# Patient Record
Sex: FEMALE | Race: WHITE | Employment: OTHER | URBAN - METROPOLITAN AREA
[De-identification: names, ages, dates, MRNs, and addresses within clinical notes are randomized per-mention and may not be internally consistent; named-entity substitution may affect disease eponyms.]

---

## 2020-07-24 ENCOUNTER — HOSPITAL ENCOUNTER (OUTPATIENT)
Dept: AUDIOLOGY | Age: 70
Discharge: HOME | End: 2020-07-24
Payer: MEDICARE

## 2020-07-24 PROCEDURE — 92545 OSCILLATING TRACKING TEST: CPT | Performed by: AUDIOLOGIST

## 2020-07-24 PROCEDURE — 92544 OPTOKINETIC NYSTAGMUS TEST: CPT | Performed by: AUDIOLOGIST

## 2020-07-24 PROCEDURE — 92537 CALORIC VSTBLR TEST W/REC: CPT | Performed by: AUDIOLOGIST

## 2020-07-24 PROCEDURE — 92541 SPONTANEOUS NYSTAGMUS TEST: CPT | Performed by: AUDIOLOGIST

## 2020-07-24 PROCEDURE — 92542 POSITIONAL NYSTAGMUS TEST: CPT | Performed by: AUDIOLOGIST

## 2020-07-24 NOTE — PROGRESS NOTES
VNG EVALUATION    REASON FOR REFERRAL:  This patient was referred for VNG testing by Dr. Donato Lowe due to dizziness. The patient reported dizziness that began 10-11 months ago. She reported dizziness when getting up quickly or bending over. She has fallen 5x in the last 3 months. She denied any nausea and vomiting. She reported that she used to take medications for high blood pressure, but since gastric bypass surgery she has been able to stop taking the medications. She reported that the dizziness occurs daily and the room is spinning when this happens. RESULTS:  Bithermal caloric irrigations revealed appropriate beating nystagmus with no unilateral weakness. Directional preponderance and fixation suppression were within normal limits. No irregular eye movements were recorded during saccades, pendular tracking, or optokinetic testing. No significant nystagmus was recorded during gaze or positional testing, with the exception of some rotary nystagmus during positional head right with vision. IMPRESSION:  Caloric testing was within normal limits. Oculomotor and positional test results were within normal limits with the exception of positional head right with vision which cannot rule out possible central involvement. If I can be of further assistance or provide additional information, please do not hesitate to contact this office.     Thank you for the referral.      __________________________________  Electronically signed by Michael Arellano on 7/24/2020 at 5:54 PM

## 2023-03-24 ENCOUNTER — TELEPHONE (OUTPATIENT)
Dept: PRIMARY CARE | Facility: CLINIC | Age: 73
End: 2023-03-24
Payer: MEDICARE

## 2023-03-24 DIAGNOSIS — I10 PRIMARY HYPERTENSION: ICD-10-CM

## 2023-03-24 DIAGNOSIS — Z79.4 TYPE 2 DIABETES MELLITUS WITH DIABETIC AUTONOMIC NEUROPATHY, WITH LONG-TERM CURRENT USE OF INSULIN (MULTI): ICD-10-CM

## 2023-03-24 DIAGNOSIS — E66.09 CLASS 1 OBESITY DUE TO EXCESS CALORIES WITH SERIOUS COMORBIDITY AND BODY MASS INDEX (BMI) OF 30.0 TO 30.9 IN ADULT: ICD-10-CM

## 2023-03-24 DIAGNOSIS — E55.9 VITAMIN D DEFICIENCY: ICD-10-CM

## 2023-03-24 DIAGNOSIS — E87.6 HYPOKALEMIA: Primary | ICD-10-CM

## 2023-03-24 DIAGNOSIS — I50.22 CHRONIC SYSTOLIC CONGESTIVE HEART FAILURE (MULTI): ICD-10-CM

## 2023-03-24 DIAGNOSIS — I48.11 LONGSTANDING PERSISTENT ATRIAL FIBRILLATION (MULTI): ICD-10-CM

## 2023-03-24 DIAGNOSIS — Z98.84 GASTRIC BYPASS STATUS FOR OBESITY: ICD-10-CM

## 2023-03-24 DIAGNOSIS — E11.43 TYPE 2 DIABETES MELLITUS WITH DIABETIC AUTONOMIC NEUROPATHY, WITH LONG-TERM CURRENT USE OF INSULIN (MULTI): ICD-10-CM

## 2023-03-29 RX ORDER — POTASSIUM CHLORIDE 750 MG/1
2 CAPSULE, EXTENDED RELEASE ORAL 2 TIMES DAILY
COMMUNITY
Start: 2021-07-02 | End: 2023-03-29 | Stop reason: ENTERED-IN-ERROR

## 2023-03-29 NOTE — TELEPHONE ENCOUNTER
Called patient and she stated she is taking the potassium 10 meq twice a day so I tried to add that one but she has other orders for the 20 mEq (which would make more sense to take). There was an outpatient order for the 20 mEq, please send whichever you prefer.     [facility] pharmacy.

## 2023-04-05 PROBLEM — Z79.4 TYPE 2 DIABETES MELLITUS WITH HYPERGLYCEMIA, WITH LONG-TERM CURRENT USE OF INSULIN (MULTI): Status: ACTIVE | Noted: 2023-04-05

## 2023-04-05 PROBLEM — E11.65 TYPE 2 DIABETES MELLITUS WITH HYPERGLYCEMIA, WITH LONG-TERM CURRENT USE OF INSULIN (MULTI): Status: ACTIVE | Noted: 2023-04-05

## 2023-04-05 RX ORDER — POTASSIUM CHLORIDE 750 MG/1
10 TABLET, FILM COATED, EXTENDED RELEASE ORAL 2 TIMES DAILY
Qty: 180 TABLET | Refills: 3 | Status: SHIPPED | OUTPATIENT
Start: 2023-04-05 | End: 2024-04-09

## 2023-04-05 NOTE — TELEPHONE ENCOUNTER
Pt taking 10meq twice daily.   States this is her 3 rd request.     Also needs order for lab work.

## 2023-04-10 PROBLEM — M17.0 DEGENERATIVE ARTHRITIS OF KNEE, BILATERAL: Status: ACTIVE | Noted: 2023-04-10

## 2023-04-10 PROBLEM — J34.0 NASAL SEPTUM ULCERATION: Status: ACTIVE | Noted: 2023-04-10

## 2023-04-10 PROBLEM — R42 DIZZINESS: Status: ACTIVE | Noted: 2023-04-10

## 2023-04-10 PROBLEM — I95.1 ORTHOSTATIC HYPOTENSION: Status: ACTIVE | Noted: 2023-04-10

## 2023-04-10 PROBLEM — U07.1 COVID-19 VIRUS INFECTION: Status: ACTIVE | Noted: 2023-04-10

## 2023-04-10 PROBLEM — R53.81 MALAISE AND FATIGUE: Status: ACTIVE | Noted: 2023-04-10

## 2023-04-10 PROBLEM — D34 HURTHLE CELL NEOPLASM OF THYROID: Status: ACTIVE | Noted: 2023-04-10

## 2023-04-10 PROBLEM — M70.52 BURSITIS OF LEFT KNEE: Status: ACTIVE | Noted: 2023-04-10

## 2023-04-10 PROBLEM — R11.2 POST-OPERATIVE NAUSEA AND VOMITING: Status: ACTIVE | Noted: 2023-04-10

## 2023-04-10 PROBLEM — K74.60 CIRRHOSIS (MULTI): Status: ACTIVE | Noted: 2023-04-10

## 2023-04-10 PROBLEM — I48.91 ATRIAL FIBRILLATION (MULTI): Status: ACTIVE | Noted: 2023-04-10

## 2023-04-10 PROBLEM — R63.5 ABNORMAL WEIGHT GAIN: Status: ACTIVE | Noted: 2023-04-10

## 2023-04-10 PROBLEM — J98.4 RESTRICTIVE LUNG DISEASE: Status: ACTIVE | Noted: 2023-04-10

## 2023-04-10 PROBLEM — K29.70 GASTRITIS: Status: ACTIVE | Noted: 2023-04-10

## 2023-04-10 PROBLEM — R53.83 MALAISE AND FATIGUE: Status: ACTIVE | Noted: 2023-04-10

## 2023-04-10 PROBLEM — R05.9 COUGH: Status: ACTIVE | Noted: 2023-04-10

## 2023-04-10 PROBLEM — E11.42 DIABETIC PERIPHERAL NEUROPATHY (MULTI): Status: ACTIVE | Noted: 2023-04-10

## 2023-04-10 PROBLEM — F32.5 DEPRESSION, MAJOR, IN REMISSION (CMS-HCC): Status: ACTIVE | Noted: 2023-04-10

## 2023-04-10 PROBLEM — R60.9 PERIPHERAL EDEMA: Status: ACTIVE | Noted: 2023-04-10

## 2023-04-10 PROBLEM — R05.8 POST-VIRAL COUGH SYNDROME: Status: ACTIVE | Noted: 2023-04-10

## 2023-04-10 PROBLEM — B37.2 CUTANEOUS CANDIDIASIS: Status: ACTIVE | Noted: 2023-04-10

## 2023-04-10 PROBLEM — R80.9 URINE TEST POSITIVE FOR MICROALBUMINURIA: Status: ACTIVE | Noted: 2023-04-10

## 2023-04-10 PROBLEM — K52.9 CHRONIC DIARRHEA: Status: ACTIVE | Noted: 2023-04-10

## 2023-04-10 PROBLEM — R19.5 POSITIVE COLORECTAL CANCER SCREENING USING COLOGUARD TEST: Status: ACTIVE | Noted: 2023-04-10

## 2023-04-10 PROBLEM — K91.2 INTESTINAL MALABSORPTION FOLLOWING GASTRECTOMY (HHS-HCC): Status: ACTIVE | Noted: 2023-04-10

## 2023-04-10 PROBLEM — E66.9 CLASS 1 OBESITY WITH BODY MASS INDEX (BMI) OF 30.0 TO 30.9 IN ADULT: Status: ACTIVE | Noted: 2023-04-10

## 2023-04-10 PROBLEM — T38.3X5A HYPOGLYCEMIA DUE TO INSULIN: Status: ACTIVE | Noted: 2023-04-10

## 2023-04-10 PROBLEM — L97.929 LEG ULCER, LEFT (MULTI): Status: ACTIVE | Noted: 2023-04-10

## 2023-04-10 PROBLEM — M25.561 BILATERAL CHRONIC KNEE PAIN: Status: ACTIVE | Noted: 2023-04-10

## 2023-04-10 PROBLEM — R31.9 HEMATURIA: Status: ACTIVE | Noted: 2023-04-10

## 2023-04-10 PROBLEM — I89.0 LYMPHEDEMA: Status: ACTIVE | Noted: 2023-04-10

## 2023-04-10 PROBLEM — R06.00 DYSPNEA: Status: ACTIVE | Noted: 2023-04-10

## 2023-04-10 PROBLEM — R06.09 DYSPNEA ON EXERTION: Status: ACTIVE | Noted: 2023-04-10

## 2023-04-10 PROBLEM — E78.1 HYPERTRIGLYCERIDEMIA: Status: ACTIVE | Noted: 2023-04-10

## 2023-04-10 PROBLEM — R06.02 SOB (SHORTNESS OF BREATH) ON EXERTION: Status: ACTIVE | Noted: 2023-04-10

## 2023-04-10 PROBLEM — B36.9 FUNGAL RASH OF TRUNK: Status: ACTIVE | Noted: 2023-04-10

## 2023-04-10 PROBLEM — Z98.84 H/O BARIATRIC SURGERY: Status: ACTIVE | Noted: 2023-04-10

## 2023-04-10 PROBLEM — K21.9 GERD (GASTROESOPHAGEAL REFLUX DISEASE): Status: ACTIVE | Noted: 2023-04-10

## 2023-04-10 PROBLEM — M24.80 CERVICAL SPINE CREPITUS: Status: ACTIVE | Noted: 2023-04-10

## 2023-04-10 PROBLEM — Z90.3 INTESTINAL MALABSORPTION FOLLOWING GASTRECTOMY (HHS-HCC): Status: ACTIVE | Noted: 2023-04-10

## 2023-04-10 PROBLEM — N39.0 UTI (URINARY TRACT INFECTION): Status: ACTIVE | Noted: 2023-04-10

## 2023-04-10 PROBLEM — H81.4 VERTIGO OF CENTRAL ORIGIN: Status: ACTIVE | Noted: 2023-04-10

## 2023-04-10 PROBLEM — Z89.422: Status: ACTIVE | Noted: 2023-04-10

## 2023-04-10 PROBLEM — E11.319: Status: ACTIVE | Noted: 2023-04-10

## 2023-04-10 PROBLEM — Z98.890 POST-OPERATIVE NAUSEA AND VOMITING: Status: ACTIVE | Noted: 2023-04-10

## 2023-04-10 PROBLEM — I10 HYPERTENSION: Status: ACTIVE | Noted: 2023-04-10

## 2023-04-10 PROBLEM — R30.0 DYSURIA: Status: ACTIVE | Noted: 2023-04-10

## 2023-04-10 PROBLEM — R09.02 HYPOXIA: Status: ACTIVE | Noted: 2023-04-10

## 2023-04-10 PROBLEM — N18.9 CHRONIC RENAL FAILURE: Status: ACTIVE | Noted: 2023-04-10

## 2023-04-10 PROBLEM — R40.0 DAYTIME SOMNOLENCE: Status: ACTIVE | Noted: 2023-04-10

## 2023-04-10 PROBLEM — M25.50 JOINT PAIN OF LEG: Status: ACTIVE | Noted: 2023-04-10

## 2023-04-10 PROBLEM — M25.562 BILATERAL CHRONIC KNEE PAIN: Status: ACTIVE | Noted: 2023-04-10

## 2023-04-10 PROBLEM — R32 INCONTINENCE: Status: ACTIVE | Noted: 2023-04-10

## 2023-04-10 PROBLEM — I51.7 CARDIOMEGALY: Status: ACTIVE | Noted: 2023-04-10

## 2023-04-10 PROBLEM — N20.0 KIDNEY STONE ON RIGHT SIDE: Status: ACTIVE | Noted: 2023-04-10

## 2023-04-10 PROBLEM — I87.2 CHRONIC STASIS DERMATITIS: Status: ACTIVE | Noted: 2023-04-10

## 2023-04-10 PROBLEM — G89.29 CHRONIC PAIN: Status: ACTIVE | Noted: 2023-04-10

## 2023-04-10 PROBLEM — T84.50XA PROSTHETIC JOINT INFECTION (CMS-HCC): Status: ACTIVE | Noted: 2023-04-10

## 2023-04-10 PROBLEM — M48.02 DEGENERATIVE CERVICAL SPINAL STENOSIS: Status: ACTIVE | Noted: 2023-04-10

## 2023-04-10 PROBLEM — G89.18 POST-OPERATIVE PAIN: Status: ACTIVE | Noted: 2023-04-10

## 2023-04-10 PROBLEM — L97.529 FOOT ULCER, LEFT (MULTI): Status: ACTIVE | Noted: 2023-04-10

## 2023-04-10 PROBLEM — E16.0 HYPOGLYCEMIA DUE TO INSULIN: Status: ACTIVE | Noted: 2023-04-10

## 2023-04-10 PROBLEM — L23.7 POISON IVY DERMATITIS: Status: ACTIVE | Noted: 2023-04-10

## 2023-04-10 PROBLEM — H61.21 IMPACTED CERUMEN OF RIGHT EAR: Status: ACTIVE | Noted: 2023-04-10

## 2023-04-10 PROBLEM — G47.33 OBSTRUCTIVE SLEEP APNEA: Status: ACTIVE | Noted: 2023-04-10

## 2023-04-10 PROBLEM — Z98.84 STATUS POST GASTRIC BYPASS FOR OBESITY: Status: ACTIVE | Noted: 2023-04-10

## 2023-04-10 PROBLEM — E11.9 TYPE 2 DIABETES MELLITUS (MULTI): Status: ACTIVE | Noted: 2023-04-10

## 2023-04-10 PROBLEM — F51.04 CHRONIC INSOMNIA: Status: ACTIVE | Noted: 2023-04-10

## 2023-04-10 PROBLEM — R91.1 LUNG NODULE: Status: ACTIVE | Noted: 2023-04-10

## 2023-04-10 PROBLEM — J45.909 REACTIVE AIRWAY DISEASE (HHS-HCC): Status: ACTIVE | Noted: 2023-04-10

## 2023-04-10 PROBLEM — E66.9 OBESITY (BMI 30-39.9): Status: ACTIVE | Noted: 2023-04-10

## 2023-04-10 PROBLEM — F41.9 ANXIETY: Status: ACTIVE | Noted: 2023-04-10

## 2023-04-10 PROBLEM — H61.23 BILATERAL IMPACTED CERUMEN: Status: ACTIVE | Noted: 2023-04-10

## 2023-04-10 PROBLEM — B37.31 VAGINAL CANDIDIASIS: Status: ACTIVE | Noted: 2023-04-10

## 2023-04-10 PROBLEM — F32.A CHRONIC DEPRESSION: Status: ACTIVE | Noted: 2023-04-10

## 2023-04-10 PROBLEM — I77.810 THORACIC AORTIC ECTASIA (CMS-HCC): Status: ACTIVE | Noted: 2023-04-10

## 2023-04-10 PROBLEM — R19.5 POSITIVE FIT (FECAL IMMUNOCHEMICAL TEST): Status: ACTIVE | Noted: 2023-04-10

## 2023-04-10 PROBLEM — G89.29 BILATERAL CHRONIC KNEE PAIN: Status: ACTIVE | Noted: 2023-04-10

## 2023-04-10 PROBLEM — F33.40 RECURRENT MAJOR DEPRESSIVE DISORDER, IN REMISSION (CMS-HCC): Status: ACTIVE | Noted: 2023-04-10

## 2023-04-10 PROBLEM — M19.019 ARTHROSIS OF SHOULDER: Status: ACTIVE | Noted: 2023-04-10

## 2023-04-10 PROBLEM — I71.20 THORACIC AORTIC ANEURYSM (CMS-HCC): Status: ACTIVE | Noted: 2023-04-10

## 2023-04-10 PROBLEM — M85.80 OSTEOPENIA: Status: ACTIVE | Noted: 2023-04-10

## 2023-04-10 PROBLEM — I50.9 CONGESTIVE HEART FAILURE (MULTI): Status: ACTIVE | Noted: 2023-04-10

## 2023-04-10 PROBLEM — J30.9 ALLERGIC RHINITIS: Status: ACTIVE | Noted: 2023-04-10

## 2023-04-10 PROBLEM — K76.9 LIVER PROBLEM: Status: ACTIVE | Noted: 2023-04-10

## 2023-04-10 PROBLEM — R10.9 ABDOMINAL CRAMPING: Status: ACTIVE | Noted: 2023-04-10

## 2023-04-10 PROBLEM — E89.0 HYPOTHYROIDISM, POSTSURGICAL: Status: ACTIVE | Noted: 2023-04-10

## 2023-04-10 RX ORDER — ASPIRIN 325 MG
50000 TABLET, DELAYED RELEASE (ENTERIC COATED) ORAL
Status: ON HOLD | COMMUNITY
Start: 2020-08-28 | End: 2024-03-27 | Stop reason: ENTERED-IN-ERROR

## 2023-04-10 RX ORDER — OMEPRAZOLE 40 MG/1
CAPSULE, DELAYED RELEASE ORAL
COMMUNITY
Start: 2019-12-04 | End: 2024-04-11

## 2023-04-10 RX ORDER — FLASH GLUCOSE SENSOR
KIT MISCELLANEOUS
COMMUNITY
Start: 2023-03-21 | End: 2023-05-12 | Stop reason: SDUPTHER

## 2023-04-10 RX ORDER — ASPIRIN 81 MG/1
1 TABLET ORAL
Status: ON HOLD | COMMUNITY
Start: 2020-04-21

## 2023-04-10 RX ORDER — GABAPENTIN 300 MG/1
1 CAPSULE ORAL 3 TIMES DAILY
COMMUNITY
Start: 2016-11-04 | End: 2023-09-07 | Stop reason: SDUPTHER

## 2023-04-10 RX ORDER — VENLAFAXINE HYDROCHLORIDE 150 MG/1
1 CAPSULE, EXTENDED RELEASE ORAL DAILY
COMMUNITY
Start: 2021-10-01 | End: 2023-05-12

## 2023-04-10 RX ORDER — AMIODARONE HYDROCHLORIDE 200 MG/1
1 TABLET ORAL DAILY
COMMUNITY
Start: 2023-02-11 | End: 2023-05-12 | Stop reason: SDUPTHER

## 2023-04-10 RX ORDER — INSULIN LISPRO 100 [IU]/ML
30 INJECTION, SUSPENSION SUBCUTANEOUS 2 TIMES DAILY
Status: ON HOLD | COMMUNITY
Start: 2023-02-17 | End: 2024-03-27 | Stop reason: ENTERED-IN-ERROR

## 2023-04-10 RX ORDER — PSEUDOEPHEDRINE HCL 30 MG
30 TABLET ORAL AS NEEDED
COMMUNITY
End: 2024-02-01 | Stop reason: HOSPADM

## 2023-04-10 RX ORDER — FAMOTIDINE 40 MG/1
40 TABLET, FILM COATED ORAL DAILY
COMMUNITY
Start: 2023-02-23 | End: 2023-05-12 | Stop reason: SDUPTHER

## 2023-04-10 RX ORDER — HUMAN INSULIN 100 [USP'U]/ML
INJECTION, SUSPENSION SUBCUTANEOUS
COMMUNITY
End: 2023-05-12 | Stop reason: CLARIF

## 2023-04-10 RX ORDER — METFORMIN HYDROCHLORIDE 1000 MG/1
1 TABLET ORAL 2 TIMES DAILY
COMMUNITY
Start: 2022-02-10 | End: 2023-06-15 | Stop reason: ALTCHOICE

## 2023-04-10 RX ORDER — TORSEMIDE 20 MG/1
2 TABLET ORAL DAILY
Status: ON HOLD | COMMUNITY
Start: 2018-08-30 | End: 2024-03-27 | Stop reason: ENTERED-IN-ERROR

## 2023-04-10 RX ORDER — PRENATAL VIT CALC,IRON,FOLIC
1 TABLET ORAL DAILY
Status: ON HOLD | COMMUNITY
Start: 2020-04-21

## 2023-04-10 RX ORDER — NYSTATIN 100000 [USP'U]/G
1 POWDER TOPICAL 2 TIMES DAILY PRN
Status: ON HOLD | COMMUNITY
Start: 2022-06-29 | End: 2024-03-27 | Stop reason: ENTERED-IN-ERROR

## 2023-04-10 RX ORDER — BLOOD SUGAR DIAGNOSTIC
STRIP MISCELLANEOUS
Status: ON HOLD | COMMUNITY
Start: 2020-03-18

## 2023-04-10 RX ORDER — SPIRONOLACTONE 25 MG/1
1 TABLET ORAL DAILY
COMMUNITY
Start: 2021-01-08 | End: 2024-04-11

## 2023-04-10 RX ORDER — TRAZODONE HYDROCHLORIDE 50 MG/1
1 TABLET ORAL NIGHTLY
COMMUNITY
End: 2023-05-15

## 2023-04-10 RX ORDER — MULTIVIT-MIN/IRON/FOLIC ACID/K 18-600-40
1 CAPSULE ORAL DAILY
Status: ON HOLD | COMMUNITY
Start: 2020-08-28

## 2023-04-10 RX ORDER — CLOTRIMAZOLE AND BETAMETHASONE DIPROPIONATE 10; .64 MG/G; MG/G
1 CREAM TOPICAL 2 TIMES DAILY PRN
Status: ON HOLD | COMMUNITY
Start: 2022-06-29 | End: 2024-03-27 | Stop reason: ENTERED-IN-ERROR

## 2023-04-10 RX ORDER — VENLAFAXINE HYDROCHLORIDE 75 MG/1
1 CAPSULE, EXTENDED RELEASE ORAL DAILY
COMMUNITY
Start: 2021-04-05 | End: 2023-05-12

## 2023-04-10 RX ORDER — OXYCODONE AND ACETAMINOPHEN 10; 325 MG/1; MG/1
1 TABLET ORAL EVERY 4 HOURS PRN
COMMUNITY
Start: 2022-09-28 | End: 2023-11-13 | Stop reason: SDUPTHER

## 2023-04-10 RX ORDER — METOPROLOL SUCCINATE 50 MG/1
1.5 TABLET, EXTENDED RELEASE ORAL DAILY
COMMUNITY
Start: 2022-05-24 | End: 2023-05-12 | Stop reason: SDUPTHER

## 2023-04-10 RX ORDER — LEVOTHYROXINE SODIUM 175 UG/1
1 TABLET ORAL DAILY
COMMUNITY
Start: 2021-06-22 | End: 2024-02-13 | Stop reason: HOSPADM

## 2023-04-10 RX ORDER — ACETAMINOPHEN, ASPIRIN (NSAID), AND CAFFEINE 250; 250; 65 MG/1; MG/1; MG/1
1 TABLET, FILM COATED ORAL EVERY 6 HOURS PRN
COMMUNITY
Start: 2020-05-18 | End: 2024-02-01 | Stop reason: HOSPADM

## 2023-05-12 ENCOUNTER — OFFICE VISIT (OUTPATIENT)
Dept: PRIMARY CARE | Facility: CLINIC | Age: 73
End: 2023-05-12
Payer: MEDICARE

## 2023-05-12 VITALS
DIASTOLIC BLOOD PRESSURE: 60 MMHG | TEMPERATURE: 98 F | BODY MASS INDEX: 33.18 KG/M2 | HEART RATE: 87 BPM | RESPIRATION RATE: 16 BRPM | OXYGEN SATURATION: 95 % | SYSTOLIC BLOOD PRESSURE: 130 MMHG | HEIGHT: 72 IN | WEIGHT: 245 LBS

## 2023-05-12 DIAGNOSIS — K21.9 GASTROESOPHAGEAL REFLUX DISEASE WITHOUT ESOPHAGITIS: ICD-10-CM

## 2023-05-12 DIAGNOSIS — Z23 NEED FOR PROPHYLACTIC VACCINATION AGAINST STREPTOCOCCUS PNEUMONIAE (PNEUMOCOCCUS): ICD-10-CM

## 2023-05-12 DIAGNOSIS — E66.09 CLASS 1 OBESITY DUE TO EXCESS CALORIES WITH SERIOUS COMORBIDITY AND BODY MASS INDEX (BMI) OF 30.0 TO 30.9 IN ADULT: ICD-10-CM

## 2023-05-12 DIAGNOSIS — F33.40 RECURRENT MAJOR DEPRESSIVE DISORDER, IN REMISSION (CMS-HCC): ICD-10-CM

## 2023-05-12 DIAGNOSIS — N39.46 MIXED STRESS AND URGE URINARY INCONTINENCE: ICD-10-CM

## 2023-05-12 DIAGNOSIS — E78.1 HYPERTRIGLYCERIDEMIA: ICD-10-CM

## 2023-05-12 DIAGNOSIS — R06.09 DYSPNEA ON EXERTION: ICD-10-CM

## 2023-05-12 DIAGNOSIS — L89.623 PRESSURE ULCER OF LEFT HEEL, STAGE 3 (MULTI): Primary | ICD-10-CM

## 2023-05-12 DIAGNOSIS — E11.65 TYPE 2 DIABETES MELLITUS WITH HYPERGLYCEMIA, WITH LONG-TERM CURRENT USE OF INSULIN (MULTI): ICD-10-CM

## 2023-05-12 DIAGNOSIS — J96.01 ACUTE RESPIRATORY FAILURE WITH HYPOXIA (MULTI): ICD-10-CM

## 2023-05-12 DIAGNOSIS — J44.9 CHRONIC OBSTRUCTIVE PULMONARY DISEASE, UNSPECIFIED COPD TYPE (MULTI): ICD-10-CM

## 2023-05-12 DIAGNOSIS — Z89.422: ICD-10-CM

## 2023-05-12 DIAGNOSIS — G89.29 BILATERAL CHRONIC KNEE PAIN: ICD-10-CM

## 2023-05-12 DIAGNOSIS — I50.42 CHRONIC COMBINED SYSTOLIC AND DIASTOLIC HEART FAILURE (MULTI): ICD-10-CM

## 2023-05-12 DIAGNOSIS — I87.2 CHRONIC STASIS DERMATITIS: ICD-10-CM

## 2023-05-12 DIAGNOSIS — E11.42 TYPE 2 DIABETES MELLITUS WITH DIABETIC POLYNEUROPATHY, WITH LONG-TERM CURRENT USE OF INSULIN (MULTI): ICD-10-CM

## 2023-05-12 DIAGNOSIS — Z79.4 TYPE 2 DIABETES MELLITUS WITH HYPERGLYCEMIA, WITH LONG-TERM CURRENT USE OF INSULIN (MULTI): ICD-10-CM

## 2023-05-12 DIAGNOSIS — K74.4 SECONDARY BILIARY CIRRHOSIS (MULTI): ICD-10-CM

## 2023-05-12 DIAGNOSIS — I71.21 ANEURYSM OF ASCENDING AORTA WITHOUT RUPTURE (CMS-HCC): ICD-10-CM

## 2023-05-12 DIAGNOSIS — E89.0 HYPOTHYROIDISM, POSTSURGICAL: ICD-10-CM

## 2023-05-12 DIAGNOSIS — Z79.899 MEDICATION MANAGEMENT: ICD-10-CM

## 2023-05-12 DIAGNOSIS — E66.01 MORBID OBESITY (MULTI): ICD-10-CM

## 2023-05-12 DIAGNOSIS — M25.561 BILATERAL CHRONIC KNEE PAIN: ICD-10-CM

## 2023-05-12 DIAGNOSIS — M17.0 PRIMARY OSTEOARTHRITIS OF BOTH KNEES: ICD-10-CM

## 2023-05-12 DIAGNOSIS — E66.9 OBESITY (BMI 30-39.9): ICD-10-CM

## 2023-05-12 DIAGNOSIS — E11.42 DIABETIC PERIPHERAL NEUROPATHY (MULTI): ICD-10-CM

## 2023-05-12 DIAGNOSIS — I48.0 PAROXYSMAL ATRIAL FIBRILLATION (MULTI): ICD-10-CM

## 2023-05-12 DIAGNOSIS — I10 PRIMARY HYPERTENSION: ICD-10-CM

## 2023-05-12 DIAGNOSIS — F32.A CHRONIC DEPRESSION: ICD-10-CM

## 2023-05-12 DIAGNOSIS — Z79.4 TYPE 2 DIABETES MELLITUS WITH DIABETIC POLYNEUROPATHY, WITH LONG-TERM CURRENT USE OF INSULIN (MULTI): ICD-10-CM

## 2023-05-12 DIAGNOSIS — G89.4 CHRONIC PAIN SYNDROME: ICD-10-CM

## 2023-05-12 DIAGNOSIS — F32.5 DEPRESSION, MAJOR, IN REMISSION (CMS-HCC): ICD-10-CM

## 2023-05-12 DIAGNOSIS — Z79.891 NARCOTIC USE AGREEMENT EXISTS: ICD-10-CM

## 2023-05-12 DIAGNOSIS — Z98.84 H/O BARIATRIC SURGERY: ICD-10-CM

## 2023-05-12 DIAGNOSIS — N18.31 CHRONIC RENAL FAILURE, STAGE 3A (MULTI): ICD-10-CM

## 2023-05-12 DIAGNOSIS — M25.562 BILATERAL CHRONIC KNEE PAIN: ICD-10-CM

## 2023-05-12 DIAGNOSIS — N18.31 STAGE 3A CHRONIC KIDNEY DISEASE (MULTI): ICD-10-CM

## 2023-05-12 DIAGNOSIS — I50.42 CHRONIC COMBINED SYSTOLIC AND DIASTOLIC CONGESTIVE HEART FAILURE (MULTI): ICD-10-CM

## 2023-05-12 PROBLEM — R09.02 HYPOXIA: Status: RESOLVED | Noted: 2023-04-10 | Resolved: 2023-05-12

## 2023-05-12 PROBLEM — D34 HURTHLE CELL NEOPLASM OF THYROID: Status: RESOLVED | Noted: 2023-04-10 | Resolved: 2023-05-12

## 2023-05-12 PROBLEM — T84.50XA PROSTHETIC JOINT INFECTION (CMS-HCC): Status: RESOLVED | Noted: 2023-04-10 | Resolved: 2023-05-12

## 2023-05-12 PROCEDURE — 3078F DIAST BP <80 MM HG: CPT | Performed by: FAMILY MEDICINE

## 2023-05-12 PROCEDURE — 1160F RVW MEDS BY RX/DR IN RCRD: CPT | Performed by: FAMILY MEDICINE

## 2023-05-12 PROCEDURE — G0009 ADMIN PNEUMOCOCCAL VACCINE: HCPCS | Performed by: FAMILY MEDICINE

## 2023-05-12 PROCEDURE — 3075F SYST BP GE 130 - 139MM HG: CPT | Performed by: FAMILY MEDICINE

## 2023-05-12 PROCEDURE — 80368 SEDATIVE HYPNOTICS: CPT

## 2023-05-12 PROCEDURE — 1036F TOBACCO NON-USER: CPT | Performed by: FAMILY MEDICINE

## 2023-05-12 PROCEDURE — 90677 PCV20 VACCINE IM: CPT | Performed by: FAMILY MEDICINE

## 2023-05-12 PROCEDURE — 1157F ADVNC CARE PLAN IN RCRD: CPT | Performed by: FAMILY MEDICINE

## 2023-05-12 PROCEDURE — 82043 UR ALBUMIN QUANTITATIVE: CPT

## 2023-05-12 PROCEDURE — 82570 ASSAY OF URINE CREATININE: CPT

## 2023-05-12 PROCEDURE — 80361 OPIATES 1 OR MORE: CPT

## 2023-05-12 PROCEDURE — 3008F BODY MASS INDEX DOCD: CPT | Performed by: FAMILY MEDICINE

## 2023-05-12 PROCEDURE — 80358 DRUG SCREENING METHADONE: CPT

## 2023-05-12 PROCEDURE — 80365 DRUG SCREENING OXYCODONE: CPT

## 2023-05-12 PROCEDURE — 99215 OFFICE O/P EST HI 40 MIN: CPT | Performed by: FAMILY MEDICINE

## 2023-05-12 PROCEDURE — 80373 DRUG SCREENING TRAMADOL: CPT

## 2023-05-12 PROCEDURE — 1159F MED LIST DOCD IN RCRD: CPT | Performed by: FAMILY MEDICINE

## 2023-05-12 PROCEDURE — 80307 DRUG TEST PRSMV CHEM ANLYZR: CPT

## 2023-05-12 PROCEDURE — 80354 DRUG SCREENING FENTANYL: CPT

## 2023-05-12 PROCEDURE — 80346 BENZODIAZEPINES1-12: CPT

## 2023-05-12 PROCEDURE — 3066F NEPHROPATHY DOC TX: CPT | Performed by: FAMILY MEDICINE

## 2023-05-12 RX ORDER — INSULIN LISPRO 100 [IU]/ML
INJECTION, SOLUTION INTRAVENOUS; SUBCUTANEOUS
COMMUNITY
Start: 2023-02-17 | End: 2023-11-13

## 2023-05-12 RX ORDER — OXYCODONE AND ACETAMINOPHEN 10; 325 MG/1; MG/1
1 TABLET ORAL EVERY 4 HOURS PRN
Qty: 180 TABLET | Refills: 0 | Status: SHIPPED | OUTPATIENT
Start: 2023-07-15 | End: 2023-08-14 | Stop reason: SDUPTHER

## 2023-05-12 RX ORDER — NALOXONE HYDROCHLORIDE 4 MG/.1ML
4 SPRAY NASAL AS NEEDED
Qty: 1 EACH | Refills: 0 | Status: SHIPPED | OUTPATIENT
Start: 2023-05-12 | End: 2023-05-13 | Stop reason: WASHOUT

## 2023-05-12 RX ORDER — OXYCODONE AND ACETAMINOPHEN 10; 325 MG/1; MG/1
1 TABLET ORAL EVERY 4 HOURS PRN
Qty: 180 TABLET | Refills: 0 | Status: SHIPPED | OUTPATIENT
Start: 2023-06-16 | End: 2023-07-16

## 2023-05-12 RX ORDER — FLASH GLUCOSE SENSOR
1 KIT MISCELLANEOUS
Qty: 6 EACH | Refills: 3 | Status: SHIPPED | OUTPATIENT
Start: 2023-05-12 | End: 2024-02-13 | Stop reason: SDUPTHER

## 2023-05-12 RX ORDER — FAMOTIDINE 40 MG/1
40 TABLET, FILM COATED ORAL DAILY
Qty: 90 TABLET | Refills: 3 | Status: SHIPPED | OUTPATIENT
Start: 2023-05-12 | End: 2023-09-07 | Stop reason: SDUPTHER

## 2023-05-12 RX ORDER — PEN NEEDLE, DIABETIC 29 G X1/2"
3 NEEDLE, DISPOSABLE MISCELLANEOUS 4 TIMES DAILY PRN
Qty: 450 EACH | Refills: 3 | Status: SHIPPED | OUTPATIENT
Start: 2023-05-12 | End: 2024-05-11

## 2023-05-12 RX ORDER — METOPROLOL SUCCINATE 50 MG/1
50 TABLET, EXTENDED RELEASE ORAL DAILY
Qty: 90 TABLET | Refills: 3 | Status: SHIPPED | OUTPATIENT
Start: 2023-05-12 | End: 2024-04-09

## 2023-05-12 RX ORDER — DESVENLAFAXINE 100 MG/1
100 TABLET, EXTENDED RELEASE ORAL DAILY
Qty: 90 TABLET | Refills: 3 | Status: SHIPPED | OUTPATIENT
Start: 2023-05-12 | End: 2024-04-09

## 2023-05-12 RX ORDER — AMIODARONE HYDROCHLORIDE 200 MG/1
200 TABLET ORAL DAILY
Qty: 90 TABLET | Refills: 3 | Status: SHIPPED | OUTPATIENT
Start: 2023-05-12 | End: 2024-05-13 | Stop reason: SDUPTHER

## 2023-05-12 RX ORDER — OXYCODONE AND ACETAMINOPHEN 10; 325 MG/1; MG/1
1 TABLET ORAL EVERY 4 HOURS PRN
Qty: 180 TABLET | Refills: 0 | Status: SHIPPED | OUTPATIENT
Start: 2023-05-17 | End: 2023-06-16

## 2023-05-12 ASSESSMENT — ENCOUNTER SYMPTOMS
BACK PAIN: 1
SHORTNESS OF BREATH: 1
LOSS OF SENSATION IN FEET: 0
OCCASIONAL FEELINGS OF UNSTEADINESS: 1
JOINT SWELLING: 1
DEPRESSION: 0
ARTHRALGIAS: 1
NECK PAIN: 1

## 2023-05-12 ASSESSMENT — PATIENT HEALTH QUESTIONNAIRE - PHQ9
1. LITTLE INTEREST OR PLEASURE IN DOING THINGS: NOT AT ALL
SUM OF ALL RESPONSES TO PHQ9 QUESTIONS 1 AND 2: 0
2. FEELING DOWN, DEPRESSED OR HOPELESS: NOT AT ALL

## 2023-05-12 NOTE — PROGRESS NOTES
OARRS:  No data recorded  I have personally reviewed the OARRS report for Teresa Matthews. I have considered the risks of abuse, dependence, addiction and diversion and I believe that it is clinically appropriate for Teresa Matthews to be prescribed this medication    Is the patient prescribed a combination of a benzodiazepine and opioid?  No    Last Urine Drug Screen / ordered today: Yes  Recent Results (from the past 16735 hour(s))   OPIATE/OPIOID/BENZO PRESCRIPTION COMPLIANCE    Collection Time: 06/29/22  3:40 PM   Result Value Ref Range    DRUG SCREEN COMMENT URINE SEE BELOW     Creatine, Urine 125.6 mg/dL    Amphetamine Screen, Urine PRESUMPTIVE NEGATIVE NEGATIVE    Barbiturate Screen, Urine PRESUMPTIVE NEGATIVE NEGATIVE    Cannabinoid Screen, Urine PRESUMPTIVE NEGATIVE NEGATIVE    Cocaine Screen, Urine PRESUMPTIVE NEGATIVE NEGATIVE    PCP Screen, Urine PRESUMPTIVE NEGATIVE NEGATIVE    7-Aminoclonazepam <25 Cutoff <25 ng/mL    Alpha-Hydroxyalprazolam <25 Cutoff <25 ng/mL    Alpha-Hydroxymidazolam <25 Cutoff <25 ng/mL    Alprazolam <25 Cutoff <25 ng/mL    Chlordiazepoxide <25 Cutoff <25 ng/mL    Clonazepam <25 Cutoff <25 ng/mL    Diazepam <25 Cutoff <25 ng/mL    Lorazepam <25 Cutoff <25 ng/mL    Midazolam <25 Cutoff <25 ng/mL    Nordiazepam <25 Cutoff <25 ng/mL    Oxazepam <25 Cutoff <25 ng/mL    Temazepam <25 Cutoff <25 ng/mL    Zolpidem <25 Cutoff <25 ng/mL    Zolpidem Metabolite (ZCA) <25 Cutoff <25 ng/mL    6-Acetylmorphine <25 Cutoff <25 ng/mL    Codeine <50 Cutoff <50 ng/mL    Hydrocodone <25 Cutoff <25 ng/mL    Hydromorphone <25 Cutoff <25 ng/mL    Morphine Urine <50 Cutoff <50 ng/mL    Norhydrocodone <25 Cutoff <25 ng/mL    Noroxycodone >1000 (A) Cutoff <25 ng/mL    Oxycodone 91 (A) Cutoff <25 ng/mL    Oxymorphone 156 (A) Cutoff <25 ng/mL    Tramadol <50 Cutoff <50 ng/mL    O-Desmethyltramadol <50 Cutoff <50 ng/mL    Fentanyl <2.5 Cutoff<2.5 ng/mL    Norfentanyl <2.5 Cutoff<2.5 ng/mL    METHADONE  CONFIRMATION,URINE <25 Cutoff <25 ng/mL    EDDP <25 Cutoff <25 ng/mL     Results are as expected.     Controlled Substance Agreement:  Date of the Last Agreement: 07/05/2022  Reviewed Controlled Substance Agreement including but not limited to the benefits, risks, and alternatives to treatment with a Controlled Substance medication(s).    Opioids:  What is the patient's goal of therapy? Improved pain  Is this being achieved with current treatment? yes    I have calculated the patient's Morphine Dose Equivalent (MED):   I have considered referral to Pain Management and/or a specialist, and do not feel it is necessary at this time.    I feel that it is clinically indicated to continue this current medication regimen after consideration of alternative therapies, and other non-opioid treatment.    Opioid Risk Screening:  No data recorded    Pain Assessment: 7/10  No data recordedSubjective   Patient ID: Teresa Matthews is a 72 y.o. female.    HPI    Review of Systems   Respiratory:  Positive for shortness of breath.    Musculoskeletal:  Positive for arthralgias, back pain, joint swelling and neck pain.   All other systems reviewed and are negative.      Objective   Physical Exam  Vitals reviewed.   Constitutional:       Appearance: Normal appearance.   HENT:      Head: Normocephalic and atraumatic.      Right Ear: Tympanic membrane normal.      Left Ear: Tympanic membrane normal.      Nose: Nose normal.      Mouth/Throat:      Mouth: Mucous membranes are moist.      Pharynx: Oropharynx is clear.   Eyes:      Extraocular Movements: Extraocular movements intact.      Conjunctiva/sclera: Conjunctivae normal.      Pupils: Pupils are equal, round, and reactive to light.   Cardiovascular:      Rate and Rhythm: Normal rate and regular rhythm.      Pulses: Normal pulses.      Heart sounds: Normal heart sounds.   Pulmonary:      Effort: Pulmonary effort is normal.      Breath sounds: Normal breath sounds.   Abdominal:       General: Abdomen is flat. Bowel sounds are normal.      Palpations: Abdomen is soft.   Musculoskeletal:         General: Swelling, tenderness and deformity present. Normal range of motion.      Cervical back: Normal range of motion and neck supple.      Comments: Left knee significantly larger than right, with tenderness, slight increased warmth and swelling.    Skin:     General: Skin is warm and dry.      Capillary Refill: Capillary refill takes less than 2 seconds.   Neurological:      General: No focal deficit present.      Mental Status: She is alert and oriented to person, place, and time.   Psychiatric:         Mood and Affect: Mood normal.         Behavior: Behavior normal.         Assessment/Plan   Diagnoses and all orders for this visit:  Pressure ulcer of left heel, stage 3 (CMS/HCC)  Chronic obstructive pulmonary disease, unspecified COPD type (CMS/HCC)  Acute respiratory failure with hypoxia (CMS/HCC)  Stage 3a chronic kidney disease  Left toe amputee (CMS/HCC)  Secondary biliary cirrhosis (CMS/HCC)  Chronic combined systolic and diastolic heart failure (CMS/HCC)  -     metoprolol succinate XL (Toprol-XL) 50 mg 24 hr tablet; Take 1 tablet (50 mg) by mouth once daily.  Recurrent major depressive disorder, in remission (CMS/HCC)  Type 2 diabetes mellitus with hyperglycemia, with long-term current use of insulin (CMS/[facility])  -     glucagon 1 mg injection; Inject 1 mg into the shoulder, thigh, or buttocks 1 time if needed for low blood sugar - see comments for up to 1 dose. USE AS DIRECTED.  -     FreeStyle Bulmaro 2 Sensor kit; Inject 1 each under the skin every 14 (fourteen) days. Test blood sugar 1-4 times per day as needed for diabetes, replace every 14 days  Aneurysm of ascending aorta without rupture (CMS/HCC)  Paroxysmal atrial fibrillation (CMS/HCC)  Morbid obesity (CMS/HCC)  Need for prophylactic vaccination against Streptococcus pneumoniae (pneumococcus)  -     Pneumococcal conjugate vaccine,  "20-valent, adult (PREVNAR 20)  Type 2 diabetes mellitus with diabetic polyneuropathy, with long-term current use of insulin (CMS/[facility])  -     pen needle 1/2\" (Easy Touch) 29G X 12mm needle; Inject 3 each under the skin 4 times a day as needed (as needed for sugars). Use as instructed  Chronic stasis dermatitis  Primary osteoarthritis of both knees  Primary hypertension  -     metoprolol succinate XL (Toprol-XL) 50 mg 24 hr tablet; Take 1 tablet (50 mg) by mouth once daily.  -     amiodarone (Pacerone) 200 mg tablet; Take 1 tablet (200 mg) by mouth once daily.  Chronic combined systolic and diastolic congestive heart failure (CMS/HCC)  Depression, major, in remission (CMS/HCC)  Hypertriglyceridemia  Class 1 obesity due to excess calories with serious comorbidity and body mass index (BMI) of 30.0 to 30.9 in adult  Gastroesophageal reflux disease without esophagitis  -     famotidine (Pepcid) 40 mg tablet; Take 1 tablet (40 mg) by mouth once daily.  Mixed stress and urge urinary incontinence  -     vibegron 75 mg tablet; Take 1 tablet by mouth once daily.      Patient was identified as a fall risk. Risk prevention instructions provided.Patient was identified as a fall risk. Risk prevention instructions provided.  "

## 2023-05-12 NOTE — PATIENT INSTRUCTIONS
"Physical Exam    Assessment/Plan   Diagnoses and all orders for this visit:  Pressure ulcer of left heel, stage 3 (CMS/[facility])  Chronic obstructive pulmonary disease, unspecified COPD type (CMS/[facility])  Acute respiratory failure with hypoxia (CMS/[facility])  Stage 3a chronic kidney disease  Left toe amputee (CMS/[facility])  Secondary biliary cirrhosis (CMS/[facility])  Chronic combined systolic and diastolic heart failure (CMS/[facility])  -     metoprolol succinate XL (Toprol-XL) 50 mg 24 hr tablet; Take 1 tablet (50 mg) by mouth once daily.  Recurrent major depressive disorder, in remission (CMS/[facility])  Type 2 diabetes mellitus with hyperglycemia, with long-term current use of insulin (CMS/[facility])  -     glucagon 1 mg injection; Inject 1 mg into the shoulder, thigh, or buttocks 1 time if needed for low blood sugar - see comments for up to 1 dose. USE AS DIRECTED.  -     FreeStyle Bulmaro 2 Sensor kit; Inject 1 each under the skin every 14 (fourteen) days. Test blood sugar 1-4 times per day as needed for diabetes, replace every 14 days  Aneurysm of ascending aorta without rupture (CMS/[facility])  Paroxysmal atrial fibrillation (CMS/[facility])  Morbid obesity (CMS/[facility])  Need for prophylactic vaccination against Streptococcus pneumoniae (pneumococcus)  -     Pneumococcal conjugate vaccine, 20-valent, adult (PREVNAR 20)  Type 2 diabetes mellitus with diabetic polyneuropathy, with long-term current use of insulin (CMS/[facility])  -     pen needle 1/2\" (Easy Touch) 29G X 12mm needle; Inject 3 each under the skin 4 times a day as needed (as needed for sugars). Use as instructed  Chronic stasis dermatitis  Primary osteoarthritis of both knees  Primary hypertension  -     metoprolol succinate XL (Toprol-XL) 50 mg 24 hr tablet; Take 1 tablet (50 mg) by mouth once daily.  -     amiodarone (Pacerone) 200 mg tablet; Take 1 tablet (200 mg) by mouth once daily.  Chronic combined systolic and diastolic congestive heart failure (CMS/[facility])  Depression, major, in remission " (CMS/[facility])  Hypertriglyceridemia  Class 1 obesity due to excess calories with serious comorbidity and body mass index (BMI) of 30.0 to 30.9 in adult  Gastroesophageal reflux disease without esophagitis  -     famotidine (Pepcid) 40 mg tablet; Take 1 tablet (40 mg) by mouth once daily.  Mixed stress and urge urinary incontinence  -     vibegron 75 mg tablet; Take 1 tablet by mouth once daily.      Patient was identified as a fall risk. Risk prevention instructions provided.Patient was identified as a fall risk. Risk prevention instructions provided.      Ways to Help Prevent Falls at Home    Quick Tips   ? Ask for help if you need it. Most people want to help!   ? Get up slowly after sitting or laying down   ? Wear a medical alert device or keep cell phone in your pocket   ? Use night lights, especially areas near a bathroom   ? Keep the items you use often within reach on a small stool or end table   ? Use an assistive device such as walker or cane, as directed by provider/physical therapy   ? Use a non-slip mat and grab bars in your bathroom. Look for home health sections for best options     Other Areas to Focus On   ? Exercise and nutrition: Regular exercise or taking a falls prevention class are great ways improve strength and balance. Don’t forget to stay hydrated and bring a snack!   ? Medicine side effects: Some medicines can make you sleepy or dizzy, which could cause a fall. Ask your healthcare provider about the side effects your medicines could cause. Be sure to let them know if you take any vitamins or supplements as well.   ? Tripping hazards: Remove items you could trip on, such as loose mats, rugs, cords, and clutter. Wear closed toe shoes with rubber soles.   ? Health and wellness: Get regular checkups with your healthcare provider, plus routine vision and hearing screenings. Talk with your healthcare provider about:   o Your medicines and the possible side effects - bring them in a bag if that is  easier!   o Problems with balance or feeling dizzy   o Ways to promote bone health, such as Vitamin D and calcium supplements   o Questions or concerns about falling     *Ask your healthcare team if you have questions     [facility], 2022         Ways to Help Prevent Falls at Home    Quick Tips   ? Ask for help if you need it. Most people want to help!   ? Get up slowly after sitting or laying down   ? Wear a medical alert device or keep cell phone in your pocket   ? Use night lights, especially areas near a bathroom   ? Keep the items you use often within reach on a small stool or end table   ? Use an assistive device such as walker or cane, as directed by provider/physical therapy   ? Use a non-slip mat and grab bars in your bathroom. Look for home health sections for best options     Other Areas to Focus On   ? Exercise and nutrition: Regular exercise or taking a falls prevention class are great ways improve strength and balance. Don’t forget to stay hydrated and bring a snack!   ? Medicine side effects: Some medicines can make you sleepy or dizzy, which could cause a fall. Ask your healthcare provider about the side effects your medicines could cause. Be sure to let them know if you take any vitamins or supplements as well.   ? Tripping hazards: Remove items you could trip on, such as loose mats, rugs, cords, and clutter. Wear closed toe shoes with rubber soles.   ? Health and wellness: Get regular checkups with your healthcare provider, plus routine vision and hearing screenings. Talk with your healthcare provider about:   o Your medicines and the possible side effects - bring them in a bag if that is easier!   o Problems with balance or feeling dizzy   o Ways to promote bone health, such as Vitamin D and calcium supplements   o Questions or concerns about falling     *Ask your healthcare team if you have questions     [facility], 2022

## 2023-05-13 LAB
ALBUMIN (MG/L) IN URINE: 131.3 MG/L
ALBUMIN/CREATININE (UG/MG) IN URINE: 141 UG/MG CRT (ref 0–30)
CREATININE (MG/DL) IN URINE: 93.1 MG/DL (ref 20–320)

## 2023-05-17 NOTE — RESULT ENCOUNTER NOTE
Urine albumin a bit higher but sugars aren't as well controlled. Will continue to recheck annually.

## 2023-05-19 LAB
6-ACETYLMORPHINE: <25 NG/ML
7-AMINOCLONAZEPAM: <25 NG/ML
ALPHA-HYDROXYALPRAZOLAM: <25 NG/ML
ALPHA-HYDROXYMIDAZOLAM: <25 NG/ML
ALPRAZOLAM: <25 NG/ML
AMPHETAMINE (PRESENCE) IN URINE BY SCREEN METHOD: NORMAL
BARBITURATES PRESENCE IN URINE BY SCREEN METHOD: NORMAL
CANNABINOIDS IN URINE BY SCREEN METHOD: NORMAL
CHLORDIAZEPOXIDE: <25 NG/ML
CLONAZEPAM: <25 NG/ML
COCAINE (PRESENCE) IN URINE BY SCREEN METHOD: NORMAL
CODEINE: <50 NG/ML
CREATINE, URINE FOR DRUG: 92.9 MG/DL
DIAZEPAM: <25 NG/ML
DRUG SCREEN COMMENT URINE: NORMAL
EDDP: <25 NG/ML
FENTANYL CONFIRMATION, URINE: <2.5 NG/ML
HYDROCODONE: <25 NG/ML
HYDROMORPHONE: <25 NG/ML
LORAZEPAM: <25 NG/ML
METHADONE CONFIRMATION,URINE: <25 NG/ML
MIDAZOLAM: <25 NG/ML
MORPHINE URINE: <50 NG/ML
NORDIAZEPAM: <25 NG/ML
NORFENTANYL: <2.5 NG/ML
NORHYDROCODONE: <25 NG/ML
NOROXYCODONE: <25 NG/ML
O-DESMETHYLTRAMADOL: <50 NG/ML
OXAZEPAM: <25 NG/ML
OXYCODONE: <25 NG/ML
OXYMORPHONE: <25 NG/ML
PHENCYCLIDINE (PRESENCE) IN URINE BY SCREEN METHOD: NORMAL
TEMAZEPAM: <25 NG/ML
TRAMADOL: <50 NG/ML
ZOLPIDEM METABOLITE (ZCA): <25 NG/ML
ZOLPIDEM: <25 NG/ML

## 2023-05-21 NOTE — RESULT ENCOUNTER NOTE
Urine albumin elevated, but we are treating with medication, and Urine drug screen performed for medication monitoring was consistent with medications prescribed and negative for any illicit drugs.

## 2023-06-09 ENCOUNTER — LAB (OUTPATIENT)
Dept: LAB | Facility: LAB | Age: 73
End: 2023-06-09
Payer: MEDICARE

## 2023-06-09 DIAGNOSIS — I48.11 LONGSTANDING PERSISTENT ATRIAL FIBRILLATION (MULTI): ICD-10-CM

## 2023-06-09 DIAGNOSIS — Z79.4 TYPE 2 DIABETES MELLITUS WITH HYPERGLYCEMIA, WITH LONG-TERM CURRENT USE OF INSULIN (MULTI): ICD-10-CM

## 2023-06-09 DIAGNOSIS — I71.21 ANEURYSM OF ASCENDING AORTA WITHOUT RUPTURE (CMS-HCC): ICD-10-CM

## 2023-06-09 DIAGNOSIS — E55.9 VITAMIN D DEFICIENCY: ICD-10-CM

## 2023-06-09 DIAGNOSIS — Z98.84 GASTRIC BYPASS STATUS FOR OBESITY: ICD-10-CM

## 2023-06-09 DIAGNOSIS — I50.22 CHRONIC SYSTOLIC CONGESTIVE HEART FAILURE (MULTI): ICD-10-CM

## 2023-06-09 DIAGNOSIS — E78.1 HYPERTRIGLYCERIDEMIA: ICD-10-CM

## 2023-06-09 DIAGNOSIS — I10 PRIMARY HYPERTENSION: ICD-10-CM

## 2023-06-09 DIAGNOSIS — E11.43 TYPE 2 DIABETES MELLITUS WITH DIABETIC AUTONOMIC NEUROPATHY, WITH LONG-TERM CURRENT USE OF INSULIN (MULTI): ICD-10-CM

## 2023-06-09 DIAGNOSIS — Z79.4 TYPE 2 DIABETES MELLITUS WITH DIABETIC AUTONOMIC NEUROPATHY, WITH LONG-TERM CURRENT USE OF INSULIN (MULTI): ICD-10-CM

## 2023-06-09 DIAGNOSIS — E89.0 HYPOTHYROIDISM, POSTSURGICAL: ICD-10-CM

## 2023-06-09 DIAGNOSIS — N18.31 STAGE 3A CHRONIC KIDNEY DISEASE (MULTI): ICD-10-CM

## 2023-06-09 DIAGNOSIS — E11.42 TYPE 2 DIABETES MELLITUS WITH DIABETIC POLYNEUROPATHY, WITH LONG-TERM CURRENT USE OF INSULIN (MULTI): ICD-10-CM

## 2023-06-09 DIAGNOSIS — Z79.4 TYPE 2 DIABETES MELLITUS WITH DIABETIC POLYNEUROPATHY, WITH LONG-TERM CURRENT USE OF INSULIN (MULTI): ICD-10-CM

## 2023-06-09 DIAGNOSIS — I48.0 PAROXYSMAL ATRIAL FIBRILLATION (MULTI): ICD-10-CM

## 2023-06-09 DIAGNOSIS — E11.65 TYPE 2 DIABETES MELLITUS WITH HYPERGLYCEMIA, WITH LONG-TERM CURRENT USE OF INSULIN (MULTI): ICD-10-CM

## 2023-06-09 DIAGNOSIS — L89.623 PRESSURE ULCER OF LEFT HEEL, STAGE 3 (MULTI): ICD-10-CM

## 2023-06-09 LAB
ALANINE AMINOTRANSFERASE (SGPT) (U/L) IN SER/PLAS: 19 U/L (ref 7–45)
ALBUMIN (G/DL) IN SER/PLAS: 3.6 G/DL (ref 3.4–5)
ALKALINE PHOSPHATASE (U/L) IN SER/PLAS: 153 U/L (ref 33–136)
ANION GAP IN SER/PLAS: 14 MMOL/L (ref 10–20)
ASPARTATE AMINOTRANSFERASE (SGOT) (U/L) IN SER/PLAS: 18 U/L (ref 9–39)
BASOPHILS (10*3/UL) IN BLOOD BY AUTOMATED COUNT: 0.06 X10E9/L (ref 0–0.1)
BASOPHILS/100 LEUKOCYTES IN BLOOD BY AUTOMATED COUNT: 1 % (ref 0–2)
BILIRUBIN TOTAL (MG/DL) IN SER/PLAS: 0.4 MG/DL (ref 0–1.2)
C REACTIVE PROTEIN (MG/L) IN SER/PLAS: 0.39 MG/DL
CALCIUM (MG/DL) IN SER/PLAS: 9.1 MG/DL (ref 8.6–10.3)
CARBON DIOXIDE, TOTAL (MMOL/L) IN SER/PLAS: 29 MMOL/L (ref 21–32)
CHLORIDE (MMOL/L) IN SER/PLAS: 98 MMOL/L (ref 98–107)
CHOLESTEROL (MG/DL) IN SER/PLAS: 171 MG/DL (ref 0–199)
CHOLESTEROL IN HDL (MG/DL) IN SER/PLAS: 41.3 MG/DL
CHOLESTEROL/HDL RATIO: 4.1
CREATINE KINASE (U/L) IN SER/PLAS: 70 U/L (ref 0–215)
CREATININE (MG/DL) IN SER/PLAS: 1.28 MG/DL (ref 0.5–1.05)
EOSINOPHILS (10*3/UL) IN BLOOD BY AUTOMATED COUNT: 0.14 X10E9/L (ref 0–0.4)
EOSINOPHILS/100 LEUKOCYTES IN BLOOD BY AUTOMATED COUNT: 2.4 % (ref 0–6)
ERYTHROCYTE DISTRIBUTION WIDTH (RATIO) BY AUTOMATED COUNT: 14.5 % (ref 11.5–14.5)
ERYTHROCYTE MEAN CORPUSCULAR HEMOGLOBIN CONCENTRATION (G/DL) BY AUTOMATED: 30.1 G/DL (ref 32–36)
ERYTHROCYTE MEAN CORPUSCULAR VOLUME (FL) BY AUTOMATED COUNT: 92 FL (ref 80–100)
ERYTHROCYTES (10*6/UL) IN BLOOD BY AUTOMATED COUNT: 4.14 X10E12/L (ref 4–5.2)
ESTIMATED AVERAGE GLUCOSE FOR HBA1C: 301 MG/DL
GFR FEMALE: 45 ML/MIN/1.73M2
GLUCOSE (MG/DL) IN SER/PLAS: 136 MG/DL (ref 74–99)
HEMATOCRIT (%) IN BLOOD BY AUTOMATED COUNT: 38.2 % (ref 36–46)
HEMOGLOBIN (G/DL) IN BLOOD: 11.5 G/DL (ref 12–16)
HEMOGLOBIN A1C/HEMOGLOBIN TOTAL IN BLOOD: 12.1 %
IMMATURE GRANULOCYTES/100 LEUKOCYTES IN BLOOD BY AUTOMATED COUNT: 0.7 % (ref 0–0.9)
LDL: 88 MG/DL (ref 0–99)
LEUKOCYTES (10*3/UL) IN BLOOD BY AUTOMATED COUNT: 5.9 X10E9/L (ref 4.4–11.3)
LYMPHOCYTES (10*3/UL) IN BLOOD BY AUTOMATED COUNT: 1.54 X10E9/L (ref 0.8–3)
LYMPHOCYTES/100 LEUKOCYTES IN BLOOD BY AUTOMATED COUNT: 26.2 % (ref 13–44)
MONOCYTES (10*3/UL) IN BLOOD BY AUTOMATED COUNT: 0.55 X10E9/L (ref 0.05–0.8)
MONOCYTES/100 LEUKOCYTES IN BLOOD BY AUTOMATED COUNT: 9.4 % (ref 2–10)
NEUTROPHILS (10*3/UL) IN BLOOD BY AUTOMATED COUNT: 3.54 X10E9/L (ref 1.6–5.5)
NEUTROPHILS/100 LEUKOCYTES IN BLOOD BY AUTOMATED COUNT: 60.3 % (ref 40–80)
NON HDL CHOLESTEROL: 130 MG/DL
PLATELETS (10*3/UL) IN BLOOD AUTOMATED COUNT: 161 X10E9/L (ref 150–450)
POTASSIUM (MMOL/L) IN SER/PLAS: 4.3 MMOL/L (ref 3.5–5.3)
PROTEIN TOTAL: 6.9 G/DL (ref 6.4–8.2)
SEDIMENTATION RATE, ERYTHROCYTE: 44 MM/H (ref 0–30)
SODIUM (MMOL/L) IN SER/PLAS: 137 MMOL/L (ref 136–145)
THYROTROPIN (MIU/L) IN SER/PLAS BY DETECTION LIMIT <= 0.05 MIU/L: 22.3 MIU/L (ref 0.44–3.98)
THYROXINE (T4) FREE (NG/DL) IN SER/PLAS: 1.09 NG/DL (ref 0.78–1.48)
TRIGLYCERIDE (MG/DL) IN SER/PLAS: 210 MG/DL (ref 0–149)
URATE (MG/DL) IN SER/PLAS: 5.3 MG/DL (ref 2.3–6.7)
UREA NITROGEN (MG/DL) IN SER/PLAS: 18 MG/DL (ref 6–23)
UREA NITROGEN (MG/DL) IN SER/PLAS: 19 MG/DL (ref 6–23)
VLDL: 42 MG/DL (ref 0–40)

## 2023-06-09 PROCEDURE — 84439 ASSAY OF FREE THYROXINE: CPT

## 2023-06-09 PROCEDURE — 36415 COLL VENOUS BLD VENIPUNCTURE: CPT

## 2023-06-09 PROCEDURE — 85025 COMPLETE CBC W/AUTO DIFF WBC: CPT

## 2023-06-09 PROCEDURE — 82306 VITAMIN D 25 HYDROXY: CPT

## 2023-06-09 PROCEDURE — 82607 VITAMIN B-12: CPT

## 2023-06-09 PROCEDURE — 86140 C-REACTIVE PROTEIN: CPT

## 2023-06-09 PROCEDURE — 85652 RBC SED RATE AUTOMATED: CPT

## 2023-06-09 PROCEDURE — 80053 COMPREHEN METABOLIC PANEL: CPT

## 2023-06-09 PROCEDURE — 84550 ASSAY OF BLOOD/URIC ACID: CPT

## 2023-06-09 PROCEDURE — 83036 HEMOGLOBIN GLYCOSYLATED A1C: CPT

## 2023-06-09 PROCEDURE — 80061 LIPID PANEL: CPT

## 2023-06-09 PROCEDURE — 84443 ASSAY THYROID STIM HORMONE: CPT

## 2023-06-10 LAB
CALCIDIOL (25 OH VITAMIN D3) (NG/ML) IN SER/PLAS: 31 NG/ML
COBALAMIN (VITAMIN B12) (PG/ML) IN SER/PLAS: 1874 PG/ML (ref 211–911)

## 2023-06-15 ENCOUNTER — TELEPHONE (OUTPATIENT)
Dept: PRIMARY CARE | Facility: CLINIC | Age: 73
End: 2023-06-15
Payer: MEDICARE

## 2023-06-15 DIAGNOSIS — Z79.4 TYPE 2 DIABETES MELLITUS WITH HYPERGLYCEMIA, WITH LONG-TERM CURRENT USE OF INSULIN (MULTI): Primary | ICD-10-CM

## 2023-06-15 DIAGNOSIS — E11.65 TYPE 2 DIABETES MELLITUS WITH HYPERGLYCEMIA, WITH LONG-TERM CURRENT USE OF INSULIN (MULTI): Primary | ICD-10-CM

## 2023-06-15 RX ORDER — METFORMIN HYDROCHLORIDE 500 MG/1
1000 TABLET, EXTENDED RELEASE ORAL 2 TIMES DAILY
Qty: 360 TABLET | Refills: 3 | Status: SHIPPED | OUTPATIENT
Start: 2023-06-15 | End: 2024-03-18 | Stop reason: SINTOL

## 2023-06-15 NOTE — TELEPHONE ENCOUNTER
Pt needs Metformin extended release sent to [facility]. States she was given regular and has developed diarrhea.

## 2023-06-20 NOTE — RESULT ENCOUNTER NOTE
Increasingly uncontrolled sugars and thyroid. I believe you are in hospital at the moment. I hope they get things improved, if not we can adjust when you are out. Also sed rate up, popssibly due to whatever is going on now! Looking forward to seeing you when you get out!

## 2023-06-21 ENCOUNTER — NURSING HOME VISIT (OUTPATIENT)
Dept: POST ACUTE CARE | Facility: EXTERNAL LOCATION | Age: 73
End: 2023-06-21
Payer: MEDICARE

## 2023-06-21 DIAGNOSIS — E11.65 TYPE 2 DIABETES MELLITUS WITH HYPERGLYCEMIA, WITH LONG-TERM CURRENT USE OF INSULIN (MULTI): ICD-10-CM

## 2023-06-21 DIAGNOSIS — Z79.4 TYPE 2 DIABETES MELLITUS WITH HYPERGLYCEMIA, WITH LONG-TERM CURRENT USE OF INSULIN (MULTI): ICD-10-CM

## 2023-06-21 DIAGNOSIS — I10 HYPERTENSION, ESSENTIAL: ICD-10-CM

## 2023-06-21 DIAGNOSIS — R53.1 WEAKNESS: Primary | ICD-10-CM

## 2023-06-21 DIAGNOSIS — I50.42 CHRONIC COMBINED SYSTOLIC AND DIASTOLIC CONGESTIVE HEART FAILURE (MULTI): ICD-10-CM

## 2023-06-21 DIAGNOSIS — S82.102D CLOSED FRACTURE OF PROXIMAL END OF LEFT TIBIA WITH ROUTINE HEALING, UNSPECIFIED FRACTURE MORPHOLOGY, SUBSEQUENT ENCOUNTER: ICD-10-CM

## 2023-06-21 DIAGNOSIS — S82.455D CLOSED NONDISPLACED COMMINUTED FRACTURE OF SHAFT OF LEFT FIBULA WITH ROUTINE HEALING, SUBSEQUENT ENCOUNTER: ICD-10-CM

## 2023-06-21 DIAGNOSIS — J44.9 CHRONIC OBSTRUCTIVE PULMONARY DISEASE, UNSPECIFIED COPD TYPE (MULTI): ICD-10-CM

## 2023-06-21 PROBLEM — Z98.84 STATUS POST GASTRIC BYPASS FOR OBESITY: Status: RESOLVED | Noted: 2023-04-10 | Resolved: 2023-06-21

## 2023-06-21 PROBLEM — U07.1 COVID-19 VIRUS INFECTION: Status: RESOLVED | Noted: 2023-04-10 | Resolved: 2023-06-21

## 2023-06-21 PROBLEM — R42 DIZZINESS: Status: RESOLVED | Noted: 2023-04-10 | Resolved: 2023-06-21

## 2023-06-21 PROBLEM — G89.18 POST-OPERATIVE PAIN: Status: RESOLVED | Noted: 2023-04-10 | Resolved: 2023-06-21

## 2023-06-21 PROBLEM — R11.2 POST-OPERATIVE NAUSEA AND VOMITING: Status: RESOLVED | Noted: 2023-04-10 | Resolved: 2023-06-21

## 2023-06-21 PROBLEM — R06.09 DYSPNEA ON EXERTION: Status: RESOLVED | Noted: 2023-04-10 | Resolved: 2023-06-21

## 2023-06-21 PROBLEM — M70.52 BURSITIS OF LEFT KNEE: Status: RESOLVED | Noted: 2023-04-10 | Resolved: 2023-06-21

## 2023-06-21 PROBLEM — E11.9 TYPE 2 DIABETES MELLITUS (MULTI): Status: RESOLVED | Noted: 2023-04-10 | Resolved: 2023-06-21

## 2023-06-21 PROBLEM — B37.31 VAGINAL CANDIDIASIS: Status: RESOLVED | Noted: 2023-04-10 | Resolved: 2023-06-21

## 2023-06-21 PROBLEM — R10.9 ABDOMINAL CRAMPING: Status: RESOLVED | Noted: 2023-04-10 | Resolved: 2023-06-21

## 2023-06-21 PROBLEM — E16.0 HYPOGLYCEMIA DUE TO INSULIN: Status: RESOLVED | Noted: 2023-04-10 | Resolved: 2023-06-21

## 2023-06-21 PROBLEM — J96.01 ACUTE RESPIRATORY FAILURE WITH HYPOXIA (MULTI): Status: RESOLVED | Noted: 2023-05-12 | Resolved: 2023-06-21

## 2023-06-21 PROBLEM — R30.0 DYSURIA: Status: RESOLVED | Noted: 2023-04-10 | Resolved: 2023-06-21

## 2023-06-21 PROBLEM — S82.409A FIBULA FRACTURE: Status: ACTIVE | Noted: 2023-06-21

## 2023-06-21 PROBLEM — B37.2 CUTANEOUS CANDIDIASIS: Status: RESOLVED | Noted: 2023-04-10 | Resolved: 2023-06-21

## 2023-06-21 PROBLEM — B36.9 FUNGAL RASH OF TRUNK: Status: RESOLVED | Noted: 2023-04-10 | Resolved: 2023-06-21

## 2023-06-21 PROBLEM — K29.70 GASTRITIS: Status: RESOLVED | Noted: 2023-04-10 | Resolved: 2023-06-21

## 2023-06-21 PROBLEM — F32.A CHRONIC DEPRESSION: Status: RESOLVED | Noted: 2023-04-10 | Resolved: 2023-06-21

## 2023-06-21 PROBLEM — H61.23 BILATERAL IMPACTED CERUMEN: Status: RESOLVED | Noted: 2023-04-10 | Resolved: 2023-06-21

## 2023-06-21 PROBLEM — T38.3X5A HYPOGLYCEMIA DUE TO INSULIN: Status: RESOLVED | Noted: 2023-04-10 | Resolved: 2023-06-21

## 2023-06-21 PROBLEM — N39.0 UTI (URINARY TRACT INFECTION): Status: RESOLVED | Noted: 2023-04-10 | Resolved: 2023-06-21

## 2023-06-21 PROBLEM — R06.00 DYSPNEA: Status: RESOLVED | Noted: 2023-04-10 | Resolved: 2023-06-21

## 2023-06-21 PROBLEM — N18.9 CHRONIC RENAL FAILURE: Status: RESOLVED | Noted: 2023-04-10 | Resolved: 2023-06-21

## 2023-06-21 PROBLEM — H61.21 IMPACTED CERUMEN OF RIGHT EAR: Status: RESOLVED | Noted: 2023-04-10 | Resolved: 2023-06-21

## 2023-06-21 PROBLEM — E66.9 CLASS 1 OBESITY WITH BODY MASS INDEX (BMI) OF 30.0 TO 30.9 IN ADULT: Status: RESOLVED | Noted: 2023-04-10 | Resolved: 2023-06-21

## 2023-06-21 PROBLEM — R05.9 COUGH: Status: RESOLVED | Noted: 2023-04-10 | Resolved: 2023-06-21

## 2023-06-21 PROBLEM — Z98.890 POST-OPERATIVE NAUSEA AND VOMITING: Status: RESOLVED | Noted: 2023-04-10 | Resolved: 2023-06-21

## 2023-06-21 PROCEDURE — 99309 SBSQ NF CARE MODERATE MDM 30: CPT | Performed by: NURSE PRACTITIONER

## 2023-06-21 NOTE — PROGRESS NOTES
PROGRESS NOTE    Subjective   Chief complaint: Teresa Matthews is a 72 y.o. female who is an acute skilled patient being seen and evaluated for weakness    HPI:  6/21/2023 patient mated to skilled nursing facility for therapy due to weakness after recent hospitalization status post fall.  Patient was found to have acute comminuted fractures of proximal tibia and fibular diaphysis.  She was admitted to the hospital and seen by Ortho who recommended conservative management.  Pain was controlled and patient none weightbearing.  She was discharged to skilled nursing facility for therapy and to continue medical management.      Objective   Vital signs: 131/70, 98.0, 77, 18, 93%,     Physical Exam  Constitutional:       General: She is not in acute distress.  Eyes:      Extraocular Movements: Extraocular movements intact.   Cardiovascular:      Rate and Rhythm: Normal rate and regular rhythm.   Pulmonary:      Effort: Pulmonary effort is normal.      Breath sounds: Normal breath sounds.   Abdominal:      General: Bowel sounds are normal.      Palpations: Abdomen is soft.   Musculoskeletal:      Cervical back: Neck supple.      Right lower leg: Edema present.      Left lower leg: Edema present.      Comments: Generalized weakness   Skin:     Comments: Dressing left foot   Neurological:      Mental Status: She is alert.   Psychiatric:         Mood and Affect: Mood normal.         Behavior: Behavior is cooperative.         Assessment/Plan   Problem List Items Addressed This Visit    None    Medications, treatments, and labs reviewed  Continue medications and treatments as listed in EMR    Yasemin Kelly, APRN-CNP

## 2023-06-21 NOTE — LETTER
Patient: Teresa Matthews  : 1950    Encounter Date: 2023    PROGRESS NOTE    Subjective  Chief complaint: Teresa Matthews is a 72 y.o. female who is an acute skilled patient being seen and evaluated for weakness    HPI:  2023 patient mated to skilled nursing facility for therapy due to weakness after recent hospitalization status post fall. Patient was found to have acute comminuted fractures of proximal tibia and fibular diaphysis. She was admitted to the hospital and seen by Ortho who recommended conservative management. Pain was controlled and patient none weightbearing. She was discharged to skilled nursing facility for therapy and to continue medical management.      Objective  Vital signs: 131/70, 98.0, 77, 18, 93%,      Physical Exam  Constitutional:       General: She is not in acute distress.  Eyes:      Extraocular Movements: Extraocular movements intact.   Cardiovascular:      Rate and Rhythm: Normal rate and regular rhythm.   Pulmonary:      Effort: Pulmonary effort is normal.      Breath sounds: Normal breath sounds.   Abdominal:      General: Bowel sounds are normal.      Palpations: Abdomen is soft.   Musculoskeletal:      Cervical back: Neck supple.      Right lower leg: Edema present.      Left lower leg: Edema present.      Comments: Generalized weakness   Skin:     Comments: Dressing left foot   Neurological:      Mental Status: She is alert.   Psychiatric:         Mood and Affect: Mood normal.         Behavior: Behavior is cooperative.         Assessment/Plan  Problem List Items Addressed This Visit       Chronic combined systolic and diastolic congestive heart failure (CMS/HCC)     Bill  Diuretic  Monitor weight         Chronic obstructive pulmonary disease, unspecified COPD type (CMS/HCC)     Stable  On room air         Closed fracture of proximal end of left tibia with routine healing     Pain meds  Therapy  Follow-up with Ortho         Fibula fracture     Pain  Spoke with EP NP - patient okay to be off tele for stress test - stress test should be nuclear exercise stress to eval for ischemia.    Amanda Mills NP  [phone] meds  Therapy  Follow-up with Ortho         Hypertension, essential     At goal  Monitor blood pressure  Continue antihypertensives         Type 2 diabetes mellitus with hyperglycemia, with long-term current use of insulin (CMS/[facility])     Blood sugars in the mid 200s  Continue metformin  Glucoscan with sliding scale insulin coverage         Weakness - Primary     Therapy to eval and treat          Medications, treatments, and labs reviewed  Continue medications and treatments as listed in EMR    MARISELA Nelson      Electronically Signed By: MARISELA Nelson   6/21/23  7:07 PM

## 2023-06-21 NOTE — PROGRESS NOTES
PROGRESS NOTE    Subjective   Chief complaint: Teresa Matthews is a 72 y.o. female who is an acute skilled patient being seen and evaluated for weakness    HPI:  6/21/2023 patient mated to skilled nursing facility for therapy due to weakness after recent hospitalization status post fall. Patient was found to have acute comminuted fractures of proximal tibia and fibular diaphysis. She was admitted to the hospital and seen by Ortho who recommended conservative management. Pain was controlled and patient none weightbearing. She was discharged to skilled nursing facility for therapy and to continue medical management.      Objective   Vital signs: 131/70, 98.0, 77, 18, 93%,      Physical Exam  Constitutional:       General: She is not in acute distress.  Eyes:      Extraocular Movements: Extraocular movements intact.   Cardiovascular:      Rate and Rhythm: Normal rate and regular rhythm.   Pulmonary:      Effort: Pulmonary effort is normal.      Breath sounds: Normal breath sounds.   Abdominal:      General: Bowel sounds are normal.      Palpations: Abdomen is soft.   Musculoskeletal:      Cervical back: Neck supple.      Right lower leg: Edema present.      Left lower leg: Edema present.      Comments: Generalized weakness   Skin:     Comments: Dressing left foot   Neurological:      Mental Status: She is alert.   Psychiatric:         Mood and Affect: Mood normal.         Behavior: Behavior is cooperative.         Assessment/Plan   Problem List Items Addressed This Visit       Chronic combined systolic and diastolic congestive heart failure (CMS/HCC)     Bill  Diuretic  Monitor weight         Chronic obstructive pulmonary disease, unspecified COPD type (CMS/HCC)     Stable  On room air         Closed fracture of proximal end of left tibia with routine healing     Pain meds  Therapy  Follow-up with Ortho         Fibula fracture     Pain meds  Therapy  Follow-up with Ortho         Hypertension, essential     At  goal  Monitor blood pressure  Continue antihypertensives         Type 2 diabetes mellitus with hyperglycemia, with long-term current use of insulin (CMS/[facility])     Blood sugars in the mid 200s  Continue metformin  Glucoscan with sliding scale insulin coverage         Weakness - Primary     Therapy to eval and treat          Medications, treatments, and labs reviewed  Continue medications and treatments as listed in EMR    Yasemin Kelly, APRN-CNP

## 2023-06-22 ENCOUNTER — NURSING HOME VISIT (OUTPATIENT)
Dept: POST ACUTE CARE | Facility: EXTERNAL LOCATION | Age: 73
End: 2023-06-22
Payer: MEDICARE

## 2023-06-22 DIAGNOSIS — I48.91 ATRIAL FIBRILLATION, UNSPECIFIED TYPE (MULTI): Primary | ICD-10-CM

## 2023-06-22 DIAGNOSIS — Z79.4 TYPE 2 DIABETES MELLITUS WITH HYPERGLYCEMIA, WITH LONG-TERM CURRENT USE OF INSULIN (MULTI): ICD-10-CM

## 2023-06-22 DIAGNOSIS — K74.60 CIRRHOSIS OF LIVER WITH ASCITES, UNSPECIFIED HEPATIC CIRRHOSIS TYPE (MULTI): ICD-10-CM

## 2023-06-22 DIAGNOSIS — R18.8 CIRRHOSIS OF LIVER WITH ASCITES, UNSPECIFIED HEPATIC CIRRHOSIS TYPE (MULTI): ICD-10-CM

## 2023-06-22 DIAGNOSIS — E11.65 TYPE 2 DIABETES MELLITUS WITH HYPERGLYCEMIA, WITH LONG-TERM CURRENT USE OF INSULIN (MULTI): ICD-10-CM

## 2023-06-22 DIAGNOSIS — I71.20 THORACIC AORTIC ANEURYSM (TAA), UNSPECIFIED PART, UNSPECIFIED WHETHER RUPTURED (CMS-HCC): ICD-10-CM

## 2023-06-22 DIAGNOSIS — R53.1 WEAKNESS: ICD-10-CM

## 2023-06-22 DIAGNOSIS — S82.102D CLOSED FRACTURE OF PROXIMAL END OF LEFT TIBIA WITH ROUTINE HEALING, UNSPECIFIED FRACTURE MORPHOLOGY, SUBSEQUENT ENCOUNTER: ICD-10-CM

## 2023-06-22 DIAGNOSIS — I50.42 CHRONIC COMBINED SYSTOLIC AND DIASTOLIC CONGESTIVE HEART FAILURE (MULTI): ICD-10-CM

## 2023-06-22 DIAGNOSIS — J44.9 CHRONIC OBSTRUCTIVE PULMONARY DISEASE, UNSPECIFIED COPD TYPE (MULTI): ICD-10-CM

## 2023-06-22 PROCEDURE — 99305 1ST NF CARE MODERATE MDM 35: CPT | Performed by: INTERNAL MEDICINE

## 2023-06-22 NOTE — LETTER
Patient: Teresa Matthews  : 1950    Encounter Date: 2023    HISTORY & PHYSICAL    Subjective  Chief complaint: Teresa Matthews is a 72 y.o. female who is a acute skilled care patient being seen and evaluated for multiple medical problems.  Patient presents for weakness.    HPI:  2023 patient mated to skilled nursing facility for therapy due to weakness after recent hospitalization status post fall. Patient was found to have acute comminuted fractures of proximal tibia and fibular diaphysis. She was admitted to the hospital and seen by Ortho who recommended conservative management. Pain was controlled and patient none weightbearing. She was discharged to skilled nursing facility for therapy and to continue medical management.        Past Medical History:   Diagnosis Date   • Body mass index (BMI) 31.0-31.9, adult 2020    Body mass index (BMI) of 31.0 to 31.9 in adult   • Body mass index (BMI) 32.0-32.9, adult 2021    Body mass index (BMI) of 32.0 to 32.9 in adult   • Body mass index (BMI) 33.0-33.9, adult 2021    Body mass index (BMI) of 33.0 to 33.9 in adult   • Body mass index (BMI) 35.0-35.9, adult 2020    Body mass index (BMI) of 35.0 to 35.9 in adult   • Body mass index (BMI)40.0-44.9, adult (CMS/[facility]) 2019    Body mass index (BMI) of 40.0 to 44.9 in adult   • Essential (primary) hypertension 2022    Hypertension   • 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   • Hypoxia 04/10/2023   • Nontoxic multinodular goiter 2021    Multiple thyroid nodules   • Personal history of other diseases of the circulatory system     History of congestive heart failure   • Personal history of other diseases of the musculoskeletal system and connective tissue     History of arthritis   • Personal history of other diseases of the nervous system and sense organs 10/11/2022    History of sleep apnea   • Personal history of other diseases of the respiratory system      History of lung disease   • Personal history of other diseases of urinary system 02/19/2020    History of hematuria   • Personal history of other endocrine, nutritional and metabolic disease 07/02/2021    History of morbid obesity   • Personal history of other endocrine, nutritional and metabolic disease 04/24/2017    History of diabetic neuropathy   • 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   • Status post gastric bypass for obesity 04/10/2023       Past Surgical History:   Procedure Laterality Date   • HYSTERECTOMY  01/20/2016    Hysterectomy   • OTHER SURGICAL HISTORY  01/07/2022    Toe amputation   • OTHER SURGICAL HISTORY  05/28/2021    Gastric bypass surgery   • TONSILLECTOMY  01/20/2016    Tonsillectomy   • TOTAL KNEE ARTHROPLASTY  05/28/2021    Knee Replacement   • TUBAL LIGATION  04/24/2017    Tubal Ligation       Family History   Problem Relation Name Age of Onset   • Coronary artery disease Mother     • Liver disease Mother     • Other (non-hodgkins lymphoma) Mother     • Stroke Father     • Deafness Father     • Hypertension Father         Social History     Socioeconomic History   • Marital status: Single     Spouse name: Not on file   • Number of children: Not on file   • Years of education: Not on file   • Highest education level: Not on file   Occupational History   • Not on file   Tobacco Use   • Smoking status: Never   • Smokeless tobacco: Never   Substance and Sexual Activity   • Alcohol use: Never   • Drug use: Never   • Sexual activity: Not on file   Other Topics Concern   • Not on file   Social History Narrative   • Not on file     Social Determinants of Health     Financial Resource Strain: Not on file   Food Insecurity: Not on file   Transportation Needs: Not on file   Physical Activity: Not on file   Stress: Not on file   Social Connections: Not on file   Intimate Partner Violence: Not on file   Housing Stability: Not on file       Vital signs: 136/81, 97.1, 81, 18, 193.0,  95%    Objective  Physical Exam  Vitals reviewed.   Constitutional:       Appearance: Normal appearance.   HENT:      Head: Normocephalic and atraumatic.   Cardiovascular:      Rate and Rhythm: Normal rate and regular rhythm.   Pulmonary:      Effort: Pulmonary effort is normal.      Breath sounds: Normal breath sounds.   Abdominal:      General: Bowel sounds are normal.      Palpations: Abdomen is soft.   Musculoskeletal:      Cervical back: Neck supple.      Comments: Limited range of motion of the tibia and the fibula bone at the knee level   Skin:     General: Skin is warm and dry.   Neurological:      General: No focal deficit present.      Mental Status: She is alert.   Psychiatric:         Mood and Affect: Mood normal.         Behavior: Behavior is cooperative.         Assessment/Plan  Problem List Items Addressed This Visit          Respiratory    Chronic obstructive pulmonary disease, unspecified COPD type (CMS/HCC)       Circulatory    Atrial fibrillation (CMS/HCC) - Primary    Chronic combined systolic and diastolic congestive heart failure (CMS/HCC)    Thoracic aortic aneurysm (CMS/HCC)       Digestive    Cirrhosis (CMS/HCC)       Musculoskeletal    Closed fracture of proximal end of left tibia with routine healing       Endocrine/Metabolic    Type 2 diabetes mellitus with hyperglycemia, with long-term current use of insulin (CMS/HCC)       Other    Weakness     Medications, treatments, and labs reviewed  Continue medications and treatments as listed in [facility]    Scribe Attestation  I, Donato Cervantesibe   attest that this documentation has been prepared under the direction and in the presence of Pita Virgen MD.    Provider Attestation - Scribe documentation  All medical record entries made by the Scribe were at my direction and personally dictated by me. I have reviewed the chart and agree that the record accurately reflects my personal performance of the history, physical exam, discussion and  plan.    Pita Virgen MD          Electronically Signed By: Pita Virgen MD   6/22/23  7:10 PM

## 2023-06-22 NOTE — PROGRESS NOTES
HISTORY & PHYSICAL    Subjective   Chief complaint: Teresa Matthews is a 72 y.o. female who is a acute skilled care patient being seen and evaluated for multiple medical problems.  Patient presents for weakness.    HPI:  6/21/2023 patient mated to skilled nursing facility for therapy due to weakness after recent hospitalization status post fall. Patient was found to have acute comminuted fractures of proximal tibia and fibular diaphysis. She was admitted to the hospital and seen by Ortho who recommended conservative management. Pain was controlled and patient none weightbearing. She was discharged to skilled nursing facility for therapy and to continue medical management.        Past Medical History:   Diagnosis Date    Body mass index (BMI) 31.0-31.9, adult 06/30/2020    Body mass index (BMI) of 31.0 to 31.9 in adult    Body mass index (BMI) 32.0-32.9, adult 01/04/2021    Body mass index (BMI) of 32.0 to 32.9 in adult    Body mass index (BMI) 33.0-33.9, adult 01/04/2021    Body mass index (BMI) of 33.0 to 33.9 in adult    Body mass index (BMI) 35.0-35.9, adult 04/09/2020    Body mass index (BMI) of 35.0 to 35.9 in adult    Body mass index (BMI)40.0-44.9, adult (CMS/[facility]) 11/14/2019    Body mass index (BMI) of 40.0 to 44.9 in adult    Essential (primary) hypertension 09/06/2022    Hypertension    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    Hypoxia 04/10/2023    Nontoxic multinodular goiter 07/02/2021    Multiple thyroid nodules    Personal history of other diseases of the circulatory system     History of congestive heart failure    Personal history of other diseases of the musculoskeletal system and connective tissue     History of arthritis    Personal history of other diseases of the nervous system and sense organs 10/11/2022    History of sleep apnea    Personal history of other diseases of the respiratory system     History of lung disease    Personal history of other diseases of urinary system 02/19/2020     History of hematuria    Personal history of other endocrine, nutritional and metabolic disease 07/02/2021    History of morbid obesity    Personal history of other endocrine, nutritional and metabolic disease 04/24/2017    History of diabetic neuropathy    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    Status post gastric bypass for obesity 04/10/2023       Past Surgical History:   Procedure Laterality Date    HYSTERECTOMY  01/20/2016    Hysterectomy    OTHER SURGICAL HISTORY  01/07/2022    Toe amputation    OTHER SURGICAL HISTORY  05/28/2021    Gastric bypass surgery    TONSILLECTOMY  01/20/2016    Tonsillectomy    TOTAL KNEE ARTHROPLASTY  05/28/2021    Knee Replacement    TUBAL LIGATION  04/24/2017    Tubal Ligation       Family History   Problem Relation Name Age of Onset    Coronary artery disease Mother      Liver disease Mother      Other (non-hodgkins lymphoma) Mother      Stroke Father      Deafness Father      Hypertension Father         Social History     Socioeconomic History    Marital status: Single     Spouse name: Not on file    Number of children: Not on file    Years of education: Not on file    Highest education level: Not on file   Occupational History    Not on file   Tobacco Use    Smoking status: Never    Smokeless tobacco: Never   Substance and Sexual Activity    Alcohol use: Never    Drug use: Never    Sexual activity: Not on file   Other Topics Concern    Not on file   Social History Narrative    Not on file     Social Determinants of Health     Financial Resource Strain: Not on file   Food Insecurity: Not on file   Transportation Needs: Not on file   Physical Activity: Not on file   Stress: Not on file   Social Connections: Not on file   Intimate Partner Violence: Not on file   Housing Stability: Not on file       Vital signs: 136/81, 97.1, 81, 18, 193.0, 95%    Objective   Physical Exam  Vitals reviewed.   Constitutional:       Appearance: Normal appearance.   HENT:      Head:  Normocephalic and atraumatic.   Cardiovascular:      Rate and Rhythm: Normal rate and regular rhythm.   Pulmonary:      Effort: Pulmonary effort is normal.      Breath sounds: Normal breath sounds.   Abdominal:      General: Bowel sounds are normal.      Palpations: Abdomen is soft.   Musculoskeletal:      Cervical back: Neck supple.      Comments: Limited range of motion of the tibia and the fibula bone at the knee level   Skin:     General: Skin is warm and dry.   Neurological:      General: No focal deficit present.      Mental Status: She is alert.   Psychiatric:         Mood and Affect: Mood normal.         Behavior: Behavior is cooperative.         Assessment/Plan   Problem List Items Addressed This Visit          Respiratory    Chronic obstructive pulmonary disease, unspecified COPD type (CMS/HCC)       Circulatory    Atrial fibrillation (CMS/HCC) - Primary    Chronic combined systolic and diastolic congestive heart failure (CMS/HCC)    Thoracic aortic aneurysm (CMS/HCC)       Digestive    Cirrhosis (CMS/HCC)       Musculoskeletal    Closed fracture of proximal end of left tibia with routine healing       Endocrine/Metabolic    Type 2 diabetes mellitus with hyperglycemia, with long-term current use of insulin (CMS/HCC)       Other    Weakness     Medications, treatments, and labs reviewed  Continue medications and treatments as listed in [facility]    Scribe Attestation  I, Rivka Kim Scribgénesis   attest that this documentation has been prepared under the direction and in the presence of Pita Virgen MD.    Provider Attestation - Scribe documentation  All medical record entries made by the Scribe were at my direction and personally dictated by me. I have reviewed the chart and agree that the record accurately reflects my personal performance of the history, physical exam, discussion and plan.    Pita Virgen MD

## 2023-06-23 ENCOUNTER — NURSING HOME VISIT (OUTPATIENT)
Dept: POST ACUTE CARE | Facility: EXTERNAL LOCATION | Age: 73
End: 2023-06-23
Payer: MEDICARE

## 2023-06-23 DIAGNOSIS — Z79.4 TYPE 2 DIABETES MELLITUS WITH HYPERGLYCEMIA, WITH LONG-TERM CURRENT USE OF INSULIN (MULTI): ICD-10-CM

## 2023-06-23 DIAGNOSIS — I50.42 CHRONIC COMBINED SYSTOLIC AND DIASTOLIC CONGESTIVE HEART FAILURE (MULTI): ICD-10-CM

## 2023-06-23 DIAGNOSIS — S82.102D CLOSED FRACTURE OF PROXIMAL END OF LEFT TIBIA WITH ROUTINE HEALING, UNSPECIFIED FRACTURE MORPHOLOGY, SUBSEQUENT ENCOUNTER: ICD-10-CM

## 2023-06-23 DIAGNOSIS — E11.65 TYPE 2 DIABETES MELLITUS WITH HYPERGLYCEMIA, WITH LONG-TERM CURRENT USE OF INSULIN (MULTI): ICD-10-CM

## 2023-06-23 DIAGNOSIS — J44.9 CHRONIC OBSTRUCTIVE PULMONARY DISEASE, UNSPECIFIED COPD TYPE (MULTI): ICD-10-CM

## 2023-06-23 DIAGNOSIS — I10 HYPERTENSION, ESSENTIAL: ICD-10-CM

## 2023-06-23 DIAGNOSIS — S82.455D CLOSED NONDISPLACED COMMINUTED FRACTURE OF SHAFT OF LEFT FIBULA WITH ROUTINE HEALING, SUBSEQUENT ENCOUNTER: ICD-10-CM

## 2023-06-23 DIAGNOSIS — R53.1 WEAKNESS: Primary | ICD-10-CM

## 2023-06-23 PROCEDURE — 99309 SBSQ NF CARE MODERATE MDM 30: CPT | Performed by: NURSE PRACTITIONER

## 2023-06-23 NOTE — PROGRESS NOTES
PROGRESS NOTE    Subjective   Chief complaint: Teresa Matthews is a 72 y.o. female who is an acute skilled patient being seen and evaluated for weakness    HPI:  6/21/2023 patient mated to skilled nursing facility for therapy due to weakness after recent hospitalization status post fall. Patient was found to have acute comminuted fractures of proximal tibia and fibular diaphysis. She was admitted to the hospital and seen by Ortho who recommended conservative management. Pain was controlled and patient none weightbearing. She was discharged to skilled nursing facility for therapy and to continue medical management.      Objective   Vital signs: 126/75, 97.5, 72, 18, 92%, blood sugar 270    Physical Exam  Constitutional:       General: She is not in acute distress.  Eyes:      Extraocular Movements: Extraocular movements intact.   Cardiovascular:      Rate and Rhythm: Normal rate and regular rhythm.   Pulmonary:      Effort: Pulmonary effort is normal.      Breath sounds: Normal breath sounds.   Abdominal:      General: Bowel sounds are normal.      Palpations: Abdomen is soft.   Musculoskeletal:      Cervical back: Neck supple.      Right lower leg: Edema present.      Left lower leg: Edema present.      Comments: Generalized weakness   Skin:     Comments: Dressing left foot   Neurological:      Mental Status: She is alert.   Psychiatric:         Mood and Affect: Mood normal.         Behavior: Behavior is cooperative.         Assessment/Plan   Problem List Items Addressed This Visit       Chronic combined systolic and diastolic congestive heart failure (CMS/HCC)     Stable  Diuretic  Monitor weight         Chronic obstructive pulmonary disease, unspecified COPD type (CMS/HCC)     Stable, no wheezing or shortness of breath  On room air         Closed fracture of proximal end of left tibia with routine healing     Pain meds  Therapy  Follow-up with Ortho         Fibula fracture     Pain meds  Therapy  Follow-up with  Ortho         Hypertension, essential     At goal  Monitor blood pressure  Continue antihypertensives         Type 2 diabetes mellitus with hyperglycemia, with long-term current use of insulin (CMS/[facility])     Continue metformin and insulin  Glucoscan with sliding scale insulin coverage  Low concentrated sweets diet         Weakness - Primary     Continue with therapy          Medications, treatments, and labs reviewed  Continue medications and treatments as listed in EMR    Yasemin Kelly, APRDIONISIO-CNP

## 2023-06-23 NOTE — LETTER
Patient: Teresa Matthews  : 1950    Encounter Date: 2023    PROGRESS NOTE    Subjective  Chief complaint: Teresa Matthews is a 72 y.o. female who is an acute skilled patient being seen and evaluated for weakness    HPI:  2023 patient mated to skilled nursing facility for therapy due to weakness after recent hospitalization status post fall. Patient was found to have acute comminuted fractures of proximal tibia and fibular diaphysis. She was admitted to the hospital and seen by Ortho who recommended conservative management. Pain was controlled and patient none weightbearing. She was discharged to skilled nursing facility for therapy and to continue medical management.      Objective  Vital signs: 126/75, 97.5, 72, 18, 92%, blood sugar 270    Physical Exam  Constitutional:       General: She is not in acute distress.  Eyes:      Extraocular Movements: Extraocular movements intact.   Cardiovascular:      Rate and Rhythm: Normal rate and regular rhythm.   Pulmonary:      Effort: Pulmonary effort is normal.      Breath sounds: Normal breath sounds.   Abdominal:      General: Bowel sounds are normal.      Palpations: Abdomen is soft.   Musculoskeletal:      Cervical back: Neck supple.      Right lower leg: Edema present.      Left lower leg: Edema present.      Comments: Generalized weakness   Skin:     Comments: Dressing left foot   Neurological:      Mental Status: She is alert.   Psychiatric:         Mood and Affect: Mood normal.         Behavior: Behavior is cooperative.         Assessment/Plan  Problem List Items Addressed This Visit       Chronic combined systolic and diastolic congestive heart failure (CMS/HCC)     Stable  Diuretic  Monitor weight         Chronic obstructive pulmonary disease, unspecified COPD type (CMS/HCC)     Stable, no wheezing or shortness of breath  On room air         Closed fracture of proximal end of left tibia with routine healing     Pain  meds  Therapy  Follow-up with Ortho         Fibula fracture     Pain meds  Therapy  Follow-up with Ortho         Hypertension, essential     At goal  Monitor blood pressure  Continue antihypertensives         Type 2 diabetes mellitus with hyperglycemia, with long-term current use of insulin (CMS/[facility])     Continue metformin and insulin  Glucoscan with sliding scale insulin coverage  Low concentrated sweets diet         Weakness - Primary     Continue with therapy          Medications, treatments, and labs reviewed  Continue medications and treatments as listed in EMR    MARISELA Nelson      Electronically Signed By: MARISELA Nelson   6/23/23  5:19 PM

## 2023-06-23 NOTE — ASSESSMENT & PLAN NOTE
Continue metformin and insulin  Glucoscan with sliding scale insulin coverage  Low concentrated sweets diet

## 2023-06-26 ENCOUNTER — NURSING HOME VISIT (OUTPATIENT)
Dept: POST ACUTE CARE | Facility: EXTERNAL LOCATION | Age: 73
End: 2023-06-26
Payer: MEDICARE

## 2023-06-26 DIAGNOSIS — R53.1 WEAKNESS: Primary | ICD-10-CM

## 2023-06-26 DIAGNOSIS — E11.65 TYPE 2 DIABETES MELLITUS WITH HYPERGLYCEMIA, WITH LONG-TERM CURRENT USE OF INSULIN (MULTI): ICD-10-CM

## 2023-06-26 DIAGNOSIS — Z79.4 TYPE 2 DIABETES MELLITUS WITH HYPERGLYCEMIA, WITH LONG-TERM CURRENT USE OF INSULIN (MULTI): ICD-10-CM

## 2023-06-26 DIAGNOSIS — I10 HYPERTENSION, ESSENTIAL: ICD-10-CM

## 2023-06-26 DIAGNOSIS — I48.91 ATRIAL FIBRILLATION, UNSPECIFIED TYPE (MULTI): ICD-10-CM

## 2023-06-26 PROCEDURE — 99308 SBSQ NF CARE LOW MDM 20: CPT | Performed by: INTERNAL MEDICINE

## 2023-06-26 NOTE — LETTER
Patient: Teresa Matthews  : 1950    Encounter Date: 2023    PROGRESS NOTE    Subjective  Chief complaint: Teresa Matthews is a 72 y.o. female who is an acute skilled patient being seen and evaluated for weakness    HPI:  2023 patient mated to skilled nursing facility for therapy due to weakness after recent hospitalization status post fall. Patient was found to have acute comminuted fractures of proximal tibia and fibular diaphysis. She was admitted to the hospital and seen by Ortho who recommended conservative management. Pain was controlled and patient none weightbearing. She was discharged to skilled nursing facility for therapy and to continue medical management.    2023 Patient continues to work with therapy to reach goals. ST addressing cognition and communication skill, pt with 100% acc min mod verbal cues. PT working on ROM, Posteral exercises to improve posture, transfer training, balance and coordination. Longest stance time 2:15 min with CGA/Min A. No LOB. Pt maintained NWB status on LLE. OT also working on body alignment\posture and increasing strength and activity tolerance. Patient actively participates in therapy. No new concerns today.  Denies n/v/f/c pain.        Objective  Vital signs: 117/72,72,95% RA,     Physical Exam  Constitutional:       General: She is not in acute distress.  Eyes:      Extraocular Movements: Extraocular movements intact.   Cardiovascular:      Rate and Rhythm: Normal rate and regular rhythm.   Pulmonary:      Effort: Pulmonary effort is normal.      Breath sounds: Normal breath sounds.   Abdominal:      General: Bowel sounds are normal.      Palpations: Abdomen is soft.   Musculoskeletal:      Cervical back: Neck supple.      Comments: Generalized weakness   Skin:     Comments: Dressing left foot   Neurological:      Mental Status: She is alert.   Psychiatric:         Mood and Affect: Mood normal.         Behavior: Behavior is  cooperative.         Assessment/Plan  Problem List Items Addressed This Visit       Type 2 diabetes mellitus with hyperglycemia, with long-term current use of insulin (CMS/[facility])     Continue metformin and insulin  Glucoscan with sliding scale insulin coverage  Low concentrated sweets diet         Atrial fibrillation (CMS/[facility])     Continue with Apixaban  HR controlled         Hypertension, essential     Monitor blood pressure  Continue antihypertensives         Weakness - Primary     Continue with therapy          Medications, treatments, and labs reviewed  Continue medications and treatments as listed in EMR    Scribe Attestation  I, Kirill Chapman   attest that this documentation has been prepared under the direction and in the presence of Pita Virgen MD.     Provider Attestation - Scribe documentation  All medical record entries made by the Scribe were at my direction and personally dictated by me. I have reviewed the chart and agree that the record accurately reflects my personal performance of the history, physical exam, discussion and plan.   Pita Virgen MD      Electronically Signed By: Pita Virgen MD   6/26/23  5:14 PM

## 2023-06-26 NOTE — PROGRESS NOTES
PROGRESS NOTE    Subjective   Chief complaint: Teresa Matthews is a 72 y.o. female who is an acute skilled patient being seen and evaluated for weakness    HPI:  6/21/2023 patient mated to skilled nursing facility for therapy due to weakness after recent hospitalization status post fall. Patient was found to have acute comminuted fractures of proximal tibia and fibular diaphysis. She was admitted to the hospital and seen by Ortho who recommended conservative management. Pain was controlled and patient none weightbearing. She was discharged to skilled nursing facility for therapy and to continue medical management.    6/26/2023 Patient continues to work with therapy to reach goals. ST addressing cognition and communication skill, pt with 100% acc min mod verbal cues. PT working on ROM, Posteral exercises to improve posture, transfer training, balance and coordination. Longest stance time 2:15 min with CGA/Min A. No LOB. Pt maintained NWB status on LLE. OT also working on body alignment\posture and increasing strength and activity tolerance. Patient actively participates in therapy. No new concerns today.  Denies n/v/f/c pain.        Objective   Vital signs: 117/72,72,95% RA,     Physical Exam  Constitutional:       General: She is not in acute distress.  Eyes:      Extraocular Movements: Extraocular movements intact.   Cardiovascular:      Rate and Rhythm: Normal rate and regular rhythm.   Pulmonary:      Effort: Pulmonary effort is normal.      Breath sounds: Normal breath sounds.   Abdominal:      General: Bowel sounds are normal.      Palpations: Abdomen is soft.   Musculoskeletal:      Cervical back: Neck supple.      Comments: Generalized weakness   Skin:     Comments: Dressing left foot   Neurological:      Mental Status: She is alert.   Psychiatric:         Mood and Affect: Mood normal.         Behavior: Behavior is cooperative.         Assessment/Plan   Problem List Items Addressed This Visit        Type 2 diabetes mellitus with hyperglycemia, with long-term current use of insulin (CMS/[facility])     Continue metformin and insulin  Glucoscan with sliding scale insulin coverage  Low concentrated sweets diet         Atrial fibrillation (CMS/[facility])     Continue with Apixaban  HR controlled         Hypertension, essential     Monitor blood pressure  Continue antihypertensives         Weakness - Primary     Continue with therapy          Medications, treatments, and labs reviewed  Continue medications and treatments as listed in EMR    Scribe Attestation  I, Kirill Chapman   attest that this documentation has been prepared under the direction and in the presence of Pita Virgen MD.     Provider Attestation - Scribe documentation  All medical record entries made by the Scribe were at my direction and personally dictated by me. I have reviewed the chart and agree that the record accurately reflects my personal performance of the history, physical exam, discussion and plan.   Pita Virgen MD

## 2023-06-27 ENCOUNTER — NURSING HOME VISIT (OUTPATIENT)
Dept: POST ACUTE CARE | Facility: EXTERNAL LOCATION | Age: 73
End: 2023-06-27
Payer: MEDICARE

## 2023-06-27 DIAGNOSIS — I48.91 ATRIAL FIBRILLATION, UNSPECIFIED TYPE (MULTI): ICD-10-CM

## 2023-06-27 DIAGNOSIS — J44.9 CHRONIC OBSTRUCTIVE PULMONARY DISEASE, UNSPECIFIED COPD TYPE (MULTI): ICD-10-CM

## 2023-06-27 DIAGNOSIS — I10 HYPERTENSION, ESSENTIAL: ICD-10-CM

## 2023-06-27 DIAGNOSIS — R53.1 WEAKNESS: Primary | ICD-10-CM

## 2023-06-27 DIAGNOSIS — Z79.4 TYPE 2 DIABETES MELLITUS WITH HYPERGLYCEMIA, WITH LONG-TERM CURRENT USE OF INSULIN (MULTI): ICD-10-CM

## 2023-06-27 DIAGNOSIS — E11.65 TYPE 2 DIABETES MELLITUS WITH HYPERGLYCEMIA, WITH LONG-TERM CURRENT USE OF INSULIN (MULTI): ICD-10-CM

## 2023-06-27 PROCEDURE — 99308 SBSQ NF CARE LOW MDM 20: CPT | Performed by: INTERNAL MEDICINE

## 2023-06-27 NOTE — LETTER
Patient: Teresa Matthews  : 1950    Encounter Date: 2023    PROGRESS NOTE    Subjective  Chief complaint: Teresa Matthews is a 72 y.o. female who is an acute skilled patient being seen and evaluated for weakness    HPI:  2023 patient mated to skilled nursing facility for therapy due to weakness after recent hospitalization status post fall. Patient was found to have acute comminuted fractures of proximal tibia and fibular diaphysis. She was admitted to the hospital and seen by Ortho who recommended conservative management. Pain was controlled and patient none weightbearing. She was discharged to skilled nursing facility for therapy and to continue medical management.    2023 Patient continues to work with therapy to reach goals. ST addressing cognition and communication skill, pt with 100% acc min mod verbal cues. PT working on ROM, Posteral exercises to improve posture, transfer training, balance and coordination. Longest stance time 2:15 min with CGA/Min A. No LOB. Pt maintained NWB status on LLE. OT also working on body alignment\posture and increasing strength and activity tolerance. Patient actively participates in therapy. No new concerns today.  Denies n/v/f/c pain.      2023  Patient working with therapy with Recumbent bike x 15 min in order to improve strength and endurance, maintaining NWB status on LLE. Working on transfer training, balance activities & weight shifting. ST working on addressing cognitive communication skills, targeting memory and reasoning skills. Pt completed 100% with min\mod cues. Complex problem solving\reasoning with 95% accuracy with occasional cues. No new nursing concerns today.       Objective  Vital signs: 108/68,78,95% RA,      Physical Exam  Constitutional:       General: She is not in acute distress.  Eyes:      Extraocular Movements: Extraocular movements intact.   Cardiovascular:      Rate and Rhythm: Normal rate and regular rhythm.    Pulmonary:      Effort: Pulmonary effort is normal.      Breath sounds: Normal breath sounds.   Abdominal:      General: Bowel sounds are normal.      Palpations: Abdomen is soft.   Musculoskeletal:      Cervical back: Neck supple.      Comments: Generalized weakness   Skin:     Comments: Dressing left foot   Neurological:      Mental Status: She is alert.   Psychiatric:         Mood and Affect: Mood normal.         Behavior: Behavior is cooperative.         Assessment/Plan  Problem List Items Addressed This Visit       Type 2 diabetes mellitus with hyperglycemia, with long-term current use of insulin (CMS/HCC)     Continue metformin and insulin  Glucoscan with sliding scale insulin coverage  Low concentrated sweets diet         Atrial fibrillation (CMS/HCC)     Continue with Apixaban  HR controlled         Hypertension, essential     Monitor blood pressure  Continue antihypertensives         Chronic obstructive pulmonary disease, unspecified COPD type (CMS/HCC)     Stable, no wheezing or shortness of breath  On room air         Weakness - Primary     Continue with therapy          Medications, treatments, and labs reviewed  Continue medications and treatments as listed in EMR    Scribe Attestation  Sharon CHICAS Scribe   attest that this documentation has been prepared under the direction and in the presence of Pita Virgen MD.     Provider Attestation - Scribe documentation  All medical record entries made by the Scribe were at my direction and personally dictated by me. I have reviewed the chart and agree that the record accurately reflects my personal performance of the history, physical exam, discussion and plan.   Pita Virgen MD      Electronically Signed By: Pita Virgen MD   6/27/23  8:06 PM

## 2023-06-27 NOTE — PROGRESS NOTES
PROGRESS NOTE    Subjective   Chief complaint: Teresa Matthews is a 72 y.o. female who is an acute skilled patient being seen and evaluated for weakness    HPI:  6/21/2023 patient mated to skilled nursing facility for therapy due to weakness after recent hospitalization status post fall. Patient was found to have acute comminuted fractures of proximal tibia and fibular diaphysis. She was admitted to the hospital and seen by Ortho who recommended conservative management. Pain was controlled and patient none weightbearing. She was discharged to skilled nursing facility for therapy and to continue medical management.    6/26/2023 Patient continues to work with therapy to reach goals. ST addressing cognition and communication skill, pt with 100% acc min mod verbal cues. PT working on ROM, Posteral exercises to improve posture, transfer training, balance and coordination. Longest stance time 2:15 min with CGA/Min A. No LOB. Pt maintained NWB status on LLE. OT also working on body alignment\posture and increasing strength and activity tolerance. Patient actively participates in therapy. No new concerns today.  Denies n/v/f/c pain.      6/27/2023  Patient working with therapy with Recumbent bike x 15 min in order to improve strength and endurance, maintaining NWB status on LLE. Working on transfer training, balance activities & weight shifting. ST working on addressing cognitive communication skills, targeting memory and reasoning skills. Pt completed 100% with min\mod cues. Complex problem solving\reasoning with 95% accuracy with occasional cues. No new nursing concerns today.       Objective   Vital signs: 108/68,78,95% RA,      Physical Exam  Constitutional:       General: She is not in acute distress.  Eyes:      Extraocular Movements: Extraocular movements intact.   Cardiovascular:      Rate and Rhythm: Normal rate and regular rhythm.   Pulmonary:      Effort: Pulmonary effort is normal.      Breath sounds:  Normal breath sounds.   Abdominal:      General: Bowel sounds are normal.      Palpations: Abdomen is soft.   Musculoskeletal:      Cervical back: Neck supple.      Comments: Generalized weakness   Skin:     Comments: Dressing left foot   Neurological:      Mental Status: She is alert.   Psychiatric:         Mood and Affect: Mood normal.         Behavior: Behavior is cooperative.         Assessment/Plan   Problem List Items Addressed This Visit       Type 2 diabetes mellitus with hyperglycemia, with long-term current use of insulin (CMS/HCC)     Continue metformin and insulin  Glucoscan with sliding scale insulin coverage  Low concentrated sweets diet         Atrial fibrillation (CMS/HCC)     Continue with Apixaban  HR controlled         Hypertension, essential     Monitor blood pressure  Continue antihypertensives         Chronic obstructive pulmonary disease, unspecified COPD type (CMS/HCC)     Stable, no wheezing or shortness of breath  On room air         Weakness - Primary     Continue with therapy          Medications, treatments, and labs reviewed  Continue medications and treatments as listed in EMR    Scribe Attestation  Sharon CHICAS Scribe   attest that this documentation has been prepared under the direction and in the presence of Pita Virgen MD.     Provider Attestation - Scribe documentation  All medical record entries made by the Scribe were at my direction and personally dictated by me. I have reviewed the chart and agree that the record accurately reflects my personal performance of the history, physical exam, discussion and plan.   Pita Virgen MD

## 2023-06-28 ENCOUNTER — NURSING HOME VISIT (OUTPATIENT)
Dept: POST ACUTE CARE | Facility: EXTERNAL LOCATION | Age: 73
End: 2023-06-28
Payer: MEDICARE

## 2023-06-28 DIAGNOSIS — J44.9 CHRONIC OBSTRUCTIVE PULMONARY DISEASE, UNSPECIFIED COPD TYPE (MULTI): ICD-10-CM

## 2023-06-28 DIAGNOSIS — E11.65 TYPE 2 DIABETES MELLITUS WITH HYPERGLYCEMIA, WITH LONG-TERM CURRENT USE OF INSULIN (MULTI): ICD-10-CM

## 2023-06-28 DIAGNOSIS — Z79.4 TYPE 2 DIABETES MELLITUS WITH HYPERGLYCEMIA, WITH LONG-TERM CURRENT USE OF INSULIN (MULTI): ICD-10-CM

## 2023-06-28 DIAGNOSIS — R53.1 WEAKNESS: Primary | ICD-10-CM

## 2023-06-28 DIAGNOSIS — I48.91 ATRIAL FIBRILLATION, UNSPECIFIED TYPE (MULTI): ICD-10-CM

## 2023-06-28 DIAGNOSIS — I50.42 CHRONIC COMBINED SYSTOLIC AND DIASTOLIC CONGESTIVE HEART FAILURE (MULTI): ICD-10-CM

## 2023-06-28 DIAGNOSIS — I10 HYPERTENSION, ESSENTIAL: ICD-10-CM

## 2023-06-28 PROCEDURE — 99309 SBSQ NF CARE MODERATE MDM 30: CPT | Performed by: NURSE PRACTITIONER

## 2023-06-28 NOTE — LETTER
Patient: Teresa Matthews  : 1950    Encounter Date: 2023    PROGRESS NOTE    Subjective  Chief complaint: Teresa Matthews is a 72 y.o. female who is an acute skilled patient being seen and evaluated for weakness    HPI:  2023 patient mated to skilled nursing facility for therapy due to weakness after recent hospitalization status post fall. Patient was found to have acute comminuted fractures of proximal tibia and fibular diaphysis. She was admitted to the hospital and seen by Ortho who recommended conservative management. Pain was controlled and patient none weightbearing. She was discharged to skilled nursing facility for therapy and to continue medical management.    2023 Patient continues to work with therapy to reach goals. ST addressing cognition and communication skill, pt with 100% acc min mod verbal cues. PT working on ROM, Posteral exercises to improve posture, transfer training, balance and coordination. Longest stance time 2:15 min with CGA/Min A. No LOB. Pt maintained NWB status on LLE. OT also working on body alignment\posture and increasing strength and activity tolerance. Patient actively participates in therapy. No new concerns today.  Denies n/v/f/c pain.      2023  Patient working with therapy with Recumbent bike x 15 min in order to improve strength and endurance, maintaining NWB status on LLE. Working on transfer training, balance activities & weight shifting. ST working on addressing cognitive communication skills, targeting memory and reasoning skills. Pt completed 100% with min\mod cues. Complex problem solving\reasoning with 95% accuracy with occasional cues. No new nursing concerns today.     2023 Patient presents for fu therapy and general medical care.  Patient is working on strengthening balance and ADLs and continues to work toward goal with assist of therapist.  No concerns verbalized from nursing.  Patient denies constitutional symptoms.          Objective  Vital signs: 136/80, 97.3, 78, 20, 93%, blood sugar 224    Physical Exam  Constitutional:       General: She is not in acute distress.  Eyes:      Extraocular Movements: Extraocular movements intact.   Cardiovascular:      Rate and Rhythm: Normal rate and regular rhythm.   Pulmonary:      Effort: Pulmonary effort is normal.      Breath sounds: Normal breath sounds.   Abdominal:      General: Bowel sounds are normal.      Palpations: Abdomen is soft.   Musculoskeletal:      Cervical back: Neck supple.      Comments: Generalized weakness  Immobilizer LLE   Skin:     Comments: Dressing left foot   Neurological:      Mental Status: She is alert.   Psychiatric:         Mood and Affect: Mood normal.         Behavior: Behavior is cooperative.         Assessment/Plan  Problem List Items Addressed This Visit       Atrial fibrillation (CMS/HCC)     Apixaban  Amiodarone  Metoprolol  Bleeding precautions           Chronic combined systolic and diastolic congestive heart failure (CMS/HCC)     Stable  Tosemide  Aldactone   BB  Monitor weight         Chronic obstructive pulmonary disease, unspecified COPD type (CMS/HCC)     Stable, no wheezing or shortness of breath  On room air         Hypertension, essential     BP at goal  Monitor blood pressure  Continue antihypertensives         Type 2 diabetes mellitus with hyperglycemia, with long-term current use of insulin (CMS/HCC)     Continue metformin and insulin  Glucoscan with sliding scale insulin coverage  Low concentrated sweets diet         Weakness - Primary     Continue with therapy          Medications, treatments, and labs reviewed  Continue medications and treatments as listed in EMR    MARISELA Nelson      Electronically Signed By: MARISELA Nelson   7/2/23  9:46 PM

## 2023-06-28 NOTE — PROGRESS NOTES
PROGRESS NOTE    Subjective   Chief complaint: Teresa Matthews is a 72 y.o. female who is an acute skilled patient being seen and evaluated for weakness    HPI:  6/21/2023 patient mated to skilled nursing facility for therapy due to weakness after recent hospitalization status post fall. Patient was found to have acute comminuted fractures of proximal tibia and fibular diaphysis. She was admitted to the hospital and seen by Ortho who recommended conservative management. Pain was controlled and patient none weightbearing. She was discharged to skilled nursing facility for therapy and to continue medical management.    6/26/2023 Patient continues to work with therapy to reach goals. ST addressing cognition and communication skill, pt with 100% acc min mod verbal cues. PT working on ROM, Posteral exercises to improve posture, transfer training, balance and coordination. Longest stance time 2:15 min with CGA/Min A. No LOB. Pt maintained NWB status on LLE. OT also working on body alignment\posture and increasing strength and activity tolerance. Patient actively participates in therapy. No new concerns today.  Denies n/v/f/c pain.      6/27/2023  Patient working with therapy with Recumbent bike x 15 min in order to improve strength and endurance, maintaining NWB status on LLE. Working on transfer training, balance activities & weight shifting. ST working on addressing cognitive communication skills, targeting memory and reasoning skills. Pt completed 100% with min\mod cues. Complex problem solving\reasoning with 95% accuracy with occasional cues. No new nursing concerns today.     6/28/2023 Patient presents for fu therapy and general medical care.  Patient is working on strengthening balance and ADLs and continues to work toward goal with assist of therapist.  No concerns verbalized from nursing.  Patient denies constitutional symptoms.         Objective   Vital signs: 136/80, 97.3, 78, 20, 93%, blood sugar  224    Physical Exam  Constitutional:       General: She is not in acute distress.  Eyes:      Extraocular Movements: Extraocular movements intact.   Cardiovascular:      Rate and Rhythm: Normal rate and regular rhythm.   Pulmonary:      Effort: Pulmonary effort is normal.      Breath sounds: Normal breath sounds.   Abdominal:      General: Bowel sounds are normal.      Palpations: Abdomen is soft.   Musculoskeletal:      Cervical back: Neck supple.      Comments: Generalized weakness  Immobilizer LLE   Skin:     Comments: Dressing left foot   Neurological:      Mental Status: She is alert.   Psychiatric:         Mood and Affect: Mood normal.         Behavior: Behavior is cooperative.         Assessment/Plan   Problem List Items Addressed This Visit       Atrial fibrillation (CMS/HCC)     Apixaban  Amiodarone  Metoprolol  Bleeding precautions           Chronic combined systolic and diastolic congestive heart failure (CMS/HCC)     Stable  Tosemide  Aldactone   BB  Monitor weight         Chronic obstructive pulmonary disease, unspecified COPD type (CMS/HCC)     Stable, no wheezing or shortness of breath  On room air         Hypertension, essential     BP at goal  Monitor blood pressure  Continue antihypertensives         Type 2 diabetes mellitus with hyperglycemia, with long-term current use of insulin (CMS/HCC)     Continue metformin and insulin  Glucoscan with sliding scale insulin coverage  Low concentrated sweets diet         Weakness - Primary     Continue with therapy          Medications, treatments, and labs reviewed  Continue medications and treatments as listed in EMR    Yasemin Kelly, APRN-CNP

## 2023-06-29 ENCOUNTER — NURSING HOME VISIT (OUTPATIENT)
Dept: POST ACUTE CARE | Facility: EXTERNAL LOCATION | Age: 73
End: 2023-06-29
Payer: MEDICARE

## 2023-06-29 DIAGNOSIS — R53.1 WEAKNESS: ICD-10-CM

## 2023-06-29 DIAGNOSIS — S82.102D CLOSED FRACTURE OF PROXIMAL END OF LEFT TIBIA WITH ROUTINE HEALING, UNSPECIFIED FRACTURE MORPHOLOGY, SUBSEQUENT ENCOUNTER: ICD-10-CM

## 2023-06-29 DIAGNOSIS — I50.42 CHRONIC COMBINED SYSTOLIC AND DIASTOLIC CONGESTIVE HEART FAILURE (MULTI): ICD-10-CM

## 2023-06-29 PROCEDURE — 99308 SBSQ NF CARE LOW MDM 20: CPT | Performed by: INTERNAL MEDICINE

## 2023-06-29 NOTE — LETTER
Patient: Teresa Matthews  : 1950    Encounter Date: 2023    PROGRESS NOTE    Subjective  Chief complaint: Teresa Matthews is a 72 y.o. female who is an acute skilled patient being seen and evaluated for weakness    HPI:  2023 patient mated to skilled nursing facility for therapy due to weakness after recent hospitalization status post fall. Patient was found to have acute comminuted fractures of proximal tibia and fibular diaphysis. She was admitted to the hospital and seen by Ortho who recommended conservative management. Pain was controlled and patient none weightbearing. She was discharged to skilled nursing facility for therapy and to continue medical management.    2023 Patient continues to work with therapy to reach goals. ST addressing cognition and communication skill, pt with 100% acc min mod verbal cues. PT working on ROM, Posteral exercises to improve posture, transfer training, balance and coordination. Longest stance time 2:15 min with CGA/Min A. No LOB. Pt maintained NWB status on LLE. OT also working on body alignment\posture and increasing strength and activity tolerance. Patient actively participates in therapy. No new concerns today.  Denies n/v/f/c pain.      2023  Patient working with therapy with Recumbent bike x 15 min in order to improve strength and endurance, maintaining NWB status on LLE. Working on transfer training, balance activities & weight shifting. ST working on addressing cognitive communication skills, targeting memory and reasoning skills. Pt completed 100% with min\mod cues. Complex problem solving\reasoning with 95% accuracy with occasional cues. No new nursing concerns today.     2023 Patient presents for fu therapy and general medical care.  Patient is working on strengthening balance and ADLs and continues to work toward goal with assist of therapist.  No concerns verbalized from nursing.  Patient denies constitutional symptoms.        6/29/23 Patient working on therapy. Is working on recumbent bike ans tolerates up to 15 minutes. Remains NWB to LLE. Denies pain at this time. No acute distress. Denies SOB and orthopnea. No acute distress      Objective  Vital signs: 137/74, 97%    Physical Exam  Constitutional:       General: She is not in acute distress.  Eyes:      Extraocular Movements: Extraocular movements intact.   Cardiovascular:      Rate and Rhythm: Normal rate and regular rhythm.   Pulmonary:      Effort: Pulmonary effort is normal.      Breath sounds: Normal breath sounds.   Abdominal:      General: Bowel sounds are normal.      Palpations: Abdomen is soft.   Musculoskeletal:      Cervical back: Neck supple.      Right lower leg: No edema.      Left lower leg: No edema.   Neurological:      Mental Status: She is alert.   Psychiatric:         Mood and Affect: Mood normal.         Behavior: Behavior is cooperative.         Assessment/Plan  Problem List Items Addressed This Visit          Cardiac and Vasculature    Chronic combined systolic and diastolic congestive heart failure (CMS/HCC)     Stable  Diuretic  Monitor weight            Musculoskeletal and Injuries    Closed fracture of proximal end of left tibia with routine healing     Pain meds  Therapy  Follow-up with Ortho            Symptoms and Signs    Weakness     Continue with therapy          Medications, treatments, and labs reviewed  Continue medications and treatments as listed in PCC    Pita Virgen MD    1. Chronic combined systolic and diastolic congestive heart failure (CMS/HCC)        2. Closed fracture of proximal end of left tibia with routine healing, unspecified fracture morphology, subsequent encounter        3. Weakness             Scribe Attestation  By signing my name below, I, Monica Ronak, Scribe   attest that this documentation has been prepared under the direction and in the presence of Pita Virgen MD.    Provider Attestation - Scribe  documentation  All medical record entries made by the Scribe were at my direction and personally dictated by me. I have reviewed the chart and agree that the record accurately reflects my personal performance of the history, physical exam, discussion and plan.      Electronically Signed By: Pita Virgen MD   6/29/23  2:54 PM

## 2023-06-29 NOTE — PROGRESS NOTES
PROGRESS NOTE    Subjective   Chief complaint: Teresa Matthews is a 72 y.o. female who is an acute skilled patient being seen and evaluated for weakness    HPI:  6/21/2023 patient mated to skilled nursing facility for therapy due to weakness after recent hospitalization status post fall. Patient was found to have acute comminuted fractures of proximal tibia and fibular diaphysis. She was admitted to the hospital and seen by Ortho who recommended conservative management. Pain was controlled and patient none weightbearing. She was discharged to skilled nursing facility for therapy and to continue medical management.    6/26/2023 Patient continues to work with therapy to reach goals. ST addressing cognition and communication skill, pt with 100% acc min mod verbal cues. PT working on ROM, Posteral exercises to improve posture, transfer training, balance and coordination. Longest stance time 2:15 min with CGA/Min A. No LOB. Pt maintained NWB status on LLE. OT also working on body alignment\posture and increasing strength and activity tolerance. Patient actively participates in therapy. No new concerns today.  Denies n/v/f/c pain.      6/27/2023  Patient working with therapy with Recumbent bike x 15 min in order to improve strength and endurance, maintaining NWB status on LLE. Working on transfer training, balance activities & weight shifting. ST working on addressing cognitive communication skills, targeting memory and reasoning skills. Pt completed 100% with min\mod cues. Complex problem solving\reasoning with 95% accuracy with occasional cues. No new nursing concerns today.     6/28/2023 Patient presents for fu therapy and general medical care.  Patient is working on strengthening balance and ADLs and continues to work toward goal with assist of therapist.  No concerns verbalized from nursing.  Patient denies constitutional symptoms.       6/29/23 Patient working on therapy. Is working on recumbent bike ans  tolerates up to 15 minutes. Remains NWB to LLE. Denies pain at this time. No acute distress. Denies SOB and orthopnea. No acute distress      Objective   Vital signs: 137/74, 97%    Physical Exam  Constitutional:       General: She is not in acute distress.  Eyes:      Extraocular Movements: Extraocular movements intact.   Cardiovascular:      Rate and Rhythm: Normal rate and regular rhythm.   Pulmonary:      Effort: Pulmonary effort is normal.      Breath sounds: Normal breath sounds.   Abdominal:      General: Bowel sounds are normal.      Palpations: Abdomen is soft.   Musculoskeletal:      Cervical back: Neck supple.      Right lower leg: No edema.      Left lower leg: No edema.   Neurological:      Mental Status: She is alert.   Psychiatric:         Mood and Affect: Mood normal.         Behavior: Behavior is cooperative.         Assessment/Plan   Problem List Items Addressed This Visit          Cardiac and Vasculature    Chronic combined systolic and diastolic congestive heart failure (CMS/HCC)     Stable  Diuretic  Monitor weight            Musculoskeletal and Injuries    Closed fracture of proximal end of left tibia with routine healing     Pain meds  Therapy  Follow-up with Ortho            Symptoms and Signs    Weakness     Continue with therapy          Medications, treatments, and labs reviewed  Continue medications and treatments as listed in PCC    Pita Virgen MD    1. Chronic combined systolic and diastolic congestive heart failure (CMS/HCC)        2. Closed fracture of proximal end of left tibia with routine healing, unspecified fracture morphology, subsequent encounter        3. Weakness             Scribe Attestation  By signing my name below, IMonica Scribe   attest that this documentation has been prepared under the direction and in the presence of Pita Virgen MD.    Provider Attestation - Scribe documentation  All medical record entries made by the Scribe were at my direction  and personally dictated by me. I have reviewed the chart and agree that the record accurately reflects my personal performance of the history, physical exam, discussion and plan.

## 2023-06-30 ENCOUNTER — NURSING HOME VISIT (OUTPATIENT)
Dept: POST ACUTE CARE | Facility: EXTERNAL LOCATION | Age: 73
End: 2023-06-30
Payer: MEDICARE

## 2023-06-30 DIAGNOSIS — E11.65 TYPE 2 DIABETES MELLITUS WITH HYPERGLYCEMIA, WITH LONG-TERM CURRENT USE OF INSULIN (MULTI): ICD-10-CM

## 2023-06-30 DIAGNOSIS — I50.42 CHRONIC COMBINED SYSTOLIC AND DIASTOLIC CONGESTIVE HEART FAILURE (MULTI): ICD-10-CM

## 2023-06-30 DIAGNOSIS — S82.102D CLOSED FRACTURE OF PROXIMAL END OF LEFT TIBIA WITH ROUTINE HEALING, UNSPECIFIED FRACTURE MORPHOLOGY, SUBSEQUENT ENCOUNTER: ICD-10-CM

## 2023-06-30 DIAGNOSIS — R53.1 WEAKNESS: Primary | ICD-10-CM

## 2023-06-30 DIAGNOSIS — Z79.4 TYPE 2 DIABETES MELLITUS WITH HYPERGLYCEMIA, WITH LONG-TERM CURRENT USE OF INSULIN (MULTI): ICD-10-CM

## 2023-06-30 DIAGNOSIS — I48.91 ATRIAL FIBRILLATION, UNSPECIFIED TYPE (MULTI): ICD-10-CM

## 2023-06-30 DIAGNOSIS — J44.9 CHRONIC OBSTRUCTIVE PULMONARY DISEASE, UNSPECIFIED COPD TYPE (MULTI): ICD-10-CM

## 2023-06-30 DIAGNOSIS — I10 HYPERTENSION, ESSENTIAL: ICD-10-CM

## 2023-06-30 PROCEDURE — 99309 SBSQ NF CARE MODERATE MDM 30: CPT | Performed by: NURSE PRACTITIONER

## 2023-06-30 NOTE — LETTER
Patient: Teresa Matthews  : 1950    Encounter Date: 2023    PROGRESS NOTE    Subjective  Chief complaint: Teresa Matthews is a 72 y.o. female who is an acute skilled patient being seen and evaluated for weakness    HPI:  2023 patient mated to skilled nursing facility for therapy due to weakness after recent hospitalization status post fall. Patient was found to have acute comminuted fractures of proximal tibia and fibular diaphysis. She was admitted to the hospital and seen by Ortho who recommended conservative management. Pain was controlled and patient none weightbearing. She was discharged to skilled nursing facility for therapy and to continue medical management.    2023 Patient continues to work with therapy to reach goals. ST addressing cognition and communication skill, pt with 100% acc min mod verbal cues. PT working on ROM, Posteral exercises to improve posture, transfer training, balance and coordination. Longest stance time 2:15 min with CGA/Min A. No LOB. Pt maintained NWB status on LLE. OT also working on body alignment\posture and increasing strength and activity tolerance. Patient actively participates in therapy. No new concerns today.  Denies n/v/f/c pain.      2023  Patient working with therapy with Recumbent bike x 15 min in order to improve strength and endurance, maintaining NWB status on LLE. Working on transfer training, balance activities & weight shifting. ST working on addressing cognitive communication skills, targeting memory and reasoning skills. Pt completed 100% with min\mod cues. Complex problem solving\reasoning with 95% accuracy with occasional cues. No new nursing concerns today.     2023 Patient presents for fu therapy and general medical care.  Patient is working on strengthening balance and ADLs and continues to work toward goal with assist of therapist.  No concerns verbalized from nursing.  Patient denies constitutional symptoms.        6/29/23 Patient working on therapy. Is working on recumbent bike ans tolerates up to 15 minutes. Remains NWB to LLE. Denies pain at this time. No acute distress. Denies SOB and orthopnea. No acute distress    6/30/2023 Patient at SNF and working toward goals in therapy.   Patient NWB LLE.  Working on transfer training and balance.  Patient verbalizes no new concerns today.  Had uneventful night.  Denies n/v/f/c.  No new concerns from nursing staff.          Objective  Vital signs: 121/73,     Physical Exam  Constitutional:       General: She is not in acute distress.  Eyes:      Extraocular Movements: Extraocular movements intact.   Cardiovascular:      Rate and Rhythm: Normal rate and regular rhythm.   Pulmonary:      Effort: Pulmonary effort is normal.      Breath sounds: Normal breath sounds.   Abdominal:      General: Bowel sounds are normal.      Palpations: Abdomen is soft.   Musculoskeletal:      Cervical back: Neck supple.      Right lower leg: Edema present.      Left lower leg: Edema present.      Comments: Generalized weakness   Skin:     Comments: Dressing left foot   Neurological:      Mental Status: She is alert.   Psychiatric:         Mood and Affect: Mood normal.         Behavior: Behavior is cooperative.         Assessment/Plan  Problem List Items Addressed This Visit       Atrial fibrillation (CMS/HCC)     Apixaban  Amiodarone  Metoprolol  Bleeding precautions           Chronic combined systolic and diastolic congestive heart failure (CMS/HCC)     Stable  Tosemide  Aldactone   BB  Monitor weight         Chronic obstructive pulmonary disease, unspecified COPD type (CMS/HCC)     Stable, no wheezing or shortness of breath  On room air         Closed fracture of proximal end of left tibia with routine healing     Pain meds  Therapy  Follow-up with Ortho         Hypertension, essential     BP at goal  Monitor blood pressure  Continue antihypertensives         Type 2 diabetes mellitus with  hyperglycemia, with long-term current use of insulin (CMS/[facility])     FBG at goal  Continue metformin and insulin  Glucoscan with sliding scale insulin coverage  Low concentrated sweets diet         Weakness - Primary     Continue with therapy          Medications, treatments, and labs reviewed  Continue medications and treatments as listed in EMR    MARISELA Nelson      Electronically Signed By: MARISELA Nelson   7/1/23  6:47 PM

## 2023-07-01 NOTE — PROGRESS NOTES
PROGRESS NOTE    Subjective   Chief complaint: Teresa Matthews is a 72 y.o. female who is an acute skilled patient being seen and evaluated for weakness    HPI:  6/21/2023 patient mated to skilled nursing facility for therapy due to weakness after recent hospitalization status post fall. Patient was found to have acute comminuted fractures of proximal tibia and fibular diaphysis. She was admitted to the hospital and seen by Ortho who recommended conservative management. Pain was controlled and patient none weightbearing. She was discharged to skilled nursing facility for therapy and to continue medical management.    6/26/2023 Patient continues to work with therapy to reach goals. ST addressing cognition and communication skill, pt with 100% acc min mod verbal cues. PT working on ROM, Posteral exercises to improve posture, transfer training, balance and coordination. Longest stance time 2:15 min with CGA/Min A. No LOB. Pt maintained NWB status on LLE. OT also working on body alignment\posture and increasing strength and activity tolerance. Patient actively participates in therapy. No new concerns today.  Denies n/v/f/c pain.      6/27/2023  Patient working with therapy with Recumbent bike x 15 min in order to improve strength and endurance, maintaining NWB status on LLE. Working on transfer training, balance activities & weight shifting. ST working on addressing cognitive communication skills, targeting memory and reasoning skills. Pt completed 100% with min\mod cues. Complex problem solving\reasoning with 95% accuracy with occasional cues. No new nursing concerns today.     6/28/2023 Patient presents for fu therapy and general medical care.  Patient is working on strengthening balance and ADLs and continues to work toward goal with assist of therapist.  No concerns verbalized from nursing.  Patient denies constitutional symptoms.       6/29/23 Patient working on therapy. Is working on recumbent bike ans  tolerates up to 15 minutes. Remains NWB to LLE. Denies pain at this time. No acute distress. Denies SOB and orthopnea. No acute distress    6/30/2023 Patient at SNF and working toward goals in therapy.   Patient NWB LLE.  Working on transfer training and balance.  Patient verbalizes no new concerns today.  Had uneventful night.  Denies n/v/f/c.  No new concerns from nursing staff.          Objective   Vital signs: 121/73,     Physical Exam  Constitutional:       General: She is not in acute distress.  Eyes:      Extraocular Movements: Extraocular movements intact.   Cardiovascular:      Rate and Rhythm: Normal rate and regular rhythm.   Pulmonary:      Effort: Pulmonary effort is normal.      Breath sounds: Normal breath sounds.   Abdominal:      General: Bowel sounds are normal.      Palpations: Abdomen is soft.   Musculoskeletal:      Cervical back: Neck supple.      Right lower leg: Edema present.      Left lower leg: Edema present.      Comments: Generalized weakness  Immobilizer LLE   Skin:     Comments: Dressing left foot   Neurological:      Mental Status: She is alert.   Psychiatric:         Mood and Affect: Mood normal.         Behavior: Behavior is cooperative.         Assessment/Plan   Problem List Items Addressed This Visit       Atrial fibrillation (CMS/HCC)     Apixaban  Amiodarone  Metoprolol  Bleeding precautions           Chronic combined systolic and diastolic congestive heart failure (CMS/HCC)     Stable  Tosemide  Aldactone   BB  Monitor weight         Chronic obstructive pulmonary disease, unspecified COPD type (CMS/HCC)     Stable, no wheezing or shortness of breath  On room air         Closed fracture of proximal end of left tibia with routine healing     Pain meds  Therapy  Follow-up with Ortho         Hypertension, essential     BP at goal  Monitor blood pressure  Continue antihypertensives         Type 2 diabetes mellitus with hyperglycemia, with long-term current use of insulin  (CMS/[facility])     FBG at goal  Continue metformin and insulin  Glucoscan with sliding scale insulin coverage  Low concentrated sweets diet         Weakness - Primary     Continue with therapy          Medications, treatments, and labs reviewed  Continue medications and treatments as listed in EMR    CORNELIUS Nelson-CNP

## 2023-07-01 NOTE — ASSESSMENT & PLAN NOTE
FBG at goal  Continue metformin and insulin  Glucoscan with sliding scale insulin coverage  Low concentrated sweets diet

## 2023-07-03 ENCOUNTER — NURSING HOME VISIT (OUTPATIENT)
Dept: POST ACUTE CARE | Facility: EXTERNAL LOCATION | Age: 73
End: 2023-07-03
Payer: MEDICARE

## 2023-07-03 DIAGNOSIS — I50.42 CHRONIC COMBINED SYSTOLIC AND DIASTOLIC CONGESTIVE HEART FAILURE (MULTI): ICD-10-CM

## 2023-07-03 DIAGNOSIS — S82.102D CLOSED FRACTURE OF PROXIMAL END OF LEFT TIBIA WITH ROUTINE HEALING, UNSPECIFIED FRACTURE MORPHOLOGY, SUBSEQUENT ENCOUNTER: ICD-10-CM

## 2023-07-03 DIAGNOSIS — R53.1 WEAKNESS: ICD-10-CM

## 2023-07-03 PROCEDURE — 99308 SBSQ NF CARE LOW MDM 20: CPT | Performed by: INTERNAL MEDICINE

## 2023-07-03 NOTE — PROGRESS NOTES
PROGRESS NOTE    Subjective   Chief complaint: Teresa Matthews is a 72 y.o. female who is an acute skilled patient being seen and evaluated for weakness    HPI:  6/21/2023 patient mated to skilled nursing facility for therapy due to weakness after recent hospitalization status post fall. Patient was found to have acute comminuted fractures of proximal tibia and fibular diaphysis. She was admitted to the hospital and seen by Ortho who recommended conservative management. Pain was controlled and patient none weightbearing. She was discharged to skilled nursing facility for therapy and to continue medical management.    6/26/2023 Patient continues to work with therapy to reach goals. ST addressing cognition and communication skill, pt with 100% acc min mod verbal cues. PT working on ROM, Posteral exercises to improve posture, transfer training, balance and coordination. Longest stance time 2:15 min with CGA/Min A. No LOB. Pt maintained NWB status on LLE. OT also working on body alignment\posture and increasing strength and activity tolerance. Patient actively participates in therapy. No new concerns today.  Denies n/v/f/c pain.      6/27/2023  Patient working with therapy with Recumbent bike x 15 min in order to improve strength and endurance, maintaining NWB status on LLE. Working on transfer training, balance activities & weight shifting. ST working on addressing cognitive communication skills, targeting memory and reasoning skills. Pt completed 100% with min\mod cues. Complex problem solving\reasoning with 95% accuracy with occasional cues. No new nursing concerns today.     6/28/2023 Patient presents for fu therapy and general medical care.  Patient is working on strengthening balance and ADLs and continues to work toward goal with assist of therapist.  No concerns verbalized from nursing.  Patient denies constitutional symptoms.       6/29/23 Patient working on therapy. Is working on recumbent bike ans  tolerates up to 15 minutes. Remains NWB to LLE. Denies pain at this time. No acute distress. Denies SOB and orthopnea. No acute distress    6/30/2023 Patient at SNF and working toward goals in therapy.   Patient NWB LLE.  Working on transfer training and balance.  Patient verbalizes no new concerns today.  Had uneventful night.  Denies n/v/f/c.  No new concerns from nursing staff.      7/3/23 Patient working in therapy due to weakness. Continues to be NWB LLE. Patient does not ambulate and is working on recumbent bike.  Getting stronger.   Pain from recent fracture is controlled with current medications. Denies SOB or weight gain.  No acute distress.      Objective   Vital signs: 122/74, 97%    Physical Exam  Constitutional:       General: She is not in acute distress.  Eyes:      Extraocular Movements: Extraocular movements intact.   Cardiovascular:      Rate and Rhythm: Normal rate and regular rhythm.   Pulmonary:      Effort: Pulmonary effort is normal.      Breath sounds: Normal breath sounds.   Abdominal:      General: Bowel sounds are normal.      Palpations: Abdomen is soft.   Musculoskeletal:      Cervical back: Neck supple.      Right lower leg: Edema present.      Left lower leg: Edema present.      Comments: Generalized weakness  Immobilizer LLE   Skin:     Comments: Dressing left foot   Neurological:      Mental Status: She is alert.   Psychiatric:         Mood and Affect: Mood normal.         Behavior: Behavior is cooperative.         Assessment/Plan   Problem List Items Addressed This Visit          Cardiac and Vasculature    Chronic combined systolic and diastolic congestive heart failure (CMS/HCC)     Stable  Tosemide  Aldactone   BB  Monitor weight            Musculoskeletal and Injuries    Closed fracture of proximal end of left tibia with routine healing     Pain meds  Therapy  Follow-up with Ortho            Symptoms and Signs    Weakness     Continue with therapy        Medications, treatments,  and labs reviewed  Continue medications and treatments as listed in EMR    Pita Virgen MD  Scribe Attestation  I, Kirill De Oliveira   attest that this documentation has been prepared under the direction and in the presence of Pita Virgen MD.     Provider Attestation - Scribe documentation  All medical record entries made by the Scribe were at my direction and personally dictated by me. I have reviewed the chart and agree that the record accurately reflects my personal performance of the history, physical exam, discussion and plan.   1. Chronic combined systolic and diastolic congestive heart failure (CMS/HCC)        2. Closed fracture of proximal end of left tibia with routine healing, unspecified fracture morphology, subsequent encounter        3. Weakness

## 2023-07-03 NOTE — LETTER
Patient: Teresa Matthews  : 1950    Encounter Date: 2023    PROGRESS NOTE    Subjective  Chief complaint: Teresa Matthews is a 72 y.o. female who is an acute skilled patient being seen and evaluated for weakness    HPI:  2023 patient mated to skilled nursing facility for therapy due to weakness after recent hospitalization status post fall. Patient was found to have acute comminuted fractures of proximal tibia and fibular diaphysis. She was admitted to the hospital and seen by Ortho who recommended conservative management. Pain was controlled and patient none weightbearing. She was discharged to skilled nursing facility for therapy and to continue medical management.    2023 Patient continues to work with therapy to reach goals. ST addressing cognition and communication skill, pt with 100% acc min mod verbal cues. PT working on ROM, Posteral exercises to improve posture, transfer training, balance and coordination. Longest stance time 2:15 min with CGA/Min A. No LOB. Pt maintained NWB status on LLE. OT also working on body alignment\posture and increasing strength and activity tolerance. Patient actively participates in therapy. No new concerns today.  Denies n/v/f/c pain.      2023  Patient working with therapy with Recumbent bike x 15 min in order to improve strength and endurance, maintaining NWB status on LLE. Working on transfer training, balance activities & weight shifting. ST working on addressing cognitive communication skills, targeting memory and reasoning skills. Pt completed 100% with min\mod cues. Complex problem solving\reasoning with 95% accuracy with occasional cues. No new nursing concerns today.     2023 Patient presents for fu therapy and general medical care.  Patient is working on strengthening balance and ADLs and continues to work toward goal with assist of therapist.  No concerns verbalized from nursing.  Patient denies constitutional symptoms.        6/29/23 Patient working on therapy. Is working on recumbent bike ans tolerates up to 15 minutes. Remains NWB to LLE. Denies pain at this time. No acute distress. Denies SOB and orthopnea. No acute distress    6/30/2023 Patient at SNF and working toward goals in therapy.   Patient NWB LLE.  Working on transfer training and balance.  Patient verbalizes no new concerns today.  Had uneventful night.  Denies n/v/f/c.  No new concerns from nursing staff.      7/3/23 Patient working in therapy due to weakness. Continues to be NWB LLE. Patient does not ambulate and is working on recumbent bike.  Getting stronger.   Pain from recent fracture is controlled with current medications. Denies SOB or weight gain.  No acute distress.      Objective  Vital signs: 122/74, 97%    Physical Exam  Constitutional:       General: She is not in acute distress.  Eyes:      Extraocular Movements: Extraocular movements intact.   Cardiovascular:      Rate and Rhythm: Normal rate and regular rhythm.   Pulmonary:      Effort: Pulmonary effort is normal.      Breath sounds: Normal breath sounds.   Abdominal:      General: Bowel sounds are normal.      Palpations: Abdomen is soft.   Musculoskeletal:      Cervical back: Neck supple.      Right lower leg: Edema present.      Left lower leg: Edema present.      Comments: Generalized weakness  Immobilizer LLE   Skin:     Comments: Dressing left foot   Neurological:      Mental Status: She is alert.   Psychiatric:         Mood and Affect: Mood normal.         Behavior: Behavior is cooperative.         Assessment/Plan  Problem List Items Addressed This Visit          Cardiac and Vasculature    Chronic combined systolic and diastolic congestive heart failure (CMS/HCC)     Stable  Tosemide  Aldactone   BB  Monitor weight            Musculoskeletal and Injuries    Closed fracture of proximal end of left tibia with routine healing     Pain meds  Therapy  Follow-up with Ortho            Symptoms and Signs     Weakness     Continue with therapy        Medications, treatments, and labs reviewed  Continue medications and treatments as listed in EMR    Pita Virgen MD  Scribe Attestation  I, Kirill De Oliveira   attest that this documentation has been prepared under the direction and in the presence of Pita Virgen MD.     Provider Attestation - Scribe documentation  All medical record entries made by the Scribe were at my direction and personally dictated by me. I have reviewed the chart and agree that the record accurately reflects my personal performance of the history, physical exam, discussion and plan.   1. Chronic combined systolic and diastolic congestive heart failure (CMS/HCC)        2. Closed fracture of proximal end of left tibia with routine healing, unspecified fracture morphology, subsequent encounter        3. Weakness              Electronically Signed By: Pita Virgen MD   7/3/23  3:14 PM

## 2023-07-04 ENCOUNTER — NURSING HOME VISIT (OUTPATIENT)
Dept: POST ACUTE CARE | Facility: EXTERNAL LOCATION | Age: 73
End: 2023-07-04
Payer: MEDICARE

## 2023-07-04 DIAGNOSIS — I48.91 ATRIAL FIBRILLATION, UNSPECIFIED TYPE (MULTI): ICD-10-CM

## 2023-07-04 DIAGNOSIS — R53.1 WEAKNESS: ICD-10-CM

## 2023-07-04 DIAGNOSIS — K21.9 GASTROESOPHAGEAL REFLUX DISEASE WITHOUT ESOPHAGITIS: ICD-10-CM

## 2023-07-04 DIAGNOSIS — I50.42 CHRONIC COMBINED SYSTOLIC AND DIASTOLIC CONGESTIVE HEART FAILURE (MULTI): ICD-10-CM

## 2023-07-04 DIAGNOSIS — S82.102D CLOSED FRACTURE OF PROXIMAL END OF LEFT TIBIA WITH ROUTINE HEALING, UNSPECIFIED FRACTURE MORPHOLOGY, SUBSEQUENT ENCOUNTER: ICD-10-CM

## 2023-07-04 PROCEDURE — 99309 SBSQ NF CARE MODERATE MDM 30: CPT | Performed by: INTERNAL MEDICINE

## 2023-07-04 NOTE — PROGRESS NOTES
PROGRESS NOTE    Subjective   Chief complaint: Teresa Matthews is a 72 y.o. female who is an acute skilled patient being seen and evaluated for weakness    HPI:  6/21/2023 patient mated to skilled nursing facility for therapy due to weakness after recent hospitalization status post fall. Patient was found to have acute comminuted fractures of proximal tibia and fibular diaphysis. She was admitted to the hospital and seen by Ortho who recommended conservative management. Pain was controlled and patient none weightbearing. She was discharged to skilled nursing facility for therapy and to continue medical management.    6/26/2023 Patient continues to work with therapy to reach goals. ST addressing cognition and communication skill, pt with 100% acc min mod verbal cues. PT working on ROM, Posteral exercises to improve posture, transfer training, balance and coordination. Longest stance time 2:15 min with CGA/Min A. No LOB. Pt maintained NWB status on LLE. OT also working on body alignment\posture and increasing strength and activity tolerance. Patient actively participates in therapy. No new concerns today.  Denies n/v/f/c pain.      6/27/2023  Patient working with therapy with Recumbent bike x 15 min in order to improve strength and endurance, maintaining NWB status on LLE. Working on transfer training, balance activities & weight shifting. ST working on addressing cognitive communication skills, targeting memory and reasoning skills. Pt completed 100% with min\mod cues. Complex problem solving\reasoning with 95% accuracy with occasional cues. No new nursing concerns today.     6/28/2023 Patient presents for fu therapy and general medical care.  Patient is working on strengthening balance and ADLs and continues to work toward goal with assist of therapist.  No concerns verbalized from nursing.  Patient denies constitutional symptoms.       6/29/23 Patient working on therapy. Is working on recumbent bike ans  tolerates up to 15 minutes. Remains NWB to LLE. Denies pain at this time. No acute distress. Denies SOB and orthopnea. No acute distress    6/30/2023 Patient at SNF and working toward goals in therapy.   Patient NWB LLE.  Working on transfer training and balance.  Patient verbalizes no new concerns today.  Had uneventful night.  Denies n/v/f/c.  No new concerns from nursing staff.      7/3/23 Patient working in therapy due to weakness. Continues to be NWB LLE. Patient does not ambulate and is working on recumbent bike.  Getting stronger.   Pain from recent fracture is controlled with current medications. Denies SOB or weight gain.  No acute distress.      Patient presents for general medical care and f/u.  Patient seen and examined at bedside.  No issues per nursing.  Patient has no acute complaints.    Pain from recent fracture is controlled with current medications.   AFIB stable, denies palpitations and chest pain.  GERD controlled.  Denies heartburn, regurgitation, epigastric discomfort, sour taste, and cough.  CHF stable, denies sob, orthopnea, weight gain.  Patient continues to work in therapy.  He is getting stronger and requires assistance for transfers ADLs and mobility.  No acute distress.      Objective   Vital signs: 122/74, 97%    Physical Exam  Constitutional:       General: She is not in acute distress.  Eyes:      Extraocular Movements: Extraocular movements intact.   Cardiovascular:      Rate and Rhythm: Normal rate and regular rhythm.   Pulmonary:      Effort: Pulmonary effort is normal.      Breath sounds: Normal breath sounds.   Abdominal:      General: Bowel sounds are normal.      Palpations: Abdomen is soft.   Musculoskeletal:      Cervical back: Neck supple.      Right lower leg: Edema present.      Left lower leg: Edema present.      Comments: Generalized weakness  Immobilizer LLE   Skin:     Comments: Dressing left foot   Neurological:      Mental Status: She is alert.   Psychiatric:          Mood and Affect: Mood normal.         Behavior: Behavior is cooperative.         Assessment/Plan   Problem List Items Addressed This Visit          Cardiac and Vasculature    Atrial fibrillation (CMS/HCC)     Apixaban  Amiodarone  Metoprolol  Bleeding precautions           Chronic combined systolic and diastolic congestive heart failure (CMS/HCC)     Stable  Tosemide  Aldactone   BB  Monitor weight            Gastrointestinal and Abdominal    GERD (gastroesophageal reflux disease)       Musculoskeletal and Injuries    Closed fracture of proximal end of left tibia with routine healing     Pain meds  Therapy  Follow-up with Ortho            Symptoms and Signs    Weakness     Continue with therapy        Medications, treatments, and labs reviewed  Continue medications and treatments as listed in EMR    Pita Virgen MD  Scribe Attestation  I, Kirill De Oliveira   attest that this documentation has been prepared under the direction and in the presence of Pita Virgen MD.     Provider Attestation - Scribe documentation  All medical record entries made by the Scribe were at my direction and personally dictated by me. I have reviewed the chart and agree that the record accurately reflects my personal performance of the history, physical exam, discussion and plan.   1. Atrial fibrillation, unspecified type (CMS/HCC)        2. Chronic combined systolic and diastolic congestive heart failure (CMS/HCC)        3. Closed fracture of proximal end of left tibia with routine healing, unspecified fracture morphology, subsequent encounter        4. Gastroesophageal reflux disease without esophagitis        5. Weakness

## 2023-07-04 NOTE — LETTER
Patient: Teresa Matthews  : 1950    Encounter Date: 2023    PROGRESS NOTE    Subjective  Chief complaint: Teresa Matthews is a 72 y.o. female who is an acute skilled patient being seen and evaluated for weakness    HPI:  2023 patient mated to skilled nursing facility for therapy due to weakness after recent hospitalization status post fall. Patient was found to have acute comminuted fractures of proximal tibia and fibular diaphysis. She was admitted to the hospital and seen by Ortho who recommended conservative management. Pain was controlled and patient none weightbearing. She was discharged to skilled nursing facility for therapy and to continue medical management.    2023 Patient continues to work with therapy to reach goals. ST addressing cognition and communication skill, pt with 100% acc min mod verbal cues. PT working on ROM, Posteral exercises to improve posture, transfer training, balance and coordination. Longest stance time 2:15 min with CGA/Min A. No LOB. Pt maintained NWB status on LLE. OT also working on body alignment\posture and increasing strength and activity tolerance. Patient actively participates in therapy. No new concerns today.  Denies n/v/f/c pain.      2023  Patient working with therapy with Recumbent bike x 15 min in order to improve strength and endurance, maintaining NWB status on LLE. Working on transfer training, balance activities & weight shifting. ST working on addressing cognitive communication skills, targeting memory and reasoning skills. Pt completed 100% with min\mod cues. Complex problem solving\reasoning with 95% accuracy with occasional cues. No new nursing concerns today.     2023 Patient presents for fu therapy and general medical care.  Patient is working on strengthening balance and ADLs and continues to work toward goal with assist of therapist.  No concerns verbalized from nursing.  Patient denies constitutional symptoms.        6/29/23 Patient working on therapy. Is working on recumbent bike ans tolerates up to 15 minutes. Remains NWB to LLE. Denies pain at this time. No acute distress. Denies SOB and orthopnea. No acute distress    6/30/2023 Patient at SNF and working toward goals in therapy.   Patient NWB LLE.  Working on transfer training and balance.  Patient verbalizes no new concerns today.  Had uneventful night.  Denies n/v/f/c.  No new concerns from nursing staff.      7/3/23 Patient working in therapy due to weakness. Continues to be NWB LLE. Patient does not ambulate and is working on recumbent bike.  Getting stronger.   Pain from recent fracture is controlled with current medications. Denies SOB or weight gain.  No acute distress.      Patient presents for general medical care and f/u.  Patient seen and examined at bedside.  No issues per nursing.  Patient has no acute complaints.    Pain from recent fracture is controlled with current medications.   AFIB stable, denies palpitations and chest pain.  GERD controlled.  Denies heartburn, regurgitation, epigastric discomfort, sour taste, and cough.  CHF stable, denies sob, orthopnea, weight gain.  Patient continues to work in therapy.  He is getting stronger and requires assistance for transfers ADLs and mobility.  No acute distress.      Objective  Vital signs: 122/74, 97%    Physical Exam  Constitutional:       General: She is not in acute distress.  Eyes:      Extraocular Movements: Extraocular movements intact.   Cardiovascular:      Rate and Rhythm: Normal rate and regular rhythm.   Pulmonary:      Effort: Pulmonary effort is normal.      Breath sounds: Normal breath sounds.   Abdominal:      General: Bowel sounds are normal.      Palpations: Abdomen is soft.   Musculoskeletal:      Cervical back: Neck supple.      Right lower leg: Edema present.      Left lower leg: Edema present.      Comments: Generalized weakness  Immobilizer LLE   Skin:     Comments: Dressing left foot    Neurological:      Mental Status: She is alert.   Psychiatric:         Mood and Affect: Mood normal.         Behavior: Behavior is cooperative.         Assessment/Plan  Problem List Items Addressed This Visit          Cardiac and Vasculature    Atrial fibrillation (CMS/HCC)     Apixaban  Amiodarone  Metoprolol  Bleeding precautions           Chronic combined systolic and diastolic congestive heart failure (CMS/HCC)     Stable  Tosemide  Aldactone   BB  Monitor weight            Gastrointestinal and Abdominal    GERD (gastroesophageal reflux disease)       Musculoskeletal and Injuries    Closed fracture of proximal end of left tibia with routine healing     Pain meds  Therapy  Follow-up with Ortho            Symptoms and Signs    Weakness     Continue with therapy        Medications, treatments, and labs reviewed  Continue medications and treatments as listed in EMR    Pita Virgen MD  Scribe Attestation  I, Kirill De Oliveira   attest that this documentation has been prepared under the direction and in the presence of Pita Virgen MD.     Provider Attestation - Scribe documentation  All medical record entries made by the Scribe were at my direction and personally dictated by me. I have reviewed the chart and agree that the record accurately reflects my personal performance of the history, physical exam, discussion and plan.   1. Atrial fibrillation, unspecified type (CMS/HCC)        2. Chronic combined systolic and diastolic congestive heart failure (CMS/HCC)        3. Closed fracture of proximal end of left tibia with routine healing, unspecified fracture morphology, subsequent encounter        4. Gastroesophageal reflux disease without esophagitis        5. Weakness              Electronically Signed By: Pita Virgen MD   7/5/23 12:55 PM

## 2023-07-05 ENCOUNTER — NURSING HOME VISIT (OUTPATIENT)
Dept: POST ACUTE CARE | Facility: EXTERNAL LOCATION | Age: 73
End: 2023-07-05
Payer: MEDICARE

## 2023-07-05 DIAGNOSIS — E11.65 TYPE 2 DIABETES MELLITUS WITH HYPERGLYCEMIA, WITH LONG-TERM CURRENT USE OF INSULIN (MULTI): ICD-10-CM

## 2023-07-05 DIAGNOSIS — J44.9 CHRONIC OBSTRUCTIVE PULMONARY DISEASE, UNSPECIFIED COPD TYPE (MULTI): ICD-10-CM

## 2023-07-05 DIAGNOSIS — S82.102D CLOSED FRACTURE OF PROXIMAL END OF LEFT TIBIA WITH ROUTINE HEALING, UNSPECIFIED FRACTURE MORPHOLOGY, SUBSEQUENT ENCOUNTER: ICD-10-CM

## 2023-07-05 DIAGNOSIS — I48.91 ATRIAL FIBRILLATION, UNSPECIFIED TYPE (MULTI): ICD-10-CM

## 2023-07-05 DIAGNOSIS — I10 HYPERTENSION, ESSENTIAL: ICD-10-CM

## 2023-07-05 DIAGNOSIS — R53.1 WEAKNESS: Primary | ICD-10-CM

## 2023-07-05 DIAGNOSIS — I50.42 CHRONIC COMBINED SYSTOLIC AND DIASTOLIC CONGESTIVE HEART FAILURE (MULTI): ICD-10-CM

## 2023-07-05 DIAGNOSIS — Z79.4 TYPE 2 DIABETES MELLITUS WITH HYPERGLYCEMIA, WITH LONG-TERM CURRENT USE OF INSULIN (MULTI): ICD-10-CM

## 2023-07-05 PROCEDURE — 99309 SBSQ NF CARE MODERATE MDM 30: CPT | Performed by: NURSE PRACTITIONER

## 2023-07-05 NOTE — ASSESSMENT & PLAN NOTE
Apixaban  Amiodarone  Metoprolol  Bleeding precautions    
BP at goal  Monitor blood pressure  Continue antihypertensives  
BS above goal  Start lantus 5u every day  Continue metformin and lispro insulin  Glucoscan with sliding scale insulin coverage  Low concentrated sweets diet  
Continue with therapy  
Pain meds  Therapy  Follow-up with Ortho  
Stable  Tosemide  Aldactone   BB  Monitor weight  
Stable, no wheezing or shortness of breath  On room air  
65
65

## 2023-07-05 NOTE — PROGRESS NOTES
PROGRESS NOTE    Subjective   Chief complaint: Teresa Matthews is a 72 y.o. female who is an acute skilled patient being seen and evaluated for weakness    HPI:  6/21/2023 patient mated to skilled nursing facility for therapy due to weakness after recent hospitalization status post fall. Patient was found to have acute comminuted fractures of proximal tibia and fibular diaphysis. She was admitted to the hospital and seen by Ortho who recommended conservative management. Pain was controlled and patient none weightbearing. She was discharged to skilled nursing facility for therapy and to continue medical management.    6/26/2023 Patient continues to work with therapy to reach goals. ST addressing cognition and communication skill, pt with 100% acc min mod verbal cues. PT working on ROM, Posteral exercises to improve posture, transfer training, balance and coordination. Longest stance time 2:15 min with CGA/Min A. No LOB. Pt maintained NWB status on LLE. OT also working on body alignment\posture and increasing strength and activity tolerance. Patient actively participates in therapy. No new concerns today.  Denies n/v/f/c pain.      6/27/2023  Patient working with therapy with Recumbent bike x 15 min in order to improve strength and endurance, maintaining NWB status on LLE. Working on transfer training, balance activities & weight shifting. ST working on addressing cognitive communication skills, targeting memory and reasoning skills. Pt completed 100% with min\mod cues. Complex problem solving\reasoning with 95% accuracy with occasional cues. No new nursing concerns today.     6/28/2023 Patient presents for fu therapy and general medical care.  Patient is working on strengthening balance and ADLs and continues to work toward goal with assist of therapist.  No concerns verbalized from nursing.  Patient denies constitutional symptoms.       6/29/23 Patient working on therapy. Is working on recumbent bike ans  tolerates up to 15 minutes. Remains NWB to LLE. Denies pain at this time. No acute distress. Denies SOB and orthopnea. No acute distress    6/30/2023 Patient at SNF and working toward goals in therapy.   Patient NWB LLE.  Working on transfer training and balance.  Patient verbalizes no new concerns today.  Had uneventful night.  Denies n/v/f/c.  No new concerns from nursing staff.      7/3/23 Patient working in therapy due to weakness. Continues to be NWB LLE. Patient does not ambulate and is working on recumbent bike.  Getting stronger.   Pain from recent fracture is controlled with current medications. Denies SOB or weight gain.  No acute distress.      Patient presents for general medical care and f/u.  Patient seen and examined at bedside.  No issues per nursing.  Patient has no acute complaints.    Pain from recent fracture is controlled with current medications.   AFIB stable, denies palpitations and chest pain.  GERD controlled.  Denies heartburn, regurgitation, epigastric discomfort, sour taste, and cough.  CHF stable, denies sob, orthopnea, weight gain.  Patient continues to work in therapy.  He is getting stronger and requires assistance for transfers ADLs and mobility.  No acute distress.    7/5/2023 Patient is at [facility] and working in therapy.   Feels therapy is going ok, no obstacles/barriers.  Patient is working on recumbent bike with the right lower extremity while maintaining nonweightbearing to the left.  Patient is feeling ok and has no new concerns today.  Denies constitutional symptoms.  No new concerns from nursing staff.          Objective   Vital signs: 126/74, 96.8, 66, 20, 93%, blood sugar 249    Physical Exam  Constitutional:       General: She is not in acute distress.  Eyes:      Extraocular Movements: Extraocular movements intact.   Cardiovascular:      Rate and Rhythm: Normal rate and regular rhythm.   Pulmonary:      Effort: Pulmonary effort is normal.      Breath sounds: Normal breath  sounds.   Abdominal:      General: Bowel sounds are normal.      Palpations: Abdomen is soft.   Musculoskeletal:      Cervical back: Neck supple.      Right lower leg: Edema present.      Left lower leg: Edema present.      Comments: Generalized weakness  Immobilizer LLE   Skin:     Comments: Dressing left foot   Neurological:      Mental Status: She is alert.   Psychiatric:         Mood and Affect: Mood normal.         Behavior: Behavior is cooperative.         Assessment/Plan   Problem List Items Addressed This Visit       Atrial fibrillation (CMS/HCC)     Apixaban  Amiodarone  Metoprolol  Bleeding precautions           Chronic combined systolic and diastolic congestive heart failure (CMS/HCC)     Stable  Tosemide  Aldactone   BB  Monitor weight         Chronic obstructive pulmonary disease, unspecified COPD type (CMS/HCC)     Stable, no wheezing or shortness of breath  On room air         Closed fracture of proximal end of left tibia with routine healing     Pain meds  Therapy  Follow-up with Ortho         Hypertension, essential     BP at goal  Monitor blood pressure  Continue antihypertensives         Type 2 diabetes mellitus with hyperglycemia, with long-term current use of insulin (CMS/HCC)     BS above goal  Start lantus 5u every day  Continue metformin and lispro insulin  Glucoscan with sliding scale insulin coverage  Low concentrated sweets diet         Weakness - Primary     Continue with therapy          Medications, treatments, and labs reviewed  Continue medications and treatments as listed in EMR    Yasemin Kelly, APRN-CNP

## 2023-07-05 NOTE — LETTER
Patient: Teresa Matthews  : 1950    Encounter Date: 2023    PROGRESS NOTE    Subjective  Chief complaint: Teresa Matthews is a 72 y.o. female who is an acute skilled patient being seen and evaluated for weakness    HPI:  2023 patient mated to skilled nursing facility for therapy due to weakness after recent hospitalization status post fall. Patient was found to have acute comminuted fractures of proximal tibia and fibular diaphysis. She was admitted to the hospital and seen by Ortho who recommended conservative management. Pain was controlled and patient none weightbearing. She was discharged to skilled nursing facility for therapy and to continue medical management.    2023 Patient continues to work with therapy to reach goals. ST addressing cognition and communication skill, pt with 100% acc min mod verbal cues. PT working on ROM, Posteral exercises to improve posture, transfer training, balance and coordination. Longest stance time 2:15 min with CGA/Min A. No LOB. Pt maintained NWB status on LLE. OT also working on body alignment\posture and increasing strength and activity tolerance. Patient actively participates in therapy. No new concerns today.  Denies n/v/f/c pain.      2023  Patient working with therapy with Recumbent bike x 15 min in order to improve strength and endurance, maintaining NWB status on LLE. Working on transfer training, balance activities & weight shifting. ST working on addressing cognitive communication skills, targeting memory and reasoning skills. Pt completed 100% with min\mod cues. Complex problem solving\reasoning with 95% accuracy with occasional cues. No new nursing concerns today.     2023 Patient presents for fu therapy and general medical care.  Patient is working on strengthening balance and ADLs and continues to work toward goal with assist of therapist.  No concerns verbalized from nursing.  Patient denies constitutional symptoms.        6/29/23 Patient working on therapy. Is working on recumbent bike ans tolerates up to 15 minutes. Remains NWB to LLE. Denies pain at this time. No acute distress. Denies SOB and orthopnea. No acute distress    6/30/2023 Patient at [facility] and working toward goals in therapy.   Patient NWB LLE.  Working on transfer training and balance.  Patient verbalizes no new concerns today.  Had uneventful night.  Denies n/v/f/c.  No new concerns from nursing staff.      7/3/23 Patient working in therapy due to weakness. Continues to be NWB LLE. Patient does not ambulate and is working on recumbent bike.  Getting stronger.   Pain from recent fracture is controlled with current medications. Denies SOB or weight gain.  No acute distress.      Patient presents for general medical care and f/u.  Patient seen and examined at bedside.  No issues per nursing.  Patient has no acute complaints.    Pain from recent fracture is controlled with current medications.   AFIB stable, denies palpitations and chest pain.  GERD controlled.  Denies heartburn, regurgitation, epigastric discomfort, sour taste, and cough.  CHF stable, denies sob, orthopnea, weight gain.  Patient continues to work in therapy.  He is getting stronger and requires assistance for transfers ADLs and mobility.  No acute distress.    7/5/2023 Patient is at [facility] and working in therapy.   Feels therapy is going ok, no obstacles/barriers.  Patient is working on recumbent bike with the right lower extremity while maintaining nonweightbearing to the left.  Patient is feeling ok and has no new concerns today.  Denies constitutional symptoms.  No new concerns from nursing staff.          Objective  Vital signs: 126/74, 96.8, 66, 20, 93%, blood sugar 249    Physical Exam  Constitutional:       General: She is not in acute distress.  Eyes:      Extraocular Movements: Extraocular movements intact.   Cardiovascular:      Rate and Rhythm: Normal rate and regular rhythm.   Pulmonary:       Effort: Pulmonary effort is normal.      Breath sounds: Normal breath sounds.   Abdominal:      General: Bowel sounds are normal.      Palpations: Abdomen is soft.   Musculoskeletal:      Cervical back: Neck supple.      Right lower leg: Edema present.      Left lower leg: Edema present.      Comments: Generalized weakness  Immobilizer LLE   Skin:     Comments: Dressing left foot   Neurological:      Mental Status: She is alert.   Psychiatric:         Mood and Affect: Mood normal.         Behavior: Behavior is cooperative.         Assessment/Plan  Problem List Items Addressed This Visit       Atrial fibrillation (CMS/HCC)     Apixaban  Amiodarone  Metoprolol  Bleeding precautions           Chronic combined systolic and diastolic congestive heart failure (CMS/HCC)     Stable  Tosemide  Aldactone   BB  Monitor weight         Chronic obstructive pulmonary disease, unspecified COPD type (CMS/HCC)     Stable, no wheezing or shortness of breath  On room air         Closed fracture of proximal end of left tibia with routine healing     Pain meds  Therapy  Follow-up with Ortho         Hypertension, essential     BP at goal  Monitor blood pressure  Continue antihypertensives         Type 2 diabetes mellitus with hyperglycemia, with long-term current use of insulin (CMS/HCC)     BS above goal  Start lantus 5u every day  Continue metformin and lispro insulin  Glucoscan with sliding scale insulin coverage  Low concentrated sweets diet         Weakness - Primary     Continue with therapy          Medications, treatments, and labs reviewed  Continue medications and treatments as listed in EMR    MARISELA Nelson      Electronically Signed By: MARISELA Nelson   7/5/23  3:16 PM

## 2023-07-06 ENCOUNTER — NURSING HOME VISIT (OUTPATIENT)
Dept: POST ACUTE CARE | Facility: EXTERNAL LOCATION | Age: 73
End: 2023-07-06
Payer: MEDICARE

## 2023-07-06 DIAGNOSIS — R53.1 WEAKNESS: ICD-10-CM

## 2023-07-06 DIAGNOSIS — I50.42 CHRONIC COMBINED SYSTOLIC AND DIASTOLIC CONGESTIVE HEART FAILURE (MULTI): ICD-10-CM

## 2023-07-06 DIAGNOSIS — S82.102D CLOSED FRACTURE OF PROXIMAL END OF LEFT TIBIA WITH ROUTINE HEALING, UNSPECIFIED FRACTURE MORPHOLOGY, SUBSEQUENT ENCOUNTER: ICD-10-CM

## 2023-07-06 PROCEDURE — 99308 SBSQ NF CARE LOW MDM 20: CPT | Performed by: INTERNAL MEDICINE

## 2023-07-06 NOTE — LETTER
Patient: Teresa Matthews  : 1950    Encounter Date: 2023    PROGRESS NOTE    Subjective  Chief complaint: Teresa Matthews is a 72 y.o. female who is an acute skilled patient being seen and evaluated for weakness    HPI:  2023 patient mated to skilled nursing facility for therapy due to weakness after recent hospitalization status post fall. Patient was found to have acute comminuted fractures of proximal tibia and fibular diaphysis. She was admitted to the hospital and seen by Ortho who recommended conservative management. Pain was controlled and patient none weightbearing. She was discharged to skilled nursing facility for therapy and to continue medical management.    2023 Patient continues to work with therapy to reach goals. ST addressing cognition and communication skill, pt with 100% acc min mod verbal cues. PT working on ROM, Posteral exercises to improve posture, transfer training, balance and coordination. Longest stance time 2:15 min with CGA/Min A. No LOB. Pt maintained NWB status on LLE. OT also working on body alignment\posture and increasing strength and activity tolerance. Patient actively participates in therapy. No new concerns today.  Denies n/v/f/c pain.      2023  Patient working with therapy with Recumbent bike x 15 min in order to improve strength and endurance, maintaining NWB status on LLE. Working on transfer training, balance activities & weight shifting. ST working on addressing cognitive communication skills, targeting memory and reasoning skills. Pt completed 100% with min\mod cues. Complex problem solving\reasoning with 95% accuracy with occasional cues. No new nursing concerns today.     2023 Patient presents for fu therapy and general medical care.  Patient is working on strengthening balance and ADLs and continues to work toward goal with assist of therapist.  No concerns verbalized from nursing.  Patient denies constitutional symptoms.        6/29/23 Patient working on therapy. Is working on recumbent bike ans tolerates up to 15 minutes. Remains NWB to LLE. Denies pain at this time. No acute distress. Denies SOB and orthopnea. No acute distress    6/30/2023 Patient at [facility] and working toward goals in therapy.   Patient NWB LLE.  Working on transfer training and balance.  Patient verbalizes no new concerns today.  Had uneventful night.  Denies n/v/f/c.  No new concerns from nursing staff.      7/3/23 Patient working in therapy due to weakness. Continues to be NWB LLE. Patient does not ambulate and is working on recumbent bike.  Getting stronger.   Pain from recent fracture is controlled with current medications. Denies SOB or weight gain.  No acute distress.      Patient presents for general medical care and f/u.  Patient seen and examined at bedside.  No issues per nursing.  Patient has no acute complaints.    Pain from recent fracture is controlled with current medications.   AFIB stable, denies palpitations and chest pain.  GERD controlled.  Denies heartburn, regurgitation, epigastric discomfort, sour taste, and cough.  CHF stable, denies sob, orthopnea, weight gain.  Patient continues to work in therapy.  He is getting stronger and requires assistance for transfers ADLs and mobility.  No acute distress.    7/5/2023 Patient is at [facility] and working in therapy.   Feels therapy is going ok, no obstacles/barriers.  Patient is working on recumbent bike with the right lower extremity while maintaining nonweightbearing to the left.  Patient is feeling ok and has no new concerns today.  Denies constitutional symptoms.  No new concerns from nursing staff.     7/6/23 Patient working in therapy due to weakness. Patient working on strengthening and balance. Remains NWB to LLE. Denies SOB or orthopnea. No acute distress. Pain from recent fracture controlled.          Objective  Vital signs: 124/76, 94%,     Physical Exam  Constitutional:       General: She is not  in acute distress.  Eyes:      Extraocular Movements: Extraocular movements intact.   Cardiovascular:      Rate and Rhythm: Normal rate and regular rhythm.   Pulmonary:      Effort: Pulmonary effort is normal.      Breath sounds: Normal breath sounds.   Abdominal:      General: Bowel sounds are normal.      Palpations: Abdomen is soft.   Musculoskeletal:      Cervical back: Neck supple.      Right lower leg: Edema present.      Left lower leg: Edema present.      Comments: Generalized weakness  Immobilizer LLE   Skin:     Comments: Dressing left foot   Neurological:      Mental Status: She is alert.   Psychiatric:         Mood and Affect: Mood normal.         Behavior: Behavior is cooperative.         Assessment/Plan  Problem List Items Addressed This Visit          Cardiac and Vasculature    Chronic combined systolic and diastolic congestive heart failure (CMS/HCC)     Stable  Tosemide  Aldactone   BB  Monitor weight            Musculoskeletal and Injuries    Closed fracture of proximal end of left tibia with routine healing     Pain meds  Therapy  Follow-up with Ortho            Symptoms and Signs    Weakness     Continue with therapy        Medications, treatments, and labs reviewed  Continue medications and treatments as listed in EMR    Pita Virgen MD  Scribe Attestation  By signing my name below, IMonica, Scribe   attest that this documentation has been prepared under the direction and in the presence of Pita Virgen MD.    Provider Attestation - Scribe documentation  All medical record entries made by the Scribe were at my direction and personally dictated by me. I have reviewed the chart and agree that the record accurately reflects my personal performance of the history, physical exam, discussion and plan.  1. Chronic combined systolic and diastolic congestive heart failure (CMS/HCC)        2. Weakness        3. Closed fracture of proximal end of left tibia with routine healing, unspecified  fracture morphology, subsequent encounter              Electronically Signed By: Pita Virgen MD   7/6/23 12:47 PM

## 2023-07-06 NOTE — PROGRESS NOTES
PROGRESS NOTE    Subjective   Chief complaint: Teresa Matthews is a 72 y.o. female who is an acute skilled patient being seen and evaluated for weakness    HPI:  6/21/2023 patient mated to skilled nursing facility for therapy due to weakness after recent hospitalization status post fall. Patient was found to have acute comminuted fractures of proximal tibia and fibular diaphysis. She was admitted to the hospital and seen by Ortho who recommended conservative management. Pain was controlled and patient none weightbearing. She was discharged to skilled nursing facility for therapy and to continue medical management.    6/26/2023 Patient continues to work with therapy to reach goals. ST addressing cognition and communication skill, pt with 100% acc min mod verbal cues. PT working on ROM, Posteral exercises to improve posture, transfer training, balance and coordination. Longest stance time 2:15 min with CGA/Min A. No LOB. Pt maintained NWB status on LLE. OT also working on body alignment\posture and increasing strength and activity tolerance. Patient actively participates in therapy. No new concerns today.  Denies n/v/f/c pain.      6/27/2023  Patient working with therapy with Recumbent bike x 15 min in order to improve strength and endurance, maintaining NWB status on LLE. Working on transfer training, balance activities & weight shifting. ST working on addressing cognitive communication skills, targeting memory and reasoning skills. Pt completed 100% with min\mod cues. Complex problem solving\reasoning with 95% accuracy with occasional cues. No new nursing concerns today.     6/28/2023 Patient presents for fu therapy and general medical care.  Patient is working on strengthening balance and ADLs and continues to work toward goal with assist of therapist.  No concerns verbalized from nursing.  Patient denies constitutional symptoms.       6/29/23 Patient working on therapy. Is working on recumbent bike ans  tolerates up to 15 minutes. Remains NWB to LLE. Denies pain at this time. No acute distress. Denies SOB and orthopnea. No acute distress    6/30/2023 Patient at SNF and working toward goals in therapy.   Patient NWB LLE.  Working on transfer training and balance.  Patient verbalizes no new concerns today.  Had uneventful night.  Denies n/v/f/c.  No new concerns from nursing staff.      7/3/23 Patient working in therapy due to weakness. Continues to be NWB LLE. Patient does not ambulate and is working on recumbent bike.  Getting stronger.   Pain from recent fracture is controlled with current medications. Denies SOB or weight gain.  No acute distress.      Patient presents for general medical care and f/u.  Patient seen and examined at bedside.  No issues per nursing.  Patient has no acute complaints.    Pain from recent fracture is controlled with current medications.   AFIB stable, denies palpitations and chest pain.  GERD controlled.  Denies heartburn, regurgitation, epigastric discomfort, sour taste, and cough.  CHF stable, denies sob, orthopnea, weight gain.  Patient continues to work in therapy.  He is getting stronger and requires assistance for transfers ADLs and mobility.  No acute distress.    7/5/2023 Patient is at [facility] and working in therapy.   Feels therapy is going ok, no obstacles/barriers.  Patient is working on recumbent bike with the right lower extremity while maintaining nonweightbearing to the left.  Patient is feeling ok and has no new concerns today.  Denies constitutional symptoms.  No new concerns from nursing staff.     7/6/23 Patient working in therapy due to weakness. Patient working on strengthening and balance. Remains NWB to LLE. Denies SOB or orthopnea. No acute distress. Pain from recent fracture controlled.          Objective   Vital signs: 124/76, 94%,     Physical Exam  Constitutional:       General: She is not in acute distress.  Eyes:      Extraocular Movements: Extraocular  movements intact.   Cardiovascular:      Rate and Rhythm: Normal rate and regular rhythm.   Pulmonary:      Effort: Pulmonary effort is normal.      Breath sounds: Normal breath sounds.   Abdominal:      General: Bowel sounds are normal.      Palpations: Abdomen is soft.   Musculoskeletal:      Cervical back: Neck supple.      Right lower leg: Edema present.      Left lower leg: Edema present.      Comments: Generalized weakness  Immobilizer LLE   Skin:     Comments: Dressing left foot   Neurological:      Mental Status: She is alert.   Psychiatric:         Mood and Affect: Mood normal.         Behavior: Behavior is cooperative.         Assessment/Plan   Problem List Items Addressed This Visit          Cardiac and Vasculature    Chronic combined systolic and diastolic congestive heart failure (CMS/HCC)     Stable  Tosemide  Aldactone   BB  Monitor weight            Musculoskeletal and Injuries    Closed fracture of proximal end of left tibia with routine healing     Pain meds  Therapy  Follow-up with Ortho            Symptoms and Signs    Weakness     Continue with therapy        Medications, treatments, and labs reviewed  Continue medications and treatments as listed in EMR    Pita Virgen MD  Scribe Attestation  By signing my name below, IMonica Scribe   attest that this documentation has been prepared under the direction and in the presence of Pita Virgen MD.    Provider Attestation - Scribe documentation  All medical record entries made by the Scribe were at my direction and personally dictated by me. I have reviewed the chart and agree that the record accurately reflects my personal performance of the history, physical exam, discussion and plan.  1. Chronic combined systolic and diastolic congestive heart failure (CMS/HCC)        2. Weakness        3. Closed fracture of proximal end of left tibia with routine healing, unspecified fracture morphology, subsequent encounter

## 2023-07-07 ENCOUNTER — NURSING HOME VISIT (OUTPATIENT)
Dept: POST ACUTE CARE | Facility: EXTERNAL LOCATION | Age: 73
End: 2023-07-07
Payer: MEDICARE

## 2023-07-07 DIAGNOSIS — I50.42 CHRONIC COMBINED SYSTOLIC AND DIASTOLIC CONGESTIVE HEART FAILURE (MULTI): ICD-10-CM

## 2023-07-07 DIAGNOSIS — I10 HYPERTENSION, ESSENTIAL: ICD-10-CM

## 2023-07-07 DIAGNOSIS — S82.102D CLOSED FRACTURE OF PROXIMAL END OF LEFT TIBIA WITH ROUTINE HEALING, UNSPECIFIED FRACTURE MORPHOLOGY, SUBSEQUENT ENCOUNTER: ICD-10-CM

## 2023-07-07 DIAGNOSIS — R53.1 WEAKNESS: Primary | ICD-10-CM

## 2023-07-07 DIAGNOSIS — I48.91 ATRIAL FIBRILLATION, UNSPECIFIED TYPE (MULTI): ICD-10-CM

## 2023-07-07 DIAGNOSIS — E11.65 TYPE 2 DIABETES MELLITUS WITH HYPERGLYCEMIA, WITH LONG-TERM CURRENT USE OF INSULIN (MULTI): ICD-10-CM

## 2023-07-07 DIAGNOSIS — Z79.4 TYPE 2 DIABETES MELLITUS WITH HYPERGLYCEMIA, WITH LONG-TERM CURRENT USE OF INSULIN (MULTI): ICD-10-CM

## 2023-07-07 DIAGNOSIS — J44.9 CHRONIC OBSTRUCTIVE PULMONARY DISEASE, UNSPECIFIED COPD TYPE (MULTI): ICD-10-CM

## 2023-07-07 PROCEDURE — 99309 SBSQ NF CARE MODERATE MDM 30: CPT | Performed by: NURSE PRACTITIONER

## 2023-07-07 NOTE — PROGRESS NOTES
PROGRESS NOTE    Subjective   Chief complaint: Teresa Matthews is a 72 y.o. female who is a acute skilled care patient being seen and evaluated for weakness.    HPI:  6/21/2023 patient mated to skilled nursing facility for therapy due to weakness after recent hospitalization status post fall. Patient was found to have acute comminuted fractures of proximal tibia and fibular diaphysis. She was admitted to the hospital and seen by Ortho who recommended conservative management. Pain was controlled and patient none weightbearing. She was discharged to skilled nursing facility for therapy and to continue medical management.    6/26/2023 Patient continues to work with therapy to reach goals. ST addressing cognition and communication skill, pt with 100% acc min mod verbal cues. PT working on ROM, Posteral exercises to improve posture, transfer training, balance and coordination. Longest stance time 2:15 min with CGA/Min A. No LOB. Pt maintained NWB status on LLE. OT also working on body alignment\posture and increasing strength and activity tolerance. Patient actively participates in therapy. No new concerns today.  Denies n/v/f/c pain.      6/27/2023  Patient working with therapy with Recumbent bike x 15 min in order to improve strength and endurance, maintaining NWB status on LLE. Working on transfer training, balance activities & weight shifting. ST working on addressing cognitive communication skills, targeting memory and reasoning skills. Pt completed 100% with min\mod cues. Complex problem solving\reasoning with 95% accuracy with occasional cues. No new nursing concerns today.     6/28/2023 Patient presents for fu therapy and general medical care.  Patient is working on strengthening balance and ADLs and continues to work toward goal with assist of therapist.  No concerns verbalized from nursing.  Patient denies constitutional symptoms.       6/29/23 Patient working on therapy. Is working on recumbent bike ans  tolerates up to 15 minutes. Remains NWB to LLE. Denies pain at this time. No acute distress. Denies SOB and orthopnea. No acute distress    6/30/2023 Patient at SNF and working toward goals in therapy.   Patient NWB LLE.  Working on transfer training and balance.  Patient verbalizes no new concerns today.  Had uneventful night.  Denies n/v/f/c.  No new concerns from nursing staff.      7/3/23 Patient working in therapy due to weakness. Continues to be NWB LLE. Patient does not ambulate and is working on recumbent bike.  Getting stronger.   Pain from recent fracture is controlled with current medications. Denies SOB or weight gain.  No acute distress.      Patient presents for general medical care and f/u.  Patient seen and examined at bedside.  No issues per nursing.  Patient has no acute complaints.    Pain from recent fracture is controlled with current medications.   AFIB stable, denies palpitations and chest pain.  GERD controlled.  Denies heartburn, regurgitation, epigastric discomfort, sour taste, and cough.  CHF stable, denies sob, orthopnea, weight gain.  Patient continues to work in therapy.  He is getting stronger and requires assistance for transfers ADLs and mobility.  No acute distress.    7/5/2023 Patient is at [facility] and working in therapy.   Feels therapy is going ok, no obstacles/barriers.  Patient is working on recumbent bike with the right lower extremity while maintaining nonweightbearing to the left.  Patient is feeling ok and has no new concerns today.  Denies constitutional symptoms.  No new concerns from nursing staff.     7/6/23 Patient working in therapy due to weakness. Patient working on strengthening and balance. Remains NWB to LLE. Denies SOB or orthopnea. No acute distress. Pain from recent fracture controlled.      7/7/23   Patient actively participates in therapy.  Working on therapy exercises to improve strength and endurance.  Patient is nonweightbearing to LLE.  Completed core  exercises to improve upright posture.    Completed sit to stand with contact-guard assist.  Able to stand at wheeled walker and maintain nonweightbearing for 1-1/2 to 2 minutes with fair minus balance.  Patient is stable without complaint.  No new concerns reported by nursing.      Objective   Vital signs:  144/78, 97.6, 88, 20, 94%,   Physical Exam  Constitutional:       General: She is not in acute distress.  Eyes:      Extraocular Movements: Extraocular movements intact.   Cardiovascular:      Rate and Rhythm: Normal rate and regular rhythm.   Pulmonary:      Effort: Pulmonary effort is normal.      Breath sounds: Normal breath sounds.   Abdominal:      General: Bowel sounds are normal.      Palpations: Abdomen is soft.   Musculoskeletal:      Cervical back: Neck supple.      Comments: Generalized weakness  Immobilizer LLE   Skin:     Comments: Dressing left foot   Neurological:      Mental Status: She is alert.   Psychiatric:         Mood and Affect: Mood normal.         Behavior: Behavior is cooperative.         Assessment/Plan   Problem List Items Addressed This Visit       Atrial fibrillation (CMS/HCC)     Apixaban  Amiodarone  Metoprolol  Bleeding precautions           Chronic combined systolic and diastolic congestive heart failure (CMS/HCC)     Stable  Tosemide  Aldactone   BB  Monitor weight         Chronic obstructive pulmonary disease, unspecified COPD type (CMS/HCC)     Stable, no wheezing or shortness of breath  On room air         Closed fracture of proximal end of left tibia with routine healing     Pain meds  Therapy  Follow-up with Ortho         Hypertension, essential     Monitor blood pressure  Continue antihypertensives         Type 2 diabetes mellitus with hyperglycemia, with long-term current use of insulin (CMS/HCC)     Lantus   Continue metformin and lispro insulin  Glucoscan with sliding scale insulin coverage  Low concentrated sweets diet         Weakness - Primary     Continue with  therapy          Medications, treatments, and labs reviewed  Continue medications and treatments as listed in EMR    Scribe Attestation  IMarzena Scribe   attest that this documentation has been prepared under the direction and in the presence of MARISELA Nelson    Provider Attestation - Scribe documentation  All medical record entries made by the Scribe were at my direction and personally dictated by me. I have reviewed the chart and agree that the record accurately reflects my personal performance of the history, physical exam, discussion and plan.   MARISELA Nelson

## 2023-07-07 NOTE — LETTER
Patient: Teresa Matthews  : 1950    Encounter Date: 2023    PROGRESS NOTE    Subjective  Chief complaint: Teresa Matthews is a 72 y.o. female who is a acute skilled care patient being seen and evaluated for weakness.    HPI:  2023 patient mated to skilled nursing facility for therapy due to weakness after recent hospitalization status post fall. Patient was found to have acute comminuted fractures of proximal tibia and fibular diaphysis. She was admitted to the hospital and seen by Ortho who recommended conservative management. Pain was controlled and patient none weightbearing. She was discharged to skilled nursing facility for therapy and to continue medical management.    2023 Patient continues to work with therapy to reach goals. ST addressing cognition and communication skill, pt with 100% acc min mod verbal cues. PT working on ROM, Posteral exercises to improve posture, transfer training, balance and coordination. Longest stance time 2:15 min with CGA/Min A. No LOB. Pt maintained NWB status on LLE. OT also working on body alignment\posture and increasing strength and activity tolerance. Patient actively participates in therapy. No new concerns today.  Denies n/v/f/c pain.      2023  Patient working with therapy with Recumbent bike x 15 min in order to improve strength and endurance, maintaining NWB status on LLE. Working on transfer training, balance activities & weight shifting. ST working on addressing cognitive communication skills, targeting memory and reasoning skills. Pt completed 100% with min\mod cues. Complex problem solving\reasoning with 95% accuracy with occasional cues. No new nursing concerns today.     2023 Patient presents for fu therapy and general medical care.  Patient is working on strengthening balance and ADLs and continues to work toward goal with assist of therapist.  No concerns verbalized from nursing.  Patient denies constitutional symptoms.        6/29/23 Patient working on therapy. Is working on recumbent bike ans tolerates up to 15 minutes. Remains NWB to LLE. Denies pain at this time. No acute distress. Denies SOB and orthopnea. No acute distress    6/30/2023 Patient at [facility] and working toward goals in therapy.   Patient NWB LLE.  Working on transfer training and balance.  Patient verbalizes no new concerns today.  Had uneventful night.  Denies n/v/f/c.  No new concerns from nursing staff.      7/3/23 Patient working in therapy due to weakness. Continues to be NWB LLE. Patient does not ambulate and is working on recumbent bike.  Getting stronger.   Pain from recent fracture is controlled with current medications. Denies SOB or weight gain.  No acute distress.      Patient presents for general medical care and f/u.  Patient seen and examined at bedside.  No issues per nursing.  Patient has no acute complaints.    Pain from recent fracture is controlled with current medications.   AFIB stable, denies palpitations and chest pain.  GERD controlled.  Denies heartburn, regurgitation, epigastric discomfort, sour taste, and cough.  CHF stable, denies sob, orthopnea, weight gain.  Patient continues to work in therapy.  He is getting stronger and requires assistance for transfers ADLs and mobility.  No acute distress.    7/5/2023 Patient is at [facility] and working in therapy.   Feels therapy is going ok, no obstacles/barriers.  Patient is working on recumbent bike with the right lower extremity while maintaining nonweightbearing to the left.  Patient is feeling ok and has no new concerns today.  Denies constitutional symptoms.  No new concerns from nursing staff.     7/6/23 Patient working in therapy due to weakness. Patient working on strengthening and balance. Remains NWB to LLE. Denies SOB or orthopnea. No acute distress. Pain from recent fracture controlled.      7/7/23   Patient actively participates in therapy.  Working on therapy exercises to improve strength  and endurance.  Patient is nonweightbearing to LLE.  Completed core exercises to improve upright posture.    Completed sit to stand with contact-guard assist.  Able to stand at wheeled walker and maintain nonweightbearing for 1-1/2 to 2 minutes with fair minus balance.  Patient is stable without complaint.  No new concerns reported by nursing.      Objective  Vital signs:  144/78, 97.6, 88, 20, 94%,   Physical Exam  Constitutional:       General: She is not in acute distress.  Eyes:      Extraocular Movements: Extraocular movements intact.   Cardiovascular:      Rate and Rhythm: Normal rate and regular rhythm.   Pulmonary:      Effort: Pulmonary effort is normal.      Breath sounds: Normal breath sounds.   Abdominal:      General: Bowel sounds are normal.      Palpations: Abdomen is soft.   Musculoskeletal:      Cervical back: Neck supple.      Comments: Generalized weakness  Immobilizer LLE   Skin:     Comments: Dressing left foot   Neurological:      Mental Status: She is alert.   Psychiatric:         Mood and Affect: Mood normal.         Behavior: Behavior is cooperative.         Assessment/Plan  Problem List Items Addressed This Visit       Atrial fibrillation (CMS/HCC)     Apixaban  Amiodarone  Metoprolol  Bleeding precautions           Chronic combined systolic and diastolic congestive heart failure (CMS/HCC)     Stable  Tosemide  Aldactone   BB  Monitor weight         Chronic obstructive pulmonary disease, unspecified COPD type (CMS/HCC)     Stable, no wheezing or shortness of breath  On room air         Closed fracture of proximal end of left tibia with routine healing     Pain meds  Therapy  Follow-up with Ortho         Hypertension, essential     Monitor blood pressure  Continue antihypertensives         Type 2 diabetes mellitus with hyperglycemia, with long-term current use of insulin (CMS/HCC)     Lantus   Continue metformin and lispro insulin  Glucoscan with sliding scale insulin coverage  Low  concentrated sweets diet         Weakness - Primary     Continue with therapy          Medications, treatments, and labs reviewed  Continue medications and treatments as listed in EMR    Scribe Attestation  IMarzena Scribe   attest that this documentation has been prepared under the direction and in the presence of MARISELA Nelson    Provider Attestation - Scribe documentation  All medical record entries made by the Scribe were at my direction and personally dictated by me. I have reviewed the chart and agree that the record accurately reflects my personal performance of the history, physical exam, discussion and plan.   MARISELA Nelson        Electronically Signed By: MARISELA Nelson   7/9/23  9:47 PM

## 2023-07-10 ENCOUNTER — NURSING HOME VISIT (OUTPATIENT)
Dept: POST ACUTE CARE | Facility: EXTERNAL LOCATION | Age: 73
End: 2023-07-10
Payer: MEDICARE

## 2023-07-10 DIAGNOSIS — I48.91 ATRIAL FIBRILLATION, UNSPECIFIED TYPE (MULTI): ICD-10-CM

## 2023-07-10 DIAGNOSIS — I50.42 CHRONIC COMBINED SYSTOLIC AND DIASTOLIC CONGESTIVE HEART FAILURE (MULTI): ICD-10-CM

## 2023-07-10 DIAGNOSIS — Z79.4 TYPE 2 DIABETES MELLITUS WITH HYPERGLYCEMIA, WITH LONG-TERM CURRENT USE OF INSULIN (MULTI): ICD-10-CM

## 2023-07-10 DIAGNOSIS — R53.1 WEAKNESS: Primary | ICD-10-CM

## 2023-07-10 DIAGNOSIS — J44.9 CHRONIC OBSTRUCTIVE PULMONARY DISEASE, UNSPECIFIED COPD TYPE (MULTI): ICD-10-CM

## 2023-07-10 DIAGNOSIS — E11.65 TYPE 2 DIABETES MELLITUS WITH HYPERGLYCEMIA, WITH LONG-TERM CURRENT USE OF INSULIN (MULTI): ICD-10-CM

## 2023-07-10 DIAGNOSIS — S82.102D CLOSED FRACTURE OF PROXIMAL END OF LEFT TIBIA WITH ROUTINE HEALING, UNSPECIFIED FRACTURE MORPHOLOGY, SUBSEQUENT ENCOUNTER: ICD-10-CM

## 2023-07-10 PROCEDURE — 99308 SBSQ NF CARE LOW MDM 20: CPT | Performed by: INTERNAL MEDICINE

## 2023-07-10 NOTE — PROGRESS NOTES
PROGRESS NOTE    Subjective   Chief complaint: Teresa Matthews is a 72 y.o. female who is an acute skilled patient being seen and evaluated for weakness    HPI:  6/21/2023 patient mated to skilled nursing facility for therapy due to weakness after recent hospitalization status post fall. Patient was found to have acute comminuted fractures of proximal tibia and fibular diaphysis. She was admitted to the hospital and seen by Ortho who recommended conservative management. Pain was controlled and patient none weightbearing. She was discharged to skilled nursing facility for therapy and to continue medical management.    6/26/2023 Patient continues to work with therapy to reach goals. ST addressing cognition and communication skill, pt with 100% acc min mod verbal cues. PT working on ROM, Posteral exercises to improve posture, transfer training, balance and coordination. Longest stance time 2:15 min with CGA/Min A. No LOB. Pt maintained NWB status on LLE. OT also working on body alignment\posture and increasing strength and activity tolerance. Patient actively participates in therapy. No new concerns today.  Denies n/v/f/c pain.      6/27/2023  Patient working with therapy with Recumbent bike x 15 min in order to improve strength and endurance, maintaining NWB status on LLE. Working on transfer training, balance activities & weight shifting. ST working on addressing cognitive communication skills, targeting memory and reasoning skills. Pt completed 100% with min\mod cues. Complex problem solving\reasoning with 95% accuracy with occasional cues. No new nursing concerns today.     6/28/2023 Patient presents for fu therapy and general medical care.  Patient is working on strengthening balance and ADLs and continues to work toward goal with assist of therapist.  No concerns verbalized from nursing.  Patient denies constitutional symptoms.       6/29/23 Patient working on therapy. Is working on recumbent bike ans  tolerates up to 15 minutes. Remains NWB to LLE. Denies pain at this time. No acute distress. Denies SOB and orthopnea. No acute distress    6/30/2023 Patient at SNF and working toward goals in therapy.   Patient NWB LLE.  Working on transfer training and balance.  Patient verbalizes no new concerns today.  Had uneventful night.  Denies n/v/f/c.  No new concerns from nursing staff.      7/3/23 Patient working in therapy due to weakness. Continues to be NWB LLE. Patient does not ambulate and is working on recumbent bike.  Getting stronger.   Pain from recent fracture is controlled with current medications. Denies SOB or weight gain.  No acute distress.      Patient presents for general medical care and f/u.  Patient seen and examined at bedside.  No issues per nursing.  Patient has no acute complaints.    Pain from recent fracture is controlled with current medications.   AFIB stable, denies palpitations and chest pain.  GERD controlled.  Denies heartburn, regurgitation, epigastric discomfort, sour taste, and cough.  CHF stable, denies sob, orthopnea, weight gain.  Patient continues to work in therapy.  He is getting stronger and requires assistance for transfers ADLs and mobility.  No acute distress.    7/5/2023 Patient is at [facility] and working in therapy.   Feels therapy is going ok, no obstacles/barriers.  Patient is working on recumbent bike with the right lower extremity while maintaining nonweightbearing to the left.  Patient is feeling ok and has no new concerns today.  Denies constitutional symptoms.  No new concerns from nursing staff.     7/6/23 Patient working in therapy due to weakness. Patient working on strengthening and balance. Remains NWB to LLE. Denies SOB or orthopnea. No acute distress. Pain from recent fracture controlled.      7/7/23   Patient actively participates in therapy.  Working on therapy exercises to improve strength and endurance.  Patient is nonweightbearing to LLE.  Completed core  exercises to improve upright posture.    Completed sit to stand with contact-guard assist.  Able to stand at wheeled walker and maintain nonweightbearing for 1-1/2 to 2 minutes with fair minus balance.  Patient is stable without complaint.  No new concerns reported by nursing.    7/10/2023 Patient continues working with therapy to reach goals. Pt is NWB to LLE with knee immobilizer in place. Patient utilizing sliding board for transfers with assistance for set up and sba during transfer. STS from w\c to grab bar with mod assist. Pt performed static standing balance with CGA, vc as needed to maintain NWB status. Pt attempted toilet transfer but unable to maintain NWB status. Pt continues to require assistance with dressing of lower extremities.       Objective   Vital signs: 117/75,74,99%    Physical Exam  Constitutional:       General: She is not in acute distress.  Eyes:      Extraocular Movements: Extraocular movements intact.   Cardiovascular:      Rate and Rhythm: Normal rate and regular rhythm.   Pulmonary:      Effort: Pulmonary effort is normal.      Breath sounds: Normal breath sounds.   Abdominal:      General: Bowel sounds are normal.      Palpations: Abdomen is soft.   Musculoskeletal:      Cervical back: Neck supple.      Comments: Generalized weakness  Immobilizer LLE   Skin:     Comments: Dressing left foot   Neurological:      Mental Status: She is alert.   Psychiatric:         Mood and Affect: Mood normal.         Behavior: Behavior is cooperative.         Assessment/Plan   Problem List Items Addressed This Visit       Type 2 diabetes mellitus with hyperglycemia, with long-term current use of insulin (CMS/HCC)     Continue metformin and insulin  Glucoscan with sliding scale insulin coverage  Low concentrated sweets diet         Atrial fibrillation (CMS/HCC)     Continue with Apixaban  HR controlled         Chronic combined systolic and diastolic congestive heart failure (CMS/HCC)      Stable  Tosemide  Aldactone   BB  Monitor weight         Chronic obstructive pulmonary disease, unspecified COPD type (CMS/HCC)     Stable, no wheezing or shortness of breath  On room air         Weakness - Primary     Continue with therapy         Closed fracture of proximal end of left tibia with routine healing     Pain meds  Therapy  Follow-up with Ortho          Medications, treatments, and labs reviewed  Continue medications and treatments as listed in EMR    Scribe Attestation  I, Sharon Christiansen Scribe   attest that this documentation has been prepared under the direction and in the presence of Pita Virgen MD.     Provider Attestation - Scribe documentation  All medical record entries made by the Scribe were at my direction and personally dictated by me. I have reviewed the chart and agree that the record accurately reflects my personal performance of the history, physical exam, discussion and plan.   Pita Virgen MD

## 2023-07-10 NOTE — LETTER
Patient: Teresa Matthews  : 1950    Encounter Date: 07/10/2023    PROGRESS NOTE    Subjective  Chief complaint: Teresa Matthews is a 72 y.o. female who is an acute skilled patient being seen and evaluated for weakness    HPI:  2023 patient mated to skilled nursing facility for therapy due to weakness after recent hospitalization status post fall. Patient was found to have acute comminuted fractures of proximal tibia and fibular diaphysis. She was admitted to the hospital and seen by Ortho who recommended conservative management. Pain was controlled and patient none weightbearing. She was discharged to skilled nursing facility for therapy and to continue medical management.    2023 Patient continues to work with therapy to reach goals. ST addressing cognition and communication skill, pt with 100% acc min mod verbal cues. PT working on ROM, Posteral exercises to improve posture, transfer training, balance and coordination. Longest stance time 2:15 min with CGA/Min A. No LOB. Pt maintained NWB status on LLE. OT also working on body alignment\posture and increasing strength and activity tolerance. Patient actively participates in therapy. No new concerns today.  Denies n/v/f/c pain.      2023  Patient working with therapy with Recumbent bike x 15 min in order to improve strength and endurance, maintaining NWB status on LLE. Working on transfer training, balance activities & weight shifting. ST working on addressing cognitive communication skills, targeting memory and reasoning skills. Pt completed 100% with min\mod cues. Complex problem solving\reasoning with 95% accuracy with occasional cues. No new nursing concerns today.     2023 Patient presents for fu therapy and general medical care.  Patient is working on strengthening balance and ADLs and continues to work toward goal with assist of therapist.  No concerns verbalized from nursing.  Patient denies constitutional symptoms.        6/29/23 Patient working on therapy. Is working on recumbent bike ans tolerates up to 15 minutes. Remains NWB to LLE. Denies pain at this time. No acute distress. Denies SOB and orthopnea. No acute distress    6/30/2023 Patient at [facility] and working toward goals in therapy.   Patient NWB LLE.  Working on transfer training and balance.  Patient verbalizes no new concerns today.  Had uneventful night.  Denies n/v/f/c.  No new concerns from nursing staff.      7/3/23 Patient working in therapy due to weakness. Continues to be NWB LLE. Patient does not ambulate and is working on recumbent bike.  Getting stronger.   Pain from recent fracture is controlled with current medications. Denies SOB or weight gain.  No acute distress.      Patient presents for general medical care and f/u.  Patient seen and examined at bedside.  No issues per nursing.  Patient has no acute complaints.    Pain from recent fracture is controlled with current medications.   AFIB stable, denies palpitations and chest pain.  GERD controlled.  Denies heartburn, regurgitation, epigastric discomfort, sour taste, and cough.  CHF stable, denies sob, orthopnea, weight gain.  Patient continues to work in therapy.  He is getting stronger and requires assistance for transfers ADLs and mobility.  No acute distress.    7/5/2023 Patient is at [facility] and working in therapy.   Feels therapy is going ok, no obstacles/barriers.  Patient is working on recumbent bike with the right lower extremity while maintaining nonweightbearing to the left.  Patient is feeling ok and has no new concerns today.  Denies constitutional symptoms.  No new concerns from nursing staff.     7/6/23 Patient working in therapy due to weakness. Patient working on strengthening and balance. Remains NWB to LLE. Denies SOB or orthopnea. No acute distress. Pain from recent fracture controlled.      7/7/23   Patient actively participates in therapy.  Working on therapy exercises to improve strength  and endurance.  Patient is nonweightbearing to LLE.  Completed core exercises to improve upright posture.    Completed sit to stand with contact-guard assist.  Able to stand at wheeled walker and maintain nonweightbearing for 1-1/2 to 2 minutes with fair minus balance.  Patient is stable without complaint.  No new concerns reported by nursing.    7/10/2023 Patient continues working with therapy to reach goals. Pt is NWB to LLE with knee immobilizer in place. Patient utilizing sliding board for transfers with assistance for set up and sba during transfer. STS from w\c to grab bar with mod assist. Pt performed static standing balance with CGA, vc as needed to maintain NWB status. Pt attempted toilet transfer but unable to maintain NWB status. Pt continues to require assistance with dressing of lower extremities.       Objective  Vital signs: 117/75,74,99%    Physical Exam  Constitutional:       General: She is not in acute distress.  Eyes:      Extraocular Movements: Extraocular movements intact.   Cardiovascular:      Rate and Rhythm: Normal rate and regular rhythm.   Pulmonary:      Effort: Pulmonary effort is normal.      Breath sounds: Normal breath sounds.   Abdominal:      General: Bowel sounds are normal.      Palpations: Abdomen is soft.   Musculoskeletal:      Cervical back: Neck supple.      Comments: Generalized weakness  Immobilizer LLE   Skin:     Comments: Dressing left foot   Neurological:      Mental Status: She is alert.   Psychiatric:         Mood and Affect: Mood normal.         Behavior: Behavior is cooperative.         Assessment/Plan  Problem List Items Addressed This Visit       Type 2 diabetes mellitus with hyperglycemia, with long-term current use of insulin (CMS/HCC)     Continue metformin and insulin  Glucoscan with sliding scale insulin coverage  Low concentrated sweets diet         Atrial fibrillation (CMS/HCC)     Continue with Apixaban  HR controlled         Chronic combined systolic and  diastolic congestive heart failure (CMS/HCC)     Stable  Tosemide  Aldactone   BB  Monitor weight         Chronic obstructive pulmonary disease, unspecified COPD type (CMS/HCC)     Stable, no wheezing or shortness of breath  On room air         Weakness - Primary     Continue with therapy         Closed fracture of proximal end of left tibia with routine healing     Pain meds  Therapy  Follow-up with Ortho          Medications, treatments, and labs reviewed  Continue medications and treatments as listed in EMR    Scribe Attestation  I, Kirill Chapman   attest that this documentation has been prepared under the direction and in the presence of Pita Virgen MD.     Provider Attestation - Scribe documentation  All medical record entries made by the Scribe were at my direction and personally dictated by me. I have reviewed the chart and agree that the record accurately reflects my personal performance of the history, physical exam, discussion and plan.   Pita Virgen MD      Electronically Signed By: Pita Virgen MD   7/10/23  5:47 PM

## 2023-07-10 NOTE — ASSESSMENT & PLAN NOTE
Lantus   Continue metformin and lispro insulin  Glucoscan with sliding scale insulin coverage  Low concentrated sweets diet

## 2023-07-11 ENCOUNTER — NURSING HOME VISIT (OUTPATIENT)
Dept: POST ACUTE CARE | Facility: EXTERNAL LOCATION | Age: 73
End: 2023-07-11
Payer: MEDICARE

## 2023-07-11 DIAGNOSIS — I50.42 CHRONIC COMBINED SYSTOLIC AND DIASTOLIC CONGESTIVE HEART FAILURE (MULTI): ICD-10-CM

## 2023-07-11 DIAGNOSIS — J44.9 CHRONIC OBSTRUCTIVE PULMONARY DISEASE, UNSPECIFIED COPD TYPE (MULTI): ICD-10-CM

## 2023-07-11 DIAGNOSIS — S82.102D CLOSED FRACTURE OF PROXIMAL END OF LEFT TIBIA WITH ROUTINE HEALING, UNSPECIFIED FRACTURE MORPHOLOGY, SUBSEQUENT ENCOUNTER: ICD-10-CM

## 2023-07-11 DIAGNOSIS — Z79.4 TYPE 2 DIABETES MELLITUS WITH HYPERGLYCEMIA, WITH LONG-TERM CURRENT USE OF INSULIN (MULTI): ICD-10-CM

## 2023-07-11 DIAGNOSIS — E11.65 TYPE 2 DIABETES MELLITUS WITH HYPERGLYCEMIA, WITH LONG-TERM CURRENT USE OF INSULIN (MULTI): ICD-10-CM

## 2023-07-11 DIAGNOSIS — R53.1 WEAKNESS: Primary | ICD-10-CM

## 2023-07-11 DIAGNOSIS — I48.91 ATRIAL FIBRILLATION, UNSPECIFIED TYPE (MULTI): ICD-10-CM

## 2023-07-11 PROCEDURE — 99308 SBSQ NF CARE LOW MDM 20: CPT | Performed by: INTERNAL MEDICINE

## 2023-07-11 NOTE — LETTER
Patient: Teresa Matthews  : 1950    Encounter Date: 2023    PROGRESS NOTE    Subjective  Chief complaint: Teresa Matthews is a 72 y.o. female who is an acute skilled patient being seen and evaluated for weakness    HPI:  2023 patient mated to skilled nursing facility for therapy due to weakness after recent hospitalization status post fall. Patient was found to have acute comminuted fractures of proximal tibia and fibular diaphysis. She was admitted to the hospital and seen by Ortho who recommended conservative management. Pain was controlled and patient none weightbearing. She was discharged to skilled nursing facility for therapy and to continue medical management.    2023 Patient continues to work with therapy to reach goals. ST addressing cognition and communication skill, pt with 100% acc min mod verbal cues. PT working on ROM, Posteral exercises to improve posture, transfer training, balance and coordination. Longest stance time 2:15 min with CGA/Min A. No LOB. Pt maintained NWB status on LLE. OT also working on body alignment\posture and increasing strength and activity tolerance. Patient actively participates in therapy. No new concerns today.  Denies n/v/f/c pain.      2023  Patient working with therapy with Recumbent bike x 15 min in order to improve strength and endurance, maintaining NWB status on LLE. Working on transfer training, balance activities & weight shifting. ST working on addressing cognitive communication skills, targeting memory and reasoning skills. Pt completed 100% with min\mod cues. Complex problem solving\reasoning with 95% accuracy with occasional cues. No new nursing concerns today.     2023 Patient presents for fu therapy and general medical care.  Patient is working on strengthening balance and ADLs and continues to work toward goal with assist of therapist.  No concerns verbalized from nursing.  Patient denies constitutional symptoms.        6/29/23 Patient working on therapy. Is working on recumbent bike ans tolerates up to 15 minutes. Remains NWB to LLE. Denies pain at this time. No acute distress. Denies SOB and orthopnea. No acute distress    6/30/2023 Patient at [facility] and working toward goals in therapy.   Patient NWB LLE.  Working on transfer training and balance.  Patient verbalizes no new concerns today.  Had uneventful night.  Denies n/v/f/c.  No new concerns from nursing staff.      7/3/23 Patient working in therapy due to weakness. Continues to be NWB LLE. Patient does not ambulate and is working on recumbent bike.  Getting stronger.   Pain from recent fracture is controlled with current medications. Denies SOB or weight gain.  No acute distress.      Patient presents for general medical care and f/u.  Patient seen and examined at bedside.  No issues per nursing.  Patient has no acute complaints.    Pain from recent fracture is controlled with current medications.   AFIB stable, denies palpitations and chest pain.  GERD controlled.  Denies heartburn, regurgitation, epigastric discomfort, sour taste, and cough.  CHF stable, denies sob, orthopnea, weight gain.  Patient continues to work in therapy.  He is getting stronger and requires assistance for transfers ADLs and mobility.  No acute distress.    7/5/2023 Patient is at [facility] and working in therapy.   Feels therapy is going ok, no obstacles/barriers.  Patient is working on recumbent bike with the right lower extremity while maintaining nonweightbearing to the left.  Patient is feeling ok and has no new concerns today.  Denies constitutional symptoms.  No new concerns from nursing staff.     7/6/23 Patient working in therapy due to weakness. Patient working on strengthening and balance. Remains NWB to LLE. Denies SOB or orthopnea. No acute distress. Pain from recent fracture controlled.      7/7/23   Patient actively participates in therapy.  Working on therapy exercises to improve strength  and endurance.  Patient is nonweightbearing to LLE.  Completed core exercises to improve upright posture.    Completed sit to stand with contact-guard assist.  Able to stand at wheeled walker and maintain nonweightbearing for 1-1/2 to 2 minutes with fair minus balance.  Patient is stable without complaint.  No new concerns reported by nursing.    7/10/2023 Patient continues working with therapy to reach goals. Pt is NWB to LLE with knee immobilizer in place. Patient utilizing sliding board for transfers with assistance for set up and sba during transfer. STS from w\c to grab bar with mod assist. Pt performed static standing balance with CGA, vc as needed to maintain NWB status. Pt attempted toilet transfer but unable to maintain NWB status. Pt continues to require assistance with dressing of lower extremities.     7/11/2023  Therapy continues to work with patient. Patient worked on parallel bars with mod\max assist. Pt completed reciprocal motion x 10 mins. Practiced sliding board transfers. Also practiced ADL's and independence with home activities by retrieving towels from dryer and folding. No new concerns.       Objective  Vital signs: 140/83,68,97%,    Physical Exam  Constitutional:       General: She is not in acute distress.  Eyes:      Extraocular Movements: Extraocular movements intact.   Cardiovascular:      Rate and Rhythm: Normal rate and regular rhythm.   Pulmonary:      Effort: Pulmonary effort is normal.      Breath sounds: Normal breath sounds.   Abdominal:      General: Bowel sounds are normal.      Palpations: Abdomen is soft.   Musculoskeletal:      Cervical back: Neck supple.      Comments: Generalized weakness  Immobilizer LLE   Skin:     Comments: Dressing left foot   Neurological:      Mental Status: She is alert.   Psychiatric:         Mood and Affect: Mood normal.         Behavior: Behavior is cooperative.         Assessment/Plan  Problem List Items Addressed This Visit       Type 2  diabetes mellitus with hyperglycemia, with long-term current use of insulin (CMS/HCC)     Continue metformin and insulin  Glucoscan with sliding scale insulin coverage  Low concentrated sweets diet         Atrial fibrillation (CMS/HCC)     Continue with Apixaban  HR controlled         Chronic combined systolic and diastolic congestive heart failure (CMS/HCC)     Stable  Tosemide  Aldactone   BB  Monitor weight         Chronic obstructive pulmonary disease, unspecified COPD type (CMS/[facility])     Stable, no wheezing or shortness of breath  On room air         Weakness - Primary     Continue with therapy         Closed fracture of proximal end of left tibia with routine healing     Pain meds  Therapy  Follow-up with Ortho          Medications, treatments, and labs reviewed  Continue medications and treatments as listed in EMR    Scribe Attestation  I, Sharon Christiansen Scribe   attest that this documentation has been prepared under the direction and in the presence of Pita Virgen MD.     Provider Attestation - Scribe documentation  All medical record entries made by the Scribe were at my direction and personally dictated by me. I have reviewed the chart and agree that the record accurately reflects my personal performance of the history, physical exam, discussion and plan.   Pita Virgen MD      Electronically Signed By: Pita Virgen MD   7/11/23  6:41 PM

## 2023-07-11 NOTE — PROGRESS NOTES
PROGRESS NOTE    Subjective   Chief complaint: Teresa Matthews is a 72 y.o. female who is an acute skilled patient being seen and evaluated for weakness    HPI:  6/21/2023 patient mated to skilled nursing facility for therapy due to weakness after recent hospitalization status post fall. Patient was found to have acute comminuted fractures of proximal tibia and fibular diaphysis. She was admitted to the hospital and seen by Ortho who recommended conservative management. Pain was controlled and patient none weightbearing. She was discharged to skilled nursing facility for therapy and to continue medical management.    6/26/2023 Patient continues to work with therapy to reach goals. ST addressing cognition and communication skill, pt with 100% acc min mod verbal cues. PT working on ROM, Posteral exercises to improve posture, transfer training, balance and coordination. Longest stance time 2:15 min with CGA/Min A. No LOB. Pt maintained NWB status on LLE. OT also working on body alignment\posture and increasing strength and activity tolerance. Patient actively participates in therapy. No new concerns today.  Denies n/v/f/c pain.      6/27/2023  Patient working with therapy with Recumbent bike x 15 min in order to improve strength and endurance, maintaining NWB status on LLE. Working on transfer training, balance activities & weight shifting. ST working on addressing cognitive communication skills, targeting memory and reasoning skills. Pt completed 100% with min\mod cues. Complex problem solving\reasoning with 95% accuracy with occasional cues. No new nursing concerns today.     6/28/2023 Patient presents for fu therapy and general medical care.  Patient is working on strengthening balance and ADLs and continues to work toward goal with assist of therapist.  No concerns verbalized from nursing.  Patient denies constitutional symptoms.       6/29/23 Patient working on therapy. Is working on recumbent bike ans  tolerates up to 15 minutes. Remains NWB to LLE. Denies pain at this time. No acute distress. Denies SOB and orthopnea. No acute distress    6/30/2023 Patient at SNF and working toward goals in therapy.   Patient NWB LLE.  Working on transfer training and balance.  Patient verbalizes no new concerns today.  Had uneventful night.  Denies n/v/f/c.  No new concerns from nursing staff.      7/3/23 Patient working in therapy due to weakness. Continues to be NWB LLE. Patient does not ambulate and is working on recumbent bike.  Getting stronger.   Pain from recent fracture is controlled with current medications. Denies SOB or weight gain.  No acute distress.      Patient presents for general medical care and f/u.  Patient seen and examined at bedside.  No issues per nursing.  Patient has no acute complaints.    Pain from recent fracture is controlled with current medications.   AFIB stable, denies palpitations and chest pain.  GERD controlled.  Denies heartburn, regurgitation, epigastric discomfort, sour taste, and cough.  CHF stable, denies sob, orthopnea, weight gain.  Patient continues to work in therapy.  He is getting stronger and requires assistance for transfers ADLs and mobility.  No acute distress.    7/5/2023 Patient is at [facility] and working in therapy.   Feels therapy is going ok, no obstacles/barriers.  Patient is working on recumbent bike with the right lower extremity while maintaining nonweightbearing to the left.  Patient is feeling ok and has no new concerns today.  Denies constitutional symptoms.  No new concerns from nursing staff.     7/6/23 Patient working in therapy due to weakness. Patient working on strengthening and balance. Remains NWB to LLE. Denies SOB or orthopnea. No acute distress. Pain from recent fracture controlled.      7/7/23   Patient actively participates in therapy.  Working on therapy exercises to improve strength and endurance.  Patient is nonweightbearing to LLE.  Completed core  exercises to improve upright posture.    Completed sit to stand with contact-guard assist.  Able to stand at wheeled walker and maintain nonweightbearing for 1-1/2 to 2 minutes with fair minus balance.  Patient is stable without complaint.  No new concerns reported by nursing.    7/10/2023 Patient continues working with therapy to reach goals. Pt is NWB to LLE with knee immobilizer in place. Patient utilizing sliding board for transfers with assistance for set up and sba during transfer. STS from w\c to grab bar with mod assist. Pt performed static standing balance with CGA, vc as needed to maintain NWB status. Pt attempted toilet transfer but unable to maintain NWB status. Pt continues to require assistance with dressing of lower extremities.     7/11/2023  Therapy continues to work with patient. Patient worked on parallel bars with mod\max assist. Pt completed reciprocal motion x 10 mins. Practiced sliding board transfers. Also practiced ADL's and independence with home activities by retrieving towels from dryer and folding. No new concerns.       Objective   Vital signs: 140/83,68,97%,    Physical Exam  Constitutional:       General: She is not in acute distress.  Eyes:      Extraocular Movements: Extraocular movements intact.   Cardiovascular:      Rate and Rhythm: Normal rate and regular rhythm.   Pulmonary:      Effort: Pulmonary effort is normal.      Breath sounds: Normal breath sounds.   Abdominal:      General: Bowel sounds are normal.      Palpations: Abdomen is soft.   Musculoskeletal:      Cervical back: Neck supple.      Comments: Generalized weakness  Immobilizer LLE   Skin:     Comments: Dressing left foot   Neurological:      Mental Status: She is alert.   Psychiatric:         Mood and Affect: Mood normal.         Behavior: Behavior is cooperative.         Assessment/Plan   Problem List Items Addressed This Visit       Type 2 diabetes mellitus with hyperglycemia, with long-term current use of  insulin (CMS/[facility])     Continue metformin and insulin  Glucoscan with sliding scale insulin coverage  Low concentrated sweets diet         Atrial fibrillation (CMS/HCC)     Continue with Apixaban  HR controlled         Chronic combined systolic and diastolic congestive heart failure (CMS/HCC)     Stable  Tosemide  Aldactone   BB  Monitor weight         Chronic obstructive pulmonary disease, unspecified COPD type (CMS/[facility])     Stable, no wheezing or shortness of breath  On room air         Weakness - Primary     Continue with therapy         Closed fracture of proximal end of left tibia with routine healing     Pain meds  Therapy  Follow-up with Ortho          Medications, treatments, and labs reviewed  Continue medications and treatments as listed in EMR    Scribe Attestation  Sharon CHICAS Scribe   attest that this documentation has been prepared under the direction and in the presence of Pita Virgen MD.     Provider Attestation - Scribe documentation  All medical record entries made by the Scribe were at my direction and personally dictated by me. I have reviewed the chart and agree that the record accurately reflects my personal performance of the history, physical exam, discussion and plan.   Pita Virgen MD

## 2023-07-12 ENCOUNTER — NURSING HOME VISIT (OUTPATIENT)
Dept: POST ACUTE CARE | Facility: EXTERNAL LOCATION | Age: 73
End: 2023-07-12
Payer: MEDICARE

## 2023-07-12 DIAGNOSIS — I10 HYPERTENSION, ESSENTIAL: ICD-10-CM

## 2023-07-12 DIAGNOSIS — J44.9 CHRONIC OBSTRUCTIVE PULMONARY DISEASE, UNSPECIFIED COPD TYPE (MULTI): ICD-10-CM

## 2023-07-12 DIAGNOSIS — R53.1 WEAKNESS: Primary | ICD-10-CM

## 2023-07-12 DIAGNOSIS — Z79.4 TYPE 2 DIABETES MELLITUS WITH HYPERGLYCEMIA, WITH LONG-TERM CURRENT USE OF INSULIN (MULTI): ICD-10-CM

## 2023-07-12 DIAGNOSIS — S82.102D CLOSED FRACTURE OF PROXIMAL END OF LEFT TIBIA WITH ROUTINE HEALING, UNSPECIFIED FRACTURE MORPHOLOGY, SUBSEQUENT ENCOUNTER: ICD-10-CM

## 2023-07-12 DIAGNOSIS — E11.65 TYPE 2 DIABETES MELLITUS WITH HYPERGLYCEMIA, WITH LONG-TERM CURRENT USE OF INSULIN (MULTI): ICD-10-CM

## 2023-07-12 DIAGNOSIS — I50.42 CHRONIC COMBINED SYSTOLIC AND DIASTOLIC CONGESTIVE HEART FAILURE (MULTI): ICD-10-CM

## 2023-07-12 PROCEDURE — 99309 SBSQ NF CARE MODERATE MDM 30: CPT | Performed by: NURSE PRACTITIONER

## 2023-07-12 NOTE — ASSESSMENT & PLAN NOTE
Blood sugars ranging mid 100s to upper 200s  Continue Lantus metformin and lispro   Glucoscan with sliding scale insulin coverage  Low concentrated sweets diet

## 2023-07-12 NOTE — LETTER
Patient: Teresa Matthews  : 1950    Encounter Date: 2023    PROGRESS NOTE    Subjectivef/u diabetes  Chief complaint: Teresa Matthews is a 72 y.o. female who is an acute skilled patient being seen and evaluated for weakness and fu DM    HPI:  2023 patient mated to skilled nursing facility for therapy due to weakness after recent hospitalization status post fall. Patient was found to have acute comminuted fractures of proximal tibia and fibular diaphysis. She was admitted to the hospital and seen by Ortho who recommended conservative management. Pain was controlled and patient none weightbearing. She was discharged to skilled nursing facility for therapy and to continue medical management.    2023 Patient continues to work with therapy to reach goals. ST addressing cognition and communication skill, pt with 100% acc min mod verbal cues. PT working on ROM, Posteral exercises to improve posture, transfer training, balance and coordination. Longest stance time 2:15 min with CGA/Min A. No LOB. Pt maintained NWB status on LLE. OT also working on body alignment\posture and increasing strength and activity tolerance. Patient actively participates in therapy. No new concerns today.  Denies n/v/f/c pain.      2023  Patient working with therapy with Recumbent bike x 15 min in order to improve strength and endurance, maintaining NWB status on LLE. Working on transfer training, balance activities & weight shifting. ST working on addressing cognitive communication skills, targeting memory and reasoning skills. Pt completed 100% with min\mod cues. Complex problem solving\reasoning with 95% accuracy with occasional cues. No new nursing concerns today.     2023 Patient presents for fu therapy and general medical care.  Patient is working on strengthening balance and ADLs and continues to work toward goal with assist of therapist.  No concerns verbalized from nursing.  Patient denies  constitutional symptoms.       6/29/23 Patient working on therapy. Is working on recumbent bike ans tolerates up to 15 minutes. Remains NWB to LLE. Denies pain at this time. No acute distress. Denies SOB and orthopnea. No acute distress    6/30/2023 Patient at [facility] and working toward goals in therapy.   Patient NWB LLE.  Working on transfer training and balance.  Patient verbalizes no new concerns today.  Had uneventful night.  Denies n/v/f/c.  No new concerns from nursing staff.      7/3/23 Patient working in therapy due to weakness. Continues to be NWB LLE. Patient does not ambulate and is working on recumbent bike.  Getting stronger.   Pain from recent fracture is controlled with current medications. Denies SOB or weight gain.  No acute distress.      Patient presents for general medical care and f/u.  Patient seen and examined at bedside.  No issues per nursing.  Patient has no acute complaints.    Pain from recent fracture is controlled with current medications.   AFIB stable, denies palpitations and chest pain.  GERD controlled.  Denies heartburn, regurgitation, epigastric discomfort, sour taste, and cough.  CHF stable, denies sob, orthopnea, weight gain.  Patient continues to work in therapy.  He is getting stronger and requires assistance for transfers ADLs and mobility.  No acute distress.    7/5/2023 Patient is at [facility] and working in therapy.   Feels therapy is going ok, no obstacles/barriers.  Patient is working on recumbent bike with the right lower extremity while maintaining nonweightbearing to the left.  Patient is feeling ok and has no new concerns today.  Denies constitutional symptoms.  No new concerns from nursing staff.     7/6/23 Patient working in therapy due to weakness. Patient working on strengthening and balance. Remains NWB to LLE. Denies SOB or orthopnea. No acute distress. Pain from recent fracture controlled.      7/7/23   Patient actively participates in therapy.  Working on therapy  exercises to improve strength and endurance.  Patient is nonweightbearing to LLE.  Completed core exercises to improve upright posture.    Completed sit to stand with contact-guard assist.  Able to stand at wheeled walker and maintain nonweightbearing for 1-1/2 to 2 minutes with fair minus balance.  Patient is stable without complaint.  No new concerns reported by nursing.    7/10/2023 Patient continues working with therapy to reach goals. Pt is NWB to LLE with knee immobilizer in place. Patient utilizing sliding board for transfers with assistance for set up and sba during transfer. STS from w\c to grab bar with mod assist. Pt performed static standing balance with CGA, vc as needed to maintain NWB status. Pt attempted toilet transfer but unable to maintain NWB status. Pt continues to require assistance with dressing of lower extremities.     7/11/2023  Therapy continues to work with patient. Patient worked on parallel bars with mod\max assist. Pt completed reciprocal motion x 10 mins. Practiced sliding board transfers. Also practiced ADL's and independence with home activities by retrieving towels from dryer and folding. No new concerns.     7/12/2023 Patient at SNF for therapy and presents for follow-up.  Patient continues working toward goals for strengthening, transfers, and ADLs.  Patient is nonweightbearing to the left lower extremity.  Patient has no new complaints at this time.  Denies n/v/f/c.  No new concerns per nursing.        Objective  Vital signs: 136/81, 97.1, 70s, 18, 98%, blood sugar 243    Physical Exam  Constitutional:       General: She is not in acute distress.  Eyes:      Extraocular Movements: Extraocular movements intact.   Cardiovascular:      Rate and Rhythm: Normal rate and regular rhythm.   Pulmonary:      Effort: Pulmonary effort is normal.      Breath sounds: Normal breath sounds.   Abdominal:      General: Bowel sounds are normal.      Palpations: Abdomen is soft.   Musculoskeletal:       Cervical back: Neck supple.      Comments: Generalized weakness  Immobilizer LLE   Skin:     Comments: Dressing left foot   Neurological:      Mental Status: She is alert.   Psychiatric:         Mood and Affect: Mood normal.         Behavior: Behavior is cooperative.         Assessment/Plan  Problem List Items Addressed This Visit       Chronic combined systolic and diastolic congestive heart failure (CMS/HCC)     Stable  Tosemide  Aldactone   BB  Monitor weight         Chronic obstructive pulmonary disease, unspecified COPD type (CMS/HCC)     Stable, no wheezing or shortness of breath  On room air         Closed fracture of proximal end of left tibia with routine healing     Pain meds  Therapy  Follow-up with Ortho         Hypertension, essential     Monitor blood pressure  Continue antihypertensives  Blood pressure at goal         Type 2 diabetes mellitus with hyperglycemia, with long-term current use of insulin (CMS/HCC)     Blood sugars ranging mid 100s to upper 200s  Continue Lantus metformin and lispro   Glucoscan with sliding scale insulin coverage  Low concentrated sweets diet         Weakness - Primary     Continue with therapy          Medications, treatments, and labs reviewed  Continue medications and treatments as listed in EMR    MARISELA Nelson      Electronically Signed By: MARISELA Nelson   7/12/23  3:25 PM

## 2023-07-12 NOTE — PROGRESS NOTES
PROGRESS NOTE    Subjective f/u diabetes  Chief complaint: Teresa Matthews is a 72 y.o. female who is an acute skilled patient being seen and evaluated for weakness and fu DM    HPI:  6/21/2023 patient mated to skilled nursing facility for therapy due to weakness after recent hospitalization status post fall. Patient was found to have acute comminuted fractures of proximal tibia and fibular diaphysis. She was admitted to the hospital and seen by Ortho who recommended conservative management. Pain was controlled and patient none weightbearing. She was discharged to skilled nursing facility for therapy and to continue medical management.    6/26/2023 Patient continues to work with therapy to reach goals. ST addressing cognition and communication skill, pt with 100% acc min mod verbal cues. PT working on ROM, Posteral exercises to improve posture, transfer training, balance and coordination. Longest stance time 2:15 min with CGA/Min A. No LOB. Pt maintained NWB status on LLE. OT also working on body alignment\posture and increasing strength and activity tolerance. Patient actively participates in therapy. No new concerns today.  Denies n/v/f/c pain.      6/27/2023  Patient working with therapy with Recumbent bike x 15 min in order to improve strength and endurance, maintaining NWB status on LLE. Working on transfer training, balance activities & weight shifting. ST working on addressing cognitive communication skills, targeting memory and reasoning skills. Pt completed 100% with min\mod cues. Complex problem solving\reasoning with 95% accuracy with occasional cues. No new nursing concerns today.     6/28/2023 Patient presents for fu therapy and general medical care.  Patient is working on strengthening balance and ADLs and continues to work toward goal with assist of therapist.  No concerns verbalized from nursing.  Patient denies constitutional symptoms.       6/29/23 Patient working on therapy. Is working on  recumbent bike ans tolerates up to 15 minutes. Remains NWB to LLE. Denies pain at this time. No acute distress. Denies SOB and orthopnea. No acute distress    6/30/2023 Patient at SNF and working toward goals in therapy.   Patient NWB LLE.  Working on transfer training and balance.  Patient verbalizes no new concerns today.  Had uneventful night.  Denies n/v/f/c.  No new concerns from nursing staff.      7/3/23 Patient working in therapy due to weakness. Continues to be NWB LLE. Patient does not ambulate and is working on recumbent bike.  Getting stronger.   Pain from recent fracture is controlled with current medications. Denies SOB or weight gain.  No acute distress.      Patient presents for general medical care and f/u.  Patient seen and examined at bedside.  No issues per nursing.  Patient has no acute complaints.    Pain from recent fracture is controlled with current medications.   AFIB stable, denies palpitations and chest pain.  GERD controlled.  Denies heartburn, regurgitation, epigastric discomfort, sour taste, and cough.  CHF stable, denies sob, orthopnea, weight gain.  Patient continues to work in therapy.  He is getting stronger and requires assistance for transfers ADLs and mobility.  No acute distress.    7/5/2023 Patient is at [facility] and working in therapy.   Feels therapy is going ok, no obstacles/barriers.  Patient is working on recumbent bike with the right lower extremity while maintaining nonweightbearing to the left.  Patient is feeling ok and has no new concerns today.  Denies constitutional symptoms.  No new concerns from nursing staff.     7/6/23 Patient working in therapy due to weakness. Patient working on strengthening and balance. Remains NWB to LLE. Denies SOB or orthopnea. No acute distress. Pain from recent fracture controlled.      7/7/23   Patient actively participates in therapy.  Working on therapy exercises to improve strength and endurance.  Patient is nonweightbearing to LLE.   Completed core exercises to improve upright posture.    Completed sit to stand with contact-guard assist.  Able to stand at wheeled walker and maintain nonweightbearing for 1-1/2 to 2 minutes with fair minus balance.  Patient is stable without complaint.  No new concerns reported by nursing.    7/10/2023 Patient continues working with therapy to reach goals. Pt is NWB to LLE with knee immobilizer in place. Patient utilizing sliding board for transfers with assistance for set up and sba during transfer. STS from w\c to grab bar with mod assist. Pt performed static standing balance with CGA, vc as needed to maintain NWB status. Pt attempted toilet transfer but unable to maintain NWB status. Pt continues to require assistance with dressing of lower extremities.     7/11/2023  Therapy continues to work with patient. Patient worked on parallel bars with mod\max assist. Pt completed reciprocal motion x 10 mins. Practiced sliding board transfers. Also practiced ADL's and independence with home activities by retrieving towels from dryer and folding. No new concerns.     7/12/2023 Patient at SNF for therapy and presents for follow-up.  Patient continues working toward goals for strengthening, transfers, and ADLs.  Patient is nonweightbearing to the left lower extremity.  Patient has no new complaints at this time.  Denies n/v/f/c.  No new concerns per nursing.        Objective   Vital signs: 136/81, 97.1, 70s, 18, 98%, blood sugar 243    Physical Exam  Constitutional:       General: She is not in acute distress.  Eyes:      Extraocular Movements: Extraocular movements intact.   Cardiovascular:      Rate and Rhythm: Normal rate and regular rhythm.   Pulmonary:      Effort: Pulmonary effort is normal.      Breath sounds: Normal breath sounds.   Abdominal:      General: Bowel sounds are normal.      Palpations: Abdomen is soft.   Musculoskeletal:      Cervical back: Neck supple.      Comments: Generalized weakness  Immobilizer  LLE   Skin:     Comments: Dressing left foot   Neurological:      Mental Status: She is alert.   Psychiatric:         Mood and Affect: Mood normal.         Behavior: Behavior is cooperative.         Assessment/Plan   Problem List Items Addressed This Visit       Chronic combined systolic and diastolic congestive heart failure (CMS/HCC)     Stable  Tosemide  Aldactone   BB  Monitor weight         Chronic obstructive pulmonary disease, unspecified COPD type (CMS/HCC)     Stable, no wheezing or shortness of breath  On room air         Closed fracture of proximal end of left tibia with routine healing     Pain meds  Therapy  Follow-up with Ortho         Hypertension, essential     Monitor blood pressure  Continue antihypertensives  Blood pressure at goal         Type 2 diabetes mellitus with hyperglycemia, with long-term current use of insulin (CMS/[facility])     Blood sugars ranging mid 100s to upper 200s  Continue Lantus metformin and lispro   Glucoscan with sliding scale insulin coverage  Low concentrated sweets diet         Weakness - Primary     Continue with therapy          Medications, treatments, and labs reviewed  Continue medications and treatments as listed in EMR    Yasemin Kelly, APRN-CNP

## 2023-07-13 ENCOUNTER — NURSING HOME VISIT (OUTPATIENT)
Dept: POST ACUTE CARE | Facility: EXTERNAL LOCATION | Age: 73
End: 2023-07-13
Payer: MEDICARE

## 2023-07-13 DIAGNOSIS — R53.1 WEAKNESS: ICD-10-CM

## 2023-07-13 DIAGNOSIS — I50.42 CHRONIC COMBINED SYSTOLIC AND DIASTOLIC CONGESTIVE HEART FAILURE (MULTI): ICD-10-CM

## 2023-07-13 DIAGNOSIS — S82.102D CLOSED FRACTURE OF PROXIMAL END OF LEFT TIBIA WITH ROUTINE HEALING, UNSPECIFIED FRACTURE MORPHOLOGY, SUBSEQUENT ENCOUNTER: ICD-10-CM

## 2023-07-13 PROCEDURE — 99308 SBSQ NF CARE LOW MDM 20: CPT | Performed by: INTERNAL MEDICINE

## 2023-07-13 NOTE — LETTER
Patient: Teresa Matthews  : 1950    Encounter Date: 2023    PROGRESS NOTE    Subjectivef/u diabetes  Chief complaint: Teresa Matthews is a 72 y.o. female who is an acute skilled patient being seen and evaluated for weakness and fu DM    HPI:  2023 patient mated to skilled nursing facility for therapy due to weakness after recent hospitalization status post fall. Patient was found to have acute comminuted fractures of proximal tibia and fibular diaphysis. She was admitted to the hospital and seen by Ortho who recommended conservative management. Pain was controlled and patient none weightbearing. She was discharged to skilled nursing facility for therapy and to continue medical management.    2023 Patient continues to work with therapy to reach goals. ST addressing cognition and communication skill, pt with 100% acc min mod verbal cues. PT working on ROM, Posteral exercises to improve posture, transfer training, balance and coordination. Longest stance time 2:15 min with CGA/Min A. No LOB. Pt maintained NWB status on LLE. OT also working on body alignment\posture and increasing strength and activity tolerance. Patient actively participates in therapy. No new concerns today.  Denies n/v/f/c pain.      2023  Patient working with therapy with Recumbent bike x 15 min in order to improve strength and endurance, maintaining NWB status on LLE. Working on transfer training, balance activities & weight shifting. ST working on addressing cognitive communication skills, targeting memory and reasoning skills. Pt completed 100% with min\mod cues. Complex problem solving\reasoning with 95% accuracy with occasional cues. No new nursing concerns today.     2023 Patient presents for fu therapy and general medical care.  Patient is working on strengthening balance and ADLs and continues to work toward goal with assist of therapist.  No concerns verbalized from nursing.  Patient denies  constitutional symptoms.       6/29/23 Patient working on therapy. Is working on recumbent bike ans tolerates up to 15 minutes. Remains NWB to LLE. Denies pain at this time. No acute distress. Denies SOB and orthopnea. No acute distress    6/30/2023 Patient at [facility] and working toward goals in therapy.   Patient NWB LLE.  Working on transfer training and balance.  Patient verbalizes no new concerns today.  Had uneventful night.  Denies n/v/f/c.  No new concerns from nursing staff.      7/3/23 Patient working in therapy due to weakness. Continues to be NWB LLE. Patient does not ambulate and is working on recumbent bike.  Getting stronger.   Pain from recent fracture is controlled with current medications. Denies SOB or weight gain.  No acute distress.      Patient presents for general medical care and f/u.  Patient seen and examined at bedside.  No issues per nursing.  Patient has no acute complaints.    Pain from recent fracture is controlled with current medications.   AFIB stable, denies palpitations and chest pain.  GERD controlled.  Denies heartburn, regurgitation, epigastric discomfort, sour taste, and cough.  CHF stable, denies sob, orthopnea, weight gain.  Patient continues to work in therapy.  He is getting stronger and requires assistance for transfers ADLs and mobility.  No acute distress.    7/5/2023 Patient is at [facility] and working in therapy.   Feels therapy is going ok, no obstacles/barriers.  Patient is working on recumbent bike with the right lower extremity while maintaining nonweightbearing to the left.  Patient is feeling ok and has no new concerns today.  Denies constitutional symptoms.  No new concerns from nursing staff.     7/6/23 Patient working in therapy due to weakness. Patient working on strengthening and balance. Remains NWB to LLE. Denies SOB or orthopnea. No acute distress. Pain from recent fracture controlled.      7/7/23   Patient actively participates in therapy.  Working on therapy  exercises to improve strength and endurance.  Patient is nonweightbearing to LLE.  Completed core exercises to improve upright posture.    Completed sit to stand with contact-guard assist.  Able to stand at wheeled walker and maintain nonweightbearing for 1-1/2 to 2 minutes with fair minus balance.  Patient is stable without complaint.  No new concerns reported by nursing.    7/10/2023 Patient continues working with therapy to reach goals. Pt is NWB to LLE with knee immobilizer in place. Patient utilizing sliding board for transfers with assistance for set up and sba during transfer. STS from w\c to grab bar with mod assist. Pt performed static standing balance with CGA, vc as needed to maintain NWB status. Pt attempted toilet transfer but unable to maintain NWB status. Pt continues to require assistance with dressing of lower extremities.     7/11/2023  Therapy continues to work with patient. Patient worked on parallel bars with mod\max assist. Pt completed reciprocal motion x 10 mins. Practiced sliding board transfers. Also practiced ADL's and independence with home activities by retrieving towels from dryer and folding. No new concerns.     7/12/2023 Patient at [facility] for therapy and presents for follow-up.  Patient continues working toward goals for strengthening, transfers, and ADLs.  Patient is nonweightbearing to the left lower extremity.  Patient has no new complaints at this time.  Denies n/v/f/c.  No new concerns per nursing.      7/13/23 Patient working in therapy due to weakness and debility. Patient is non weight bearing to LLE. Working on strengthening exercises and safe transfers and ADL retraining. No new issues or concerns at this time. Pain from recent fracture well controlled with current meds. Denies SOB or orthopnea.       Objective  Vital signs: 134/78,  98%,     Physical Exam  Constitutional:       General: She is not in acute distress.  Eyes:      Extraocular Movements: Extraocular movements  intact.   Cardiovascular:      Rate and Rhythm: Normal rate and regular rhythm.   Pulmonary:      Effort: Pulmonary effort is normal.      Breath sounds: Normal breath sounds.   Abdominal:      General: Bowel sounds are normal.      Palpations: Abdomen is soft.   Musculoskeletal:      Cervical back: Neck supple.      Comments: Generalized weakness  Immobilizer LLE   Skin:     Comments: Dressing left foot   Neurological:      Mental Status: She is alert.   Psychiatric:         Mood and Affect: Mood normal.         Behavior: Behavior is cooperative.         Assessment/Plan  Problem List Items Addressed This Visit          Cardiac and Vasculature    Chronic combined systolic and diastolic congestive heart failure (CMS/HCC)     Stable  Tosemide  Aldactone   BB  Monitor weight            Musculoskeletal and Injuries    Closed fracture of proximal end of left tibia with routine healing     Pain meds  Therapy  Follow-up with Ortho            Symptoms and Signs    Weakness     Continue with therapy        Medications, treatments, and labs reviewed  Continue medications and treatments as listed in EMR    Pita Virgen MD  Scribe Attestation  IMonica Scribgénesis   attest that this documentation has been prepared under the direction and in the presence of Pita Virgen MD.     Provider Attestation - Scribe documentation  All medical record entries made by the Scribe were at my direction and personally dictated by me. I have reviewed the chart and agree that the record accurately reflects my personal performance of the history, physical exam, discussion and plan.   Pita Virgen MD  1. Chronic combined systolic and diastolic congestive heart failure (CMS/HCC)        2. Closed fracture of proximal end of left tibia with routine healing, unspecified fracture morphology, subsequent encounter        3. Weakness              Electronically Signed By: Pita Virgen MD   7/13/23 11:38 AM

## 2023-07-13 NOTE — PROGRESS NOTES
PROGRESS NOTE    Subjective f/u diabetes  Chief complaint: Teresa Matthews is a 72 y.o. female who is an acute skilled patient being seen and evaluated for weakness and fu DM    HPI:  6/21/2023 patient mated to skilled nursing facility for therapy due to weakness after recent hospitalization status post fall. Patient was found to have acute comminuted fractures of proximal tibia and fibular diaphysis. She was admitted to the hospital and seen by Ortho who recommended conservative management. Pain was controlled and patient none weightbearing. She was discharged to skilled nursing facility for therapy and to continue medical management.    6/26/2023 Patient continues to work with therapy to reach goals. ST addressing cognition and communication skill, pt with 100% acc min mod verbal cues. PT working on ROM, Posteral exercises to improve posture, transfer training, balance and coordination. Longest stance time 2:15 min with CGA/Min A. No LOB. Pt maintained NWB status on LLE. OT also working on body alignment\posture and increasing strength and activity tolerance. Patient actively participates in therapy. No new concerns today.  Denies n/v/f/c pain.      6/27/2023  Patient working with therapy with Recumbent bike x 15 min in order to improve strength and endurance, maintaining NWB status on LLE. Working on transfer training, balance activities & weight shifting. ST working on addressing cognitive communication skills, targeting memory and reasoning skills. Pt completed 100% with min\mod cues. Complex problem solving\reasoning with 95% accuracy with occasional cues. No new nursing concerns today.     6/28/2023 Patient presents for fu therapy and general medical care.  Patient is working on strengthening balance and ADLs and continues to work toward goal with assist of therapist.  No concerns verbalized from nursing.  Patient denies constitutional symptoms.       6/29/23 Patient working on therapy. Is working on  recumbent bike ans tolerates up to 15 minutes. Remains NWB to LLE. Denies pain at this time. No acute distress. Denies SOB and orthopnea. No acute distress    6/30/2023 Patient at SNF and working toward goals in therapy.   Patient NWB LLE.  Working on transfer training and balance.  Patient verbalizes no new concerns today.  Had uneventful night.  Denies n/v/f/c.  No new concerns from nursing staff.      7/3/23 Patient working in therapy due to weakness. Continues to be NWB LLE. Patient does not ambulate and is working on recumbent bike.  Getting stronger.   Pain from recent fracture is controlled with current medications. Denies SOB or weight gain.  No acute distress.      Patient presents for general medical care and f/u.  Patient seen and examined at bedside.  No issues per nursing.  Patient has no acute complaints.    Pain from recent fracture is controlled with current medications.   AFIB stable, denies palpitations and chest pain.  GERD controlled.  Denies heartburn, regurgitation, epigastric discomfort, sour taste, and cough.  CHF stable, denies sob, orthopnea, weight gain.  Patient continues to work in therapy.  He is getting stronger and requires assistance for transfers ADLs and mobility.  No acute distress.    7/5/2023 Patient is at [facility] and working in therapy.   Feels therapy is going ok, no obstacles/barriers.  Patient is working on recumbent bike with the right lower extremity while maintaining nonweightbearing to the left.  Patient is feeling ok and has no new concerns today.  Denies constitutional symptoms.  No new concerns from nursing staff.     7/6/23 Patient working in therapy due to weakness. Patient working on strengthening and balance. Remains NWB to LLE. Denies SOB or orthopnea. No acute distress. Pain from recent fracture controlled.      7/7/23   Patient actively participates in therapy.  Working on therapy exercises to improve strength and endurance.  Patient is nonweightbearing to LLE.   Completed core exercises to improve upright posture.    Completed sit to stand with contact-guard assist.  Able to stand at wheeled walker and maintain nonweightbearing for 1-1/2 to 2 minutes with fair minus balance.  Patient is stable without complaint.  No new concerns reported by nursing.    7/10/2023 Patient continues working with therapy to reach goals. Pt is NWB to LLE with knee immobilizer in place. Patient utilizing sliding board for transfers with assistance for set up and sba during transfer. STS from w\c to grab bar with mod assist. Pt performed static standing balance with CGA, vc as needed to maintain NWB status. Pt attempted toilet transfer but unable to maintain NWB status. Pt continues to require assistance with dressing of lower extremities.     7/11/2023  Therapy continues to work with patient. Patient worked on parallel bars with mod\max assist. Pt completed reciprocal motion x 10 mins. Practiced sliding board transfers. Also practiced ADL's and independence with home activities by retrieving towels from dryer and folding. No new concerns.     7/12/2023 Patient at SNF for therapy and presents for follow-up.  Patient continues working toward goals for strengthening, transfers, and ADLs.  Patient is nonweightbearing to the left lower extremity.  Patient has no new complaints at this time.  Denies n/v/f/c.  No new concerns per nursing.      7/13/23 Patient working in therapy due to weakness and debility. Patient is non weight bearing to LLE. Working on strengthening exercises and safe transfers and ADL retraining. No new issues or concerns at this time. Pain from recent fracture well controlled with current meds. Denies SOB or orthopnea.       Objective   Vital signs: 134/78,  98%,     Physical Exam  Constitutional:       General: She is not in acute distress.  Eyes:      Extraocular Movements: Extraocular movements intact.   Cardiovascular:      Rate and Rhythm: Normal rate and regular rhythm.    Pulmonary:      Effort: Pulmonary effort is normal.      Breath sounds: Normal breath sounds.   Abdominal:      General: Bowel sounds are normal.      Palpations: Abdomen is soft.   Musculoskeletal:      Cervical back: Neck supple.      Comments: Generalized weakness  Immobilizer LLE   Skin:     Comments: Dressing left foot   Neurological:      Mental Status: She is alert.   Psychiatric:         Mood and Affect: Mood normal.         Behavior: Behavior is cooperative.         Assessment/Plan   Problem List Items Addressed This Visit          Cardiac and Vasculature    Chronic combined systolic and diastolic congestive heart failure (CMS/HCC)     Stable  Tosemide  Aldactone   BB  Monitor weight            Musculoskeletal and Injuries    Closed fracture of proximal end of left tibia with routine healing     Pain meds  Therapy  Follow-up with Ortho            Symptoms and Signs    Weakness     Continue with therapy        Medications, treatments, and labs reviewed  Continue medications and treatments as listed in EMR    Pita Virgen MD  Scribe Attestation  I, Kirill De Oliveira   attest that this documentation has been prepared under the direction and in the presence of Pita Virgen MD.     Provider Attestation - Scribe documentation  All medical record entries made by the Scribe were at my direction and personally dictated by me. I have reviewed the chart and agree that the record accurately reflects my personal performance of the history, physical exam, discussion and plan.   Pita Virgen MD  1. Chronic combined systolic and diastolic congestive heart failure (CMS/HCC)        2. Closed fracture of proximal end of left tibia with routine healing, unspecified fracture morphology, subsequent encounter        3. Weakness

## 2023-07-14 ENCOUNTER — NURSING HOME VISIT (OUTPATIENT)
Dept: POST ACUTE CARE | Facility: EXTERNAL LOCATION | Age: 73
End: 2023-07-14
Payer: MEDICARE

## 2023-07-14 DIAGNOSIS — R53.1 WEAKNESS: Primary | ICD-10-CM

## 2023-07-14 DIAGNOSIS — S82.102D CLOSED FRACTURE OF PROXIMAL END OF LEFT TIBIA WITH ROUTINE HEALING, UNSPECIFIED FRACTURE MORPHOLOGY, SUBSEQUENT ENCOUNTER: ICD-10-CM

## 2023-07-14 DIAGNOSIS — I48.91 ATRIAL FIBRILLATION, UNSPECIFIED TYPE (MULTI): ICD-10-CM

## 2023-07-14 DIAGNOSIS — Z79.4 TYPE 2 DIABETES MELLITUS WITH HYPERGLYCEMIA, WITH LONG-TERM CURRENT USE OF INSULIN (MULTI): ICD-10-CM

## 2023-07-14 DIAGNOSIS — I10 HYPERTENSION, ESSENTIAL: ICD-10-CM

## 2023-07-14 DIAGNOSIS — J44.9 CHRONIC OBSTRUCTIVE PULMONARY DISEASE, UNSPECIFIED COPD TYPE (MULTI): ICD-10-CM

## 2023-07-14 DIAGNOSIS — I50.42 CHRONIC COMBINED SYSTOLIC AND DIASTOLIC CONGESTIVE HEART FAILURE (MULTI): ICD-10-CM

## 2023-07-14 DIAGNOSIS — E11.65 TYPE 2 DIABETES MELLITUS WITH HYPERGLYCEMIA, WITH LONG-TERM CURRENT USE OF INSULIN (MULTI): ICD-10-CM

## 2023-07-14 PROCEDURE — 99309 SBSQ NF CARE MODERATE MDM 30: CPT | Performed by: NURSE PRACTITIONER

## 2023-07-14 NOTE — PROGRESS NOTES
PROGRESS NOTE    Subjective   Chief complaint: Teresa Matthews is a 72 y.o. female who is an acute skilled patient being seen and evaluated for weakness    HPI:  6/21/2023 patient mated to skilled nursing facility for therapy due to weakness after recent hospitalization status post fall. Patient was found to have acute comminuted fractures of proximal tibia and fibular diaphysis. She was admitted to the hospital and seen by Ortho who recommended conservative management. Pain was controlled and patient none weightbearing. She was discharged to skilled nursing facility for therapy and to continue medical management.    6/26/2023 Patient continues to work with therapy to reach goals. ST addressing cognition and communication skill, pt with 100% acc min mod verbal cues. PT working on ROM, Posteral exercises to improve posture, transfer training, balance and coordination. Longest stance time 2:15 min with CGA/Min A. No LOB. Pt maintained NWB status on LLE. OT also working on body alignment\posture and increasing strength and activity tolerance. Patient actively participates in therapy. No new concerns today.  Denies n/v/f/c pain.      6/27/2023  Patient working with therapy with Recumbent bike x 15 min in order to improve strength and endurance, maintaining NWB status on LLE. Working on transfer training, balance activities & weight shifting. ST working on addressing cognitive communication skills, targeting memory and reasoning skills. Pt completed 100% with min\mod cues. Complex problem solving\reasoning with 95% accuracy with occasional cues. No new nursing concerns today.     6/28/2023 Patient presents for fu therapy and general medical care.  Patient is working on strengthening balance and ADLs and continues to work toward goal with assist of therapist.  No concerns verbalized from nursing.  Patient denies constitutional symptoms.       6/29/23 Patient working on therapy. Is working on recumbent bike ans  tolerates up to 15 minutes. Remains NWB to LLE. Denies pain at this time. No acute distress. Denies SOB and orthopnea. No acute distress    6/30/2023 Patient at SNF and working toward goals in therapy.   Patient NWB LLE.  Working on transfer training and balance.  Patient verbalizes no new concerns today.  Had uneventful night.  Denies n/v/f/c.  No new concerns from nursing staff.      7/3/23 Patient working in therapy due to weakness. Continues to be NWB LLE. Patient does not ambulate and is working on recumbent bike.  Getting stronger.   Pain from recent fracture is controlled with current medications. Denies SOB or weight gain.  No acute distress.      Patient presents for general medical care and f/u.  Patient seen and examined at bedside.  No issues per nursing.  Patient has no acute complaints.    Pain from recent fracture is controlled with current medications.   AFIB stable, denies palpitations and chest pain.  GERD controlled.  Denies heartburn, regurgitation, epigastric discomfort, sour taste, and cough.  CHF stable, denies sob, orthopnea, weight gain.  Patient continues to work in therapy.  He is getting stronger and requires assistance for transfers ADLs and mobility.  No acute distress.    7/5/2023 Patient is at [facility] and working in therapy.   Feels therapy is going ok, no obstacles/barriers.  Patient is working on recumbent bike with the right lower extremity while maintaining nonweightbearing to the left.  Patient is feeling ok and has no new concerns today.  Denies constitutional symptoms.  No new concerns from nursing staff.     7/6/23 Patient working in therapy due to weakness. Patient working on strengthening and balance. Remains NWB to LLE. Denies SOB or orthopnea. No acute distress. Pain from recent fracture controlled.      7/7/23   Patient actively participates in therapy.  Working on therapy exercises to improve strength and endurance.  Patient is nonweightbearing to LLE.  Completed core  exercises to improve upright posture.    Completed sit to stand with contact-guard assist.  Able to stand at wheeled walker and maintain nonweightbearing for 1-1/2 to 2 minutes with fair minus balance.  Patient is stable without complaint.  No new concerns reported by nursing.    7/10/2023 Patient continues working with therapy to reach goals. Pt is NWB to LLE with knee immobilizer in place. Patient utilizing sliding board for transfers with assistance for set up and sba during transfer. STS from w\c to grab bar with mod assist. Pt performed static standing balance with CGA, vc as needed to maintain NWB status. Pt attempted toilet transfer but unable to maintain NWB status. Pt continues to require assistance with dressing of lower extremities.     7/11/2023  Therapy continues to work with patient. Patient worked on parallel bars with mod\max assist. Pt completed reciprocal motion x 10 mins. Practiced sliding board transfers. Also practiced ADL's and independence with home activities by retrieving towels from dryer and folding. No new concerns.     7/12/2023 Patient at SNF for therapy and presents for follow-up.  Patient continues working toward goals for strengthening, transfers, and ADLs.  Patient is nonweightbearing to the left lower extremity.  Patient has no new complaints at this time.  Denies n/v/f/c.  No new concerns per nursing.      7/13/23 Patient working in therapy due to weakness and debility. Patient is non weight bearing to LLE. Working on strengthening exercises and safe transfers and ADL retraining. No new issues or concerns at this time. Pain from recent fracture well controlled with current meds. Denies SOB or orthopnea.     7/14/2023 Patient continues working with therapy.  Patient is working on recumbent bike and balance activities.  Has no new concerns today.  Feeling OK.  Denies constitutional symptoms.  No new concerns per nursing.        Objective   Vital signs: 131/75,     Physical  Exam  Constitutional:       General: She is not in acute distress.  Eyes:      Extraocular Movements: Extraocular movements intact.   Cardiovascular:      Rate and Rhythm: Normal rate.   Pulmonary:      Effort: Pulmonary effort is normal.   Musculoskeletal:      Cervical back: Neck supple.      Comments: Generalized weakness  Immobilizer LLE   Skin:     Comments: Dressing left foot   Neurological:      Mental Status: She is alert.   Psychiatric:         Mood and Affect: Mood normal.         Behavior: Behavior is cooperative.         Assessment/Plan   Problem List Items Addressed This Visit       Atrial fibrillation (CMS/HCC)     Apixaban  Amiodarone  Metoprolol  Bleeding precautions           Chronic combined systolic and diastolic congestive heart failure (CMS/HCC)     Stable  Tosemide  Aldactone   BB  Monitor weight         Chronic obstructive pulmonary disease, unspecified COPD type (CMS/HCC)     Stable, no wheezing or shortness of breath  On room air         Closed fracture of proximal end of left tibia with routine healing     Pain meds  Therapy  Follow-up with Ortho         Hypertension, essential     Monitor blood pressure  Continue antihypertensives  Blood pressure at goal         Type 2 diabetes mellitus with hyperglycemia, with long-term current use of insulin (CMS/[facility])     FBG at goal  Continue Lantus metformin and lispro   Glucoscan with sliding scale insulin coverage  Low concentrated sweets diet         Weakness - Primary     Continue working with therapy, actively participating           Medications, treatments, and labs reviewed  Continue medications and treatments as listed in EMR    Yasemin Kelly, APRN-CNP

## 2023-07-14 NOTE — LETTER
Patient: Teresa Matthews  : 1950    Encounter Date: 2023    PROGRESS NOTE    Subjective  Chief complaint: Teresa Matthews is a 72 y.o. female who is an acute skilled patient being seen and evaluated for weakness    HPI:  2023 patient mated to skilled nursing facility for therapy due to weakness after recent hospitalization status post fall. Patient was found to have acute comminuted fractures of proximal tibia and fibular diaphysis. She was admitted to the hospital and seen by Ortho who recommended conservative management. Pain was controlled and patient none weightbearing. She was discharged to skilled nursing facility for therapy and to continue medical management.    2023 Patient continues to work with therapy to reach goals. ST addressing cognition and communication skill, pt with 100% acc min mod verbal cues. PT working on ROM, Posteral exercises to improve posture, transfer training, balance and coordination. Longest stance time 2:15 min with CGA/Min A. No LOB. Pt maintained NWB status on LLE. OT also working on body alignment\posture and increasing strength and activity tolerance. Patient actively participates in therapy. No new concerns today.  Denies n/v/f/c pain.      2023  Patient working with therapy with Recumbent bike x 15 min in order to improve strength and endurance, maintaining NWB status on LLE. Working on transfer training, balance activities & weight shifting. ST working on addressing cognitive communication skills, targeting memory and reasoning skills. Pt completed 100% with min\mod cues. Complex problem solving\reasoning with 95% accuracy with occasional cues. No new nursing concerns today.     2023 Patient presents for fu therapy and general medical care.  Patient is working on strengthening balance and ADLs and continues to work toward goal with assist of therapist.  No concerns verbalized from nursing.  Patient denies constitutional symptoms.        6/29/23 Patient working on therapy. Is working on recumbent bike ans tolerates up to 15 minutes. Remains NWB to LLE. Denies pain at this time. No acute distress. Denies SOB and orthopnea. No acute distress    6/30/2023 Patient at [facility] and working toward goals in therapy.   Patient NWB LLE.  Working on transfer training and balance.  Patient verbalizes no new concerns today.  Had uneventful night.  Denies n/v/f/c.  No new concerns from nursing staff.      7/3/23 Patient working in therapy due to weakness. Continues to be NWB LLE. Patient does not ambulate and is working on recumbent bike.  Getting stronger.   Pain from recent fracture is controlled with current medications. Denies SOB or weight gain.  No acute distress.      Patient presents for general medical care and f/u.  Patient seen and examined at bedside.  No issues per nursing.  Patient has no acute complaints.    Pain from recent fracture is controlled with current medications.   AFIB stable, denies palpitations and chest pain.  GERD controlled.  Denies heartburn, regurgitation, epigastric discomfort, sour taste, and cough.  CHF stable, denies sob, orthopnea, weight gain.  Patient continues to work in therapy.  He is getting stronger and requires assistance for transfers ADLs and mobility.  No acute distress.    7/5/2023 Patient is at [facility] and working in therapy.   Feels therapy is going ok, no obstacles/barriers.  Patient is working on recumbent bike with the right lower extremity while maintaining nonweightbearing to the left.  Patient is feeling ok and has no new concerns today.  Denies constitutional symptoms.  No new concerns from nursing staff.     7/6/23 Patient working in therapy due to weakness. Patient working on strengthening and balance. Remains NWB to LLE. Denies SOB or orthopnea. No acute distress. Pain from recent fracture controlled.      7/7/23   Patient actively participates in therapy.  Working on therapy exercises to improve strength  and endurance.  Patient is nonweightbearing to LLE.  Completed core exercises to improve upright posture.    Completed sit to stand with contact-guard assist.  Able to stand at wheeled walker and maintain nonweightbearing for 1-1/2 to 2 minutes with fair minus balance.  Patient is stable without complaint.  No new concerns reported by nursing.    7/10/2023 Patient continues working with therapy to reach goals. Pt is NWB to LLE with knee immobilizer in place. Patient utilizing sliding board for transfers with assistance for set up and sba during transfer. STS from w\c to grab bar with mod assist. Pt performed static standing balance with CGA, vc as needed to maintain NWB status. Pt attempted toilet transfer but unable to maintain NWB status. Pt continues to require assistance with dressing of lower extremities.     7/11/2023  Therapy continues to work with patient. Patient worked on parallel bars with mod\max assist. Pt completed reciprocal motion x 10 mins. Practiced sliding board transfers. Also practiced ADL's and independence with home activities by retrieving towels from dryer and folding. No new concerns.     7/12/2023 Patient at SNF for therapy and presents for follow-up.  Patient continues working toward goals for strengthening, transfers, and ADLs.  Patient is nonweightbearing to the left lower extremity.  Patient has no new complaints at this time.  Denies n/v/f/c.  No new concerns per nursing.      7/13/23 Patient working in therapy due to weakness and debility. Patient is non weight bearing to LLE. Working on strengthening exercises and safe transfers and ADL retraining. No new issues or concerns at this time. Pain from recent fracture well controlled with current meds. Denies SOB or orthopnea.     7/14/2023 Patient continues working with therapy.  Patient is working on recumbent bike and balance activities.  Has no new concerns today.  Feeling OK.  Denies constitutional symptoms.  No new concerns per  nursing.        Objective  Vital signs: 131/75,     Physical Exam  Constitutional:       General: She is not in acute distress.  Eyes:      Extraocular Movements: Extraocular movements intact.   Cardiovascular:      Rate and Rhythm: Normal rate.   Pulmonary:      Effort: Pulmonary effort is normal.   Musculoskeletal:      Cervical back: Neck supple.      Comments: Generalized weakness  Immobilizer LLE   Skin:     Comments: Dressing left foot   Neurological:      Mental Status: She is alert.   Psychiatric:         Mood and Affect: Mood normal.         Behavior: Behavior is cooperative.         Assessment/Plan  Problem List Items Addressed This Visit       Atrial fibrillation (CMS/HCC)     Apixaban  Amiodarone  Metoprolol  Bleeding precautions           Chronic combined systolic and diastolic congestive heart failure (CMS/HCC)     Stable  Tosemide  Aldactone   BB  Monitor weight         Chronic obstructive pulmonary disease, unspecified COPD type (CMS/HCC)     Stable, no wheezing or shortness of breath  On room air         Closed fracture of proximal end of left tibia with routine healing     Pain meds  Therapy  Follow-up with Ortho         Hypertension, essential     Monitor blood pressure  Continue antihypertensives  Blood pressure at goal         Type 2 diabetes mellitus with hyperglycemia, with long-term current use of insulin (CMS/HCC)     FBG at goal  Continue Lantus metformin and lispro   Glucoscan with sliding scale insulin coverage  Low concentrated sweets diet         Weakness - Primary     Continue working with therapy, actively participating           Medications, treatments, and labs reviewed  Continue medications and treatments as listed in EMR    MARISELA Nelson      Electronically Signed By: MARISELA Nelson   7/16/23  9:21 PM

## 2023-07-17 ENCOUNTER — NURSING HOME VISIT (OUTPATIENT)
Dept: POST ACUTE CARE | Facility: EXTERNAL LOCATION | Age: 73
End: 2023-07-17
Payer: MEDICARE

## 2023-07-17 DIAGNOSIS — I50.42 CHRONIC COMBINED SYSTOLIC AND DIASTOLIC CONGESTIVE HEART FAILURE (MULTI): ICD-10-CM

## 2023-07-17 DIAGNOSIS — R53.1 WEAKNESS: ICD-10-CM

## 2023-07-17 PROCEDURE — 99308 SBSQ NF CARE LOW MDM 20: CPT | Performed by: INTERNAL MEDICINE

## 2023-07-17 NOTE — ASSESSMENT & PLAN NOTE
Continue working with therapy, actively participating    [place] [place]: [id number] - PA Case ID: [id number] - Rx #: [id number]  Sent to plan Today    Lisdexamfetamine 40mg #30/30   Bipolar affective disorder, currently manic, moderate (CMS/HCC) +1 more     PA pending for approval

## 2023-07-17 NOTE — ASSESSMENT & PLAN NOTE
FBG at goal  Continue Lantus metformin and lispro   Glucoscan with sliding scale insulin coverage  Low concentrated sweets diet

## 2023-07-17 NOTE — LETTER
Patient: Teresa Matthews  : 1950    Encounter Date: 2023    PROGRESS NOTE    Subjective  Chief complaint: Teresa Matthews is a 72 y.o. female who is an acute skilled patient being seen and evaluated for weakness    HPI:  2023 patient mated to skilled nursing facility for therapy due to weakness after recent hospitalization status post fall. Patient was found to have acute comminuted fractures of proximal tibia and fibular diaphysis. She was admitted to the hospital and seen by Ortho who recommended conservative management. Pain was controlled and patient none weightbearing. She was discharged to skilled nursing facility for therapy and to continue medical management.    2023 Patient continues to work with therapy to reach goals. ST addressing cognition and communication skill, pt with 100% acc min mod verbal cues. PT working on ROM, Posteral exercises to improve posture, transfer training, balance and coordination. Longest stance time 2:15 min with CGA/Min A. No LOB. Pt maintained NWB status on LLE. OT also working on body alignment\posture and increasing strength and activity tolerance. Patient actively participates in therapy. No new concerns today.  Denies n/v/f/c pain.      2023  Patient working with therapy with Recumbent bike x 15 min in order to improve strength and endurance, maintaining NWB status on LLE. Working on transfer training, balance activities & weight shifting. ST working on addressing cognitive communication skills, targeting memory and reasoning skills. Pt completed 100% with min\mod cues. Complex problem solving\reasoning with 95% accuracy with occasional cues. No new nursing concerns today.     2023 Patient presents for fu therapy and general medical care.  Patient is working on strengthening balance and ADLs and continues to work toward goal with assist of therapist.  No concerns verbalized from nursing.  Patient denies constitutional symptoms.        6/29/23 Patient working on therapy. Is working on recumbent bike ans tolerates up to 15 minutes. Remains NWB to LLE. Denies pain at this time. No acute distress. Denies SOB and orthopnea. No acute distress    6/30/2023 Patient at [facility] and working toward goals in therapy.   Patient NWB LLE.  Working on transfer training and balance.  Patient verbalizes no new concerns today.  Had uneventful night.  Denies n/v/f/c.  No new concerns from nursing staff.      7/3/23 Patient working in therapy due to weakness. Continues to be NWB LLE. Patient does not ambulate and is working on recumbent bike.  Getting stronger.   Pain from recent fracture is controlled with current medications. Denies SOB or weight gain.  No acute distress.      Patient presents for general medical care and f/u.  Patient seen and examined at bedside.  No issues per nursing.  Patient has no acute complaints.    Pain from recent fracture is controlled with current medications.   AFIB stable, denies palpitations and chest pain.  GERD controlled.  Denies heartburn, regurgitation, epigastric discomfort, sour taste, and cough.  CHF stable, denies sob, orthopnea, weight gain.  Patient continues to work in therapy.  He is getting stronger and requires assistance for transfers ADLs and mobility.  No acute distress.    7/5/2023 Patient is at [facility] and working in therapy.   Feels therapy is going ok, no obstacles/barriers.  Patient is working on recumbent bike with the right lower extremity while maintaining nonweightbearing to the left.  Patient is feeling ok and has no new concerns today.  Denies constitutional symptoms.  No new concerns from nursing staff.     7/6/23 Patient working in therapy due to weakness. Patient working on strengthening and balance. Remains NWB to LLE. Denies SOB or orthopnea. No acute distress. Pain from recent fracture controlled.      7/7/23   Patient actively participates in therapy.  Working on therapy exercises to improve strength  and endurance.  Patient is nonweightbearing to LLE.  Completed core exercises to improve upright posture.    Completed sit to stand with contact-guard assist.  Able to stand at wheeled walker and maintain nonweightbearing for 1-1/2 to 2 minutes with fair minus balance.  Patient is stable without complaint.  No new concerns reported by nursing.    7/10/2023 Patient continues working with therapy to reach goals. Pt is NWB to LLE with knee immobilizer in place. Patient utilizing sliding board for transfers with assistance for set up and sba during transfer. STS from w\c to grab bar with mod assist. Pt performed static standing balance with CGA, vc as needed to maintain NWB status. Pt attempted toilet transfer but unable to maintain NWB status. Pt continues to require assistance with dressing of lower extremities.     7/11/2023  Therapy continues to work with patient. Patient worked on parallel bars with mod\max assist. Pt completed reciprocal motion x 10 mins. Practiced sliding board transfers. Also practiced ADL's and independence with home activities by retrieving towels from dryer and folding. No new concerns.     7/12/2023 Patient at SNF for therapy and presents for follow-up.  Patient continues working toward goals for strengthening, transfers, and ADLs.  Patient is nonweightbearing to the left lower extremity.  Patient has no new complaints at this time.  Denies n/v/f/c.  No new concerns per nursing.      7/13/23 Patient working in therapy due to weakness and debility. Patient is non weight bearing to LLE. Working on strengthening exercises and safe transfers and ADL retraining. No new issues or concerns at this time. Pain from recent fracture well controlled with current meds. Denies SOB or orthopnea.     7/14/2023 Patient continues working with therapy.  Patient is working on recumbent bike and balance activities.  Has no new concerns today.  Feeling OK.  Denies constitutional symptoms.  No new concerns per  nursing.      7/17/23 Patient working in therapy due to weakness. She is NWB to [facility] and requires assistance for transfers. Denies SOB or orthopnea. No acute distress.       Objective  Vital signs: 128/74, 98%    Physical Exam  Constitutional:       General: She is not in acute distress.  Eyes:      Extraocular Movements: Extraocular movements intact.   Cardiovascular:      Rate and Rhythm: Normal rate.   Pulmonary:      Effort: Pulmonary effort is normal.   Musculoskeletal:      Cervical back: Neck supple.      Comments: Generalized weakness  Immobilizer LLE   Skin:     Comments: Dressing left foot   Neurological:      Mental Status: She is alert.   Psychiatric:         Mood and Affect: Mood normal.         Behavior: Behavior is cooperative.         Assessment/Plan  Problem List Items Addressed This Visit          Cardiac and Vasculature    Chronic combined systolic and diastolic congestive heart failure (CMS/HCC)     Stable  Tosemide  Aldactone   BB  Monitor weight            Symptoms and Signs    Weakness     Continue working with therapy, actively participating         Medications, treatments, and labs reviewed  Continue medications and treatments as listed in EMR    Pita Virgen MD  Scribe Attestation  IMonica Scribe   attest that this documentation has been prepared under the direction and in the presence of Pita Virgen MD.     Provider Attestation - Scribe documentation  All medical record entries made by the Scribe were at my direction and personally dictated by me. I have reviewed the chart and agree that the record accurately reflects my personal performance of the history, physical exam, discussion and plan.       Electronically Signed By: Pita Virgen MD   7/18/23  3:46 PM

## 2023-07-18 ENCOUNTER — NURSING HOME VISIT (OUTPATIENT)
Dept: POST ACUTE CARE | Facility: EXTERNAL LOCATION | Age: 73
End: 2023-07-18
Payer: MEDICARE

## 2023-07-18 DIAGNOSIS — R53.1 WEAKNESS: ICD-10-CM

## 2023-07-18 DIAGNOSIS — S82.102D CLOSED FRACTURE OF PROXIMAL END OF LEFT TIBIA WITH ROUTINE HEALING, UNSPECIFIED FRACTURE MORPHOLOGY, SUBSEQUENT ENCOUNTER: ICD-10-CM

## 2023-07-18 DIAGNOSIS — I50.42 CHRONIC COMBINED SYSTOLIC AND DIASTOLIC CONGESTIVE HEART FAILURE (MULTI): ICD-10-CM

## 2023-07-18 PROCEDURE — 99308 SBSQ NF CARE LOW MDM 20: CPT | Performed by: INTERNAL MEDICINE

## 2023-07-18 NOTE — ASSESSMENT & PLAN NOTE
Continue working with therapy, actively participating   
Stable  Tosemide  Aldactone   BB  Monitor weight  
Yes

## 2023-07-18 NOTE — PROGRESS NOTES
PROGRESS NOTE    Subjective   Chief complaint: Teresa Matthews is a 72 y.o. female who is an acute skilled patient being seen and evaluated for weakness    HPI:  6/21/2023 patient mated to skilled nursing facility for therapy due to weakness after recent hospitalization status post fall. Patient was found to have acute comminuted fractures of proximal tibia and fibular diaphysis. She was admitted to the hospital and seen by Ortho who recommended conservative management. Pain was controlled and patient none weightbearing. She was discharged to skilled nursing facility for therapy and to continue medical management.    6/26/2023 Patient continues to work with therapy to reach goals. ST addressing cognition and communication skill, pt with 100% acc min mod verbal cues. PT working on ROM, Posteral exercises to improve posture, transfer training, balance and coordination. Longest stance time 2:15 min with CGA/Min A. No LOB. Pt maintained NWB status on LLE. OT also working on body alignment\posture and increasing strength and activity tolerance. Patient actively participates in therapy. No new concerns today.  Denies n/v/f/c pain.      6/27/2023  Patient working with therapy with Recumbent bike x 15 min in order to improve strength and endurance, maintaining NWB status on LLE. Working on transfer training, balance activities & weight shifting. ST working on addressing cognitive communication skills, targeting memory and reasoning skills. Pt completed 100% with min\mod cues. Complex problem solving\reasoning with 95% accuracy with occasional cues. No new nursing concerns today.     6/28/2023 Patient presents for fu therapy and general medical care.  Patient is working on strengthening balance and ADLs and continues to work toward goal with assist of therapist.  No concerns verbalized from nursing.  Patient denies constitutional symptoms.       6/29/23 Patient working on therapy. Is working on recumbent bike ans  tolerates up to 15 minutes. Remains NWB to LLE. Denies pain at this time. No acute distress. Denies SOB and orthopnea. No acute distress    6/30/2023 Patient at SNF and working toward goals in therapy.   Patient NWB LLE.  Working on transfer training and balance.  Patient verbalizes no new concerns today.  Had uneventful night.  Denies n/v/f/c.  No new concerns from nursing staff.      7/3/23 Patient working in therapy due to weakness. Continues to be NWB LLE. Patient does not ambulate and is working on recumbent bike.  Getting stronger.   Pain from recent fracture is controlled with current medications. Denies SOB or weight gain.  No acute distress.      Patient presents for general medical care and f/u.  Patient seen and examined at bedside.  No issues per nursing.  Patient has no acute complaints.    Pain from recent fracture is controlled with current medications.   AFIB stable, denies palpitations and chest pain.  GERD controlled.  Denies heartburn, regurgitation, epigastric discomfort, sour taste, and cough.  CHF stable, denies sob, orthopnea, weight gain.  Patient continues to work in therapy.  He is getting stronger and requires assistance for transfers ADLs and mobility.  No acute distress.    7/5/2023 Patient is at [facility] and working in therapy.   Feels therapy is going ok, no obstacles/barriers.  Patient is working on recumbent bike with the right lower extremity while maintaining nonweightbearing to the left.  Patient is feeling ok and has no new concerns today.  Denies constitutional symptoms.  No new concerns from nursing staff.     7/6/23 Patient working in therapy due to weakness. Patient working on strengthening and balance. Remains NWB to LLE. Denies SOB or orthopnea. No acute distress. Pain from recent fracture controlled.      7/7/23   Patient actively participates in therapy.  Working on therapy exercises to improve strength and endurance.  Patient is nonweightbearing to LLE.  Completed core  exercises to improve upright posture.    Completed sit to stand with contact-guard assist.  Able to stand at wheeled walker and maintain nonweightbearing for 1-1/2 to 2 minutes with fair minus balance.  Patient is stable without complaint.  No new concerns reported by nursing.    7/10/2023 Patient continues working with therapy to reach goals. Pt is NWB to LLE with knee immobilizer in place. Patient utilizing sliding board for transfers with assistance for set up and sba during transfer. STS from w\c to grab bar with mod assist. Pt performed static standing balance with CGA, vc as needed to maintain NWB status. Pt attempted toilet transfer but unable to maintain NWB status. Pt continues to require assistance with dressing of lower extremities.     7/11/2023  Therapy continues to work with patient. Patient worked on parallel bars with mod\max assist. Pt completed reciprocal motion x 10 mins. Practiced sliding board transfers. Also practiced ADL's and independence with home activities by retrieving towels from dryer and folding. No new concerns.     7/12/2023 Patient at SNF for therapy and presents for follow-up.  Patient continues working toward goals for strengthening, transfers, and ADLs.  Patient is nonweightbearing to the left lower extremity.  Patient has no new complaints at this time.  Denies n/v/f/c.  No new concerns per nursing.      7/13/23 Patient working in therapy due to weakness and debility. Patient is non weight bearing to LLE. Working on strengthening exercises and safe transfers and ADL retraining. No new issues or concerns at this time. Pain from recent fracture well controlled with current meds. Denies SOB or orthopnea.     7/14/2023 Patient continues working with therapy.  Patient is working on recumbent bike and balance activities.  Has no new concerns today.  Feeling OK.  Denies constitutional symptoms.  No new concerns per nursing.      7/17/23 Patient working in therapy due to weakness. She  is NWB to E and requires assistance for transfers. Denies SOB or orthopnea. No acute distress.       Objective   Vital signs: 128/74, 98%    Physical Exam  Constitutional:       General: She is not in acute distress.  Eyes:      Extraocular Movements: Extraocular movements intact.   Cardiovascular:      Rate and Rhythm: Normal rate.   Pulmonary:      Effort: Pulmonary effort is normal.   Musculoskeletal:      Cervical back: Neck supple.      Comments: Generalized weakness  Immobilizer LLE   Skin:     Comments: Dressing left foot   Neurological:      Mental Status: She is alert.   Psychiatric:         Mood and Affect: Mood normal.         Behavior: Behavior is cooperative.         Assessment/Plan   Problem List Items Addressed This Visit          Cardiac and Vasculature    Chronic combined systolic and diastolic congestive heart failure (CMS/HCC)     Stable  Tosemide  Aldactone   BB  Monitor weight            Symptoms and Signs    Weakness     Continue working with therapy, actively participating         Medications, treatments, and labs reviewed  Continue medications and treatments as listed in EMR    Pita Virgen MD  Scribe Attestation  I, Donato De Oliveiraibe   attest that this documentation has been prepared under the direction and in the presence of Pita Virgen MD.     Provider Attestation - Scribe documentation  All medical record entries made by the Scribe were at my direction and personally dictated by me. I have reviewed the chart and agree that the record accurately reflects my personal performance of the history, physical exam, discussion and plan.

## 2023-07-18 NOTE — LETTER
Patient: Teresa Matthews  : 1950    Encounter Date: 2023    PROGRESS NOTE    Subjective  Chief complaint: Teresa Matthews is a 72 y.o. female who is an acute skilled patient being seen and evaluated for weakness    HPI:  2023 patient mated to skilled nursing facility for therapy due to weakness after recent hospitalization status post fall. Patient was found to have acute comminuted fractures of proximal tibia and fibular diaphysis. She was admitted to the hospital and seen by Ortho who recommended conservative management. Pain was controlled and patient none weightbearing. She was discharged to skilled nursing facility for therapy and to continue medical management.    2023 Patient continues to work with therapy to reach goals. ST addressing cognition and communication skill, pt with 100% acc min mod verbal cues. PT working on ROM, Posteral exercises to improve posture, transfer training, balance and coordination. Longest stance time 2:15 min with CGA/Min A. No LOB. Pt maintained NWB status on LLE. OT also working on body alignment\posture and increasing strength and activity tolerance. Patient actively participates in therapy. No new concerns today.  Denies n/v/f/c pain.      2023  Patient working with therapy with Recumbent bike x 15 min in order to improve strength and endurance, maintaining NWB status on LLE. Working on transfer training, balance activities & weight shifting. ST working on addressing cognitive communication skills, targeting memory and reasoning skills. Pt completed 100% with min\mod cues. Complex problem solving\reasoning with 95% accuracy with occasional cues. No new nursing concerns today.     2023 Patient presents for fu therapy and general medical care.  Patient is working on strengthening balance and ADLs and continues to work toward goal with assist of therapist.  No concerns verbalized from nursing.  Patient denies constitutional symptoms.        6/29/23 Patient working on therapy. Is working on recumbent bike ans tolerates up to 15 minutes. Remains NWB to LLE. Denies pain at this time. No acute distress. Denies SOB and orthopnea. No acute distress    6/30/2023 Patient at [facility] and working toward goals in therapy.   Patient NWB LLE.  Working on transfer training and balance.  Patient verbalizes no new concerns today.  Had uneventful night.  Denies n/v/f/c.  No new concerns from nursing staff.      7/3/23 Patient working in therapy due to weakness. Continues to be NWB LLE. Patient does not ambulate and is working on recumbent bike.  Getting stronger.   Pain from recent fracture is controlled with current medications. Denies SOB or weight gain.  No acute distress.      Patient presents for general medical care and f/u.  Patient seen and examined at bedside.  No issues per nursing.  Patient has no acute complaints.    Pain from recent fracture is controlled with current medications.   AFIB stable, denies palpitations and chest pain.  GERD controlled.  Denies heartburn, regurgitation, epigastric discomfort, sour taste, and cough.  CHF stable, denies sob, orthopnea, weight gain.  Patient continues to work in therapy.  He is getting stronger and requires assistance for transfers ADLs and mobility.  No acute distress.    7/5/2023 Patient is at [facility] and working in therapy.   Feels therapy is going ok, no obstacles/barriers.  Patient is working on recumbent bike with the right lower extremity while maintaining nonweightbearing to the left.  Patient is feeling ok and has no new concerns today.  Denies constitutional symptoms.  No new concerns from nursing staff.     7/6/23 Patient working in therapy due to weakness. Patient working on strengthening and balance. Remains NWB to LLE. Denies SOB or orthopnea. No acute distress. Pain from recent fracture controlled.      7/7/23   Patient actively participates in therapy.  Working on therapy exercises to improve strength  and endurance.  Patient is nonweightbearing to LLE.  Completed core exercises to improve upright posture.    Completed sit to stand with contact-guard assist.  Able to stand at wheeled walker and maintain nonweightbearing for 1-1/2 to 2 minutes with fair minus balance.  Patient is stable without complaint.  No new concerns reported by nursing.    7/10/2023 Patient continues working with therapy to reach goals. Pt is NWB to LLE with knee immobilizer in place. Patient utilizing sliding board for transfers with assistance for set up and sba during transfer. STS from w\c to grab bar with mod assist. Pt performed static standing balance with CGA, vc as needed to maintain NWB status. Pt attempted toilet transfer but unable to maintain NWB status. Pt continues to require assistance with dressing of lower extremities.     7/11/2023  Therapy continues to work with patient. Patient worked on parallel bars with mod\max assist. Pt completed reciprocal motion x 10 mins. Practiced sliding board transfers. Also practiced ADL's and independence with home activities by retrieving towels from dryer and folding. No new concerns.     7/12/2023 Patient at SNF for therapy and presents for follow-up.  Patient continues working toward goals for strengthening, transfers, and ADLs.  Patient is nonweightbearing to the left lower extremity.  Patient has no new complaints at this time.  Denies n/v/f/c.  No new concerns per nursing.      7/13/23 Patient working in therapy due to weakness and debility. Patient is non weight bearing to LLE. Working on strengthening exercises and safe transfers and ADL retraining. No new issues or concerns at this time. Pain from recent fracture well controlled with current meds. Denies SOB or orthopnea.     7/14/2023 Patient continues working with therapy.  Patient is working on recumbent bike and balance activities.  Has no new concerns today.  Feeling OK.  Denies constitutional symptoms.  No new concerns per  nursing.      7/17/23 Patient working in therapy due to weakness. She is NWB to LLE and requires assistance for transfers. Denies SOB or orthopnea. No acute distress.     7/18/23 Patient working in therapy due to weakness and debility. Requires assistance for transfers and ADL's. Denies SOB or orthopnea. Pain controlled      Objective  Vital signs: 128/74, 98%    Physical Exam  Constitutional:       General: She is not in acute distress.  Eyes:      Extraocular Movements: Extraocular movements intact.   Cardiovascular:      Rate and Rhythm: Normal rate.   Pulmonary:      Effort: Pulmonary effort is normal.   Musculoskeletal:      Cervical back: Neck supple.      Comments: Generalized weakness  Immobilizer LLE   Skin:     Comments: Dressing left foot   Neurological:      Mental Status: She is alert.   Psychiatric:         Mood and Affect: Mood normal.         Behavior: Behavior is cooperative.         Assessment/Plan  Problem List Items Addressed This Visit          Cardiac and Vasculature    Chronic combined systolic and diastolic congestive heart failure (CMS/HCC)     Stable  Tosemide  Aldactone   BB  Monitor weight            Musculoskeletal and Injuries    Closed fracture of proximal end of left tibia with routine healing     Pain meds  Therapy  Follow-up with Ortho            Symptoms and Signs    Weakness     Continue working with therapy, actively participating         Medications, treatments, and labs reviewed  Continue medications and treatments as listed in EMR    Pita Virgen MD  Scribe Attestation  I, Kirill De Oliveira   attest that this documentation has been prepared under the direction and in the presence of Pita Virgen MD.     Provider Attestation - Scribe documentation  All medical record entries made by the Scribe were at my direction and personally dictated by me. I have reviewed the chart and agree that the record accurately reflects my personal performance of the history, physical  exam, discussion and plan.       Electronically Signed By: Pita Virgen MD   7/18/23  5:48 PM

## 2023-07-18 NOTE — PROGRESS NOTES
PROGRESS NOTE    Subjective   Chief complaint: Teresa Matthews is a 72 y.o. female who is an acute skilled patient being seen and evaluated for weakness    HPI:  6/21/2023 patient mated to skilled nursing facility for therapy due to weakness after recent hospitalization status post fall. Patient was found to have acute comminuted fractures of proximal tibia and fibular diaphysis. She was admitted to the hospital and seen by Ortho who recommended conservative management. Pain was controlled and patient none weightbearing. She was discharged to skilled nursing facility for therapy and to continue medical management.    6/26/2023 Patient continues to work with therapy to reach goals. ST addressing cognition and communication skill, pt with 100% acc min mod verbal cues. PT working on ROM, Posteral exercises to improve posture, transfer training, balance and coordination. Longest stance time 2:15 min with CGA/Min A. No LOB. Pt maintained NWB status on LLE. OT also working on body alignment\posture and increasing strength and activity tolerance. Patient actively participates in therapy. No new concerns today.  Denies n/v/f/c pain.      6/27/2023  Patient working with therapy with Recumbent bike x 15 min in order to improve strength and endurance, maintaining NWB status on LLE. Working on transfer training, balance activities & weight shifting. ST working on addressing cognitive communication skills, targeting memory and reasoning skills. Pt completed 100% with min\mod cues. Complex problem solving\reasoning with 95% accuracy with occasional cues. No new nursing concerns today.     6/28/2023 Patient presents for fu therapy and general medical care.  Patient is working on strengthening balance and ADLs and continues to work toward goal with assist of therapist.  No concerns verbalized from nursing.  Patient denies constitutional symptoms.       6/29/23 Patient working on therapy. Is working on recumbent bike ans  tolerates up to 15 minutes. Remains NWB to LLE. Denies pain at this time. No acute distress. Denies SOB and orthopnea. No acute distress    6/30/2023 Patient at SNF and working toward goals in therapy.   Patient NWB LLE.  Working on transfer training and balance.  Patient verbalizes no new concerns today.  Had uneventful night.  Denies n/v/f/c.  No new concerns from nursing staff.      7/3/23 Patient working in therapy due to weakness. Continues to be NWB LLE. Patient does not ambulate and is working on recumbent bike.  Getting stronger.   Pain from recent fracture is controlled with current medications. Denies SOB or weight gain.  No acute distress.      Patient presents for general medical care and f/u.  Patient seen and examined at bedside.  No issues per nursing.  Patient has no acute complaints.    Pain from recent fracture is controlled with current medications.   AFIB stable, denies palpitations and chest pain.  GERD controlled.  Denies heartburn, regurgitation, epigastric discomfort, sour taste, and cough.  CHF stable, denies sob, orthopnea, weight gain.  Patient continues to work in therapy.  He is getting stronger and requires assistance for transfers ADLs and mobility.  No acute distress.    7/5/2023 Patient is at [facility] and working in therapy.   Feels therapy is going ok, no obstacles/barriers.  Patient is working on recumbent bike with the right lower extremity while maintaining nonweightbearing to the left.  Patient is feeling ok and has no new concerns today.  Denies constitutional symptoms.  No new concerns from nursing staff.     7/6/23 Patient working in therapy due to weakness. Patient working on strengthening and balance. Remains NWB to LLE. Denies SOB or orthopnea. No acute distress. Pain from recent fracture controlled.      7/7/23   Patient actively participates in therapy.  Working on therapy exercises to improve strength and endurance.  Patient is nonweightbearing to LLE.  Completed core  exercises to improve upright posture.    Completed sit to stand with contact-guard assist.  Able to stand at wheeled walker and maintain nonweightbearing for 1-1/2 to 2 minutes with fair minus balance.  Patient is stable without complaint.  No new concerns reported by nursing.    7/10/2023 Patient continues working with therapy to reach goals. Pt is NWB to LLE with knee immobilizer in place. Patient utilizing sliding board for transfers with assistance for set up and sba during transfer. STS from w\c to grab bar with mod assist. Pt performed static standing balance with CGA, vc as needed to maintain NWB status. Pt attempted toilet transfer but unable to maintain NWB status. Pt continues to require assistance with dressing of lower extremities.     7/11/2023  Therapy continues to work with patient. Patient worked on parallel bars with mod\max assist. Pt completed reciprocal motion x 10 mins. Practiced sliding board transfers. Also practiced ADL's and independence with home activities by retrieving towels from dryer and folding. No new concerns.     7/12/2023 Patient at SNF for therapy and presents for follow-up.  Patient continues working toward goals for strengthening, transfers, and ADLs.  Patient is nonweightbearing to the left lower extremity.  Patient has no new complaints at this time.  Denies n/v/f/c.  No new concerns per nursing.      7/13/23 Patient working in therapy due to weakness and debility. Patient is non weight bearing to LLE. Working on strengthening exercises and safe transfers and ADL retraining. No new issues or concerns at this time. Pain from recent fracture well controlled with current meds. Denies SOB or orthopnea.     7/14/2023 Patient continues working with therapy.  Patient is working on recumbent bike and balance activities.  Has no new concerns today.  Feeling OK.  Denies constitutional symptoms.  No new concerns per nursing.      7/17/23 Patient working in therapy due to weakness. She  is NWB to LLE and requires assistance for transfers. Denies SOB or orthopnea. No acute distress.     7/18/23 Patient working in therapy due to weakness and debility. Requires assistance for transfers and ADL's. Denies SOB or orthopnea. Pain controlled      Objective   Vital signs: 128/74, 98%    Physical Exam  Constitutional:       General: She is not in acute distress.  Eyes:      Extraocular Movements: Extraocular movements intact.   Cardiovascular:      Rate and Rhythm: Normal rate.   Pulmonary:      Effort: Pulmonary effort is normal.   Musculoskeletal:      Cervical back: Neck supple.      Comments: Generalized weakness  Immobilizer LLE   Skin:     Comments: Dressing left foot   Neurological:      Mental Status: She is alert.   Psychiatric:         Mood and Affect: Mood normal.         Behavior: Behavior is cooperative.         Assessment/Plan   Problem List Items Addressed This Visit          Cardiac and Vasculature    Chronic combined systolic and diastolic congestive heart failure (CMS/HCC)     Stable  Tosemide  Aldactone   BB  Monitor weight            Musculoskeletal and Injuries    Closed fracture of proximal end of left tibia with routine healing     Pain meds  Therapy  Follow-up with Ortho            Symptoms and Signs    Weakness     Continue working with therapy, actively participating         Medications, treatments, and labs reviewed  Continue medications and treatments as listed in EMR    Pita Virgen MD  Scribe Attestation  I, Kirill De Oliveira   attest that this documentation has been prepared under the direction and in the presence of Pita Virgen MD.     Provider Attestation - Scribe documentation  All medical record entries made by the Scribe were at my direction and personally dictated by me. I have reviewed the chart and agree that the record accurately reflects my personal performance of the history, physical exam, discussion and plan.

## 2023-07-19 ENCOUNTER — NURSING HOME VISIT (OUTPATIENT)
Dept: POST ACUTE CARE | Facility: EXTERNAL LOCATION | Age: 73
End: 2023-07-19
Payer: MEDICARE

## 2023-07-19 DIAGNOSIS — I10 HYPERTENSION, ESSENTIAL: ICD-10-CM

## 2023-07-19 DIAGNOSIS — Z79.4 TYPE 2 DIABETES MELLITUS WITH HYPERGLYCEMIA, WITH LONG-TERM CURRENT USE OF INSULIN (MULTI): ICD-10-CM

## 2023-07-19 DIAGNOSIS — R53.1 WEAKNESS: Primary | ICD-10-CM

## 2023-07-19 DIAGNOSIS — S82.102D CLOSED FRACTURE OF PROXIMAL END OF LEFT TIBIA WITH ROUTINE HEALING, UNSPECIFIED FRACTURE MORPHOLOGY, SUBSEQUENT ENCOUNTER: ICD-10-CM

## 2023-07-19 DIAGNOSIS — J44.9 CHRONIC OBSTRUCTIVE PULMONARY DISEASE, UNSPECIFIED COPD TYPE (MULTI): ICD-10-CM

## 2023-07-19 DIAGNOSIS — I48.91 ATRIAL FIBRILLATION, UNSPECIFIED TYPE (MULTI): ICD-10-CM

## 2023-07-19 DIAGNOSIS — E11.65 TYPE 2 DIABETES MELLITUS WITH HYPERGLYCEMIA, WITH LONG-TERM CURRENT USE OF INSULIN (MULTI): ICD-10-CM

## 2023-07-19 DIAGNOSIS — I50.42 CHRONIC COMBINED SYSTOLIC AND DIASTOLIC CONGESTIVE HEART FAILURE (MULTI): ICD-10-CM

## 2023-07-19 PROCEDURE — 99309 SBSQ NF CARE MODERATE MDM 30: CPT | Performed by: NURSE PRACTITIONER

## 2023-07-19 NOTE — PROGRESS NOTES
PROGRESS NOTE    Subjective   Chief complaint: Teresa Matthews is a 72 y.o. female who is a acute skilled care patient being seen and evaluated for weakness.    HPI:  6/21/2023 patient mated to skilled nursing facility for therapy due to weakness after recent hospitalization status post fall. Patient was found to have acute comminuted fractures of proximal tibia and fibular diaphysis. She was admitted to the hospital and seen by Ortho who recommended conservative management. Pain was controlled and patient none weightbearing. She was discharged to skilled nursing facility for therapy and to continue medical management.    6/26/2023 Patient continues to work with therapy to reach goals. ST addressing cognition and communication skill, pt with 100% acc min mod verbal cues. PT working on ROM, Posteral exercises to improve posture, transfer training, balance and coordination. Longest stance time 2:15 min with CGA/Min A. No LOB. Pt maintained NWB status on LLE. OT also working on body alignment\posture and increasing strength and activity tolerance. Patient actively participates in therapy. No new concerns today.  Denies n/v/f/c pain.      6/27/2023  Patient working with therapy with Recumbent bike x 15 min in order to improve strength and endurance, maintaining NWB status on LLE. Working on transfer training, balance activities & weight shifting. ST working on addressing cognitive communication skills, targeting memory and reasoning skills. Pt completed 100% with min\mod cues. Complex problem solving\reasoning with 95% accuracy with occasional cues. No new nursing concerns today.     6/28/2023 Patient presents for fu therapy and general medical care.  Patient is working on strengthening balance and ADLs and continues to work toward goal with assist of therapist.  No concerns verbalized from nursing.  Patient denies constitutional symptoms.       6/29/23 Patient working on therapy. Is working on recumbent bike ans  tolerates up to 15 minutes. Remains NWB to LLE. Denies pain at this time. No acute distress. Denies SOB and orthopnea. No acute distress    6/30/2023 Patient at SNF and working toward goals in therapy.   Patient NWB LLE.  Working on transfer training and balance.  Patient verbalizes no new concerns today.  Had uneventful night.  Denies n/v/f/c.  No new concerns from nursing staff.      7/3/23 Patient working in therapy due to weakness. Continues to be NWB LLE. Patient does not ambulate and is working on recumbent bike.  Getting stronger.   Pain from recent fracture is controlled with current medications. Denies SOB or weight gain.  No acute distress.      Patient presents for general medical care and f/u.  Patient seen and examined at bedside.  No issues per nursing.  Patient has no acute complaints.    Pain from recent fracture is controlled with current medications.   AFIB stable, denies palpitations and chest pain.  GERD controlled.  Denies heartburn, regurgitation, epigastric discomfort, sour taste, and cough.  CHF stable, denies sob, orthopnea, weight gain.  Patient continues to work in therapy.  He is getting stronger and requires assistance for transfers ADLs and mobility.  No acute distress.    7/5/2023 Patient is at [facility] and working in therapy.   Feels therapy is going ok, no obstacles/barriers.  Patient is working on recumbent bike with the right lower extremity while maintaining nonweightbearing to the left.  Patient is feeling ok and has no new concerns today.  Denies constitutional symptoms.  No new concerns from nursing staff.     7/6/23 Patient working in therapy due to weakness. Patient working on strengthening and balance. Remains NWB to LLE. Denies SOB or orthopnea. No acute distress. Pain from recent fracture controlled.      7/7/23   Patient actively participates in therapy.  Working on therapy exercises to improve strength and endurance.  Patient is nonweightbearing to LLE.  Completed core  exercises to improve upright posture.    Completed sit to stand with contact-guard assist.  Able to stand at wheeled walker and maintain nonweightbearing for 1-1/2 to 2 minutes with fair minus balance.  Patient is stable without complaint.  No new concerns reported by nursing.    7/10/2023 Patient continues working with therapy to reach goals. Pt is NWB to LLE with knee immobilizer in place. Patient utilizing sliding board for transfers with assistance for set up and sba during transfer. STS from w\c to grab bar with mod assist. Pt performed static standing balance with CGA, vc as needed to maintain NWB status. Pt attempted toilet transfer but unable to maintain NWB status. Pt continues to require assistance with dressing of lower extremities.     7/11/2023  Therapy continues to work with patient. Patient worked on parallel bars with mod\max assist. Pt completed reciprocal motion x 10 mins. Practiced sliding board transfers. Also practiced ADL's and independence with home activities by retrieving towels from dryer and folding. No new concerns.     7/12/2023 Patient at SNF for therapy and presents for follow-up.  Patient continues working toward goals for strengthening, transfers, and ADLs.  Patient is nonweightbearing to the left lower extremity.  Patient has no new complaints at this time.  Denies n/v/f/c.  No new concerns per nursing.      7/13/23 Patient working in therapy due to weakness and debility. Patient is non weight bearing to LLE. Working on strengthening exercises and safe transfers and ADL retraining. No new issues or concerns at this time. Pain from recent fracture well controlled with current meds. Denies SOB or orthopnea.     7/14/2023 Patient continues working with therapy.  Patient is working on recumbent bike and balance activities.  Has no new concerns today.  Feeling OK.  Denies constitutional symptoms.  No new concerns per nursing.      7/17/23 Patient working in therapy due to weakness. She  is NWB to LLE and requires assistance for transfers. Denies SOB or orthopnea. No acute distress.     7/18/23 Patient working in therapy due to weakness and debility. Requires assistance for transfers and ADL's. Denies SOB or orthopnea. Pain controlled    7/19/23   Patient is stable without complaint.  Denies n/v/f/c.  Continues to work towards goals in therapy.  No new concerns reported by nursing.      Objective   Vital signs:   116/72, 97.1, 72, 18, 98%,   Physical Exam  Constitutional:       General: She is not in acute distress.  Eyes:      Extraocular Movements: Extraocular movements intact.   Cardiovascular:      Rate and Rhythm: Normal rate.   Pulmonary:      Effort: Pulmonary effort is normal.   Musculoskeletal:      Cervical back: Neck supple.      Right lower leg: Edema present.      Left lower leg: Edema present.      Comments: Generalized weakness     Skin:     Comments: Dressing left foot   Neurological:      Mental Status: She is alert.   Psychiatric:         Mood and Affect: Mood normal.         Behavior: Behavior is cooperative.         Assessment/Plan   Problem List Items Addressed This Visit       Atrial fibrillation (CMS/HCC)     Apixaban  Amiodarone  Metoprolol  Bleeding precautions           Chronic combined systolic and diastolic congestive heart failure (CMS/HCC)     Stable  Tosemide  Aldactone   BB  Monitor weight         Chronic obstructive pulmonary disease, unspecified COPD type (CMS/HCC)     Stable, no wheezing or shortness of breath  On room air         Closed fracture of proximal end of left tibia with routine healing     Pain meds  Immobilizer   Therapy  Follow-up with Ortho         Hypertension, essential     Controlled  Monitor blood pressure  Continue antihypertensives         Type 2 diabetes mellitus with hyperglycemia, with long-term current use of insulin (CMS/HCC)     Continue Lantus metformin and lispro   Glucoscan with sliding scale insulin coverage  Low concentrated  sweets diet         Weakness - Primary     Continue working with therapy, actively participating           Medications, treatments, and labs reviewed  Continue medications and treatments as listed in EMR    Scribe Attestation  IMarzena Scribe   attest that this documentation has been prepared under the direction and in the presence of MARISELA Nelson    Provider Attestation - Scribe documentation  All medical record entries made by the Scribe were at my direction and personally dictated by me. I have reviewed the chart and agree that the record accurately reflects my personal performance of the history, physical exam, discussion and plan.   MARISELA Nelson

## 2023-07-19 NOTE — LETTER
Patient: Teresa Matthews  : 1950    Encounter Date: 2023    PROGRESS NOTE    Subjective  Chief complaint: Teresa Matthews is a 72 y.o. female who is a acute skilled care patient being seen and evaluated for weakness.    HPI:  2023 patient mated to skilled nursing facility for therapy due to weakness after recent hospitalization status post fall. Patient was found to have acute comminuted fractures of proximal tibia and fibular diaphysis. She was admitted to the hospital and seen by Ortho who recommended conservative management. Pain was controlled and patient none weightbearing. She was discharged to skilled nursing facility for therapy and to continue medical management.    2023 Patient continues to work with therapy to reach goals. ST addressing cognition and communication skill, pt with 100% acc min mod verbal cues. PT working on ROM, Posteral exercises to improve posture, transfer training, balance and coordination. Longest stance time 2:15 min with CGA/Min A. No LOB. Pt maintained NWB status on LLE. OT also working on body alignment\posture and increasing strength and activity tolerance. Patient actively participates in therapy. No new concerns today.  Denies n/v/f/c pain.      2023  Patient working with therapy with Recumbent bike x 15 min in order to improve strength and endurance, maintaining NWB status on LLE. Working on transfer training, balance activities & weight shifting. ST working on addressing cognitive communication skills, targeting memory and reasoning skills. Pt completed 100% with min\mod cues. Complex problem solving\reasoning with 95% accuracy with occasional cues. No new nursing concerns today.     2023 Patient presents for fu therapy and general medical care.  Patient is working on strengthening balance and ADLs and continues to work toward goal with assist of therapist.  No concerns verbalized from nursing.  Patient denies constitutional symptoms.        6/29/23 Patient working on therapy. Is working on recumbent bike ans tolerates up to 15 minutes. Remains NWB to LLE. Denies pain at this time. No acute distress. Denies SOB and orthopnea. No acute distress    6/30/2023 Patient at [facility] and working toward goals in therapy.   Patient NWB LLE.  Working on transfer training and balance.  Patient verbalizes no new concerns today.  Had uneventful night.  Denies n/v/f/c.  No new concerns from nursing staff.      7/3/23 Patient working in therapy due to weakness. Continues to be NWB LLE. Patient does not ambulate and is working on recumbent bike.  Getting stronger.   Pain from recent fracture is controlled with current medications. Denies SOB or weight gain.  No acute distress.      Patient presents for general medical care and f/u.  Patient seen and examined at bedside.  No issues per nursing.  Patient has no acute complaints.    Pain from recent fracture is controlled with current medications.   AFIB stable, denies palpitations and chest pain.  GERD controlled.  Denies heartburn, regurgitation, epigastric discomfort, sour taste, and cough.  CHF stable, denies sob, orthopnea, weight gain.  Patient continues to work in therapy.  He is getting stronger and requires assistance for transfers ADLs and mobility.  No acute distress.    7/5/2023 Patient is at [facility] and working in therapy.   Feels therapy is going ok, no obstacles/barriers.  Patient is working on recumbent bike with the right lower extremity while maintaining nonweightbearing to the left.  Patient is feeling ok and has no new concerns today.  Denies constitutional symptoms.  No new concerns from nursing staff.     7/6/23 Patient working in therapy due to weakness. Patient working on strengthening and balance. Remains NWB to LLE. Denies SOB or orthopnea. No acute distress. Pain from recent fracture controlled.      7/7/23   Patient actively participates in therapy.  Working on therapy exercises to improve strength  and endurance.  Patient is nonweightbearing to LLE.  Completed core exercises to improve upright posture.    Completed sit to stand with contact-guard assist.  Able to stand at wheeled walker and maintain nonweightbearing for 1-1/2 to 2 minutes with fair minus balance.  Patient is stable without complaint.  No new concerns reported by nursing.    7/10/2023 Patient continues working with therapy to reach goals. Pt is NWB to LLE with knee immobilizer in place. Patient utilizing sliding board for transfers with assistance for set up and sba during transfer. STS from w\c to grab bar with mod assist. Pt performed static standing balance with CGA, vc as needed to maintain NWB status. Pt attempted toilet transfer but unable to maintain NWB status. Pt continues to require assistance with dressing of lower extremities.     7/11/2023  Therapy continues to work with patient. Patient worked on parallel bars with mod\max assist. Pt completed reciprocal motion x 10 mins. Practiced sliding board transfers. Also practiced ADL's and independence with home activities by retrieving towels from dryer and folding. No new concerns.     7/12/2023 Patient at SNF for therapy and presents for follow-up.  Patient continues working toward goals for strengthening, transfers, and ADLs.  Patient is nonweightbearing to the left lower extremity.  Patient has no new complaints at this time.  Denies n/v/f/c.  No new concerns per nursing.      7/13/23 Patient working in therapy due to weakness and debility. Patient is non weight bearing to LLE. Working on strengthening exercises and safe transfers and ADL retraining. No new issues or concerns at this time. Pain from recent fracture well controlled with current meds. Denies SOB or orthopnea.     7/14/2023 Patient continues working with therapy.  Patient is working on recumbent bike and balance activities.  Has no new concerns today.  Feeling OK.  Denies constitutional symptoms.  No new concerns per  nursing.      7/17/23 Patient working in therapy due to weakness. She is NWB to LLE and requires assistance for transfers. Denies SOB or orthopnea. No acute distress.     7/18/23 Patient working in therapy due to weakness and debility. Requires assistance for transfers and ADL's. Denies SOB or orthopnea. Pain controlled    7/19/23   Patient is stable without complaint.  Denies n/v/f/c.  Continues to work towards goals in therapy.  No new concerns reported by nursing.      Objective  Vital signs:   116/72, 97.1, 72, 18, 98%,   Physical Exam  Constitutional:       General: She is not in acute distress.  Eyes:      Extraocular Movements: Extraocular movements intact.   Cardiovascular:      Rate and Rhythm: Normal rate.   Pulmonary:      Effort: Pulmonary effort is normal.   Musculoskeletal:      Cervical back: Neck supple.      Right lower leg: Edema present.      Left lower leg: Edema present.      Comments: Generalized weakness     Skin:     Comments: Dressing left foot   Neurological:      Mental Status: She is alert.   Psychiatric:         Mood and Affect: Mood normal.         Behavior: Behavior is cooperative.         Assessment/Plan  Problem List Items Addressed This Visit       Atrial fibrillation (CMS/HCC)     Apixaban  Amiodarone  Metoprolol  Bleeding precautions           Chronic combined systolic and diastolic congestive heart failure (CMS/HCC)     Stable  Tosemide  Aldactone   BB  Monitor weight         Chronic obstructive pulmonary disease, unspecified COPD type (CMS/HCC)     Stable, no wheezing or shortness of breath  On room air         Closed fracture of proximal end of left tibia with routine healing     Pain meds  Immobilizer   Therapy  Follow-up with Ortho         Hypertension, essential     Controlled  Monitor blood pressure  Continue antihypertensives         Type 2 diabetes mellitus with hyperglycemia, with long-term current use of insulin (CMS/HCC)     Continue Lantus metformin and lispro    Glucoscan with sliding scale insulin coverage  Low concentrated sweets diet         Weakness - Primary     Continue working with therapy, actively participating           Medications, treatments, and labs reviewed  Continue medications and treatments as listed in EMR    Scribe Attestation  IMarzena Scribe   attest that this documentation has been prepared under the direction and in the presence of MARISELA Nelson    Provider Attestation - Scribe documentation  All medical record entries made by the Scribe were at my direction and personally dictated by me. I have reviewed the chart and agree that the record accurately reflects my personal performance of the history, physical exam, discussion and plan.   MARISELA Nelson        Electronically Signed By: MARISELA Nelson   7/23/23  6:42 PM

## 2023-07-20 ENCOUNTER — NURSING HOME VISIT (OUTPATIENT)
Dept: POST ACUTE CARE | Facility: EXTERNAL LOCATION | Age: 73
End: 2023-07-20
Payer: MEDICARE

## 2023-07-20 DIAGNOSIS — I50.42 CHRONIC COMBINED SYSTOLIC AND DIASTOLIC CONGESTIVE HEART FAILURE (MULTI): ICD-10-CM

## 2023-07-20 DIAGNOSIS — R53.1 WEAKNESS: ICD-10-CM

## 2023-07-20 PROCEDURE — 99308 SBSQ NF CARE LOW MDM 20: CPT | Performed by: INTERNAL MEDICINE

## 2023-07-20 NOTE — LETTER
Patient: Teresa Matthews  : 1950    Encounter Date: 2023    PROGRESS NOTE    Subjective  Chief complaint: Teresa Matthews is a 72 y.o. female who is a acute skilled care patient being seen and evaluated for weakness.    HPI:  2023 patient mated to skilled nursing facility for therapy due to weakness after recent hospitalization status post fall. Patient was found to have acute comminuted fractures of proximal tibia and fibular diaphysis. She was admitted to the hospital and seen by Ortho who recommended conservative management. Pain was controlled and patient none weightbearing. She was discharged to skilled nursing facility for therapy and to continue medical management.    2023 Patient continues to work with therapy to reach goals. ST addressing cognition and communication skill, pt with 100% acc min mod verbal cues. PT working on ROM, Posteral exercises to improve posture, transfer training, balance and coordination. Longest stance time 2:15 min with CGA/Min A. No LOB. Pt maintained NWB status on LLE. OT also working on body alignment\posture and increasing strength and activity tolerance. Patient actively participates in therapy. No new concerns today.  Denies n/v/f/c pain.      2023  Patient working with therapy with Recumbent bike x 15 min in order to improve strength and endurance, maintaining NWB status on LLE. Working on transfer training, balance activities & weight shifting. ST working on addressing cognitive communication skills, targeting memory and reasoning skills. Pt completed 100% with min\mod cues. Complex problem solving\reasoning with 95% accuracy with occasional cues. No new nursing concerns today.     2023 Patient presents for fu therapy and general medical care.  Patient is working on strengthening balance and ADLs and continues to work toward goal with assist of therapist.  No concerns verbalized from nursing.  Patient denies constitutional symptoms.        6/29/23 Patient working on therapy. Is working on recumbent bike ans tolerates up to 15 minutes. Remains NWB to LLE. Denies pain at this time. No acute distress. Denies SOB and orthopnea. No acute distress    6/30/2023 Patient at [facility] and working toward goals in therapy.   Patient NWB LLE.  Working on transfer training and balance.  Patient verbalizes no new concerns today.  Had uneventful night.  Denies n/v/f/c.  No new concerns from nursing staff.      7/3/23 Patient working in therapy due to weakness. Continues to be NWB LLE. Patient does not ambulate and is working on recumbent bike.  Getting stronger.   Pain from recent fracture is controlled with current medications. Denies SOB or weight gain.  No acute distress.      Patient presents for general medical care and f/u.  Patient seen and examined at bedside.  No issues per nursing.  Patient has no acute complaints.    Pain from recent fracture is controlled with current medications.   AFIB stable, denies palpitations and chest pain.  GERD controlled.  Denies heartburn, regurgitation, epigastric discomfort, sour taste, and cough.  CHF stable, denies sob, orthopnea, weight gain.  Patient continues to work in therapy.  He is getting stronger and requires assistance for transfers ADLs and mobility.  No acute distress.    7/5/2023 Patient is at [facility] and working in therapy.   Feels therapy is going ok, no obstacles/barriers.  Patient is working on recumbent bike with the right lower extremity while maintaining nonweightbearing to the left.  Patient is feeling ok and has no new concerns today.  Denies constitutional symptoms.  No new concerns from nursing staff.     7/6/23 Patient working in therapy due to weakness. Patient working on strengthening and balance. Remains NWB to LLE. Denies SOB or orthopnea. No acute distress. Pain from recent fracture controlled.      7/7/23   Patient actively participates in therapy.  Working on therapy exercises to improve strength  and endurance.  Patient is nonweightbearing to LLE.  Completed core exercises to improve upright posture.    Completed sit to stand with contact-guard assist.  Able to stand at wheeled walker and maintain nonweightbearing for 1-1/2 to 2 minutes with fair minus balance.  Patient is stable without complaint.  No new concerns reported by nursing.    7/10/2023 Patient continues working with therapy to reach goals. Pt is NWB to LLE with knee immobilizer in place. Patient utilizing sliding board for transfers with assistance for set up and sba during transfer. STS from w\c to grab bar with mod assist. Pt performed static standing balance with CGA, vc as needed to maintain NWB status. Pt attempted toilet transfer but unable to maintain NWB status. Pt continues to require assistance with dressing of lower extremities.     7/11/2023  Therapy continues to work with patient. Patient worked on parallel bars with mod\max assist. Pt completed reciprocal motion x 10 mins. Practiced sliding board transfers. Also practiced ADL's and independence with home activities by retrieving towels from dryer and folding. No new concerns.     7/12/2023 Patient at SNF for therapy and presents for follow-up.  Patient continues working toward goals for strengthening, transfers, and ADLs.  Patient is nonweightbearing to the left lower extremity.  Patient has no new complaints at this time.  Denies n/v/f/c.  No new concerns per nursing.      7/13/23 Patient working in therapy due to weakness and debility. Patient is non weight bearing to LLE. Working on strengthening exercises and safe transfers and ADL retraining. No new issues or concerns at this time. Pain from recent fracture well controlled with current meds. Denies SOB or orthopnea.     7/14/2023 Patient continues working with therapy.  Patient is working on recumbent bike and balance activities.  Has no new concerns today.  Feeling OK.  Denies constitutional symptoms.  No new concerns per  nursing.      7/17/23 Patient working in therapy due to weakness. She is NWB to LLE and requires assistance for transfers. Denies SOB or orthopnea. No acute distress.     7/18/23 Patient working in therapy due to weakness and debility. Requires assistance for transfers and ADL's. Denies SOB or orthopnea. Pain controlled    7/19/23   Patient is stable without complaint.  Denies n/v/f/c.  Continues to work towards goals in therapy.  No new concerns reported by nursing.    7/20/23   Patient continues to work in therapy.  Nonweightbearing to left lower extremity and requires assistance for all transfers.  Denies shortness of breath or orthopnea.  No acute distress.      Objective  Vital signs:   116/72, 97.1, 72, 18, 98%,   Physical Exam  Constitutional:       General: She is not in acute distress.  Eyes:      Extraocular Movements: Extraocular movements intact.   Cardiovascular:      Rate and Rhythm: Normal rate.   Pulmonary:      Effort: Pulmonary effort is normal.   Musculoskeletal:      Cervical back: Neck supple.      Right lower leg: Edema present.      Left lower leg: Edema present.      Comments: Generalized weakness     Skin:     Comments: Dressing left foot   Neurological:      Mental Status: She is alert.   Psychiatric:         Mood and Affect: Mood normal.         Behavior: Behavior is cooperative.         Assessment/Plan  Problem List Items Addressed This Visit          Cardiac and Vasculature    Chronic combined systolic and diastolic congestive heart failure (CMS/HCC)     Stable  Tosemide  Aldactone   BB  Monitor weight            Symptoms and Signs    Weakness     Continue working with therapy, actively participating           Medications, treatments, and labs reviewed  Continue medications and treatments as listed in EMR    Scribe Attestation  I, Monica Simons, Scribe   attest that this documentation has been prepared under the direction and in the presence of Pita Virgen MD    Provider  Attestation - Scribe documentation  All medical record entries made by the Scribe were at my direction and personally dictated by me. I have reviewed the chart and agree that the record accurately reflects my personal performance of the history, physical exam, discussion and plan.   Pita Virgen MD    1. Chronic combined systolic and diastolic congestive heart failure (CMS/HCC)        2. Weakness              Electronically Signed By: Pita Virgen MD   7/20/23  3:07 PM

## 2023-07-20 NOTE — PROGRESS NOTES
PROGRESS NOTE    Subjective   Chief complaint: Teresa Matthews is a 72 y.o. female who is a acute skilled care patient being seen and evaluated for weakness.    HPI:  6/21/2023 patient mated to skilled nursing facility for therapy due to weakness after recent hospitalization status post fall. Patient was found to have acute comminuted fractures of proximal tibia and fibular diaphysis. She was admitted to the hospital and seen by Ortho who recommended conservative management. Pain was controlled and patient none weightbearing. She was discharged to skilled nursing facility for therapy and to continue medical management.    6/26/2023 Patient continues to work with therapy to reach goals. ST addressing cognition and communication skill, pt with 100% acc min mod verbal cues. PT working on ROM, Posteral exercises to improve posture, transfer training, balance and coordination. Longest stance time 2:15 min with CGA/Min A. No LOB. Pt maintained NWB status on LLE. OT also working on body alignment\posture and increasing strength and activity tolerance. Patient actively participates in therapy. No new concerns today.  Denies n/v/f/c pain.      6/27/2023  Patient working with therapy with Recumbent bike x 15 min in order to improve strength and endurance, maintaining NWB status on LLE. Working on transfer training, balance activities & weight shifting. ST working on addressing cognitive communication skills, targeting memory and reasoning skills. Pt completed 100% with min\mod cues. Complex problem solving\reasoning with 95% accuracy with occasional cues. No new nursing concerns today.     6/28/2023 Patient presents for fu therapy and general medical care.  Patient is working on strengthening balance and ADLs and continues to work toward goal with assist of therapist.  No concerns verbalized from nursing.  Patient denies constitutional symptoms.       6/29/23 Patient working on therapy. Is working on recumbent bike ans  tolerates up to 15 minutes. Remains NWB to LLE. Denies pain at this time. No acute distress. Denies SOB and orthopnea. No acute distress    6/30/2023 Patient at SNF and working toward goals in therapy.   Patient NWB LLE.  Working on transfer training and balance.  Patient verbalizes no new concerns today.  Had uneventful night.  Denies n/v/f/c.  No new concerns from nursing staff.      7/3/23 Patient working in therapy due to weakness. Continues to be NWB LLE. Patient does not ambulate and is working on recumbent bike.  Getting stronger.   Pain from recent fracture is controlled with current medications. Denies SOB or weight gain.  No acute distress.      Patient presents for general medical care and f/u.  Patient seen and examined at bedside.  No issues per nursing.  Patient has no acute complaints.    Pain from recent fracture is controlled with current medications.   AFIB stable, denies palpitations and chest pain.  GERD controlled.  Denies heartburn, regurgitation, epigastric discomfort, sour taste, and cough.  CHF stable, denies sob, orthopnea, weight gain.  Patient continues to work in therapy.  He is getting stronger and requires assistance for transfers ADLs and mobility.  No acute distress.    7/5/2023 Patient is at [facility] and working in therapy.   Feels therapy is going ok, no obstacles/barriers.  Patient is working on recumbent bike with the right lower extremity while maintaining nonweightbearing to the left.  Patient is feeling ok and has no new concerns today.  Denies constitutional symptoms.  No new concerns from nursing staff.     7/6/23 Patient working in therapy due to weakness. Patient working on strengthening and balance. Remains NWB to LLE. Denies SOB or orthopnea. No acute distress. Pain from recent fracture controlled.      7/7/23   Patient actively participates in therapy.  Working on therapy exercises to improve strength and endurance.  Patient is nonweightbearing to LLE.  Completed core  exercises to improve upright posture.    Completed sit to stand with contact-guard assist.  Able to stand at wheeled walker and maintain nonweightbearing for 1-1/2 to 2 minutes with fair minus balance.  Patient is stable without complaint.  No new concerns reported by nursing.    7/10/2023 Patient continues working with therapy to reach goals. Pt is NWB to LLE with knee immobilizer in place. Patient utilizing sliding board for transfers with assistance for set up and sba during transfer. STS from w\c to grab bar with mod assist. Pt performed static standing balance with CGA, vc as needed to maintain NWB status. Pt attempted toilet transfer but unable to maintain NWB status. Pt continues to require assistance with dressing of lower extremities.     7/11/2023  Therapy continues to work with patient. Patient worked on parallel bars with mod\max assist. Pt completed reciprocal motion x 10 mins. Practiced sliding board transfers. Also practiced ADL's and independence with home activities by retrieving towels from dryer and folding. No new concerns.     7/12/2023 Patient at SNF for therapy and presents for follow-up.  Patient continues working toward goals for strengthening, transfers, and ADLs.  Patient is nonweightbearing to the left lower extremity.  Patient has no new complaints at this time.  Denies n/v/f/c.  No new concerns per nursing.      7/13/23 Patient working in therapy due to weakness and debility. Patient is non weight bearing to LLE. Working on strengthening exercises and safe transfers and ADL retraining. No new issues or concerns at this time. Pain from recent fracture well controlled with current meds. Denies SOB or orthopnea.     7/14/2023 Patient continues working with therapy.  Patient is working on recumbent bike and balance activities.  Has no new concerns today.  Feeling OK.  Denies constitutional symptoms.  No new concerns per nursing.      7/17/23 Patient working in therapy due to weakness. She  is NWB to LLE and requires assistance for transfers. Denies SOB or orthopnea. No acute distress.     7/18/23 Patient working in therapy due to weakness and debility. Requires assistance for transfers and ADL's. Denies SOB or orthopnea. Pain controlled    7/19/23   Patient is stable without complaint.  Denies n/v/f/c.  Continues to work towards goals in therapy.  No new concerns reported by nursing.    7/20/23   Patient continues to work in therapy.  Nonweightbearing to left lower extremity and requires assistance for all transfers.  Denies shortness of breath or orthopnea.  No acute distress.      Objective   Vital signs:   116/72, 97.1, 72, 18, 98%,   Physical Exam  Constitutional:       General: She is not in acute distress.  Eyes:      Extraocular Movements: Extraocular movements intact.   Cardiovascular:      Rate and Rhythm: Normal rate.   Pulmonary:      Effort: Pulmonary effort is normal.   Musculoskeletal:      Cervical back: Neck supple.      Right lower leg: Edema present.      Left lower leg: Edema present.      Comments: Generalized weakness     Skin:     Comments: Dressing left foot   Neurological:      Mental Status: She is alert.   Psychiatric:         Mood and Affect: Mood normal.         Behavior: Behavior is cooperative.         Assessment/Plan   Problem List Items Addressed This Visit          Cardiac and Vasculature    Chronic combined systolic and diastolic congestive heart failure (CMS/HCC)     Stable  Tosemide  Aldactone   BB  Monitor weight            Symptoms and Signs    Weakness     Continue working with therapy, actively participating           Medications, treatments, and labs reviewed  Continue medications and treatments as listed in EMR    Scribe Attestation  I, Monica Simons, Scribe   attest that this documentation has been prepared under the direction and in the presence of Pita Virgen MD    Provider Attestation - Scribe documentation  All medical record entries made by the  Kirill were at my direction and personally dictated by me. I have reviewed the chart and agree that the record accurately reflects my personal performance of the history, physical exam, discussion and plan.   Pita Virgen MD    1. Chronic combined systolic and diastolic congestive heart failure (CMS/HCC)        2. Weakness

## 2023-07-21 ENCOUNTER — NURSING HOME VISIT (OUTPATIENT)
Dept: POST ACUTE CARE | Facility: EXTERNAL LOCATION | Age: 73
End: 2023-07-21
Payer: MEDICARE

## 2023-07-21 DIAGNOSIS — J44.9 CHRONIC OBSTRUCTIVE PULMONARY DISEASE, UNSPECIFIED COPD TYPE (MULTI): ICD-10-CM

## 2023-07-21 DIAGNOSIS — I48.91 ATRIAL FIBRILLATION, UNSPECIFIED TYPE (MULTI): ICD-10-CM

## 2023-07-21 DIAGNOSIS — R53.1 WEAKNESS: Primary | ICD-10-CM

## 2023-07-21 DIAGNOSIS — S82.102D CLOSED FRACTURE OF PROXIMAL END OF LEFT TIBIA WITH ROUTINE HEALING, UNSPECIFIED FRACTURE MORPHOLOGY, SUBSEQUENT ENCOUNTER: ICD-10-CM

## 2023-07-21 DIAGNOSIS — I10 HYPERTENSION, ESSENTIAL: ICD-10-CM

## 2023-07-21 DIAGNOSIS — I50.42 CHRONIC COMBINED SYSTOLIC AND DIASTOLIC CONGESTIVE HEART FAILURE (MULTI): ICD-10-CM

## 2023-07-21 PROCEDURE — 99309 SBSQ NF CARE MODERATE MDM 30: CPT | Performed by: NURSE PRACTITIONER

## 2023-07-21 NOTE — Clinical Note
Patient: Teresa Matthews  : 1950    Encounter Date: 2023    PROGRESS NOTE    Subjective  Chief complaint: Teresa Matthews is a 72 y.o. female who is a acute skilled care patient being seen and evaluated for weakness.    HPI:  2023 patient mated to skilled nursing facility for therapy due to weakness after recent hospitalization status post fall. Patient was found to have acute comminuted fractures of proximal tibia and fibular diaphysis. She was admitted to the hospital and seen by Ortho who recommended conservative management. Pain was controlled and patient none weightbearing. She was discharged to skilled nursing facility for therapy and to continue medical management.    2023 Patient continues to work with therapy to reach goals. ST addressing cognition and communication skill, pt with 100% acc min mod verbal cues. PT working on ROM, Posteral exercises to improve posture, transfer training, balance and coordination. Longest stance time 2:15 min with CGA/Min A. No LOB. Pt maintained NWB status on LLE. OT also working on body alignment\posture and increasing strength and activity tolerance. Patient actively participates in therapy. No new concerns today.  Denies n/v/f/c pain.      2023  Patient working with therapy with Recumbent bike x 15 min in order to improve strength and endurance, maintaining NWB status on LLE. Working on transfer training, balance activities & weight shifting. ST working on addressing cognitive communication skills, targeting memory and reasoning skills. Pt completed 100% with min\mod cues. Complex problem solving\reasoning with 95% accuracy with occasional cues. No new nursing concerns today.     2023 Patient presents for fu therapy and general medical care.  Patient is working on strengthening balance and ADLs and continues to work toward goal with assist of therapist.  No concerns verbalized from nursing.  Patient denies constitutional symptoms.        6/29/23 Patient working on therapy. Is working on recumbent bike ans tolerates up to 15 minutes. Remains NWB to LLE. Denies pain at this time. No acute distress. Denies SOB and orthopnea. No acute distress    6/30/2023 Patient at [facility] and working toward goals in therapy.   Patient NWB LLE.  Working on transfer training and balance.  Patient verbalizes no new concerns today.  Had uneventful night.  Denies n/v/f/c.  No new concerns from nursing staff.      7/3/23 Patient working in therapy due to weakness. Continues to be NWB LLE. Patient does not ambulate and is working on recumbent bike.  Getting stronger.   Pain from recent fracture is controlled with current medications. Denies SOB or weight gain.  No acute distress.      Patient presents for general medical care and f/u.  Patient seen and examined at bedside.  No issues per nursing.  Patient has no acute complaints.    Pain from recent fracture is controlled with current medications.   AFIB stable, denies palpitations and chest pain.  GERD controlled.  Denies heartburn, regurgitation, epigastric discomfort, sour taste, and cough.  CHF stable, denies sob, orthopnea, weight gain.  Patient continues to work in therapy.  He is getting stronger and requires assistance for transfers ADLs and mobility.  No acute distress.    7/5/2023 Patient is at [facility] and working in therapy.   Feels therapy is going ok, no obstacles/barriers.  Patient is working on recumbent bike with the right lower extremity while maintaining nonweightbearing to the left.  Patient is feeling ok and has no new concerns today.  Denies constitutional symptoms.  No new concerns from nursing staff.     7/6/23 Patient working in therapy due to weakness. Patient working on strengthening and balance. Remains NWB to LLE. Denies SOB or orthopnea. No acute distress. Pain from recent fracture controlled.      7/7/23   Patient actively participates in therapy.  Working on therapy exercises to improve strength  and endurance.  Patient is nonweightbearing to LLE.  Completed core exercises to improve upright posture.    Completed sit to stand with contact-guard assist.  Able to stand at wheeled walker and maintain nonweightbearing for 1-1/2 to 2 minutes with fair minus balance.  Patient is stable without complaint.  No new concerns reported by nursing.    7/10/2023 Patient continues working with therapy to reach goals. Pt is NWB to LLE with knee immobilizer in place. Patient utilizing sliding board for transfers with assistance for set up and sba during transfer. STS from w\c to grab bar with mod assist. Pt performed static standing balance with CGA, vc as needed to maintain NWB status. Pt attempted toilet transfer but unable to maintain NWB status. Pt continues to require assistance with dressing of lower extremities.     7/11/2023  Therapy continues to work with patient. Patient worked on parallel bars with mod\max assist. Pt completed reciprocal motion x 10 mins. Practiced sliding board transfers. Also practiced ADL's and independence with home activities by retrieving towels from dryer and folding. No new concerns.     7/12/2023 Patient at SNF for therapy and presents for follow-up.  Patient continues working toward goals for strengthening, transfers, and ADLs.  Patient is nonweightbearing to the left lower extremity.  Patient has no new complaints at this time.  Denies n/v/f/c.  No new concerns per nursing.      7/13/23 Patient working in therapy due to weakness and debility. Patient is non weight bearing to LLE. Working on strengthening exercises and safe transfers and ADL retraining. No new issues or concerns at this time. Pain from recent fracture well controlled with current meds. Denies SOB or orthopnea.     7/14/2023 Patient continues working with therapy.  Patient is working on recumbent bike and balance activities.  Has no new concerns today.  Feeling OK.  Denies constitutional symptoms.  No new concerns per  nursing.      7/17/23 Patient working in therapy due to weakness. She is NWB to LLE and requires assistance for transfers. Denies SOB or orthopnea. No acute distress.     7/18/23 Patient working in therapy due to weakness and debility. Requires assistance for transfers and ADL's. Denies SOB or orthopnea. Pain controlled    7/19/23   Patient is stable without complaint.  Denies n/v/f/c.  Continues to work towards goals in therapy.  No new concerns reported by nursing.    7/20/23   Patient continues to work in therapy.  Nonweightbearing to left lower extremity and requires assistance for all transfers.  Denies shortness of breath or orthopnea.  No acute distress.    7/21/23   Patient being seen for follow-up of therapy.    Working on dynamic balance activities while sitting and throwing/catching/hitting and reaching to  balloon from ground.  Also completed various seated BLE therapy exercises to improve ROM and strength.  Patient is stable.  Denies sob.  No concerns reported by nursing.      Objective  Vital signs:   135/79, 98.1, 88, 18, 99%,   Physical Exam  Constitutional:       General: She is not in acute distress.  Eyes:      Extraocular Movements: Extraocular movements intact.   Cardiovascular:      Rate and Rhythm: Normal rate.   Pulmonary:      Effort: Pulmonary effort is normal.   Musculoskeletal:      Cervical back: Neck supple.      Comments: Generalized weakness     Neurological:      Mental Status: She is alert.   Psychiatric:         Mood and Affect: Mood normal.         Behavior: Behavior is cooperative.         Assessment/Plan  Problem List Items Addressed This Visit    None    Medications, treatments, and labs reviewed  Continue medications and treatments as listed in EMR    Scribe Attestation  I, Kirill Fountain   attest that this documentation has been prepared under the direction and in the presence of MARISELA Nelson    Provider Attestation - Scribe documentation  All  medical record entries made by the Scribe were at my direction and personally dictated by me. I have reviewed the chart and agree that the record accurately reflects my personal performance of the history, physical exam, discussion and plan.   MARISELA Nelson          Electronically Signed By: MARISELA Nelson   7/23/23  6:42 PM

## 2023-07-21 NOTE — PROGRESS NOTES
PROGRESS NOTE    Subjective   Chief complaint: Teresa Matthews is a 72 y.o. female who is a acute skilled care patient being seen and evaluated for weakness.    HPI:  6/21/2023 patient mated to skilled nursing facility for therapy due to weakness after recent hospitalization status post fall. Patient was found to have acute comminuted fractures of proximal tibia and fibular diaphysis. She was admitted to the hospital and seen by Ortho who recommended conservative management. Pain was controlled and patient none weightbearing. She was discharged to skilled nursing facility for therapy and to continue medical management.    6/26/2023 Patient continues to work with therapy to reach goals. ST addressing cognition and communication skill, pt with 100% acc min mod verbal cues. PT working on ROM, Posteral exercises to improve posture, transfer training, balance and coordination. Longest stance time 2:15 min with CGA/Min A. No LOB. Pt maintained NWB status on LLE. OT also working on body alignment\posture and increasing strength and activity tolerance. Patient actively participates in therapy. No new concerns today.  Denies n/v/f/c pain.      6/27/2023  Patient working with therapy with Recumbent bike x 15 min in order to improve strength and endurance, maintaining NWB status on LLE. Working on transfer training, balance activities & weight shifting. ST working on addressing cognitive communication skills, targeting memory and reasoning skills. Pt completed 100% with min\mod cues. Complex problem solving\reasoning with 95% accuracy with occasional cues. No new nursing concerns today.     6/28/2023 Patient presents for fu therapy and general medical care.  Patient is working on strengthening balance and ADLs and continues to work toward goal with assist of therapist.  No concerns verbalized from nursing.  Patient denies constitutional symptoms.       6/29/23 Patient working on therapy. Is working on recumbent bike ans  tolerates up to 15 minutes. Remains NWB to LLE. Denies pain at this time. No acute distress. Denies SOB and orthopnea. No acute distress    6/30/2023 Patient at SNF and working toward goals in therapy.   Patient NWB LLE.  Working on transfer training and balance.  Patient verbalizes no new concerns today.  Had uneventful night.  Denies n/v/f/c.  No new concerns from nursing staff.      7/3/23 Patient working in therapy due to weakness. Continues to be NWB LLE. Patient does not ambulate and is working on recumbent bike.  Getting stronger.   Pain from recent fracture is controlled with current medications. Denies SOB or weight gain.  No acute distress.      Patient presents for general medical care and f/u.  Patient seen and examined at bedside.  No issues per nursing.  Patient has no acute complaints.    Pain from recent fracture is controlled with current medications.   AFIB stable, denies palpitations and chest pain.  GERD controlled.  Denies heartburn, regurgitation, epigastric discomfort, sour taste, and cough.  CHF stable, denies sob, orthopnea, weight gain.  Patient continues to work in therapy.  He is getting stronger and requires assistance for transfers ADLs and mobility.  No acute distress.    7/5/2023 Patient is at [facility] and working in therapy.   Feels therapy is going ok, no obstacles/barriers.  Patient is working on recumbent bike with the right lower extremity while maintaining nonweightbearing to the left.  Patient is feeling ok and has no new concerns today.  Denies constitutional symptoms.  No new concerns from nursing staff.     7/6/23 Patient working in therapy due to weakness. Patient working on strengthening and balance. Remains NWB to LLE. Denies SOB or orthopnea. No acute distress. Pain from recent fracture controlled.      7/7/23   Patient actively participates in therapy.  Working on therapy exercises to improve strength and endurance.  Patient is nonweightbearing to LLE.  Completed core  exercises to improve upright posture.    Completed sit to stand with contact-guard assist.  Able to stand at wheeled walker and maintain nonweightbearing for 1-1/2 to 2 minutes with fair minus balance.  Patient is stable without complaint.  No new concerns reported by nursing.    7/10/2023 Patient continues working with therapy to reach goals. Pt is NWB to LLE with knee immobilizer in place. Patient utilizing sliding board for transfers with assistance for set up and sba during transfer. STS from w\c to grab bar with mod assist. Pt performed static standing balance with CGA, vc as needed to maintain NWB status. Pt attempted toilet transfer but unable to maintain NWB status. Pt continues to require assistance with dressing of lower extremities.     7/11/2023  Therapy continues to work with patient. Patient worked on parallel bars with mod\max assist. Pt completed reciprocal motion x 10 mins. Practiced sliding board transfers. Also practiced ADL's and independence with home activities by retrieving towels from dryer and folding. No new concerns.     7/12/2023 Patient at SNF for therapy and presents for follow-up.  Patient continues working toward goals for strengthening, transfers, and ADLs.  Patient is nonweightbearing to the left lower extremity.  Patient has no new complaints at this time.  Denies n/v/f/c.  No new concerns per nursing.      7/13/23 Patient working in therapy due to weakness and debility. Patient is non weight bearing to LLE. Working on strengthening exercises and safe transfers and ADL retraining. No new issues or concerns at this time. Pain from recent fracture well controlled with current meds. Denies SOB or orthopnea.     7/14/2023 Patient continues working with therapy.  Patient is working on recumbent bike and balance activities.  Has no new concerns today.  Feeling OK.  Denies constitutional symptoms.  No new concerns per nursing.      7/17/23 Patient working in therapy due to weakness. She  is NWB to LLE and requires assistance for transfers. Denies SOB or orthopnea. No acute distress.     7/18/23 Patient working in therapy due to weakness and debility. Requires assistance for transfers and ADL's. Denies SOB or orthopnea. Pain controlled    7/19/23   Patient is stable without complaint.  Denies n/v/f/c.  Continues to work towards goals in therapy.  No new concerns reported by nursing.    7/20/23   Patient continues to work in therapy.  Nonweightbearing to left lower extremity and requires assistance for all transfers.  Denies shortness of breath or orthopnea.  No acute distress.    7/21/23   Patient being seen for follow-up of therapy.    Working on dynamic balance activities while sitting and throwing/catching/hitting and reaching to  balloon from ground.  Also completed various seated BLE therapy exercises to improve ROM and strength.  Patient is stable.  Denies sob.  No concerns reported by nursing.      Objective   Vital signs:   135/79, 98.1, 88, 18, 99%,   Physical Exam  Constitutional:       General: She is not in acute distress.  Eyes:      Extraocular Movements: Extraocular movements intact.   Cardiovascular:      Rate and Rhythm: Normal rate.   Pulmonary:      Effort: Pulmonary effort is normal.   Musculoskeletal:      Cervical back: Neck supple.      Comments: Generalized weakness     Neurological:      Mental Status: She is alert.   Psychiatric:         Mood and Affect: Mood normal.         Behavior: Behavior is cooperative.         Assessment/Plan   Problem List Items Addressed This Visit       Atrial fibrillation (CMS/HCC)     Apixaban  Amiodarone  Metoprolol  Bleeding precautions           Chronic combined systolic and diastolic congestive heart failure (CMS/HCC)     Stable  [place]  Aldactone   BB  Monitor weight         Chronic obstructive pulmonary disease, unspecified COPD type (CMS/HCC)     Stable, no wheezing or shortness of breath  On room air         Closed fracture  of proximal end of left tibia with routine healing     Pain meds  Immobilizer   Therapy  Follow-up with Ortho         Hypertension, essential     Controlled  Monitor blood pressure  Continue antihypertensives         Weakness - Primary     Continue working with therapy, actively participating           Medications, treatments, and labs reviewed  Continue medications and treatments as listed in EMR    Scribe Attestation  Marzena CHICAS Scribe   attest that this documentation has been prepared under the direction and in the presence of MARISELA Nelson    Provider Attestation - Scribe documentation  All medical record entries made by the Scribe were at my direction and personally dictated by me. I have reviewed the chart and agree that the record accurately reflects my personal performance of the history, physical exam, discussion and plan.   MARISELA Nelson

## 2023-07-23 NOTE — ASSESSMENT & PLAN NOTE
Continue Lantus metformin and lispro   Glucoscan with sliding scale insulin coverage  Low concentrated sweets diet

## 2023-07-24 ENCOUNTER — TELEPHONE (OUTPATIENT)
Dept: PRIMARY CARE | Facility: CLINIC | Age: 73
End: 2023-07-24

## 2023-07-24 ENCOUNTER — NURSING HOME VISIT (OUTPATIENT)
Dept: POST ACUTE CARE | Facility: EXTERNAL LOCATION | Age: 73
End: 2023-07-24
Payer: MEDICARE

## 2023-07-24 DIAGNOSIS — M17.0 PRIMARY OSTEOARTHRITIS OF BOTH KNEES: ICD-10-CM

## 2023-07-24 DIAGNOSIS — I50.42 CHRONIC COMBINED SYSTOLIC AND DIASTOLIC CONGESTIVE HEART FAILURE (MULTI): Primary | ICD-10-CM

## 2023-07-24 DIAGNOSIS — M48.02 DEGENERATIVE CERVICAL SPINAL STENOSIS: ICD-10-CM

## 2023-07-24 DIAGNOSIS — L97.528: ICD-10-CM

## 2023-07-24 DIAGNOSIS — S82.102D CLOSED FRACTURE OF PROXIMAL END OF LEFT TIBIA WITH ROUTINE HEALING, UNSPECIFIED FRACTURE MORPHOLOGY, SUBSEQUENT ENCOUNTER: ICD-10-CM

## 2023-07-24 DIAGNOSIS — R53.1 WEAKNESS: ICD-10-CM

## 2023-07-24 PROCEDURE — 99308 SBSQ NF CARE LOW MDM 20: CPT | Performed by: INTERNAL MEDICINE

## 2023-07-24 NOTE — PROGRESS NOTES
PROGRESS NOTE    Subjective   Chief complaint: Teresa Matthews is a 72 y.o. female who is a acute skilled care patient being seen and evaluated for weakness.    HPI:  6/21/2023 patient mated to skilled nursing facility for therapy due to weakness after recent hospitalization status post fall. Patient was found to have acute comminuted fractures of proximal tibia and fibular diaphysis. She was admitted to the hospital and seen by Ortho who recommended conservative management. Pain was controlled and patient none weightbearing. She was discharged to skilled nursing facility for therapy and to continue medical management.    6/26/2023 Patient continues to work with therapy to reach goals. ST addressing cognition and communication skill, pt with 100% acc min mod verbal cues. PT working on ROM, Posteral exercises to improve posture, transfer training, balance and coordination. Longest stance time 2:15 min with CGA/Min A. No LOB. Pt maintained NWB status on LLE. OT also working on body alignment\posture and increasing strength and activity tolerance. Patient actively participates in therapy. No new concerns today.  Denies n/v/f/c pain.      6/27/2023  Patient working with therapy with Recumbent bike x 15 min in order to improve strength and endurance, maintaining NWB status on LLE. Working on transfer training, balance activities & weight shifting. ST working on addressing cognitive communication skills, targeting memory and reasoning skills. Pt completed 100% with min\mod cues. Complex problem solving\reasoning with 95% accuracy with occasional cues. No new nursing concerns today.     6/28/2023 Patient presents for fu therapy and general medical care.  Patient is working on strengthening balance and ADLs and continues to work toward goal with assist of therapist.  No concerns verbalized from nursing.  Patient denies constitutional symptoms.       6/29/23 Patient working on therapy. Is working on recumbent bike ans  tolerates up to 15 minutes. Remains NWB to LLE. Denies pain at this time. No acute distress. Denies SOB and orthopnea. No acute distress    6/30/2023 Patient at SNF and working toward goals in therapy.   Patient NWB LLE.  Working on transfer training and balance.  Patient verbalizes no new concerns today.  Had uneventful night.  Denies n/v/f/c.  No new concerns from nursing staff.      7/3/23 Patient working in therapy due to weakness. Continues to be NWB LLE. Patient does not ambulate and is working on recumbent bike.  Getting stronger.   Pain from recent fracture is controlled with current medications. Denies SOB or weight gain.  No acute distress.      Patient presents for general medical care and f/u.  Patient seen and examined at bedside.  No issues per nursing.  Patient has no acute complaints.    Pain from recent fracture is controlled with current medications.   AFIB stable, denies palpitations and chest pain.  GERD controlled.  Denies heartburn, regurgitation, epigastric discomfort, sour taste, and cough.  CHF stable, denies sob, orthopnea, weight gain.  Patient continues to work in therapy.  He is getting stronger and requires assistance for transfers ADLs and mobility.  No acute distress.    7/5/2023 Patient is at [facility] and working in therapy.   Feels therapy is going ok, no obstacles/barriers.  Patient is working on recumbent bike with the right lower extremity while maintaining nonweightbearing to the left.  Patient is feeling ok and has no new concerns today.  Denies constitutional symptoms.  No new concerns from nursing staff.     7/6/23 Patient working in therapy due to weakness. Patient working on strengthening and balance. Remains NWB to LLE. Denies SOB or orthopnea. No acute distress. Pain from recent fracture controlled.      7/7/23   Patient actively participates in therapy.  Working on therapy exercises to improve strength and endurance.  Patient is nonweightbearing to LLE.  Completed core  exercises to improve upright posture.    Completed sit to stand with contact-guard assist.  Able to stand at wheeled walker and maintain nonweightbearing for 1-1/2 to 2 minutes with fair minus balance.  Patient is stable without complaint.  No new concerns reported by nursing.    7/10/2023 Patient continues working with therapy to reach goals. Pt is NWB to LLE with knee immobilizer in place. Patient utilizing sliding board for transfers with assistance for set up and sba during transfer. STS from w\c to grab bar with mod assist. Pt performed static standing balance with CGA, vc as needed to maintain NWB status. Pt attempted toilet transfer but unable to maintain NWB status. Pt continues to require assistance with dressing of lower extremities.     7/11/2023  Therapy continues to work with patient. Patient worked on parallel bars with mod\max assist. Pt completed reciprocal motion x 10 mins. Practiced sliding board transfers. Also practiced ADL's and independence with home activities by retrieving towels from dryer and folding. No new concerns.     7/12/2023 Patient at SNF for therapy and presents for follow-up.  Patient continues working toward goals for strengthening, transfers, and ADLs.  Patient is nonweightbearing to the left lower extremity.  Patient has no new complaints at this time.  Denies n/v/f/c.  No new concerns per nursing.      7/13/23 Patient working in therapy due to weakness and debility. Patient is non weight bearing to LLE. Working on strengthening exercises and safe transfers and ADL retraining. No new issues or concerns at this time. Pain from recent fracture well controlled with current meds. Denies SOB or orthopnea.     7/14/2023 Patient continues working with therapy.  Patient is working on recumbent bike and balance activities.  Has no new concerns today.  Feeling OK.  Denies constitutional symptoms.  No new concerns per nursing.      7/17/23 Patient working in therapy due to weakness. She  is NWB to LLE and requires assistance for transfers. Denies SOB or orthopnea. No acute distress.     7/18/23 Patient working in therapy due to weakness and debility. Requires assistance for transfers and ADL's. Denies SOB or orthopnea. Pain controlled    7/19/23   Patient is stable without complaint.  Denies n/v/f/c.  Continues to work towards goals in therapy.  No new concerns reported by nursing.    7/20/23   Patient continues to work in therapy.  Nonweightbearing to left lower extremity and requires assistance for all transfers.  Denies shortness of breath or orthopnea.  No acute distress.    7/21/23   Patient being seen for follow-up of therapy.    Working on dynamic balance activities while sitting and throwing/catching/hitting and reaching to  balloon from ground.  Also completed various seated BLE therapy exercises to improve ROM and strength.  Patient is stable.  Denies sob.  No concerns reported by nursing.    7/24/23   Patient continues to work in therapy.  Patient is nonweightbearing to left lower extremity and does not ambulate.  Requires moderate assistance for transfers and ADLs.  No acute distress.   Pain from recent fracture is controlled with current medications.  Denies shortness of breath or orthopnea.      Objective   Vital signs:   137/75, 99%,   Physical Exam  Constitutional:       General: She is not in acute distress.  Eyes:      Extraocular Movements: Extraocular movements intact.   Cardiovascular:      Rate and Rhythm: Normal rate.   Pulmonary:      Effort: Pulmonary effort is normal.   Musculoskeletal:      Cervical back: Neck supple.      Comments: Generalized weakness     Neurological:      Mental Status: She is alert.   Psychiatric:         Mood and Affect: Mood normal.         Behavior: Behavior is cooperative.         Assessment/Plan   Problem List Items Addressed This Visit          Cardiac and Vasculature    Chronic combined systolic and diastolic congestive heart failure  (CMS/[facility]) - Primary     Stable  Tosemide  Aldactone   BB  Monitor weight            Musculoskeletal and Injuries    Closed fracture of proximal end of left tibia with routine healing     Pain meds  Immobilizer   Therapy  Follow-up with Ortho            Symptoms and Signs    Weakness     Continue working with therapy, actively participating           Medications, treatments, and labs reviewed  Continue medications and treatments as listed in EMR    Scribe Attestation  I, Kirill De Oliveira   attest that this documentation has been prepared under the direction and in the presence of Pita Virgen MD    Provider Attestation - Scribe documentation  All medical record entries made by the Scribe were at my direction and personally dictated by me. I have reviewed the chart and agree that the record accurately reflects my personal performance of the history, physical exam, discussion and plan.   Pita Virgen MD    1. Chronic combined systolic and diastolic congestive heart failure (CMS/[facility])        2. Closed fracture of proximal end of left tibia with routine healing, unspecified fracture morphology, subsequent encounter        3. Weakness

## 2023-07-24 NOTE — LETTER
Patient: Teresa Matthews  : 1950    Encounter Date: 2023    PROGRESS NOTE    Subjective  Chief complaint: Teresa Matthews is a 72 y.o. female who is a acute skilled care patient being seen and evaluated for weakness.    HPI:  2023 patient mated to skilled nursing facility for therapy due to weakness after recent hospitalization status post fall. Patient was found to have acute comminuted fractures of proximal tibia and fibular diaphysis. She was admitted to the hospital and seen by Ortho who recommended conservative management. Pain was controlled and patient none weightbearing. She was discharged to skilled nursing facility for therapy and to continue medical management.    2023 Patient continues to work with therapy to reach goals. ST addressing cognition and communication skill, pt with 100% acc min mod verbal cues. PT working on ROM, Posteral exercises to improve posture, transfer training, balance and coordination. Longest stance time 2:15 min with CGA/Min A. No LOB. Pt maintained NWB status on LLE. OT also working on body alignment\posture and increasing strength and activity tolerance. Patient actively participates in therapy. No new concerns today.  Denies n/v/f/c pain.      2023  Patient working with therapy with Recumbent bike x 15 min in order to improve strength and endurance, maintaining NWB status on LLE. Working on transfer training, balance activities & weight shifting. ST working on addressing cognitive communication skills, targeting memory and reasoning skills. Pt completed 100% with min\mod cues. Complex problem solving\reasoning with 95% accuracy with occasional cues. No new nursing concerns today.     2023 Patient presents for fu therapy and general medical care.  Patient is working on strengthening balance and ADLs and continues to work toward goal with assist of therapist.  No concerns verbalized from nursing.  Patient denies constitutional symptoms.        6/29/23 Patient working on therapy. Is working on recumbent bike ans tolerates up to 15 minutes. Remains NWB to LLE. Denies pain at this time. No acute distress. Denies SOB and orthopnea. No acute distress    6/30/2023 Patient at [facility] and working toward goals in therapy.   Patient NWB LLE.  Working on transfer training and balance.  Patient verbalizes no new concerns today.  Had uneventful night.  Denies n/v/f/c.  No new concerns from nursing staff.      7/3/23 Patient working in therapy due to weakness. Continues to be NWB LLE. Patient does not ambulate and is working on recumbent bike.  Getting stronger.   Pain from recent fracture is controlled with current medications. Denies SOB or weight gain.  No acute distress.      Patient presents for general medical care and f/u.  Patient seen and examined at bedside.  No issues per nursing.  Patient has no acute complaints.    Pain from recent fracture is controlled with current medications.   AFIB stable, denies palpitations and chest pain.  GERD controlled.  Denies heartburn, regurgitation, epigastric discomfort, sour taste, and cough.  CHF stable, denies sob, orthopnea, weight gain.  Patient continues to work in therapy.  He is getting stronger and requires assistance for transfers ADLs and mobility.  No acute distress.    7/5/2023 Patient is at [facility] and working in therapy.   Feels therapy is going ok, no obstacles/barriers.  Patient is working on recumbent bike with the right lower extremity while maintaining nonweightbearing to the left.  Patient is feeling ok and has no new concerns today.  Denies constitutional symptoms.  No new concerns from nursing staff.     7/6/23 Patient working in therapy due to weakness. Patient working on strengthening and balance. Remains NWB to LLE. Denies SOB or orthopnea. No acute distress. Pain from recent fracture controlled.      7/7/23   Patient actively participates in therapy.  Working on therapy exercises to improve strength  and endurance.  Patient is nonweightbearing to LLE.  Completed core exercises to improve upright posture.    Completed sit to stand with contact-guard assist.  Able to stand at wheeled walker and maintain nonweightbearing for 1-1/2 to 2 minutes with fair minus balance.  Patient is stable without complaint.  No new concerns reported by nursing.    7/10/2023 Patient continues working with therapy to reach goals. Pt is NWB to LLE with knee immobilizer in place. Patient utilizing sliding board for transfers with assistance for set up and sba during transfer. STS from w\c to grab bar with mod assist. Pt performed static standing balance with CGA, vc as needed to maintain NWB status. Pt attempted toilet transfer but unable to maintain NWB status. Pt continues to require assistance with dressing of lower extremities.     7/11/2023  Therapy continues to work with patient. Patient worked on parallel bars with mod\max assist. Pt completed reciprocal motion x 10 mins. Practiced sliding board transfers. Also practiced ADL's and independence with home activities by retrieving towels from dryer and folding. No new concerns.     7/12/2023 Patient at SNF for therapy and presents for follow-up.  Patient continues working toward goals for strengthening, transfers, and ADLs.  Patient is nonweightbearing to the left lower extremity.  Patient has no new complaints at this time.  Denies n/v/f/c.  No new concerns per nursing.      7/13/23 Patient working in therapy due to weakness and debility. Patient is non weight bearing to LLE. Working on strengthening exercises and safe transfers and ADL retraining. No new issues or concerns at this time. Pain from recent fracture well controlled with current meds. Denies SOB or orthopnea.     7/14/2023 Patient continues working with therapy.  Patient is working on recumbent bike and balance activities.  Has no new concerns today.  Feeling OK.  Denies constitutional symptoms.  No new concerns per  nursing.      7/17/23 Patient working in therapy due to weakness. She is NWB to LLE and requires assistance for transfers. Denies SOB or orthopnea. No acute distress.     7/18/23 Patient working in therapy due to weakness and debility. Requires assistance for transfers and ADL's. Denies SOB or orthopnea. Pain controlled    7/19/23   Patient is stable without complaint.  Denies n/v/f/c.  Continues to work towards goals in therapy.  No new concerns reported by nursing.    7/20/23   Patient continues to work in therapy.  Nonweightbearing to left lower extremity and requires assistance for all transfers.  Denies shortness of breath or orthopnea.  No acute distress.    7/21/23   Patient being seen for follow-up of therapy.    Working on dynamic balance activities while sitting and throwing/catching/hitting and reaching to  balloon from ground.  Also completed various seated BLE therapy exercises to improve ROM and strength.  Patient is stable.  Denies sob.  No concerns reported by nursing.    7/24/23   Patient continues to work in therapy.  Patient is nonweightbearing to left lower extremity and does not ambulate.  Requires moderate assistance for transfers and ADLs.  No acute distress.   Pain from recent fracture is controlled with current medications.  Denies shortness of breath or orthopnea.      Objective  Vital signs:   137/75, 99%,   Physical Exam  Constitutional:       General: She is not in acute distress.  Eyes:      Extraocular Movements: Extraocular movements intact.   Cardiovascular:      Rate and Rhythm: Normal rate.   Pulmonary:      Effort: Pulmonary effort is normal.   Musculoskeletal:      Cervical back: Neck supple.      Comments: Generalized weakness     Neurological:      Mental Status: She is alert.   Psychiatric:         Mood and Affect: Mood normal.         Behavior: Behavior is cooperative.         Assessment/Plan  Problem List Items Addressed This Visit          Cardiac and Vasculature     Chronic combined systolic and diastolic congestive heart failure (CMS/HCC) - Primary     Stable  Tosemide  Aldactone   BB  Monitor weight            Musculoskeletal and Injuries    Closed fracture of proximal end of left tibia with routine healing     Pain meds  Immobilizer   Therapy  Follow-up with Ortho            Symptoms and Signs    Weakness     Continue working with therapy, actively participating           Medications, treatments, and labs reviewed  Continue medications and treatments as listed in EMR    Scribe Attestation  I, Monica Simons Scribe   attest that this documentation has been prepared under the direction and in the presence of Pita Virgen MD    Provider Attestation - Scribe documentation  All medical record entries made by the Scribe were at my direction and personally dictated by me. I have reviewed the chart and agree that the record accurately reflects my personal performance of the history, physical exam, discussion and plan.   Pita Virgen MD    1. Chronic combined systolic and diastolic congestive heart failure (CMS/HCC)        2. Closed fracture of proximal end of left tibia with routine healing, unspecified fracture morphology, subsequent encounter        3. Weakness              Electronically Signed By: Pita Virgen MD   7/24/23  2:35 PM

## 2023-07-25 NOTE — TELEPHONE ENCOUNTER
Patient called again regarding this, needs orders for hospital bed sent to medical service company

## 2023-07-31 ENCOUNTER — NURSING HOME VISIT (OUTPATIENT)
Dept: POST ACUTE CARE | Facility: EXTERNAL LOCATION | Age: 73
End: 2023-07-31
Payer: MEDICARE

## 2023-07-31 DIAGNOSIS — F33.40 RECURRENT MAJOR DEPRESSIVE DISORDER, IN REMISSION (CMS-HCC): ICD-10-CM

## 2023-07-31 DIAGNOSIS — K70.30 ALCOHOLIC CIRRHOSIS OF LIVER WITHOUT ASCITES (MULTI): ICD-10-CM

## 2023-07-31 DIAGNOSIS — R53.1 WEAKNESS: Primary | ICD-10-CM

## 2023-07-31 DIAGNOSIS — I95.1 ORTHOSTATIC HYPOTENSION: ICD-10-CM

## 2023-07-31 DIAGNOSIS — E66.9 OBESITY (BMI 30-39.9): ICD-10-CM

## 2023-07-31 DIAGNOSIS — I71.10 RUPTURED ANEURYSM OF THORACIC AORTA, UNSPECIFIED PART (MULTI): ICD-10-CM

## 2023-07-31 DIAGNOSIS — R06.02 SOB (SHORTNESS OF BREATH) ON EXERTION: ICD-10-CM

## 2023-07-31 DIAGNOSIS — Z79.4 TYPE 2 DIABETES MELLITUS WITH HYPERGLYCEMIA, WITH LONG-TERM CURRENT USE OF INSULIN (MULTI): ICD-10-CM

## 2023-07-31 DIAGNOSIS — I89.0 LYMPHEDEMA: ICD-10-CM

## 2023-07-31 DIAGNOSIS — R60.9 PERIPHERAL EDEMA: ICD-10-CM

## 2023-07-31 DIAGNOSIS — E11.65 TYPE 2 DIABETES MELLITUS WITH HYPERGLYCEMIA, WITH LONG-TERM CURRENT USE OF INSULIN (MULTI): ICD-10-CM

## 2023-07-31 DIAGNOSIS — N18.31 STAGE 3A CHRONIC KIDNEY DISEASE (MULTI): ICD-10-CM

## 2023-07-31 PROCEDURE — 99305 1ST NF CARE MODERATE MDM 35: CPT | Performed by: INTERNAL MEDICINE

## 2023-07-31 NOTE — PROGRESS NOTES
HISTORY & PHYSICAL    Subjective   Chief complaint: Teresa Matthews is a 72 y.o. female who is a acute skilled care patient being seen and evaluated for multiple medical problems.  Patient presents for weakness.    HPI:  Patient was admitted to the skilled nursing facility from home. Patient has a past medical history of diabetes and respiratory failure.  patient mated to skilled nursing facility for therapy due to weakness after recent hospitalization status post fall. Patient was found to have acute comminuted fractures of proximal tibia and fibular diaphysis. She was admitted to the hospital and seen by Ortho who recommended conservative management. Pain was controlled and patient none weightbearing. She was discharged to skilled nursing facility for therapy and to continue medical management.admitted for weakness and need for PT OT      Past Medical History:   Diagnosis Date    Body mass index (BMI) 31.0-31.9, adult 06/30/2020    Body mass index (BMI) of 31.0 to 31.9 in adult    Body mass index (BMI) 32.0-32.9, adult 01/04/2021    Body mass index (BMI) of 32.0 to 32.9 in adult    Body mass index (BMI) 33.0-33.9, adult 01/04/2021    Body mass index (BMI) of 33.0 to 33.9 in adult    Body mass index (BMI) 35.0-35.9, adult 04/09/2020    Body mass index (BMI) of 35.0 to 35.9 in adult    Body mass index (BMI)40.0-44.9, adult (CMS/[facility]) 11/14/2019    Body mass index (BMI) of 40.0 to 44.9 in adult    Essential (primary) hypertension 09/06/2022    Hypertension    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    Hypoxia 04/10/2023    Nontoxic multinodular goiter 07/02/2021    Multiple thyroid nodules    Personal history of other diseases of the circulatory system     History of congestive heart failure    Personal history of other diseases of the musculoskeletal system and connective tissue     History of arthritis    Personal history of other diseases of the nervous system and sense organs 10/11/2022    History of  sleep apnea    Personal history of other diseases of the respiratory system     History of lung disease    Personal history of other diseases of urinary system 02/19/2020    History of hematuria    Personal history of other endocrine, nutritional and metabolic disease 07/02/2021    History of morbid obesity    Personal history of other endocrine, nutritional and metabolic disease 04/24/2017    History of diabetic neuropathy    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    Status post gastric bypass for obesity 04/10/2023       Past Surgical History:   Procedure Laterality Date    HYSTERECTOMY  01/20/2016    Hysterectomy    OTHER SURGICAL HISTORY  01/07/2022    Toe amputation    OTHER SURGICAL HISTORY  05/28/2021    Gastric bypass surgery    TONSILLECTOMY  01/20/2016    Tonsillectomy    TOTAL KNEE ARTHROPLASTY  05/28/2021    Knee Replacement    TUBAL LIGATION  04/24/2017    Tubal Ligation       Family History   Problem Relation Name Age of Onset    Coronary artery disease Mother      Liver disease Mother      Other (non-hodgkins lymphoma) Mother      Stroke Father      Deafness Father      Hypertension Father         Social History     Socioeconomic History    Marital status: Single     Spouse name: Not on file    Number of children: Not on file    Years of education: Not on file    Highest education level: Not on file   Occupational History    Not on file   Tobacco Use    Smoking status: Never    Smokeless tobacco: Never   Substance and Sexual Activity    Alcohol use: Never    Drug use: Never    Sexual activity: Not on file   Other Topics Concern    Not on file   Social History Narrative    Not on file     Social Determinants of Health     Financial Resource Strain: Not on file   Food Insecurity: Not on file   Transportation Needs: Not on file   Physical Activity: Not on file   Stress: Not on file   Social Connections: Not on file   Intimate Partner Violence: Not on file   Housing Stability: Not on file        Vital signs: 159/93, 97.6, 65, 18, 176.0, 92%    Objective   Physical Exam  Vitals reviewed.   Constitutional:       Appearance: Normal appearance.   HENT:      Head: Normocephalic and atraumatic.   Cardiovascular:      Rate and Rhythm: Normal rate and regular rhythm.   Pulmonary:      Effort: Pulmonary effort is normal.      Breath sounds: Normal breath sounds.   Abdominal:      General: Bowel sounds are normal.      Palpations: Abdomen is soft.   Musculoskeletal:      Cervical back: Neck supple.   Skin:     General: Skin is warm and dry.   Neurological:      General: No focal deficit present.      Mental Status: She is alert.   Psychiatric:         Mood and Affect: Mood normal.         Behavior: Behavior is cooperative.         Assessment/Plan   Problem List Items Addressed This Visit       Type 2 diabetes mellitus with hyperglycemia, with long-term current use of insulin (CMS/HCC)    Cirrhosis (CMS/HCC)    Lymphedema    Peripheral edema    Obesity (BMI 30-39.9)    Orthostatic hypotension    Thoracic aortic aneurysm (CMS/HCC)    Recurrent major depressive disorder, in remission (CMS/HCC)    SOB (shortness of breath) on exertion    Stage 3a chronic kidney disease    Weakness - Primary     Medications, treatments, and labs reviewed  Continue medications and treatments as listed in [facility]    Scribe Attestation  I, Kirill Cervantes   attest that this documentation has been prepared under the direction and in the presence of Pita Virgen MD.    Provider Attestation - Scribe documentation  All medical record entries made by the Scribe were at my direction and personally dictated by me. I have reviewed the chart and agree that the record accurately reflects my personal performance of the history, physical exam, discussion and plan.    Pita Virgen MD

## 2023-07-31 NOTE — LETTER
Patient: Teresa Matthews  : 1950    Encounter Date: 2023    HISTORY & PHYSICAL    Subjective  Chief complaint: Teresa Matthews is a 72 y.o. female who is a acute skilled care patient being seen and evaluated for multiple medical problems.  Patient presents for weakness.    HPI:  Patient was admitted to the skilled nursing facility from home. Patient has a past medical history of diabetes and respiratory failure.  patient mated to skilled nursing facility for therapy due to weakness after recent hospitalization status post fall. Patient was found to have acute comminuted fractures of proximal tibia and fibular diaphysis. She was admitted to the hospital and seen by Ortho who recommended conservative management. Pain was controlled and patient none weightbearing. She was discharged to skilled nursing facility for therapy and to continue medical management.admitted for weakness and need for PT OT      Past Medical History:   Diagnosis Date   • Body mass index (BMI) 31.0-31.9, adult 2020    Body mass index (BMI) of 31.0 to 31.9 in adult   • Body mass index (BMI) 32.0-32.9, adult 2021    Body mass index (BMI) of 32.0 to 32.9 in adult   • Body mass index (BMI) 33.0-33.9, adult 2021    Body mass index (BMI) of 33.0 to 33.9 in adult   • Body mass index (BMI) 35.0-35.9, adult 2020    Body mass index (BMI) of 35.0 to 35.9 in adult   • Body mass index (BMI)40.0-44.9, adult (CMS/[facility]) 2019    Body mass index (BMI) of 40.0 to 44.9 in adult   • Essential (primary) hypertension 2022    Hypertension   • 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   • Hypoxia 04/10/2023   • Nontoxic multinodular goiter 2021    Multiple thyroid nodules   • Personal history of other diseases of the circulatory system     History of congestive heart failure   • Personal history of other diseases of the musculoskeletal system and connective tissue     History of arthritis   • Personal  history of other diseases of the nervous system and sense organs 10/11/2022    History of sleep apnea   • Personal history of other diseases of the respiratory system     History of lung disease   • Personal history of other diseases of urinary system 02/19/2020    History of hematuria   • Personal history of other endocrine, nutritional and metabolic disease 07/02/2021    History of morbid obesity   • Personal history of other endocrine, nutritional and metabolic disease 04/24/2017    History of diabetic neuropathy   • 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   • Status post gastric bypass for obesity 04/10/2023       Past Surgical History:   Procedure Laterality Date   • HYSTERECTOMY  01/20/2016    Hysterectomy   • OTHER SURGICAL HISTORY  01/07/2022    Toe amputation   • OTHER SURGICAL HISTORY  05/28/2021    Gastric bypass surgery   • TONSILLECTOMY  01/20/2016    Tonsillectomy   • TOTAL KNEE ARTHROPLASTY  05/28/2021    Knee Replacement   • TUBAL LIGATION  04/24/2017    Tubal Ligation       Family History   Problem Relation Name Age of Onset   • Coronary artery disease Mother     • Liver disease Mother     • Other (non-hodgkins lymphoma) Mother     • Stroke Father     • Deafness Father     • Hypertension Father         Social History     Socioeconomic History   • Marital status: Single     Spouse name: Not on file   • Number of children: Not on file   • Years of education: Not on file   • Highest education level: Not on file   Occupational History   • Not on file   Tobacco Use   • Smoking status: Never   • Smokeless tobacco: Never   Substance and Sexual Activity   • Alcohol use: Never   • Drug use: Never   • Sexual activity: Not on file   Other Topics Concern   • Not on file   Social History Narrative   • Not on file     Social Determinants of Health     Financial Resource Strain: Not on file   Food Insecurity: Not on file   Transportation Needs: Not on file   Physical Activity: Not on file   Stress: Not on  file   Social Connections: Not on file   Intimate Partner Violence: Not on file   Housing Stability: Not on file       Vital signs: 159/93, 97.6, 65, 18, 176.0, 92%    Objective  Physical Exam  Vitals reviewed.   Constitutional:       Appearance: Normal appearance.   HENT:      Head: Normocephalic and atraumatic.   Cardiovascular:      Rate and Rhythm: Normal rate and regular rhythm.   Pulmonary:      Effort: Pulmonary effort is normal.      Breath sounds: Normal breath sounds.   Abdominal:      General: Bowel sounds are normal.      Palpations: Abdomen is soft.   Musculoskeletal:      Cervical back: Neck supple.   Skin:     General: Skin is warm and dry.   Neurological:      General: No focal deficit present.      Mental Status: She is alert.   Psychiatric:         Mood and Affect: Mood normal.         Behavior: Behavior is cooperative.         Assessment/Plan  Problem List Items Addressed This Visit       Type 2 diabetes mellitus with hyperglycemia, with long-term current use of insulin (CMS/HCC)    Cirrhosis (CMS/HCC)    Lymphedema    Peripheral edema    Obesity (BMI 30-39.9)    Orthostatic hypotension    Thoracic aortic aneurysm (CMS/HCC)    Recurrent major depressive disorder, in remission (CMS/HCC)    SOB (shortness of breath) on exertion    Stage 3a chronic kidney disease    Weakness - Primary     Medications, treatments, and labs reviewed  Continue medications and treatments as listed in [facility]    Scribe Attestation  I, Donato Cervantesibgénesis   attest that this documentation has been prepared under the direction and in the presence of Pita Virgen MD.    Provider Attestation - Scribe documentation  All medical record entries made by the Scribe were at my direction and personally dictated by me. I have reviewed the chart and agree that the record accurately reflects my personal performance of the history, physical exam, discussion and plan.    Pita Virgen MD          Electronically Signed By: Pita PAYNE  MD Param   7/31/23  8:14 PM

## 2023-08-01 ENCOUNTER — NURSING HOME VISIT (OUTPATIENT)
Dept: POST ACUTE CARE | Facility: EXTERNAL LOCATION | Age: 73
End: 2023-08-01
Payer: MEDICARE

## 2023-08-01 DIAGNOSIS — S82.102D CLOSED FRACTURE OF PROXIMAL END OF LEFT TIBIA WITH ROUTINE HEALING, UNSPECIFIED FRACTURE MORPHOLOGY, SUBSEQUENT ENCOUNTER: ICD-10-CM

## 2023-08-01 DIAGNOSIS — R53.1 WEAKNESS: ICD-10-CM

## 2023-08-01 DIAGNOSIS — I10 HYPERTENSION, ESSENTIAL: ICD-10-CM

## 2023-08-01 DIAGNOSIS — I50.42 CHRONIC COMBINED SYSTOLIC AND DIASTOLIC CONGESTIVE HEART FAILURE (MULTI): Primary | ICD-10-CM

## 2023-08-01 PROBLEM — R40.0 DAYTIME SOMNOLENCE: Status: RESOLVED | Noted: 2023-04-10 | Resolved: 2023-08-01

## 2023-08-01 PROBLEM — L23.7 POISON IVY DERMATITIS: Status: RESOLVED | Noted: 2023-04-10 | Resolved: 2023-08-01

## 2023-08-01 PROBLEM — M25.561 BILATERAL CHRONIC KNEE PAIN: Status: RESOLVED | Noted: 2023-04-10 | Resolved: 2023-08-01

## 2023-08-01 PROBLEM — G89.29 BILATERAL CHRONIC KNEE PAIN: Status: RESOLVED | Noted: 2023-04-10 | Resolved: 2023-08-01

## 2023-08-01 PROBLEM — F51.04 CHRONIC INSOMNIA: Status: RESOLVED | Noted: 2023-04-10 | Resolved: 2023-08-01

## 2023-08-01 PROBLEM — R31.9 HEMATURIA: Status: RESOLVED | Noted: 2023-04-10 | Resolved: 2023-08-01

## 2023-08-01 PROBLEM — Z98.84 H/O BARIATRIC SURGERY: Status: RESOLVED | Noted: 2023-04-10 | Resolved: 2023-08-01

## 2023-08-01 PROBLEM — N20.0 KIDNEY STONE ON RIGHT SIDE: Status: RESOLVED | Noted: 2023-04-10 | Resolved: 2023-08-01

## 2023-08-01 PROBLEM — J45.909 REACTIVE AIRWAY DISEASE (HHS-HCC): Status: RESOLVED | Noted: 2023-04-10 | Resolved: 2023-08-01

## 2023-08-01 PROBLEM — M25.562 BILATERAL CHRONIC KNEE PAIN: Status: RESOLVED | Noted: 2023-04-10 | Resolved: 2023-08-01

## 2023-08-01 PROBLEM — F33.40 RECURRENT MAJOR DEPRESSIVE DISORDER, IN REMISSION (CMS-HCC): Status: RESOLVED | Noted: 2023-04-10 | Resolved: 2023-08-01

## 2023-08-01 PROBLEM — R53.81 MALAISE AND FATIGUE: Status: RESOLVED | Noted: 2023-04-10 | Resolved: 2023-08-01

## 2023-08-01 PROBLEM — R05.8 POST-VIRAL COUGH SYNDROME: Status: RESOLVED | Noted: 2023-04-10 | Resolved: 2023-08-01

## 2023-08-01 PROBLEM — J30.9 ALLERGIC RHINITIS: Status: RESOLVED | Noted: 2023-04-10 | Resolved: 2023-08-01

## 2023-08-01 PROBLEM — R53.83 MALAISE AND FATIGUE: Status: RESOLVED | Noted: 2023-04-10 | Resolved: 2023-08-01

## 2023-08-01 PROBLEM — M25.50 JOINT PAIN OF LEG: Status: RESOLVED | Noted: 2023-04-10 | Resolved: 2023-08-01

## 2023-08-01 PROBLEM — R19.5 POSITIVE COLORECTAL CANCER SCREENING USING COLOGUARD TEST: Status: RESOLVED | Noted: 2023-04-10 | Resolved: 2023-08-01

## 2023-08-01 PROBLEM — R63.5 ABNORMAL WEIGHT GAIN: Status: RESOLVED | Noted: 2023-04-10 | Resolved: 2023-08-01

## 2023-08-01 PROBLEM — F32.5 DEPRESSION, MAJOR, IN REMISSION (CMS-HCC): Status: RESOLVED | Noted: 2023-04-10 | Resolved: 2023-08-01

## 2023-08-01 PROBLEM — K52.9 CHRONIC DIARRHEA: Status: RESOLVED | Noted: 2023-04-10 | Resolved: 2023-08-01

## 2023-08-01 PROBLEM — J34.0 NASAL SEPTUM ULCERATION: Status: RESOLVED | Noted: 2023-04-10 | Resolved: 2023-08-01

## 2023-08-01 PROBLEM — Z89.422: Status: RESOLVED | Noted: 2023-04-10 | Resolved: 2023-08-01

## 2023-08-01 PROBLEM — H81.4 VERTIGO OF CENTRAL ORIGIN: Status: RESOLVED | Noted: 2023-04-10 | Resolved: 2023-08-01

## 2023-08-01 PROCEDURE — 99308 SBSQ NF CARE LOW MDM 20: CPT | Performed by: INTERNAL MEDICINE

## 2023-08-01 NOTE — LETTER
Patient: Teresa Matthews  : 1950    Encounter Date: 2023    PROGRESS NOTE    Subjective  Chief complaint: Teresa Matthews is a 72 y.o. female who is an acute skilled patient being seen and evaluated for weakness    HPI:  23 Patient was admitted to the skilled nursing facility from home. Patient has a past medical history of diabetes and respiratory failure.  patient mated to skilled nursing facility for therapy due to weakness after recent hospitalization status post fall. Patient was found to have acute comminuted fractures of proximal tibia and fibular diaphysis. She was admitted to the hospital and seen by Ortho who recommended conservative management. Pain was controlled and patient none weightbearing. She was discharged to skilled nursing facility for therapy and to continue medical management.admitted for weakness and need for PT OT    23  patient continues to work in therapy.  Requires assistance with transfers ADLs and mobility.     Patient remains been on a weight bearing. Pain from recent fracture is controlled with current medications.  denies chest pain or headache, shortness of breath orthopnea.  No acute distress.      Objective  Vital signs: 132/76, 98%    Physical Exam  Constitutional:       General: She is not in acute distress.  Eyes:      Extraocular Movements: Extraocular movements intact.   Cardiovascular:      Rate and Rhythm: Normal rate and regular rhythm.   Pulmonary:      Effort: Pulmonary effort is normal.      Breath sounds: Normal breath sounds.   Abdominal:      General: Bowel sounds are normal.      Palpations: Abdomen is soft.   Musculoskeletal:      Cervical back: Neck supple.      Right lower leg: No edema.      Left lower leg: No edema.      Comments:  immobilize the left lower extremity   nonweightbearing LLE   Neurological:      Mental Status: She is alert.   Psychiatric:         Mood and Affect: Mood normal.         Behavior: Behavior is  cooperative.         Assessment/Plan  Problem List Items Addressed This Visit       Chronic combined systolic and diastolic congestive heart failure (CMS/HCC) - Primary     Stable  Tosemide  Aldactone   BB  Monitor weight         Hypertension, essential     Controlled  Monitor blood pressure  Continue antihypertensives         Weakness     Continue working with therapy,   actively participating          Closed fracture of proximal end of left tibia with routine healing     Pain meds  Immobilizer   Therapy  Follow-up with Ortho          Medications, treatments, and labs reviewed  Continue medications and treatments as listed in [facility]    Scribe Attestation  By signing my name below, I, Monica Simons, Scribe   attest that this documentation has been prepared under the direction and in the presence of Pita Virgen MD.    Provider Attestation - Scribe documentation  All medical record entries made by the Scribe were at my direction and personally dictated by me. I have reviewed the chart and agree that the record accurately reflects my personal performance of the history, physical exam, discussion and plan.          Electronically Signed By: Pita Virgen MD   8/1/23 12:09 PM

## 2023-08-01 NOTE — PROGRESS NOTES
PROGRESS NOTE    Subjective   Chief complaint: Teresa Matthews is a 72 y.o. female who is an acute skilled patient being seen and evaluated for weakness    HPI:  8/31/23 Patient was admitted to the skilled nursing facility from home. Patient has a past medical history of diabetes and respiratory failure.  patient mated to skilled nursing facility for therapy due to weakness after recent hospitalization status post fall. Patient was found to have acute comminuted fractures of proximal tibia and fibular diaphysis. She was admitted to the hospital and seen by Ortho who recommended conservative management. Pain was controlled and patient none weightbearing. She was discharged to skilled nursing facility for therapy and to continue medical management.admitted for weakness and need for PT OT    8/1/23  patient continues to work in therapy.  Requires assistance with transfers ADLs and mobility.     Patient remains been on a weight bearing. Pain from recent fracture is controlled with current medications.  denies chest pain or headache, shortness of breath orthopnea.  No acute distress.      Objective   Vital signs: 132/76, 98%    Physical Exam  Constitutional:       General: She is not in acute distress.  Eyes:      Extraocular Movements: Extraocular movements intact.   Cardiovascular:      Rate and Rhythm: Normal rate and regular rhythm.   Pulmonary:      Effort: Pulmonary effort is normal.      Breath sounds: Normal breath sounds.   Abdominal:      General: Bowel sounds are normal.      Palpations: Abdomen is soft.   Musculoskeletal:      Cervical back: Neck supple.      Right lower leg: No edema.      Left lower leg: No edema.      Comments:  immobilize the left lower extremity   nonweightbearing LLE   Neurological:      Mental Status: She is alert.   Psychiatric:         Mood and Affect: Mood normal.         Behavior: Behavior is cooperative.         Assessment/Plan   Problem List Items Addressed This Visit        Chronic combined systolic and diastolic congestive heart failure (CMS/[facility]) - Primary     Stable  Tosemide  Aldactone   BB  Monitor weight         Hypertension, essential     Controlled  Monitor blood pressure  Continue antihypertensives         Weakness     Continue working with therapy,   actively participating          Closed fracture of proximal end of left tibia with routine healing     Pain meds  Immobilizer   Therapy  Follow-up with Ortho          Medications, treatments, and labs reviewed  Continue medications and treatments as listed in [facility]    Scribe Attestation  By signing my name below, I, Kirill De Oliveira   attest that this documentation has been prepared under the direction and in the presence of Pita Virgen MD.    Provider Attestation - Scribe documentation  All medical record entries made by the Scribe were at my direction and personally dictated by me. I have reviewed the chart and agree that the record accurately reflects my personal performance of the history, physical exam, discussion and plan.

## 2023-08-02 ENCOUNTER — NURSING HOME VISIT (OUTPATIENT)
Dept: POST ACUTE CARE | Facility: EXTERNAL LOCATION | Age: 73
End: 2023-08-02
Payer: MEDICARE

## 2023-08-02 DIAGNOSIS — J44.9 CHRONIC OBSTRUCTIVE PULMONARY DISEASE, UNSPECIFIED COPD TYPE (MULTI): ICD-10-CM

## 2023-08-02 DIAGNOSIS — I10 HYPERTENSION, ESSENTIAL: ICD-10-CM

## 2023-08-02 DIAGNOSIS — S82.102D CLOSED FRACTURE OF PROXIMAL END OF LEFT TIBIA WITH ROUTINE HEALING, UNSPECIFIED FRACTURE MORPHOLOGY, SUBSEQUENT ENCOUNTER: ICD-10-CM

## 2023-08-02 DIAGNOSIS — I50.42 CHRONIC COMBINED SYSTOLIC AND DIASTOLIC CONGESTIVE HEART FAILURE (MULTI): ICD-10-CM

## 2023-08-02 DIAGNOSIS — I48.91 ATRIAL FIBRILLATION, UNSPECIFIED TYPE (MULTI): ICD-10-CM

## 2023-08-02 DIAGNOSIS — R53.1 WEAKNESS: Primary | ICD-10-CM

## 2023-08-02 DIAGNOSIS — E11.65 TYPE 2 DIABETES MELLITUS WITH HYPERGLYCEMIA, WITH LONG-TERM CURRENT USE OF INSULIN (MULTI): ICD-10-CM

## 2023-08-02 DIAGNOSIS — Z79.4 TYPE 2 DIABETES MELLITUS WITH HYPERGLYCEMIA, WITH LONG-TERM CURRENT USE OF INSULIN (MULTI): ICD-10-CM

## 2023-08-02 PROCEDURE — 99309 SBSQ NF CARE MODERATE MDM 30: CPT | Performed by: NURSE PRACTITIONER

## 2023-08-02 NOTE — LETTER
Patient: Teresa Matthews  : 1950    Encounter Date: 2023    PROGRESS NOTE    Subjective  Chief complaint: Teresa Matthews is a 72 y.o. female who is a acute skilled care patient being seen and evaluated for weakness.    HPI:  23 Patient was admitted to the skilled nursing facility from home. Patient has a past medical history of diabetes and respiratory failure.  patient mated to skilled nursing facility for therapy due to weakness after recent hospitalization status post fall. Patient was found to have acute comminuted fractures of proximal tibia and fibular diaphysis. She was admitted to the hospital and seen by Ortho who recommended conservative management. Pain was controlled and patient none weightbearing. She was discharged to skilled nursing facility for therapy and to continue medical management.admitted for weakness and need for PT OT    23  patient continues to work in therapy.  Requires assistance with transfers ADLs and mobility.     Patient remains been on a weight bearing. Pain from recent fracture is controlled with current medications.  denies chest pain or headache, shortness of breath orthopnea.  No acute distress.    23  Patient working on RLE exercises in therapy.  Completed 3 sets of 15 reps with 2.5 pound weights.  Also worked on dynamic balance activities while sitting and weight shifting to improve safety and unsupported sit to stand.  Patient is independent for rolling left and right and requires minimal/moderate assistance for sit to supine.  Requires max assist/TD for sit to stand and SBA/CGA for chair<>bed.  Patient has no complaints.  Denies n/v/f/c and pain.  No new concerns reported by staff.      Objective  Vital signs:  18, 130/84, 97.1, 84, 99%,   Physical Exam  Constitutional:       General: She is not in acute distress.  Eyes:      Extraocular Movements: Extraocular movements intact.   Cardiovascular:      Rate and Rhythm: Normal rate.    Pulmonary:      Effort: Pulmonary effort is normal.   Musculoskeletal:      Cervical back: Neck supple.      Comments: Generalized weakness  Immobilizer LLE   Neurological:      Mental Status: She is alert.   Psychiatric:         Mood and Affect: Mood normal.         Behavior: Behavior is cooperative.         Assessment/Plan  Problem List Items Addressed This Visit       Atrial fibrillation (CMS/HCC)     Apixaban  Amiodarone  Metoprolol  Bleeding precautions  HR controlled         Chronic combined systolic and diastolic congestive heart failure (CMS/HCC)     Stable  Tosemide  Aldactone   BB  Monitor weight         Chronic obstructive pulmonary disease, unspecified COPD type (CMS/HCC)     Stable, no wheezing or shortness of breath  On room air         Closed fracture of proximal end of left tibia with routine healing     Pain controlled  Therapy  Immobilizer  Fu with ortho         Hypertension, essential     Monitor blood pressure  Continue antihypertensives         Type 2 diabetes mellitus with hyperglycemia, with long-term current use of insulin (CMS/[facility])     Continue insulin  Glucoscan with sliding scale insulin coverage  Low concentrated sweets diet  FBG at goal         Weakness - Primary     Continue with therapy          Medications, treatments, and labs reviewed  Continue medications and treatments as listed in EMR    Scribe Attestation  IMarzena Scribe   attest that this documentation has been prepared under the direction and in the presence of MARISELA Nelson    Provider Attestation - Scribe documentation  All medical record entries made by the Scribe were at my direction and personally dictated by me. I have reviewed the chart and agree that the record accurately reflects my personal performance of the history, physical exam, discussion and plan.   MARISELA Nelson        Electronically Signed By: MARISELA Nelson   8/8/23  5:21 PM

## 2023-08-03 ENCOUNTER — NURSING HOME VISIT (OUTPATIENT)
Dept: POST ACUTE CARE | Facility: EXTERNAL LOCATION | Age: 73
End: 2023-08-03
Payer: MEDICARE

## 2023-08-03 DIAGNOSIS — I50.42 CHRONIC COMBINED SYSTOLIC AND DIASTOLIC CONGESTIVE HEART FAILURE (MULTI): Primary | ICD-10-CM

## 2023-08-03 DIAGNOSIS — R53.1 WEAKNESS: ICD-10-CM

## 2023-08-03 DIAGNOSIS — I10 HYPERTENSION, ESSENTIAL: ICD-10-CM

## 2023-08-03 DIAGNOSIS — S82.102D CLOSED FRACTURE OF PROXIMAL END OF LEFT TIBIA WITH ROUTINE HEALING, UNSPECIFIED FRACTURE MORPHOLOGY, SUBSEQUENT ENCOUNTER: ICD-10-CM

## 2023-08-03 DIAGNOSIS — F41.9 ANXIETY: ICD-10-CM

## 2023-08-03 PROCEDURE — 99309 SBSQ NF CARE MODERATE MDM 30: CPT | Performed by: INTERNAL MEDICINE

## 2023-08-03 NOTE — PROGRESS NOTES
PROGRESS NOTE    Subjective   Chief complaint: Teresa Matthews is a 72 y.o. female who is a acute skilled care patient being seen and evaluated for weakness.    HPI:  7/31/23 Patient was admitted to the skilled nursing facility from home. Patient has a past medical history of diabetes and respiratory failure.  patient mated to skilled nursing facility for therapy due to weakness after recent hospitalization status post fall. Patient was found to have acute comminuted fractures of proximal tibia and fibular diaphysis. She was admitted to the hospital and seen by Ortho who recommended conservative management. Pain was controlled and patient none weightbearing. She was discharged to skilled nursing facility for therapy and to continue medical management.admitted for weakness and need for PT OT    8/1/23  patient continues to work in therapy.  Requires assistance with transfers ADLs and mobility.     Patient remains been on a weight bearing. Pain from recent fracture is controlled with current medications.  denies chest pain or headache, shortness of breath orthopnea.  No acute distress.    8/2/23  Patient working on RLE exercises in therapy.  Completed 3 sets of 15 reps with 2.5 pound weights.  Also worked on dynamic balance activities while sitting and weight shifting to improve safety and unsupported sit to stand.  Patient is independent for rolling left and right and requires minimal/moderate assistance for sit to supine.  Requires max assist/TD for sit to stand and SBA/CGA for chair<>bed.  Patient has no complaints.  Denies n/v/f/c and pain.  No new concerns reported by staff.      Objective   Vital signs:  18, 130/84, 97.1, 84, 99%,   Physical Exam  Constitutional:       General: She is not in acute distress.  Eyes:      Extraocular Movements: Extraocular movements intact.   Cardiovascular:      Rate and Rhythm: Normal rate.   Pulmonary:      Effort: Pulmonary effort is normal.   Musculoskeletal:       Cervical back: Neck supple.      Comments: Generalized weakness  Immobilizer LLE   Neurological:      Mental Status: She is alert.   Psychiatric:         Mood and Affect: Mood normal.         Behavior: Behavior is cooperative.         Assessment/Plan   Problem List Items Addressed This Visit       Atrial fibrillation (CMS/HCC)     Apixaban  Amiodarone  Metoprolol  Bleeding precautions  HR controlled         Chronic combined systolic and diastolic congestive heart failure (CMS/HCC)     Stable  Tosemide  Aldactone   BB  Monitor weight         Chronic obstructive pulmonary disease, unspecified COPD type (CMS/[facility])     Stable, no wheezing or shortness of breath  On room air         Closed fracture of proximal end of left tibia with routine healing     Pain controlled  Therapy  Immobilizer  Fu with ortho         Hypertension, essential     Monitor blood pressure  Continue antihypertensives         Type 2 diabetes mellitus with hyperglycemia, with long-term current use of insulin (CMS/[facility])     Continue insulin  Glucoscan with sliding scale insulin coverage  Low concentrated sweets diet  FBG at goal         Weakness - Primary     Continue with therapy          Medications, treatments, and labs reviewed  Continue medications and treatments as listed in EMR    Scribe Attestation  IMarzena Scribe   attest that this documentation has been prepared under the direction and in the presence of MARISELA Nelson    Provider Attestation - Scribe documentation  All medical record entries made by the Scribe were at my direction and personally dictated by me. I have reviewed the chart and agree that the record accurately reflects my personal performance of the history, physical exam, discussion and plan.   MARISELA Nelson

## 2023-08-03 NOTE — LETTER
Patient: Teresa Matthews  : 1950    Encounter Date: 2023    PROGRESS NOTE    Subjective  Chief complaint: Teresa Matthews is a 72 y.o. female who is a long term care patient being seen and evaluated for monthly general medical care and follow-up.    HPI:  Patient seen and examined at bedside. No new issues according to nurse. Patient continues working in therapy due to weakness, requires assistance for transfers and ADLs. Denies feeling anxious, nervous or afraid. Denies SOB or orthopnea. Denies chest pain or headache. Pain from recent fracture controlled. No acute distress.       Objective  Vital signs: 133/76, 98%    Physical Exam  Constitutional:       General: She is not in acute distress.  Eyes:      Extraocular Movements: Extraocular movements intact.   Cardiovascular:      Rate and Rhythm: Normal rate and regular rhythm.   Pulmonary:      Effort: Pulmonary effort is normal.      Breath sounds: Normal breath sounds.   Abdominal:      General: Bowel sounds are normal.      Palpations: Abdomen is soft.   Musculoskeletal:      Cervical back: Neck supple.      Right lower leg: No edema.      Left lower leg: No edema.   Neurological:      Mental Status: She is alert.   Psychiatric:         Mood and Affect: Mood normal.         Behavior: Behavior is cooperative.         Assessment/Plan  Problem List Items Addressed This Visit       Anxiety     Controlled  Continue current meds         Chronic combined systolic and diastolic congestive heart failure (CMS/HCC) - Primary     Stable  Tosemide  Aldactone   BB  Monitor weight         Hypertension, essential     Controlled  Monitor blood pressure  Continue antihypertensives         Weakness     Continue working with therapy, actively participating          Closed fracture of proximal end of left tibia with routine healing     Pain meds  Immobilizer   Therapy  Follow-up with Ortho          Medications, treatments, and labs reviewed  Continue  medications and treatments as listed in [facility]    Scribe Attestation  By signing my name below, I, Kirill De Oliveira   attest that this documentation has been prepared under the direction and in the presence of Pita Virgen MD.    Provider Attestation - Scribe documentation  All medical record entries made by the Scribe were at my direction and personally dictated by me. I have reviewed the chart and agree that the record accurately reflects my personal performance of the history, physical exam, discussion and plan.    1. Chronic combined systolic and diastolic congestive heart failure (CMS/HCC)        2. Closed fracture of proximal end of left tibia with routine healing, unspecified fracture morphology, subsequent encounter        3. Hypertension, essential        4. Weakness        5. Anxiety               Electronically Signed By: Pita Virgen MD   8/4/23 10:11 AM

## 2023-08-04 ENCOUNTER — NURSING HOME VISIT (OUTPATIENT)
Dept: POST ACUTE CARE | Facility: EXTERNAL LOCATION | Age: 73
End: 2023-08-04
Payer: MEDICARE

## 2023-08-04 DIAGNOSIS — E11.65 TYPE 2 DIABETES MELLITUS WITH HYPERGLYCEMIA, WITH LONG-TERM CURRENT USE OF INSULIN (MULTI): ICD-10-CM

## 2023-08-04 DIAGNOSIS — I48.91 ATRIAL FIBRILLATION, UNSPECIFIED TYPE (MULTI): ICD-10-CM

## 2023-08-04 DIAGNOSIS — S82.102D CLOSED FRACTURE OF PROXIMAL END OF LEFT TIBIA WITH ROUTINE HEALING, UNSPECIFIED FRACTURE MORPHOLOGY, SUBSEQUENT ENCOUNTER: ICD-10-CM

## 2023-08-04 DIAGNOSIS — I10 HYPERTENSION, ESSENTIAL: ICD-10-CM

## 2023-08-04 DIAGNOSIS — R53.1 WEAKNESS: Primary | ICD-10-CM

## 2023-08-04 DIAGNOSIS — I50.42 CHRONIC COMBINED SYSTOLIC AND DIASTOLIC CONGESTIVE HEART FAILURE (MULTI): ICD-10-CM

## 2023-08-04 DIAGNOSIS — Z79.4 TYPE 2 DIABETES MELLITUS WITH HYPERGLYCEMIA, WITH LONG-TERM CURRENT USE OF INSULIN (MULTI): ICD-10-CM

## 2023-08-04 PROCEDURE — 99309 SBSQ NF CARE MODERATE MDM 30: CPT | Performed by: NURSE PRACTITIONER

## 2023-08-04 NOTE — LETTER
Patient: Teresa Matthews  : 1950    Encounter Date: 2023    PROGRESS NOTE    Subjective  Chief complaint: Teresa Matthews is a 72 y.o. female who is a acute skilled care patient being seen and evaluated for weakness.    HPI:  23 Patient was admitted to the skilled nursing facility from home. Patient has a past medical history of diabetes and respiratory failure.  patient mated to skilled nursing facility for therapy due to weakness after recent hospitalization status post fall. Patient was found to have acute comminuted fractures of proximal tibia and fibular diaphysis. She was admitted to the hospital and seen by Ortho who recommended conservative management. Pain was controlled and patient none weightbearing. She was discharged to skilled nursing facility for therapy and to continue medical management.admitted for weakness and need for PT OT    23  patient continues to work in therapy.  Requires assistance with transfers ADLs and mobility.     Patient remains been on a weight bearing. Pain from recent fracture is controlled with current medications.  denies chest pain or headache, shortness of breath orthopnea.  No acute distress.    23  Patient working on RLE exercises in therapy.  Completed 3 sets of 15 reps with 2.5 pound weights.  Also worked on dynamic balance activities while sitting and weight shifting to improve safety and unsupported sit to stand.  Patient is independent for rolling left and right and requires minimal/moderate assistance for sit to supine.  Requires max assist/TD for sit to stand and SBA/CGA for chair<>bed.  Patient has no complaints.  Denies n/v/f/c and pain.  No new concerns reported by staff.    8/3/23  Patient seen and examined at bedside. No new issues according to nurse. Patient continues working in therapy due to weakness, requires assistance for transfers and ADLs. Denies feeling anxious, nervous or afraid. Denies SOB or orthopnea. Denies chest  pain or headache. Pain from recent fracture controlled. No acute distress.     8/4/23   Patient is actively participating with skilled interventions.  Working on seated BLE therapy exercises completed 2 sets of 15 reps with 1.5 pound weights.  Perform slide board transfer from bed to wheelchair with standby assist and sit to stand at parallel bars x 2 with max assist.  Patient has follow-up with Ortho today.  Patient has no new complaints.   No new concerns reported by nursing.        Objective  Vital signs:   148/92, 97.1, 73, 18, 99%,   Physical Exam  Constitutional:       General: She is not in acute distress.  Eyes:      Extraocular Movements: Extraocular movements intact.   Cardiovascular:      Rate and Rhythm: Normal rate.   Pulmonary:      Effort: Pulmonary effort is normal.   Musculoskeletal:      Cervical back: Neck supple.      Comments: Generalized weakness  Immobilizer LLE   Neurological:      Mental Status: She is alert.   Psychiatric:         Mood and Affect: Mood normal.         Behavior: Behavior is cooperative.         Assessment/Plan   Problem List Items Addressed This Visit       Atrial fibrillation (CMS/HCC)     Apixaban  Amiodarone  Metoprolol  Bleeding precautions  HR controlled         Chronic combined systolic and diastolic congestive heart failure (CMS/HCC)     Stable  Tosemide  Aldactone   BB  Monitor weight         Chronic obstructive pulmonary disease, unspecified COPD type (CMS/HCC)     Stable, no wheezing or shortness of breath  On room air         Closed fracture of proximal end of left tibia with routine healing     Pain controlled  Therapy  Immobilizer  Patient has fu with ortho today         Hypertension, essential     Monitor blood pressure  Continue antihypertensives         Type 2 diabetes mellitus with hyperglycemia, with long-term current use of insulin (CMS/HCC)     Continue insulin  Glucoscan with sliding scale insulin coverage  Low concentrated sweets diet  FBG at  goal         Weakness - Primary     Continue working with therapy          Medications, treatments, and labs reviewed  Continue medications and treatments as listed in EMR    Scribe Attestation  IMarzena Scribe   attest that this documentation has been prepared under the direction and in the presence of MARISELA Nelson    Provider Attestation - Scribe documentation  All medical record entries made by the Scribe were at my direction and personally dictated by me. I have reviewed the chart and agree that the record accurately reflects my personal performance of the history, physical exam, discussion and plan.   MARISELA Nleson        Electronically Signed By: MARISELA Nelson   8/15/23  3:29 PM

## 2023-08-04 NOTE — PROGRESS NOTES
PROGRESS NOTE    Subjective   Chief complaint: Teresa Matthews is a 72 y.o. female who is a long term care patient being seen and evaluated for monthly general medical care and follow-up.    HPI:  Patient seen and examined at bedside. No new issues according to nurse. Patient continues working in therapy due to weakness, requires assistance for transfers and ADLs. Denies feeling anxious, nervous or afraid. Denies SOB or orthopnea. Denies chest pain or headache. Pain from recent fracture controlled. No acute distress.       Objective   Vital signs: 133/76, 98%    Physical Exam  Constitutional:       General: She is not in acute distress.  Eyes:      Extraocular Movements: Extraocular movements intact.   Cardiovascular:      Rate and Rhythm: Normal rate and regular rhythm.   Pulmonary:      Effort: Pulmonary effort is normal.      Breath sounds: Normal breath sounds.   Abdominal:      General: Bowel sounds are normal.      Palpations: Abdomen is soft.   Musculoskeletal:      Cervical back: Neck supple.      Right lower leg: No edema.      Left lower leg: No edema.   Neurological:      Mental Status: She is alert.   Psychiatric:         Mood and Affect: Mood normal.         Behavior: Behavior is cooperative.         Assessment/Plan   Problem List Items Addressed This Visit       Anxiety     Controlled  Continue current meds         Chronic combined systolic and diastolic congestive heart failure (CMS/HCC) - Primary     Stable  Tosemide  Aldactone   BB  Monitor weight         Hypertension, essential     Controlled  Monitor blood pressure  Continue antihypertensives         Weakness     Continue working with therapy, actively participating          Closed fracture of proximal end of left tibia with routine healing     Pain meds  Immobilizer   Therapy  Follow-up with Ortho          Medications, treatments, and labs reviewed  Continue medications and treatments as listed in [facility]    Scribe Attestation  By signing my  name below, I, Kirill De Oliveira   attest that this documentation has been prepared under the direction and in the presence of Pita Virgen MD.    Provider Attestation - Scribe documentation  All medical record entries made by the Scribe were at my direction and personally dictated by me. I have reviewed the chart and agree that the record accurately reflects my personal performance of the history, physical exam, discussion and plan.    1. Chronic combined systolic and diastolic congestive heart failure (CMS/HCC)        2. Closed fracture of proximal end of left tibia with routine healing, unspecified fracture morphology, subsequent encounter        3. Hypertension, essential        4. Weakness        5. Anxiety

## 2023-08-07 ENCOUNTER — NURSING HOME VISIT (OUTPATIENT)
Dept: POST ACUTE CARE | Facility: EXTERNAL LOCATION | Age: 73
End: 2023-08-07
Payer: MEDICARE

## 2023-08-07 DIAGNOSIS — R53.1 WEAKNESS: Primary | ICD-10-CM

## 2023-08-07 DIAGNOSIS — I50.42 CHRONIC COMBINED SYSTOLIC AND DIASTOLIC CONGESTIVE HEART FAILURE (MULTI): ICD-10-CM

## 2023-08-07 DIAGNOSIS — S82.102D CLOSED FRACTURE OF PROXIMAL END OF LEFT TIBIA WITH ROUTINE HEALING, UNSPECIFIED FRACTURE MORPHOLOGY, SUBSEQUENT ENCOUNTER: ICD-10-CM

## 2023-08-07 DIAGNOSIS — Z79.4 TYPE 2 DIABETES MELLITUS WITH HYPERGLYCEMIA, WITH LONG-TERM CURRENT USE OF INSULIN (MULTI): ICD-10-CM

## 2023-08-07 DIAGNOSIS — E11.65 TYPE 2 DIABETES MELLITUS WITH HYPERGLYCEMIA, WITH LONG-TERM CURRENT USE OF INSULIN (MULTI): ICD-10-CM

## 2023-08-07 DIAGNOSIS — I10 HYPERTENSION, ESSENTIAL: ICD-10-CM

## 2023-08-07 PROCEDURE — 99309 SBSQ NF CARE MODERATE MDM 30: CPT | Performed by: INTERNAL MEDICINE

## 2023-08-07 NOTE — PROGRESS NOTES
PROGRESS NOTE    Subjective   Chief complaint: Teresa Matthews is a 72 y.o. female who is an acute skilled patient being seen and evaluated for weakness    HPI:  7/31/23 Patient was admitted to the skilled nursing facility from home. Patient has a past medical history of diabetes and respiratory failure.  patient mated to skilled nursing facility for therapy due to weakness after recent hospitalization status post fall. Patient was found to have acute comminuted fractures of proximal tibia and fibular diaphysis. She was admitted to the hospital and seen by Ortho who recommended conservative management. Pain was controlled and patient none weightbearing. She was discharged to skilled nursing facility for therapy and to continue medical management.admitted for weakness and need for PT OT    8/1/23  patient continues to work in therapy.  Requires assistance with transfers ADLs and mobility.     Patient remains been on a weight bearing. Pain from recent fracture is controlled with current medications.  denies chest pain or headache, shortness of breath orthopnea.  No acute distress.    8/2/23  Patient working on RLE exercises in therapy.  Completed 3 sets of 15 reps with 2.5 pound weights.  Also worked on dynamic balance activities while sitting and weight shifting to improve safety and unsupported sit to stand.  Patient is independent for rolling left and right and requires minimal/moderate assistance for sit to supine.  Requires max assist/TD for sit to stand and SBA/CGA for chair<>bed.  Patient has no complaints.  Denies n/v/f/c and pain.  No new concerns reported by staff.    8/7/2023 Therapy has been working with the patient to improve strength and endurance with ADLs, transfers, and mobility.  Patient continues to work toward goals.  Patient utilizing recumbent bike x 15 mins. Pt requiring mod\max assistance with bed mobility and transfers. Patient is stable and has no new complaints.  Nursing staff voices no  new concerns today.      Objective   Vital signs: 134/65,64,18,97.5,96%, BS 85    Physical Exam  Constitutional:       General: She is not in acute distress.  Eyes:      Extraocular Movements: Extraocular movements intact.   Cardiovascular:      Rate and Rhythm: Normal rate and regular rhythm.   Pulmonary:      Effort: Pulmonary effort is normal.      Breath sounds: Normal breath sounds.   Abdominal:      General: Bowel sounds are normal.      Palpations: Abdomen is soft.   Musculoskeletal:      Cervical back: Neck supple.      Right lower leg: No edema.      Left lower leg: No edema.   Neurological:      Mental Status: She is alert.   Psychiatric:         Mood and Affect: Mood normal.         Behavior: Behavior is cooperative.         Assessment/Plan   Problem List Items Addressed This Visit       Type 2 diabetes mellitus with hyperglycemia, with long-term current use of insulin (CMS/[facility])     Continue metformin and insulin  Glucoscan with sliding scale insulin coverage  Low concentrated sweets diet         Chronic combined systolic and diastolic congestive heart failure (CMS/HCC)    Hypertension, essential     Monitor blood pressure  Continue antihypertensives         Weakness - Primary     Continue with therapy         Closed fracture of proximal end of left tibia with routine healing     Pain meds  Therapy  Continue NWB to LLE x 6 more weeks           Medications, treatments, and labs reviewed  Continue medications and treatments as listed in EMR    Scribe Attestation  I, Sharon Christiansen, Scribe   attest that this documentation has been prepared under the direction and in the presence of Pita Virgen MD.     Provider Attestation - Scribe documentation  All medical record entries made by the Scribe were at my direction and personally dictated by me. I have reviewed the chart and agree that the record accurately reflects my personal performance of the history, physical exam, discussion and plan.   Pita PAYNE  MD Param

## 2023-08-07 NOTE — LETTER
Patient: Teresa Matthews  : 1950    Encounter Date: 2023    PROGRESS NOTE    Subjective  Chief complaint: Teresa Matthews is a 72 y.o. female who is an acute skilled patient being seen and evaluated for weakness    HPI:  23 Patient was admitted to the skilled nursing facility from home. Patient has a past medical history of diabetes and respiratory failure.  patient mated to skilled nursing facility for therapy due to weakness after recent hospitalization status post fall. Patient was found to have acute comminuted fractures of proximal tibia and fibular diaphysis. She was admitted to the hospital and seen by Ortho who recommended conservative management. Pain was controlled and patient none weightbearing. She was discharged to skilled nursing facility for therapy and to continue medical management.admitted for weakness and need for PT OT    23  patient continues to work in therapy.  Requires assistance with transfers ADLs and mobility.     Patient remains been on a weight bearing. Pain from recent fracture is controlled with current medications.  denies chest pain or headache, shortness of breath orthopnea.  No acute distress.    23  Patient working on RLE exercises in therapy.  Completed 3 sets of 15 reps with 2.5 pound weights.  Also worked on dynamic balance activities while sitting and weight shifting to improve safety and unsupported sit to stand.  Patient is independent for rolling left and right and requires minimal/moderate assistance for sit to supine.  Requires max assist/TD for sit to stand and SBA/CGA for chair<>bed.  Patient has no complaints.  Denies n/v/f/c and pain.  No new concerns reported by staff.    2023 Therapy has been working with the patient to improve strength and endurance with ADLs, transfers, and mobility.  Patient continues to work toward goals.  Patient utilizing recumbent bike x 15 mins. Pt requiring mod\max assistance with bed mobility and  transfers. Patient is stable and has no new complaints.  Nursing staff voices no new concerns today.      Objective  Vital signs: 134/65,64,18,97.5,96%, BS 85    Physical Exam  Constitutional:       General: She is not in acute distress.  Eyes:      Extraocular Movements: Extraocular movements intact.   Cardiovascular:      Rate and Rhythm: Normal rate and regular rhythm.   Pulmonary:      Effort: Pulmonary effort is normal.      Breath sounds: Normal breath sounds.   Abdominal:      General: Bowel sounds are normal.      Palpations: Abdomen is soft.   Musculoskeletal:      Cervical back: Neck supple.      Right lower leg: No edema.      Left lower leg: No edema.   Neurological:      Mental Status: She is alert.   Psychiatric:         Mood and Affect: Mood normal.         Behavior: Behavior is cooperative.         Assessment/Plan  Problem List Items Addressed This Visit       Type 2 diabetes mellitus with hyperglycemia, with long-term current use of insulin (CMS/[facility])     Continue metformin and insulin  Glucoscan with sliding scale insulin coverage  Low concentrated sweets diet         Chronic combined systolic and diastolic congestive heart failure (CMS/HCC)    Hypertension, essential     Monitor blood pressure  Continue antihypertensives         Weakness - Primary     Continue with therapy         Closed fracture of proximal end of left tibia with routine healing     Pain meds  Therapy  Continue NWB to LLE x 6 more weeks           Medications, treatments, and labs reviewed  Continue medications and treatments as listed in EMR    Scribe Attestation  I, Kirill Chapman   attest that this documentation has been prepared under the direction and in the presence of Pita Virgen MD.     Provider Attestation - Scribe documentation  All medical record entries made by the Scribe were at my direction and personally dictated by me. I have reviewed the chart and agree that the record accurately reflects my personal  performance of the history, physical exam, discussion and plan.   Pita Virgen MD      Electronically Signed By: Pita Virgen MD   8/7/23  5:42 PM

## 2023-08-08 ENCOUNTER — NURSING HOME VISIT (OUTPATIENT)
Dept: POST ACUTE CARE | Facility: EXTERNAL LOCATION | Age: 73
End: 2023-08-08
Payer: MEDICARE

## 2023-08-08 DIAGNOSIS — R53.1 WEAKNESS: ICD-10-CM

## 2023-08-08 DIAGNOSIS — I10 HYPERTENSION, ESSENTIAL: Primary | ICD-10-CM

## 2023-08-08 PROCEDURE — 99308 SBSQ NF CARE LOW MDM 20: CPT | Performed by: INTERNAL MEDICINE

## 2023-08-08 NOTE — ASSESSMENT & PLAN NOTE
Continue insulin  Glucoscan with sliding scale insulin coverage  Low concentrated sweets diet  FBG at goal

## 2023-08-08 NOTE — LETTER
Patient: Teresa Matthews  : 1950    Encounter Date: 2023    PROGRESS NOTE    Subjective  Chief complaint: Teresa Matthews is a 72 y.o. female who is an acute skilled patient being seen and evaluated for weakness    HPI:  23 Patient was admitted to the skilled nursing facility from home. Patient has a past medical history of diabetes and respiratory failure.  patient mated to skilled nursing facility for therapy due to weakness after recent hospitalization status post fall. Patient was found to have acute comminuted fractures of proximal tibia and fibular diaphysis. She was admitted to the hospital and seen by Ortho who recommended conservative management. Pain was controlled and patient none weightbearing. She was discharged to skilled nursing facility for therapy and to continue medical management.admitted for weakness and need for PT OT    23  patient continues to work in therapy.  Requires assistance with transfers ADLs and mobility.     Patient remains been on a weight bearing. Pain from recent fracture is controlled with current medications.  denies chest pain or headache, shortness of breath orthopnea.  No acute distress.    23  Patient working on RLE exercises in therapy.  Completed 3 sets of 15 reps with 2.5 pound weights.  Also worked on dynamic balance activities while sitting and weight shifting to improve safety and unsupported sit to stand.  Patient is independent for rolling left and right and requires minimal/moderate assistance for sit to supine.  Requires max assist/TD for sit to stand and SBA/CGA for chair<>bed.  Patient has no complaints.  Denies n/v/f/c and pain.  No new concerns reported by staff.    2023 Therapy has been working with the patient to improve strength and endurance with ADLs, transfers, and mobility.  Patient continues to work toward goals.  Patient utilizing recumbent bike x 15 mins. Pt requiring mod\max assistance with bed mobility and  transfers. Patient is stable and has no new complaints.  Nursing staff voices no new concerns today.    8/8/23 Patient working in therapy due to weakness. Requires mod/max assistance with bed mobility and transfers. Denies chest pain or headache. No acute distress.       Objective  Vital signs: 132/67, 96%,     Physical Exam  Constitutional:       General: She is not in acute distress.  Eyes:      Extraocular Movements: Extraocular movements intact.   Cardiovascular:      Rate and Rhythm: Normal rate and regular rhythm.   Pulmonary:      Effort: Pulmonary effort is normal.      Breath sounds: Normal breath sounds.   Abdominal:      General: Bowel sounds are normal.      Palpations: Abdomen is soft.   Musculoskeletal:      Cervical back: Neck supple.      Right lower leg: No edema.      Left lower leg: No edema.   Neurological:      Mental Status: She is alert.   Psychiatric:         Mood and Affect: Mood normal.         Behavior: Behavior is cooperative.         Assessment/Plan  Problem List Items Addressed This Visit    None  Medications, treatments, and labs reviewed  Continue medications and treatments as listed in EMR    Scribe Attestation  IMonica Scribe   attest that this documentation has been prepared under the direction and in the presence of Pita Virgen MD.     Provider Attestation - Scribe documentation  All medical record entries made by the Scribe were at my direction and personally dictated by me. I have reviewed the chart and agree that the record accurately reflects my personal performance of the history, physical exam, discussion and plan.   Pita Virgen MD  1. Hypertension, essential        2. Weakness              Electronically Signed By: Pita Virgen MD   8/8/23  2:03 PM

## 2023-08-08 NOTE — PROGRESS NOTES
PROGRESS NOTE    Subjective   Chief complaint: Teresa Matthews is a 72 y.o. female who is an acute skilled patient being seen and evaluated for weakness    HPI:  7/31/23 Patient was admitted to the skilled nursing facility from home. Patient has a past medical history of diabetes and respiratory failure.  patient mated to skilled nursing facility for therapy due to weakness after recent hospitalization status post fall. Patient was found to have acute comminuted fractures of proximal tibia and fibular diaphysis. She was admitted to the hospital and seen by Ortho who recommended conservative management. Pain was controlled and patient none weightbearing. She was discharged to skilled nursing facility for therapy and to continue medical management.admitted for weakness and need for PT OT    8/1/23  patient continues to work in therapy.  Requires assistance with transfers ADLs and mobility.     Patient remains been on a weight bearing. Pain from recent fracture is controlled with current medications.  denies chest pain or headache, shortness of breath orthopnea.  No acute distress.    8/2/23  Patient working on RLE exercises in therapy.  Completed 3 sets of 15 reps with 2.5 pound weights.  Also worked on dynamic balance activities while sitting and weight shifting to improve safety and unsupported sit to stand.  Patient is independent for rolling left and right and requires minimal/moderate assistance for sit to supine.  Requires max assist/TD for sit to stand and SBA/CGA for chair<>bed.  Patient has no complaints.  Denies n/v/f/c and pain.  No new concerns reported by staff.    8/7/2023 Therapy has been working with the patient to improve strength and endurance with ADLs, transfers, and mobility.  Patient continues to work toward goals.  Patient utilizing recumbent bike x 15 mins. Pt requiring mod\max assistance with bed mobility and transfers. Patient is stable and has no new complaints.  Nursing staff voices no  new concerns today.    8/8/23 Patient working in therapy due to weakness. Requires mod/max assistance with bed mobility and transfers. Denies chest pain or headache. No acute distress.       Objective   Vital signs: 132/67, 96%,     Physical Exam  Constitutional:       General: She is not in acute distress.  Eyes:      Extraocular Movements: Extraocular movements intact.   Cardiovascular:      Rate and Rhythm: Normal rate and regular rhythm.   Pulmonary:      Effort: Pulmonary effort is normal.      Breath sounds: Normal breath sounds.   Abdominal:      General: Bowel sounds are normal.      Palpations: Abdomen is soft.   Musculoskeletal:      Cervical back: Neck supple.      Right lower leg: No edema.      Left lower leg: No edema.   Neurological:      Mental Status: She is alert.   Psychiatric:         Mood and Affect: Mood normal.         Behavior: Behavior is cooperative.         Assessment/Plan   Problem List Items Addressed This Visit    None  Medications, treatments, and labs reviewed  Continue medications and treatments as listed in EMR    Scribe Attestation  Monica CHICAS Scribe   attest that this documentation has been prepared under the direction and in the presence of Pita Virgen MD.     Provider Attestation - Scribe documentation  All medical record entries made by the Scribe were at my direction and personally dictated by me. I have reviewed the chart and agree that the record accurately reflects my personal performance of the history, physical exam, discussion and plan.   Pita Virgen MD  1. Hypertension, essential        2. Weakness

## 2023-08-09 ENCOUNTER — TELEPHONE (OUTPATIENT)
Dept: PRIMARY CARE | Facility: CLINIC | Age: 73
End: 2023-08-09
Payer: MEDICARE

## 2023-08-09 ENCOUNTER — NURSING HOME VISIT (OUTPATIENT)
Dept: POST ACUTE CARE | Facility: EXTERNAL LOCATION | Age: 73
End: 2023-08-09
Payer: MEDICARE

## 2023-08-09 DIAGNOSIS — I50.42 CHRONIC COMBINED SYSTOLIC AND DIASTOLIC CONGESTIVE HEART FAILURE (MULTI): ICD-10-CM

## 2023-08-09 DIAGNOSIS — M48.02 DEGENERATIVE CERVICAL SPINAL STENOSIS: ICD-10-CM

## 2023-08-09 DIAGNOSIS — L97.528: Primary | ICD-10-CM

## 2023-08-09 DIAGNOSIS — J44.9 CHRONIC OBSTRUCTIVE PULMONARY DISEASE, UNSPECIFIED COPD TYPE (MULTI): ICD-10-CM

## 2023-08-09 DIAGNOSIS — M17.0 PRIMARY OSTEOARTHRITIS OF BOTH KNEES: ICD-10-CM

## 2023-08-09 DIAGNOSIS — E11.65 TYPE 2 DIABETES MELLITUS WITH HYPERGLYCEMIA, WITH LONG-TERM CURRENT USE OF INSULIN (MULTI): ICD-10-CM

## 2023-08-09 DIAGNOSIS — E66.09 CLASS 1 OBESITY DUE TO EXCESS CALORIES WITH SERIOUS COMORBIDITY AND BODY MASS INDEX (BMI) OF 33.0 TO 33.9 IN ADULT: ICD-10-CM

## 2023-08-09 DIAGNOSIS — S82.102D CLOSED FRACTURE OF PROXIMAL END OF LEFT TIBIA WITH ROUTINE HEALING, UNSPECIFIED FRACTURE MORPHOLOGY, SUBSEQUENT ENCOUNTER: ICD-10-CM

## 2023-08-09 DIAGNOSIS — I48.91 ATRIAL FIBRILLATION, UNSPECIFIED TYPE (MULTI): ICD-10-CM

## 2023-08-09 DIAGNOSIS — Z79.4 TYPE 2 DIABETES MELLITUS WITH HYPERGLYCEMIA, WITH LONG-TERM CURRENT USE OF INSULIN (MULTI): ICD-10-CM

## 2023-08-09 DIAGNOSIS — J96.01 ACUTE RESPIRATORY FAILURE WITH HYPOXIA (MULTI): ICD-10-CM

## 2023-08-09 DIAGNOSIS — I10 HYPERTENSION, ESSENTIAL: ICD-10-CM

## 2023-08-09 DIAGNOSIS — R53.81 DEBILITY: ICD-10-CM

## 2023-08-09 DIAGNOSIS — R53.1 WEAKNESS: Primary | ICD-10-CM

## 2023-08-09 PROCEDURE — 99309 SBSQ NF CARE MODERATE MDM 30: CPT | Performed by: NURSE PRACTITIONER

## 2023-08-09 NOTE — LETTER
Patient: Teresa Matthews  : 1950    Encounter Date: 2023    PROGRESS NOTE    Subjective  Chief complaint: Teresa Matthews is a 72 y.o. female who is a acute skilled care patient being seen and evaluated for weakness.    HPI:  23 Patient was admitted to the skilled nursing facility from home. Patient has a past medical history of diabetes and respiratory failure.  patient mated to skilled nursing facility for therapy due to weakness after recent hospitalization status post fall. Patient was found to have acute comminuted fractures of proximal tibia and fibular diaphysis. She was admitted to the hospital and seen by Ortho who recommended conservative management. Pain was controlled and patient none weightbearing. She was discharged to skilled nursing facility for therapy and to continue medical management.admitted for weakness and need for PT OT    23  patient continues to work in therapy.  Requires assistance with transfers ADLs and mobility.     Patient remains been on a weight bearing. Pain from recent fracture is controlled with current medications.  denies chest pain or headache, shortness of breath orthopnea.  No acute distress.    23  Patient working on RLE exercises in therapy.  Completed 3 sets of 15 reps with 2.5 pound weights.  Also worked on dynamic balance activities while sitting and weight shifting to improve safety and unsupported sit to stand.  Patient is independent for rolling left and right and requires minimal/moderate assistance for sit to supine.  Requires max assist/TD for sit to stand and SBA/CGA for chair<>bed.  Patient has no complaints.  Denies n/v/f/c and pain.  No new concerns reported by staff.    2023 Therapy has been working with the patient to improve strength and endurance with ADLs, transfers, and mobility.  Patient continues to work toward goals.  Patient utilizing recumbent bike x 15 mins. Pt requiring mod\max assistance with bed mobility  and transfers. Patient is stable and has no new complaints.  Nursing staff voices no new concerns today.    8/8/23 Patient working in therapy due to weakness. Requires mod/max assistance with bed mobility and transfers. Denies chest pain or headache. No acute distress.     8/9/23   Patient has been working in therapy to improve strength, endurance, and ADLs.  Patient continues to work toward goals.  Received [facility] with LCD of 8/10/23.  No new concerns reported by nursing today.  Patient denies n/v/f/c pain.        Objective  Vital signs: 18, 142/89, 97.0, 64, 97%,   Physical Exam  Constitutional:       General: She is not in acute distress.  Eyes:      Extraocular Movements: Extraocular movements intact.   Cardiovascular:      Rate and Rhythm: Normal rate.   Pulmonary:      Effort: Pulmonary effort is normal.   Musculoskeletal:      Cervical back: Neck supple.      Comments: Generalized weakness     Neurological:      Mental Status: She is alert.   Psychiatric:         Mood and Affect: Mood normal.         Behavior: Behavior is cooperative.         Assessment/Plan  Problem List Items Addressed This Visit       Atrial fibrillation (CMS/HCC)     Apixaban  Amiodarone  Metoprolol  Bleeding precautions  HR controlled         Chronic combined systolic and diastolic congestive heart failure (CMS/HCC)     Stable  Tosemide  Aldactone   BB  Monitor weight         Chronic obstructive pulmonary disease, unspecified COPD type (CMS/HCC)     Stable, no wheezing or shortness of breath  On room air         Closed fracture of proximal end of left tibia with routine healing     Pain controlled  Therapy  Immobilizer  Fu with ortho         Hypertension, essential     Monitor blood pressure  Continue antihypertensives         Type 2 diabetes mellitus with hyperglycemia, with long-term current use of insulin (CMS/HCC)     Continue insulin  Glucoscan with sliding scale insulin coverage  Low concentrated sweets diet  FBG at goal          Weakness - Primary     Continue working with therapy          Medications, treatments, and labs reviewed  Continue medications and treatments as listed in EMR    Scribe Attestation  IMarzena Scribe   attest that this documentation has been prepared under the direction and in the presence of MARISELA Nelson    Provider Attestation - Scribe documentation  All medical record entries made by the Scribe were at my direction and personally dictated by me. I have reviewed the chart and agree that the record accurately reflects my personal performance of the history, physical exam, discussion and plan.   MARISELA Nelson        Electronically Signed By: MARISELA Nelson   8/19/23  3:12 PM

## 2023-08-09 NOTE — TELEPHONE ENCOUNTER
Patient needs orders for hospital bed to go to medical service company       Medical service company needs our last note added to-     Patient needs hospital bed due to?   And needs to say not feasible with standard bed

## 2023-08-10 ENCOUNTER — NURSING HOME VISIT (OUTPATIENT)
Dept: POST ACUTE CARE | Facility: EXTERNAL LOCATION | Age: 73
End: 2023-08-10
Payer: MEDICARE

## 2023-08-10 DIAGNOSIS — S82.455D CLOSED NONDISPLACED COMMINUTED FRACTURE OF SHAFT OF LEFT FIBULA WITH ROUTINE HEALING, SUBSEQUENT ENCOUNTER: ICD-10-CM

## 2023-08-10 DIAGNOSIS — R53.1 WEAKNESS: Primary | ICD-10-CM

## 2023-08-10 DIAGNOSIS — I10 HYPERTENSION, ESSENTIAL: ICD-10-CM

## 2023-08-10 DIAGNOSIS — S82.102D CLOSED FRACTURE OF PROXIMAL END OF LEFT TIBIA WITH ROUTINE HEALING, UNSPECIFIED FRACTURE MORPHOLOGY, SUBSEQUENT ENCOUNTER: ICD-10-CM

## 2023-08-10 PROCEDURE — 99308 SBSQ NF CARE LOW MDM 20: CPT | Performed by: FAMILY MEDICINE

## 2023-08-10 NOTE — LETTER
Patient: Teresa Matthews  : 1950    Encounter Date: 08/10/2023    PROGRESS NOTE    Subjective  Chief complaint: Teresa Matthews is a 72 y.o. female who is an acute skilled patient being seen and evaluated for weakness    HPI:  23 Patient was admitted to the skilled nursing facility from home. Patient has a past medical history of diabetes and respiratory failure.  patient mated to skilled nursing facility for therapy due to weakness after recent hospitalization status post fall. Patient was found to have acute comminuted fractures of proximal tibia and fibular diaphysis. She was admitted to the hospital and seen by Ortho who recommended conservative management. Pain was controlled and patient none weightbearing. She was discharged to skilled nursing facility for therapy and to continue medical management.admitted for weakness and need for PT OT    23  patient continues to work in therapy.  Requires assistance with transfers ADLs and mobility.     Patient remains been on a weight bearing. Pain from recent fracture is controlled with current medications.  denies chest pain or headache, shortness of breath orthopnea.  No acute distress.    23  Patient working on RLE exercises in therapy.  Completed 3 sets of 15 reps with 2.5 pound weights.  Also worked on dynamic balance activities while sitting and weight shifting to improve safety and unsupported sit to stand.  Patient is independent for rolling left and right and requires minimal/moderate assistance for sit to supine.  Requires max assist/TD for sit to stand and SBA/CGA for chair<>bed.  Patient has no complaints.  Denies n/v/f/c and pain.  No new concerns reported by staff.    2023 Therapy has been working with the patient to improve strength and endurance with ADLs, transfers, and mobility.  Patient continues to work toward goals.  Patient utilizing recumbent bike x 15 mins. Pt requiring mod\max assistance with bed mobility and  transfers. Patient is stable and has no new complaints.  Nursing staff voices no new concerns today.    8/8/23 Patient working in therapy due to weakness. Requires mod/max assistance with bed mobility and transfers. Denies chest pain or headache. No acute distress.     8/10/23   Patient continues to work in therapy.  Requires moderate to maximum assistance for all bed mobility and transfers.  Denies chest pain or headache.  No new issues today.  No acute distress.   Pain from recent fracture is controlled with current pain meds.      Objective  Vital signs: 132/67, 96%,     Physical Exam  Constitutional:       General: She is not in acute distress.  Eyes:      Extraocular Movements: Extraocular movements intact.   Cardiovascular:      Rate and Rhythm: Normal rate and regular rhythm.   Pulmonary:      Effort: Pulmonary effort is normal.      Breath sounds: Normal breath sounds.   Abdominal:      General: Bowel sounds are normal.      Palpations: Abdomen is soft.   Musculoskeletal:      Cervical back: Neck supple.      Right lower leg: No edema.      Left lower leg: No edema.   Neurological:      Mental Status: She is alert.   Psychiatric:         Mood and Affect: Mood normal.         Behavior: Behavior is cooperative.         Assessment/Plan  Problem List Items Addressed This Visit       Hypertension, essential     Monitor blood pressure  Continue antihypertensives         Weakness - Primary     Continue with therapy         Closed fracture of proximal end of left tibia with routine healing     Pain controlled  Therapy  Immobilizer  Fu with ortho         Fibula fracture     Pain meds  Therapy  Follow-up with Ortho        Medications, treatments, and labs reviewed  Continue medications and treatments as listed in EMR    Scribe Attestation  I, Kirill De Oliveira   attest that this documentation has been prepared under the direction and in the presence of Monica Macario MD.     Provider Attestation - Donatoibgénesis  documentation  All medical record entries made by the Scribe were at my direction and personally dictated by me. I have reviewed the chart and agree that the record accurately reflects my personal performance of the history, physical exam, discussion and plan.   Monica Macario MD  1. Weakness        2. Hypertension, essential        3. Closed nondisplaced comminuted fracture of shaft of left fibula with routine healing, subsequent encounter        4. Closed fracture of proximal end of left tibia with routine healing, unspecified fracture morphology, subsequent encounter              Electronically Signed By: Monica Macario MD   8/10/23  3:45 PM

## 2023-08-10 NOTE — PROGRESS NOTES
PROGRESS NOTE    Subjective   Chief complaint: Teresa Matthews is a 72 y.o. female who is an acute skilled patient being seen and evaluated for weakness    HPI:  7/31/23 Patient was admitted to the skilled nursing facility from home. Patient has a past medical history of diabetes and respiratory failure.  patient mated to skilled nursing facility for therapy due to weakness after recent hospitalization status post fall. Patient was found to have acute comminuted fractures of proximal tibia and fibular diaphysis. She was admitted to the hospital and seen by Ortho who recommended conservative management. Pain was controlled and patient none weightbearing. She was discharged to skilled nursing facility for therapy and to continue medical management.admitted for weakness and need for PT OT    8/1/23  patient continues to work in therapy.  Requires assistance with transfers ADLs and mobility.     Patient remains been on a weight bearing. Pain from recent fracture is controlled with current medications.  denies chest pain or headache, shortness of breath orthopnea.  No acute distress.    8/2/23  Patient working on RLE exercises in therapy.  Completed 3 sets of 15 reps with 2.5 pound weights.  Also worked on dynamic balance activities while sitting and weight shifting to improve safety and unsupported sit to stand.  Patient is independent for rolling left and right and requires minimal/moderate assistance for sit to supine.  Requires max assist/TD for sit to stand and SBA/CGA for chair<>bed.  Patient has no complaints.  Denies n/v/f/c and pain.  No new concerns reported by staff.    8/7/2023 Therapy has been working with the patient to improve strength and endurance with ADLs, transfers, and mobility.  Patient continues to work toward goals.  Patient utilizing recumbent bike x 15 mins. Pt requiring mod\max assistance with bed mobility and transfers. Patient is stable and has no new complaints.  Nursing staff voices no  new concerns today.    8/8/23 Patient working in therapy due to weakness. Requires mod/max assistance with bed mobility and transfers. Denies chest pain or headache. No acute distress.     8/10/23   Patient continues to work in therapy.  Requires moderate to maximum assistance for all bed mobility and transfers.  Denies chest pain or headache.  No new issues today.  No acute distress.   Pain from recent fracture is controlled with current pain meds.      Objective   Vital signs: 132/67, 96%,     Physical Exam  Constitutional:       General: She is not in acute distress.  Eyes:      Extraocular Movements: Extraocular movements intact.   Cardiovascular:      Rate and Rhythm: Normal rate and regular rhythm.   Pulmonary:      Effort: Pulmonary effort is normal.      Breath sounds: Normal breath sounds.   Abdominal:      General: Bowel sounds are normal.      Palpations: Abdomen is soft.   Musculoskeletal:      Cervical back: Neck supple.      Right lower leg: No edema.      Left lower leg: No edema.   Neurological:      Mental Status: She is alert.   Psychiatric:         Mood and Affect: Mood normal.         Behavior: Behavior is cooperative.         Assessment/Plan   Problem List Items Addressed This Visit       Hypertension, essential     Monitor blood pressure  Continue antihypertensives         Weakness - Primary     Continue with therapy         Closed fracture of proximal end of left tibia with routine healing     Pain controlled  Therapy  Immobilizer  Fu with ortho         Fibula fracture     Pain meds  Therapy  Follow-up with Ortho        Medications, treatments, and labs reviewed  Continue medications and treatments as listed in EMR    Scribe Attestation  I, Kirill De Oliveira   attest that this documentation has been prepared under the direction and in the presence of Monica Macario MD.     Provider Attestation - Scribe documentation  All medical record entries made by the Scribe were at my direction  and personally dictated by me. I have reviewed the chart and agree that the record accurately reflects my personal performance of the history, physical exam, discussion and plan.   Monica Macario MD  1. Weakness        2. Hypertension, essential        3. Closed nondisplaced comminuted fracture of shaft of left fibula with routine healing, subsequent encounter        4. Closed fracture of proximal end of left tibia with routine healing, unspecified fracture morphology, subsequent encounter

## 2023-08-11 ENCOUNTER — NURSING HOME VISIT (OUTPATIENT)
Dept: POST ACUTE CARE | Facility: EXTERNAL LOCATION | Age: 73
End: 2023-08-11
Payer: MEDICARE

## 2023-08-11 DIAGNOSIS — R53.1 WEAKNESS: Primary | ICD-10-CM

## 2023-08-11 DIAGNOSIS — I10 HYPERTENSION, ESSENTIAL: ICD-10-CM

## 2023-08-11 DIAGNOSIS — I50.42 CHRONIC COMBINED SYSTOLIC AND DIASTOLIC CONGESTIVE HEART FAILURE (MULTI): ICD-10-CM

## 2023-08-11 DIAGNOSIS — Z79.4 TYPE 2 DIABETES MELLITUS WITH HYPERGLYCEMIA, WITH LONG-TERM CURRENT USE OF INSULIN (MULTI): ICD-10-CM

## 2023-08-11 DIAGNOSIS — J44.9 CHRONIC OBSTRUCTIVE PULMONARY DISEASE, UNSPECIFIED COPD TYPE (MULTI): ICD-10-CM

## 2023-08-11 DIAGNOSIS — S82.102D CLOSED FRACTURE OF PROXIMAL END OF LEFT TIBIA WITH ROUTINE HEALING, UNSPECIFIED FRACTURE MORPHOLOGY, SUBSEQUENT ENCOUNTER: ICD-10-CM

## 2023-08-11 DIAGNOSIS — I48.91 ATRIAL FIBRILLATION, UNSPECIFIED TYPE (MULTI): ICD-10-CM

## 2023-08-11 DIAGNOSIS — E11.65 TYPE 2 DIABETES MELLITUS WITH HYPERGLYCEMIA, WITH LONG-TERM CURRENT USE OF INSULIN (MULTI): ICD-10-CM

## 2023-08-11 PROCEDURE — 99316 NF DSCHRG MGMT 30 MIN+: CPT | Performed by: NURSE PRACTITIONER

## 2023-08-11 NOTE — PROGRESS NOTES
PROGRESS NOTE    Subjective   Chief complaint: Teresa Matthews is a 72 y.o. female who is a acute skilled care patient being seen and evaluated for weakness.    HPI:  7/31/23 Patient was admitted to the skilled nursing facility from home. Patient has a past medical history of diabetes and respiratory failure.  patient mated to skilled nursing facility for therapy due to weakness after recent hospitalization status post fall. Patient was found to have acute comminuted fractures of proximal tibia and fibular diaphysis. She was admitted to the hospital and seen by Ortho who recommended conservative management. Pain was controlled and patient none weightbearing. She was discharged to skilled nursing facility for therapy and to continue medical management.admitted for weakness and need for PT OT    8/1/23  patient continues to work in therapy.  Requires assistance with transfers ADLs and mobility.     Patient remains been on a weight bearing. Pain from recent fracture is controlled with current medications.  denies chest pain or headache, shortness of breath orthopnea.  No acute distress.    8/2/23  Patient working on RLE exercises in therapy.  Completed 3 sets of 15 reps with 2.5 pound weights.  Also worked on dynamic balance activities while sitting and weight shifting to improve safety and unsupported sit to stand.  Patient is independent for rolling left and right and requires minimal/moderate assistance for sit to supine.  Requires max assist/TD for sit to stand and SBA/CGA for chair<>bed.  Patient has no complaints.  Denies n/v/f/c and pain.  No new concerns reported by staff.    8/3/23  Patient seen and examined at bedside. No new issues according to nurse. Patient continues working in therapy due to weakness, requires assistance for transfers and ADLs. Denies feeling anxious, nervous or afraid. Denies SOB or orthopnea. Denies chest pain or headache. Pain from recent fracture controlled. No acute distress.      8/4/23   Patient is actively participating with skilled interventions.  Working on seated BLE therapy exercises completed 2 sets of 15 reps with 1.5 pound weights.  Perform slide board transfer from bed to wheelchair with standby assist and sit to stand at parallel bars x 2 with max assist.  Patient has follow-up with Ortho today.  Patient has no new complaints.   No new concerns reported by nursing.        Objective   Vital signs:   148/92, 97.1, 73, 18, 99%,   Physical Exam  Constitutional:       General: She is not in acute distress.  Eyes:      Extraocular Movements: Extraocular movements intact.   Cardiovascular:      Rate and Rhythm: Normal rate.   Pulmonary:      Effort: Pulmonary effort is normal.   Musculoskeletal:      Cervical back: Neck supple.      Comments: Generalized weakness  Immobilizer LLE   Neurological:      Mental Status: She is alert.   Psychiatric:         Mood and Affect: Mood normal.         Behavior: Behavior is cooperative.         Assessment/Plan   Problem List Items Addressed This Visit       Atrial fibrillation (CMS/HCC)     Apixaban  Amiodarone  Metoprolol  Bleeding precautions  HR controlled         Chronic combined systolic and diastolic congestive heart failure (CMS/HCC)     Stable  Tosemide  Aldactone   BB  Monitor weight         Chronic obstructive pulmonary disease, unspecified COPD type (CMS/HCC)     Stable, no wheezing or shortness of breath  On room air         Closed fracture of proximal end of left tibia with routine healing     Pain controlled  Therapy  Immobilizer  Patient has fu with ortho today         Hypertension, essential     Monitor blood pressure  Continue antihypertensives         Type 2 diabetes mellitus with hyperglycemia, with long-term current use of insulin (CMS/HCC)     Continue insulin  Glucoscan with sliding scale insulin coverage  Low concentrated sweets diet  FBG at goal         Weakness - Primary     Continue working with therapy           Medications, treatments, and labs reviewed  Continue medications and treatments as listed in EMR    Scribe Attestation  I, Kirill Fountain   attest that this documentation has been prepared under the direction and in the presence of MARISELA Nelson    Provider Attestation - Scribe documentation  All medical record entries made by the Scribe were at my direction and personally dictated by me. I have reviewed the chart and agree that the record accurately reflects my personal performance of the history, physical exam, discussion and plan.   MARISELA Nelson

## 2023-08-11 NOTE — LETTER
Patient: Teresa Matthews  : 1950    Encounter Date: 2023    PROGRESS NOTE    Subjective  Chief complaint: Teresa Matthews is a 72 y.o. female who is a acute skilled care patient being seen and evaluated for weakness.    HPI:  23 Patient was admitted to the skilled nursing facility from home. Patient has a past medical history of diabetes and respiratory failure.  patient mated to skilled nursing facility for therapy due to weakness after recent hospitalization status post fall. Patient was found to have acute comminuted fractures of proximal tibia and fibular diaphysis. She was admitted to the hospital and seen by Ortho who recommended conservative management. Pain was controlled and patient none weightbearing. She was discharged to skilled nursing facility for therapy and to continue medical management.admitted for weakness and need for PT OT    23  patient continues to work in therapy.  Requires assistance with transfers ADLs and mobility.     Patient remains been on a weight bearing. Pain from recent fracture is controlled with current medications.  denies chest pain or headache, shortness of breath orthopnea.  No acute distress.    23  Patient working on RLE exercises in therapy.  Completed 3 sets of 15 reps with 2.5 pound weights.  Also worked on dynamic balance activities while sitting and weight shifting to improve safety and unsupported sit to stand.  Patient is independent for rolling left and right and requires minimal/moderate assistance for sit to supine.  Requires max assist/TD for sit to stand and SBA/CGA for chair<>bed.  Patient has no complaints.  Denies n/v/f/c and pain.  No new concerns reported by staff.    2023 Therapy has been working with the patient to improve strength and endurance with ADLs, transfers, and mobility.  Patient continues to work toward goals.  Patient utilizing recumbent bike x 15 mins. Pt requiring mod\max assistance with bed mobility  and transfers. Patient is stable and has no new complaints.  Nursing staff voices no new concerns today.    8/8/23 Patient working in therapy due to weakness. Requires mod/max assistance with bed mobility and transfers. Denies chest pain or headache. No acute distress.     8/10/23   Patient continues to work in therapy.  Requires moderate to maximum assistance for all bed mobility and transfers.  Denies chest pain or headache.  No new issues today.  No acute distress.   Pain from recent fracture is controlled with current pain meds.    8/11/23   Patient has been working in therapy.  Planning to discharge home.  No acute concerns or questions.        Objective  Vital signs:  132/79, 97.7, 70, 18, 97%,   Physical Exam  Constitutional:       General: She is not in acute distress.  Eyes:      Extraocular Movements: Extraocular movements intact.   Cardiovascular:      Rate and Rhythm: Normal rate.   Pulmonary:      Effort: Pulmonary effort is normal.   Musculoskeletal:      Cervical back: Neck supple.      Right lower leg: Edema present.      Left lower leg: Edema present.      Comments: Generalized weakness     Neurological:      Mental Status: She is alert.   Psychiatric:         Mood and Affect: Mood normal.         Behavior: Behavior is cooperative.       Admitting/DC Diagnoses:  Assessment/Plan  Problem List Items Addressed This Visit       Atrial fibrillation (CMS/HCC)     Apixaban  Amiodarone  Metoprolol  Bleeding precautions  HR controlled         Chronic combined systolic and diastolic congestive heart failure (CMS/HCC)     Stable  Tosemide  Aldactone   BB  Monitor weight         Chronic obstructive pulmonary disease, unspecified COPD type (CMS/HCC)     Stable, no wheezing or shortness of breath  On room air         Closed fracture of proximal end of left tibia with routine healing     Pain controlled  Therapy  Immobilizer  Fu with ortho         Hypertension, essential     Monitor blood pressure  Continue  antihypertensives  BP at goal         Type 2 diabetes mellitus with hyperglycemia, with long-term current use of insulin (CMS/[facility])     Continue insulin  Glucoscan with sliding scale insulin coverage  Low concentrated sweets diet  FBG at goal         Weakness - Primary     Cut from therapy and discharging home  Patient requires wc for mobility          Prognosis - Fair  Course - PT/OT  Plan - DC home with home health nursing and therapy    Medications, treatments, and labs reviewed  Continue medications and treatments as listed in EMR  Time spent >30 min    Scribe Attestation  IMarzena Scribe   attest that this documentation has been prepared under the direction and in the presence of MARISELA Nelson    Provider Attestation - Scribe documentation  All medical record entries made by the Scribe were at my direction and personally dictated by me. I have reviewed the chart and agree that the record accurately reflects my personal performance of the history, physical exam, discussion and plan.   MARISELA Nelson        Electronically Signed By: MARISELA Nelson   8/26/23  6:30 PM

## 2023-08-14 ENCOUNTER — PATIENT OUTREACH (OUTPATIENT)
Dept: CARE COORDINATION | Facility: CLINIC | Age: 73
End: 2023-08-14

## 2023-08-14 ENCOUNTER — TELEMEDICINE (OUTPATIENT)
Dept: PRIMARY CARE | Facility: CLINIC | Age: 73
End: 2023-08-14
Payer: MEDICARE

## 2023-08-14 ENCOUNTER — DOCUMENTATION (OUTPATIENT)
Dept: CARE COORDINATION | Facility: CLINIC | Age: 73
End: 2023-08-14

## 2023-08-14 DIAGNOSIS — S82.102D CLOSED FRACTURE OF PROXIMAL END OF LEFT TIBIA WITH ROUTINE HEALING, UNSPECIFIED FRACTURE MORPHOLOGY, SUBSEQUENT ENCOUNTER: Primary | ICD-10-CM

## 2023-08-14 DIAGNOSIS — G47.33 OBSTRUCTIVE SLEEP APNEA: ICD-10-CM

## 2023-08-14 DIAGNOSIS — E11.42 DIABETIC PERIPHERAL NEUROPATHY (MULTI): ICD-10-CM

## 2023-08-14 DIAGNOSIS — N18.31 STAGE 3A CHRONIC KIDNEY DISEASE (MULTI): ICD-10-CM

## 2023-08-14 DIAGNOSIS — F32.1 CURRENT MODERATE EPISODE OF MAJOR DEPRESSIVE DISORDER WITHOUT PRIOR EPISODE (MULTI): ICD-10-CM

## 2023-08-14 DIAGNOSIS — F11.90 OPIATE USE: ICD-10-CM

## 2023-08-14 DIAGNOSIS — G89.4 CHRONIC PAIN SYNDROME: ICD-10-CM

## 2023-08-14 DIAGNOSIS — I89.0 LYMPHEDEMA: ICD-10-CM

## 2023-08-14 DIAGNOSIS — K21.9 GASTROESOPHAGEAL REFLUX DISEASE WITHOUT ESOPHAGITIS: ICD-10-CM

## 2023-08-14 DIAGNOSIS — E89.0 HYPOTHYROIDISM, POSTSURGICAL: ICD-10-CM

## 2023-08-14 DIAGNOSIS — I48.91 ATRIAL FIBRILLATION, UNSPECIFIED TYPE (MULTI): ICD-10-CM

## 2023-08-14 DIAGNOSIS — J44.9 CHRONIC OBSTRUCTIVE PULMONARY DISEASE, UNSPECIFIED COPD TYPE (MULTI): ICD-10-CM

## 2023-08-14 DIAGNOSIS — M48.02 DEGENERATIVE CERVICAL SPINAL STENOSIS: ICD-10-CM

## 2023-08-14 DIAGNOSIS — I50.42 CHRONIC COMBINED SYSTOLIC AND DIASTOLIC CONGESTIVE HEART FAILURE (MULTI): ICD-10-CM

## 2023-08-14 DIAGNOSIS — E66.9 OBESITY (BMI 30-39.9): ICD-10-CM

## 2023-08-14 DIAGNOSIS — I10 HYPERTENSION, ESSENTIAL: ICD-10-CM

## 2023-08-14 DIAGNOSIS — E78.1 HYPERTRIGLYCERIDEMIA: ICD-10-CM

## 2023-08-14 DIAGNOSIS — L97.422: ICD-10-CM

## 2023-08-14 DIAGNOSIS — M17.0 PRIMARY OSTEOARTHRITIS OF BOTH KNEES: ICD-10-CM

## 2023-08-14 PROCEDURE — 99214 OFFICE O/P EST MOD 30 MIN: CPT | Performed by: FAMILY MEDICINE

## 2023-08-14 RX ORDER — OXYCODONE AND ACETAMINOPHEN 10; 325 MG/1; MG/1
1 TABLET ORAL EVERY 4 HOURS PRN
Qty: 180 TABLET | Refills: 0 | Status: SHIPPED | OUTPATIENT
Start: 2023-10-13 | End: 2023-11-13 | Stop reason: SDUPTHER

## 2023-08-14 RX ORDER — NALOXONE HYDROCHLORIDE 4 MG/.1ML
4 SPRAY NASAL AS NEEDED
Qty: 1 EACH | Refills: 0 | Status: ON HOLD | OUTPATIENT
Start: 2023-08-14 | End: 2023-08-15

## 2023-08-14 RX ORDER — BUPROPION HYDROCHLORIDE 150 MG/1
150 TABLET ORAL EVERY MORNING
Qty: 30 TABLET | Refills: 5 | Status: SHIPPED | OUTPATIENT
Start: 2023-08-14 | End: 2024-02-13

## 2023-08-14 RX ORDER — OXYCODONE AND ACETAMINOPHEN 10; 325 MG/1; MG/1
1 TABLET ORAL EVERY 4 HOURS PRN
Qty: 180 TABLET | Refills: 0 | Status: SHIPPED | OUTPATIENT
Start: 2023-09-13 | End: 2023-10-13

## 2023-08-14 RX ORDER — OXYCODONE AND ACETAMINOPHEN 10; 325 MG/1; MG/1
1 TABLET ORAL EVERY 4 HOURS PRN
Qty: 180 TABLET | Refills: 0 | Status: SHIPPED | OUTPATIENT
Start: 2023-08-14 | End: 2023-09-13

## 2023-08-14 ASSESSMENT — ENCOUNTER SYMPTOMS
JOINT SWELLING: 1
BACK PAIN: 1

## 2023-08-14 NOTE — PATIENT INSTRUCTIONS
Diagnoses and all orders for this visit:  Closed fracture of proximal end of left tibia with routine healing, unspecified fracture morphology, subsequent encounter  -     oxyCODONE-acetaminophen (Percocet)  mg tablet; Take 1 tablet by mouth every 4 hours if needed for severe pain (7 - 10).  -     oxyCODONE-acetaminophen (Percocet)  mg tablet; Take 1 tablet by mouth every 4 hours if needed for severe pain (7 - 10). Do not start before September 13, 2023.  -     oxyCODONE-acetaminophen (Percocet)  mg tablet; Take 1 tablet by mouth every 4 hours if needed for severe pain (7 - 10). Do not start before October 13, 2023.  Chronic ulcer of left heel with fat layer exposed (CMS/HCC)  Chronic pain syndrome  -     oxyCODONE-acetaminophen (Percocet)  mg tablet; Take 1 tablet by mouth every 4 hours if needed for severe pain (7 - 10).  -     oxyCODONE-acetaminophen (Percocet)  mg tablet; Take 1 tablet by mouth every 4 hours if needed for severe pain (7 - 10). Do not start before September 13, 2023.  -     oxyCODONE-acetaminophen (Percocet)  mg tablet; Take 1 tablet by mouth every 4 hours if needed for severe pain (7 - 10). Do not start before October 13, 2023.  Obstructive sleep apnea  Chronic obstructive pulmonary disease, unspecified COPD type (CMS/[facility])  Degenerative cervical spinal stenosis  Diabetic peripheral neuropathy (CMS/[facility])  Lymphedema  Primary osteoarthritis of both knees  -     oxyCODONE-acetaminophen (Percocet)  mg tablet; Take 1 tablet by mouth every 4 hours if needed for severe pain (7 - 10).  -     oxyCODONE-acetaminophen (Percocet)  mg tablet; Take 1 tablet by mouth every 4 hours if needed for severe pain (7 - 10). Do not start before September 13, 2023.  -     oxyCODONE-acetaminophen (Percocet)  mg tablet; Take 1 tablet by mouth every 4 hours if needed for severe pain (7 - 10). Do not start before October 13, 2023.  Stage 3a chronic kidney disease  (CMS/[facility])  Gastroesophageal reflux disease without esophagitis  Hypothyroidism, postsurgical  Obesity (BMI 30-39.9)  Hypertriglyceridemia  Hypertension, essential  Chronic combined systolic and diastolic congestive heart failure (CMS/[facility])  Atrial fibrillation, unspecified type (CMS/[facility])  Opiate use  -     naloxone (Narcan) 4 mg/0.1 mL nasal spray; Administer 1 spray (4 mg) into affected nostril(s) if needed for opioid reversal for up to 1 day. May repeat every 2-3 minutes if needed, alternating nostrils, until medical assistance becomes available.  Current moderate episode of major depressive disorder without prior episode (CMS/[facility])  -     buPROPion XL (Wellbutrin XL) 150 mg 24 hr tablet; Take 1 tablet (150 mg) by mouth once daily in the morning. Do not crush, chew, or split.

## 2023-08-14 NOTE — PROGRESS NOTES
Discharge Facility:[facility]  Discharge Diagnosis:unspecified fracture of shaft of left tibia  Admission Date:07/30/23  Discharge Date: 08/11/23    PCP Appointment Date:08/14/23  Specialist Appointment Date:   Hospital Encounter and Summary: not available at this time   See discharge assessment below for further details  Engagement  Call Start Time: 0916 (8/14/2023  9:15 AM)    Medications  Medications reviewed with patient/caregiver?: Yes (no new meds) (8/14/2023  9:15 AM)  Is the patient having any side effects they believe may be caused by any medication additions or changes?: No (8/14/2023  9:15 AM)  Does the patient have all medications ordered at discharge?: Not applicable (8/14/2023  9:15 AM)    Appointments  Does the patient have a primary care provider?: Yes (8/14/2023  9:15 AM)  Care Management Interventions: Verified appointment date/time/provider (8/14/2023  9:15 AM)    Self Management  Has home health visited the patient within 72 hours of discharge?: Yes (8/14/2023  9:15 AM)  Has all Durable Medical Equipment (DME) been delivered?: Yes (8/14/2023  9:15 AM)    Patient Teaching  Does the patient have access to their discharge instructions?: Yes (8/14/2023  9:15 AM)  Care Management Interventions: Reviewed instructions with patient (8/14/2023  9:15 AM)  What is the patient's perception of their health status since discharge?: Improving (still no weightbearing allowed until sept when she sees her ortho dr) (8/14/2023  9:15 AM)    Wrap Up  Call End Time: 0920 (8/14/2023  9:15 AM)

## 2023-08-14 NOTE — PROGRESS NOTES
Subjective   Patient ID: Teresa Matthews is a 72 y.o. female.    HPI  An interactive audio and video telecommunication system which permits real time communications between the patient (at the originating site) and provider (at a distant site) was utilized to provider this telehealth service.     72 year old female for follow up. No falls over weekend, has broken leg, and heel ulcer, she is now going to wound center, needs to go there in wheelchair, and cannot weight bear until 09/15. Sugars are not bad, and [place] [facility]. Admitted 6/17=-6/20, discharge to SNF,[facility], then home, then heel ulcer , admitted 07/25-07/28, discharge to SNF ([facility]  again) Home 8/11/2023. She is not supposed to be weight bearing and this is a challenge.   Review of Systems   Cardiovascular:  Positive for leg swelling.   Musculoskeletal:  Positive for back pain, gait problem and joint swelling.        Leg pain due to tib fib fractrue       Objective   Physical Exam  Neurological:      Mental Status: She is alert.     Virtual visit  Assessment/Plan   Diagnoses and all orders for this visit:  Closed fracture of proximal end of left tibia with routine healing, unspecified fracture morphology, subsequent encounter  -     oxyCODONE-acetaminophen (Percocet)  mg tablet; Take 1 tablet by mouth every 4 hours if needed for severe pain (7 - 10).  -     oxyCODONE-acetaminophen (Percocet)  mg tablet; Take 1 tablet by mouth every 4 hours if needed for severe pain (7 - 10). Do not start before September 13, 2023.  -     oxyCODONE-acetaminophen (Percocet)  mg tablet; Take 1 tablet by mouth every 4 hours if needed for severe pain (7 - 10). Do not start before October 13, 2023.  Chronic ulcer of left heel with fat layer exposed (CMS/HCC)  Chronic pain syndrome  -     oxyCODONE-acetaminophen (Percocet)  mg tablet; Take 1 tablet by mouth every 4 hours if needed for severe pain (7 - 10).  -      oxyCODONE-acetaminophen (Percocet)  mg tablet; Take 1 tablet by mouth every 4 hours if needed for severe pain (7 - 10). Do not start before September 13, 2023.  -     oxyCODONE-acetaminophen (Percocet)  mg tablet; Take 1 tablet by mouth every 4 hours if needed for severe pain (7 - 10). Do not start before October 13, 2023.  Obstructive sleep apnea  Chronic obstructive pulmonary disease, unspecified COPD type (CMS/[facility])  Degenerative cervical spinal stenosis  Diabetic peripheral neuropathy (CMS/[facility])  Lymphedema  Primary osteoarthritis of both knees  -     oxyCODONE-acetaminophen (Percocet)  mg tablet; Take 1 tablet by mouth every 4 hours if needed for severe pain (7 - 10).  -     oxyCODONE-acetaminophen (Percocet)  mg tablet; Take 1 tablet by mouth every 4 hours if needed for severe pain (7 - 10). Do not start before September 13, 2023.  -     oxyCODONE-acetaminophen (Percocet)  mg tablet; Take 1 tablet by mouth every 4 hours if needed for severe pain (7 - 10). Do not start before October 13, 2023.  Stage 3a chronic kidney disease (CMS/[facility])  Gastroesophageal reflux disease without esophagitis  Hypothyroidism, postsurgical  Obesity (BMI 30-39.9)  Hypertriglyceridemia  Hypertension, essential  Chronic combined systolic and diastolic congestive heart failure (CMS/[facility])  Atrial fibrillation, unspecified type (CMS/[facility])  Opiate use  -     naloxone (Narcan) 4 mg/0.1 mL nasal spray; Administer 1 spray (4 mg) into affected nostril(s) if needed for opioid reversal for up to 1 day. May repeat every 2-3 minutes if needed, alternating nostrils, until medical assistance becomes available.  Current moderate episode of major depressive disorder without prior episode (CMS/[facility])  -     buPROPion XL (Wellbutrin XL) 150 mg 24 hr tablet; Take 1 tablet (150 mg) by mouth once daily in the morning. Do not crush, chew, or split.  For depression, first I would add to the desvenlafaxine, and then change that if not  enough.   A virtual visit was performed today due to the current COVID-19 pandemic. All questions were answered, and medications were reviewed, and renewed where necessary.   A total of 25   minutes was spent on this encounter.   OARRS:  Monica Macario MD on 8/14/2023  2:56 PM  I have personally reviewed the OARRS report for Teresa Matthews. I have considered the risks of abuse, dependence, addiction and diversion and I believe that it is clinically appropriate for Teresa Matthews to be prescribed this medication    Is the patient prescribed a combination of a benzodiazepine and opioid?  No    Last Urine Drug Screen / ordered today: Yes  Recent Results (from the past 26474 hour(s))   OPIATE/OPIOID/BENZO PRESCRIPTION COMPLIANCE    Collection Time: 05/12/23  1:28 PM   Result Value Ref Range    DRUG SCREEN COMMENT URINE SEE BELOW     Creatine, Urine 92.9 mg/dL    Amphetamine Screen, Urine PRESUMPTIVE NEGATIVE NEGATIVE    Barbiturate Screen, Urine PRESUMPTIVE NEGATIVE NEGATIVE    Cannabinoid Screen, Urine PRESUMPTIVE NEGATIVE NEGATIVE    Cocaine Screen, Urine PRESUMPTIVE NEGATIVE NEGATIVE    PCP Screen, Urine PRESUMPTIVE NEGATIVE NEGATIVE    7-Aminoclonazepam <25 Cutoff <25 ng/mL    Alpha-Hydroxyalprazolam <25 Cutoff <25 ng/mL    Alpha-Hydroxymidazolam <25 Cutoff <25 ng/mL    Alprazolam <25 Cutoff <25 ng/mL    Chlordiazepoxide <25 Cutoff <25 ng/mL    Clonazepam <25 Cutoff <25 ng/mL    Diazepam <25 Cutoff <25 ng/mL    Lorazepam <25 Cutoff <25 ng/mL    Midazolam <25 Cutoff <25 ng/mL    Nordiazepam <25 Cutoff <25 ng/mL    Oxazepam <25 Cutoff <25 ng/mL    Temazepam <25 Cutoff <25 ng/mL    Zolpidem <25 Cutoff <25 ng/mL    Zolpidem Metabolite (ZCA) <25 Cutoff <25 ng/mL    6-Acetylmorphine <25 Cutoff <25 ng/mL    Codeine <50 Cutoff <50 ng/mL    Hydrocodone <25 Cutoff <25 ng/mL    Hydromorphone <25 Cutoff <25 ng/mL    Morphine Urine <50 Cutoff <50 ng/mL    Norhydrocodone <25 Cutoff <25 ng/mL    Noroxycodone <25  Cutoff <25 ng/mL    Oxycodone <25 Cutoff <25 ng/mL    Oxymorphone <25 Cutoff <25 ng/mL    Tramadol <50 Cutoff <50 ng/mL    O-Desmethyltramadol <50 Cutoff <50 ng/mL    Fentanyl <2.5 Cutoff<2.5 ng/mL    Norfentanyl <2.5 Cutoff<2.5 ng/mL    METHADONE CONFIRMATION,URINE <25 Cutoff <25 ng/mL    EDDP <25 Cutoff <25 ng/mL     Results are as expected.     Controlled Substance Agreement:  Date of the Last Agreement: 7/2022, needs updated, but patient is currently homebound and can only leave by ambulance.   Reviewed Controlled Substance Agreement including but not limited to the benefits, risks, and alternatives to treatment with a Controlled Substance medication(s).    Opioids:  What is the patient's goal of therapy? Improved pain in multiple areas  Is this being achieved with current treatment? yes    I have calculated the patient's Morphine Dose Equivalent (MED):   I have considered referral to Pain Management and/or a specialist, and do not feel it is necessary at this time.    I feel that it is clinically indicated to continue this current medication regimen after consideration of alternative therapies, and other non-opioid treatment.    Opioid Risk Screening:  No data recorded    Pain Assessment:  7/10  Incidentally at visit- patient is in need of lift chair for multiple reasons, She has had a failed knee replacement removed. She has a femur fracture who could not weight bear until 09/15, and since then also has wound ulcer on foot. For multiple reasons she cannot bear weight to rise from chair.

## 2023-08-16 ENCOUNTER — PATIENT OUTREACH (OUTPATIENT)
Dept: CARE COORDINATION | Facility: CLINIC | Age: 73
End: 2023-08-16
Payer: MEDICARE

## 2023-08-18 NOTE — PROGRESS NOTES
PROGRESS NOTE    Subjective   Chief complaint: Teresa Matthews is a 72 y.o. female who is a acute skilled care patient being seen and evaluated for weakness.    HPI:  7/31/23 Patient was admitted to the skilled nursing facility from home. Patient has a past medical history of diabetes and respiratory failure.  patient mated to skilled nursing facility for therapy due to weakness after recent hospitalization status post fall. Patient was found to have acute comminuted fractures of proximal tibia and fibular diaphysis. She was admitted to the hospital and seen by Ortho who recommended conservative management. Pain was controlled and patient none weightbearing. She was discharged to skilled nursing facility for therapy and to continue medical management.admitted for weakness and need for PT OT    8/1/23  patient continues to work in therapy.  Requires assistance with transfers ADLs and mobility.     Patient remains been on a weight bearing. Pain from recent fracture is controlled with current medications.  denies chest pain or headache, shortness of breath orthopnea.  No acute distress.    8/2/23  Patient working on RLE exercises in therapy.  Completed 3 sets of 15 reps with 2.5 pound weights.  Also worked on dynamic balance activities while sitting and weight shifting to improve safety and unsupported sit to stand.  Patient is independent for rolling left and right and requires minimal/moderate assistance for sit to supine.  Requires max assist/TD for sit to stand and SBA/CGA for chair<>bed.  Patient has no complaints.  Denies n/v/f/c and pain.  No new concerns reported by staff.    8/7/2023 Therapy has been working with the patient to improve strength and endurance with ADLs, transfers, and mobility.  Patient continues to work toward goals.  Patient utilizing recumbent bike x 15 mins. Pt requiring mod\max assistance with bed mobility and transfers. Patient is stable and has no new complaints.  Nursing staff  voices no new concerns today.    8/8/23 Patient working in therapy due to weakness. Requires mod/max assistance with bed mobility and transfers. Denies chest pain or headache. No acute distress.     8/9/23   Patient has been working in therapy to improve strength, endurance, and ADLs.  Patient continues to work toward goals.  Received [facility] with LCD of 8/10/23.  No new concerns reported by nursing today.  Patient denies n/v/f/c pain.        Objective   Vital signs: 18, 142/89, 97.0, 64, 97%,   Physical Exam  Constitutional:       General: She is not in acute distress.  Eyes:      Extraocular Movements: Extraocular movements intact.   Cardiovascular:      Rate and Rhythm: Normal rate.   Pulmonary:      Effort: Pulmonary effort is normal.   Musculoskeletal:      Cervical back: Neck supple.      Comments: Generalized weakness     Neurological:      Mental Status: She is alert.   Psychiatric:         Mood and Affect: Mood normal.         Behavior: Behavior is cooperative.         Assessment/Plan   Problem List Items Addressed This Visit       Atrial fibrillation (CMS/HCC)     Apixaban  Amiodarone  Metoprolol  Bleeding precautions  HR controlled         Chronic combined systolic and diastolic congestive heart failure (CMS/HCC)     Stable  Tosemide  Aldactone   BB  Monitor weight         Chronic obstructive pulmonary disease, unspecified COPD type (CMS/HCC)     Stable, no wheezing or shortness of breath  On room air         Closed fracture of proximal end of left tibia with routine healing     Pain controlled  Therapy  Immobilizer  Fu with ortho         Hypertension, essential     Monitor blood pressure  Continue antihypertensives         Type 2 diabetes mellitus with hyperglycemia, with long-term current use of insulin (CMS/HCC)     Continue insulin  Glucoscan with sliding scale insulin coverage  Low concentrated sweets diet  FBG at goal         Weakness - Primary     Continue working with therapy           Medications, treatments, and labs reviewed  Continue medications and treatments as listed in EMR    Scribe Attestation  I, Kirill Fountain   attest that this documentation has been prepared under the direction and in the presence of MARISELA Nelson    Provider Attestation - Scribe documentation  All medical record entries made by the Scribe were at my direction and personally dictated by me. I have reviewed the chart and agree that the record accurately reflects my personal performance of the history, physical exam, discussion and plan.   MARISELA Nelson

## 2023-08-19 PROBLEM — I95.1 ORTHOSTATIC HYPOTENSION: Status: RESOLVED | Noted: 2023-04-10 | Resolved: 2023-08-19

## 2023-08-19 PROBLEM — R60.9 PERIPHERAL EDEMA: Status: RESOLVED | Noted: 2023-04-10 | Resolved: 2023-08-19

## 2023-08-19 PROBLEM — R80.9 URINE TEST POSITIVE FOR MICROALBUMINURIA: Status: RESOLVED | Noted: 2023-04-10 | Resolved: 2023-08-19

## 2023-08-19 PROBLEM — R06.02 SOB (SHORTNESS OF BREATH) ON EXERTION: Status: RESOLVED | Noted: 2023-04-10 | Resolved: 2023-08-19

## 2023-08-19 PROBLEM — L97.529 FOOT ULCER, LEFT (MULTI): Status: RESOLVED | Noted: 2023-04-10 | Resolved: 2023-08-19

## 2023-08-19 PROBLEM — I89.0 LYMPHEDEMA: Status: RESOLVED | Noted: 2023-04-10 | Resolved: 2023-08-19

## 2023-08-21 NOTE — PROGRESS NOTES
PROGRESS NOTE    Subjective   Chief complaint: Teresa Matthews is a 72 y.o. female who is a acute skilled care patient being seen and evaluated for weakness.    HPI:  7/31/23 Patient was admitted to the skilled nursing facility from home. Patient has a past medical history of diabetes and respiratory failure.  patient mated to skilled nursing facility for therapy due to weakness after recent hospitalization status post fall. Patient was found to have acute comminuted fractures of proximal tibia and fibular diaphysis. She was admitted to the hospital and seen by Ortho who recommended conservative management. Pain was controlled and patient none weightbearing. She was discharged to skilled nursing facility for therapy and to continue medical management.admitted for weakness and need for PT OT    8/1/23  patient continues to work in therapy.  Requires assistance with transfers ADLs and mobility.     Patient remains been on a weight bearing. Pain from recent fracture is controlled with current medications.  denies chest pain or headache, shortness of breath orthopnea.  No acute distress.    8/2/23  Patient working on RLE exercises in therapy.  Completed 3 sets of 15 reps with 2.5 pound weights.  Also worked on dynamic balance activities while sitting and weight shifting to improve safety and unsupported sit to stand.  Patient is independent for rolling left and right and requires minimal/moderate assistance for sit to supine.  Requires max assist/TD for sit to stand and SBA/CGA for chair<>bed.  Patient has no complaints.  Denies n/v/f/c and pain.  No new concerns reported by staff.    8/7/2023 Therapy has been working with the patient to improve strength and endurance with ADLs, transfers, and mobility.  Patient continues to work toward goals.  Patient utilizing recumbent bike x 15 mins. Pt requiring mod\max assistance with bed mobility and transfers. Patient is stable and has no new complaints.  Nursing staff  voices no new concerns today.    8/8/23 Patient working in therapy due to weakness. Requires mod/max assistance with bed mobility and transfers. Denies chest pain or headache. No acute distress.     8/10/23   Patient continues to work in therapy.  Requires moderate to maximum assistance for all bed mobility and transfers.  Denies chest pain or headache.  No new issues today.  No acute distress.   Pain from recent fracture is controlled with current pain meds.    8/11/23   Patient has been working in therapy.  Planning to discharge home.  No acute concerns or questions.        Objective   Vital signs:  132/79, 97.7, 70, 18, 97%,   Physical Exam  Constitutional:       General: She is not in acute distress.  Eyes:      Extraocular Movements: Extraocular movements intact.   Cardiovascular:      Rate and Rhythm: Normal rate.   Pulmonary:      Effort: Pulmonary effort is normal.   Musculoskeletal:      Cervical back: Neck supple.      Right lower leg: Edema present.      Left lower leg: Edema present.      Comments: Generalized weakness     Neurological:      Mental Status: She is alert.   Psychiatric:         Mood and Affect: Mood normal.         Behavior: Behavior is cooperative.       Admitting/DC Diagnoses:  Assessment/Plan   Problem List Items Addressed This Visit       Atrial fibrillation (CMS/HCC)     Apixaban  Amiodarone  Metoprolol  Bleeding precautions  HR controlled         Chronic combined systolic and diastolic congestive heart failure (CMS/HCC)     Stable  Tosemide  Aldactone   BB  Monitor weight         Chronic obstructive pulmonary disease, unspecified COPD type (CMS/HCC)     Stable, no wheezing or shortness of breath  On room air         Closed fracture of proximal end of left tibia with routine healing     Pain controlled  Therapy  Immobilizer  Fu with ortho         Hypertension, essential     Monitor blood pressure  Continue antihypertensives  BP at goal         Type 2 diabetes mellitus with  hyperglycemia, with long-term current use of insulin (CMS/[facility])     Continue insulin  Glucoscan with sliding scale insulin coverage  Low concentrated sweets diet  FBG at goal         Weakness - Primary     Cut from therapy and discharging home  Patient requires wc for mobility          Prognosis - Fair  Course - PT/OT  Plan - DC home with home health nursing and therapy    Medications, treatments, and labs reviewed  Continue medications and treatments as listed in EMR  Time spent >30 min    Scribe Attestation  IMarzena Scribe   attest that this documentation has been prepared under the direction and in the presence of MARISELA Nelson    Provider Attestation - Scribe documentation  All medical record entries made by the Scribe were at my direction and personally dictated by me. I have reviewed the chart and agree that the record accurately reflects my personal performance of the history, physical exam, discussion and plan.   MARISELA Nelson

## 2023-08-23 ENCOUNTER — APPOINTMENT (OUTPATIENT)
Dept: PRIMARY CARE | Facility: CLINIC | Age: 73
End: 2023-08-23
Payer: MEDICARE

## 2023-08-23 ENCOUNTER — TELEPHONE (OUTPATIENT)
Dept: PRIMARY CARE | Facility: CLINIC | Age: 73
End: 2023-08-23

## 2023-08-23 ENCOUNTER — CLINICAL SUPPORT (OUTPATIENT)
Dept: PRIMARY CARE | Facility: CLINIC | Age: 73
End: 2023-08-23
Payer: MEDICARE

## 2023-08-23 DIAGNOSIS — N30.01 ACUTE CYSTITIS WITH HEMATURIA: Primary | ICD-10-CM

## 2023-08-23 DIAGNOSIS — R35.0 INCREASED URINARY FREQUENCY: ICD-10-CM

## 2023-08-23 LAB
POC APPEARANCE, URINE: CLEAR
POC BILIRUBIN, URINE: NEGATIVE
POC BLOOD, URINE: ABNORMAL
POC COLOR, URINE: ABNORMAL
POC GLUCOSE, URINE: ABNORMAL MG/DL
POC KETONES, URINE: NEGATIVE MG/DL
POC LEUKOCYTES, URINE: ABNORMAL
POC NITRITE,URINE: NEGATIVE
POC PH, URINE: 5.5 PH
POC PROTEIN, URINE: NEGATIVE MG/DL
POC SPECIFIC GRAVITY, URINE: 1.01
POC UROBILINOGEN, URINE: 0.2 EU/DL

## 2023-08-23 PROCEDURE — 87077 CULTURE AEROBIC IDENTIFY: CPT

## 2023-08-23 PROCEDURE — 87186 SC STD MICRODIL/AGAR DIL: CPT

## 2023-08-23 PROCEDURE — 81003 URINALYSIS AUTO W/O SCOPE: CPT | Performed by: FAMILY MEDICINE

## 2023-08-23 PROCEDURE — 87086 URINE CULTURE/COLONY COUNT: CPT

## 2023-08-23 RX ORDER — NITROFURANTOIN 25; 75 MG/1; MG/1
100 CAPSULE ORAL 2 TIMES DAILY
Qty: 10 CAPSULE | Refills: 0 | Status: SHIPPED | OUTPATIENT
Start: 2023-08-23 | End: 2023-08-28

## 2023-08-23 NOTE — TELEPHONE ENCOUNTER
Patient dropped off a urine, the dip showed positive for BLO, Lues, and Gl.   Can we send something in for her?

## 2023-08-26 LAB — URINE CULTURE: ABNORMAL

## 2023-08-29 NOTE — RESULT ENCOUNTER NOTE
Urine culture confirms macrobid that was used to treat should take care of the infection, if symptoms do not completely resolve or return shortly after completing antibiotic then please let us know

## 2023-08-31 ENCOUNTER — TELEPHONE (OUTPATIENT)
Dept: PRIMARY CARE | Facility: CLINIC | Age: 73
End: 2023-08-31
Payer: MEDICARE

## 2023-08-31 DIAGNOSIS — K21.9 GASTROESOPHAGEAL REFLUX DISEASE WITHOUT ESOPHAGITIS: ICD-10-CM

## 2023-08-31 DIAGNOSIS — E11.42 DIABETIC PERIPHERAL NEUROPATHY (MULTI): ICD-10-CM

## 2023-08-31 DIAGNOSIS — F51.04 CHRONIC INSOMNIA: ICD-10-CM

## 2023-08-31 DIAGNOSIS — B37.31 VAGINAL CANDIDIASIS: Primary | ICD-10-CM

## 2023-08-31 NOTE — TELEPHONE ENCOUNTER
After UTI  she now has a yeast infection and would like antibiotics sent to [facility] if possible

## 2023-09-01 RX ORDER — MICONAZOLE NITRATE 2 %
1 CREAM WITH APPLICATOR VAGINAL NIGHTLY
Qty: 45 G | Refills: 0 | Status: SHIPPED | OUTPATIENT
Start: 2023-09-01 | End: 2023-09-08

## 2023-09-06 NOTE — TELEPHONE ENCOUNTER
Trazodone   Gabapentin   Famotidine  Still has the yeast infection the cream is not working is there an oral pill she can try?  Refills sent to [facility]

## 2023-09-07 RX ORDER — FAMOTIDINE 40 MG/1
40 TABLET, FILM COATED ORAL DAILY
Qty: 30 TABLET | Refills: 0 | Status: SHIPPED | OUTPATIENT
Start: 2023-09-07 | End: 2024-05-13 | Stop reason: SDUPTHER

## 2023-09-07 RX ORDER — TRAZODONE HYDROCHLORIDE 50 MG/1
50 TABLET ORAL NIGHTLY
Qty: 30 TABLET | Refills: 0 | Status: SHIPPED | OUTPATIENT
Start: 2023-09-07 | End: 2023-11-13 | Stop reason: WASHOUT

## 2023-09-07 RX ORDER — TRAZODONE HYDROCHLORIDE 50 MG/1
1 TABLET ORAL NIGHTLY
COMMUNITY
End: 2023-11-13 | Stop reason: SDUPTHER

## 2023-09-07 RX ORDER — FLUCONAZOLE 150 MG/1
150 TABLET ORAL ONCE
Qty: 1 TABLET | Refills: 0 | Status: SHIPPED | OUTPATIENT
Start: 2023-09-07 | End: 2023-09-07

## 2023-09-07 RX ORDER — GABAPENTIN 300 MG/1
300 CAPSULE ORAL 3 TIMES DAILY
Qty: 90 CAPSULE | Refills: 0 | Status: SHIPPED | OUTPATIENT
Start: 2023-09-07 | End: 2023-10-22

## 2023-09-07 RX ORDER — FAMOTIDINE 40 MG/1
1 TABLET, FILM COATED ORAL DAILY
COMMUNITY
Start: 2022-10-18 | End: 2023-11-13

## 2023-09-19 ENCOUNTER — PATIENT OUTREACH (OUTPATIENT)
Dept: CARE COORDINATION | Facility: CLINIC | Age: 73
End: 2023-09-19
Payer: MEDICARE

## 2023-09-19 NOTE — PROGRESS NOTES
Call placed regarding one month post discharge follow up call.  At time of outreach call the patient feels as if their condition has (improved since initial visit with PCP or specialist.  Questions or concerns regarding recovery period addressed at this time. (Individualize as needed if questions arise)  Reviewed any PCP or specialists progress notes/labs/radiology reports if applicable and addressed any questions or concerns.

## 2023-09-21 ENCOUNTER — TELEPHONE (OUTPATIENT)
Dept: PRIMARY CARE | Facility: CLINIC | Age: 73
End: 2023-09-21
Payer: MEDICARE

## 2023-09-21 DIAGNOSIS — B37.9 YEAST INFECTION: Primary | ICD-10-CM

## 2023-09-21 NOTE — TELEPHONE ENCOUNTER
Still has the yeast infection would like something stronger than wht was sent last time, would prefer to not use a cream.

## 2023-09-26 RX ORDER — FLUCONAZOLE 100 MG/1
100 TABLET ORAL DAILY
Qty: 14 TABLET | Refills: 0 | Status: SHIPPED | OUTPATIENT
Start: 2023-09-26 | End: 2023-10-10

## 2023-10-09 ENCOUNTER — APPOINTMENT (OUTPATIENT)
Dept: UROLOGY | Facility: CLINIC | Age: 73
End: 2023-10-09
Payer: MEDICARE

## 2023-10-19 ENCOUNTER — TELEPHONE (OUTPATIENT)
Dept: PRIMARY CARE | Facility: CLINIC | Age: 73
End: 2023-10-19
Payer: MEDICARE

## 2023-11-09 ENCOUNTER — APPOINTMENT (OUTPATIENT)
Dept: UROLOGY | Facility: CLINIC | Age: 73
End: 2023-11-09
Payer: MEDICARE

## 2023-11-10 ENCOUNTER — PATIENT OUTREACH (OUTPATIENT)
Dept: CARE COORDINATION | Facility: CLINIC | Age: 73
End: 2023-11-10
Payer: MEDICARE

## 2023-11-13 ENCOUNTER — OFFICE VISIT (OUTPATIENT)
Dept: PRIMARY CARE | Facility: CLINIC | Age: 73
End: 2023-11-13
Payer: MEDICARE

## 2023-11-13 VITALS
HEART RATE: 84 BPM | DIASTOLIC BLOOD PRESSURE: 64 MMHG | HEIGHT: 72 IN | WEIGHT: 250 LBS | SYSTOLIC BLOOD PRESSURE: 138 MMHG | OXYGEN SATURATION: 96 % | BODY MASS INDEX: 33.86 KG/M2 | RESPIRATION RATE: 16 BRPM

## 2023-11-13 DIAGNOSIS — I48.91 ATRIAL FIBRILLATION, UNSPECIFIED TYPE (MULTI): Primary | ICD-10-CM

## 2023-11-13 DIAGNOSIS — L97.422: ICD-10-CM

## 2023-11-13 DIAGNOSIS — J44.9 CHRONIC OBSTRUCTIVE PULMONARY DISEASE, UNSPECIFIED COPD TYPE (MULTI): ICD-10-CM

## 2023-11-13 DIAGNOSIS — I50.42 CHRONIC COMBINED SYSTOLIC AND DIASTOLIC CONGESTIVE HEART FAILURE (MULTI): ICD-10-CM

## 2023-11-13 DIAGNOSIS — N18.31 STAGE 3A CHRONIC KIDNEY DISEASE (MULTI): ICD-10-CM

## 2023-11-13 DIAGNOSIS — E89.0 HYPOTHYROIDISM, POSTSURGICAL: ICD-10-CM

## 2023-11-13 DIAGNOSIS — I51.7 CARDIOMEGALY: ICD-10-CM

## 2023-11-13 DIAGNOSIS — Z23 NEED FOR PROPHYLACTIC VACCINATION AND INOCULATION AGAINST INFLUENZA: ICD-10-CM

## 2023-11-13 DIAGNOSIS — M48.02 DEGENERATIVE CERVICAL SPINAL STENOSIS: ICD-10-CM

## 2023-11-13 DIAGNOSIS — I87.2 CHRONIC STASIS DERMATITIS: ICD-10-CM

## 2023-11-13 DIAGNOSIS — S82.102D CLOSED FRACTURE OF PROXIMAL END OF LEFT TIBIA WITH ROUTINE HEALING, UNSPECIFIED FRACTURE MORPHOLOGY, SUBSEQUENT ENCOUNTER: ICD-10-CM

## 2023-11-13 DIAGNOSIS — M17.0 PRIMARY OSTEOARTHRITIS OF BOTH KNEES: ICD-10-CM

## 2023-11-13 DIAGNOSIS — E11.42 DIABETIC PERIPHERAL NEUROPATHY (MULTI): ICD-10-CM

## 2023-11-13 DIAGNOSIS — E11.65 TYPE 2 DIABETES MELLITUS WITH HYPERGLYCEMIA, WITH LONG-TERM CURRENT USE OF INSULIN (MULTI): ICD-10-CM

## 2023-11-13 DIAGNOSIS — F51.01 PRIMARY INSOMNIA: ICD-10-CM

## 2023-11-13 DIAGNOSIS — I10 HYPERTENSION, ESSENTIAL: ICD-10-CM

## 2023-11-13 DIAGNOSIS — R26.2 INABILITY TO AMBULATE DUE TO KNEE: ICD-10-CM

## 2023-11-13 DIAGNOSIS — Z79.899 MEDICATION MANAGEMENT: ICD-10-CM

## 2023-11-13 DIAGNOSIS — Z79.4 TYPE 2 DIABETES MELLITUS WITH HYPERGLYCEMIA, WITH LONG-TERM CURRENT USE OF INSULIN (MULTI): ICD-10-CM

## 2023-11-13 DIAGNOSIS — G89.4 CHRONIC PAIN SYNDROME: ICD-10-CM

## 2023-11-13 DIAGNOSIS — K21.9 GASTROESOPHAGEAL REFLUX DISEASE WITHOUT ESOPHAGITIS: ICD-10-CM

## 2023-11-13 LAB — POC HEMOGLOBIN A1C: 13.9 % (ref 4.2–6.5)

## 2023-11-13 PROCEDURE — 80358 DRUG SCREENING METHADONE: CPT

## 2023-11-13 PROCEDURE — 82570 ASSAY OF URINE CREATININE: CPT

## 2023-11-13 PROCEDURE — 3046F HEMOGLOBIN A1C LEVEL >9.0%: CPT | Performed by: FAMILY MEDICINE

## 2023-11-13 PROCEDURE — 1036F TOBACCO NON-USER: CPT | Performed by: FAMILY MEDICINE

## 2023-11-13 PROCEDURE — 3078F DIAST BP <80 MM HG: CPT | Performed by: FAMILY MEDICINE

## 2023-11-13 PROCEDURE — 3075F SYST BP GE 130 - 139MM HG: CPT | Performed by: FAMILY MEDICINE

## 2023-11-13 PROCEDURE — 3008F BODY MASS INDEX DOCD: CPT | Performed by: FAMILY MEDICINE

## 2023-11-13 PROCEDURE — 80365 DRUG SCREENING OXYCODONE: CPT

## 2023-11-13 PROCEDURE — 90662 IIV NO PRSV INCREASED AG IM: CPT | Performed by: FAMILY MEDICINE

## 2023-11-13 PROCEDURE — G0008 ADMIN INFLUENZA VIRUS VAC: HCPCS | Performed by: FAMILY MEDICINE

## 2023-11-13 PROCEDURE — 82043 UR ALBUMIN QUANTITATIVE: CPT

## 2023-11-13 PROCEDURE — 80361 OPIATES 1 OR MORE: CPT

## 2023-11-13 PROCEDURE — 3066F NEPHROPATHY DOC TX: CPT | Performed by: FAMILY MEDICINE

## 2023-11-13 PROCEDURE — 1160F RVW MEDS BY RX/DR IN RCRD: CPT | Performed by: FAMILY MEDICINE

## 2023-11-13 PROCEDURE — 99215 OFFICE O/P EST HI 40 MIN: CPT | Performed by: FAMILY MEDICINE

## 2023-11-13 PROCEDURE — 1125F AMNT PAIN NOTED PAIN PRSNT: CPT | Performed by: FAMILY MEDICINE

## 2023-11-13 PROCEDURE — 80354 DRUG SCREENING FENTANYL: CPT

## 2023-11-13 PROCEDURE — 80346 BENZODIAZEPINES1-12: CPT

## 2023-11-13 PROCEDURE — 83036 HEMOGLOBIN GLYCOSYLATED A1C: CPT | Performed by: FAMILY MEDICINE

## 2023-11-13 PROCEDURE — 1159F MED LIST DOCD IN RCRD: CPT | Performed by: FAMILY MEDICINE

## 2023-11-13 PROCEDURE — 80368 SEDATIVE HYPNOTICS: CPT

## 2023-11-13 PROCEDURE — 80307 DRUG TEST PRSMV CHEM ANLYZR: CPT

## 2023-11-13 PROCEDURE — 80373 DRUG SCREENING TRAMADOL: CPT

## 2023-11-13 RX ORDER — GABAPENTIN 600 MG/1
600 TABLET ORAL 3 TIMES DAILY
Qty: 90 TABLET | Refills: 5 | Status: SHIPPED | OUTPATIENT
Start: 2023-11-13 | End: 2024-03-30 | Stop reason: HOSPADM

## 2023-11-13 RX ORDER — INSULIN LISPRO 100 [IU]/ML
10 INJECTION, SOLUTION INTRAVENOUS; SUBCUTANEOUS
Qty: 10 ML | Refills: 11 | Status: SHIPPED | OUTPATIENT
Start: 2023-11-13 | End: 2024-03-30 | Stop reason: HOSPADM

## 2023-11-13 RX ORDER — OXYCODONE AND ACETAMINOPHEN 10; 325 MG/1; MG/1
1 TABLET ORAL EVERY 4 HOURS PRN
Qty: 180 TABLET | Refills: 0 | Status: SHIPPED | OUTPATIENT
Start: 2023-11-13 | End: 2023-12-13

## 2023-11-13 RX ORDER — TRAZODONE HYDROCHLORIDE 50 MG/1
50 TABLET ORAL NIGHTLY
Qty: 90 TABLET | Refills: 3 | Status: ON HOLD | OUTPATIENT
Start: 2023-11-13 | End: 2024-11-12

## 2023-11-13 RX ORDER — OXYCODONE AND ACETAMINOPHEN 10; 325 MG/1; MG/1
1 TABLET ORAL EVERY 4 HOURS PRN
Qty: 180 TABLET | Refills: 0 | Status: SHIPPED | OUTPATIENT
Start: 2024-01-12 | End: 2024-02-01 | Stop reason: ALTCHOICE

## 2023-11-13 RX ORDER — OXYCODONE AND ACETAMINOPHEN 10; 325 MG/1; MG/1
1 TABLET ORAL EVERY 4 HOURS PRN
Qty: 180 TABLET | Refills: 0 | Status: SHIPPED | OUTPATIENT
Start: 2023-12-13 | End: 2024-02-13 | Stop reason: SDUPTHER

## 2023-11-13 ASSESSMENT — PATIENT HEALTH QUESTIONNAIRE - PHQ9
2. FEELING DOWN, DEPRESSED OR HOPELESS: NEARLY EVERY DAY
4. FEELING TIRED OR HAVING LITTLE ENERGY: NEARLY EVERY DAY
9. THOUGHTS THAT YOU WOULD BE BETTER OFF DEAD, OR OF HURTING YOURSELF: NOT AT ALL
SUM OF ALL RESPONSES TO PHQ9 QUESTIONS 1 AND 2: 6
7. TROUBLE CONCENTRATING ON THINGS, SUCH AS READING THE NEWSPAPER OR WATCHING TELEVISION: NOT AT ALL
5. POOR APPETITE OR OVEREATING: NEARLY EVERY DAY
6. FEELING BAD ABOUT YOURSELF - OR THAT YOU ARE A FAILURE OR HAVE LET YOURSELF OR YOUR FAMILY DOWN: NOT AT ALL
1. LITTLE INTEREST OR PLEASURE IN DOING THINGS: NEARLY EVERY DAY
SUM OF ALL RESPONSES TO PHQ QUESTIONS 1-9: 13
10. IF YOU CHECKED OFF ANY PROBLEMS, HOW DIFFICULT HAVE THESE PROBLEMS MADE IT FOR YOU TO DO YOUR WORK, TAKE CARE OF THINGS AT HOME, OR GET ALONG WITH OTHER PEOPLE: NOT DIFFICULT AT ALL
8. MOVING OR SPEAKING SO SLOWLY THAT OTHER PEOPLE COULD HAVE NOTICED. OR THE OPPOSITE, BEING SO FIGETY OR RESTLESS THAT YOU HAVE BEEN MOVING AROUND A LOT MORE THAN USUAL: NOT AT ALL
3. TROUBLE FALLING OR STAYING ASLEEP OR SLEEPING TOO MUCH: SEVERAL DAYS

## 2023-11-13 ASSESSMENT — ENCOUNTER SYMPTOMS
DEPRESSION: 0
BACK PAIN: 1
ARTHRALGIAS: 1
OCCASIONAL FEELINGS OF UNSTEADINESS: 0
LOSS OF SENSATION IN FEET: 0

## 2023-11-13 NOTE — PATIENT INSTRUCTIONS
Assessment/Plan   Diagnoses and all orders for this visit:  Atrial fibrillation, unspecified type (CMS/HCC)  -     apixaban (Eliquis) 5 mg tablet; Take 1 tablet (5 mg) by mouth 2 times a day.    Cardiomegaly  Chronic combined systolic and diastolic congestive heart failure (CMS/HCC)- stable on amiodorone. Spironoloactone Torsemide.   Chronic stasis dermatitis  Hypertension, essential-   Hypothyroidism, postsurgical  Gastroesophageal reflux disease without esophagitis- omeprazole.   Chronic ulcer of left heel with fat layer exposed (CMS/HCC)  Closed fracture of proximal end of left tibia with routine healing, unspecified fracture morphology, subsequent encounter  -     oxyCODONE-acetaminophen (Percocet)  mg tablet; Take 1 tablet by mouth every 4 hours if needed (for pain).  -     oxyCODONE-acetaminophen (Percocet)  mg tablet; Take 1 tablet by mouth every 4 hours if needed for severe pain (7 - 10). Do not start before December 13, 2023.  -     oxyCODONE-acetaminophen (Percocet)  mg tablet; Take 1 tablet by mouth every 4 hours if needed for severe pain (7 - 10). Do not start before January 12, 2024.  Primary osteoarthritis of both knees  -     oxyCODONE-acetaminophen (Percocet)  mg tablet; Take 1 tablet by mouth every 4 hours if needed (for pain).  -     oxyCODONE-acetaminophen (Percocet)  mg tablet; Take 1 tablet by mouth every 4 hours if needed for severe pain (7 - 10). Do not start before December 13, 2023.  -     oxyCODONE-acetaminophen (Percocet)  mg tablet; Take 1 tablet by mouth every 4 hours if needed for severe pain (7 - 10). Do not start before January 12, 2024.  Degenerative cervical spinal stenosis  Chronic obstructive pulmonary disease, unspecified COPD type (CMS/HCC)  Chronic pain syndrome  -     oxyCODONE-acetaminophen (Percocet)  mg tablet; Take 1 tablet by mouth every 4 hours if needed (for pain).  -     oxyCODONE-acetaminophen (Percocet)  mg tablet;  Take 1 tablet by mouth every 4 hours if needed for severe pain (7 - 10). Do not start before December 13, 2023.  -     oxyCODONE-acetaminophen (Percocet)  mg tablet; Take 1 tablet by mouth every 4 hours if needed for severe pain (7 - 10). Do not start before January 12, 2024.  Need for prophylactic vaccination and inoculation against influenza  -     Flu vaccine, quadrivalent, high-dose, preservative free, age 65y+ (FLUZONE)  Type 2 diabetes mellitus with hyperglycemia, with long-term current use of insulin (CMS/[facility])  -     POCT glycosylated hemoglobin (Hb A1C) manually resulted  -     Albumin, urine, random; Future  Metformin and will   Diabetic peripheral neuropathy (CMS/[facility])  -     gabapentin (Neurontin) 600 mg tablet; Take 1 tablet (600 mg) by mouth 3 times a day.  Stage 3a chronic kidney disease (CMS/[facility])  Medication management  -     Opiate/Opioid/Benzo Extended Prescription Compliance; Future  -     traZODone (Desyrel) 50 mg tablet; Take 1 tablet (50 mg) by mouth once daily at bedtime.  Primary insomnia  Inability to ambulate due to knee  -     General supply request Raised toilet seat with arms    Follow up three months- call if you need anything. I am trying to get the lift chair mechanism and raised toilet for you. At some point you may want to call your insurance about a motorized scooter or wheelchair.       Ways to Help Prevent Falls at Home    Quick Tips   ? Ask for help if you need it. Most people want to help!   ? Get up slowly after sitting or laying down   ? Wear a medical alert device or keep cell phone in your pocket   ? Use night lights, especially areas near a bathroom   ? Keep the items you use often within reach on a small stool or end table   ? Use an assistive device such as walker or cane, as directed by provider/physical therapy   ? Use a non-slip mat and grab bars in your bathroom. Look for home health sections for best options     Other Areas to Focus On   ? Exercise and  nutrition: Regular exercise or taking a falls prevention class are great ways improve strength and balance. Don’t forget to stay hydrated and bring a snack!   ? Medicine side effects: Some medicines can make you sleepy or dizzy, which could cause a fall. Ask your healthcare provider about the side effects your medicines could cause. Be sure to let them know if you take any vitamins or supplements as well.   ? Tripping hazards: Remove items you could trip on, such as loose mats, rugs, cords, and clutter. Wear closed toe shoes with rubber soles.   ? Health and wellness: Get regular checkups with your healthcare provider, plus routine vision and hearing screenings. Talk with your healthcare provider about:   o Your medicines and the possible side effects - bring them in a bag if that is easier!   o Problems with balance or feeling dizzy   o Ways to promote bone health, such as Vitamin D and calcium supplements   o Questions or concerns about falling     *Ask your healthcare team if you have questions     ©[facility], 2022

## 2023-11-13 NOTE — PROGRESS NOTES
Subjective   Patient ID: Teresa Matthews is a 72 y.o. female.    HPI  72 year old female for follow up. She states she can no longer walk- fracture of femur and loss of knee replacement, left knee with spacer in place, after knee joint removed. Ankles are starting to get contractures, and making it difficult to walk. Cannot lift right leg.   Review of Systems   Musculoskeletal:  Positive for arthralgias, back pain and gait problem.        Cannot bear weight on leg.    All other systems reviewed and are negative.  Needs lift chair from [place].   OARRS:  Monica Macario MD on 11/13/2023  9:43 AM  I have personally reviewed the OARRS report for Teresa Matthews. I have considered the risks of abuse, dependence, addiction and diversion and I believe that it is clinically appropriate for Teresa Matthews to be prescribed this medication    Is the patient prescribed a combination of a benzodiazepine and opioid?  Yes, I feel it is clincially indicated to continue the medication and have discussed with the patient risks/benefits/alternatives.    Last Urine Drug Screen / ordered today: Yes  Recent Results (from the past 8760 hour(s))   OPIATE/OPIOID/BENZO PRESCRIPTION COMPLIANCE    Collection Time: 05/12/23  1:28 PM   Result Value Ref Range    DRUG SCREEN COMMENT URINE SEE BELOW     Creatine, Urine 92.9 mg/dL    Amphetamine Screen, Urine PRESUMPTIVE NEGATIVE NEGATIVE    Barbiturate Screen, Urine PRESUMPTIVE NEGATIVE NEGATIVE    Cannabinoid Screen, Urine PRESUMPTIVE NEGATIVE NEGATIVE    Cocaine Screen, Urine PRESUMPTIVE NEGATIVE NEGATIVE    PCP Screen, Urine PRESUMPTIVE NEGATIVE NEGATIVE    7-Aminoclonazepam <25 Cutoff <25 ng/mL    Alpha-Hydroxyalprazolam <25 Cutoff <25 ng/mL    Alpha-Hydroxymidazolam <25 Cutoff <25 ng/mL    Alprazolam <25 Cutoff <25 ng/mL    Chlordiazepoxide <25 Cutoff <25 ng/mL    Clonazepam <25 Cutoff <25 ng/mL    Diazepam <25 Cutoff <25 ng/mL    Lorazepam <25 Cutoff <25 ng/mL    Midazolam  <25 Cutoff <25 ng/mL    Nordiazepam <25 Cutoff <25 ng/mL    Oxazepam <25 Cutoff <25 ng/mL    Temazepam <25 Cutoff <25 ng/mL    Zolpidem <25 Cutoff <25 ng/mL    Zolpidem Metabolite (ZCA) <25 Cutoff <25 ng/mL    6-Acetylmorphine <25 Cutoff <25 ng/mL    Codeine <50 Cutoff <50 ng/mL    Hydrocodone <25 Cutoff <25 ng/mL    Hydromorphone <25 Cutoff <25 ng/mL    Morphine Urine <50 Cutoff <50 ng/mL    Norhydrocodone <25 Cutoff <25 ng/mL    Noroxycodone <25 Cutoff <25 ng/mL    Oxycodone <25 Cutoff <25 ng/mL    Oxymorphone <25 Cutoff <25 ng/mL    Tramadol <50 Cutoff <50 ng/mL    O-Desmethyltramadol <50 Cutoff <50 ng/mL    Fentanyl <2.5 Cutoff<2.5 ng/mL    Norfentanyl <2.5 Cutoff<2.5 ng/mL    METHADONE CONFIRMATION,URINE <25 Cutoff <25 ng/mL    EDDP <25 Cutoff <25 ng/mL     Results are as expected.         Controlled Substance Agreement:  Date of the Last Agreement: 07/2022  Reviewed Controlled Substance Agreement including but not limited to the benefits, risks, and alternatives to treatment with a Controlled Substance medication(s).    Opioids:  What is the patient's goal of therapy? Improved pain  Is this being achieved with current treatment? yes    I have calculated the patient's Morphine Dose Equivalent (MED):   I have considered referral to Pain Management and/or a specialist, and do not feel it is necessary at this time.    I feel that it is clinically indicated to continue this current medication regimen after consideration of alternative therapies, and other non-opioid treatment.    Opioid Risk Screening:  No data recorded    Pain Assessment:  5/10  Objective   Physical Exam  Vitals reviewed.   Constitutional:       Appearance: Normal appearance.   HENT:      Head: Normocephalic and atraumatic.      Right Ear: Tympanic membrane normal.      Left Ear: Tympanic membrane normal.      Nose: Nose normal.      Mouth/Throat:      Mouth: Mucous membranes are moist.      Pharynx: Oropharynx is clear.   Eyes:      Extraocular  Movements: Extraocular movements intact.      Conjunctiva/sclera: Conjunctivae normal.      Pupils: Pupils are equal, round, and reactive to light.   Cardiovascular:      Rate and Rhythm: Normal rate and regular rhythm.      Pulses: Normal pulses.      Heart sounds: Normal heart sounds.   Pulmonary:      Effort: Pulmonary effort is normal.      Breath sounds: Normal breath sounds.   Abdominal:      General: Abdomen is flat. Bowel sounds are normal.      Palpations: Abdomen is soft.   Musculoskeletal:         General: Normal range of motion.      Cervical back: Normal range of motion and neck supple.   Skin:     General: Skin is warm and dry.      Capillary Refill: Capillary refill takes less than 2 seconds.   Neurological:      General: No focal deficit present.      Mental Status: She is alert and oriented to person, place, and time.   Psychiatric:         Mood and Affect: Mood normal.         Behavior: Behavior normal.         Assessment/Plan   Diagnoses and all orders for this visit:  Atrial fibrillation, unspecified type (CMS/HCC)  -     apixaban (Eliquis) 5 mg tablet; Take 1 tablet (5 mg) by mouth 2 times a day.    Cardiomegaly  Chronic combined systolic and diastolic congestive heart failure (CMS/HCC)- stable on amiodorone. Spironoloactone Torsemide.   Chronic stasis dermatitis  Hypertension, essential-   Hypothyroidism, postsurgical  Gastroesophageal reflux disease without esophagitis- omeprazole.   Chronic ulcer of left heel with fat layer exposed (CMS/HCC)  Closed fracture of proximal end of left tibia with routine healing, unspecified fracture morphology, subsequent encounter  -     oxyCODONE-acetaminophen (Percocet)  mg tablet; Take 1 tablet by mouth every 4 hours if needed (for pain).  -     oxyCODONE-acetaminophen (Percocet)  mg tablet; Take 1 tablet by mouth every 4 hours if needed for severe pain (7 - 10). Do not start before December 13, 2023.  -     oxyCODONE-acetaminophen (Percocet)   mg tablet; Take 1 tablet by mouth every 4 hours if needed for severe pain (7 - 10). Do not start before January 12, 2024.  Primary osteoarthritis of both knees  -     oxyCODONE-acetaminophen (Percocet)  mg tablet; Take 1 tablet by mouth every 4 hours if needed (for pain).  -     oxyCODONE-acetaminophen (Percocet)  mg tablet; Take 1 tablet by mouth every 4 hours if needed for severe pain (7 - 10). Do not start before December 13, 2023.  -     oxyCODONE-acetaminophen (Percocet)  mg tablet; Take 1 tablet by mouth every 4 hours if needed for severe pain (7 - 10). Do not start before January 12, 2024.  Degenerative cervical spinal stenosis  Chronic obstructive pulmonary disease, unspecified COPD type (CMS/[facility])  Chronic pain syndrome  -     oxyCODONE-acetaminophen (Percocet)  mg tablet; Take 1 tablet by mouth every 4 hours if needed (for pain).  -     oxyCODONE-acetaminophen (Percocet)  mg tablet; Take 1 tablet by mouth every 4 hours if needed for severe pain (7 - 10). Do not start before December 13, 2023.  -     oxyCODONE-acetaminophen (Percocet)  mg tablet; Take 1 tablet by mouth every 4 hours if needed for severe pain (7 - 10). Do not start before January 12, 2024.  Need for prophylactic vaccination and inoculation against influenza  -     Flu vaccine, quadrivalent, high-dose, preservative free, age 65y+ (FLUZONE)  Type 2 diabetes mellitus with hyperglycemia, with long-term current use of insulin (CMS/[facility])  -     POCT glycosylated hemoglobin (Hb A1C) manually resulted  -     Albumin, urine, random; Future  Metformin and will   Diabetic peripheral neuropathy (CMS/[facility])  -     gabapentin (Neurontin) 600 mg tablet; Take 1 tablet (600 mg) by mouth 3 times a day.  Stage 3a chronic kidney disease (CMS/[facility])  Medication management  -     Opiate/Opioid/Benzo Extended Prescription Compliance; Future  -     traZODone (Desyrel) 50 mg tablet; Take 1 tablet (50 mg) by mouth once daily at  bedtime.  Primary insomnia  Inability to ambulate due to knee  -     General supply request Raised toilet seat with arms    Follow up three months- call if you need anything. I am trying to get the lift chair mechanism and raised toilet for you. At some point you may want to call your insurance about a motorized scooter or wheelchair.   Patient was identified as a fall risk. Risk prevention instructions provided.

## 2023-11-14 LAB
CREAT UR-MCNC: 46.7 MG/DL (ref 20–320)
MICROALBUMIN UR-MCNC: 101.2 MG/L
MICROALBUMIN/CREAT UR: 216.7 UG/MG CREAT

## 2023-11-15 ENCOUNTER — TELEPHONE (OUTPATIENT)
Dept: PRIMARY CARE | Facility: CLINIC | Age: 73
End: 2023-11-15
Payer: MEDICARE

## 2023-11-15 DIAGNOSIS — N39.41 URGE INCONTINENCE OF URINE: Primary | ICD-10-CM

## 2023-11-20 DIAGNOSIS — Z79.4 TYPE 2 DIABETES MELLITUS WITH HYPERGLYCEMIA, WITH LONG-TERM CURRENT USE OF INSULIN (MULTI): ICD-10-CM

## 2023-11-20 DIAGNOSIS — N18.31 STAGE 3A CHRONIC KIDNEY DISEASE (MULTI): Primary | ICD-10-CM

## 2023-11-20 DIAGNOSIS — R80.9 URINE TEST POSITIVE FOR MICROALBUMINURIA: ICD-10-CM

## 2023-11-20 DIAGNOSIS — E11.65 TYPE 2 DIABETES MELLITUS WITH HYPERGLYCEMIA, WITH LONG-TERM CURRENT USE OF INSULIN (MULTI): ICD-10-CM

## 2023-11-20 RX ORDER — LOSARTAN POTASSIUM 25 MG/1
25 TABLET ORAL DAILY
Qty: 90 TABLET | Refills: 3 | Status: ON HOLD | OUTPATIENT
Start: 2023-11-20 | End: 2024-11-19

## 2023-11-20 NOTE — RESULT ENCOUNTER NOTE
Urine albumin was very high- we usually prescribe and ARB medication for this, I sent 25 mg of losartan to pharmacy to add on to you medications.

## 2023-11-21 ENCOUNTER — TELEPHONE (OUTPATIENT)
Dept: PRIMARY CARE | Facility: CLINIC | Age: 73
End: 2023-11-21
Payer: MEDICARE

## 2023-11-22 ENCOUNTER — TELEPHONE (OUTPATIENT)
Dept: PRIMARY CARE | Facility: CLINIC | Age: 73
End: 2023-11-22
Payer: MEDICARE

## 2023-11-22 LAB
AMPHETAMINES UR QL SCN: NORMAL
BARBITURATES UR QL SCN: NORMAL
BZE UR QL SCN: NORMAL
CANNABINOIDS UR QL SCN: NORMAL
CREAT UR-MCNC: 48.5 MG/DL (ref 20–320)
PCP UR QL SCN: NORMAL

## 2023-11-22 NOTE — TELEPHONE ENCOUNTER
----- Message from Monica Macario MD sent at 11/20/2023  6:31 PM EST -----  Urine albumin was very high- we usually prescribe and ARB medication for this, I sent 25 mg of losartan to pharmacy to add on to you medications.

## 2023-11-28 ENCOUNTER — TELEPHONE (OUTPATIENT)
Dept: PRIMARY CARE | Facility: CLINIC | Age: 73
End: 2023-11-28
Payer: MEDICARE

## 2023-11-28 DIAGNOSIS — J01.00 ACUTE NON-RECURRENT MAXILLARY SINUSITIS: Primary | ICD-10-CM

## 2023-11-28 NOTE — TELEPHONE ENCOUNTER
Chief Complaint: Sinus infection    Symptoms: Stuffy head, congestion, Roof of mouth itcy, Green Drainage    Duration: 4 days    Treatments: Theraflu, cough drops    Patient Requesting:  Recommendations    Covid Test: Negative Today    Did patient have Covid Vaccine?    Recent covid booster:      Pharmacy: [facility]

## 2023-11-29 RX ORDER — DOXYCYCLINE 100 MG/1
100 CAPSULE ORAL 2 TIMES DAILY
Qty: 20 CAPSULE | Refills: 0 | Status: SHIPPED | OUTPATIENT
Start: 2023-11-29 | End: 2023-12-09

## 2023-12-06 NOTE — TELEPHONE ENCOUNTER
Patient is called back and following up on incontinence supply prescription. She is having trouble affording it until prescription is sent

## 2023-12-07 RX ORDER — DIAPER,BRIEF,ADULT, DISPOSABLE
1 EACH MISCELLANEOUS EVERY 4 HOURS PRN
Qty: 240 EACH | Refills: 11 | Status: SHIPPED | OUTPATIENT
Start: 2023-12-07 | End: 2024-02-13

## 2023-12-07 RX ORDER — MULTIVITAMIN WITH FOLIC ACID 400 MCG
1 TABLET ORAL 2 TIMES DAILY
Qty: 60 EACH | Refills: 11 | Status: ON HOLD | OUTPATIENT
Start: 2023-12-07 | End: 2024-12-06

## 2024-01-04 ENCOUNTER — TELEPHONE (OUTPATIENT)
Dept: PRIMARY CARE | Facility: CLINIC | Age: 74
End: 2024-01-04
Payer: MEDICARE

## 2024-01-04 DIAGNOSIS — R05.1 ACUTE COUGH: ICD-10-CM

## 2024-01-04 DIAGNOSIS — U07.1 COVID-19: Primary | ICD-10-CM

## 2024-01-04 NOTE — TELEPHONE ENCOUNTER
Patient tested positive for Covid 1/4 and wants to know if Paxlovid can be called into [facility]. Thanks!

## 2024-01-09 PROBLEM — J40 BRONCHITIS DUE TO COVID-19 VIRUS: Status: ACTIVE | Noted: 2023-04-10

## 2024-01-09 RX ORDER — PROMETHAZINE HYDROCHLORIDE AND DEXTROMETHORPHAN HYDROBROMIDE 6.25; 15 MG/5ML; MG/5ML
5 SYRUP ORAL EVERY 4 HOURS PRN
Qty: 120 ML | Refills: 1 | Status: SHIPPED | OUTPATIENT
Start: 2024-01-09 | End: 2024-02-01 | Stop reason: HOSPADM

## 2024-01-09 RX ORDER — DOXYCYCLINE 100 MG/1
100 CAPSULE ORAL 2 TIMES DAILY
Qty: 14 CAPSULE | Refills: 0 | Status: SHIPPED | OUTPATIENT
Start: 2024-01-09 | End: 2024-01-09 | Stop reason: SDUPTHER

## 2024-01-09 RX ORDER — PROMETHAZINE HYDROCHLORIDE AND DEXTROMETHORPHAN HYDROBROMIDE 6.25; 15 MG/5ML; MG/5ML
5 SYRUP ORAL EVERY 4 HOURS PRN
Qty: 120 ML | Refills: 1 | Status: SHIPPED | OUTPATIENT
Start: 2024-01-09 | End: 2024-01-09 | Stop reason: SDUPTHER

## 2024-01-09 RX ORDER — DOXYCYCLINE 100 MG/1
100 CAPSULE ORAL 2 TIMES DAILY
Qty: 14 CAPSULE | Refills: 0 | Status: SHIPPED | OUTPATIENT
Start: 2024-01-09 | End: 2024-01-16

## 2024-01-09 NOTE — TELEPHONE ENCOUNTER
She would like something called in to pharmacy. Cough medicine/ antibiotics  [facility] [place] - Open later than [facility].

## 2024-01-26 ENCOUNTER — APPOINTMENT (OUTPATIENT)
Dept: CARDIOLOGY | Facility: HOSPITAL | Age: 74
DRG: 264 | End: 2024-01-26
Payer: MEDICARE

## 2024-01-26 ENCOUNTER — HOSPITAL ENCOUNTER (INPATIENT)
Facility: HOSPITAL | Age: 74
LOS: 6 days | Discharge: NURSING, REHAB OR LONG-TERM CARE | DRG: 264 | End: 2024-02-01
Attending: EMERGENCY MEDICINE | Admitting: INTERNAL MEDICINE
Payer: MEDICARE

## 2024-01-26 ENCOUNTER — APPOINTMENT (OUTPATIENT)
Dept: RADIOLOGY | Facility: HOSPITAL | Age: 74
DRG: 264 | End: 2024-01-26
Payer: MEDICARE

## 2024-01-26 DIAGNOSIS — E11.42 DIABETIC PERIPHERAL NEUROPATHY (MULTI): ICD-10-CM

## 2024-01-26 DIAGNOSIS — E11.65 TYPE 2 DIABETES MELLITUS WITH HYPERGLYCEMIA, UNSPECIFIED WHETHER LONG TERM INSULIN USE (MULTI): ICD-10-CM

## 2024-01-26 DIAGNOSIS — E89.0 HYPOTHYROIDISM, POSTSURGICAL: ICD-10-CM

## 2024-01-26 DIAGNOSIS — L03.119 CELLULITIS OF LOWER EXTREMITY, UNSPECIFIED LATERALITY: Primary | ICD-10-CM

## 2024-01-26 DIAGNOSIS — L03.116 CELLULITIS OF LEFT LOWER EXTREMITY: ICD-10-CM

## 2024-01-26 LAB
ALBUMIN SERPL BCP-MCNC: 3.4 G/DL (ref 3.4–5)
ALP SERPL-CCNC: 117 U/L (ref 33–136)
ALT SERPL W P-5'-P-CCNC: 12 U/L (ref 7–45)
ANION GAP SERPL CALC-SCNC: 13 MMOL/L (ref 10–20)
AST SERPL W P-5'-P-CCNC: 11 U/L (ref 9–39)
BASOPHILS # BLD AUTO: 0.03 X10*3/UL (ref 0–0.1)
BASOPHILS NFR BLD AUTO: 0.5 %
BILIRUB SERPL-MCNC: 0.4 MG/DL (ref 0–1.2)
BNP SERPL-MCNC: 294 PG/ML (ref 0–99)
BUN SERPL-MCNC: 14 MG/DL (ref 6–23)
CALCIUM SERPL-MCNC: 8.8 MG/DL (ref 8.6–10.3)
CARDIAC TROPONIN I PNL SERPL HS: 6 NG/L (ref 0–13)
CHLORIDE SERPL-SCNC: 94 MMOL/L (ref 98–107)
CO2 SERPL-SCNC: 28 MMOL/L (ref 21–32)
CREAT SERPL-MCNC: 1.19 MG/DL (ref 0.5–1.05)
CRP SERPL-MCNC: 1.91 MG/DL
EGFRCR SERPLBLD CKD-EPI 2021: 49 ML/MIN/1.73M*2
EOSINOPHIL # BLD AUTO: 0.05 X10*3/UL (ref 0–0.4)
EOSINOPHIL NFR BLD AUTO: 0.8 %
ERYTHROCYTE [DISTWIDTH] IN BLOOD BY AUTOMATED COUNT: 15.7 % (ref 11.5–14.5)
ERYTHROCYTE [SEDIMENTATION RATE] IN BLOOD BY WESTERGREN METHOD: 45 MM/H (ref 0–30)
GLUCOSE BLD MANUAL STRIP-MCNC: 330 MG/DL (ref 74–99)
GLUCOSE BLD MANUAL STRIP-MCNC: 387 MG/DL (ref 74–99)
GLUCOSE BLD MANUAL STRIP-MCNC: 451 MG/DL (ref 74–99)
GLUCOSE SERPL-MCNC: 576 MG/DL (ref 74–99)
HCT VFR BLD AUTO: 35.3 % (ref 36–46)
HGB BLD-MCNC: 11.5 G/DL (ref 12–16)
IMM GRANULOCYTES # BLD AUTO: 0.03 X10*3/UL (ref 0–0.5)
IMM GRANULOCYTES NFR BLD AUTO: 0.5 % (ref 0–0.9)
LACTATE SERPL-SCNC: 2 MMOL/L (ref 0.4–2)
LACTATE SERPL-SCNC: 2.6 MMOL/L (ref 0.4–2)
LYMPHOCYTES # BLD AUTO: 0.99 X10*3/UL (ref 0.8–3)
LYMPHOCYTES NFR BLD AUTO: 15.7 %
MAGNESIUM SERPL-MCNC: 1.79 MG/DL (ref 1.6–2.4)
MCH RBC QN AUTO: 28.3 PG (ref 26–34)
MCHC RBC AUTO-ENTMCNC: 32.6 G/DL (ref 32–36)
MCV RBC AUTO: 87 FL (ref 80–100)
MONOCYTES # BLD AUTO: 0.5 X10*3/UL (ref 0.05–0.8)
MONOCYTES NFR BLD AUTO: 7.9 %
NEUTROPHILS # BLD AUTO: 4.7 X10*3/UL (ref 1.6–5.5)
NEUTROPHILS NFR BLD AUTO: 74.6 %
NRBC BLD-RTO: 0 /100 WBCS (ref 0–0)
PLATELET # BLD AUTO: 156 X10*3/UL (ref 150–450)
POTASSIUM SERPL-SCNC: 4.1 MMOL/L (ref 3.5–5.3)
PROT SERPL-MCNC: 6.8 G/DL (ref 6.4–8.2)
RBC # BLD AUTO: 4.07 X10*6/UL (ref 4–5.2)
SODIUM SERPL-SCNC: 131 MMOL/L (ref 136–145)
T4 FREE SERPL-MCNC: 0.68 NG/DL (ref 0.61–1.12)
TSH SERPL-ACNC: 40.63 MIU/L (ref 0.44–3.98)
WBC # BLD AUTO: 6.3 X10*3/UL (ref 4.4–11.3)

## 2024-01-26 PROCEDURE — 93970 EXTREMITY STUDY: CPT

## 2024-01-26 PROCEDURE — 83735 ASSAY OF MAGNESIUM: CPT | Performed by: HEALTH CARE PROVIDER

## 2024-01-26 PROCEDURE — 93005 ELECTROCARDIOGRAM TRACING: CPT

## 2024-01-26 PROCEDURE — 1200000002 HC GENERAL ROOM WITH TELEMETRY DAILY

## 2024-01-26 PROCEDURE — 82947 ASSAY GLUCOSE BLOOD QUANT: CPT

## 2024-01-26 PROCEDURE — 82947 ASSAY GLUCOSE BLOOD QUANT: CPT | Mod: 91

## 2024-01-26 PROCEDURE — 87040 BLOOD CULTURE FOR BACTERIA: CPT | Mod: GEALAB,91 | Performed by: INTERNAL MEDICINE

## 2024-01-26 PROCEDURE — 2550000001 HC RX 255 CONTRASTS: Performed by: HEALTH CARE PROVIDER

## 2024-01-26 PROCEDURE — 87040 BLOOD CULTURE FOR BACTERIA: CPT | Mod: GEALAB,91 | Performed by: HEALTH CARE PROVIDER

## 2024-01-26 PROCEDURE — 96367 TX/PROPH/DG ADDL SEQ IV INF: CPT

## 2024-01-26 PROCEDURE — 83880 ASSAY OF NATRIURETIC PEPTIDE: CPT | Performed by: HEALTH CARE PROVIDER

## 2024-01-26 PROCEDURE — 96365 THER/PROPH/DIAG IV INF INIT: CPT

## 2024-01-26 PROCEDURE — 2500000004 HC RX 250 GENERAL PHARMACY W/ HCPCS (ALT 636 FOR OP/ED): Performed by: INTERNAL MEDICINE

## 2024-01-26 PROCEDURE — 2500000004 HC RX 250 GENERAL PHARMACY W/ HCPCS (ALT 636 FOR OP/ED): Performed by: HEALTH CARE PROVIDER

## 2024-01-26 PROCEDURE — 2500000002 HC RX 250 W HCPCS SELF ADMINISTERED DRUGS (ALT 637 FOR MEDICARE OP, ALT 636 FOR OP/ED): Performed by: INTERNAL MEDICINE

## 2024-01-26 PROCEDURE — 99285 EMERGENCY DEPT VISIT HI MDM: CPT | Mod: 27 | Performed by: EMERGENCY MEDICINE

## 2024-01-26 PROCEDURE — 73590 X-RAY EXAM OF LOWER LEG: CPT | Mod: LT

## 2024-01-26 PROCEDURE — 86140 C-REACTIVE PROTEIN: CPT | Performed by: HEALTH CARE PROVIDER

## 2024-01-26 PROCEDURE — 73590 X-RAY EXAM OF LOWER LEG: CPT | Mod: LEFT SIDE | Performed by: RADIOLOGY

## 2024-01-26 PROCEDURE — 85025 COMPLETE CBC W/AUTO DIFF WBC: CPT | Performed by: HEALTH CARE PROVIDER

## 2024-01-26 PROCEDURE — 71275 CT ANGIOGRAPHY CHEST: CPT

## 2024-01-26 PROCEDURE — 2500000002 HC RX 250 W HCPCS SELF ADMINISTERED DRUGS (ALT 637 FOR MEDICARE OP, ALT 636 FOR OP/ED): Performed by: HEALTH CARE PROVIDER

## 2024-01-26 PROCEDURE — 36415 COLL VENOUS BLD VENIPUNCTURE: CPT | Performed by: HEALTH CARE PROVIDER

## 2024-01-26 PROCEDURE — 80053 COMPREHEN METABOLIC PANEL: CPT | Performed by: HEALTH CARE PROVIDER

## 2024-01-26 PROCEDURE — 71275 CT ANGIOGRAPHY CHEST: CPT | Performed by: RADIOLOGY

## 2024-01-26 PROCEDURE — 96376 TX/PRO/DX INJ SAME DRUG ADON: CPT

## 2024-01-26 PROCEDURE — 84484 ASSAY OF TROPONIN QUANT: CPT | Performed by: HEALTH CARE PROVIDER

## 2024-01-26 PROCEDURE — 84439 ASSAY OF FREE THYROXINE: CPT | Performed by: INTERNAL MEDICINE

## 2024-01-26 PROCEDURE — 96375 TX/PRO/DX INJ NEW DRUG ADDON: CPT

## 2024-01-26 PROCEDURE — 83605 ASSAY OF LACTIC ACID: CPT | Performed by: HEALTH CARE PROVIDER

## 2024-01-26 PROCEDURE — 99223 1ST HOSP IP/OBS HIGH 75: CPT | Performed by: INTERNAL MEDICINE

## 2024-01-26 PROCEDURE — A4217 STERILE WATER/SALINE, 500 ML: HCPCS | Performed by: HEALTH CARE PROVIDER

## 2024-01-26 PROCEDURE — 85652 RBC SED RATE AUTOMATED: CPT | Performed by: HEALTH CARE PROVIDER

## 2024-01-26 PROCEDURE — 84443 ASSAY THYROID STIM HORMONE: CPT | Performed by: INTERNAL MEDICINE

## 2024-01-26 PROCEDURE — 2500000001 HC RX 250 WO HCPCS SELF ADMINISTERED DRUGS (ALT 637 FOR MEDICARE OP): Performed by: INTERNAL MEDICINE

## 2024-01-26 RX ORDER — TORSEMIDE 20 MG/1
40 TABLET ORAL DAILY
Status: DISCONTINUED | OUTPATIENT
Start: 2024-01-26 | End: 2024-02-01 | Stop reason: HOSPADM

## 2024-01-26 RX ORDER — GABAPENTIN 300 MG/1
600 CAPSULE ORAL 3 TIMES DAILY
Status: DISCONTINUED | OUTPATIENT
Start: 2024-01-26 | End: 2024-02-01 | Stop reason: HOSPADM

## 2024-01-26 RX ORDER — LOSARTAN POTASSIUM 50 MG/1
25 TABLET ORAL DAILY
Status: DISCONTINUED | OUTPATIENT
Start: 2024-01-26 | End: 2024-02-01 | Stop reason: HOSPADM

## 2024-01-26 RX ORDER — DESVENLAFAXINE 100 MG/1
100 TABLET, EXTENDED RELEASE ORAL DAILY
Status: DISCONTINUED | OUTPATIENT
Start: 2024-01-26 | End: 2024-02-01 | Stop reason: HOSPADM

## 2024-01-26 RX ORDER — ASPIRIN 81 MG/1
81 TABLET ORAL
Status: DISCONTINUED | OUTPATIENT
Start: 2024-01-26 | End: 2024-02-01 | Stop reason: HOSPADM

## 2024-01-26 RX ORDER — DEXTROSE 50 % IN WATER (D50W) INTRAVENOUS SYRINGE
25
Status: DISCONTINUED | OUTPATIENT
Start: 2024-01-26 | End: 2024-01-27

## 2024-01-26 RX ORDER — ONDANSETRON 4 MG/1
4 TABLET, FILM COATED ORAL EVERY 8 HOURS PRN
Status: DISCONTINUED | OUTPATIENT
Start: 2024-01-26 | End: 2024-02-01 | Stop reason: HOSPADM

## 2024-01-26 RX ORDER — LEVOTHYROXINE SODIUM 175 UG/1
175 TABLET ORAL DAILY
Status: DISCONTINUED | OUTPATIENT
Start: 2024-01-26 | End: 2024-01-27

## 2024-01-26 RX ORDER — POLYETHYLENE GLYCOL 3350 17 G/17G
17 POWDER, FOR SOLUTION ORAL DAILY
Status: DISCONTINUED | OUTPATIENT
Start: 2024-01-26 | End: 2024-02-01 | Stop reason: HOSPADM

## 2024-01-26 RX ORDER — POTASSIUM CHLORIDE 750 MG/1
10 TABLET, FILM COATED, EXTENDED RELEASE ORAL 2 TIMES DAILY
Status: DISCONTINUED | OUTPATIENT
Start: 2024-01-26 | End: 2024-02-01 | Stop reason: HOSPADM

## 2024-01-26 RX ORDER — DEXTROSE MONOHYDRATE 100 MG/ML
0.3 INJECTION, SOLUTION INTRAVENOUS ONCE AS NEEDED
Status: DISCONTINUED | OUTPATIENT
Start: 2024-01-26 | End: 2024-02-01 | Stop reason: HOSPADM

## 2024-01-26 RX ORDER — INSULIN LISPRO 100 [IU]/ML
8 INJECTION, SOLUTION INTRAVENOUS; SUBCUTANEOUS
Status: DISCONTINUED | OUTPATIENT
Start: 2024-01-27 | End: 2024-01-31

## 2024-01-26 RX ORDER — OXYCODONE AND ACETAMINOPHEN 5; 325 MG/1; MG/1
1 TABLET ORAL EVERY 4 HOURS PRN
Status: DISCONTINUED | OUTPATIENT
Start: 2024-01-26 | End: 2024-02-01 | Stop reason: HOSPADM

## 2024-01-26 RX ORDER — METOPROLOL SUCCINATE 50 MG/1
50 TABLET, EXTENDED RELEASE ORAL DAILY
Status: DISCONTINUED | OUTPATIENT
Start: 2024-01-26 | End: 2024-02-01 | Stop reason: HOSPADM

## 2024-01-26 RX ORDER — SPIRONOLACTONE 25 MG/1
25 TABLET ORAL DAILY
Status: DISCONTINUED | OUTPATIENT
Start: 2024-01-26 | End: 2024-02-01 | Stop reason: HOSPADM

## 2024-01-26 RX ORDER — FAMOTIDINE 20 MG/1
40 TABLET, FILM COATED ORAL DAILY
Status: DISCONTINUED | OUTPATIENT
Start: 2024-01-26 | End: 2024-02-01 | Stop reason: HOSPADM

## 2024-01-26 RX ORDER — INSULIN GLARGINE 100 [IU]/ML
20 INJECTION, SOLUTION SUBCUTANEOUS 2 TIMES DAILY
Status: DISCONTINUED | OUTPATIENT
Start: 2024-01-26 | End: 2024-01-27

## 2024-01-26 RX ORDER — AMIODARONE HYDROCHLORIDE 200 MG/1
200 TABLET ORAL DAILY
Status: DISCONTINUED | OUTPATIENT
Start: 2024-01-26 | End: 2024-02-01 | Stop reason: HOSPADM

## 2024-01-26 RX ORDER — ONDANSETRON HYDROCHLORIDE 2 MG/ML
4 INJECTION, SOLUTION INTRAVENOUS EVERY 8 HOURS PRN
Status: DISCONTINUED | OUTPATIENT
Start: 2024-01-26 | End: 2024-02-01 | Stop reason: HOSPADM

## 2024-01-26 RX ORDER — NYSTATIN 100000 [USP'U]/G
1 POWDER TOPICAL 2 TIMES DAILY PRN
Status: DISCONTINUED | OUTPATIENT
Start: 2024-01-26 | End: 2024-02-01 | Stop reason: HOSPADM

## 2024-01-26 RX ORDER — TRAZODONE HYDROCHLORIDE 50 MG/1
50 TABLET ORAL NIGHTLY
Status: DISCONTINUED | OUTPATIENT
Start: 2024-01-26 | End: 2024-02-01 | Stop reason: HOSPADM

## 2024-01-26 RX ORDER — BUPROPION HYDROCHLORIDE 150 MG/1
150 TABLET ORAL EVERY MORNING
Status: DISCONTINUED | OUTPATIENT
Start: 2024-01-27 | End: 2024-02-01 | Stop reason: HOSPADM

## 2024-01-26 RX ADMIN — ASPIRIN 81 MG: 81 TABLET, COATED ORAL at 22:01

## 2024-01-26 RX ADMIN — INSULIN GLARGINE 20 UNITS: 100 INJECTION, SOLUTION SUBCUTANEOUS at 22:42

## 2024-01-26 RX ADMIN — INSULIN HUMAN 10 UNITS: 100 INJECTION, SOLUTION PARENTERAL at 16:49

## 2024-01-26 RX ADMIN — POTASSIUM CHLORIDE 10 MEQ: 750 TABLET, EXTENDED RELEASE ORAL at 22:01

## 2024-01-26 RX ADMIN — INSULIN HUMAN 10 UNITS: 100 INJECTION, SOLUTION PARENTERAL at 18:41

## 2024-01-26 RX ADMIN — APIXABAN 5 MG: 5 TABLET, FILM COATED ORAL at 22:01

## 2024-01-26 RX ADMIN — TRAZODONE HYDROCHLORIDE 50 MG: 50 TABLET ORAL at 22:01

## 2024-01-26 RX ADMIN — PIPERACILLIN SODIUM AND TAZOBACTAM SODIUM 3.38 G: 3; .375 INJECTION, SOLUTION INTRAVENOUS at 16:48

## 2024-01-26 RX ADMIN — VANCOMYCIN HYDROCHLORIDE 1500 MG: 10 INJECTION, POWDER, LYOPHILIZED, FOR SOLUTION INTRAVENOUS at 16:49

## 2024-01-26 RX ADMIN — GABAPENTIN 600 MG: 300 CAPSULE ORAL at 22:01

## 2024-01-26 RX ADMIN — IOHEXOL 80 ML: 350 INJECTION, SOLUTION INTRAVENOUS at 17:30

## 2024-01-26 SDOH — SOCIAL STABILITY: SOCIAL INSECURITY: ARE YOU OR HAVE YOU BEEN THREATENED OR ABUSED PHYSICALLY, EMOTIONALLY, OR SEXUALLY BY ANYONE?: NO

## 2024-01-26 SDOH — SOCIAL STABILITY: SOCIAL INSECURITY: DOES ANYONE TRY TO KEEP YOU FROM HAVING/CONTACTING OTHER FRIENDS OR DOING THINGS OUTSIDE YOUR HOME?: NO

## 2024-01-26 SDOH — SOCIAL STABILITY: SOCIAL INSECURITY: HAVE YOU HAD THOUGHTS OF HARMING ANYONE ELSE?: NO

## 2024-01-26 SDOH — SOCIAL STABILITY: SOCIAL INSECURITY: HAS ANYONE EVER THREATENED TO HURT YOUR FAMILY OR YOUR PETS?: NO

## 2024-01-26 SDOH — SOCIAL STABILITY: SOCIAL INSECURITY: DO YOU FEEL UNSAFE GOING BACK TO THE PLACE WHERE YOU ARE LIVING?: NO

## 2024-01-26 SDOH — SOCIAL STABILITY: SOCIAL INSECURITY: DO YOU FEEL ANYONE HAS EXPLOITED OR TAKEN ADVANTAGE OF YOU FINANCIALLY OR OF YOUR PERSONAL PROPERTY?: NO

## 2024-01-26 SDOH — SOCIAL STABILITY: SOCIAL INSECURITY: WERE YOU ABLE TO COMPLETE ALL THE BEHAVIORAL HEALTH SCREENINGS?: YES

## 2024-01-26 SDOH — SOCIAL STABILITY: SOCIAL INSECURITY: ABUSE: ADULT

## 2024-01-26 SDOH — SOCIAL STABILITY: SOCIAL INSECURITY: ARE THERE ANY APPARENT SIGNS OF INJURIES/BEHAVIORS THAT COULD BE RELATED TO ABUSE/NEGLECT?: NO

## 2024-01-26 ASSESSMENT — PAIN - FUNCTIONAL ASSESSMENT
PAIN_FUNCTIONAL_ASSESSMENT: 0-10
PAIN_FUNCTIONAL_ASSESSMENT: 0-10

## 2024-01-26 ASSESSMENT — LIFESTYLE VARIABLES
REASON UNABLE TO ASSESS: NO
HOW MANY STANDARD DRINKS CONTAINING ALCOHOL DO YOU HAVE ON A TYPICAL DAY: PATIENT DOES NOT DRINK
HAVE PEOPLE ANNOYED YOU BY CRITICIZING YOUR DRINKING: NO
AUDIT-C TOTAL SCORE: 0
AUDIT-C TOTAL SCORE: 0
HOW OFTEN DO YOU HAVE A DRINK CONTAINING ALCOHOL: NEVER
SKIP TO QUESTIONS 9-10: 1
EVER FELT BAD OR GUILTY ABOUT YOUR DRINKING: NO
HOW OFTEN DO YOU HAVE 6 OR MORE DRINKS ON ONE OCCASION: NEVER
HAVE YOU EVER FELT YOU SHOULD CUT DOWN ON YOUR DRINKING: NO
EVER HAD A DRINK FIRST THING IN THE MORNING TO STEADY YOUR NERVES TO GET RID OF A HANGOVER: NO

## 2024-01-26 ASSESSMENT — ENCOUNTER SYMPTOMS
SLEEP DISTURBANCE: 1
ENDOCRINE NEGATIVE: 1
SHORTNESS OF BREATH: 1
CONSTITUTIONAL NEGATIVE: 1
WEAKNESS: 1
WOUND: 1
ALLERGIC/IMMUNOLOGIC NEGATIVE: 1
MUSCULOSKELETAL NEGATIVE: 1
DECREASED CONCENTRATION: 1
COLOR CHANGE: 1
GASTROINTESTINAL NEGATIVE: 1
EYES NEGATIVE: 1

## 2024-01-26 ASSESSMENT — COGNITIVE AND FUNCTIONAL STATUS - GENERAL
MOBILITY SCORE: 12
MOVING TO AND FROM BED TO CHAIR: A LOT
TURNING FROM BACK TO SIDE WHILE IN FLAT BAD: A LITTLE
STANDING UP FROM CHAIR USING ARMS: A LOT
PATIENT BASELINE BEDBOUND: NO
DAILY ACTIVITIY SCORE: 23
MOVING FROM LYING ON BACK TO SITTING ON SIDE OF FLAT BED WITH BEDRAILS: A LITTLE
WALKING IN HOSPITAL ROOM: TOTAL
CLIMB 3 TO 5 STEPS WITH RAILING: TOTAL
TOILETING: A LITTLE

## 2024-01-26 ASSESSMENT — ACTIVITIES OF DAILY LIVING (ADL)
ASSISTIVE_DEVICE: WHEELCHAIR;EYEGLASSES
HEARING - RIGHT EAR: FUNCTIONAL
PATIENT'S MEMORY ADEQUATE TO SAFELY COMPLETE DAILY ACTIVITIES?: YES
FEEDING YOURSELF: NEEDS ASSISTANCE
BATHING: NEEDS ASSISTANCE
LACK_OF_TRANSPORTATION: PATIENT DECLINED
GROOMING: NEEDS ASSISTANCE
DRESSING YOURSELF: NEEDS ASSISTANCE
WALKS IN HOME: NEEDS ASSISTANCE
TOILETING: NEEDS ASSISTANCE
ADEQUATE_TO_COMPLETE_ADL: YES
JUDGMENT_ADEQUATE_SAFELY_COMPLETE_DAILY_ACTIVITIES: YES
HEARING - LEFT EAR: FUNCTIONAL

## 2024-01-26 ASSESSMENT — PAIN SCALES - GENERAL
PAINLEVEL_OUTOF10: 0 - NO PAIN
PAINLEVEL_OUTOF10: 0 - NO PAIN
PAINLEVEL_OUTOF10: 3

## 2024-01-26 ASSESSMENT — PAIN DESCRIPTION - PROGRESSION: CLINICAL_PROGRESSION: NOT CHANGED

## 2024-01-26 ASSESSMENT — COLUMBIA-SUICIDE SEVERITY RATING SCALE - C-SSRS
1. IN THE PAST MONTH, HAVE YOU WISHED YOU WERE DEAD OR WISHED YOU COULD GO TO SLEEP AND NOT WAKE UP?: NO
1. IN THE PAST MONTH, HAVE YOU WISHED YOU WERE DEAD OR WISHED YOU COULD GO TO SLEEP AND NOT WAKE UP?: NO
6. HAVE YOU EVER DONE ANYTHING, STARTED TO DO ANYTHING, OR PREPARED TO DO ANYTHING TO END YOUR LIFE?: NO
2. HAVE YOU ACTUALLY HAD ANY THOUGHTS OF KILLING YOURSELF?: NO
2. HAVE YOU ACTUALLY HAD ANY THOUGHTS OF KILLING YOURSELF?: NO
6. HAVE YOU EVER DONE ANYTHING, STARTED TO DO ANYTHING, OR PREPARED TO DO ANYTHING TO END YOUR LIFE?: NO

## 2024-01-26 ASSESSMENT — PATIENT HEALTH QUESTIONNAIRE - PHQ9
SUM OF ALL RESPONSES TO PHQ9 QUESTIONS 1 & 2: 6
2. FEELING DOWN, DEPRESSED OR HOPELESS: NEARLY EVERY DAY
1. LITTLE INTEREST OR PLEASURE IN DOING THINGS: NEARLY EVERY DAY

## 2024-01-26 ASSESSMENT — PAIN DESCRIPTION - ORIENTATION: ORIENTATION: RIGHT

## 2024-01-26 NOTE — ED PROVIDER NOTES
HPI   Chief Complaint   Patient presents with    Abscess     Patient to ED complaining of diabetic ulcer left leg       CC: Cellulitis lower extremities, left lower extremity wound  HPI:   This is a pleasant 73-year-old female history of insulin-dependent type 2 diabetes noncompliant with her medications for the past month presents ED via EMS with lower extremity redness, warmth, wound to the left lower extremity nonhealing, draining, patient has chronic lower extremity neuropathy does not remember injuring her left lower extremity she reports having previous left femur fracture and left tib-fib fracture year ago, she has chronic venous stasis, chronic Charcot left foot, status post left first metatarsophalangeal amputation she has not seen her podiatrist in several months.  Patient states she is nonambulatory at home she is able to transition to and from wheelchair and pivot.  She denies having any chest pain fever, chills denies any headache, dizziness denies any abdominal pain.     Additional Limitations to History:   External Records Reviewed: I reviewed recent and relevant outside records including   History Obtained From:     Past Medical History: Per HPI  Medications: Reviewed in EMR and with patient  Allergies:  Reviewed in EMR  Past Surgical History:   Social History:     ------------------------------------------------------------------------------------------------------  Physical Exam:  --Vital signs reviewed in nursing triage note, EMR flow sheets, and at patient's bedside  GEN:  A&Ox3, no acute distress, appears comfortable.  Conversational and appropriate.  No confusion or gross mental status changes.  EYES: EOMI, non-injected sclera.  ENT: Moist mucous membranes, no apparent injuries or lesions.   CARDIO: Normal rate and regular rhythm. No murmurs, rubs, or gallops.  2+ equal pulses of the distal extremities.   PULM: Clear to auscultation bilaterally. No rales, rhonchi, or wheezes. Good symmetric  chest expansion.  GI: Soft, non-tender, non-distended. No rebound tenderness or guarding.  SKIN: Chronic venous stasis dermatitis lower extremities bilaterally, soft tissue is moderately erythematous, 1-2+ pitting edema, there is no open draining nonpurulent wound to the left lower extremity  MSK: Chronic Charcot right foot left foot, no obvious soft tissue ulcers or infection to the feet bilaterally.  Tinea pedis to the left foot  EXT: No peripheral edema, contusions, or wounds.   NEURO: Cranial nerves II-XII grossly intact. Sensation to light touch intact and equal bilaterally in upper and lower extremities.  Symmetric 5/5 strength in upper and lower extremities.  PSYCH: Appropriate mood and behavior, converses and responds appropriately during exam.  -------------------------------------------------------------------------------------------------------    Medical Decision Making:  Patient seen and evaluated by ED attending. On arrival the patient     Differential Diagnoses Considered:   Chronic Medical Conditions Significantly Affecting Care:   Diagnostic testing considered: [PERC, D-Dimer, PECARN, etc.]    - EKG interpreted by myself (ED attending physician):   - I independently interpreted: [CXR, CT, POCUS, etc. including your interpretation]  - Labs notable for     Escalation of Care: Appropriate for   Social Determinants of Health Significantly Affecting Care: [Homelessness, lacking transportation, uninsured, unable to afford medications]  Prescription Drug Consideration: [Antibiotics, antivirals, pain medications, etc.]  Discussion of Management with Other Providers:  I discussed the patient/results with: [admitting team, consultant, radiologist, social work, EPAT, case management, PT/OT, RT, PCP, etc.]      Jesus Cox PA-C                          No data recorded                Patient History   Past Medical History:   Diagnosis Date    Abnormal weight gain 04/10/2023    Allergic rhinitis 04/10/2023     Body mass index (BMI) 31.0-31.9, adult 06/30/2020    Body mass index (BMI) of 31.0 to 31.9 in adult    Body mass index (BMI) 32.0-32.9, adult 01/04/2021    Body mass index (BMI) of 32.0 to 32.9 in adult    Body mass index (BMI) 33.0-33.9, adult 01/04/2021    Body mass index (BMI) of 33.0 to 33.9 in adult    Body mass index (BMI) 35.0-35.9, adult 04/09/2020    Body mass index (BMI) of 35.0 to 35.9 in adult    Body mass index (BMI)40.0-44.9, adult 11/14/2019    Body mass index (BMI) of 40.0 to 44.9 in adult    Chronic insomnia 04/10/2023    Depression, major, in remission (CMS/HCC) 04/10/2023    Essential (primary) hypertension 09/06/2022    Hypertension    H/O bariatric surgery 04/10/2023    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    Hypoxia 04/10/2023    Kidney stone on right side 04/10/2023    Left toe amputee (CMS/HCC) 04/10/2023    Malaise and fatigue 04/10/2023    Nasal septum ulceration 04/10/2023    Nontoxic multinodular goiter 07/02/2021    Multiple thyroid nodules    Personal history of other diseases of the circulatory system     History of congestive heart failure    Personal history of other diseases of the musculoskeletal system and connective tissue     History of arthritis    Personal history of other diseases of the nervous system and sense organs 10/11/2022    History of sleep apnea    Personal history of other diseases of the respiratory system     History of lung disease    Personal history of other diseases of urinary system 02/19/2020    History of hematuria    Personal history of other endocrine, nutritional and metabolic disease 07/02/2021    History of morbid obesity    Personal history of other endocrine, nutritional and metabolic disease 04/24/2017    History of diabetic neuropathy    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    Recurrent major depressive disorder, in remission (CMS/HCC) 04/10/2023    Status post gastric bypass for obesity 04/10/2023    Vertigo of central origin  04/10/2023     Past Surgical History:   Procedure Laterality Date    HYSTERECTOMY  01/20/2016    Hysterectomy    OTHER SURGICAL HISTORY  01/07/2022    Toe amputation    OTHER SURGICAL HISTORY  05/28/2021    Gastric bypass surgery    TONSILLECTOMY  01/20/2016    Tonsillectomy    TOTAL KNEE ARTHROPLASTY  05/28/2021    Knee Replacement    TUBAL LIGATION  04/24/2017    Tubal Ligation     Family History   Problem Relation Name Age of Onset    Coronary artery disease Mother      Liver disease Mother      Other (non-hodgkins lymphoma) Mother      Stroke Father      Deafness Father      Hypertension Father       Social History     Tobacco Use    Smoking status: Never    Smokeless tobacco: Never   Vaping Use    Vaping Use: Never used   Substance Use Topics    Alcohol use: Never    Drug use: Never       Physical Exam   ED Triage Vitals [01/26/24 1531]   Temperature Heart Rate Respirations BP   36.3 °C (97.3 °F) 64 18 (!) 159/92      Pulse Ox Temp src Heart Rate Source Patient Position   97 % -- -- --      BP Location FiO2 (%)     Right arm --       Physical Exam    ED Course & MDM   Diagnoses as of 01/26/24 1821   Cellulitis of lower extremity, unspecified laterality   Type 2 diabetes mellitus with hyperglycemia, unspecified whether long term insulin use (CMS/[facility])       Medical Decision Making  This is a 73-year-old female with history of insulin-dependent type 2 diabetes who is noncompliant with her medications or over a month now with chronic venous stasis, peripheral vascular disease, acute hyperglycemia, complicated by cellulitis in the lower extremities bilaterally and nonhealing wound to the left lower extremity no evidence suggesting necrotizing fasciitis or osteomyelitis at this time, she received Zosyn, vancomycin, she is nontoxic-appearing well-hydrated, patient was also given 10 units insulin IV, patient also became hypoxic while in the emergency room she was placed on 2 L via nasal cannula which improved her  oxygen saturation over 95%, CT chest is negative for pulmonary embolism, there was markedly dilated main pulmonary artery, patient will be admitted admitted for IV antibiotics, ID consult        Procedure  Procedures     Jesus Cox PA-C  01/26/24 1607       Jesus Cox PA-C  01/26/24 1825

## 2024-01-26 NOTE — H&P
History Of Present Illness  Teresa Matthews is a 73 y.o. female  with history of poorly controlled diabetes mellitus type 2, atrial fibrillation on anticoagulation, congestive heart failure, left total knee arthroplasty complicated by E. coli infection and MRSA infection of the foot s/p left  total knee explant and synovectomy, acute comminuted fractures of proximal tibial  and fibular, bedbound, anxiety and depression, GERD, hypothyroidism, morbid obesity, COPD, CKD stage III comes into the hospital due to worsening wound on her left lower extremity.  The patient states she has not been compliant with her medications and has not been taking her insulins for quite a few days and has not been taking her Eliquis for past couple of days.  The patient states her family seems to have given up on her and she states that she has been depressed and stopped taking her medications.  The patient denies any fever chills nausea vomiting abdominal pain but she was found to be hypoxic in ER and required supplemental oxygen.  She denies any productive cough at this time.  The patient was started on vancomycin and Zosyn in ED and was found to have blood glucose over 500 and given 10 units of IV insulin.  CT of the chest was negative for any PE and the patient is admitted for further care and management.    Past Medical History  Past Medical History:   Diagnosis Date    Abnormal weight gain 04/10/2023    Allergic rhinitis 04/10/2023    Body mass index (BMI) 31.0-31.9, adult 06/30/2020    Body mass index (BMI) of 31.0 to 31.9 in adult    Body mass index (BMI) 32.0-32.9, adult 01/04/2021    Body mass index (BMI) of 32.0 to 32.9 in adult    Body mass index (BMI) 33.0-33.9, adult 01/04/2021    Body mass index (BMI) of 33.0 to 33.9 in adult    Body mass index (BMI) 35.0-35.9, adult 04/09/2020    Body mass index (BMI) of 35.0 to 35.9 in adult    Body mass index (BMI)40.0-44.9, adult 11/14/2019    Body mass index (BMI) of 40.0 to 44.9  in adult    Chronic insomnia 04/10/2023    Depression, major, in remission (CMS/HCC) 04/10/2023    Essential (primary) hypertension 09/06/2022    Hypertension    H/O bariatric surgery 04/10/2023    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    Hypoxia 04/10/2023    Kidney stone on right side 04/10/2023    Left toe amputee (CMS/HCC) 04/10/2023    Malaise and fatigue 04/10/2023    Nasal septum ulceration 04/10/2023    Nontoxic multinodular goiter 07/02/2021    Multiple thyroid nodules    Personal history of other diseases of the circulatory system     History of congestive heart failure    Personal history of other diseases of the musculoskeletal system and connective tissue     History of arthritis    Personal history of other diseases of the nervous system and sense organs 10/11/2022    History of sleep apnea    Personal history of other diseases of the respiratory system     History of lung disease    Personal history of other diseases of urinary system 02/19/2020    History of hematuria    Personal history of other endocrine, nutritional and metabolic disease 07/02/2021    History of morbid obesity    Personal history of other endocrine, nutritional and metabolic disease 04/24/2017    History of diabetic neuropathy    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    Recurrent major depressive disorder, in remission (CMS/HCC) 04/10/2023    Status post gastric bypass for obesity 04/10/2023    Vertigo of central origin 04/10/2023       Surgical History  Past Surgical History:   Procedure Laterality Date    HYSTERECTOMY  01/20/2016    Hysterectomy    OTHER SURGICAL HISTORY  01/07/2022    Toe amputation    OTHER SURGICAL HISTORY  05/28/2021    Gastric bypass surgery    TONSILLECTOMY  01/20/2016    Tonsillectomy    TOTAL KNEE ARTHROPLASTY  05/28/2021    Knee Replacement    TUBAL LIGATION  04/24/2017    Tubal Ligation        Social History  She reports that she has never smoked. She has never used smokeless tobacco.  She reports that she does not drink alcohol and does not use drugs.    Family History  Family History   Problem Relation Name Age of Onset    Coronary artery disease Mother      Liver disease Mother      Other (non-hodgkins lymphoma) Mother      Stroke Father      Deafness Father      Hypertension Father          Allergies  Cephalexin, Other, Statins-hmg-coa reductase inhibitors, and Adhesive tape-silicones    Review of Systems   Constitutional: Negative.    HENT: Negative.     Eyes: Negative.    Respiratory:  Positive for shortness of breath.    Cardiovascular:  Positive for leg swelling.   Gastrointestinal: Negative.    Endocrine: Negative.    Musculoskeletal: Negative.    Skin:  Positive for color change and wound.   Allergic/Immunologic: Negative.    Neurological:  Positive for weakness.   Psychiatric/Behavioral:  Positive for decreased concentration and sleep disturbance.    All other systems reviewed and are negative.       Physical Exam  Constitutional:       Appearance: She is obese. She is ill-appearing.   HENT:      Head: Normocephalic and atraumatic.      Nose: Nose normal.      Mouth/Throat:      Mouth: Mucous membranes are dry.   Eyes:      Extraocular Movements: Extraocular movements intact.      Conjunctiva/sclera: Conjunctivae normal.   Cardiovascular:      Rate and Rhythm: Regular rhythm. Tachycardia present.      Pulses: Normal pulses.      Heart sounds: Normal heart sounds.   Pulmonary:      Effort: Pulmonary effort is normal.      Breath sounds: Normal breath sounds.   Abdominal:      General: Bowel sounds are normal.      Palpations: Abdomen is soft.   Musculoskeletal:         General: Normal range of motion.      Cervical back: Normal range of motion and neck supple.      Right lower leg: Edema present.      Left lower leg: Edema present.   Skin:     General: Skin is warm and dry.      Comments: Bilateral lower extremity venous stasis.  Left lower extremity erythema and ulcer at the posterior  aspect.  There is erythema and edema   Neurological:      General: No focal deficit present.      Mental Status: She is alert and oriented to person, place, and time. Mental status is at baseline.   Psychiatric:      Comments: Depressed          Last Recorded Vitals  Blood pressure 156/84, pulse 62, temperature 36.3 °C (97.3 °F), resp. rate 16, height 1.829 m (6'), weight 113 kg (250 lb), SpO2 99 %.    Relevant Results        73 y.o. female  with history of poorly controlled diabetes mellitus type 2, atrial fibrillation on anticoagulation, congestive heart failure, left total knee arthroplasty complicated by E. coli infection and MRSA infection of the foot s/p left  total knee explant and synovectomy, acute comminuted fractures of proximal tibial  and fibular, bedbound, anxiety and depression, GERD, hypothyroidism, morbid obesity, COPD, CKD stage III comes into the hospital due to worsening wound on her left lower extremity.       Assessment/Plan   Principal Problem:    Cellulitis of lower extremity, unspecified laterality      Cellulitis of left lower extremity.  -Patient has been started with vancomycin and Zosyn we will continue.  -Follow-up blood cultures and check wound cultures  -Podiatry and infectious disease has been consulted  -Doppler with no DVT.  -Lactic acidosis improved.    Poorly controlled diabetes mellitus type 2 with hyperglycemia  -Not compliant with home medications.  Given 10 units of insulin in ER with minimal improvement.  -Give another 10 units and 20 units of Lantus.  -Continue with 20 units of Lantus in a.m. and p.m.  -Premeal insulin and sliding scale insulin  -Endocrinologist been consulted    Chronic diastolic congestive heart failure  Acute respiratory failure with hypoxia  -Continue with    Atrial fibrillation  -Continue with home amiodarone and Eliquis.    COPD  -Continue with breathing treatments    CKD stage III  -Monitor renal functions avoid nephrotoxins    Hypothyroidism/morbid  obesity/GERD  -Continue appropriate home medications    VTE prophylaxis: On Eliquis    PT OT and SW       I spent 55 minutes in the professional and overall care of this patient.      Behzad Baeza MD

## 2024-01-26 NOTE — PROGRESS NOTES
Pharmacy Medication History Review    Teresa Matthews is a 73 y.o. female admitted for No Principal Problem: There is no principal problem currently on the Problem List. Please update the Problem List and refresh.. Pharmacy reviewed the patient's [id number] medications and allergies for accuracy.    The list below reflectives the updated PTA list. Please review each medication in order reconciliation for additional clarification and justification.  Prior to Admission medications    Medication Sig Start Date End Date Taking? Authorizing Provider   amiodarone (Pacerone) 200 mg tablet Take 1 tablet (200 mg) by mouth once daily. 5/12/23 5/11/24  Monica Macario MD   apixaban (Eliquis) 5 mg tablet Take 1 tablet (5 mg) by mouth 2 times a day. 11/13/23 11/12/24  Monica Macario MD   ascorbic acid, vitamin C, 500 mg capsule Take 1 capsule by mouth once daily. 8/28/20   Historical Provider, MD   aspirin 81 mg EC tablet Take 1 tablet (81 mg) by mouth once every day. 4/21/20   Historical Provider, MD   aspirin-acetaminophen-caffeine (Excedrin Extra Strength) 250-250-65 mg tablet Take 1 tablet by mouth every 6 hours if needed for headaches. 5/18/20   Historical Provider, MD   BIOTIN ORAL Take 1 capsule by mouth once daily.    Historical Provider, MD   buPROPion XL (Wellbutrin XL) 150 mg 24 hr tablet Take 1 tablet (150 mg) by mouth once daily in the morning. Do not crush, chew, or split. 8/14/23 2/10/24  Monica Macario MD   calcium citrate-vitamin D3 200 mg-6.25 mcg (250 unit) tablet Take 1 tablet by mouth once daily. 4/21/20   Historical Provider, MD   cholecalciferol (Vitamin D-3) 1,250 mcg (50,000 unit) capsule Take 1 capsule (50,000 Units) by mouth 1 (one) time per week. 8/28/20   Historical Provider, MD   clotrimazole-betamethasone (Lotrisone) cream Apply 1 Application topically 2 times a day as needed. APPLY  AND RUB  IN A THIN FILM TO AFFECTED AREAS TWICE DAILY.(AM AND PM). 6/29/22   Historical  Provider, MD   desvenlafaxine (Pristiq) 100 mg 24 hr tablet Take 1 tablet (100 mg) by mouth once daily. Do not crush, chew, or split. Instead of venlafaxine. 5/12/23 5/11/24  Monica Macario MD   famotidine (Pepcid) 40 mg tablet Take 1 tablet (40 mg) by mouth once daily. 9/7/23 1/26/24  Katrin Lomeli, APRN-CNP   FreeStyle Bulmaro 2 Sensor kit Inject 1 each under the skin every 14 (fourteen) days. Test blood sugar 1-4 times per day as needed for diabetes, replace every 14 days 5/12/23   Monica Macario MD   gabapentin (Neurontin) 600 mg tablet Take 1 tablet (600 mg) by mouth 3 times a day. 11/13/23 5/11/24  Monica Macario MD   glucagon 1 mg injection Inject 1 mg into the shoulder, thigh, or buttocks 1 time if needed for low blood sugar - see comments for up to 1 dose. USE AS DIRECTED. 5/12/23   Monica Macario MD   HumaLOG Mix 75-25 KwikPen 100 unit/mL (75-25) injection Inject 30 Units under the skin 2 times a day. 2/17/23   Historical Provider, MD   insulin lispro (HumaLOG U-100 Insulin) 100 unit/mL injection Inject 0.1 mL (10 Units) under the skin 3 times a day with meals. Take as directed per insulin instructions. Adjust per Sliding scale- 200-250 6u, 250-300 8u 350-400 10u 400+ 12u  Patient not taking: Reported on 1/26/2024 11/13/23 11/12/24  Monica Macario MD   levothyroxine (Synthroid, Levoxyl) 175 mcg tablet Take 1 tablet (175 mcg) by mouth once daily. 6/22/21   Historical Provider, MD   losartan (Cozaar) 25 mg tablet Take 1 tablet (25 mg) by mouth once daily. 11/20/23 11/19/24  Monica Macario MD   metFORMIN XR (Glucophage-XR) 500 mg 24 hr tablet Take 2 tablets (1,000 mg) by mouth 2 times a day. Do not crush, chew, or split. 6/15/23 6/14/24  CORNELIUS Cannon-CNP   metoprolol succinate XL (Toprol-XL) 50 mg 24 hr tablet Take 1 tablet (50 mg) by mouth once daily. 5/12/23 5/11/24  Monica Macario MD   mv-min/iron/folic/calcium/vitK (WOMEN'S MULTIVITAMIN ORAL) Take  "1 tablet by mouth once daily. 4/21/20   Historical Provider, MD   naloxone (Narcan) 4 mg/0.1 mL nasal spray Administer 1 spray (4 mg) into affected nostril(s) if needed for opioid reversal for up to 1 day. May repeat every 2-3 minutes if needed, alternating nostrils, until medical assistance becomes available. 8/14/23 8/15/23  Monica Macario MD   nystatin (Mycostatin) 100,000 unit/gram powder Apply 1 Application topically 2 times a day as needed. APPLY SPARINGLY TO AFFECTED AREA(S) TWICE DAILY 6/29/22   Historical Provider, MD   omeprazole (PriLOSEC) 40 mg DR capsule TAKE 1 CAPSULE BY MOUTH  DAILY OPEN CAPSULE AND  SPRINKLE CONTENTS ON SUGAR  FREE APPLESAUCE OR PUDDING. 12/4/19   Historical Provider, MD   OneTouch Ultra Test strip TO TEST BLOOD SUGAR 4 TIMES A DAY FOR DIABETES 3/18/20   Historical Provider, MD   oxyCODONE-acetaminophen (Percocet)  mg tablet Take 1 tablet by mouth every 4 hours if needed for severe pain (7 - 10). Do not start before January 12, 2024. 1/12/24 2/11/24  Monica Macario MD   pen needle 1/2\" (Easy Touch) 29G X 12mm needle Inject 3 each under the skin 4 times a day as needed (as needed for sugars). Use as instructed 5/12/23 5/11/24  Monica Macario MD   potassium chloride CR 10 mEq ER tablet Take 1 tablet (10 mEq) by mouth in the morning and 1 tablet (10 mEq) before bedtime. Do not crush, chew, or split.. 4/5/23 4/4/24  Monica Macario MD   promethazine-DM (Phenergan-DM) 6.25-15 mg/5 mL syrup Take 5 mL by mouth every 4 hours if needed for cough.  Patient not taking: Reported on 1/26/2024 1/9/24 2/8/24  Monica Macario MD   pseudoephedrine (Sudafed) 30 mg tablet Take 1 tablet (30 mg) by mouth if needed.    Historical Provider, MD   spironolactone (Aldactone) 25 mg tablet Take 1 tablet (25 mg) by mouth once daily. 1/8/21   Historical Provider, MD   torsemide (Demadex) 20 mg tablet Take 2 tablets (40 mg) by mouth once daily. 8/30/18   Historical Provider, MD "   traZODone (Desyrel) 50 mg tablet Take 1 tablet (50 mg) by mouth once daily at bedtime. 11/13/23 11/12/24  Monica Macario MD   underpads (Bed Underpads) pad 1 each 2 times a day. 12/7/23 12/6/24  Monica Macario MD   vibegron 75 mg tablet Take 1 tablet by mouth once daily. 5/12/23 5/11/24  Monica Macario MD   vitamin B complex (SUPER B-50 COMPLEX ORAL) Take 1 capsule by mouth once daily.    Historical Provider, MD   L. acidophilus/Bifid. animalis 32 billion cell capsule USE AS DIRECTED. 8/28/20 1/26/24  Historical Provider, MD        The list below reflectives the updated allergy list. Please review each documented allergy for additional clarification and justification.  Allergies  Reviewed by Coy Lockwood RN on 1/26/2024        Severity Reactions Comments    Cephalexin Not Specified Unknown Tolerated Zosyn: 11/17/2021    Other Not Specified Unknown BANDAGES PLASTIC STRIPS    Statins-hmg-coa Reductase Inhibitors Not Specified Other     Adhesive Tape-silicones Low Rash             Below are additional concerns with the patient's PTA list.      Masha Minaya CPhT

## 2024-01-27 ENCOUNTER — APPOINTMENT (OUTPATIENT)
Dept: RADIOLOGY | Facility: HOSPITAL | Age: 74
DRG: 264 | End: 2024-01-27
Payer: MEDICARE

## 2024-01-27 LAB
ANION GAP SERPL CALC-SCNC: 9 MMOL/L (ref 10–20)
BUN SERPL-MCNC: 13 MG/DL (ref 6–23)
CALCIUM SERPL-MCNC: 8.6 MG/DL (ref 8.6–10.3)
CHLORIDE SERPL-SCNC: 94 MMOL/L (ref 98–107)
CO2 SERPL-SCNC: 33 MMOL/L (ref 21–32)
CREAT SERPL-MCNC: 1.16 MG/DL (ref 0.5–1.05)
EGFRCR SERPLBLD CKD-EPI 2021: 50 ML/MIN/1.73M*2
EST. AVERAGE GLUCOSE BLD GHB EST-MCNC: 395 MG/DL
GLUCOSE BLD MANUAL STRIP-MCNC: 145 MG/DL (ref 74–99)
GLUCOSE BLD MANUAL STRIP-MCNC: 190 MG/DL (ref 74–99)
GLUCOSE BLD MANUAL STRIP-MCNC: 319 MG/DL (ref 74–99)
GLUCOSE BLD MANUAL STRIP-MCNC: 350 MG/DL (ref 74–99)
GLUCOSE SERPL-MCNC: 335 MG/DL (ref 74–99)
HBA1C MFR BLD: 15.4 %
POTASSIUM SERPL-SCNC: 3.9 MMOL/L (ref 3.5–5.3)
SODIUM SERPL-SCNC: 132 MMOL/L (ref 136–145)
VANCOMYCIN SERPL-MCNC: 8.3 UG/ML (ref 5–20)

## 2024-01-27 PROCEDURE — 80048 BASIC METABOLIC PNL TOTAL CA: CPT | Performed by: INTERNAL MEDICINE

## 2024-01-27 PROCEDURE — 2500000002 HC RX 250 W HCPCS SELF ADMINISTERED DRUGS (ALT 637 FOR MEDICARE OP, ALT 636 FOR OP/ED): Mod: MUE | Performed by: INTERNAL MEDICINE

## 2024-01-27 PROCEDURE — 97535 SELF CARE MNGMENT TRAINING: CPT | Mod: GO | Performed by: OCCUPATIONAL THERAPIST

## 2024-01-27 PROCEDURE — 0JBP0ZZ EXCISION OF LEFT LOWER LEG SUBCUTANEOUS TISSUE AND FASCIA, OPEN APPROACH: ICD-10-PCS | Performed by: PODIATRIST

## 2024-01-27 PROCEDURE — 2500000004 HC RX 250 GENERAL PHARMACY W/ HCPCS (ALT 636 FOR OP/ED): Performed by: INTERNAL MEDICINE

## 2024-01-27 PROCEDURE — 1200000002 HC GENERAL ROOM WITH TELEMETRY DAILY

## 2024-01-27 PROCEDURE — 2500000001 HC RX 250 WO HCPCS SELF ADMINISTERED DRUGS (ALT 637 FOR MEDICARE OP): Performed by: INTERNAL MEDICINE

## 2024-01-27 PROCEDURE — 36415 COLL VENOUS BLD VENIPUNCTURE: CPT | Performed by: INTERNAL MEDICINE

## 2024-01-27 PROCEDURE — 97161 PT EVAL LOW COMPLEX 20 MIN: CPT | Mod: GP

## 2024-01-27 PROCEDURE — 97165 OT EVAL LOW COMPLEX 30 MIN: CPT | Mod: GO | Performed by: OCCUPATIONAL THERAPIST

## 2024-01-27 PROCEDURE — 76882 US LMTD JT/FCL EVL NVASC XTR: CPT | Performed by: RADIOLOGY

## 2024-01-27 PROCEDURE — 80202 ASSAY OF VANCOMYCIN: CPT | Performed by: INTERNAL MEDICINE

## 2024-01-27 PROCEDURE — 83036 HEMOGLOBIN GLYCOSYLATED A1C: CPT | Mod: GEALAB | Performed by: INTERNAL MEDICINE

## 2024-01-27 PROCEDURE — 97530 THERAPEUTIC ACTIVITIES: CPT | Mod: GP

## 2024-01-27 PROCEDURE — 99222 1ST HOSP IP/OBS MODERATE 55: CPT | Performed by: INTERNAL MEDICINE

## 2024-01-27 PROCEDURE — 99233 SBSQ HOSP IP/OBS HIGH 50: CPT | Performed by: INTERNAL MEDICINE

## 2024-01-27 PROCEDURE — 76882 US LMTD JT/FCL EVL NVASC XTR: CPT

## 2024-01-27 PROCEDURE — 82947 ASSAY GLUCOSE BLOOD QUANT: CPT

## 2024-01-27 RX ORDER — INSULIN LISPRO 100 [IU]/ML
0-10 INJECTION, SOLUTION INTRAVENOUS; SUBCUTANEOUS
Status: DISCONTINUED | OUTPATIENT
Start: 2024-01-27 | End: 2024-02-01 | Stop reason: HOSPADM

## 2024-01-27 RX ORDER — DEXTROSE 50 % IN WATER (D50W) INTRAVENOUS SYRINGE
25
Status: DISCONTINUED | OUTPATIENT
Start: 2024-01-27 | End: 2024-02-01 | Stop reason: HOSPADM

## 2024-01-27 RX ORDER — CEFAZOLIN SODIUM 2 G/50ML
2 SOLUTION INTRAVENOUS EVERY 8 HOURS
Status: DISCONTINUED | OUTPATIENT
Start: 2024-01-27 | End: 2024-01-27

## 2024-01-27 RX ORDER — CEFAZOLIN SODIUM 2 G/100ML
2 INJECTION, SOLUTION INTRAVENOUS EVERY 8 HOURS
Status: DISCONTINUED | OUTPATIENT
Start: 2024-01-27 | End: 2024-02-01 | Stop reason: HOSPADM

## 2024-01-27 RX ADMIN — ASPIRIN 81 MG: 81 TABLET, COATED ORAL at 20:14

## 2024-01-27 RX ADMIN — GABAPENTIN 600 MG: 300 CAPSULE ORAL at 20:14

## 2024-01-27 RX ADMIN — PIPERACILLIN SODIUM AND TAZOBACTAM SODIUM 4.5 G: 4; .5 INJECTION, SOLUTION INTRAVENOUS at 06:39

## 2024-01-27 RX ADMIN — INSULIN LISPRO 8 UNITS: 100 INJECTION, SOLUTION INTRAVENOUS; SUBCUTANEOUS at 17:09

## 2024-01-27 RX ADMIN — METOPROLOL SUCCINATE 50 MG: 50 TABLET, EXTENDED RELEASE ORAL at 09:05

## 2024-01-27 RX ADMIN — INSULIN LISPRO 8 UNITS: 100 INJECTION, SOLUTION INTRAVENOUS; SUBCUTANEOUS at 09:13

## 2024-01-27 RX ADMIN — LEVOTHYROXINE SODIUM 225 MCG: 0.17 TABLET ORAL at 09:05

## 2024-01-27 RX ADMIN — CEFAZOLIN SODIUM 2 G: 2 INJECTION, SOLUTION INTRAVENOUS at 11:51

## 2024-01-27 RX ADMIN — APIXABAN 5 MG: 5 TABLET, FILM COATED ORAL at 09:08

## 2024-01-27 RX ADMIN — INSULIN LISPRO 8 UNITS: 100 INJECTION, SOLUTION INTRAVENOUS; SUBCUTANEOUS at 09:17

## 2024-01-27 RX ADMIN — AMIODARONE HYDROCHLORIDE 200 MG: 200 TABLET ORAL at 09:04

## 2024-01-27 RX ADMIN — LOSARTAN POTASSIUM 25 MG: 50 TABLET, FILM COATED ORAL at 09:07

## 2024-01-27 RX ADMIN — DESVENLAFAXINE SUCCINATE 100 MG: 100 TABLET, EXTENDED RELEASE ORAL at 09:09

## 2024-01-27 RX ADMIN — TRAZODONE HYDROCHLORIDE 50 MG: 50 TABLET ORAL at 20:15

## 2024-01-27 RX ADMIN — TORSEMIDE 40 MG: 20 TABLET ORAL at 09:03

## 2024-01-27 RX ADMIN — SPIRONOLACTONE 25 MG: 25 TABLET ORAL at 09:03

## 2024-01-27 RX ADMIN — GABAPENTIN 600 MG: 300 CAPSULE ORAL at 15:04

## 2024-01-27 RX ADMIN — CEFAZOLIN SODIUM 2 G: 2 INJECTION, SOLUTION INTRAVENOUS at 20:14

## 2024-01-27 RX ADMIN — BUPROPION HYDROCHLORIDE 150 MG: 150 TABLET, FILM COATED, EXTENDED RELEASE ORAL at 09:05

## 2024-01-27 RX ADMIN — INSULIN LISPRO 2 UNITS: 100 INJECTION, SOLUTION INTRAVENOUS; SUBCUTANEOUS at 17:04

## 2024-01-27 RX ADMIN — INSULIN LISPRO 8 UNITS: 100 INJECTION, SOLUTION INTRAVENOUS; SUBCUTANEOUS at 11:53

## 2024-01-27 RX ADMIN — INSULIN HUMAN 30 UNITS: 100 INJECTION, SUSPENSION SUBCUTANEOUS at 20:15

## 2024-01-27 RX ADMIN — POLYETHYLENE GLYCOL 3350 17 G: 17 POWDER, FOR SOLUTION ORAL at 09:02

## 2024-01-27 RX ADMIN — GABAPENTIN 600 MG: 300 CAPSULE ORAL at 09:03

## 2024-01-27 RX ADMIN — INSULIN LISPRO 8 UNITS: 100 INJECTION, SOLUTION INTRAVENOUS; SUBCUTANEOUS at 11:51

## 2024-01-27 RX ADMIN — FAMOTIDINE 40 MG: 20 TABLET ORAL at 09:04

## 2024-01-27 RX ADMIN — POTASSIUM CHLORIDE 10 MEQ: 750 TABLET, EXTENDED RELEASE ORAL at 09:00

## 2024-01-27 RX ADMIN — INSULIN HUMAN 30 UNITS: 100 INJECTION, SUSPENSION SUBCUTANEOUS at 09:18

## 2024-01-27 RX ADMIN — PIPERACILLIN SODIUM AND TAZOBACTAM SODIUM 4.5 G: 4; .5 INJECTION, SOLUTION INTRAVENOUS at 00:17

## 2024-01-27 RX ADMIN — OXYCODONE HYDROCHLORIDE AND ACETAMINOPHEN 1 TABLET: 5; 325 TABLET ORAL at 20:14

## 2024-01-27 RX ADMIN — POTASSIUM CHLORIDE 10 MEQ: 750 TABLET, EXTENDED RELEASE ORAL at 20:15

## 2024-01-27 RX ADMIN — APIXABAN 5 MG: 5 TABLET, FILM COATED ORAL at 20:14

## 2024-01-27 ASSESSMENT — COGNITIVE AND FUNCTIONAL STATUS - GENERAL
TOILETING: TOTAL
CLIMB 3 TO 5 STEPS WITH RAILING: TOTAL
CLIMB 3 TO 5 STEPS WITH RAILING: TOTAL
DRESSING REGULAR LOWER BODY CLOTHING: TOTAL
DRESSING REGULAR LOWER BODY CLOTHING: TOTAL
CLIMB 3 TO 5 STEPS WITH RAILING: TOTAL
MOBILITY SCORE: 10
DAILY ACTIVITIY SCORE: 15
MOVING FROM LYING ON BACK TO SITTING ON SIDE OF FLAT BED WITH BEDRAILS: A LITTLE
DRESSING REGULAR UPPER BODY CLOTHING: A LITTLE
WALKING IN HOSPITAL ROOM: TOTAL
DRESSING REGULAR UPPER BODY CLOTHING: A LITTLE
MOVING FROM LYING ON BACK TO SITTING ON SIDE OF FLAT BED WITH BEDRAILS: A LOT
WALKING IN HOSPITAL ROOM: TOTAL
DAILY ACTIVITIY SCORE: 15
HELP NEEDED FOR BATHING: A LOT
MOVING TO AND FROM BED TO CHAIR: A LOT
TOILETING: TOTAL
STANDING UP FROM CHAIR USING ARMS: A LOT
TURNING FROM BACK TO SIDE WHILE IN FLAT BAD: A LITTLE
TOILETING: A LITTLE
MOVING TO AND FROM BED TO CHAIR: A LOT
STANDING UP FROM CHAIR USING ARMS: A LOT
WALKING IN HOSPITAL ROOM: TOTAL
MOVING FROM LYING ON BACK TO SITTING ON SIDE OF FLAT BED WITH BEDRAILS: A LOT
MOBILITY SCORE: 12
HELP NEEDED FOR BATHING: A LOT
STANDING UP FROM CHAIR USING ARMS: A LOT
MOBILITY SCORE: 10
MOVING TO AND FROM BED TO CHAIR: A LOT
TURNING FROM BACK TO SIDE WHILE IN FLAT BAD: A LOT
DAILY ACTIVITIY SCORE: 23
TURNING FROM BACK TO SIDE WHILE IN FLAT BAD: A LOT

## 2024-01-27 ASSESSMENT — ENCOUNTER SYMPTOMS
FATIGUE: 1
NAUSEA: 0
FEVER: 0
ENDOCRINE COMMENTS: AS ABOVE
VOMITING: 0
SHORTNESS OF BREATH: 0
UNEXPECTED WEIGHT CHANGE: 0

## 2024-01-27 ASSESSMENT — ACTIVITIES OF DAILY LIVING (ADL)
ADL_ASSISTANCE: INDEPENDENT
BATHING_ASSISTANCE: MAXIMAL
HOME_MANAGEMENT_TIME_ENTRY: 22
LACK_OF_TRANSPORTATION: NO

## 2024-01-27 ASSESSMENT — PAIN - FUNCTIONAL ASSESSMENT
PAIN_FUNCTIONAL_ASSESSMENT: 0-10
PAIN_FUNCTIONAL_ASSESSMENT: 0-10

## 2024-01-27 ASSESSMENT — PAIN SCALES - GENERAL
PAINLEVEL_OUTOF10: 8
PAINLEVEL_OUTOF10: 2
PAINLEVEL_OUTOF10: 3

## 2024-01-27 ASSESSMENT — PAIN DESCRIPTION - LOCATION: LOCATION: KNEE

## 2024-01-27 NOTE — PROGRESS NOTES
Occupational Therapy    Evaluation    Patient Name: Teresa Matthews  MRN: [id number]  Today's Date: 1/27/2024  Time Calculation  Start Time: 0938  Stop Time: 1020  Time Calculation (min): 42 min    Assessment  IP OT Assessment  OT Assessment: Pt below functional baseline and would benefit from skilled OT intervention to increase  Evaluation/Treatment Tolerance: Patient limited by fatigue  Medical Staff Made Aware: Yes  End of Session Communication: Bedside nurse, PCT/NA/CTA  End of Session Patient Position: Up in chair, Alarm on  Plan:  Treatment Interventions: ADL retraining, Functional transfer training, UE strengthening/ROM, Endurance training, Equipment evaluation/education, Compensatory technique education  OT Frequency: 3 times per week  OT Discharge Recommendations: Moderate intensity level of continued care  OT Recommended Transfer Status: Moderate assist, Assist of 2  OT - OK to Discharge: Yes    Subjective   Current Problem:  1. Cellulitis of lower extremity, unspecified laterality        2. Cellulitis of left lower extremity  Vascular US Lower Extremity Venous Duplex Bilateral    Vascular US Lower Extremity Venous Duplex Bilateral      3. Type 2 diabetes mellitus with hyperglycemia, unspecified whether long term insulin use (CMS/[facility])          General:  General  Reason for Referral: OT for ADL and safety assessment; pt presents with LLE cellulitus  Referred By: Behzad Baeza MD  Past Medical History Relevant to Rehab: PMH: poorly controlled diabetes mellitus type 2, atrial fibrillation on anticoagulation, congestive heart failure, left total knee arthroplasty complicated by E. coli infection and MRSA infection of the foot s/p left  total knee explant and synovectomy, acute comminuted fractures of proximal tibial  and fibular, bedbound, anxiety and depression, GERD, hypothyroidism, morbid obesity, COPD, CKD stage III  Family/Caregiver Present: No  Co-Treatment: PT  Co-Treatment Reason: To maximize  pt mobility safely  Prior to Session Communication: Bedside nurse  Patient Position Received: Bed, 3 rail up, Alarm on  Preferred Learning Style: auditory, visual  General Comment: Pt pleasant and agreeable to therapy. Distal LLE diabetic ulcer, purewick catheter, telemetry  Precautions:  Medical Precautions: Fall precautions  Vital Signs:     Pain:       Objective   Cognition:  Overall Cognitive Status: Within Functional Limits  Attention: Within Functional Limits  Memory: Within Funtional Limits  Problem Solving: Within Functional Limits  Abstract Reasoning: Within Functional Limits  Safety/Judgement: Exceptions to WFL  Unable to Self-Monitor and Self-Correct Consistently: Maximal  Other (Comment): pt incontinent of urine at baseline; pt bed soiled more than 80% and pt did not request assistance  Insight: Within function limits  Processing Speed: Within funtional limits     Confusion Assessment Method (CAM)  Acute Onset and Fluctuating Course (1A): No  [place] Agitation Sedation Scale  [place] Agitation Sedation Scale (RASS): Alert and calm  Home Living:  Type of Home: Apartment  Lives With: Alone  Home Adaptive Equipment: Walker rolling or standard, Wheelchair-manual  Home Layout: One level  Home Access: Ramped entrance  Bathroom Shower/Tub: Tub/shower unit (Pt unable to access bathroom due to small size)  Home Living Comments: Pt uses bedside commode in living area   Prior Function:  Level of [place]: Needs assistance with homemaking  Receives Help From: Neighbor  ADL Assistance: Independent (per pt)  Homemaking Assistance: Needs assistance  Homemaking Assistance Comments: Pt reports  multiple neighbors assist with homemaking  Ambulatory Assistance: Needs assistance  Prior Function Comments: Completed rehab in early and summer of 2023 but since then has become weaker and non-ambulatory; pt pivoting from surface to wheelchair  IADL History:  Homemaking Responsibilities: No  Current License: No  Mode of  Transportation:  (Medicare provides transportation when needed)  ADL:  Eating Assistance: Independent  Grooming Assistance: Stand by  Grooming Deficit: Setup  Bathing Assistance: Maximal  Bathing Deficit: Steadying, Increased time to complete , Right lower leg including foot, Left lower leg including foot, Right upper leg, Left upper leg, Buttocks, Perineal area  UE Dressing Assistance: Minimal  LE Dressing Assistance: Total  LE Dressing Deficit: Requires assistive device for steadying, Supervision/safety, Don/doff R sock, Don/doff L sock, Thread RLE into pants, Thread LLE into pants, Thread RLE into underwear, Thread LLE into underwear, Don/doff R shoe, Don/doff L shoe  Toileting Assistance with Device:  (dependent; pt is incontinent at baseline; use of purewick this acute LOS; pt bed saturated when initating bed mobility)       Bed Mobility/Transfers: Bed Mobility  Bed Mobility: Yes  Bed Mobility 1  Bed Mobility 1: Supine to sitting  Level of Assistance 1: Moderate assistance    Transfers  Transfer: Yes  Transfer 1  Transfer From 1: Bed to, Sit to  Transfer to 1: Stand  Technique 1: Sit to stand  Transfer Device 1: Walker  Transfer Level of Assistance 1: Moderate assistance  Transfers 2  Transfer From 2: Stand to  Transfer to 2: Sit, Chair with drop arm  Technique 2: Stand pivot  Transfer Device 2: Walker  Transfer Level of Assistance 2: Moderate assistance, +2, Minimal verbal cues         Sitting Balance:  Static Sitting Balance  Static Sitting-Balance Support: Bilateral upper extremity supported  Static Sitting-Level of Assistance: Close supervision  Dynamic Sitting Balance  Dynamic Sitting-Balance Support: Bilateral upper extremity supported  Dynamic Sitting-Balance: Lateral lean, Forward lean, Trunk control activities  Dynamic Sitting-Comments: CGA  Standing Balance:  Static Standing Balance  Static Standing-Balance Support: Bilateral upper extremity supported  Static Standing-Level of Assistance: Minimum  assistance  Dynamic Standing Balance  Dynamic Standing-Balance Support: Bilateral upper extremity supported  Dynamic Standing-Balance: Turning  Dynamic Standing-Comments: moderate assistance       IADL's:   Homemaking Responsibilities: No  Current License: No  Mode of Transportation:  (Medicare provides transportation when needed)  Vision: Vision - Basic Assessment  Current Vision: No visual deficits  Sensation:  Sensation Comment: pt reports neuropathy and indicates generalized  Strength:  Strength Comments: WFL BUE  Perception:  Inattention/Neglect: Appears intact  Initiation: Appears intact  Motor Planning: Appears intact  Perseveration: Not present  Coordination:  Movements are Fluid and Coordinated: Yes  Finger to Nose: Intact   Hand Function:  Hand Function  Gross Grasp: Functional  Coordination: Functional  Extremities: RUE   RUE : Within Functional Limits and LUE   LUE: Within Functional Limits      Outcome Measures: [facility] Daily Activity  Putting on and taking off regular lower body clothing: Total  Bathing (including washing, rinsing, drying): A lot  Putting on and taking off regular upper body clothing: A little  Toileting, which includes using toilet, bedpan or urinal: Total  Taking care of personal grooming such as brushing teeth: None  Eating Meals: None  Daily Activity - Total Score: 15      Education Documentation  No documentation found.  Education Comments  No comments found.      Goals:   Encounter Problems       Encounter Problems (Active)       ADLs       Patient with complete upper body dressing with modified independent level of assistance donning and doffing all UE clothes with no adaptive equipment while supported sitting       Start:  01/27/24    Expected End:  02/09/24            Patient will complete daily grooming tasks brushing teeth, washing face/hair, and unsupported sitting with modified independent level of assistance and PRN adaptive equipment while supported sitting and standing.        Start:  01/27/24    Expected End:  02/09/24                 EXERCISE/STRENGTHENING       Patient will be educated on BUE HEP for increased ADL performance.       Start:  01/27/24    Expected End:  02/09/24                 TRANSFERS       Patient will complete sit to stand transfer with modified independent level of assistance and least restrictive device in order to improve safety and prepare for out of bed mobility.       Start:  01/27/24    Expected End:  02/09/24

## 2024-01-27 NOTE — PROGRESS NOTES
01/27/24 1118   Discharge Planning   Living Arrangements Alone   Support Systems Family members   Assistance Needed Patient is A&Ox3, on room air at baseline, is independent with ADL's and uses a wheelchair at baseline (self propels), doesn't drive (patient receives transport through [employer])   Type of Residence Private residence   Who is requesting discharge planning? Provider   Home or Post Acute Services Post acute facilities (Rehab/SNF/etc)  (TBD pend PT/OT eval)   Type of Post Acute Facility Services Skilled nursing   Patient expects to be discharged to: HHC vs SNF- pend PT/OT eval   Does the patient need discharge transport arranged? Yes   RoundTrip coordination needed? Yes   Has discharge transport been arranged? No   Financial Resource Strain   How hard is it for you to pay for the very basics like food, housing, medical care, and heating? Not hard   Housing Stability   In the last 12 months, was there a time when you were not able to pay the mortgage or rent on time? N   In the last 12 months, how many places have you lived? 1   In the last 12 months, was there a time when you did not have a steady place to sleep or slept in a shelter (including now)? N   Transportation Needs   In the past 12 months, has lack of transportation kept you from medical appointments or from getting medications? no   In the past 12 months, has lack of transportation kept you from meetings, work, or from getting things needed for daily living? No   Patient Choice   Provider Choice list and CMS website (https://medicare.gov/care-compare#search) for post-acute Quality and Resource Measure Data were provided and reviewed with: Patient     1/27/24 at 1518: Spoke with patient regarding PT/OT recommendation of SNF at time of discharge. Patient agreeable to SNF, PAQN list printed and provided to patient. Patient to review with family and TCC will follow up.

## 2024-01-27 NOTE — CONSULTS
Consults  Referred by ELMER Baeza MD: Monica Macario MD    Reason For Consult  cellulitis    History Of Present Illness  Teresa Matthews is a 73 y.o. female, hx of obesity, hx of DM, hx of AF, hx of CHF, hx of CKD, hx of GERD, hx of hypothyroidism, hx of legs stasis with recurrent cellulitis, hx of Lt knee prosthesis infection sp explantation and antibiotics ( E. Coli ), hx of MRSA colonization, she was admitted for elevated glucose and Lt calf wound, ist noticed 3 days ago, was bleeding, minimal pain, no trauma, no fever, no sob, no emesis, WBC are N, the us is negative for DVT, the xray with no bony changes     Past Medical History  She has a past medical history of Abnormal weight gain (04/10/2023), Allergic rhinitis (04/10/2023), Body mass index (BMI) 31.0-31.9, adult (06/30/2020), Body mass index (BMI) 32.0-32.9, adult (01/04/2021), Body mass index (BMI) 33.0-33.9, adult (01/04/2021), Body mass index (BMI) 35.0-35.9, adult (04/09/2020), Body mass index (BMI)40.0-44.9, adult (11/14/2019), Chronic insomnia (04/10/2023), Depression, major, in remission (CMS/[facility]) (04/10/2023), Essential (primary) hypertension (09/06/2022), H/O bariatric surgery (04/10/2023), 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), Hypoxia (04/10/2023), Kidney stone on right side (04/10/2023), Left toe amputee (CMS/HCC) (04/10/2023), Malaise and fatigue (04/10/2023), Nasal septum ulceration (04/10/2023), Nontoxic multinodular goiter (07/02/2021), Personal history of other diseases of the circulatory system, Personal history of other diseases of the musculoskeletal system and connective tissue, Personal history of other diseases of the nervous system and sense organs (10/11/2022), Personal history of other diseases of the respiratory system, Personal history of other diseases of urinary system (02/19/2020), Personal history of other endocrine, nutritional and metabolic disease (07/02/2021), Personal history of other  endocrine, nutritional and metabolic disease (04/24/2017), Prosthetic joint infection (CMS/HCC) (04/10/2023), Recurrent major depressive disorder, in remission (CMS/HCC) (04/10/2023), Status post gastric bypass for obesity (04/10/2023), and 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    Surgical History  She has a past surgical history that includes Hysterectomy (01/20/2016); Tonsillectomy (01/20/2016); Total knee arthroplasty (05/28/2021); Other surgical history (01/07/2022); Other surgical history (05/28/2021); and Tubal ligation (04/24/2017).     Social History     Occupational History    Not on file   Tobacco Use    Smoking status: Never    Smokeless tobacco: Never   Vaping Use    Vaping Use: Never used   Substance and Sexual Activity    Alcohol use: Never    Drug use: Never    Sexual activity: Defer     Travel History   Travel since 12/27/23    No documented travel since 12/27/23          Family History  Family History   Problem Relation Name Age of Onset    Coronary artery disease Mother      Liver disease Mother      Other (non-hodgkins lymphoma) Mother      Stroke Father      Deafness Father      Hypertension Father       Allergies  Cephalexin, Other, Statins-hmg-coa reductase inhibitors, and Adhesive tape-silicones     Immunization History   Administered Date(s) Administered    Flu vaccine (IIV4), preservative free *Check age/dose* 10/29/2019    Flu vaccine, quadrivalent, high-dose, preservative free, age 65y+ (FLUZONE) 11/13/2023    Influenza, seasonal, injectable 10/01/2021    Accruent SARS-CoV-2 Vaccination 03/06/2021, 10/25/2021    Pneumococcal conjugate vaccine, 13-valent (PREVNAR 13) 09/17/2015, 05/31/2018    Pneumococcal conjugate vaccine, 20-valent (PREVNAR 20) 05/12/2023    Pneumococcal polysaccharide vaccine, 23-valent, age 2 years and older (PNEUMOVAX 23) 11/04/2016    Tdap vaccine, age 7 year and older (BOOSTRIX, ADACEL) 07/17/2018    Zoster vaccine, recombinant, adult (SHINGRIX) 04/25/2019,  08/02/2019   Pneumonia and influenza vaccines are up to date  Henson fall risk 70, preventive protocol was implemented  Depression screen is negative    Medications  Home medications:  Medications Prior to Admission   Medication Sig Dispense Refill Last Dose    amiodarone (Pacerone) 200 mg tablet Take 1 tablet (200 mg) by mouth once daily. 90 tablet 3 1/23/2024    apixaban (Eliquis) 5 mg tablet Take 1 tablet (5 mg) by mouth 2 times a day. 180 tablet 3 1/23/2024    ascorbic acid, vitamin C, 500 mg capsule Take 1 capsule by mouth once daily.   1/23/2024    aspirin 81 mg EC tablet Take 1 tablet (81 mg) by mouth once every day.   1/12/2024    aspirin-acetaminophen-caffeine (Excedrin Extra Strength) 250-250-65 mg tablet Take 1 tablet by mouth every 6 hours if needed for headaches.   Unknown    BIOTIN ORAL Take 1 capsule by mouth once daily.   1/23/2024    buPROPion XL (Wellbutrin XL) 150 mg 24 hr tablet Take 1 tablet (150 mg) by mouth once daily in the morning. Do not crush, chew, or split. 30 tablet 5 1/25/2024 at am    calcium citrate-vitamin D3 200 mg-6.25 mcg (250 unit) tablet Take 1 tablet by mouth once daily.   1/23/2024    cholecalciferol (Vitamin D-3) 1,250 mcg (50,000 unit) capsule Take 1 capsule (50,000 Units) by mouth 1 (one) time per week.       clotrimazole-betamethasone (Lotrisone) cream Apply 1 Application topically 2 times a day as needed. APPLY  AND RUB  IN A THIN FILM TO AFFECTED AREAS TWICE DAILY.(AM AND PM).       desvenlafaxine (Pristiq) 100 mg 24 hr tablet Take 1 tablet (100 mg) by mouth once daily. Do not crush, chew, or split. Instead of venlafaxine. 90 tablet 3 1/25/2024 at am    famotidine (Pepcid) 40 mg tablet Take 1 tablet (40 mg) by mouth once daily. 30 tablet 0 1/25/2024    FreeStyle Bulmaro 2 Sensor kit Inject 1 each under the skin every 14 (fourteen) days. Test blood sugar 1-4 times per day as needed for diabetes, replace every 14 days 6 each 3     gabapentin (Neurontin) 600 mg tablet Take 1  tablet (600 mg) by mouth 3 times a day. 90 tablet 5 1/26/2024 at am    glucagon 1 mg injection Inject 1 mg into the shoulder, thigh, or buttocks 1 time if needed for low blood sugar - see comments for up to 1 dose. USE AS DIRECTED. 1 each 5 Unknown    HumaLOG Mix 75-25 KwikPen 100 unit/mL (75-25) injection Inject 30 Units under the skin 2 times a day.   More than a month    insulin lispro (HumaLOG U-100 Insulin) 100 unit/mL injection Inject 0.1 mL (10 Units) under the skin 3 times a day with meals. Take as directed per insulin instructions. Adjust per Sliding scale- 200-250 6u, 250-300 8u 350-400 10u 400+ 12u (Patient not taking: Reported on 1/26/2024) 10 mL 11 More than a month    levothyroxine (Synthroid, Levoxyl) 175 mcg tablet Take 1 tablet (175 mcg) by mouth once daily.   1/25/2024 at am    losartan (Cozaar) 25 mg tablet Take 1 tablet (25 mg) by mouth once daily. 90 tablet 3 1/23/2024    metFORMIN XR (Glucophage-XR) 500 mg 24 hr tablet Take 2 tablets (1,000 mg) by mouth 2 times a day. Do not crush, chew, or split. 360 tablet 3 Unknown    metoprolol succinate XL (Toprol-XL) 50 mg 24 hr tablet Take 1 tablet (50 mg) by mouth once daily. 90 tablet 3 1/23/2024    mv-min/iron/folic/calcium/vitK (WOMEN'S MULTIVITAMIN ORAL) Take 1 tablet by mouth once daily.       naloxone (Narcan) 4 mg/0.1 mL nasal spray Administer 1 spray (4 mg) into affected nostril(s) if needed for opioid reversal for up to 1 day. May repeat every 2-3 minutes if needed, alternating nostrils, until medical assistance becomes available. 1 each 0     nystatin (Mycostatin) 100,000 unit/gram powder Apply 1 Application topically 2 times a day as needed. APPLY SPARINGLY TO AFFECTED AREA(S) TWICE DAILY       omeprazole (PriLOSEC) 40 mg DR capsule TAKE 1 CAPSULE BY MOUTH  DAILY OPEN CAPSULE AND  SPRINKLE CONTENTS ON SUGAR  FREE APPLESAUCE OR PUDDING.   1/25/2024 at am    OneTouch Ultra Test strip TO TEST BLOOD SUGAR 4 TIMES A DAY FOR DIABETES        "oxyCODONE-acetaminophen (Percocet)  mg tablet Take 1 tablet by mouth every 4 hours if needed for severe pain (7 - 10). Do not start before January 12, 2024. 180 tablet 0 1/24/2024    pen needle 1/2\" (Easy Touch) 29G X 12mm needle Inject 3 each under the skin 4 times a day as needed (as needed for sugars). Use as instructed 450 each 3     potassium chloride CR 10 mEq ER tablet Take 1 tablet (10 mEq) by mouth in the morning and 1 tablet (10 mEq) before bedtime. Do not crush, chew, or split.. 180 tablet 3 1/23/2024    promethazine-DM (Phenergan-DM) 6.25-15 mg/5 mL syrup Take 5 mL by mouth every 4 hours if needed for cough. (Patient not taking: Reported on 1/26/2024) 120 mL 1 Not Taking    pseudoephedrine (Sudafed) 30 mg tablet Take 1 tablet (30 mg) by mouth if needed.       spironolactone (Aldactone) 25 mg tablet Take 1 tablet (25 mg) by mouth once daily.   1/12/2024    torsemide (Demadex) 20 mg tablet Take 2 tablets (40 mg) by mouth once daily.   More than a month    traZODone (Desyrel) 50 mg tablet Take 1 tablet (50 mg) by mouth once daily at bedtime. 90 tablet 3 Unknown    underpads (Bed Underpads) pad 1 each 2 times a day. 60 each 11     vibegron 75 mg tablet Take 1 tablet by mouth once daily. 90 tablet 3 1/23/2024    vitamin B complex (SUPER B-50 COMPLEX ORAL) Take 1 capsule by mouth once daily.   1/23/2024     Current medications:  Scheduled medications  amiodarone, 200 mg, oral, Daily  apixaban, 5 mg, oral, BID  aspirin, 81 mg, oral, Every Day  buPROPion XL, 150 mg, oral, q AM  desvenlafaxine, 100 mg, oral, Daily  famotidine, 40 mg, oral, Daily  gabapentin, 600 mg, oral, TID  insulin lispro, 0-10 Units, subcutaneous, TID with meals  insulin lispro, 8 Units, subcutaneous, TID with meals  insulin NPH (Isophane), 30 Units, subcutaneous, q12h  levothyroxine, 225 mcg, oral, Daily  losartan, 25 mg, oral, Daily  metoprolol succinate XL, 50 mg, oral, Daily  piperacillin-tazobactam, 4.5 g, intravenous, " q8h  polyethylene glycol, 17 g, oral, Daily  potassium chloride CR, 10 mEq, oral, BID  spironolactone, 25 mg, oral, Daily  torsemide, 40 mg, oral, Daily  traZODone, 50 mg, oral, Nightly  vancomycin, 1,500 mg, intravenous, q24h      Continuous medications     PRN medications  PRN medications: dextrose 10 % in water (D10W), dextrose, glucagon, nystatin, ondansetron **OR** ondansetron, oxyCODONE-acetaminophen, oxygen    Review of Systems   All other systems reviewed and are negative.       Objective  Range of Vitals (last 24 hours)  Heart Rate:  [60-64]   Temp:  [36 °C (96.8 °F)-36.3 °C (97.3 °F)]   Resp:  [16-20]   BP: (118-179)/(63-98)   Height:  [182.9 cm (6')]   Weight:  [109 kg (240 lb 9.6 oz)-113 kg (250 lb)]   SpO2:  [83 %-100 %]   Daily Weight  01/26/24 : 109 kg (240 lb 9.6 oz)    Body mass index is 32.63 kg/m².   Nutritional consult    Physical Exam  Constitutional:       Appearance: Normal appearance.   HENT:      Head: Normocephalic and atraumatic.      Mouth/Throat:      Mouth: Mucous membranes are moist.      Pharynx: Oropharynx is clear.   Eyes:      Pupils: Pupils are equal, round, and reactive to light.   Cardiovascular:      Rate and Rhythm: Normal rate and regular rhythm.      Heart sounds: Normal heart sounds.   Pulmonary:      Effort: Pulmonary effort is normal.      Breath sounds: Normal breath sounds.   Abdominal:      General: Abdomen is flat. Bowel sounds are normal.      Palpations: Abdomen is soft.   Musculoskeletal:      Cervical back: Normal range of motion.      Comments: Stasis of both legs with crust and desquamation,   Lt upper posterior jesus wound with some slough, dried eschar, no purulence   Neurological:      Mental Status: She is alert.          Relevant Results  Outside Hospital Results  reviewed  Labs  Results from last 72 hours   Lab Units 01/26/24  1558   WBC AUTO x10*3/uL 6.3   HEMOGLOBIN g/dL 11.5*   HEMATOCRIT % 35.3*   PLATELETS AUTO x10*3/uL 156   NEUTROS PCT AUTO % 74.6  "  LYMPHS PCT AUTO % 15.7   MONOS PCT AUTO % 7.9   EOS PCT AUTO % 0.8     Results from last 72 hours   Lab Units 01/26/24  1558   SODIUM mmol/L 131*   POTASSIUM mmol/L 4.1   CHLORIDE mmol/L 94*   CO2 mmol/L 28   BUN mg/dL 14   CREATININE mg/dL 1.19*   GLUCOSE mg/dL 576*   CALCIUM mg/dL 8.8   ANION GAP mmol/L 13   EGFR mL/min/1.73m*2 49*     Results from last 72 hours   Lab Units 01/26/24  1558   ALK PHOS U/L 117   BILIRUBIN TOTAL mg/dL 0.4   PROTEIN TOTAL g/dL 6.8   ALT U/L 12   AST U/L 11   ALBUMIN g/dL 3.4     Estimated Creatinine Clearance: 59 mL/min (A) (by C-G formula based on SCr of 1.19 mg/dL (H)).  C-Reactive Protein   Date Value Ref Range Status   01/26/2024 1.91 (H) <1.00 mg/dL Final     CRP   Date Value Ref Range Status   07/26/2023 1.28 (A) mg/dL Final     Comment:     REF VALUE  < 1.00     06/09/2023 0.39 mg/dL Final     Comment:     REF VALUE  < 1.00     Sedimentation Rate   Date Value Ref Range Status   01/26/2024 45 (H) 0 - 30 mm/h Final   07/26/2023 20 0 - 30 mm/h Final   06/09/2023 44 (H) 0 - 30 mm/h Final     No results found for: \"HIV1X2\", \"HIVCONF\", \"BOACVR0NR\"  Hepatitis C Ab   Date Value Ref Range Status   05/16/2019 NON-REACTIVE NONREACTIVE Final     Comment:      Patients receiving more than 5 mg/day of biotin may have interference   in test results.  A sample should be taken no sooner than eight hours   after  previous dose. Contact the testing laboratory for additional    information.        Microbiology  Reviewed  Imaging  Reviewed       Assessment/Plan   Left calf wound / stasis / possible cellulitis  Legs stasis    Recommendations :  Cefazolin 2 gm iv every 8 hours  Keep the legs elevated  Wound care  US of the calf wound to rule out a collection    I spent minutes in the professional and overall care of this patient.      Lady Butterfield MD  "

## 2024-01-27 NOTE — PROGRESS NOTES
Physical Therapy    Physical Therapy Evaluation & Treatment    Patient Name: Teresa Matthews  MRN: [id number]  Today's Date: 1/27/2024   Time Calculation  Start Time: 0937  Stop Time: 1014  Time Calculation (min): 37 min    Assessment/Plan Pt is a 72 yo female who presents to the hospital with LLE cellulitis. Pt states ~6 months ago was able to walk within apartment unity, but now is only able to pivot to WC and unable to enter restroom due to weakness. Pt presents with generalized weakness, impaired balance, and impaired mobility limiting her overall functional independence. Pt may benefit from PT services at this time for strengthening, balance training, and mobility training to improve overall independence.   PT Assessment  PT Assessment Results: Decreased strength, Decreased range of motion, Impaired balance, Decreased mobility, Obesity, Decreased skin integrity, Pain, Orthopedic restrictions  Rehab Prognosis: Fair  Evaluation/Treatment Tolerance: Patient limited by fatigue  Medical Staff Made Aware: Yes  Strengths: Ability to acquire knowledge, Support of extended family/friends, Rehab experience  Barriers to Participation: Comorbidities  End of Session Communication: Bedside nurse  End of Session Patient Position: Up in chair, Alarm on   IP OR SWING BED PT PLAN  Inpatient or Swing Bed: Inpatient  PT Plan  Treatment/Interventions: Bed mobility, Transfer training, Gait training, Balance training, Neuromuscular re-education, Strengthening, Endurance training, Therapeutic exercise, Therapeutic activity, Home exercise program  PT Plan: Skilled PT  PT Frequency: 3 times per week  PT Discharge Recommendations: Moderate intensity level of continued care  PT Recommended Transfer Status: Assist x2  PT - OK to Discharge: Yes      Subjective     General Visit Information:  General  Reason for Referral: Pt admitted with left leg cellulities  Referred By: Behzad Baeza MD  Past Medical History Relevant to Rehab:  PMH: poorly controlled diabetes mellitus type 2, atrial fibrillation on anticoagulation, congestive heart failure, left total knee arthroplasty complicated by E. coli infection and MRSA infection of the foot s/p left  total knee explant and synovectomy, acute comminuted fractures of proximal tibial  and fibular, bedbound, anxiety and depression, GERD, hypothyroidism, morbid obesity, COPD, CKD stage III  Co-Treatment: OT  Co-Treatment Reason: To maximize pt mobility safely  Prior to Session Communication: Bedside nurse  Patient Position Received: Bed, 3 rail up, Alarm on  General Comment: Pt pleasant and agreeable to therapy. Distal LLE diabetic ulcer, purewick catheter, telemetry  Home Living:  Home Living  Type of Home:  (First floor of an apartment unit within very hold home)  Lives With: Alone  Home Adaptive Equipment: Walker rolling or standard, Wheelchair-manual (Lift chair (where she sleeps))  Home Layout: One level  Home Access: Ramped entrance (Public service provided by Medicaid assist pt with entering/exiting residence)  Bathroom Shower/Tub:  (Pt has not been able to enter bathroom due to weakness, has been sponge bathing)  Prior Level of Function:  Prior Function Per Pt/Caregiver Report  Level of [place]: Needs assistance with homemaking  Receives Help From: Neighbor  Homemaking Assistance: Needs assistance  Ambulatory Assistance: Needs assistance  Transfers:  (Pivot to )  Gait:  (Has not been able to ambulate since summer due to weakness)  Prior Function Comments: Completed rehab in early and summer of 2023 but since then has become weaker  Precautions:  Precautions  Medical Precautions: Fall precautions       Objective   Pain: Pt did not report     Cognition:  Cognition  Overall Cognitive Status: Within Functional Limits    General Assessments:    Static Sitting Balance  Static Sitting-Balance Support: Bilateral upper extremity supported, Feet supported  Static Sitting-Level of Assistance: Close  supervision  Static Sitting-Comment/Number of Minutes: 5 mins, observed fatigue       Functional Assessments:  Bed Mobility  Bed Mobility: Yes  Bed Mobility 1  Bed Mobility 1: Supine to sitting  Level of Assistance 1: Moderate assistance (x1)  Bed Mobility Comments 1: HOB elevated, increased time required    Transfers  Transfer: Yes  Transfer 1  Transfer From 1: Bed to, Sit to  Transfer to 1: Stand  Technique 1: Sit to stand  Transfer Device 1: Walker  Transfer Level of Assistance 1: Moderate assistance (x2)  Transfers 2  Transfer From 2: Stand to  Transfer to 2: Sit, Chair with drop arm  Technique 2: Stand pivot  Transfer Device 2: Walker  Transfer Level of Assistance 2: Moderate assistance (x2)    Ambulation/Gait Training  Ambulation/Gait Training Performed: Yes  Ambulation/Gait Training 1  Surface 1: Level tile  Device 1: Rolling walker  Assistance 1: Moderate assistance (x1)  Quality of Gait 1:  ([place] flexed posure, decreased step height/length, decreased use of BUE)  Comments/Distance (ft) 1: 1' left lateral side steps  Extremity/Trunk Assessments:    CANDICE HARVEY:  (Decreased mobility of L knee secondary to spacer)    Treatments:     Bed Mobility  Bed Mobility: Yes  Bed Mobility 1  Bed Mobility 1: Supine to sitting  Level of Assistance 1: Moderate assistance (x1)  Bed Mobility Comments 1: HOB elevated, increased time required    Ambulation/Gait Training  Ambulation/Gait Training Performed: Yes  Ambulation/Gait Training 1  Surface 1: Level tile  Device 1: Rolling walker  Assistance 1: Moderate assistance (x1)  Quality of Gait 1:  ([place] flexed posure, decreased step height/length, decreased use of BUE)  Comments/Distance (ft) 1: 1' left lateral side steps  Transfers  Transfer: Yes  Transfer 1  Transfer From 1: Bed to, Sit to  Transfer to 1: Stand  Technique 1: Sit to stand  Transfer Device 1: Walker  Transfer Level of Assistance 1: Moderate assistance (x2)  Transfers 2  Transfer From 2: Stand to  Transfer to  2: Sit, Chair with drop arm  Technique 2: Stand pivot  Transfer Device 2: Walker  Transfer Level of Assistance 2: Moderate assistance (x2)  Outcome Measures:  [facility] Basic Mobility  Turning from your back to your side while in a flat bed without using bedrails: A lot  Moving from lying on your back to sitting on the side of a flat bed without using bedrails: A lot  Moving to and from bed to chair (including a wheelchair): A lot  Standing up from a chair using your arms (e.g. wheelchair or bedside chair): A lot  To walk in hospital room: Total  Climbing 3-5 steps with railing: Total  Basic Mobility - Total Score: 10    Encounter Problems       Encounter Problems (Active)       Balance       STG - Maintains static sitting balance with upper extremity support       Start:  01/27/24       INTERVENTIONS:  1. Practice sitting on the edge of a bed/mat with minimal support.  2. Educate patient about maintining total hip precautions while maintaining balance.  3. Educate patient about pressure relief.  4. Educate patient about use of assistive device.            Mobility       STG - Patient will ambulate       Start:  01/27/24               Transfers       STG - Patient to transfer to and from sit to supine       Start:  01/27/24            STG - Patient will transfer sit to and from stand       Start:  01/27/24                   Education Documentation  Body Mechanics, taught by Adriana Schulz PT at 1/27/2024 11:37 AM.  Learner: Patient  Readiness: Acceptance  Method: Explanation  Response: Verbalizes Understanding, Demonstrated Understanding    Mobility Training, taught by Adriana Schulz PT at 1/27/2024 11:37 AM.  Learner: Patient  Readiness: Acceptance  Method: Explanation  Response: Verbalizes Understanding, Demonstrated Understanding    Body Mechanics, taught by Adriana Schulz PT at 1/27/2024 11:36 AM.  Learner: Patient  Readiness: Acceptance  Method: Explanation  Response: Verbalizes Understanding,  Demonstrated Understanding    Mobility Training, taught by Adriana Schulz PT at 1/27/2024 11:36 AM.  Learner: Patient  Readiness: Acceptance  Method: Explanation  Response: Verbalizes Understanding, Demonstrated Understanding    Education Comments  No comments found.

## 2024-01-27 NOTE — CONSULTS
Vancomycin Dosing by Pharmacy- Cessation of Therapy    Consult to pharmacy for vancomycin dosing has been discontinued by the prescriber, pharmacy will sign off at this time.    Please call pharmacy if there are further questions or re-enter a consult if vancomycin is resumed.     Mary Painting, PharmD

## 2024-01-27 NOTE — CONSULTS
Consults    Reason For Consult  Left lower leg ulceration/cellulitis    History Of Present Illness  Teresa Matthews is a 73 y.o. female presenting with a new wound to her left leg.  She relates that she noticed it a few days ago when she saw some drainage on her sock.  Does not remember hitting the leg on anything or that her legs have been swelling more.  Patient well known to me from previous hospitalizations, private office, and [facility] for wound care.  Has history of multiple episodes of cellulitis/infection to left leg.  Stopped taking her insulin a few months ago because she was just sick of it.     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), Hypoxia (04/10/2023), Kidney stone on right side (04/10/2023), Left toe amputee (CMS/HCC) (04/10/2023), Malaise and fatigue (04/10/2023), Nasal septum ulceration (04/10/2023), Nontoxic multinodular goiter (07/02/2021), Personal history of other diseases of the circulatory system, Personal history of other diseases of the musculoskeletal system and connective tissue, Personal history of other diseases of the nervous system and sense organs (10/11/2022), Personal history of other diseases of the respiratory system, Personal history of other diseases of urinary system (02/19/2020), Personal history of other endocrine, nutritional and metabolic disease (07/02/2021), Personal history of other endocrine,  nutritional and metabolic disease (04/24/2017), Prosthetic joint infection (CMS/HCC) (04/10/2023), Recurrent major depressive disorder, in remission (CMS/[facility]) (04/10/2023), Status post gastric bypass for obesity (04/10/2023), and 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    Surgical History  She has a past surgical history that includes Hysterectomy (01/20/2016); Tonsillectomy (01/20/2016); Total knee arthroplasty (05/28/2021); Other surgical history (01/07/2022); Other surgical history (05/28/2021); and Tubal ligation (04/24/2017).     Social History  She reports that she has never smoked. She has never used smokeless tobacco. She reports that she does not drink alcohol and does not use drugs.    Family History  Family History   Problem Relation Name Age of Onset    Coronary artery disease Mother      Liver disease Mother      Other (non-hodgkins lymphoma) Mother      Stroke Father      Deafness Father      Hypertension Father          Allergies  Cephalexin, Other, Statins-hmg-coa reductase inhibitors, and Adhesive tape-silicones    Meds:  Scheduled medications  amiodarone, 200 mg, oral, Daily  apixaban, 5 mg, oral, BID  aspirin, 81 mg, oral, Every Day  buPROPion XL, 150 mg, oral, q AM  ceFAZolin, 2 g, intravenous, q8h  desvenlafaxine, 100 mg, oral, Daily  famotidine, 40 mg, oral, Daily  gabapentin, 600 mg, oral, TID  insulin lispro, 0-10 Units, subcutaneous, TID with meals  insulin lispro, 8 Units, subcutaneous, TID with meals  insulin NPH (Isophane), 30 Units, subcutaneous, q12h  levothyroxine, 225 mcg, oral, Daily  losartan, 25 mg, oral, Daily  metoprolol succinate XL, 50 mg, oral, Daily  polyethylene glycol, 17 g, oral, Daily  potassium chloride CR, 10 mEq, oral, BID  spironolactone, 25 mg, oral, Daily  torsemide, 40 mg, oral, Daily  traZODone, 50 mg, oral, Nightly      Continuous medications     PRN medications  PRN medications: dextrose 10 % in water (D10W), dextrose, glucagon, nystatin, ondansetron **OR**  ondansetron, oxyCODONE-acetaminophen, oxygen    Review of Systems   Negative except for above     Physical Exam  Constitutional:       Appearance: Normal appearance.   HENT:      Head: Normocephalic and atraumatic.      Mouth/Throat:      Mouth: Mucous membranes are moist.      Pharynx: Oropharynx is clear.   Eyes:      Pupils: Pupils are equal, round, and reactive to light.   Cardiovascular:      Rate and Rhythm: Normal rate and regular rhythm.      Heart sounds: Normal heart sounds.   Pulmonary:      Effort: Pulmonary effort is normal.      Breath sounds: Normal breath sounds.   Abdominal:      General: Abdomen is flat. Bowel sounds are normal.      Palpations: Abdomen is soft.   Musculoskeletal:      Cervical back: Normal range of motion.      Comments: palpable pedal pulses B/L;  surgically absent left hallux;  charcot deformity to left;  stasis changes to B/L lower legs;  ulceration with eshcar to left lateral calf measuring 3.5 x 3.0 x 0.3 cm - no purulence;  no obvious tendon exposure.  Erythema to left lower leg.  Neurological:      Mental Status: She is alert.         Last Recorded Vitals  Blood pressure 118/63, pulse 60, temperature 36.1 °C (97 °F), temperature source Temporal, resp. rate 19, height 1.829 m (6'), weight 109 kg (240 lb 9.6 oz), SpO2 95 %.    Relevant Results  CT angio chest for pulmonary embolism    Result Date: 1/26/2024  Interpreted By:  Jody Aldana, STUDY: CT ANGIO CHEST FOR PULMONARY EMBOLISM;  1/26/2024 5:20 pm   INDICATION: Signs/Symptoms:Hypoxic, noncompliant insulin-dependent diabetic.   COMPARISON: 09/12/2022   ACCESSION NUMBER(S): [id number]   ORDERING CLINICIAN: PHIL GARCIA   TECHNIQUE: Helical data acquisition of the chest was obtained with IV contrast material. MIP reconstructions. 80 mL of Omnipaque 350. Images were reformatted in axial, coronal, and sagittal planes.   FINDINGS: Lungs and Pleura: Streaky bibasilar, lingular, and right middle lobe atelectasis. No  pulmonary consolidation. No pleural effusion. No pneumothorax.   Mediastinum and axilla: No adenopathy by CT size criteria. Cardiomegaly. Fluid in the pericardial recesses without romain pericardial effusion. Cystic structure in the subcarinal region measuring 3.4 x 2.6 cm suggestive of a pericardial cyst. Borderline heart size. Heavy mitral unless area aortic annular calcifications. Ascending thoracic aortic aneurysm measuring 4.6 cm in diameter. Markedly dilated main pulmonary artery measuring 4.4 cm in diameter. Small hiatal hernia.   Visualized Upper Abdomen: Sequelae of gastric bypass surgery. Calcified granulomas in the spleen. Bilateral adrenal gland thickening. Nodular liver contour suggestive of cirrhosis.   MSK/Chest Wall: No aggressive bony lesion identified. Bridging syndesmophytes throughout the spine.       No evidence of pulmonary embolism.   Ascending thoracic aortic aneurysm again noted.   Dilated main pulmonary artery suggesting pulmonary artery hypertension, similar.   Scattered atelectasis.   Signed by: Jody Aldana 1/26/2024 5:56 PM Dictation workstation:   [id number]    Vascular US Lower Extremity Venous Duplex Bilateral    Result Date: 1/26/2024  Interpreted By:  Ramon Pearce, STUDY: [facility] US LOWER EXTREMITY VENOUS DUPLEX BILATERAL;  1/26/2024 4:39 pm   ACCESSION NUMBER(S): [id number]   ORDERING CLINICIAN: DONYA DOAN   TECHNIQUE: Static grayscale, color Doppler, and spectral Doppler sonographic images of the bilateral lower extremities were obtained for duplex evaluation.   FINDINGS: LEFT LOWER EXTREMITY: There is no evidence of deep venous thrombus in the visualized portions of the common femoral vein, femoral vein, or popliteal vein. Respiratory variation and augmentation to calf pressure is noted. The visualized portion of the posterior tibial and peroneal veins are unremarkable.   RIGHT LOWER EXTREMITY: There is no evidence of deep venous thrombus in the visualized portions of the  common femoral vein, femoral vein, or popliteal vein. Respiratory variation and augmentation to calf pressure is noted. The visualized portion of the posterior tibial, and peroneal veins are unremarkable.       1. No evidence of deep venous thrombus in the evaluated veins of the bilateral lower extremities from the inguinal ligament to the popliteal fossa.   Signed by: Ramon Pearce 1/26/2024 4:43 PM Dictation workstation:   [id number]    XR tibia fibula left 2 views    Result Date: 1/26/2024  Interpreted By:  Nancy Marin, STUDY: XR TIBIA FIBULA LEFT 2 VIEWS; ;  1/26/2024 4:03 pm   INDICATION: Signs/Symptoms:wound ulcer LLE diabetic.   COMPARISON: 09/15/2023   ACCESSION NUMBER(S): [id number]   ORDERING CLINICIAN: PHIL GARCIA   FINDINGS: Four views left tibia and fibula: There is antibiotic spacer radiopaque material in the femorotibial joint space at the site of distal femoral and proximal tibial prostheses which have been removed. There are remote healed fractures of the proximal tibial and fibular diaphyses, also seen on 09/15/2023. The bones are markedly osteopenic. There is no bony destruction or osteomyelitis. There is no gas-forming infection. There is no acute fracture or dislocation. There are punctate soft tissue calcifications.       No acute bony abnormality left tibia and fibula.     MACRO: None   Signed by: Nancy Marin 1/26/2024 4:09 PM Dictation workstation:   [id number]   Results for orders placed or performed during the hospital encounter of 01/26/24 (from the past 24 hour(s))   CBC and Auto Differential   Result Value Ref Range    WBC 6.3 4.4 - 11.3 x10*3/uL    nRBC 0.0 0.0 - 0.0 /100 WBCs    RBC 4.07 4.00 - 5.20 x10*6/uL    Hemoglobin 11.5 (L) 12.0 - 16.0 g/dL    Hematocrit 35.3 (L) 36.0 - 46.0 %    MCV 87 80 - 100 fL    MCH 28.3 26.0 - 34.0 pg    MCHC 32.6 32.0 - 36.0 g/dL    RDW 15.7 (H) 11.5 - 14.5 %    Platelets 156 150 - 450 x10*3/uL    Neutrophils % 74.6 40.0 - 80.0 %    Immature  Granulocytes %, Automated 0.5 0.0 - 0.9 %    Lymphocytes % 15.7 13.0 - 44.0 %    Monocytes % 7.9 2.0 - 10.0 %    Eosinophils % 0.8 0.0 - 6.0 %    Basophils % 0.5 0.0 - 2.0 %    Neutrophils Absolute 4.70 1.60 - 5.50 x10*3/uL    Immature Granulocytes Absolute, Automated 0.03 0.00 - 0.50 x10*3/uL    Lymphocytes Absolute 0.99 0.80 - 3.00 x10*3/uL    Monocytes Absolute 0.50 0.05 - 0.80 x10*3/uL    Eosinophils Absolute 0.05 0.00 - 0.40 x10*3/uL    Basophils Absolute 0.03 0.00 - 0.10 x10*3/uL   Magnesium   Result Value Ref Range    Magnesium 1.79 1.60 - 2.40 mg/dL   Comprehensive metabolic panel   Result Value Ref Range    Glucose 576 (HH) 74 - 99 mg/dL    Sodium 131 (L) 136 - 145 mmol/L    Potassium 4.1 3.5 - 5.3 mmol/L    Chloride 94 (L) 98 - 107 mmol/L    Bicarbonate 28 21 - 32 mmol/L    Anion Gap 13 10 - 20 mmol/L    Urea Nitrogen 14 6 - 23 mg/dL    Creatinine 1.19 (H) 0.50 - 1.05 mg/dL    eGFR 49 (L) >60 mL/min/1.73m*2    Calcium 8.8 8.6 - 10.3 mg/dL    Albumin 3.4 3.4 - 5.0 g/dL    Alkaline Phosphatase 117 33 - 136 U/L    Total Protein 6.8 6.4 - 8.2 g/dL    AST 11 9 - 39 U/L    Bilirubin, Total 0.4 0.0 - 1.2 mg/dL    ALT 12 7 - 45 U/L   Lactate   Result Value Ref Range    Lactate 2.6 (H) 0.4 - 2.0 mmol/L   Blood Culture    Specimen: Peripheral Venipuncture; Blood culture   Result Value Ref Range    Blood Culture Loaded on Instrument - Culture in progress    Blood Culture    Specimen: Peripheral Venipuncture; Blood culture   Result Value Ref Range    Blood Culture Loaded on Instrument - Culture in progress    Sedimentation rate, automated   Result Value Ref Range    Sedimentation Rate 45 (H) 0 - 30 mm/h   C-reactive protein   Result Value Ref Range    C-Reactive Protein 1.91 (H) <1.00 mg/dL   Troponin I, High Sensitivity   Result Value Ref Range    Troponin I, High Sensitivity 6 0 - 13 ng/L   B-Type Natriuretic Peptide   Result Value Ref Range     (H) 0 - 99 pg/mL   TSH with reflex to Free T4 if abnormal    Result Value Ref Range    Thyroid Stimulating Hormone 40.63 (H) 0.44 - 3.98 mIU/L   Thyroxine, Free   Result Value Ref Range    Thyroxine, Free 0.68 0.61 - 1.12 ng/dL   Lactate   Result Value Ref Range    Lactate 2.0 0.4 - 2.0 mmol/L   POCT GLUCOSE   Result Value Ref Range    POCT Glucose 451 (H) 74 - 99 mg/dL   POCT GLUCOSE   Result Value Ref Range    POCT Glucose 387 (H) 74 - 99 mg/dL   POCT GLUCOSE   Result Value Ref Range    POCT Glucose 330 (H) 74 - 99 mg/dL   Vancomycin   Result Value Ref Range    Vancomycin 8.3 5.0 - 20.0 ug/mL   POCT GLUCOSE   Result Value Ref Range    POCT Glucose 319 (H) 74 - 99 mg/dL   Basic Metabolic Panel   Result Value Ref Range    Glucose 335 (H) 74 - 99 mg/dL    Sodium 132 (L) 136 - 145 mmol/L    Potassium 3.9 3.5 - 5.3 mmol/L    Chloride 94 (L) 98 - 107 mmol/L    Bicarbonate 33 (H) 21 - 32 mmol/L    Anion Gap 9 (L) 10 - 20 mmol/L    Urea Nitrogen 13 6 - 23 mg/dL    Creatinine 1.16 (H) 0.50 - 1.05 mg/dL    eGFR 50 (L) >60 mL/min/1.73m*2    Calcium 8.6 8.6 - 10.3 mg/dL   POCT GLUCOSE   Result Value Ref Range    POCT Glucose 350 (H) 74 - 99 mg/dL        Assessment/Plan      Left lateral calf ulceration/hematoma  Sharp, full thickness excisional debridement of left lateral calf ulceration down to an including the subcutaneous tissue utilizing forceps and scissors.  No purulence or tunneling.    Medihoney,  aquacel, ABD, and tubifast. Keep leg elevated  IV antibiotics per ID.  Additional imaging pending.  Will follow.    I spent 40 minutes in the professional and overall care of this patient.

## 2024-01-27 NOTE — PROGRESS NOTES
"Vancomycin Dosing by Pharmacy- INITIAL    Teresa Matthews is a 73 y.o. year old female who Pharmacy has been consulted for vancomycin dosing for cellulitis, skin and soft tissue. Based on the patient's indication and renal status this patient will be dosed based on a goal AUC of 400-600.     Renal function is currently stable.    Visit Vitals  /71 (BP Location: Right arm, Patient Position: Sitting)   Pulse 64   Temp 36 °C (96.8 °F) (Temporal)   Resp 20        Lab Results   Component Value Date    CREATININE 1.19 (H) 01/26/2024    CREATININE 1.45 (H) 07/29/2023    CREATININE 1.73 (H) 07/28/2023    CREATININE 1.67 (H) 07/27/2023    CREATININE 1.28 (H) 07/26/2023        Patient weight is No results found for: \"PTWEIGHT\"    No results found for: \"CULTURE\"     No intake/output data recorded.  [unfilled]    Lab Results   Component Value Date    PATIENTTEMP 37.0 08/12/2022          Assessment/Plan     Patient will not be given a loading dose.  Will initiate vancomycin maintenance,  1500 mg every 24 hours.    This dosing regimen is predicted by [employer] to result in the following pharmacokinetic parameters:    Regimen: 1500 mg IV every 24 hours.  Start time: 04:49 on 01/27/2024  Exposure target: AUC24 (range)400-600 mg/L.hr   AUC24,ss: 480 mg/L.hr  Probability of AUC24 > 400: 70 %  Ctrough,ss: 14.6 mg/L  Probability of Ctrough,ss > 20: 23 %  Probability of nephrotoxicity (Lodise SONNY 2009): 10 %    Follow-up level will be ordered on 1/27 at 5a unless clinically indicated sooner.  Will continue to monitor renal function daily while on vancomycin and order serum creatinine at least every 48 hours if not already ordered.  Follow for continued vancomycin needs, clinical response, and signs/symptoms of toxicity.       Jim Calvillo, PharmD       "

## 2024-01-27 NOTE — CONSULTS
Inpatient consult to Endocrinology  Consult performed by: Kirill Scott MD  Consult ordered by: Behzad Baeza MD      Reason For Consult  Diabetes    History Of Present Illness  Teresa Matthews is a 73 y.o. female admitted yesterday with left leg cellulitis.    Duration of type 2 diabetes mellitus:  28 years  Complications:  retinopathy, peripheral neuropathy, cardiovascular disease, and left Charcot foot, left hallux amputation  CKD  Bariatric surgery status: RNYGB 2019    Lantus 20 units last night  Regular insulin 10 units subcutaneous and 10 units IV yesterday.    Home regimen:  Humalog Mix 75/25  Humalog  Metformin ER 1000 mg BID    She stopped taking insulin 2 months ago for no particular reason, just was tired of it.     Last available A1c 9.9% (7/26/23)  Patient following with Dr. Macario.  Former patient in my practic.    Hypothyroidism, postoperative  Thyroidectomy 2021  She does not know her levothyroxine dose but states she does take it every day.  Records indicate 175 mcg/day    Medications    Current Facility-Administered Medications:     amiodarone (Pacerone) tablet 200 mg, 200 mg, oral, Daily, Behzad Baeza MD    apixaban (Eliquis) tablet 5 mg, 5 mg, oral, BID, Behzad Baeza MD, 5 mg at 01/26/24 2201    aspirin EC tablet 81 mg, 81 mg, oral, Every Day, Behzad Baeza MD, 81 mg at 01/26/24 2201    buPROPion XL (Wellbutrin XL) 24 hr tablet 150 mg, 150 mg, oral, q AM, Behzad Baeza MD    desvenlafaxine (Pristiq) 24 hr tablet 100 mg, 100 mg, oral, Daily, Behzad Baeza MD    dextrose 10 % in water (D10W) infusion, 0.3 g/kg/hr, intravenous, Once PRN, Behzad Baeza MD    dextrose 50 % injection 25 g, 25 g, intravenous, q15 min PRN, Behzad Baeza MD    famotidine (Pepcid) tablet 40 mg, 40 mg, oral, Daily, Behzad Baeza MD    gabapentin (Neurontin) capsule 600 mg, 600 mg, oral, TID, Behzad Baeza MD, 600 mg at 01/26/24 2201    glucagon (Glucagen)  injection 1 mg, 1 mg, intramuscular, q15 min PRN, Behzad Baeza MD    insulin glargine (Lantus) injection 20 Units, 20 Units, subcutaneous, BID, Behzad Baeza MD, 20 Units at 01/26/24 2242    insulin lispro (HumaLOG) injection 8 Units, 8 Units, subcutaneous, TID with meals, Behzad Baeza MD    insulin regular (HumuLIN R) injection 0-10 Units, 0-10 Units, subcutaneous, TID with meals, Behzad Baeza MD    levothyroxine (Synthroid, Levoxyl) tablet 175 mcg, 175 mcg, oral, Daily, Behzad Baeza MD    losartan (Cozaar) tablet 25 mg, 25 mg, oral, Daily, Behzad Baeza MD    metoprolol succinate XL (Toprol-XL) 24 hr tablet 50 mg, 50 mg, oral, Daily, Behzad Baeza MD    nystatin (Mycostatin) 100,000 unit/gram powder 1 Application, 1 Application, Topical, BID PRN, Behzad Baeza MD    ondansetron (Zofran) tablet 4 mg, 4 mg, oral, q8h PRN **OR** ondansetron (Zofran) injection 4 mg, 4 mg, intravenous, q8h PRN, Behzad Baeza MD    oxyCODONE-acetaminophen (Percocet) 5-325 mg per tablet 1 tablet, 1 tablet, oral, q4h PRN, Behzad Baeza MD    piperacillin-tazobactam-dextrose (Zosyn) IV 4.5 g, 4.5 g, intravenous, q8h, Behzad Baeza MD, Stopped at 01/27/24 0047    polyethylene glycol (Glycolax, Miralax) packet 17 g, 17 g, oral, Daily, Behzad Baeza MD    potassium chloride CR (Klor-Con) ER tablet 10 mEq, 10 mEq, oral, BID, Behzad Baeza MD, 10 mEq at 01/26/24 2201    spironolactone (Aldactone) tablet 25 mg, 25 mg, oral, Daily, Behzad Baeza MD    torsemide (Demadex) tablet 40 mg, 40 mg, oral, Daily, Behzad Baeza MD    traZODone (Desyrel) tablet 50 mg, 50 mg, oral, Nightly, Behzad Baeza MD, 50 mg at 01/26/24 2201    vancomycin (Vancocin) 1,500 mg in dextrose 5 % in water (D5W) 500 mL IV, 1,500 mg, intravenous, q24h, Behzad Baeza MD     Past Medical History  She has a past medical history of Abnormal weight gain (04/10/2023), Allergic rhinitis  (04/10/2023), Body mass index (BMI) 31.0-31.9, adult (06/30/2020), Body mass index (BMI) 32.0-32.9, adult (01/04/2021), Body mass index (BMI) 33.0-33.9, adult (01/04/2021), Body mass index (BMI) 35.0-35.9, adult (04/09/2020), Body mass index (BMI)40.0-44.9, adult (11/14/2019), Chronic insomnia (04/10/2023), Depression, major, in remission (CMS/HCC) (04/10/2023), Essential (primary) hypertension (09/06/2022), H/O bariatric surgery (04/10/2023), 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), Hypoxia (04/10/2023), Kidney stone on right side (04/10/2023), Left toe amputee (CMS/HCC) (04/10/2023), Malaise and fatigue (04/10/2023), Nasal septum ulceration (04/10/2023), Nontoxic multinodular goiter (07/02/2021), Personal history of other diseases of the circulatory system, Personal history of other diseases of the musculoskeletal system and connective tissue, Personal history of other diseases of the nervous system and sense organs (10/11/2022), Personal history of other diseases of the respiratory system, Personal history of other diseases of urinary system (02/19/2020), Personal history of other endocrine, nutritional and metabolic disease (07/02/2021), Personal history of other endocrine, nutritional and metabolic disease (04/24/2017), Prosthetic joint infection (CMS/HCC) (04/10/2023), Recurrent major depressive disorder, in remission (CMS/HCC) (04/10/2023), Status post gastric bypass for obesity (04/10/2023), and 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    Surgical History  She has a past surgical history that includes Hysterectomy (01/20/2016); Tonsillectomy (01/20/2016); Total knee arthroplasty (05/28/2021); Other surgical history (01/07/2022); Other surgical history (05/28/2021); and Tubal ligation (04/24/2017).     Social History  She reports that she has never smoked. She has never used smokeless tobacco. She reports that she does not drink alcohol and does not use drugs.    Family History  Family History   Problem  Relation Name Age of Onset    Coronary artery disease Mother      Liver disease Mother      Other (non-hodgkins lymphoma) Mother      Stroke Father      Deafness Father      Hypertension Father         Allergies  Cephalexin, Other, Statins-hmg-coa reductase inhibitors, and Adhesive tape-silicones    Review of Systems   Constitutional:  Positive for fatigue. Negative for fever and unexpected weight change.   Respiratory:  Negative for shortness of breath.    Cardiovascular:  Negative for chest pain.   Gastrointestinal:  Negative for nausea and vomiting.   Endocrine:        As above         Last Recorded Vitals  Blood pressure 118/63, pulse 60, temperature 36.1 °C (97 °F), temperature source Temporal, resp. rate 19, height 1.829 m (6'), weight 109 kg (240 lb 9.6 oz), SpO2 96 %.    Physical Exam  Constitutional:       General: She is not in acute distress.  HENT:      Head: Normocephalic.   Neck:      Comments: No palpable thyroid  Cardiovascular:      Pulses:           Radial pulses are 2+ on the right side and 2+ on the left side.        Dorsalis pedis pulses are 2+ on the right side and 2+ on the left side.   Abdominal:      Tenderness: There is no abdominal tenderness.   Musculoskeletal:      Left foot: Deformity (1st toe amputation) present.   Skin:     Comments: Stasis erythema left lower leg, open sore posteriorly.   No active foot sores  Dry flaky skin   Neurological:      Mental Status: She is alert.      Motor: No tremor.   Psychiatric:         Mood and Affect: Affect normal.          Relevant Results  Lab Results   Component Value Date    POCGLU 330 (H) 01/26/2024    POCGLU 387 (H) 01/26/2024    POCGLU 451 (H) 01/26/2024    POCGLU 109 (H) 07/29/2023    POCGLU 181 (H) 07/29/2023    GLUCOSE 576 (HH) 01/26/2024    GLUCOSE 97 07/29/2023    GLUCOSE 103 (H) 07/28/2023    GLUCOSE 242 (H) 07/27/2023    GLUCOSE 244 (H) 07/26/2023      Latest Reference Range & Units 01/26/24 15:58   GLUCOSE 74 - 99 mg/dL 576 (HH)    SODIUM 136 - 145 mmol/L 131 (L)   POTASSIUM 3.5 - 5.3 mmol/L 4.1   CHLORIDE 98 - 107 mmol/L 94 (L)   Bicarbonate 21 - 32 mmol/L 28   Anion Gap 10 - 20 mmol/L 13   Blood Urea Nitrogen 6 - 23 mg/dL 14   Creatinine 0.50 - 1.05 mg/dL 1.19 (H)   EGFR >60 mL/min/1.73m*2 49 (L)   Calcium 8.6 - 10.3 mg/dL 8.8   Albumin 3.4 - 5.0 g/dL 3.4   Alkaline Phosphatase 33 - 136 U/L 117   ALT 7 - 45 U/L 12   AST 9 - 39 U/L 11   Bilirubin Total 0.0 - 1.2 mg/dL 0.4      Latest Reference Range & Units 01/26/24 15:58   Thyroxine, Free 0.61 - 1.12 ng/dL 0.68   Thyroid Stimulating Hormone 0.44 - 3.98 mIU/L 40.63 (H)         IMPRESSION  TYPE 2 DIABETES MELLITUS WITH HYPERGLYCEMIA  LONG TERM CURRENT USE OF INSULIN  Patient stopped taking insulin  Admitted with hyperglycemia  Further exacerbation of hyperglycemia by infection    HYPOTHYROIDISM, POSTOPERATIVE  TSH quite elevated  Patient states she actually takes her levothyroxine.      RECOMMENDATIONS  NPH 30 unit subcutaneous BID  Lispro 8 units QAC plus corrective scale  Plan to resume Humalog Mix 75/25 at discharge  Increase Levothyroxine to 225 mcg/day        Kirill Scott MD

## 2024-01-27 NOTE — PROGRESS NOTES
Teresa Matthews is a 73 y.o. female on day 1 of admission presenting with Cellulitis of lower extremity, unspecified laterality.      Subjective   Patient seen and evaluated today.  She is eating her breakfast denies any specific complaints.  Today she is on room air yesterday she did require oxygen briefly.  Denies any shortness of breath chest pain fever or chills.       Objective     Last Recorded Vitals  /63 (BP Location: Right arm, Patient Position: Lying)   Pulse 60   Temp 36.1 °C (97 °F) (Temporal)   Resp 19   Wt 109 kg (240 lb 9.6 oz)   SpO2 95%   Intake/Output last 3 Shifts:  No intake or output data in the 24 hours ending 01/27/24 1157    Admission Weight  Weight: 113 kg (250 lb) (01/26/24 1531)    Daily Weight  01/26/24 : 109 kg (240 lb 9.6 oz)    Image Results  CT angio chest for pulmonary embolism  Narrative: Interpreted By:  Jody Aldana,   STUDY:  CT ANGIO CHEST FOR PULMONARY EMBOLISM;  1/26/2024 5:20 pm      INDICATION:  Signs/Symptoms:Hypoxic, noncompliant insulin-dependent diabetic.      COMPARISON:  09/12/2022      ACCESSION NUMBER(S):  [id number]      ORDERING CLINICIAN:  PHIL GARCIA      TECHNIQUE:  Helical data acquisition of the chest was obtained with IV contrast  material. MIP reconstructions. 80 mL of Omnipaque 350. Images were  reformatted in axial, coronal, and sagittal planes.      FINDINGS:  Lungs and Pleura: Streaky bibasilar, lingular, and right middle lobe  atelectasis. No pulmonary consolidation. No pleural effusion. No  pneumothorax.      Mediastinum and axilla: No adenopathy by CT size criteria.  Cardiomegaly. Fluid in the pericardial recesses without romain  pericardial effusion. Cystic structure in the subcarinal region  measuring 3.4 x 2.6 cm suggestive of a pericardial cyst. Borderline  heart size. Heavy mitral unless area aortic annular calcifications.  Ascending thoracic aortic aneurysm measuring 4.6 cm in diameter.  Markedly dilated main pulmonary  artery measuring 4.4 cm in diameter.  Small hiatal hernia.      Visualized Upper Abdomen: Sequelae of gastric bypass surgery.  Calcified granulomas in the spleen. Bilateral adrenal gland  thickening. Nodular liver contour suggestive of cirrhosis.      MSK/Chest Wall: No aggressive bony lesion identified. Bridging  syndesmophytes throughout the spine.      Impression: No evidence of pulmonary embolism.      Ascending thoracic aortic aneurysm again noted.      Dilated main pulmonary artery suggesting pulmonary artery  hypertension, similar.      Scattered atelectasis.      Signed by: Jody Aldana 1/26/2024 5:56 PM  Dictation workstation:   [id number]  Vascular US Lower Extremity Venous Duplex Bilateral  Narrative: Interpreted By:  Ramon Pearce,   STUDY:  [facility] US LOWER EXTREMITY VENOUS DUPLEX BILATERAL;  1/26/2024 4:39 pm      ACCESSION NUMBER(S):  [id number]      ORDERING CLINICIAN:  DONYA DOAN      TECHNIQUE:  Static grayscale, color Doppler, and spectral Doppler sonographic  images of the bilateral lower extremities were obtained for duplex  evaluation.      FINDINGS:  LEFT LOWER EXTREMITY:  There is no evidence of deep venous thrombus in the visualized  portions of the common femoral vein, femoral vein, or popliteal vein.  Respiratory variation and augmentation to calf pressure is noted. The  visualized portion of the posterior tibial and peroneal veins are  unremarkable.      RIGHT LOWER EXTREMITY:  There is no evidence of deep venous thrombus in the visualized  portions of the common femoral vein, femoral vein, or popliteal vein.  Respiratory variation and augmentation to calf pressure is noted. The  visualized portion of the posterior tibial, and peroneal veins are  unremarkable.      Impression: 1. No evidence of deep venous thrombus in the evaluated veins of the  bilateral lower extremities from the inguinal ligament to the  popliteal fossa.      Signed by: Ramon Pearce 1/26/2024 4:43 PM  Dictation  workstation:   [id number]  XR tibia fibula left 2 views  Narrative: Interpreted By:  Nancy Marin,   STUDY:  XR TIBIA FIBULA LEFT 2 VIEWS; ;  1/26/2024 4:03 pm      INDICATION:  Signs/Symptoms:wound ulcer LLE diabetic.      COMPARISON:  09/15/2023      ACCESSION NUMBER(S):  [id number]      ORDERING CLINICIAN:  PHIL GARCIA      FINDINGS:  Four views left tibia and fibula:  There is antibiotic spacer radiopaque material in the femorotibial  joint space at the site of distal femoral and proximal tibial  prostheses which have been removed. There are remote healed fractures  of the proximal tibial and fibular diaphyses, also seen on  09/15/2023. The bones are markedly osteopenic. There is no bony  destruction or osteomyelitis. There is no gas-forming infection.  There is no acute fracture or dislocation.  There are punctate soft tissue calcifications.      Impression: No acute bony abnormality left tibia and fibula.          MACRO:  None      Signed by: Nancy Marin 1/26/2024 4:09 PM  Dictation workstation:   [id number]      Physical Exam  Constitutional:       Appearance: She is obese. She is ill-appearing.   HENT:      Head: Normocephalic and atraumatic.      Nose: Nose normal.      Mouth/Throat:      Mouth: Mucous membranes are dry.   Eyes:      Extraocular Movements: Extraocular movements intact.      Conjunctiva/sclera: Conjunctivae normal.   Cardiovascular:      Rate and Rhythm: Normal rate and regular rhythm.      Pulses: Normal pulses.      Heart sounds: Normal heart sounds.   Pulmonary:      Effort: Pulmonary effort is normal.      Breath sounds: Normal breath sounds.   Abdominal:      General: Bowel sounds are normal.      Palpations: Abdomen is soft.   Musculoskeletal:         General: Normal range of motion.      Cervical back: Normal range of motion and neck supple.      Right lower leg: Edema present.      Left lower leg: Edema present.   Skin:     General: Skin is warm and dry.      Findings:  Erythema present.      Comments: Venous stasis well bilateral lower extremities.  Large ulcer posterior aspect of left lower extremity seems to be chronic and nonhealing   Neurological:      General: No focal deficit present.      Mental Status: She is alert and oriented to person, place, and time. Mental status is at baseline.   Psychiatric:         Mood and Affect: Mood normal.         Behavior: Behavior normal.         Relevant Results               Assessment/Plan        73 y.o. female  with history of poorly controlled diabetes mellitus type 2, atrial fibrillation on anticoagulation, congestive heart failure, left total knee arthroplasty complicated by E. coli infection and MRSA infection of the foot s/p left  total knee explant and synovectomy, comminuted fractures of proximal tibial  and fibular, bedbound, anxiety and depression, GERD, hypothyroidism, morbid obesity, COPD, CKD stage III comes into the hospital due to worsening wound on her left lower extremity and poor living conditions and not able to care for self.         Principal Problem:    Cellulitis of lower extremity, unspecified laterality    Left calf wound with possible cellulitis   -Patient was started with vancomycin and Zosyn now switched to cefazolin 2 g IV every 8 hours.  -Follow-up blood cultures and check wound cultures  -Appreciate recommendations from ID ultrasound of the calf wound to rule out collection.  -Doppler with no DVT.     Poorly controlled diabetes mellitus type 2 with hyperglycemia  -Not compliant with home medications.  Given 10 units of insulin in ER with minimal improvement.  -Appreciate endocrinology recommendations.  NPH 30 units SQ twice daily  -Lispro 8 units before every meal plus corrective scale  -Plan to resume Humalog Mix 75/25 at discharge  -A1c over 15.     Chronic diastolic congestive heart failure  -Continue with Aldactone and torsemide at home.  Continue with potassium as well.  -Echo from 2020 showed LVEF of  55 to 60%.  Showed diastolic dysfunction.  Mild aortic valve regurgitation and mild mitral regurg.     Atrial fibrillation  -Continue with home amiodarone and Eliquis.  Continue with metoprolol.     COPD  -Continue with breathing treatments     CKD stage III  -Monitor renal functions avoid nephrotoxins     Hypothyroidism  -Levothyroxine has been increased to 225.  Unclear if she has been compliant with her home medications but per record she has.  TSH 40.63     VTE prophylaxis: On Eliquis     PT OT and SW              Behzad Baeza MD

## 2024-01-27 NOTE — PROGRESS NOTES
"Vancomycin Dosing by Pharmacy- FOLLOW UP    Teresa Matthews is a 73 y.o. year old female who Pharmacy has been consulted for vancomycin dosing for cellulitis, skin and soft tissue. Based on the patient's indication and renal status this patient is being dosed based on a goal AUC of 400-600.     Renal function is currently improving.    Current vancomycin dose: 1500 mg given every 24 hours    Estimated vancomycin AUC on current dose: 473 mg/L.hr     Visit Vitals  /63 (BP Location: Right arm, Patient Position: Lying)   Pulse 60   Temp 36.1 °C (97 °F) (Temporal)   Resp 19        Lab Results   Component Value Date    CREATININE 1.19 (H) 01/26/2024    CREATININE 1.45 (H) 07/29/2023    CREATININE 1.73 (H) 07/28/2023    CREATININE 1.67 (H) 07/27/2023    CREATININE 1.28 (H) 07/26/2023        Patient weight is No results found for: \"PTWEIGHT\"    No results found for: \"CULTURE\"     No intake/output data recorded.  [unfilled]    Lab Results   Component Value Date    PATIENTTEMP 37.0 08/12/2022        Assessment/Plan    Within goal AUC range. Continue current vancomycin regimen.    This dosing regimen is predicted by [employer] to result in the following pharmacokinetic parameters:    Regimen: 1500 mg IV every 24 hours.  Start time: 06:52 on 01/27/2024  Exposure target: AUC24 (range)400-600 mg/L.hr   AUC24,ss: 473 mg/L.hr  Probability of AUC24 > 400: 78 %  Ctrough,ss: 14.5 mg/L  Probability of Ctrough,ss > 20: 14 %  Probability of nephrotoxicity (Lodise SONNY 2009): 10 %    The next level will be obtained on 1/28 at 5a. May be obtained sooner if clinically indicated.   Will continue to monitor renal function daily while on vancomycin and order serum creatinine at least every 48 hours if not already ordered.  Follow for continued vancomycin needs, clinical response, and signs/symptoms of toxicity.       Jim Calvillo, PharmD           "

## 2024-01-27 NOTE — SIGNIFICANT EVENT
Found patient on 2L NC . She stated she's been on it since she's been at the hospital. Moved pt. To RA and will see how she does

## 2024-01-28 LAB
ANION GAP SERPL CALC-SCNC: 10 MMOL/L (ref 10–20)
BASOPHILS # BLD AUTO: 0.05 X10*3/UL (ref 0–0.1)
BASOPHILS NFR BLD AUTO: 0.9 %
BUN SERPL-MCNC: 18 MG/DL (ref 6–23)
CALCIUM SERPL-MCNC: 8.4 MG/DL (ref 8.6–10.3)
CHLORIDE SERPL-SCNC: 98 MMOL/L (ref 98–107)
CO2 SERPL-SCNC: 31 MMOL/L (ref 21–32)
CREAT SERPL-MCNC: 1.55 MG/DL (ref 0.5–1.05)
EGFRCR SERPLBLD CKD-EPI 2021: 35 ML/MIN/1.73M*2
EOSINOPHIL # BLD AUTO: 0.2 X10*3/UL (ref 0–0.4)
EOSINOPHIL NFR BLD AUTO: 3.5 %
ERYTHROCYTE [DISTWIDTH] IN BLOOD BY AUTOMATED COUNT: 16.2 % (ref 11.5–14.5)
GLUCOSE BLD MANUAL STRIP-MCNC: 105 MG/DL (ref 74–99)
GLUCOSE BLD MANUAL STRIP-MCNC: 105 MG/DL (ref 74–99)
GLUCOSE BLD MANUAL STRIP-MCNC: 246 MG/DL (ref 74–99)
GLUCOSE BLD MANUAL STRIP-MCNC: 90 MG/DL (ref 74–99)
GLUCOSE BLD MANUAL STRIP-MCNC: 90 MG/DL (ref 74–99)
GLUCOSE SERPL-MCNC: 107 MG/DL (ref 74–99)
HCT VFR BLD AUTO: 37.9 % (ref 36–46)
HGB BLD-MCNC: 11.8 G/DL (ref 12–16)
IMM GRANULOCYTES # BLD AUTO: 0.04 X10*3/UL (ref 0–0.5)
IMM GRANULOCYTES NFR BLD AUTO: 0.7 % (ref 0–0.9)
LYMPHOCYTES # BLD AUTO: 1.71 X10*3/UL (ref 0.8–3)
LYMPHOCYTES NFR BLD AUTO: 29.6 %
MCH RBC QN AUTO: 28 PG (ref 26–34)
MCHC RBC AUTO-ENTMCNC: 31.1 G/DL (ref 32–36)
MCV RBC AUTO: 90 FL (ref 80–100)
MONOCYTES # BLD AUTO: 0.58 X10*3/UL (ref 0.05–0.8)
MONOCYTES NFR BLD AUTO: 10.1 %
NEUTROPHILS # BLD AUTO: 3.19 X10*3/UL (ref 1.6–5.5)
NEUTROPHILS NFR BLD AUTO: 55.2 %
NRBC BLD-RTO: 0 /100 WBCS (ref 0–0)
PLATELET # BLD AUTO: 162 X10*3/UL (ref 150–450)
POTASSIUM SERPL-SCNC: 3.7 MMOL/L (ref 3.5–5.3)
RBC # BLD AUTO: 4.21 X10*6/UL (ref 4–5.2)
SODIUM SERPL-SCNC: 135 MMOL/L (ref 136–145)
WBC # BLD AUTO: 5.8 X10*3/UL (ref 4.4–11.3)

## 2024-01-28 PROCEDURE — 2500000001 HC RX 250 WO HCPCS SELF ADMINISTERED DRUGS (ALT 637 FOR MEDICARE OP): Performed by: INTERNAL MEDICINE

## 2024-01-28 PROCEDURE — 99233 SBSQ HOSP IP/OBS HIGH 50: CPT | Performed by: INTERNAL MEDICINE

## 2024-01-28 PROCEDURE — 2500000004 HC RX 250 GENERAL PHARMACY W/ HCPCS (ALT 636 FOR OP/ED): Mod: MUE | Performed by: INTERNAL MEDICINE

## 2024-01-28 PROCEDURE — 85025 COMPLETE CBC W/AUTO DIFF WBC: CPT | Performed by: INTERNAL MEDICINE

## 2024-01-28 PROCEDURE — 2500000002 HC RX 250 W HCPCS SELF ADMINISTERED DRUGS (ALT 637 FOR MEDICARE OP, ALT 636 FOR OP/ED): Mod: MUE | Performed by: INTERNAL MEDICINE

## 2024-01-28 PROCEDURE — 2500000002 HC RX 250 W HCPCS SELF ADMINISTERED DRUGS (ALT 637 FOR MEDICARE OP, ALT 636 FOR OP/ED): Performed by: INTERNAL MEDICINE

## 2024-01-28 PROCEDURE — 80048 BASIC METABOLIC PNL TOTAL CA: CPT | Performed by: INTERNAL MEDICINE

## 2024-01-28 PROCEDURE — 36415 COLL VENOUS BLD VENIPUNCTURE: CPT | Performed by: INTERNAL MEDICINE

## 2024-01-28 PROCEDURE — 1200000002 HC GENERAL ROOM WITH TELEMETRY DAILY

## 2024-01-28 PROCEDURE — 2500000004 HC RX 250 GENERAL PHARMACY W/ HCPCS (ALT 636 FOR OP/ED): Performed by: INTERNAL MEDICINE

## 2024-01-28 PROCEDURE — 82947 ASSAY GLUCOSE BLOOD QUANT: CPT

## 2024-01-28 PROCEDURE — 99231 SBSQ HOSP IP/OBS SF/LOW 25: CPT | Performed by: INTERNAL MEDICINE

## 2024-01-28 RX ADMIN — LOSARTAN POTASSIUM 25 MG: 50 TABLET, FILM COATED ORAL at 08:02

## 2024-01-28 RX ADMIN — INSULIN LISPRO 8 UNITS: 100 INJECTION, SOLUTION INTRAVENOUS; SUBCUTANEOUS at 16:19

## 2024-01-28 RX ADMIN — FAMOTIDINE 40 MG: 20 TABLET ORAL at 08:01

## 2024-01-28 RX ADMIN — GABAPENTIN 600 MG: 300 CAPSULE ORAL at 08:01

## 2024-01-28 RX ADMIN — TRAZODONE HYDROCHLORIDE 50 MG: 50 TABLET ORAL at 20:18

## 2024-01-28 RX ADMIN — DESVENLAFAXINE SUCCINATE 100 MG: 100 TABLET, EXTENDED RELEASE ORAL at 08:01

## 2024-01-28 RX ADMIN — SPIRONOLACTONE 25 MG: 25 TABLET ORAL at 08:02

## 2024-01-28 RX ADMIN — CEFAZOLIN SODIUM 2 G: 2 INJECTION, SOLUTION INTRAVENOUS at 16:58

## 2024-01-28 RX ADMIN — INSULIN HUMAN 30 UNITS: 100 INJECTION, SUSPENSION SUBCUTANEOUS at 08:07

## 2024-01-28 RX ADMIN — INSULIN LISPRO 4 UNITS: 100 INJECTION, SOLUTION INTRAVENOUS; SUBCUTANEOUS at 11:31

## 2024-01-28 RX ADMIN — AMIODARONE HYDROCHLORIDE 200 MG: 200 TABLET ORAL at 08:02

## 2024-01-28 RX ADMIN — APIXABAN 5 MG: 5 TABLET, FILM COATED ORAL at 20:18

## 2024-01-28 RX ADMIN — GABAPENTIN 600 MG: 300 CAPSULE ORAL at 20:18

## 2024-01-28 RX ADMIN — ASPIRIN 81 MG: 81 TABLET, COATED ORAL at 20:18

## 2024-01-28 RX ADMIN — POTASSIUM CHLORIDE 10 MEQ: 750 TABLET, EXTENDED RELEASE ORAL at 08:01

## 2024-01-28 RX ADMIN — METOPROLOL SUCCINATE 50 MG: 50 TABLET, EXTENDED RELEASE ORAL at 08:01

## 2024-01-28 RX ADMIN — BUPROPION HYDROCHLORIDE 150 MG: 150 TABLET, FILM COATED, EXTENDED RELEASE ORAL at 08:02

## 2024-01-28 RX ADMIN — CEFAZOLIN SODIUM 2 G: 2 INJECTION, SOLUTION INTRAVENOUS at 09:13

## 2024-01-28 RX ADMIN — CEFAZOLIN SODIUM 2 G: 2 INJECTION, SOLUTION INTRAVENOUS at 01:05

## 2024-01-28 RX ADMIN — INSULIN LISPRO 8 UNITS: 100 INJECTION, SOLUTION INTRAVENOUS; SUBCUTANEOUS at 11:33

## 2024-01-28 RX ADMIN — POTASSIUM CHLORIDE 10 MEQ: 750 TABLET, EXTENDED RELEASE ORAL at 20:18

## 2024-01-28 RX ADMIN — OXYCODONE HYDROCHLORIDE AND ACETAMINOPHEN 1 TABLET: 5; 325 TABLET ORAL at 20:18

## 2024-01-28 RX ADMIN — APIXABAN 5 MG: 5 TABLET, FILM COATED ORAL at 08:02

## 2024-01-28 RX ADMIN — LEVOTHYROXINE SODIUM 225 MCG: 0.17 TABLET ORAL at 05:27

## 2024-01-28 RX ADMIN — TORSEMIDE 40 MG: 20 TABLET ORAL at 08:02

## 2024-01-28 RX ADMIN — INSULIN LISPRO 8 UNITS: 100 INJECTION, SOLUTION INTRAVENOUS; SUBCUTANEOUS at 08:06

## 2024-01-28 RX ADMIN — GABAPENTIN 600 MG: 300 CAPSULE ORAL at 14:22

## 2024-01-28 ASSESSMENT — COGNITIVE AND FUNCTIONAL STATUS - GENERAL
CLIMB 3 TO 5 STEPS WITH RAILING: TOTAL
STANDING UP FROM CHAIR USING ARMS: A LOT
TURNING FROM BACK TO SIDE WHILE IN FLAT BAD: A LOT
TOILETING: TOTAL
DRESSING REGULAR UPPER BODY CLOTHING: A LITTLE
DRESSING REGULAR UPPER BODY CLOTHING: A LITTLE
HELP NEEDED FOR BATHING: A LOT
TURNING FROM BACK TO SIDE WHILE IN FLAT BAD: A LOT
WALKING IN HOSPITAL ROOM: TOTAL
DRESSING REGULAR LOWER BODY CLOTHING: TOTAL
MOVING TO AND FROM BED TO CHAIR: A LOT
MOVING TO AND FROM BED TO CHAIR: A LOT
STANDING UP FROM CHAIR USING ARMS: A LOT
MOBILITY SCORE: 10
CLIMB 3 TO 5 STEPS WITH RAILING: TOTAL
HELP NEEDED FOR BATHING: A LOT
TOILETING: TOTAL
MOVING FROM LYING ON BACK TO SITTING ON SIDE OF FLAT BED WITH BEDRAILS: A LOT
DRESSING REGULAR LOWER BODY CLOTHING: TOTAL
MOVING FROM LYING ON BACK TO SITTING ON SIDE OF FLAT BED WITH BEDRAILS: A LOT
WALKING IN HOSPITAL ROOM: TOTAL
DAILY ACTIVITIY SCORE: 15

## 2024-01-28 ASSESSMENT — PAIN DESCRIPTION - LOCATION: LOCATION: KNEE

## 2024-01-28 ASSESSMENT — PAIN SCALES - GENERAL
PAINLEVEL_OUTOF10: 2
PAINLEVEL_OUTOF10: 7

## 2024-01-28 ASSESSMENT — PAIN DESCRIPTION - ORIENTATION: ORIENTATION: RIGHT;LEFT

## 2024-01-28 ASSESSMENT — PAIN - FUNCTIONAL ASSESSMENT: PAIN_FUNCTIONAL_ASSESSMENT: 0-10

## 2024-01-28 NOTE — PROGRESS NOTES
Teresa Matthews is a 73 y.o. female on day 2 of admission presenting with Cellulitis of lower extremity, unspecified laterality.      Subjective   Patient seen and evaluated today.  She is eating her breakfast denies any specific complaints.  Today she is on room air yesterday she did require oxygen briefly.  Denies any shortness of breath chest pain fever or chills.       Objective     Last Recorded Vitals  /86 (BP Location: Right arm, Patient Position: Lying)   Pulse 78   Temp 35.5 °C (95.9 °F) (Temporal)   Resp 19   Wt 109 kg (240 lb 9.6 oz)   SpO2 91%   Intake/Output last 3 Shifts:    Intake/Output Summary (Last 24 hours) at 1/28/2024 1059  Last data filed at 1/28/2024 0929  Gross per 24 hour   Intake 400 ml   Output 1900 ml   Net -1500 ml       Admission Weight  Weight: 113 kg (250 lb) (01/26/24 1531)    Daily Weight  01/26/24 : 109 kg (240 lb 9.6 oz)    Image Results  US extremity nonvascular real time w image documentation limited anatomic specific  Narrative: Interpreted By:  Nancy Marin,   STUDY:  US EXTREMITY NONVASCULAR REAL TIME WITH IMAGE DOCUMENTATION LIMITED  ANATOMIC SPECIFIC;  1/27/2024 4:35 pm      INDICATION:  Signs/Symptoms:Lt calf wound / rule out a collection.      COMPARISON:  None.      ACCESSION NUMBER(S):  [id number]      ORDERING CLINICIAN:  SELVIN AMTTSON      TECHNIQUE:  Soft tissue ultrasound of the left calf was performed      FINDINGS:  There is edema in the subcutaneous fat with no abscess.      Impression: Soft tissue ultrasound of the left calf was performed. There is edema  of the subcutaneous fat with no abscess or focal fluid collection.      MACRO:  None      Signed by: Nancy Marin 1/28/2024 9:42 AM  Dictation workstation:   [id number]      Physical Exam  Constitutional:       Appearance: She is obese.   HENT:      Head: Normocephalic and atraumatic.      Nose: Nose normal.      Mouth/Throat:      Mouth: Mucous membranes are dry.   Eyes:       Extraocular Movements: Extraocular movements intact.      Conjunctiva/sclera: Conjunctivae normal.   Cardiovascular:      Rate and Rhythm: Normal rate and regular rhythm.      Pulses: Normal pulses.      Heart sounds: Normal heart sounds.   Pulmonary:      Effort: Pulmonary effort is normal.      Breath sounds: Normal breath sounds.   Abdominal:      General: Bowel sounds are normal.      Palpations: Abdomen is soft.   Musculoskeletal:         General: Normal range of motion.      Cervical back: Normal range of motion and neck supple.      Right lower leg: Edema present.      Left lower leg: Edema present.   Skin:     General: Skin is warm and dry.      Findings: Erythema present.      Comments: Venous stasis well bilateral lower extremities.  Large ulcer posterior aspect of left lower extremity seems to be chronic and nonhealing   Neurological:      General: No focal deficit present.      Mental Status: She is alert and oriented to person, place, and time. Mental status is at baseline.   Psychiatric:         Mood and Affect: Mood normal.         Behavior: Behavior normal.         Relevant Results               Assessment/Plan        73 y.o. female  with history of poorly controlled diabetes mellitus type 2, atrial fibrillation on anticoagulation, congestive heart failure, left total knee arthroplasty complicated by E. coli infection and MRSA infection of the foot s/p left  total knee explant and synovectomy, comminuted fractures of proximal tibial  and fibular, bedbound, anxiety and depression, GERD, hypothyroidism, morbid obesity, COPD, CKD stage III comes into the hospital due to worsening wound on her left lower extremity and poor living conditions and not able to care for self.         Principal Problem:    Cellulitis of lower extremity, unspecified laterality    Left calf wound with possible cellulitis   -Patient was started with vancomycin and Zosyn now switched to cefazolin 2 g IV every 8 hours.  -Blood  culture NGTD  -Appreciate recommendations from ID ultrasound of the calf wound  with no evidence of abscess at this time..  -Doppler with no DVT.  -Appreciate podiatry.  Continue outpatient follow-up as well     Poorly controlled diabetes mellitus type 2 with hyperglycemia  -Not compliant with home medications.  Given 10 units of insulin in ER with minimal improvement.  -Appreciate endocrinology recommendations.  NPH 30 units SQ twice daily  -Lispro 8 units before every meal plus corrective scale  -Plan to resume Humalog Mix 75/25 at discharge  -A1c over 15.     Chronic diastolic congestive heart failure  -Aldactone and torsemide at home on hold due to mild SANDI.  Continue with potassium as well.  -Echo from 2020 showed LVEF of 55 to 60%.  Showed diastolic dysfunction.  Mild aortic valve regurgitation and mild mitral regurg.     Atrial fibrillation  -Continue with home amiodarone and Eliquis.  Continue with metoprolol.     COPD  -Continue with breathing treatments     CKD stage III  -Monitor renal functions avoid nephrotoxins  -Any noted to be 1.55 today holding diuretics and ARB at this time.  Monitor renal functions.     Hypothyroidism  -Levothyroxine has been increased to 225.  Unclear if she has been compliant with her home medications but per record she has.  TSH 40.63     VTE prophylaxis: On Eliquis     PT OT and SW.  The patient may need placement.              Behzad Baeza MD

## 2024-01-28 NOTE — PROGRESS NOTES
Teresa Matthews is a 73 y.o. female on day 2 of admission presenting with Cellulitis of lower extremity, unspecified laterality.    Subjective   No new complaint     Scheduled medications  amiodarone, 200 mg, oral, Daily  apixaban, 5 mg, oral, BID  aspirin, 81 mg, oral, Every Day  buPROPion XL, 150 mg, oral, q AM  ceFAZolin, 2 g, intravenous, q8h  desvenlafaxine, 100 mg, oral, Daily  famotidine, 40 mg, oral, Daily  gabapentin, 600 mg, oral, TID  insulin lispro, 0-10 Units, subcutaneous, TID with meals  insulin lispro, 8 Units, subcutaneous, TID with meals  insulin NPH (Isophane), 30 Units, subcutaneous, q12h  levothyroxine, 225 mcg, oral, Daily  [Held by provider] losartan, 25 mg, oral, Daily  metoprolol succinate XL, 50 mg, oral, Daily  polyethylene glycol, 17 g, oral, Daily  potassium chloride CR, 10 mEq, oral, BID  [Held by provider] spironolactone, 25 mg, oral, Daily  [Held by provider] torsemide, 40 mg, oral, Daily  traZODone, 50 mg, oral, Nightly        Objective   Physical Exam  Constitutional:       General: She is not in acute distress.  Neurological:      Mental Status: She is alert.         Last Recorded Vitals  Blood pressure 150/86, pulse 78, temperature 35.5 °C (95.9 °F), temperature source Temporal, resp. rate 19, height 1.829 m (6'), weight 109 kg (240 lb 9.6 oz), SpO2 91 %.  Intake/Output last 3 Shifts:  I/O last 3 completed shifts:  In: 300 (2.7 mL/kg) [IV Piggyback:300]  Out: 1900 (17.4 mL/kg) [Urine:1900 (0.5 mL/kg/hr)]  Weight: 109.1 kg     Relevant Results  Results from last 7 days   Lab Units 01/28/24  1118 01/28/24  0716 01/28/24  0526 01/27/24  1951/24  1551 01/27/24  1121 01/27/24  0857 01/26/24  1804 01/26/24  1558   POCT GLUCOSE mg/dL 246* 105*  --  145* 190* 350*  --    < >  --    GLUCOSE mg/dL  --   --  107*  --   --   --  335*  --  576*    < > = values in this interval not displayed.      Latest Reference Range & Units 01/28/24 05:26   GLUCOSE 74 - 99 mg/dL 107 (H)   SODIUM  136 - 145 mmol/L 135 (L)   POTASSIUM 3.5 - 5.3 mmol/L 3.7   CHLORIDE 98 - 107 mmol/L 98   Bicarbonate 21 - 32 mmol/L 31   Anion Gap 10 - 20 mmol/L 10   Blood Urea Nitrogen 6 - 23 mg/dL 18   Creatinine 0.50 - 1.05 mg/dL 1.55 (H)   EGFR >60 mL/min/1.73m*2 35 (L)   Calcium 8.6 - 10.3 mg/dL 8.4 (L)       Assessment/Plan   Principal Problem:    Cellulitis of lower extremity, unspecified laterality    IMPRESSION  TYPE 2 DIABETES MELLITUS WITH HYPERGLYCEMIA  LONG TERM CURRENT USE OF INSULIN  Patient stopped taking insulin  Admitted with hyperglycemia  Glucose much improved     HYPOTHYROIDISM, POSTOPERATIVE  TSH quite elevated        RECOMMENDATIONS  NPH 30 unit subcutaneous BID  Lispro 8 units QAC plus corrective scale  Plan to resume Humalog Mix 75/25 at discharge  Levothyroxine 225 mcg/day      Kirill Scott MD

## 2024-01-28 NOTE — PROGRESS NOTES
Teresa Matthews is a 73 y.o. female on day 2 of admission presenting with Cellulitis of lower extremity, unspecified laterality.    Subjective   Interval History: no fever, no new complaints        Review of Systems    Objective   Range of Vitals (last 24 hours)  Heart Rate:  [67-78]   Temp:  [35.5 °C (95.9 °F)-36.4 °C (97.5 °F)]   Resp:  [17-19]   BP: (106-150)/(57-86)   SpO2:  [91 %-99 %]   Daily Weight  01/26/24 : 109 kg (240 lb 9.6 oz)    Body mass index is 32.63 kg/m².    Physical Exam  Constitutional:       Appearance: Normal appearance.   HENT:      Head: Normocephalic and atraumatic.      Mouth/Throat:      Mouth: Mucous membranes are moist.      Pharynx: Oropharynx is clear.   Eyes:      Pupils: Pupils are equal, round, and reactive to light.   Cardiovascular:      Rate and Rhythm: Normal rate and regular rhythm.      Heart sounds: Normal heart sounds.   Pulmonary:      Effort: Pulmonary effort is normal.      Breath sounds: Normal breath sounds.   Abdominal:      General: Abdomen is flat. Bowel sounds are normal.      Palpations: Abdomen is soft.   Musculoskeletal:         General: Swelling present.      Cervical back: Normal range of motion.      Comments: Lt calf wound, less edema and redness   Neurological:      Mental Status: She is alert.         Antibiotics  vancomycin (Vancocin) in dextrose 5 % water (D5W) 500 mL IV 1,500 mg  piperacillin-tazobactam-dextrose (Zosyn) IV 3.375 g  insulin regular (HumuLIN R) injection 10 Units  iohexol (OMNIPaque) 350 mg iodine/mL solution 80 mL    insulin regular (HumuLIN R) injection 10 Units  amiodarone (Pacerone) tablet 200 mg  apixaban (Eliquis) tablet 5 mg  aspirin EC tablet 81 mg  buPROPion XL (Wellbutrin XL) 24 hr tablet 150 mg  desvenlafaxine (Pristiq) 24 hr tablet 100 mg  famotidine (Pepcid) tablet 40 mg  gabapentin (Neurontin) capsule 600 mg  levothyroxine (Synthroid, Levoxyl) tablet 175 mcg  losartan (Cozaar) tablet 25 mg  metoprolol succinate XL  (Toprol-XL) 24 hr tablet 50 mg  nystatin (Mycostatin) 100,000 unit/gram powder 1 Application  oxyCODONE-acetaminophen (Percocet) 5-325 mg per tablet 1 tablet  potassium chloride CR (Klor-Con) ER tablet 10 mEq  spironolactone (Aldactone) tablet 25 mg  torsemide (Demadex) tablet 40 mg  traZODone (Desyrel) tablet 50 mg  ondansetron (Zofran) tablet 4 mg  ondansetron (Zofran) injection 4 mg  polyethylene glycol (Glycolax, Miralax) packet 17 g  dextrose 50 % injection 25 g  glucagon (Glucagen) injection 1 mg  dextrose 10 % in water (D10W) infusion  insulin glargine (Lantus) injection 20 Units  insulin lispro (HumaLOG) injection 8 Units  insulin regular (HumuLIN R) injection 0-10 Units  piperacillin-tazobactam-dextrose (Zosyn) IV 4.5 g  vancomycin (Vancocin) 1,500 mg in dextrose 5 % in water (D5W) 500 mL IV  dextrose 50 % injection 25 g  insulin NPH (Isophane) (HumuLIN N,NovoLIN N) injection 30 Units  insulin lispro (HumaLOG) injection 0-10 Units  levothyroxine (Synthroid, Levoxyl) tablet 225 mcg  oxygen (O2) therapy  ceFAZolin (Ancef) 2 g IV in dextrose 5% 50 mL  ceFAZolin in dextrose (iso-os) (Ancef) IVPB 2 g      Relevant Results  Labs  Results from last 72 hours   Lab Units 01/28/24  0526 01/26/24  1558   WBC AUTO x10*3/uL 5.8 6.3   HEMOGLOBIN g/dL 11.8* 11.5*   HEMATOCRIT % 37.9 35.3*   PLATELETS AUTO x10*3/uL 162 156   NEUTROS PCT AUTO % 55.2 74.6   LYMPHS PCT AUTO % 29.6 15.7   MONOS PCT AUTO % 10.1 7.9   EOS PCT AUTO % 3.5 0.8     Results from last 72 hours   Lab Units 01/28/24  0526 01/27/24  0857 01/26/24  1558   SODIUM mmol/L 135* 132* 131*   POTASSIUM mmol/L 3.7 3.9 4.1   CHLORIDE mmol/L 98 94* 94*   CO2 mmol/L 31 33* 28   BUN mg/dL 18 13 14   CREATININE mg/dL 1.55* 1.16* 1.19*   GLUCOSE mg/dL 107* 335* 576*   CALCIUM mg/dL 8.4* 8.6 8.8   ANION GAP mmol/L 10 9* 13   EGFR mL/min/1.73m*2 35* 50* 49*     Results from last 72 hours   Lab Units 01/26/24  1558   ALK PHOS U/L 117   BILIRUBIN TOTAL mg/dL 0.4   PROTEIN  TOTAL g/dL 6.8   ALT U/L 12   AST U/L 11   ALBUMIN g/dL 3.4     Estimated Creatinine Clearance: 45.3 mL/min (A) (by C-G formula based on SCr of 1.55 mg/dL (H)).  C-Reactive Protein   Date Value Ref Range Status   01/26/2024 1.91 (H) <1.00 mg/dL Final     CRP   Date Value Ref Range Status   07/26/2023 1.28 (A) mg/dL Final     Comment:     REF VALUE  < 1.00     06/09/2023 0.39 mg/dL Final     Comment:     REF VALUE  < 1.00     Microbiology  Reviewed  Imaging  reviewed        Assessment/Plan   Left calf wound / stasis / possible cellulitis  Legs stasis     Recommendations :  Continue Cefazolin   Discussed with the wound care team     I spent minutes in the professional and overall care of this patient.      Lady Butterfield MD

## 2024-01-28 NOTE — PROGRESS NOTES
Teresa Matthews is a 73 y.o. female on day 2 of admission presenting with Cellulitis of lower extremity, unspecified laterality.    Subjective   No new symptoms to left leg.  Had ultrasound done of left calf yesterday.    Meds:  Scheduled medications  amiodarone, 200 mg, oral, Daily  apixaban, 5 mg, oral, BID  aspirin, 81 mg, oral, Every Day  buPROPion XL, 150 mg, oral, q AM  ceFAZolin, 2 g, intravenous, q8h  desvenlafaxine, 100 mg, oral, Daily  famotidine, 40 mg, oral, Daily  gabapentin, 600 mg, oral, TID  insulin lispro, 0-10 Units, subcutaneous, TID with meals  insulin lispro, 8 Units, subcutaneous, TID with meals  insulin NPH (Isophane), 30 Units, subcutaneous, q12h  levothyroxine, 225 mcg, oral, Daily  [Held by provider] losartan, 25 mg, oral, Daily  metoprolol succinate XL, 50 mg, oral, Daily  polyethylene glycol, 17 g, oral, Daily  potassium chloride CR, 10 mEq, oral, BID  [Held by provider] spironolactone, 25 mg, oral, Daily  [Held by provider] torsemide, 40 mg, oral, Daily  traZODone, 50 mg, oral, Nightly      Continuous medications     PRN medications  PRN medications: dextrose 10 % in water (D10W), dextrose, glucagon, nystatin, ondansetron **OR** ondansetron, oxyCODONE-acetaminophen, oxygen      Physical Exam    Constitutional:       Appearance: Normal appearance.   HENT:      Head: Normocephalic and atraumatic.      Mouth/Throat:      Mouth: Mucous membranes are moist.      Pharynx: Oropharynx is clear.   Eyes:      Pupils: Pupils are equal, round, and reactive to light.   Cardiovascular:      Rate and Rhythm: Normal rate and regular rhythm.      Heart sounds: Normal heart sounds.   Pulmonary:      Effort: Pulmonary effort is normal.      Breath sounds: Normal breath sounds.   Abdominal:      General: Abdomen is flat. Bowel sounds are normal.      Palpations: Abdomen is soft.   Musculoskeletal:      Cervical back: Normal range of motion.      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  Decreased erythema to left lower leg.  Neurological:      Mental Status: She is alert.         Last Recorded Vitals  Blood pressure 150/86, pulse 78, temperature 35.5 °C (95.9 °F), temperature source Temporal, resp. rate 19, height 1.829 m (6'), weight 109 kg (240 lb 9.6 oz), SpO2 91 %.    Intake/Output last 3 Shifts:  I/O last 3 completed shifts:  In: 300 (2.7 mL/kg) [IV Piggyback:300]  Out: 1900 (17.4 mL/kg) [Urine:1900 (0.5 mL/kg/hr)]  Weight: 109.1 kg     Relevant Results   US extremity nonvascular real time w image documentation limited anatomic specific    Result Date: 1/28/2024  Interpreted By:  Nancy Marin, STUDY: US EXTREMITY NONVASCULAR REAL TIME WITH IMAGE DOCUMENTATION LIMITED ANATOMIC SPECIFIC;  1/27/2024 4:35 pm   INDICATION: Signs/Symptoms:Lt calf wound / rule out a collection.   COMPARISON: None.   ACCESSION NUMBER(S): [id number]   ORDERING CLINICIAN: SELVIN MATTSON   TECHNIQUE: Soft tissue ultrasound of the left calf was performed   FINDINGS: There is edema in the subcutaneous fat with no abscess.       Soft tissue ultrasound of the left calf was performed. There is edema of the subcutaneous fat with no abscess or focal fluid collection.   MACRO: None   Signed by: Nancy Marin 1/28/2024 9:42 AM Dictation workstation:   [id number]    CT angio chest for pulmonary embolism    Result Date: 1/26/2024  Interpreted By:  Jody Aldana, STUDY: CT ANGIO CHEST FOR PULMONARY EMBOLISM;  1/26/2024 5:20 pm   INDICATION: Signs/Symptoms:Hypoxic, noncompliant insulin-dependent diabetic.   COMPARISON: 09/12/2022   ACCESSION NUMBER(S): [id number]   ORDERING CLINICIAN: PHIL GARCIA   TECHNIQUE: Helical data acquisition of the chest was obtained with IV contrast material. MIP reconstructions. 80 mL  of Omnipaque 350. Images were reformatted in axial, coronal, and sagittal planes.   FINDINGS: Lungs and Pleura: Streaky bibasilar, lingular, and right middle lobe atelectasis. No pulmonary consolidation. No pleural effusion. No pneumothorax.   Mediastinum and axilla: No adenopathy by CT size criteria. Cardiomegaly. Fluid in the pericardial recesses without romain pericardial effusion. Cystic structure in the subcarinal region measuring 3.4 x 2.6 cm suggestive of a pericardial cyst. Borderline heart size. Heavy mitral unless area aortic annular calcifications. Ascending thoracic aortic aneurysm measuring 4.6 cm in diameter. Markedly dilated main pulmonary artery measuring 4.4 cm in diameter. Small hiatal hernia.   Visualized Upper Abdomen: Sequelae of gastric bypass surgery. Calcified granulomas in the spleen. Bilateral adrenal gland thickening. Nodular liver contour suggestive of cirrhosis.   MSK/Chest Wall: No aggressive bony lesion identified. Bridging syndesmophytes throughout the spine.       No evidence of pulmonary embolism.   Ascending thoracic aortic aneurysm again noted.   Dilated main pulmonary artery suggesting pulmonary artery hypertension, similar.   Scattered atelectasis.   Signed by: Jody Aldana 1/26/2024 5:56 PM Dictation workstation:   [id number]    Vascular US Lower Extremity Venous Duplex Bilateral    Result Date: 1/26/2024  Interpreted By:  Ramon Pearce, STUDY: [facility] US LOWER EXTREMITY VENOUS DUPLEX BILATERAL;  1/26/2024 4:39 pm   ACCESSION NUMBER(S): [id number]   ORDERING CLINICIAN: DONYA DOAN   TECHNIQUE: Static grayscale, color Doppler, and spectral Doppler sonographic images of the bilateral lower extremities were obtained for duplex evaluation.   FINDINGS: LEFT LOWER EXTREMITY: There is no evidence of deep venous thrombus in the visualized portions of the common femoral vein, femoral vein, or popliteal vein. Respiratory variation and augmentation to calf pressure is noted. The  visualized portion of the posterior tibial and peroneal veins are unremarkable.   RIGHT LOWER EXTREMITY: There is no evidence of deep venous thrombus in the visualized portions of the common femoral vein, femoral vein, or popliteal vein. Respiratory variation and augmentation to calf pressure is noted. The visualized portion of the posterior tibial, and peroneal veins are unremarkable.       1. No evidence of deep venous thrombus in the evaluated veins of the bilateral lower extremities from the inguinal ligament to the popliteal fossa.   Signed by: Ramon Pearce 1/26/2024 4:43 PM Dictation workstation:   [id number]    XR tibia fibula left 2 views    Result Date: 1/26/2024  Interpreted By:  Nancy Marin, STUDY: XR TIBIA FIBULA LEFT 2 VIEWS; ;  1/26/2024 4:03 pm   INDICATION: Signs/Symptoms:wound ulcer LLE diabetic.   COMPARISON: 09/15/2023   ACCESSION NUMBER(S): [id number]   ORDERING CLINICIAN: PHIL GARCIA   FINDINGS: Four views left tibia and fibula: There is antibiotic spacer radiopaque material in the femorotibial joint space at the site of distal femoral and proximal tibial prostheses which have been removed. There are remote healed fractures of the proximal tibial and fibular diaphyses, also seen on 09/15/2023. The bones are markedly osteopenic. There is no bony destruction or osteomyelitis. There is no gas-forming infection. There is no acute fracture or dislocation. There are punctate soft tissue calcifications.       No acute bony abnormality left tibia and fibula.     MACRO: None   Signed by: Nancy Marin 1/26/2024 4:09 PM Dictation workstation:   [id number]   Results for orders placed or performed during the hospital encounter of 01/26/24 (from the past 24 hour(s))   POCT GLUCOSE   Result Value Ref Range    POCT Glucose 350 (H) 74 - 99 mg/dL   POCT GLUCOSE   Result Value Ref Range    POCT Glucose 190 (H) 74 - 99 mg/dL   POCT GLUCOSE   Result Value Ref Range    POCT Glucose 145 (H) 74 - 99 mg/dL    CBC and Auto Differential   Result Value Ref Range    WBC 5.8 4.4 - 11.3 x10*3/uL    nRBC 0.0 0.0 - 0.0 /100 WBCs    RBC 4.21 4.00 - 5.20 x10*6/uL    Hemoglobin 11.8 (L) 12.0 - 16.0 g/dL    Hematocrit 37.9 36.0 - 46.0 %    MCV 90 80 - 100 fL    MCH 28.0 26.0 - 34.0 pg    MCHC 31.1 (L) 32.0 - 36.0 g/dL    RDW 16.2 (H) 11.5 - 14.5 %    Platelets 162 150 - 450 x10*3/uL    Neutrophils % 55.2 40.0 - 80.0 %    Immature Granulocytes %, Automated 0.7 0.0 - 0.9 %    Lymphocytes % 29.6 13.0 - 44.0 %    Monocytes % 10.1 2.0 - 10.0 %    Eosinophils % 3.5 0.0 - 6.0 %    Basophils % 0.9 0.0 - 2.0 %    Neutrophils Absolute 3.19 1.60 - 5.50 x10*3/uL    Immature Granulocytes Absolute, Automated 0.04 0.00 - 0.50 x10*3/uL    Lymphocytes Absolute 1.71 0.80 - 3.00 x10*3/uL    Monocytes Absolute 0.58 0.05 - 0.80 x10*3/uL    Eosinophils Absolute 0.20 0.00 - 0.40 x10*3/uL    Basophils Absolute 0.05 0.00 - 0.10 x10*3/uL   Basic Metabolic Panel   Result Value Ref Range    Glucose 107 (H) 74 - 99 mg/dL    Sodium 135 (L) 136 - 145 mmol/L    Potassium 3.7 3.5 - 5.3 mmol/L    Chloride 98 98 - 107 mmol/L    Bicarbonate 31 21 - 32 mmol/L    Anion Gap 10 10 - 20 mmol/L    Urea Nitrogen 18 6 - 23 mg/dL    Creatinine 1.55 (H) 0.50 - 1.05 mg/dL    eGFR 35 (L) >60 mL/min/1.73m*2    Calcium 8.4 (L) 8.6 - 10.3 mg/dL   POCT GLUCOSE   Result Value Ref Range    POCT Glucose 105 (H) 74 - 99 mg/dL       Assessment/Plan   Principal Problem:    Cellulitis of lower extremity, unspecified laterality    Left lateral calf ulceration/hematoma  Medihoney,  aquacel, ABD, and ace bandage. Keep leg elevated.  Change every other day.  IV antibiotics per ID.  Ultrasound negative for abscess/hematoma  Nails x 9 debrided today.  Will follow.       Christine Balderrama DPM

## 2024-01-28 NOTE — PROGRESS NOTES
01/27/24 1118   Discharge Planning   Living Arrangements Alone   Support Systems Family members   Assistance Needed Patient is A&Ox3, on room air at baseline, is independent with ADL's and uses a wheelchair at baseline (self propels), doesn't drive (patient receives transport through [facility])   Type of Residence Private residence   Who is requesting discharge planning? Provider   Home or Post Acute Services Post acute facilities (Rehab/SNF/etc)  (TBD pend PT/OT eval)   Type of Post Acute Facility Services Skilled nursing   Patient expects to be discharged to: HHC vs SNF- pend PT/OT eval   Does the patient need discharge transport arranged? Yes   RoundTrip coordination needed? Yes   Has discharge transport been arranged? No   Financial Resource Strain   How hard is it for you to pay for the very basics like food, housing, medical care, and heating? Not hard   Housing Stability   In the last 12 months, was there a time when you were not able to pay the mortgage or rent on time? N   In the last 12 months, how many places have you lived? 1   In the last 12 months, was there a time when you did not have a steady place to sleep or slept in a shelter (including now)? N   Transportation Needs   In the past 12 months, has lack of transportation kept you from medical appointments or from getting medications? no   In the past 12 months, has lack of transportation kept you from meetings, work, or from getting things needed for daily living? No   Patient Choice   Provider Choice list and CMS website (https://medicare.gov/care-compare#search) for post-acute Quality and Resource Measure Data were provided and reviewed with: Patient      1/27/24 at 1518: Spoke with patient regarding PT/OT recommendation of SNF at time of discharge. Patient agreeable to SNF, PAQN list printed and provided to patient. Patient to review with family and TCC will follow up.     1/28/24 at 1530: TCC followed up with patient to see if  they could provide choices. Patient states they haven't been able to look it over with family yet. TCC to follow.

## 2024-01-29 LAB
ANION GAP SERPL CALC-SCNC: 15 MMOL/L (ref 10–20)
BASOPHILS # BLD AUTO: 0.05 X10*3/UL (ref 0–0.1)
BASOPHILS NFR BLD AUTO: 0.9 %
BUN SERPL-MCNC: 21 MG/DL (ref 6–23)
CALCIUM SERPL-MCNC: 8.1 MG/DL (ref 8.6–10.3)
CHLORIDE SERPL-SCNC: 96 MMOL/L (ref 98–107)
CO2 SERPL-SCNC: 29 MMOL/L (ref 21–32)
CREAT SERPL-MCNC: 1.56 MG/DL (ref 0.5–1.05)
EGFRCR SERPLBLD CKD-EPI 2021: 35 ML/MIN/1.73M*2
EOSINOPHIL # BLD AUTO: 0.16 X10*3/UL (ref 0–0.4)
EOSINOPHIL NFR BLD AUTO: 2.8 %
ERYTHROCYTE [DISTWIDTH] IN BLOOD BY AUTOMATED COUNT: 16 % (ref 11.5–14.5)
GLUCOSE BLD MANUAL STRIP-MCNC: 100 MG/DL (ref 74–99)
GLUCOSE BLD MANUAL STRIP-MCNC: 145 MG/DL (ref 74–99)
GLUCOSE BLD MANUAL STRIP-MCNC: 262 MG/DL (ref 74–99)
GLUCOSE BLD MANUAL STRIP-MCNC: 81 MG/DL (ref 74–99)
GLUCOSE SERPL-MCNC: 137 MG/DL (ref 74–99)
HCT VFR BLD AUTO: 37 % (ref 36–46)
HGB BLD-MCNC: 11.8 G/DL (ref 12–16)
IMM GRANULOCYTES # BLD AUTO: 0.04 X10*3/UL (ref 0–0.5)
IMM GRANULOCYTES NFR BLD AUTO: 0.7 % (ref 0–0.9)
LYMPHOCYTES # BLD AUTO: 1.65 X10*3/UL (ref 0.8–3)
LYMPHOCYTES NFR BLD AUTO: 28.4 %
MCH RBC QN AUTO: 28.1 PG (ref 26–34)
MCHC RBC AUTO-ENTMCNC: 31.9 G/DL (ref 32–36)
MCV RBC AUTO: 88 FL (ref 80–100)
MONOCYTES # BLD AUTO: 0.73 X10*3/UL (ref 0.05–0.8)
MONOCYTES NFR BLD AUTO: 12.6 %
NEUTROPHILS # BLD AUTO: 3.18 X10*3/UL (ref 1.6–5.5)
NEUTROPHILS NFR BLD AUTO: 54.6 %
NRBC BLD-RTO: 0 /100 WBCS (ref 0–0)
PLATELET # BLD AUTO: 154 X10*3/UL (ref 150–450)
POTASSIUM SERPL-SCNC: 3.6 MMOL/L (ref 3.5–5.3)
RBC # BLD AUTO: 4.2 X10*6/UL (ref 4–5.2)
SODIUM SERPL-SCNC: 136 MMOL/L (ref 136–145)
WBC # BLD AUTO: 5.8 X10*3/UL (ref 4.4–11.3)

## 2024-01-29 PROCEDURE — 82947 ASSAY GLUCOSE BLOOD QUANT: CPT

## 2024-01-29 PROCEDURE — 36415 COLL VENOUS BLD VENIPUNCTURE: CPT | Performed by: INTERNAL MEDICINE

## 2024-01-29 PROCEDURE — 97530 THERAPEUTIC ACTIVITIES: CPT | Mod: GP,CQ

## 2024-01-29 PROCEDURE — 2500000002 HC RX 250 W HCPCS SELF ADMINISTERED DRUGS (ALT 637 FOR MEDICARE OP, ALT 636 FOR OP/ED): Performed by: INTERNAL MEDICINE

## 2024-01-29 PROCEDURE — 99232 SBSQ HOSP IP/OBS MODERATE 35: CPT | Performed by: INTERNAL MEDICINE

## 2024-01-29 PROCEDURE — 2500000001 HC RX 250 WO HCPCS SELF ADMINISTERED DRUGS (ALT 637 FOR MEDICARE OP): Performed by: INTERNAL MEDICINE

## 2024-01-29 PROCEDURE — 2500000004 HC RX 250 GENERAL PHARMACY W/ HCPCS (ALT 636 FOR OP/ED): Performed by: INTERNAL MEDICINE

## 2024-01-29 PROCEDURE — 97535 SELF CARE MNGMENT TRAINING: CPT | Mod: GO,CO

## 2024-01-29 PROCEDURE — 1200000002 HC GENERAL ROOM WITH TELEMETRY DAILY

## 2024-01-29 PROCEDURE — 80048 BASIC METABOLIC PNL TOTAL CA: CPT | Performed by: INTERNAL MEDICINE

## 2024-01-29 PROCEDURE — 85025 COMPLETE CBC W/AUTO DIFF WBC: CPT | Performed by: INTERNAL MEDICINE

## 2024-01-29 PROCEDURE — 97110 THERAPEUTIC EXERCISES: CPT | Mod: GP,CQ

## 2024-01-29 RX ORDER — CEPHALEXIN 500 MG/1
500 CAPSULE ORAL 2 TIMES DAILY
Qty: 10 CAPSULE | Refills: 0 | Status: SHIPPED | OUTPATIENT
Start: 2024-01-29 | End: 2024-02-03

## 2024-01-29 RX ORDER — LEVOTHYROXINE SODIUM 75 UG/1
225 TABLET ORAL DAILY
Qty: 90 TABLET | Refills: 0 | Status: SHIPPED | OUTPATIENT
Start: 2024-01-30 | End: 2024-03-30 | Stop reason: HOSPADM

## 2024-01-29 RX ADMIN — DESVENLAFAXINE SUCCINATE 100 MG: 100 TABLET, EXTENDED RELEASE ORAL at 09:16

## 2024-01-29 RX ADMIN — INSULIN LISPRO 8 UNITS: 100 INJECTION, SOLUTION INTRAVENOUS; SUBCUTANEOUS at 17:08

## 2024-01-29 RX ADMIN — GABAPENTIN 600 MG: 300 CAPSULE ORAL at 20:28

## 2024-01-29 RX ADMIN — BUPROPION HYDROCHLORIDE 150 MG: 150 TABLET, FILM COATED, EXTENDED RELEASE ORAL at 09:16

## 2024-01-29 RX ADMIN — INSULIN HUMAN 25 UNITS: 100 INJECTION, SUSPENSION SUBCUTANEOUS at 09:22

## 2024-01-29 RX ADMIN — CEFAZOLIN SODIUM 2 G: 2 INJECTION, SOLUTION INTRAVENOUS at 01:10

## 2024-01-29 RX ADMIN — LEVOTHYROXINE SODIUM 225 MCG: 0.17 TABLET ORAL at 05:00

## 2024-01-29 RX ADMIN — NYSTATIN 1 APPLICATION: 100000 POWDER TOPICAL at 09:12

## 2024-01-29 RX ADMIN — INSULIN LISPRO 8 UNITS: 100 INJECTION, SOLUTION INTRAVENOUS; SUBCUTANEOUS at 09:25

## 2024-01-29 RX ADMIN — INSULIN LISPRO 6 UNITS: 100 INJECTION, SOLUTION INTRAVENOUS; SUBCUTANEOUS at 12:11

## 2024-01-29 RX ADMIN — AMIODARONE HYDROCHLORIDE 200 MG: 200 TABLET ORAL at 09:16

## 2024-01-29 RX ADMIN — APIXABAN 5 MG: 5 TABLET, FILM COATED ORAL at 09:16

## 2024-01-29 RX ADMIN — OXYCODONE HYDROCHLORIDE AND ACETAMINOPHEN 1 TABLET: 5; 325 TABLET ORAL at 20:27

## 2024-01-29 RX ADMIN — POTASSIUM CHLORIDE 10 MEQ: 750 TABLET, EXTENDED RELEASE ORAL at 20:28

## 2024-01-29 RX ADMIN — APIXABAN 5 MG: 5 TABLET, FILM COATED ORAL at 20:28

## 2024-01-29 RX ADMIN — POTASSIUM CHLORIDE 10 MEQ: 750 TABLET, EXTENDED RELEASE ORAL at 09:16

## 2024-01-29 RX ADMIN — INSULIN LISPRO 8 UNITS: 100 INJECTION, SOLUTION INTRAVENOUS; SUBCUTANEOUS at 12:08

## 2024-01-29 RX ADMIN — ASPIRIN 81 MG: 81 TABLET, COATED ORAL at 20:28

## 2024-01-29 RX ADMIN — GABAPENTIN 600 MG: 300 CAPSULE ORAL at 14:30

## 2024-01-29 RX ADMIN — FAMOTIDINE 40 MG: 20 TABLET ORAL at 09:16

## 2024-01-29 RX ADMIN — METOPROLOL SUCCINATE 50 MG: 50 TABLET, EXTENDED RELEASE ORAL at 09:16

## 2024-01-29 RX ADMIN — TRAZODONE HYDROCHLORIDE 50 MG: 50 TABLET ORAL at 20:28

## 2024-01-29 RX ADMIN — GABAPENTIN 600 MG: 300 CAPSULE ORAL at 09:16

## 2024-01-29 RX ADMIN — CEFAZOLIN SODIUM 2 G: 2 INJECTION, SOLUTION INTRAVENOUS at 17:11

## 2024-01-29 RX ADMIN — OXYCODONE HYDROCHLORIDE AND ACETAMINOPHEN 1 TABLET: 5; 325 TABLET ORAL at 10:49

## 2024-01-29 RX ADMIN — POLYETHYLENE GLYCOL 3350 17 G: 17 POWDER, FOR SOLUTION ORAL at 09:16

## 2024-01-29 RX ADMIN — CEFAZOLIN SODIUM 2 G: 2 INJECTION, SOLUTION INTRAVENOUS at 09:13

## 2024-01-29 ASSESSMENT — COGNITIVE AND FUNCTIONAL STATUS - GENERAL
DRESSING REGULAR LOWER BODY CLOTHING: TOTAL
CLIMB 3 TO 5 STEPS WITH RAILING: TOTAL
WALKING IN HOSPITAL ROOM: TOTAL
TOILETING: TOTAL
STANDING UP FROM CHAIR USING ARMS: A LOT
MOVING FROM LYING ON BACK TO SITTING ON SIDE OF FLAT BED WITH BEDRAILS: A LOT
MOVING TO AND FROM BED TO CHAIR: A LOT
HELP NEEDED FOR BATHING: A LOT
MOVING TO AND FROM BED TO CHAIR: A LOT
TOILETING: TOTAL
WALKING IN HOSPITAL ROOM: TOTAL
STANDING UP FROM CHAIR USING ARMS: A LOT
MOBILITY SCORE: 10
DRESSING REGULAR UPPER BODY CLOTHING: A LITTLE
CLIMB 3 TO 5 STEPS WITH RAILING: TOTAL
TURNING FROM BACK TO SIDE WHILE IN FLAT BAD: A LOT
DRESSING REGULAR UPPER BODY CLOTHING: A LITTLE
WALKING IN HOSPITAL ROOM: TOTAL
STANDING UP FROM CHAIR USING ARMS: A LOT
DRESSING REGULAR UPPER BODY CLOTHING: A LITTLE
MOVING FROM LYING ON BACK TO SITTING ON SIDE OF FLAT BED WITH BEDRAILS: A LOT
HELP NEEDED FOR BATHING: A LOT
DAILY ACTIVITIY SCORE: 15
DRESSING REGULAR LOWER BODY CLOTHING: TOTAL
HELP NEEDED FOR BATHING: A LOT
DAILY ACTIVITIY SCORE: 15
DAILY ACTIVITIY SCORE: 14
CLIMB 3 TO 5 STEPS WITH RAILING: TOTAL
MOBILITY SCORE: 10
DRESSING REGULAR LOWER BODY CLOTHING: TOTAL
PERSONAL GROOMING: A LITTLE
TOILETING: TOTAL
TURNING FROM BACK TO SIDE WHILE IN FLAT BAD: A LOT
MOVING FROM LYING ON BACK TO SITTING ON SIDE OF FLAT BED WITH BEDRAILS: A LOT
TURNING FROM BACK TO SIDE WHILE IN FLAT BAD: A LOT
MOVING TO AND FROM BED TO CHAIR: A LOT
MOBILITY SCORE: 10

## 2024-01-29 ASSESSMENT — PAIN - FUNCTIONAL ASSESSMENT
PAIN_FUNCTIONAL_ASSESSMENT: 0-10

## 2024-01-29 ASSESSMENT — ACTIVITIES OF DAILY LIVING (ADL)
HOME_MANAGEMENT_TIME_ENTRY: 10
LACK_OF_TRANSPORTATION: NO

## 2024-01-29 ASSESSMENT — PAIN SCALES - GENERAL
PAINLEVEL_OUTOF10: 0 - NO PAIN
PAINLEVEL_OUTOF10: 7

## 2024-01-29 ASSESSMENT — PAIN DESCRIPTION - LOCATION: LOCATION: ANKLE

## 2024-01-29 NOTE — DISCHARGE SUMMARY
Discharge Diagnosis  Cellulitis of lower extremity, unspecified laterality    Issues Requiring Follow-Up  Continue follow-up with podiatry and wound care in outpatient settings.  Continue to work with PT OT and SNF.  Follow-up with endocrinology in outpatient settings.  Continue outpatient follow-up with cardiology.    Discharge Meds     Your medication list        START taking these medications        Instructions Last Dose Given Next Dose Due   cephalexin 500 mg capsule  Commonly known as: Keflex      Take 1 capsule (500 mg) by mouth 2 times a day for 5 days.              CHANGE how you take these medications        Instructions Last Dose Given Next Dose Due   levothyroxine 75 mcg tablet  Commonly known as: Synthroid, Levoxyl  Start taking on: January 30, 2024  What changed:   medication strength  how much to take      Take 3 tablets (225 mcg) by mouth once daily. Do not start before January 30, 2024.              CONTINUE taking these medications        Instructions Last Dose Given Next Dose Due   amiodarone 200 mg tablet  Commonly known as: Pacerone      Take 1 tablet (200 mg) by mouth once daily.       apixaban 5 mg tablet  Commonly known as: Eliquis      Take 1 tablet (5 mg) by mouth 2 times a day.       ascorbic acid (vitamin C) 500 mg capsule           aspirin 81 mg EC tablet           BIOTIN ORAL           buPROPion  mg 24 hr tablet  Commonly known as: Wellbutrin XL      Take 1 tablet (150 mg) by mouth once daily in the morning. Do not crush, chew, or split.       calcium citrate-vitamin D3 200 mg-6.25 mcg (250 unit) tablet           cholecalciferol 50,000 unit capsule  Commonly known as: Vitamin D-3           clotrimazole-betamethasone cream  Commonly known as: Lotrisone           desvenlafaxine 100 mg 24 hr tablet  Commonly known as: Pristiq      Take 1 tablet (100 mg) by mouth once daily. Do not crush, chew, or split. Instead of venlafaxine.       famotidine 40 mg tablet  Commonly known as:  "Pepcid      Take 1 tablet (40 mg) by mouth once daily.       FreeStyle Bulmaro 2 Sensor kit  Generic drug: FreeStyle Bulmaro sensor system      Inject 1 each under the skin every 14 (fourteen) days. Test blood sugar 1-4 times per day as needed for diabetes, replace every 14 days       gabapentin 600 mg tablet  Commonly known as: Neurontin      Take 1 tablet (600 mg) by mouth 3 times a day.       glucagon 1 mg injection  Commonly known as: Glucagen      Inject 1 mg into the shoulder, thigh, or buttocks 1 time if needed for low blood sugar - see comments for up to 1 dose. USE AS DIRECTED.       HumaLOG Mix 75-25 KwikPen 100 unit/mL (75-25) injection  Generic drug: insulin lispro protamin-lispro           losartan 25 mg tablet  Commonly known as: Cozaar      Take 1 tablet (25 mg) by mouth once daily.       metFORMIN  mg 24 hr tablet  Commonly known as: Glucophage-XR      Take 2 tablets (1,000 mg) by mouth 2 times a day. Do not crush, chew, or split.       metoprolol succinate XL 50 mg 24 hr tablet  Commonly known as: Toprol-XL      Take 1 tablet (50 mg) by mouth once daily.       naloxone 4 mg/0.1 mL nasal spray  Commonly known as: Narcan      Administer 1 spray (4 mg) into affected nostril(s) if needed for opioid reversal for up to 1 day. May repeat every 2-3 minutes if needed, alternating nostrils, until medical assistance becomes available.       nystatin 100,000 unit/gram powder  Commonly known as: Mycostatin           omeprazole 40 mg DR capsule  Commonly known as: PriLOSEC           OneTouch Ultra Test strip  Generic drug: blood sugar diagnostic           oxyCODONE-acetaminophen  mg tablet  Commonly known as: Percocet      Take 1 tablet by mouth every 4 hours if needed for severe pain (7 - 10). Do not start before January 12, 2024.       pen needle 1/2\" 29G X 12mm needle  Commonly known as: Easy Touch      Inject 3 each under the skin 4 times a day as needed (as needed for sugars). Use as instructed     "   potassium chloride CR 10 mEq ER tablet  Commonly known as: Klor-Con      Take 1 tablet (10 mEq) by mouth in the morning and 1 tablet (10 mEq) before bedtime. Do not crush, chew, or split..       spironolactone 25 mg tablet  Commonly known as: Aldactone           SUPER B-50 COMPLEX ORAL           torsemide 20 mg tablet  Commonly known as: Demadex           traZODone 50 mg tablet  Commonly known as: Desyrel      Take 1 tablet (50 mg) by mouth once daily at bedtime.       underpads pad  Commonly known as: Bed Underpads      1 each 2 times a day.       vibegron 75 mg tablet      Take 1 tablet by mouth once daily.       WOMEN'S MULTIVITAMIN ORAL                  STOP taking these medications      Excedrin Extra Strength 250-250-65 mg tablet  Generic drug: aspirin-acetaminophen-caffeine        promethazine-DM 6.25-15 mg/5 mL syrup  Commonly known as: Phenergan-DM        Sudafed 30 mg tablet  Generic drug: pseudoephedrine               ASK your doctor about these medications        Instructions Last Dose Given Next Dose Due   insulin lispro 100 unit/mL injection  Commonly known as: HumaLOG U-100 Insulin      Inject 0.1 mL (10 Units) under the skin 3 times a day with meals. Take as directed per insulin instructions. Adjust per Sliding scale- 200-250 6u, 250-300 8u 350-400 10u 400+ 12u                 Where to Get Your Medications        These medications were sent to [facility] [address] [address]      Phone: [phone]   cephalexin 500 mg capsule  levothyroxine 75 mcg tablet         Test Results Pending At Discharge  Pending Labs       Order Current Status    Blood Culture Preliminary result    Blood Culture Preliminary result    Blood Culture Preliminary result    Blood Culture Preliminary result            Hospital Course   73 y.o. female  with history of poorly controlled diabetes mellitus type 2, atrial fibrillation on anticoagulation, congestive  heart failure, left total knee arthroplasty complicated by E. coli infection and MRSA infection of the foot s/p left  total knee explant and synovectomy, comminuted fractures of proximal tibial  and fibular, bedbound, anxiety and depression, GERD, hypothyroidism, morbid obesity, COPD, CKD stage III comes into the hospital due to worsening wound on her left lower extremity and poor living conditions and not able to care for self.  Patient was started on vancomycin and Zosyn initially.  Infectious disease were consulted and antibiotic were switched to cefazolin.  Podiatry were consulted and the patient had some excisional debridement.  Ultrasound of the calf wound with without any evidence of abscess at this time.  ID recommends outpatient Keflex course.  Doppler was done without any evidence of DVT.  Regarding poorly controlled diabetes mellitus and the patient has not been taking her medications at home.  Endocrinology were consulted and recommended to continue NPH and sliding scale at home.  The patient blood glucose improved.  Also the patient's Synthroid has been increased to 225 and recommend outpatient monitoring of her TSH.  Her TSH was 40.63 here and hemoglobin A1c was more than 15.  Patient also worked with PT OT qualifies for rehab and she is stable for discharge to rehab with outpatient follow-up.    Pertinent Physical Exam At Time of Discharge  Physical Exam  Constitutional:       Appearance: She is obese.   HENT:      Head: Normocephalic and atraumatic.      Nose: Nose normal.      Mouth/Throat:      Mouth: Mucous membranes are dry.   Eyes:      Extraocular Movements: Extraocular movements intact.      Conjunctiva/sclera: Conjunctivae normal.   Cardiovascular:      Rate and Rhythm: Normal rate and regular rhythm.      Pulses: Normal pulses.      Heart sounds: Normal heart sounds.   Pulmonary:      Effort: Pulmonary effort is normal.      Breath sounds: Normal breath sounds.   Abdominal:      General:  Bowel sounds are normal.      Palpations: Abdomen is soft.   Musculoskeletal:         General: Normal range of motion.      Cervical back: Normal range of motion and neck supple.      Right lower leg: Edema present.      Left lower leg: Edema present.   Skin:     General: Skin is warm and dry.      Comments: Left lower extremity wound currently in dressing.  Dressing is clean dry intact.   Neurological:      General: No focal deficit present.      Mental Status: She is alert and oriented to person, place, and time. Mental status is at baseline.   Psychiatric:         Mood and Affect: Mood normal.         Behavior: Behavior normal.         Thought Content: Thought content normal.         Judgment: Judgment normal.         Outpatient Follow-Up  Future Appointments   Date Time Provider Department Center   2/13/2024  1:40 PM Monica Macario MD [id number] [facility]         Behzad Baeza MD

## 2024-01-29 NOTE — CARE PLAN
The patient's goals for the shift include      The clinical goals for the shift include Patient will remain free from injury throughout shift    Over the shift, the patient did not make progress toward the following goals. Barriers to progression include . Recommendations to address these barriers include   Problem: Skin  Goal: Decreased wound size/increased tissue granulation at next dressing change  Outcome: Progressing  Flowsheets (Taken 1/28/2024 2200)  Decreased wound size/increased tissue granulation at next dressing change: Promote sleep for wound healing  Goal: Participates in plan/prevention/treatment measures  Outcome: Progressing  Goal: Prevent/manage excess moisture  Outcome: Progressing  Goal: Prevent/minimize sheer/friction injuries  Outcome: Progressing  Goal: Promote/optimize nutrition  Outcome: Progressing  Goal: Promote skin healing  Outcome: Progressing   .

## 2024-01-29 NOTE — PROGRESS NOTES
01/29/24 0948   Discharge Planning   Living Arrangements Alone   Support Systems Family members   Assistance Needed Patient is A&Ox3, on room air at baseline, is independent with ADL's and uses a wheelchair at baseline (self propels), doesn't drive (patient receives transport through [facility] PF program)   Type of Residence Private residence   Who is requesting discharge planning? Provider   Home or Post Acute Services Post acute facilities (Rehab/SNF/etc)  (TBD pend PT/OT eval)   Type of Post Acute Facility Services Skilled nursing   Financial Resource Strain   How hard is it for you to pay for the very basics like food, housing, medical care, and heating? Not hard   Housing Stability   In the last 12 months, was there a time when you were not able to pay the mortgage or rent on time? N   In the last 12 months, how many places have you lived? 1   In the last 12 months, was there a time when you did not have a steady place to sleep or slept in a shelter (including now)? N   Transportation Needs   In the past 12 months, has lack of transportation kept you from medical appointments or from getting medications? no   In the past 12 months, has lack of transportation kept you from meetings, work, or from getting things needed for daily living? No        01/31/24 0810   Discharge Planning   Living Arrangements Alone   Support Systems Family members   Assistance Needed Patient is A&Ox3, on room air at baseline, is independent with ADL's and uses a wheelchair at baseline (self propels), doesn't drive (patient receives transport through [facility] program)   Type of Residence Private residence   Who is requesting discharge planning? Provider   Home or Post Acute Services Post acute facilities (Rehab/SNF/etc)  (TBD pend PT/OT eval)   Type of Post Acute Facility Services Skilled nursing   Financial Resource Strain   How hard is it for you to pay for the very basics like food, housing, medical care, and heating? Not hard    Housing Stability   In the last 12 months, was there a time when you were not able to pay the mortgage or rent on time? N   In the last 12 months, how many places have you lived? 1   In the last 12 months, was there a time when you did not have a steady place to sleep or slept in a shelter (including now)? N   Transportation Needs   In the past 12 months, has lack of transportation kept you from medical appointments or from getting medications? no   In the past 12 months, has lack of transportation kept you from meetings, work, or from getting things needed for daily living? No     01/29/2024 0948am  Made aware patient recommended for snf by therapy. Patient primarily in wheelchair since summer due to weakness. Met with patient and reviewed [facility] SNF list. Her preference for snf are 1)[facility] 2)[facility] 3)[facility]. Patient with [facility] insurance so precert needed for snf transition. Will send referrals to facilities for review.     01/29/2024 1230pm  [facility] is able to accept patient upon precert. PT completed and [facility] is starting precert now with [facility]. In anticipation of obtaining precert today, transport confirmed for 4pm stretcher via [facility] but patient CANNOT discharge until precert received from insurance. 'After visit summary' and goldenrod sent to [facility] and they were made aware of 4pm transport if auth received. Maci Rockwell at [facility] has completed 7000 in [facility]. Patient made aware of discharge plan and anticipated dc for today at 4pm-IMM signed by patient. RN and RN Navigator aware of anticipated dc plan for today. Ambulance form given to unit secretary and she was made aware patient cannot dc until auth received.     01/29/2024 1525pm  Precert still pending for [facility]. No discharge today to [facility]. 4pm stretcher cancelled and facility, physician, RN, RN Navigator and patient made aware transport cancelled.     01/31/2024 0811am  Met with patient bedside.  Made patient aware that precert is still pending at this time with [employer]. Patient aware of likely auth and discharge today to [facility]. Will continue to follow.     02/01/2024 0729am  Insurance authorization received for transition to [facility]. Transport confirmed for 12pm stretcher via [facility]. 'After visit summary' and goldenrod sent to facility and they were made aware of transport time. 7000 has been completed in [facility] by [facility]. Spoke with patient and made her aware of authorization for snf received and that she will be leaving around 12pm today. RN and RN Navigator made aware of discharge. Ambulance form completed and given to unit secretary.

## 2024-01-29 NOTE — PROGRESS NOTES
Physical Therapy    Physical Therapy Treatment    Patient Name: Teresa Matthews  MRN: [id number]  Today's Date: 1/29/2024  Time Calculation  Start Time: 1115  Stop Time: 1157  Time Calculation (min): 42 min       Assessment/Plan   PT Assessment  End of Session Communication: Bedside nurse  End of Session Patient Position: Up in chair, Alarm off, not on at start of session     PT Plan  Treatment/Interventions: Bed mobility, Transfer training, Gait training, Balance training, Neuromuscular re-education, Strengthening, Endurance training, Therapeutic exercise, Therapeutic activity, Home exercise program  PT Plan: Skilled PT  PT Frequency: 3 times per week  PT Discharge Recommendations: Moderate intensity level of continued care  PT Recommended Transfer Status: Assist x2  PT - OK to Discharge: Yes      General Visit Information:   PT  Visit  PT Received On: 01/29/24  Response to Previous Treatment: Patient with no complaints from previous session.  General  Reason for Referral: Referred to PT for decreased mobility and safety. Patient presents with LLE cellulitus  Referred By: Behzad Baeza MD  Past Medical History Relevant to Rehab: PMH: poorly controlled diabetes mellitus type 2, atrial fibrillation on anticoagulation, congestive heart failure, left total knee arthroplasty complicated by E. coli infection and MRSA infection of the foot s/p left  total knee explant and synovectomy, acute comminuted fractures of proximal tibial  and fibular, bedbound, anxiety and depression, GERD, hypothyroidism, morbid obesity, COPD, CKD stage III  Family/Caregiver Present: No  Prior to Session Communication: Bedside nurse  Patient Position Received: Bed, 3 rail up, Alarm off, not on at start of session  Preferred Learning Style: visual, verbal  General Comment: Patient presents with L LE cellulitis, Gauze and ACE wrap L Calf. Per patient debridement treatment received on Saturday 1/27/24.  IV removed by nurse at beginning of  session. (IV removed by nurse at beginning of session. Patient on RA during day. Patient stated she was instructed to wear 2L O2 only at night. Confirmed with nurse.)    Subjective   Precautions:  Precautions  Medical Precautions: Fall precautions  Prosthesis/Orthosis Used:  (L surgical boot and R shoe donned for transfer for improved stability and prevent L foot from sliding.)  Vital Signs:       Objective   Pain:  Pain Assessment  Pain Assessment: 0-10  Pain Score: 0 - No pain  Pain Interventions: Medication (See MAR)  Cognition:  Cognition  Overall Cognitive Status: Within Functional Limits  Postural Control:  Static Sitting Balance  Static Sitting-Level of Assistance: Close supervision  Static Sitting-Comment/Number of Minutes: Static sitting x 5 min b/t each STS trial. (Seated rest break to recover from each trial)  Extremity/Trunk Assessments:    Activity Tolerance:  Activity Tolerance  Endurance: Tolerates 30 min exercise with multiple rests  Activity Tolerance Comments: Patient with good tolerance although patient is self limiting secondary to B LE weakness.  Treatments:  Therapeutic Exercise  Therapeutic Exercise Performed: Yes  Therapeutic Exercise Activity 1: Seated ther ex 2 x 15 each  B LE (HR/TR, ABD/ADD (lightly resisted), HS (lightly resisted), LAQ, Hip flex)  Therapeutic Exercise Activity 2: Supine ther ex x 10 each (reclined in chair) (AP, QS, GS, HS)         Bed Mobility  Bed Mobility: Yes  Bed Mobility 1  Bed Mobility 1: Supine to sitting  Level of Assistance 1: Moderate assistance  Bed Mobility Comments 1: HOB elevated to assist patient. (HHA and TOI to assist patient to EOB and feet >floor)  Bed Mobility 2  Bed Mobility  2: Scooting (Scooting to HOB seated at EOB)  Level of Assistance 2: Contact guard  Bed Mobility Comments 2: Extended time to complete       Transfers  Transfer: Yes  Transfer 1  Transfer From 1: Bed to  Transfer to 1: Stand  Technique 1: Sit to stand  Transfer Device 1:  Walker  Transfer Level of Assistance 1: Moderate assistance, +2  Trials/Comments 1: Multiple trials initially with MaxA x 1. (Patient wasn't able to complete with one person assist. ModA x 2 required to complete.)  Transfers 2  Transfer From 2: Stand to  Transfer to 2: Chair with arms  Technique 2: Stand to sit  Transfer Device 2: Walker  Transfer Level of Assistance 2: Minimal verbal cues         Outcome Measures:  [facility] Basic Mobility  Turning from your back to your side while in a flat bed without using bedrails: A lot  Moving from lying on your back to sitting on the side of a flat bed without using bedrails: A lot  Moving to and from bed to chair (including a wheelchair): A lot  Standing up from a chair using your arms (e.g. wheelchair or bedside chair): A lot  To walk in hospital room: Total  Climbing 3-5 steps with railing: Total  Basic Mobility - Total Score: 10    Education Documentation  Precautions, taught by Lexii Quintero PTA at 1/29/2024 12:22 PM.  Learner: Patient  Readiness: Acceptance  Method: Explanation, Demonstration  Response: Verbalizes Understanding, Demonstrated Understanding, Needs Reinforcement    Home Exercise Program, taught by Lexii Quintero PTA at 1/29/2024 12:22 PM.  Learner: Patient  Readiness: Acceptance  Method: Explanation, Demonstration  Response: Verbalizes Understanding, Demonstrated Understanding, Needs Reinforcement    Body Mechanics, taught by Lexii Quintero PTA at 1/29/2024 12:22 PM.  Learner: Patient  Readiness: Acceptance  Method: Explanation, Demonstration  Response: Verbalizes Understanding, Demonstrated Understanding, Needs Reinforcement    Mobility Training, taught by Lexii Quintero PTA at 1/29/2024 12:22 PM.  Learner: Patient  Readiness: Acceptance  Method: Explanation, Demonstration  Response: Verbalizes Understanding, Demonstrated Understanding, Needs Reinforcement    Education Comments  No comments found.        OP EDUCATION:       Encounter Problems        Encounter Problems (Active)       Balance       STG - Maintains static sitting balance with upper extremity support       Start:  01/27/24       INTERVENTIONS:  1. Practice sitting on the edge of a bed/mat with minimal support.  2. Educate patient about maintining total hip precautions while maintaining balance.  3. Educate patient about pressure relief.  4. Educate patient about use of assistive device.            Mobility       STG - Patient will ambulate       Start:  01/27/24               Transfers       STG - Patient to transfer to and from sit to supine       Start:  01/27/24            STG - Patient will transfer sit to and from stand       Start:  01/27/24

## 2024-01-29 NOTE — PROGRESS NOTES
Teresa Matthews is a 73 y.o. female on day 3 of admission presenting with Cellulitis of lower extremity, unspecified laterality.      Subjective   Patient seen and evaluated today.  She is eating her breakfast denies any specific complaints.  She remains on room air.  Denies any shortness of breath chest pain fever or chills.       Objective     Last Recorded Vitals  /52   Pulse 65   Temp 36.3 °C (97.3 °F)   Resp 18   Wt 109 kg (240 lb 9.6 oz)   SpO2 96%   Intake/Output last 3 Shifts:    Intake/Output Summary (Last 24 hours) at 1/29/2024 1014  Last data filed at 1/29/2024 0838  Gross per 24 hour   Intake 1500 ml   Output 3000 ml   Net -1500 ml         Admission Weight  Weight: 113 kg (250 lb) (01/26/24 1531)    Daily Weight  01/26/24 : 109 kg (240 lb 9.6 oz)    Image Results  US extremity nonvascular real time w image documentation limited anatomic specific  Narrative: Interpreted By:  Nanyc Marni,   STUDY:  US EXTREMITY NONVASCULAR REAL TIME WITH IMAGE DOCUMENTATION LIMITED  ANATOMIC SPECIFIC;  1/27/2024 4:35 pm      INDICATION:  Signs/Symptoms:Lt calf wound / rule out a collection.      COMPARISON:  None.      ACCESSION NUMBER(S):  [id number]      ORDERING CLINICIAN:  SELVIN MATTSON      TECHNIQUE:  Soft tissue ultrasound of the left calf was performed      FINDINGS:  There is edema in the subcutaneous fat with no abscess.      Impression: Soft tissue ultrasound of the left calf was performed. There is edema  of the subcutaneous fat with no abscess or focal fluid collection.      MACRO:  None      Signed by: Nancy Marin 1/28/2024 9:42 AM  Dictation workstation:   [id number]      Physical Exam  Constitutional:       Appearance: She is obese.   HENT:      Head: Normocephalic and atraumatic.      Nose: Nose normal.      Mouth/Throat:      Mouth: Mucous membranes are dry.   Eyes:      Extraocular Movements: Extraocular movements intact.      Conjunctiva/sclera: Conjunctivae normal.    Cardiovascular:      Rate and Rhythm: Normal rate and regular rhythm.      Pulses: Normal pulses.      Heart sounds: Normal heart sounds.   Pulmonary:      Effort: Pulmonary effort is normal.      Breath sounds: Normal breath sounds.   Abdominal:      General: Bowel sounds are normal.      Palpations: Abdomen is soft.   Musculoskeletal:         General: Normal range of motion.      Cervical back: Normal range of motion and neck supple.      Right lower leg: Edema present.      Left lower leg: Edema present.   Skin:     General: Skin is warm and dry.      Findings: Erythema present.      Comments: Venous stasis well bilateral lower extremities.  Large ulcer posterior aspect of left lower extremity seems to be chronic and nonhealing   Neurological:      General: No focal deficit present.      Mental Status: She is alert and oriented to person, place, and time. Mental status is at baseline.   Psychiatric:         Mood and Affect: Mood normal.         Behavior: Behavior normal.         Relevant Results               Assessment/Plan        73 y.o. female  with history of poorly controlled diabetes mellitus type 2, atrial fibrillation on anticoagulation, congestive heart failure, left total knee arthroplasty complicated by E. coli infection and MRSA infection of the foot s/p left  total knee explant and synovectomy, comminuted fractures of proximal tibial  and fibular, bedbound, anxiety and depression, GERD, hypothyroidism, morbid obesity, COPD, CKD stage III comes into the hospital due to worsening wound on her left lower extremity and poor living conditions and not able to care for self.         Principal Problem:    Cellulitis of lower extremity, unspecified laterality    Left calf wound with possible cellulitis   -Patient was started with vancomycin and Zosyn now switched to cefazolin 2 g IV every 8 hours.  -Blood culture NGTD  -Appreciate recommendations from ID ultrasound of the calf wound  with no evidence of  abscess at this time..  ID recommends: Continue Cefazolin, plan on a short course of oral Keflex with discharge   -Doppler with no DVT.  -Appreciate podiatry.  Continue outpatient follow-up as well     Poorly controlled diabetes mellitus type 2 with hyperglycemia  -Not compliant with home medications.  Given 10 units of insulin in ER with minimal improvement.  -Appreciate endocrinology recommendations.  NPH 30 units SQ twice daily  -Lispro 8 units before every meal plus corrective scale  -Plan to resume Humalog Mix 75/25 at discharge  -A1c over 15.  -Blood glucose improved on current regimen.     Chronic diastolic congestive heart failure  -Aldactone and torsemide at home on hold due to mild SANDI.  Continue with potassium as well.  -Echo from 2020 showed LVEF of 55 to 60%.  Showed diastolic dysfunction.  Mild aortic valve regurgitation and mild mitral regurg.     Atrial fibrillation  -Continue with home amiodarone and Eliquis.  Continue with metoprolol.     COPD  -Continue with breathing treatments     CKD stage III  -Monitor renal functions avoid nephrotoxins  -Any noted to be 1.55>1.56 today holding diuretics and ARB at this time.  Monitor renal functions.  Will likely resume on discharge.     Hypothyroidism  -Levothyroxine has been increased to 225.  Unclear if she has been compliant with her home medications but per record she has.  TSH 40.63     VTE prophylaxis: On Eliquis     PT OT and SW. pending placement to SNF at this time.              Behzad Baeza MD

## 2024-01-29 NOTE — DOCUMENTATION CLARIFICATION NOTE
"    PATIENT:               REYES RAMESH  ACCT #:                  [id number]  MRN:                       [id number]  :                       1950  ADMIT DATE:       2024 3:28 PM  DISCH DATE:  RESPONDING PROVIDER #:        [id number]          PROVIDER RESPONSE TEXT:    The ulceration and current cellulitis is due to PVD caused by uncontrolled DMII    CDI QUERY TEXT:    [facility]_Etiology Linkage      Instruction:    Based on your assessment of the patient and the clinical information, please provide the requested documentation by clicking on the appropriate radio button and enter any additional information if prompted.    Question: Please clarify if a relationship exists between    When answering this query, please exercise your independent professional judgment. The fact that a question is being asked, does not imply that any particular answer is desired or expected.    The patient's clinical indicators include:  Clinical Information: 74 y/o female with a BMI of 32.63 with a history of DMI and chronic lower extremity neuropath and  with noncompliance of medications, bedbound, anxiety and depression, GERD, hypothyroidism, morbid obesity, COPD, CKD stage III, AFib on anticoagulants, and diastolic CHF presented complaining of a diabetic ulcer at right leg    Clinical Indicators:    Per ED note 23 [facility] \"peripheral vascular disease\"    Noted uncontrolled DM II with noncompliance of medications.    Per PN [place] 24 \"Left calf wound / stasis / possible cellulitis, US is negative for collection.\" and  \"Legs stasis\"    Per Consult note [place] [facility] \" Left lateral calf ulceration/hematoma\"    Per Labs 24 through 24  Glucose 576, 335, 107, and 137    Treatment: Debridement, Ancef 2 g IV every 8 hours, Insulin for glycemic control, Eliquis 5 mg 2 times a day, Metoprolol 50 mg tab every 24 hours, Losartan 25 mg daily, amiodarone 200 mg daily, and supportive care    Risk Factors: 74 y/o with noted " DM II with noncompliance, Cellulitis, PVD, and stasis now with ulcerations  Options provided:  -- The ulceration and current cellulitis is due to PVD caused by uncontrolled DMII  -- The ulceration is due to PVD caused by uncontrolled DMII  -- The  current cellulitis is due uncontrolled DMII but the ulceration is not related to DMII  -- The ulceration and current cellulitis  are not related to the DMII  -- Other - I will add my own diagnosis  -- Refer to Clinical Documentation Reviewer    Query created by: Gio Ramirez on 1/29/2024 10:56 AM      Electronically signed by:  ROBLES HARTMAN MD 1/29/2024 11:28 AM

## 2024-01-29 NOTE — PROGRESS NOTES
Teresa Matthews is a 73 y.o. female on day 3 of admission presenting with Cellulitis of lower extremity, unspecified laterality.    Subjective   Interval History: no fever, no new complaints        Review of Systems    Objective   Range of Vitals (last 24 hours)  Heart Rate:  [61-77]   Temp:  [36.2 °C (97.2 °F)-36.3 °C (97.3 °F)]   Resp:  [18-20]   BP: (103-114)/(52-70)   SpO2:  [92 %-96 %]   Daily Weight  01/26/24 : 109 kg (240 lb 9.6 oz)    Body mass index is 32.63 kg/m².    Physical Exam  Constitutional:       Appearance: Normal appearance.   HENT:      Head: Normocephalic and atraumatic.      Mouth/Throat:      Mouth: Mucous membranes are moist.      Pharynx: Oropharynx is clear.   Eyes:      Pupils: Pupils are equal, round, and reactive to light.   Cardiovascular:      Rate and Rhythm: Normal rate and regular rhythm.      Heart sounds: Normal heart sounds.   Pulmonary:      Effort: Pulmonary effort is normal.      Breath sounds: Normal breath sounds.   Abdominal:      General: Abdomen is flat. Bowel sounds are normal.      Palpations: Abdomen is soft.   Musculoskeletal:         General: Swelling present.      Cervical back: Normal range of motion.      Comments: Lt calf wound, less edema and redness   Neurological:      Mental Status: She is alert.         Antibiotics  vancomycin (Vancocin) in dextrose 5 % water (D5W) 500 mL IV 1,500 mg  piperacillin-tazobactam-dextrose (Zosyn) IV 3.375 g  insulin regular (HumuLIN R) injection 10 Units  iohexol (OMNIPaque) 350 mg iodine/mL solution 80 mL    insulin regular (HumuLIN R) injection 10 Units  amiodarone (Pacerone) tablet 200 mg  apixaban (Eliquis) tablet 5 mg  aspirin EC tablet 81 mg  buPROPion XL (Wellbutrin XL) 24 hr tablet 150 mg  desvenlafaxine (Pristiq) 24 hr tablet 100 mg  famotidine (Pepcid) tablet 40 mg  gabapentin (Neurontin) capsule 600 mg  levothyroxine (Synthroid, Levoxyl) tablet 175 mcg  losartan (Cozaar) tablet 25 mg  metoprolol succinate XL  (Toprol-XL) 24 hr tablet 50 mg  nystatin (Mycostatin) 100,000 unit/gram powder 1 Application  oxyCODONE-acetaminophen (Percocet) 5-325 mg per tablet 1 tablet  potassium chloride CR (Klor-Con) ER tablet 10 mEq  spironolactone (Aldactone) tablet 25 mg  torsemide (Demadex) tablet 40 mg  traZODone (Desyrel) tablet 50 mg  ondansetron (Zofran) tablet 4 mg  ondansetron (Zofran) injection 4 mg  polyethylene glycol (Glycolax, Miralax) packet 17 g  dextrose 50 % injection 25 g  glucagon (Glucagen) injection 1 mg  dextrose 10 % in water (D10W) infusion  insulin glargine (Lantus) injection 20 Units  insulin lispro (HumaLOG) injection 8 Units  insulin regular (HumuLIN R) injection 0-10 Units  piperacillin-tazobactam-dextrose (Zosyn) IV 4.5 g  vancomycin (Vancocin) 1,500 mg in dextrose 5 % in water (D5W) 500 mL IV  dextrose 50 % injection 25 g  insulin NPH (Isophane) (HumuLIN N,NovoLIN N) injection 30 Units  insulin lispro (HumaLOG) injection 0-10 Units  levothyroxine (Synthroid, Levoxyl) tablet 225 mcg  oxygen (O2) therapy  ceFAZolin (Ancef) 2 g IV in dextrose 5% 50 mL  ceFAZolin in dextrose (iso-os) (Ancef) IVPB 2 g      Relevant Results  Labs  Results from last 72 hours   Lab Units 01/29/24  0509 01/28/24  0526 01/26/24  1558   WBC AUTO x10*3/uL 5.8 5.8 6.3   HEMOGLOBIN g/dL 11.8* 11.8* 11.5*   HEMATOCRIT % 37.0 37.9 35.3*   PLATELETS AUTO x10*3/uL 154 162 156   NEUTROS PCT AUTO % 54.6 55.2 74.6   LYMPHS PCT AUTO % 28.4 29.6 15.7   MONOS PCT AUTO % 12.6 10.1 7.9   EOS PCT AUTO % 2.8 3.5 0.8       Results from last 72 hours   Lab Units 01/29/24  0509 01/28/24  0526 01/27/24  0857   SODIUM mmol/L 136 135* 132*   POTASSIUM mmol/L 3.6 3.7 3.9   CHLORIDE mmol/L 96* 98 94*   CO2 mmol/L 29 31 33*   BUN mg/dL 21 18 13   CREATININE mg/dL 1.56* 1.55* 1.16*   GLUCOSE mg/dL 137* 107* 335*   CALCIUM mg/dL 8.1* 8.4* 8.6   ANION GAP mmol/L 15 10 9*   EGFR mL/min/1.73m*2 35* 35* 50*       Results from last 72 hours   Lab Units 01/26/24  1556    ALK PHOS U/L 117   BILIRUBIN TOTAL mg/dL 0.4   PROTEIN TOTAL g/dL 6.8   ALT U/L 12   AST U/L 11   ALBUMIN g/dL 3.4       Estimated Creatinine Clearance: 45 mL/min (A) (by C-G formula based on SCr of 1.56 mg/dL (H)).  C-Reactive Protein   Date Value Ref Range Status   01/26/2024 1.91 (H) <1.00 mg/dL Final     CRP   Date Value Ref Range Status   07/26/2023 1.28 (A) mg/dL Final     Comment:     REF VALUE  < 1.00     06/09/2023 0.39 mg/dL Final     Comment:     REF VALUE  < 1.00     Microbiology  Reviewed  Imaging  reviewed        Assessment/Plan   Left calf wound / stasis / possible cellulitis, US is negative for collection  Legs stasis     Recommendations :  Continue Cefazolin, plan on a short course of oral Keflex with discharge  Discussed with the wound care team     I spent minutes in the professional and overall care of this patient.      Lady Butterfield MD

## 2024-01-29 NOTE — CARE PLAN
The patient's goals for the shift include      The clinical goals for the shift include Patient will remain free from injury throughout shift    Over the shift, the patient did not make progress toward the following goals. Barriers to progression include pending . Recommendations to address these barriers include pending .

## 2024-01-29 NOTE — PROGRESS NOTES
Occupational Therapy    Occupational Therapy Treatment    Name: Teresa Matthews  MRN: [id number]  : 1950  Date: 24  Time Calculation  Start Time: 1352  Stop Time: 1409  Time Calculation (min): 17 min    Assessment:  OT Assessment: Pt still below functional baseline for PLOF and will benefit from skilled OT intervention to increase mobility needed for BADL and safe mobility/transfers.  Prognosis: Good  Barriers to Discharge: None  Evaluation/Treatment Tolerance: Patient limited by fatigue  Medical Staff Made Aware: Yes  End of Session Communication: Bedside nurse, Care Coordinator  End of Session Patient Position: Up in chair, Alarm on  Plan:  Treatment Interventions: ADL retraining, Functional transfer training, Endurance training, Equipment evaluation/education  OT Frequency: 3 times per week  OT Discharge Recommendations: Moderate intensity level of continued care  Equipment Recommended upon Discharge:  (TBD in discharge location)  OT Recommended Transfer Status: Assist of 2  OT - OK to Discharge: Yes    Subjective   Previous Visit Info:  OT Last Visit  OT Received On: 24  General:  General  Reason for Referral: OT for ADL and safety assessment; pt presents with LLE cellulitus  Referred By: Behzad Baeza MD  Past Medical History Relevant to Rehab: PMH: poorly controlled diabetes mellitus type 2, atrial fibrillation on anticoagulation, congestive heart failure, left total knee arthroplasty complicated by E. coli infection and MRSA infection of the foot s/p left  total knee explant and synovectomy, acute comminuted fractures of proximal tibial  and fibular, bedbound, anxiety and depression, GERD, hypothyroidism, morbid obesity, COPD, CKD stage III  Family/Caregiver Present: No  Prior to Session Communication: Bedside nurse  Patient Position Received: Alarm on  Preferred Learning Style: verbal  General Comment: Pt pleasant and agreeable to therapy. Distal LLE diabetic ulcer, purewick  catheter, telemetry. Post-op shoe and vecro closure shoes donned prior to transfer.  Precautions:  Medical Precautions: Fall precautions  Vitals:     Pain Assessment:  Pain Assessment  Pain Assessment: 0-10  Pain Score: 0 - No pain     Objective   Activities of Daily Living: LE Dressing  LE Dressing: Yes  LE Dressing Adaptive Equipment: Reacher, Dressing stick  Shoe Level of Assistance: Maximum assistance  LE Dressing Where Assessed: Chair  LE Dressing Comments: Assist needed to manage velcro closures and preposition shoes prior to donning.     Bed Mobility/Transfers: Bed Mobility  Bed Mobility: Yes  Bed Mobility 1  Bed Mobility 1: Sitting to supine  Level of Assistance 1: Moderate assistance  Bed Mobility Comments 1:  (Assisgt needed to lift right leg to bed surface.)    Transfers  Transfer: Yes  Transfer 1  Transfer From 1: Chair with arms to  Transfer to 1: Stand  Technique 1: Sit to stand  Transfer Device 1: Walker  Transfer Level of Assistance 1: Maximum assistance  Trials/Comments 1: Significantly extended time on task.  Transfers 2  Transfer From 2: Stand to  Transfer to 2: Bed  Technique 2: Stand pivot  Transfer Device 2: Walker  Transfer Level of Assistance 2: Maximum assistance  Trials/Comments 2: Pysical assist required for pivot and advancement of feet in turning with device.    Outcome Measures:  [facility] Daily Activity  Putting on and taking off regular lower body clothing: Total  Bathing (including washing, rinsing, drying): A lot  Putting on and taking off regular upper body clothing: A little  Toileting, which includes using toilet, bedpan or urinal: Total  Taking care of personal grooming such as brushing teeth: A little  Eating Meals: None  Daily Activity - Total Score: 14      Education Documentation  Body Mechanics, taught by TIMMY Mckeon at 1/29/2024  2:28 PM.  Learner: Patient  Readiness: Acceptance  Method: Explanation, Demonstration  Response: Verbalizes Understanding, Demonstrated  Understanding, Needs Reinforcement  Comment: Safety in mobility and transfers stressed.    Precautions, taught by TIMMY Mckeon at 1/29/2024  2:28 PM.  Learner: Patient  Readiness: Acceptance  Method: Explanation, Demonstration  Response: Verbalizes Understanding, Demonstrated Understanding, Needs Reinforcement  Comment: Safety in mobility and transfers stressed.    ADL Training, taught by TIMMY Mckeon at 1/29/2024  2:28 PM.  Learner: Patient  Readiness: Acceptance  Method: Explanation, Demonstration  Response: Verbalizes Understanding, Demonstrated Understanding, Needs Reinforcement  Comment: Safety in mobility and transfers stressed.    Education Comments  Pt receptive to and compliant with instruction..    Goals:  Encounter Problems       Encounter Problems (Active)       ADLs       Patient with complete upper body dressing with modified independent level of assistance donning and doffing all UE clothes with no adaptive equipment while supported sitting (Progressing)       Start:  01/27/24    Expected End:  02/09/24            Patient will complete daily grooming tasks brushing teeth, washing face/hair, and unsupported sitting with modified independent level of assistance and PRN adaptive equipment while supported sitting and standing. (Progressing)       Start:  01/27/24    Expected End:  02/09/24               EXERCISE/STRENGTHENING       Patient will be educated on BUE HEP for increased ADL performance. (Progressing)       Start:  01/27/24    Expected End:  02/09/24               TRANSFERS       Patient will complete sit to stand transfer with modified independent level of assistance and least restrictive device in order to improve safety and prepare for out of bed mobility. (Progressing)       Start:  01/27/24    Expected End:  02/09/24                  absent

## 2024-01-30 LAB
ANION GAP SERPL CALC-SCNC: 11 MMOL/L (ref 10–20)
ATRIAL RATE: 65 BPM
BASOPHILS # BLD AUTO: 0.05 X10*3/UL (ref 0–0.1)
BASOPHILS NFR BLD AUTO: 1 %
BUN SERPL-MCNC: 25 MG/DL (ref 6–23)
CALCIUM SERPL-MCNC: 8.1 MG/DL (ref 8.6–10.3)
CHLORIDE SERPL-SCNC: 95 MMOL/L (ref 98–107)
CO2 SERPL-SCNC: 31 MMOL/L (ref 21–32)
CREAT SERPL-MCNC: 1.55 MG/DL (ref 0.5–1.05)
EGFRCR SERPLBLD CKD-EPI 2021: 35 ML/MIN/1.73M*2
EOSINOPHIL # BLD AUTO: 0.16 X10*3/UL (ref 0–0.4)
EOSINOPHIL NFR BLD AUTO: 3.1 %
ERYTHROCYTE [DISTWIDTH] IN BLOOD BY AUTOMATED COUNT: 15.9 % (ref 11.5–14.5)
GLUCOSE BLD MANUAL STRIP-MCNC: 169 MG/DL (ref 74–99)
GLUCOSE BLD MANUAL STRIP-MCNC: 186 MG/DL (ref 74–99)
GLUCOSE BLD MANUAL STRIP-MCNC: 195 MG/DL (ref 74–99)
GLUCOSE BLD MANUAL STRIP-MCNC: 294 MG/DL (ref 74–99)
GLUCOSE SERPL-MCNC: 202 MG/DL (ref 74–99)
HCT VFR BLD AUTO: 34.8 % (ref 36–46)
HGB BLD-MCNC: 11 G/DL (ref 12–16)
IMM GRANULOCYTES # BLD AUTO: 0.03 X10*3/UL (ref 0–0.5)
IMM GRANULOCYTES NFR BLD AUTO: 0.6 % (ref 0–0.9)
LYMPHOCYTES # BLD AUTO: 1.57 X10*3/UL (ref 0.8–3)
LYMPHOCYTES NFR BLD AUTO: 30.4 %
MCH RBC QN AUTO: 28.1 PG (ref 26–34)
MCHC RBC AUTO-ENTMCNC: 31.6 G/DL (ref 32–36)
MCV RBC AUTO: 89 FL (ref 80–100)
MONOCYTES # BLD AUTO: 0.65 X10*3/UL (ref 0.05–0.8)
MONOCYTES NFR BLD AUTO: 12.6 %
NEUTROPHILS # BLD AUTO: 2.71 X10*3/UL (ref 1.6–5.5)
NEUTROPHILS NFR BLD AUTO: 52.3 %
NRBC BLD-RTO: 0 /100 WBCS (ref 0–0)
P AXIS: 61 DEGREES
P OFFSET: 162 MS
P ONSET: 111 MS
PLATELET # BLD AUTO: 155 X10*3/UL (ref 150–450)
POTASSIUM SERPL-SCNC: 3.8 MMOL/L (ref 3.5–5.3)
PR INTERVAL: 208 MS
Q ONSET: 215 MS
QRS COUNT: 11 BEATS
QRS DURATION: 104 MS
QT INTERVAL: 474 MS
QTC CALCULATION(BAZETT): 492 MS
QTC FREDERICIA: 486 MS
R AXIS: 82 DEGREES
RBC # BLD AUTO: 3.91 X10*6/UL (ref 4–5.2)
SODIUM SERPL-SCNC: 133 MMOL/L (ref 136–145)
T AXIS: 56 DEGREES
T OFFSET: 452 MS
VENTRICULAR RATE: 65 BPM
WBC # BLD AUTO: 5.2 X10*3/UL (ref 4.4–11.3)

## 2024-01-30 PROCEDURE — 82947 ASSAY GLUCOSE BLOOD QUANT: CPT

## 2024-01-30 PROCEDURE — 2500000002 HC RX 250 W HCPCS SELF ADMINISTERED DRUGS (ALT 637 FOR MEDICARE OP, ALT 636 FOR OP/ED): Performed by: INTERNAL MEDICINE

## 2024-01-30 PROCEDURE — 99231 SBSQ HOSP IP/OBS SF/LOW 25: CPT | Performed by: INTERNAL MEDICINE

## 2024-01-30 PROCEDURE — 36415 COLL VENOUS BLD VENIPUNCTURE: CPT | Performed by: INTERNAL MEDICINE

## 2024-01-30 PROCEDURE — 1200000002 HC GENERAL ROOM WITH TELEMETRY DAILY

## 2024-01-30 PROCEDURE — 80048 BASIC METABOLIC PNL TOTAL CA: CPT | Performed by: INTERNAL MEDICINE

## 2024-01-30 PROCEDURE — 2500000004 HC RX 250 GENERAL PHARMACY W/ HCPCS (ALT 636 FOR OP/ED): Performed by: INTERNAL MEDICINE

## 2024-01-30 PROCEDURE — 2500000001 HC RX 250 WO HCPCS SELF ADMINISTERED DRUGS (ALT 637 FOR MEDICARE OP): Performed by: INTERNAL MEDICINE

## 2024-01-30 PROCEDURE — 85025 COMPLETE CBC W/AUTO DIFF WBC: CPT | Performed by: INTERNAL MEDICINE

## 2024-01-30 PROCEDURE — 99232 SBSQ HOSP IP/OBS MODERATE 35: CPT | Performed by: FAMILY MEDICINE

## 2024-01-30 PROCEDURE — 97110 THERAPEUTIC EXERCISES: CPT | Mod: GO,CO

## 2024-01-30 RX ADMIN — LEVOTHYROXINE SODIUM 225 MCG: 0.17 TABLET ORAL at 06:28

## 2024-01-30 RX ADMIN — TRAZODONE HYDROCHLORIDE 50 MG: 50 TABLET ORAL at 21:23

## 2024-01-30 RX ADMIN — INSULIN LISPRO 2 UNITS: 100 INJECTION, SOLUTION INTRAVENOUS; SUBCUTANEOUS at 08:58

## 2024-01-30 RX ADMIN — INSULIN LISPRO 8 UNITS: 100 INJECTION, SOLUTION INTRAVENOUS; SUBCUTANEOUS at 16:23

## 2024-01-30 RX ADMIN — METOPROLOL SUCCINATE 50 MG: 50 TABLET, EXTENDED RELEASE ORAL at 08:53

## 2024-01-30 RX ADMIN — INSULIN HUMAN 25 UNITS: 100 INJECTION, SUSPENSION SUBCUTANEOUS at 08:54

## 2024-01-30 RX ADMIN — GABAPENTIN 600 MG: 300 CAPSULE ORAL at 21:23

## 2024-01-30 RX ADMIN — GABAPENTIN 600 MG: 300 CAPSULE ORAL at 16:22

## 2024-01-30 RX ADMIN — INSULIN LISPRO 8 UNITS: 100 INJECTION, SOLUTION INTRAVENOUS; SUBCUTANEOUS at 13:07

## 2024-01-30 RX ADMIN — INSULIN LISPRO 2 UNITS: 100 INJECTION, SOLUTION INTRAVENOUS; SUBCUTANEOUS at 16:24

## 2024-01-30 RX ADMIN — APIXABAN 5 MG: 5 TABLET, FILM COATED ORAL at 21:23

## 2024-01-30 RX ADMIN — OXYCODONE HYDROCHLORIDE AND ACETAMINOPHEN 1 TABLET: 5; 325 TABLET ORAL at 21:25

## 2024-01-30 RX ADMIN — AMIODARONE HYDROCHLORIDE 200 MG: 200 TABLET ORAL at 08:51

## 2024-01-30 RX ADMIN — BUPROPION HYDROCHLORIDE 150 MG: 150 TABLET, FILM COATED, EXTENDED RELEASE ORAL at 08:50

## 2024-01-30 RX ADMIN — INSULIN LISPRO 6 UNITS: 100 INJECTION, SOLUTION INTRAVENOUS; SUBCUTANEOUS at 13:07

## 2024-01-30 RX ADMIN — CEFAZOLIN SODIUM 2 G: 2 INJECTION, SOLUTION INTRAVENOUS at 08:51

## 2024-01-30 RX ADMIN — DESVENLAFAXINE SUCCINATE 100 MG: 100 TABLET, EXTENDED RELEASE ORAL at 08:52

## 2024-01-30 RX ADMIN — GABAPENTIN 600 MG: 300 CAPSULE ORAL at 08:50

## 2024-01-30 RX ADMIN — CEFAZOLIN SODIUM 2 G: 2 INJECTION, SOLUTION INTRAVENOUS at 00:47

## 2024-01-30 RX ADMIN — POTASSIUM CHLORIDE 10 MEQ: 750 TABLET, EXTENDED RELEASE ORAL at 21:23

## 2024-01-30 RX ADMIN — POTASSIUM CHLORIDE 10 MEQ: 750 TABLET, EXTENDED RELEASE ORAL at 08:51

## 2024-01-30 RX ADMIN — INSULIN LISPRO 8 UNITS: 100 INJECTION, SOLUTION INTRAVENOUS; SUBCUTANEOUS at 08:56

## 2024-01-30 RX ADMIN — ASPIRIN 81 MG: 81 TABLET, COATED ORAL at 21:23

## 2024-01-30 RX ADMIN — INSULIN HUMAN 20 UNITS: 100 INJECTION, SUSPENSION SUBCUTANEOUS at 21:25

## 2024-01-30 RX ADMIN — FAMOTIDINE 40 MG: 20 TABLET ORAL at 08:50

## 2024-01-30 RX ADMIN — APIXABAN 5 MG: 5 TABLET, FILM COATED ORAL at 08:51

## 2024-01-30 RX ADMIN — CEFAZOLIN SODIUM 2 G: 2 INJECTION, SOLUTION INTRAVENOUS at 16:23

## 2024-01-30 ASSESSMENT — COGNITIVE AND FUNCTIONAL STATUS - GENERAL
DRESSING REGULAR UPPER BODY CLOTHING: A LITTLE
CLIMB 3 TO 5 STEPS WITH RAILING: A LOT
DRESSING REGULAR LOWER BODY CLOTHING: TOTAL
TURNING FROM BACK TO SIDE WHILE IN FLAT BAD: A LOT
MOVING TO AND FROM BED TO CHAIR: A LOT
DRESSING REGULAR LOWER BODY CLOTHING: TOTAL
MOVING FROM LYING ON BACK TO SITTING ON SIDE OF FLAT BED WITH BEDRAILS: A LOT
DAILY ACTIVITIY SCORE: 15
MOBILITY SCORE: 12
CLIMB 3 TO 5 STEPS WITH RAILING: A LOT
HELP NEEDED FOR BATHING: A LOT
DAILY ACTIVITIY SCORE: 15
HELP NEEDED FOR BATHING: A LOT
MOBILITY SCORE: 12
TURNING FROM BACK TO SIDE WHILE IN FLAT BAD: A LOT
DRESSING REGULAR UPPER BODY CLOTHING: A LITTLE
STANDING UP FROM CHAIR USING ARMS: A LOT
MOVING FROM LYING ON BACK TO SITTING ON SIDE OF FLAT BED WITH BEDRAILS: A LOT
DRESSING REGULAR LOWER BODY CLOTHING: TOTAL
MOVING TO AND FROM BED TO CHAIR: A LOT
TOILETING: TOTAL
TOILETING: TOTAL
STANDING UP FROM CHAIR USING ARMS: A LOT
WALKING IN HOSPITAL ROOM: A LOT
WALKING IN HOSPITAL ROOM: A LOT

## 2024-01-30 ASSESSMENT — PAIN SCALES - GENERAL
PAINLEVEL_OUTOF10: 0 - NO PAIN

## 2024-01-30 ASSESSMENT — PAIN - FUNCTIONAL ASSESSMENT: PAIN_FUNCTIONAL_ASSESSMENT: 0-10

## 2024-01-30 NOTE — PROGRESS NOTES
Teresa Matthews is a 73 y.o. female on day 4 of admission presenting with Cellulitis of lower extremity, unspecified laterality.    Subjective   No new complaints      Objective   Review of Systems  Physical Exam  Constitutional:       General: She is not in acute distress.        Last Recorded Vitals  Blood pressure 116/60, pulse 68, temperature 36.1 °C (97 °F), resp. rate 16, height 1.829 m (6'), weight 109 kg (240 lb 9.6 oz), SpO2 92 %.  Intake/Output last 3 Shifts:  I/O last 3 completed shifts:  In: 920 (8.4 mL/kg) [P.O.:720; IV Piggyback:200]  Out: 2500 (22.9 mL/kg) [Urine:2500 (0.6 mL/kg/hr)]  Weight: 109.1 kg     Relevant Results  Results from last 7 days   Lab Units 01/30/24  0737 01/30/24  0600 01/29/24  2035 01/29/24  1535 01/29/24  1122 01/29/24  0724 01/29/24  0509 01/28/24  0716 01/28/24  0526 01/27/24  1121 01/27/24  0857 01/26/24  1804 01/26/24  1558   POCT GLUCOSE mg/dL 195*  --  81 100* 262* 145*  --    < >  --    < >  --    < >  --    GLUCOSE mg/dL  --  202*  --   --   --   --  137*  --  107*  --  335*  --  576*    < > = values in this interval not displayed.     Pump Settings  Assessment/Plan   Principal Problem:    Cellulitis of lower extremity, unspecified laterality    IMPRESSION  TYPE 2 DIABETES MELLITUS WITH HYPERGLYCEMIA  LONG TERM CURRENT USE OF INSULIN  Patient stopped taking insulin  Admitted with hyperglycemia  Glucose much improved  Glucose tight yesterday     HYPOTHYROIDISM, POSTOPERATIVE        RECOMMENDATIONS  NPH 25 unit subcutaneous BID  Lispro 8 units QAC plus corrective scale  Plan to resume Humalog Mix 75/25 at discharge  Levothyroxine 225 mcg/day    Kirill Scott MD

## 2024-01-30 NOTE — PROGRESS NOTES
Teresa Matthews is a 73 y.o. female on day 4 of admission presenting with Cellulitis of lower extremity, unspecified laterality.    Subjective   Interval History: no fever, no new complaints        Review of Systems    Objective   Range of Vitals (last 24 hours)  Heart Rate:  [64-68]   Temp:  [36.1 °C (97 °F)-36.8 °C (98.2 °F)]   Resp:  [16-18]   BP: (111-129)/(60-65)   SpO2:  [92 %]   Daily Weight  01/26/24 : 109 kg (240 lb 9.6 oz)    Body mass index is 32.63 kg/m².    Physical Exam  Constitutional:       Appearance: Normal appearance.   HENT:      Head: Normocephalic and atraumatic.      Mouth/Throat:      Mouth: Mucous membranes are moist.      Pharynx: Oropharynx is clear.   Eyes:      Pupils: Pupils are equal, round, and reactive to light.   Cardiovascular:      Rate and Rhythm: Normal rate and regular rhythm.      Heart sounds: Normal heart sounds.   Pulmonary:      Effort: Pulmonary effort is normal.      Breath sounds: Normal breath sounds.   Abdominal:      General: Abdomen is flat. Bowel sounds are normal.      Palpations: Abdomen is soft.   Musculoskeletal:         General: Swelling present.      Cervical back: Normal range of motion.      Comments: Lt calf wound, less edema and redness   Neurological:      Mental Status: She is alert.         Antibiotics  vancomycin (Vancocin) in dextrose 5 % water (D5W) 500 mL IV 1,500 mg  piperacillin-tazobactam-dextrose (Zosyn) IV 3.375 g  insulin regular (HumuLIN R) injection 10 Units  iohexol (OMNIPaque) 350 mg iodine/mL solution 80 mL    insulin regular (HumuLIN R) injection 10 Units  amiodarone (Pacerone) tablet 200 mg  apixaban (Eliquis) tablet 5 mg  aspirin EC tablet 81 mg  buPROPion XL (Wellbutrin XL) 24 hr tablet 150 mg  desvenlafaxine (Pristiq) 24 hr tablet 100 mg  famotidine (Pepcid) tablet 40 mg  gabapentin (Neurontin) capsule 600 mg  levothyroxine (Synthroid, Levoxyl) tablet 175 mcg  losartan (Cozaar) tablet 25 mg  metoprolol succinate XL (Toprol-XL)  24 hr tablet 50 mg  nystatin (Mycostatin) 100,000 unit/gram powder 1 Application  oxyCODONE-acetaminophen (Percocet) 5-325 mg per tablet 1 tablet  potassium chloride CR (Klor-Con) ER tablet 10 mEq  spironolactone (Aldactone) tablet 25 mg  torsemide (Demadex) tablet 40 mg  traZODone (Desyrel) tablet 50 mg  ondansetron (Zofran) tablet 4 mg  ondansetron (Zofran) injection 4 mg  polyethylene glycol (Glycolax, Miralax) packet 17 g  dextrose 50 % injection 25 g  glucagon (Glucagen) injection 1 mg  dextrose 10 % in water (D10W) infusion  insulin glargine (Lantus) injection 20 Units  insulin lispro (HumaLOG) injection 8 Units  insulin regular (HumuLIN R) injection 0-10 Units  piperacillin-tazobactam-dextrose (Zosyn) IV 4.5 g  vancomycin (Vancocin) 1,500 mg in dextrose 5 % in water (D5W) 500 mL IV  dextrose 50 % injection 25 g  insulin NPH (Isophane) (HumuLIN N,NovoLIN N) injection 30 Units  insulin lispro (HumaLOG) injection 0-10 Units  levothyroxine (Synthroid, Levoxyl) tablet 225 mcg  oxygen (O2) therapy  ceFAZolin (Ancef) 2 g IV in dextrose 5% 50 mL  ceFAZolin in dextrose (iso-os) (Ancef) IVPB 2 g      Relevant Results  Labs  Results from last 72 hours   Lab Units 01/30/24  0600 01/29/24  0509 01/28/24  0526   WBC AUTO x10*3/uL 5.2 5.8 5.8   HEMOGLOBIN g/dL 11.0* 11.8* 11.8*   HEMATOCRIT % 34.8* 37.0 37.9   PLATELETS AUTO x10*3/uL 155 154 162   NEUTROS PCT AUTO % 52.3 54.6 55.2   LYMPHS PCT AUTO % 30.4 28.4 29.6   MONOS PCT AUTO % 12.6 12.6 10.1   EOS PCT AUTO % 3.1 2.8 3.5       Results from last 72 hours   Lab Units 01/30/24  0600 01/29/24  0509 01/28/24  0526   SODIUM mmol/L 133* 136 135*   POTASSIUM mmol/L 3.8 3.6 3.7   CHLORIDE mmol/L 95* 96* 98   CO2 mmol/L 31 29 31   BUN mg/dL 25* 21 18   CREATININE mg/dL 1.55* 1.56* 1.55*   GLUCOSE mg/dL 202* 137* 107*   CALCIUM mg/dL 8.1* 8.1* 8.4*   ANION GAP mmol/L 11 15 10   EGFR mL/min/1.73m*2 35* 35* 35*             Estimated Creatinine Clearance: 45.3 mL/min (A) (by C-G  formula based on SCr of 1.55 mg/dL (H)).  C-Reactive Protein   Date Value Ref Range Status   01/26/2024 1.91 (H) <1.00 mg/dL Final     CRP   Date Value Ref Range Status   07/26/2023 1.28 (A) mg/dL Final     Comment:     REF VALUE  < 1.00     06/09/2023 0.39 mg/dL Final     Comment:     REF VALUE  < 1.00     Microbiology  Reviewed  Imaging  reviewed        Assessment/Plan   Left calf wound / stasis / possible cellulitis, US is negative for collection  Legs stasis     Recommendations :  Continue Cefazolin, plan on a short course of oral Keflex with discharge  Discussed with the wound care team     I spent minutes in the professional and overall care of this patient.      Lady Butterfield MD

## 2024-01-30 NOTE — PROGRESS NOTES
Teresa Matthews is a 73 y.o. female on day 4 of admission presenting with Cellulitis of lower extremity, unspecified laterality.      Subjective   No acute distress, patient is medically stable awaiting disposition       Objective     Last Recorded Vitals  /60 (BP Location: Right arm, Patient Position: Lying)   Pulse 68   Temp 36.1 °C (97 °F)   Resp 16   Wt 109 kg (240 lb 9.6 oz)   SpO2 92%   Intake/Output last 3 Shifts:    Intake/Output Summary (Last 24 hours) at 1/30/2024 1430  Last data filed at 1/30/2024 0921  Gross per 24 hour   Intake 100 ml   Output 900 ml   Net -800 ml         Admission Weight  Weight: 113 kg (250 lb) (01/26/24 1531)    Daily Weight  01/26/24 : 109 kg (240 lb 9.6 oz)    Image Results  ECG 12 lead  Sinus rhythm with Premature supraventricular complexes  Low voltage QRS  Cannot rule out Anterior infarct , age undetermined  Abnormal ECG  When compared with ECG of 10-SEP-2022 13:55,  Previous ECG has undetermined rhythm, needs review      Physical Exam  Constitutional:       Appearance: She is obese.   HENT:      Head: Normocephalic and atraumatic.      Nose: Nose normal.      Mouth/Throat:      Mouth: Mucous membranes are dry.   Eyes:      Extraocular Movements: Extraocular movements intact.      Conjunctiva/sclera: Conjunctivae normal.   Cardiovascular:      Rate and Rhythm: Normal rate and regular rhythm.      Pulses: Normal pulses.      Heart sounds: Normal heart sounds.   Pulmonary:      Effort: Pulmonary effort is normal.      Breath sounds: Normal breath sounds.   Abdominal:      General: Bowel sounds are normal.      Palpations: Abdomen is soft.   Musculoskeletal:         General: Normal range of motion.      Cervical back: Normal range of motion and neck supple.      Right lower leg: Edema present.      Left lower leg: Edema present.   Skin:     General: Skin is warm and dry.      Findings: Erythema present.      Comments: Venous stasis well bilateral lower extremities.   Large ulcer posterior aspect of left lower extremity seems to be chronic and nonhealing   Neurological:      General: No focal deficit present.      Mental Status: She is alert and oriented to person, place, and time. Mental status is at baseline.   Psychiatric:         Mood and Affect: Mood normal.         Behavior: Behavior normal.              Assessment/Plan        73 y.o. female  with history of poorly controlled diabetes mellitus type 2, atrial fibrillation on anticoagulation, congestive heart failure, left total knee arthroplasty complicated by E. coli infection and MRSA infection of the foot s/p left  total knee explant and synovectomy, comminuted fractures of proximal tibial  and fibular, bedbound, anxiety and depression, GERD, hypothyroidism, morbid obesity, COPD, CKD stage III comes into the hospital due to worsening wound on her left lower extremity and poor living conditions and not able to care for self.         Principal Problem:    Cellulitis of lower extremity, unspecified laterality    Left calf wound with possible cellulitis   -Patient was started with vancomycin and Zosyn now switched to cefazolin 2 g IV every 8 hours.  -Blood culture NGTD  -Appreciate recommendations from ID ultrasound of the calf wound  with no evidence of abscess at this time..  ID recommends: Continue Cefazolin, plan on a short course of oral Keflex with discharge   -Doppler with no DVT.     Poorly controlled diabetes mellitus type 2 with hyperglycemia  -Not compliant with home medications.  Given 10 units of insulin in ER with minimal improvement.  -Appreciate endocrinology recommendations.  NPH 30 units SQ twice daily  -Lispro 8 units before every meal plus corrective scale  -Plan to resume Humalog Mix 75/25 at discharge  -A1c over 15.  -Blood glucose improved on current regimen.     Chronic diastolic congestive heart failure  -Aldactone and torsemide at home on hold due to mild SANDI.  Continue with potassium as well.  -Echo  from 2020 showed LVEF of 55 to 60%.  Showed diastolic dysfunction.  Mild aortic valve regurgitation and mild mitral regurg.     Atrial fibrillation  -Continue with home amiodarone and Eliquis.  Continue with metoprolol.     COPD  -Continue with breathing treatments     CKD stage III  -Monitor renal functions avoid nephrotoxins  -Any noted to be 1.55>1.56 today holding diuretics and ARB at this time.  Monitor renal functions.  Will likely resume on discharge.     Hypothyroidism  -Levothyroxine has been increased to 225.  Unclear if she has been compliant with her home medications but per record she has.  TSH 40.63     VTE prophylaxis: On Eliquis     Medically stable for discharge, awaiting placement              Khadar Rodrigues, DO

## 2024-01-30 NOTE — PROGRESS NOTES
Occupational Therapy    Occupational Therapy Treatment    Name: Teresa Matthews  MRN: [id number]  : 1950  Date: 24  Time Calculation  Start Time: 1146  Stop Time: 1158  Time Calculation (min): 12 min    Assessment:  OT Assessment: Pt still below functional baseline for PLOF and will benefit from skilled OT intervention to increase mobility needed for BADL and safe mobility/transfers.  Prognosis: Good  Barriers to Discharge: None  Evaluation/Treatment Tolerance: Patient limited by fatigue  Medical Staff Made Aware: Yes  End of Session Communication: Bedside nurse, Care Coordinator  End of Session Patient Position: Bed, 3 rail up, Alarm on  Plan:  Treatment Interventions: UE strengthening/ROM, Endurance training  OT Frequency: 3 times per week  OT Discharge Recommendations: Moderate intensity level of continued care  Equipment Recommended upon Discharge:  (TBD in discharge location)  OT Recommended Transfer Status: Assist of 2  OT - OK to Discharge: Yes    Subjective   Previous Visit Info:  OT Last Visit  OT Received On: 24  General:  General  Reason for Referral: OT for ADL and safety assessment; pt presents with LLE cellulitus  Referred By: Behzad Baeza MD  Past Medical History Relevant to Rehab: PMH: poorly controlled diabetes mellitus type 2, atrial fibrillation on anticoagulation, congestive heart failure, left total knee arthroplasty complicated by E. coli infection and MRSA infection of the foot s/p left  total knee explant and synovectomy, acute comminuted fractures of proximal tibial  and fibular, bedbound, anxiety and depression, GERD, hypothyroidism, morbid obesity, COPD, CKD stage III  Family/Caregiver Present: No  Prior to Session Communication: Bedside nurse  Patient Position Received: Alarm on  Preferred Learning Style: verbal  General Comment: Pt pleasant and agreeable to address UB stregthening goals. Possible discharge pending for later today.  Precautions:  Medical  Precautions: Fall precautions  Vitals:     Pain Assessment:  Pain Assessment  Pain Assessment: 0-10  Pain Score: 0 - No pain     Objective      Therapy/Activity: Therapeutic Exercise  Therapeutic Exercise Performed: Yes  Therapeutic Exercise Activity 1: Supine UE shoulder and elbow theraband sets 2 x 15 each plane. Red theraband provided with frequent breaks for recovery.  Therapeutic Exercise Activity 2: Supine ther ex x 10 each (reclined in chair)      Outcome Measures:  [facility] Daily Activity  Putting on and taking off regular lower body clothing: Total  Bathing (including washing, rinsing, drying): A lot  Putting on and taking off regular upper body clothing: A little  Toileting, which includes using toilet, bedpan or urinal: Total  Taking care of personal grooming such as brushing teeth: None  Eating Meals: None  Daily Activity - Total Score: 15    Education Documentation  Body Mechanics, taught by TIMMY Mckeon at 1/30/2024 12:10 PM.  Learner: Patient  Readiness: Acceptance  Method: Explanation, Demonstration  Response: Verbalizes Understanding, Demonstrated Understanding, Needs Reinforcement  Comment: Pt educated on benefits of exercise and UE strengthening to increase independence in ADL and mobility/transfers. Pt demonstrated good grasp of instruction and techniques.    Precautions, taught by TIMMY Mckeon at 1/30/2024 12:10 PM.  Learner: Patient  Readiness: Acceptance  Method: Explanation, Demonstration  Response: Verbalizes Understanding, Demonstrated Understanding, Needs Reinforcement  Comment: Pt educated on benefits of exercise and UE strengthening to increase independence in ADL and mobility/transfers. Pt demonstrated good grasp of instruction and techniques.    ADL Training, taught by TIMMY Mckeon at 1/30/2024 12:10 PM.  Learner: Patient  Readiness: Acceptance  Method: Explanation, Demonstration  Response: Verbalizes Understanding, Demonstrated Understanding, Needs Reinforcement  Comment: Pt  educated on benefits of exercise and UE strengthening to increase independence in ADL and mobility/transfers. Pt demonstrated good grasp of instruction and techniques.    Education Comments  Patient was educated on HEP and on the importance of consistency with HEP to achieve best results and to perform exercises within pain free range. Patient verbalized agreement with instruction and willingness to comply.      Goals:  Encounter Problems       Encounter Problems (Active)       ADLs       Patient with complete upper body dressing with modified independent level of assistance donning and doffing all UE clothes with no adaptive equipment while supported sitting (Progressing)       Start:  01/27/24    Expected End:  02/09/24            Patient will complete daily grooming tasks brushing teeth, washing face/hair, and unsupported sitting with modified independent level of assistance and PRN adaptive equipment while supported sitting and standing. (Progressing)       Start:  01/27/24    Expected End:  02/09/24               EXERCISE/STRENGTHENING       Patient will be educated on BUE HEP for increased ADL performance. (Progressing)       Start:  01/27/24    Expected End:  02/09/24               TRANSFERS       Patient will complete sit to stand transfer with modified independent level of assistance and least restrictive device in order to improve safety and prepare for out of bed mobility. (Progressing)       Start:  01/27/24    Expected End:  02/09/24

## 2024-01-31 LAB
BACTERIA BLD CULT: NORMAL
GLUCOSE BLD MANUAL STRIP-MCNC: 170 MG/DL (ref 74–99)
GLUCOSE BLD MANUAL STRIP-MCNC: 171 MG/DL (ref 74–99)
GLUCOSE BLD MANUAL STRIP-MCNC: 183 MG/DL (ref 74–99)
GLUCOSE BLD MANUAL STRIP-MCNC: 250 MG/DL (ref 74–99)

## 2024-01-31 PROCEDURE — 97110 THERAPEUTIC EXERCISES: CPT | Mod: GP

## 2024-01-31 PROCEDURE — 2500000004 HC RX 250 GENERAL PHARMACY W/ HCPCS (ALT 636 FOR OP/ED): Performed by: INTERNAL MEDICINE

## 2024-01-31 PROCEDURE — 2500000002 HC RX 250 W HCPCS SELF ADMINISTERED DRUGS (ALT 637 FOR MEDICARE OP, ALT 636 FOR OP/ED): Performed by: INTERNAL MEDICINE

## 2024-01-31 PROCEDURE — 97530 THERAPEUTIC ACTIVITIES: CPT | Mod: GP

## 2024-01-31 PROCEDURE — 2500000001 HC RX 250 WO HCPCS SELF ADMINISTERED DRUGS (ALT 637 FOR MEDICARE OP): Performed by: INTERNAL MEDICINE

## 2024-01-31 PROCEDURE — 82947 ASSAY GLUCOSE BLOOD QUANT: CPT

## 2024-01-31 PROCEDURE — 1200000002 HC GENERAL ROOM WITH TELEMETRY DAILY

## 2024-01-31 PROCEDURE — 99231 SBSQ HOSP IP/OBS SF/LOW 25: CPT | Performed by: FAMILY MEDICINE

## 2024-01-31 PROCEDURE — 99231 SBSQ HOSP IP/OBS SF/LOW 25: CPT | Performed by: INTERNAL MEDICINE

## 2024-01-31 RX ORDER — INSULIN LISPRO 100 [IU]/ML
10 INJECTION, SOLUTION INTRAVENOUS; SUBCUTANEOUS
Qty: 10 ML | Refills: 0 | Status: SHIPPED | OUTPATIENT
Start: 2024-01-31 | End: 2024-03-30 | Stop reason: HOSPADM

## 2024-01-31 RX ORDER — INSULIN LISPRO 100 [IU]/ML
10 INJECTION, SOLUTION INTRAVENOUS; SUBCUTANEOUS
Status: DISCONTINUED | OUTPATIENT
Start: 2024-01-31 | End: 2024-02-01 | Stop reason: HOSPADM

## 2024-01-31 RX ADMIN — CEFAZOLIN SODIUM 2 G: 2 INJECTION, SOLUTION INTRAVENOUS at 16:34

## 2024-01-31 RX ADMIN — CEFAZOLIN SODIUM 2 G: 2 INJECTION, SOLUTION INTRAVENOUS at 08:49

## 2024-01-31 RX ADMIN — TRAZODONE HYDROCHLORIDE 50 MG: 50 TABLET ORAL at 20:21

## 2024-01-31 RX ADMIN — BUPROPION HYDROCHLORIDE 150 MG: 150 TABLET, FILM COATED, EXTENDED RELEASE ORAL at 08:39

## 2024-01-31 RX ADMIN — DESVENLAFAXINE SUCCINATE 100 MG: 100 TABLET, EXTENDED RELEASE ORAL at 08:38

## 2024-01-31 RX ADMIN — INSULIN HUMAN 20 UNITS: 100 INJECTION, SUSPENSION SUBCUTANEOUS at 20:21

## 2024-01-31 RX ADMIN — APIXABAN 5 MG: 5 TABLET, FILM COATED ORAL at 20:20

## 2024-01-31 RX ADMIN — GABAPENTIN 600 MG: 300 CAPSULE ORAL at 14:26

## 2024-01-31 RX ADMIN — INSULIN LISPRO 2 UNITS: 100 INJECTION, SOLUTION INTRAVENOUS; SUBCUTANEOUS at 16:25

## 2024-01-31 RX ADMIN — INSULIN HUMAN 20 UNITS: 100 INJECTION, SUSPENSION SUBCUTANEOUS at 08:39

## 2024-01-31 RX ADMIN — POLYETHYLENE GLYCOL 3350 17 G: 17 POWDER, FOR SOLUTION ORAL at 08:38

## 2024-01-31 RX ADMIN — AMIODARONE HYDROCHLORIDE 200 MG: 200 TABLET ORAL at 08:39

## 2024-01-31 RX ADMIN — ASPIRIN 81 MG: 81 TABLET, COATED ORAL at 20:20

## 2024-01-31 RX ADMIN — INSULIN LISPRO 8 UNITS: 100 INJECTION, SOLUTION INTRAVENOUS; SUBCUTANEOUS at 12:10

## 2024-01-31 RX ADMIN — FAMOTIDINE 40 MG: 20 TABLET ORAL at 08:38

## 2024-01-31 RX ADMIN — INSULIN LISPRO 10 UNITS: 100 INJECTION, SOLUTION INTRAVENOUS; SUBCUTANEOUS at 16:27

## 2024-01-31 RX ADMIN — GABAPENTIN 600 MG: 300 CAPSULE ORAL at 20:21

## 2024-01-31 RX ADMIN — INSULIN LISPRO 2 UNITS: 100 INJECTION, SOLUTION INTRAVENOUS; SUBCUTANEOUS at 08:41

## 2024-01-31 RX ADMIN — INSULIN LISPRO 8 UNITS: 100 INJECTION, SOLUTION INTRAVENOUS; SUBCUTANEOUS at 08:41

## 2024-01-31 RX ADMIN — CEFAZOLIN SODIUM 2 G: 2 INJECTION, SOLUTION INTRAVENOUS at 00:46

## 2024-01-31 RX ADMIN — POTASSIUM CHLORIDE 10 MEQ: 750 TABLET, EXTENDED RELEASE ORAL at 20:21

## 2024-01-31 RX ADMIN — OXYCODONE HYDROCHLORIDE AND ACETAMINOPHEN 1 TABLET: 5; 325 TABLET ORAL at 14:26

## 2024-01-31 RX ADMIN — METOPROLOL SUCCINATE 50 MG: 50 TABLET, EXTENDED RELEASE ORAL at 08:39

## 2024-01-31 RX ADMIN — LEVOTHYROXINE SODIUM 225 MCG: 0.17 TABLET ORAL at 05:48

## 2024-01-31 RX ADMIN — POTASSIUM CHLORIDE 10 MEQ: 750 TABLET, EXTENDED RELEASE ORAL at 08:39

## 2024-01-31 RX ADMIN — INSULIN LISPRO 4 UNITS: 100 INJECTION, SOLUTION INTRAVENOUS; SUBCUTANEOUS at 12:08

## 2024-01-31 RX ADMIN — APIXABAN 5 MG: 5 TABLET, FILM COATED ORAL at 08:39

## 2024-01-31 RX ADMIN — GABAPENTIN 600 MG: 300 CAPSULE ORAL at 08:39

## 2024-01-31 ASSESSMENT — COGNITIVE AND FUNCTIONAL STATUS - GENERAL
MOVING TO AND FROM BED TO CHAIR: TOTAL
TURNING FROM BACK TO SIDE WHILE IN FLAT BAD: A LITTLE
STANDING UP FROM CHAIR USING ARMS: A LOT
CLIMB 3 TO 5 STEPS WITH RAILING: TOTAL
DRESSING REGULAR UPPER BODY CLOTHING: A LOT
DRESSING REGULAR LOWER BODY CLOTHING: A LOT
HELP NEEDED FOR BATHING: A LOT
TOILETING: A LOT
WALKING IN HOSPITAL ROOM: TOTAL
TURNING FROM BACK TO SIDE WHILE IN FLAT BAD: A LITTLE
PERSONAL GROOMING: A LITTLE
MOVING FROM LYING ON BACK TO SITTING ON SIDE OF FLAT BED WITH BEDRAILS: A LITTLE
WALKING IN HOSPITAL ROOM: TOTAL
DAILY ACTIVITIY SCORE: 15
MOBILITY SCORE: 11
MOVING TO AND FROM BED TO CHAIR: TOTAL
MOBILITY SCORE: 11
CLIMB 3 TO 5 STEPS WITH RAILING: TOTAL
STANDING UP FROM CHAIR USING ARMS: A LOT
MOVING FROM LYING ON BACK TO SITTING ON SIDE OF FLAT BED WITH BEDRAILS: A LITTLE

## 2024-01-31 ASSESSMENT — ACTIVITIES OF DAILY LIVING (ADL): LACK_OF_TRANSPORTATION: NO

## 2024-01-31 ASSESSMENT — PAIN SCALES - GENERAL
PAINLEVEL_OUTOF10: 0 - NO PAIN
PAINLEVEL_OUTOF10: 8
PAINLEVEL_OUTOF10: 0 - NO PAIN
PAINLEVEL_OUTOF10: 7
PAINLEVEL_OUTOF10: 0 - NO PAIN

## 2024-01-31 ASSESSMENT — PAIN - FUNCTIONAL ASSESSMENT: PAIN_FUNCTIONAL_ASSESSMENT: 0-10

## 2024-01-31 NOTE — PROGRESS NOTES
Teresa Matthews is a 73 y.o. female on day 5 of admission presenting with Cellulitis of lower extremity, unspecified laterality.    Subjective   Feeling better.  No pain to left lower leg.    Meds:  Scheduled medications  amiodarone, 200 mg, oral, Daily  apixaban, 5 mg, oral, BID  aspirin, 81 mg, oral, Every Day  buPROPion XL, 150 mg, oral, q AM  ceFAZolin, 2 g, intravenous, q8h  desvenlafaxine, 100 mg, oral, Daily  famotidine, 40 mg, oral, Daily  gabapentin, 600 mg, oral, TID  insulin lispro, 0-10 Units, subcutaneous, TID with meals  insulin lispro, 8 Units, subcutaneous, TID with meals  insulin NPH (Isophane), 20 Units, subcutaneous, q12h  levothyroxine, 225 mcg, oral, Daily  [Held by provider] losartan, 25 mg, oral, Daily  metoprolol succinate XL, 50 mg, oral, Daily  polyethylene glycol, 17 g, oral, Daily  potassium chloride CR, 10 mEq, oral, BID  [Held by provider] spironolactone, 25 mg, oral, Daily  [Held by provider] torsemide, 40 mg, oral, Daily  traZODone, 50 mg, oral, Nightly      Continuous medications     PRN medications  PRN medications: dextrose 10 % in water (D10W), dextrose, glucagon, nystatin, ondansetron **OR** ondansetron, oxyCODONE-acetaminophen, oxygen       Physical Exam     Constitutional:       Appearance: Normal appearance.   HENT:      Head: Normocephalic and atraumatic.      Mouth/Throat:      Mouth: Mucous membranes are moist.      Pharynx: Oropharynx is clear.   Eyes:      Pupils: Pupils are equal, round, and reactive to light.   Cardiovascular:      Rate and Rhythm: Normal rate and regular rhythm.      Heart sounds: Normal heart sounds.   Pulmonary:      Effort: Pulmonary effort is normal.      Breath sounds: Normal breath sounds.   Abdominal:      General: Abdomen is flat. Bowel sounds are normal.      Palpations: Abdomen is soft.   Musculoskeletal:      Cervical back: Normal range of motion.      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  Decreased erythema to left lower leg.  Neurological:      Mental Status: She is alert.     Last Recorded Vitals  Blood pressure 124/66, pulse 92, temperature 36 °C (96.8 °F), temperature source Temporal, resp. rate 16, height 1.829 m (6'), weight 109 kg (240 lb 9.6 oz), SpO2 92 %.    Intake/Output last 3 Shifts:  I/O last 3 completed shifts:  In: 200 (1.8 mL/kg) [IV Piggyback:200]  Out: 1900 (17.4 mL/kg) [Urine:1900 (0.5 mL/kg/hr)]  Weight: 109.1 kg     Relevant Results   ECG 12 lead    Result Date: 1/29/2024  Sinus rhythm with Premature supraventricular complexes Low voltage QRS Cannot rule out Anterior infarct , age undetermined Abnormal ECG When compared with ECG of 10-SEP-2022 13:55, Previous ECG has undetermined rhythm, needs review    US extremity nonvascular real time w image documentation limited anatomic specific    Result Date: 1/28/2024  Interpreted By:  Nancy Marin, STUDY: US EXTREMITY NONVASCULAR REAL TIME WITH IMAGE DOCUMENTATION LIMITED ANATOMIC SPECIFIC;  1/27/2024 4:35 pm   INDICATION: Signs/Symptoms:Lt calf wound / rule out a collection.   COMPARISON: None.   ACCESSION NUMBER(S): [id number]   ORDERING CLINICIAN: SELVIN MATTSON   TECHNIQUE: Soft tissue ultrasound of the left calf was performed   FINDINGS: There is edema in the subcutaneous fat with no abscess.       Soft tissue ultrasound of the left calf was performed. There is edema of the subcutaneous fat with no abscess or focal fluid collection.   MACRO: None   Signed by: Nancy Marin 1/28/2024 9:42 AM Dictation workstation:   [id number]    CT angio chest for pulmonary embolism    Result Date: 1/26/2024  Interpreted By:  Jody Aldana, STUDY: CT ANGIO CHEST FOR PULMONARY EMBOLISM;  1/26/2024 5:20 pm   INDICATION: Signs/Symptoms:Hypoxic,  noncompliant insulin-dependent diabetic.   COMPARISON: 09/12/2022   ACCESSION NUMBER(S): [id number]   ORDERING CLINICIAN: PHIL GARCIA   TECHNIQUE: Helical data acquisition of the chest was obtained with IV contrast material. MIP reconstructions. 80 mL of Omnipaque 350. Images were reformatted in axial, coronal, and sagittal planes.   FINDINGS: Lungs and Pleura: Streaky bibasilar, lingular, and right middle lobe atelectasis. No pulmonary consolidation. No pleural effusion. No pneumothorax.   Mediastinum and axilla: No adenopathy by CT size criteria. Cardiomegaly. Fluid in the pericardial recesses without romain pericardial effusion. Cystic structure in the subcarinal region measuring 3.4 x 2.6 cm suggestive of a pericardial cyst. Borderline heart size. Heavy mitral unless area aortic annular calcifications. Ascending thoracic aortic aneurysm measuring 4.6 cm in diameter. Markedly dilated main pulmonary artery measuring 4.4 cm in diameter. Small hiatal hernia.   Visualized Upper Abdomen: Sequelae of gastric bypass surgery. Calcified granulomas in the spleen. Bilateral adrenal gland thickening. Nodular liver contour suggestive of cirrhosis.   MSK/Chest Wall: No aggressive bony lesion identified. Bridging syndesmophytes throughout the spine.       No evidence of pulmonary embolism.   Ascending thoracic aortic aneurysm again noted.   Dilated main pulmonary artery suggesting pulmonary artery hypertension, similar.   Scattered atelectasis.   Signed by: Jody Aldana 1/26/2024 5:56 PM Dictation workstation:   [id number]    Vascular US Lower Extremity Venous Duplex Bilateral    Result Date: 1/26/2024  Interpreted By:  Ramon Pearce, STUDY: [facility] US LOWER EXTREMITY VENOUS DUPLEX BILATERAL;  1/26/2024 4:39 pm   ACCESSION NUMBER(S): [id number]   ORDERING CLINICIAN: DONYA DOAN   TECHNIQUE: Static grayscale, color Doppler, and spectral Doppler sonographic images of the bilateral lower extremities were obtained for  duplex evaluation.   FINDINGS: LEFT LOWER EXTREMITY: There is no evidence of deep venous thrombus in the visualized portions of the common femoral vein, femoral vein, or popliteal vein. Respiratory variation and augmentation to calf pressure is noted. The visualized portion of the posterior tibial and peroneal veins are unremarkable.   RIGHT LOWER EXTREMITY: There is no evidence of deep venous thrombus in the visualized portions of the common femoral vein, femoral vein, or popliteal vein. Respiratory variation and augmentation to calf pressure is noted. The visualized portion of the posterior tibial, and peroneal veins are unremarkable.       1. No evidence of deep venous thrombus in the evaluated veins of the bilateral lower extremities from the inguinal ligament to the popliteal fossa.   Signed by: Ramon Pearce 1/26/2024 4:43 PM Dictation workstation:   [id number]    XR tibia fibula left 2 views    Result Date: 1/26/2024  Interpreted By:  Nancy Marin, STUDY: XR TIBIA FIBULA LEFT 2 VIEWS; ;  1/26/2024 4:03 pm   INDICATION: Signs/Symptoms:wound ulcer LLE diabetic.   COMPARISON: 09/15/2023   ACCESSION NUMBER(S): [id number]   ORDERING CLINICIAN: PHIL GARCIA   FINDINGS: Four views left tibia and fibula: There is antibiotic spacer radiopaque material in the femorotibial joint space at the site of distal femoral and proximal tibial prostheses which have been removed. There are remote healed fractures of the proximal tibial and fibular diaphyses, also seen on 09/15/2023. The bones are markedly osteopenic. There is no bony destruction or osteomyelitis. There is no gas-forming infection. There is no acute fracture or dislocation. There are punctate soft tissue calcifications.       No acute bony abnormality left tibia and fibula.     MACRO: None   Signed by: Nancy Marin 1/26/2024 4:09 PM Dictation workstation:   [id number]   Results for orders placed or performed during the hospital encounter of 01/26/24 (from  the past 24 hour(s))   POCT GLUCOSE   Result Value Ref Range    POCT Glucose 294 (H) 74 - 99 mg/dL   POCT GLUCOSE   Result Value Ref Range    POCT Glucose 186 (H) 74 - 99 mg/dL   POCT GLUCOSE   Result Value Ref Range    POCT Glucose 169 (H) 74 - 99 mg/dL   POCT GLUCOSE   Result Value Ref Range    POCT Glucose 183 (H) 74 - 99 mg/dL       Assessment/Plan   Principal Problem:    Cellulitis of lower extremity, unspecified laterality    Left lateral calf ulceration/hematoma  Medihoney,  aquacel, ABD, and ace bandage. Keep leg elevated.  Change every other day.  IV antibiotics per ID.     Follow-up in wound care center after discharge.          Christine Balderrama DPM

## 2024-01-31 NOTE — PROGRESS NOTES
Physical Therapy    Physical Therapy Treatment    Patient Name: Teresa Matthews  MRN: [id number]  Today's Date: 1/31/2024  Time Calculation  Start Time: 1415  Stop Time: 1442  Time Calculation (min): 27 min       Assessment/Plan   PT Assessment  PT Assessment Results: Decreased strength, Decreased range of motion, Impaired balance, Decreased mobility, Pain  Rehab Prognosis: Good  Evaluation/Treatment Tolerance: Patient tolerated treatment well  Medical Staff Made Aware: Yes  Strengths: Ability to acquire knowledge, Support of Caregivers, Rehab experience  Barriers to Participation: Comorbidities  End of Session Communication: Bedside nurse, Care Coordinator  Assessment Comment: Patient with improved mobility on this date but not at functional baseline. Continue to rec mod intensity PT intervention.  End of Session Patient Position: Bed, 3 rail up, Alarm on (all needs within reach)  PT Plan  Inpatient/Swing Bed or Outpatient: Inpatient  PT Plan  Treatment/Interventions: Bed mobility, Transfer training, Gait training, Balance training, Strengthening, Endurance training  PT Plan: Skilled PT  PT Frequency: 3 times per week  PT Discharge Recommendations: Moderate intensity level of continued care  Equipment Recommended upon Discharge: Wheeled walker  PT Recommended Transfer Status: Assist x2  PT - OK to Discharge: Yes (per PT POC)      General Visit Information:   PT  Visit  PT Received On: 01/31/24  Response to Previous Treatment: Patient with no complaints from previous session.  General  Reason for Referral: Impaired functional mobility; L LE cellulitis  Referred By: Behzad Baeza MD  Past Medical History Relevant to Rehab: PMH: poorly controlled diabetes mellitus type 2, atrial fibrillation on anticoagulation, congestive heart failure, left total knee arthroplasty complicated by E. coli infection and MRSA infection of the foot s/p left  total knee explant and synovectomy, acute comminuted fractures of  "proximal tibial  and fibular, bedbound, anxiety and depression, GERD, hypothyroidism, morbid obesity, COPD, CKD stage III  Family/Caregiver Present: No  Prior to Session Communication: Bedside nurse  Patient Position Received: Bed, 3 rail up, Alarm on  General Comment: Patient pleasant, cooperative, eager to improve.    Subjective   Precautions:  Precautions  Medical Precautions: Fall precautions ([place])    Objective   Pain:  Pain Assessment  Pain Assessment: 0-10  Pain Score: 8  Pain Type: Acute pain  Pain Location: Leg  Pain Orientation: Left  Pain Interventions: Medication (See MAR), Repositioned (RN provided with meds per patient request)  Cognition:  Cognition  Overall Cognitive Status: Within Functional Limits  Postural Control:  Postural Control  Postural Control: Within Functional Limits  Static Sitting Balance  Static Sitting-Balance Support: Feet supported, Bilateral upper extremity supported  Static Sitting-Level of Assistance: Close supervision  Static Standing Balance  Static Standing-Balance Support: Bilateral upper extremity supported  Static Standing-Level of Assistance: Moderate assistance  Static Standing-Comment/Number of Minutes: Maintains < 30\" each trial    Activity Tolerance:  Activity Tolerance  Endurance: Tolerates 10 - 20 min exercise with multiple rests  Treatments:  Therapeutic Exercise  Therapeutic Exercise Performed: Yes  Therapeutic Exercise Activity 1: Sitting EOB: ankle pumps B 2 x 20; LAQ B 2 x 20; Marching B 2 x 20                             Bed Mobility  Bed Mobility: Yes  Bed Mobility 1  Bed Mobility 1: Supine to sitting  Level of Assistance 1: Minimum assistance (trunk with use of rail)  Bed Mobility 2  Bed Mobility  2: Sitting to supine  Level of Assistance 2: Moderate assistance (L LE and for repositioning)  Bed Mobility 3  Bed Mobility 3: Scooting  Level of Assistance 3: Close supervision       Transfers  Transfer: Yes  Transfer 1  Transfer From 1: Sit to, Stand " "to  Transfer to 1: Sit, Stand  Technique 1: Sit to stand, Stand to sit  Transfer Device 1: Walker  Transfer Level of Assistance 1: Moderate assistance  Trials/Comments 1: Maintains standing < 30\"; 2 trials completed. Shoes donned for transfer, removed at end of session         Outcome Measures:  [facility] Basic Mobility  Turning from your back to your side while in a flat bed without using bedrails: A little  Moving from lying on your back to sitting on the side of a flat bed without using bedrails: A little  Moving to and from bed to chair (including a wheelchair): Total  Standing up from a chair using your arms (e.g. wheelchair or bedside chair): A lot  To walk in hospital room: Total  Climbing 3-5 steps with railing: Total  Basic Mobility - Total Score: 11    Education Documentation  Precautions, taught by Sarita Tucker PT at 1/31/2024  2:54 PM.  Learner: Patient  Readiness: Acceptance  Method: Explanation  Response: Verbalizes Understanding    Body Mechanics, taught by Sarita Tucker PT at 1/31/2024  2:54 PM.  Learner: Patient  Readiness: Acceptance  Method: Explanation  Response: Verbalizes Understanding    Mobility Training, taught by Sarita Tucker PT at 1/31/2024  2:54 PM.  Learner: Patient  Readiness: Acceptance  Method: Explanation  Response: Verbalizes Understanding    Education Comments  No comments found.        OP EDUCATION:       Encounter Problems       Encounter Problems (Active)       Mobility       STG - Patient will ambulate with LRD 5' min A (Progressing)       Start:  01/31/24    Expected End:  02/14/24                   Transfers       STG - Patient to transfer to and from sit to supine supervision (Progressing)       Start:  01/31/24    Expected End:  02/14/24                STG - Patient will transfer sit to and from stand CGA (Progressing)       Start:  01/31/24    Expected End:  02/14/24                      Encounter Problems (Resolved)       Balance       STG - Maintains static sitting " balance with upper extremity support (Met)       Start:  01/27/24    Resolved:  01/31/24    INTERVENTIONS:  1. Practice sitting on the edge of a bed/mat with minimal support.  2. Educate patient about maintining total hip precautions while maintaining balance.  3. Educate patient about pressure relief.  4. Educate patient about use of assistive device.

## 2024-01-31 NOTE — PROGRESS NOTES
Teresa Matthews is a 73 y.o. female on day 5 of admission presenting with Cellulitis of lower extremity, unspecified laterality.    Subjective   No new complaints  Awaiting placement at [facility]     Scheduled medications  amiodarone, 200 mg, oral, Daily  apixaban, 5 mg, oral, BID  aspirin, 81 mg, oral, Every Day  buPROPion XL, 150 mg, oral, q AM  ceFAZolin, 2 g, intravenous, q8h  desvenlafaxine, 100 mg, oral, Daily  famotidine, 40 mg, oral, Daily  gabapentin, 600 mg, oral, TID  insulin lispro, 0-10 Units, subcutaneous, TID with meals  insulin lispro, 8 Units, subcutaneous, TID with meals  insulin NPH (Isophane), 20 Units, subcutaneous, q12h  levothyroxine, 225 mcg, oral, Daily  [Held by provider] losartan, 25 mg, oral, Daily  metoprolol succinate XL, 50 mg, oral, Daily  polyethylene glycol, 17 g, oral, Daily  potassium chloride CR, 10 mEq, oral, BID  [Held by provider] spironolactone, 25 mg, oral, Daily  [Held by provider] torsemide, 40 mg, oral, Daily  traZODone, 50 mg, oral, Nightly      Objective   Physical Exam  Constitutional:       General: She is not in acute distress.  Neurological:      Mental Status: She is alert.         Last Recorded Vitals  Blood pressure 124/66, pulse 92, temperature 36 °C (96.8 °F), temperature source Temporal, resp. rate 16, height 1.829 m (6'), weight 109 kg (240 lb 9.6 oz), SpO2 92 %.  Intake/Output last 3 Shifts:  I/O last 3 completed shifts:  In: 200 (1.8 mL/kg) [IV Piggyback:200]  Out: 1900 (17.4 mL/kg) [Urine:1900 (0.5 mL/kg/hr)]  Weight: 109.1 kg     Relevant Results  Results from last 7 days   Lab Units 01/31/24  1125 01/31/24  0727 01/30/24 2057 01/30/24  1622 01/30/24  1128 01/30/24  0737 01/30/24  0600 01/29/24  0724 01/29/24  0509 01/28/24  0716 01/28/24  0526 01/27/24  1121 01/27/24  0857 01/26/24  1804 01/26/24  1558   POCT GLUCOSE mg/dL 250* 183* 169* 186* 294*   < >  --    < >  --    < >  --    < >  --    < >  --    GLUCOSE mg/dL  --   --   --   --   --    --  202*  --  137*  --  107*  --  335*  --  576*    < > = values in this interval not displayed.          Assessment/Plan   Principal Problem:    Cellulitis of lower extremity, unspecified laterality    IMPRESSION  TYPE 2 DIABETES MELLITUS WITH HYPERGLYCEMIA  LONG TERM CURRENT USE OF INSULIN  Patient stopped taking insulin  Admitted with hyperglycemia  Postprandial hyperglycemia     HYPOTHYROIDISM, POSTOPERATIVE        RECOMMENDATIONS  NPH 25 unit subcutaneous BID  Lispro 10 units QAC plus corrective scale  Continue above program in a health care facility  Plan to resume Humalog Mix 75/25 when she goes home  Levothyroxine 225 mcg/day      Kirill Scott MD

## 2024-01-31 NOTE — PROGRESS NOTES
Teresa Matthews is a 73 y.o. female on day 5 of admission presenting with Cellulitis of lower extremity, unspecified laterality.    Subjective   Interval History: no fever, no new complaints        Review of Systems    Objective   Range of Vitals (last 24 hours)  Heart Rate:  [65-92]   Temp:  [36 °C (96.8 °F)-36.6 °C (97.9 °F)]   Resp:  [16]   BP: (115-124)/(59-66)   SpO2:  [92 %-95 %]   Daily Weight  01/26/24 : 109 kg (240 lb 9.6 oz)    Body mass index is 32.63 kg/m².    Physical Exam  Constitutional:       Appearance: Normal appearance.   HENT:      Head: Normocephalic and atraumatic.      Mouth/Throat:      Mouth: Mucous membranes are moist.      Pharynx: Oropharynx is clear.   Eyes:      Pupils: Pupils are equal, round, and reactive to light.   Cardiovascular:      Rate and Rhythm: Normal rate and regular rhythm.      Heart sounds: Normal heart sounds.   Pulmonary:      Effort: Pulmonary effort is normal.      Breath sounds: Normal breath sounds.   Abdominal:      General: Abdomen is flat. Bowel sounds are normal.      Palpations: Abdomen is soft.   Musculoskeletal:         General: Swelling present.      Cervical back: Normal range of motion.      Comments: Lt calf wound, less edema and redness   Neurological:      Mental Status: She is alert.         Antibiotics  vancomycin (Vancocin) in dextrose 5 % water (D5W) 500 mL IV 1,500 mg  piperacillin-tazobactam-dextrose (Zosyn) IV 3.375 g  insulin regular (HumuLIN R) injection 10 Units  iohexol (OMNIPaque) 350 mg iodine/mL solution 80 mL    insulin regular (HumuLIN R) injection 10 Units  amiodarone (Pacerone) tablet 200 mg  apixaban (Eliquis) tablet 5 mg  aspirin EC tablet 81 mg  buPROPion XL (Wellbutrin XL) 24 hr tablet 150 mg  desvenlafaxine (Pristiq) 24 hr tablet 100 mg  famotidine (Pepcid) tablet 40 mg  gabapentin (Neurontin) capsule 600 mg  levothyroxine (Synthroid, Levoxyl) tablet 175 mcg  losartan (Cozaar) tablet 25 mg  metoprolol succinate XL  (Toprol-XL) 24 hr tablet 50 mg  nystatin (Mycostatin) 100,000 unit/gram powder 1 Application  oxyCODONE-acetaminophen (Percocet) 5-325 mg per tablet 1 tablet  potassium chloride CR (Klor-Con) ER tablet 10 mEq  spironolactone (Aldactone) tablet 25 mg  torsemide (Demadex) tablet 40 mg  traZODone (Desyrel) tablet 50 mg  ondansetron (Zofran) tablet 4 mg  ondansetron (Zofran) injection 4 mg  polyethylene glycol (Glycolax, Miralax) packet 17 g  dextrose 50 % injection 25 g  glucagon (Glucagen) injection 1 mg  dextrose 10 % in water (D10W) infusion  insulin glargine (Lantus) injection 20 Units  insulin lispro (HumaLOG) injection 8 Units  insulin regular (HumuLIN R) injection 0-10 Units  piperacillin-tazobactam-dextrose (Zosyn) IV 4.5 g  vancomycin (Vancocin) 1,500 mg in dextrose 5 % in water (D5W) 500 mL IV  dextrose 50 % injection 25 g  insulin NPH (Isophane) (HumuLIN N,NovoLIN N) injection 30 Units  insulin lispro (HumaLOG) injection 0-10 Units  levothyroxine (Synthroid, Levoxyl) tablet 225 mcg  oxygen (O2) therapy  ceFAZolin (Ancef) 2 g IV in dextrose 5% 50 mL  ceFAZolin in dextrose (iso-os) (Ancef) IVPB 2 g      Relevant Results  Labs  Results from last 72 hours   Lab Units 01/30/24  0600 01/29/24  0509   WBC AUTO x10*3/uL 5.2 5.8   HEMOGLOBIN g/dL 11.0* 11.8*   HEMATOCRIT % 34.8* 37.0   PLATELETS AUTO x10*3/uL 155 154   NEUTROS PCT AUTO % 52.3 54.6   LYMPHS PCT AUTO % 30.4 28.4   MONOS PCT AUTO % 12.6 12.6   EOS PCT AUTO % 3.1 2.8       Results from last 72 hours   Lab Units 01/30/24  0600 01/29/24  0509   SODIUM mmol/L 133* 136   POTASSIUM mmol/L 3.8 3.6   CHLORIDE mmol/L 95* 96*   CO2 mmol/L 31 29   BUN mg/dL 25* 21   CREATININE mg/dL 1.55* 1.56*   GLUCOSE mg/dL 202* 137*   CALCIUM mg/dL 8.1* 8.1*   ANION GAP mmol/L 11 15   EGFR mL/min/1.73m*2 35* 35*             Estimated Creatinine Clearance: 45.3 mL/min (A) (by C-G formula based on SCr of 1.55 mg/dL (H)).  C-Reactive Protein   Date Value Ref Range Status    01/26/2024 1.91 (H) <1.00 mg/dL Final     CRP   Date Value Ref Range Status   07/26/2023 1.28 (A) mg/dL Final     Comment:     REF VALUE  < 1.00     06/09/2023 0.39 mg/dL Final     Comment:     REF VALUE  < 1.00     Microbiology  Reviewed  Imaging  reviewed        Assessment/Plan   Left calf wound / stasis / possible cellulitis, US is negative for collection  Legs stasis     Recommendations :  Continue Cefazolin, plan on a short course of oral Keflex with discharge  Discussed with the wound care team     I spent minutes in the professional and overall care of this patient.      Lady Butterfield MD

## 2024-01-31 NOTE — PROGRESS NOTES
Teresa Matthews is a 73 y.o. female on day 5 of admission presenting with Cellulitis of lower extremity, unspecified laterality.      Subjective   No acute distress, patient is medically stable awaiting disposition       Objective     Last Recorded Vitals  /65 (BP Location: Right arm, Patient Position: Lying)   Pulse 65   Temp 36.8 °C (98.2 °F)   Resp 16   Wt 109 kg (240 lb 9.6 oz)   SpO2 93%   Intake/Output last 3 Shifts:    Intake/Output Summary (Last 24 hours) at 1/31/2024 1628  Last data filed at 1/30/2024 2307  Gross per 24 hour   Intake 100 ml   Output 1000 ml   Net -900 ml         Admission Weight  Weight: 113 kg (250 lb) (01/26/24 1531)    Daily Weight  01/26/24 : 109 kg (240 lb 9.6 oz)    Image Results  ECG 12 lead  Sinus rhythm with Premature supraventricular complexes  Low voltage QRS  Cannot rule out Anterior infarct , age undetermined  Abnormal ECG  When compared with ECG of 10-SEP-2022 13:55,  Previous ECG has undetermined rhythm, needs review      Physical Exam  Constitutional:       Appearance: She is obese.   HENT:      Head: Normocephalic and atraumatic.      Nose: Nose normal.      Mouth/Throat:      Mouth: Mucous membranes are dry.   Eyes:      Extraocular Movements: Extraocular movements intact.      Conjunctiva/sclera: Conjunctivae normal.   Cardiovascular:      Rate and Rhythm: Normal rate and regular rhythm.      Pulses: Normal pulses.      Heart sounds: Normal heart sounds.   Pulmonary:      Effort: Pulmonary effort is normal.      Breath sounds: Normal breath sounds.   Abdominal:      General: Bowel sounds are normal.      Palpations: Abdomen is soft.   Musculoskeletal:         General: Normal range of motion.      Cervical back: Normal range of motion and neck supple.      Right lower leg: Edema present.      Left lower leg: Edema present.   Skin:     General: Skin is warm and dry.      Findings: Erythema present.      Comments: Venous stasis well bilateral lower  extremities.  Large ulcer posterior aspect of left lower extremity seems to be chronic and nonhealing   Neurological:      General: No focal deficit present.      Mental Status: She is alert and oriented to person, place, and time. Mental status is at baseline.   Psychiatric:         Mood and Affect: Mood normal.         Behavior: Behavior normal.              Assessment/Plan        73 y.o. female  with history of poorly controlled diabetes mellitus type 2, atrial fibrillation on anticoagulation, congestive heart failure, left total knee arthroplasty complicated by E. coli infection and MRSA infection of the foot s/p left  total knee explant and synovectomy, comminuted fractures of proximal tibial  and fibular, bedbound, anxiety and depression, GERD, hypothyroidism, morbid obesity, COPD, CKD stage III comes into the hospital due to worsening wound on her left lower extremity and poor living conditions and not able to care for self.         Principal Problem:    Cellulitis of lower extremity, unspecified laterality    Left calf wound with possible cellulitis   -Patient was started with vancomycin and Zosyn now switched to cefazolin 2 g IV every 8 hours.  -Blood culture NGTD  -Appreciate recommendations from ID ultrasound of the calf wound  with no evidence of abscess at this time..  ID recommends: Continue Cefazolin, plan on a short course of oral Keflex with discharge   -Doppler with no DVT.     Poorly controlled diabetes mellitus type 2 with hyperglycemia  -Not compliant with home medications.  Given 10 units of insulin in ER with minimal improvement.  -Appreciate endocrinology recommendations.  NPH 30 units SQ twice daily  -Lispro 8 units before every meal plus corrective scale  -Plan to resume Humalog Mix 75/25 at discharge  -A1c over 15.  -Blood glucose improved on current regimen.     Chronic diastolic congestive heart failure  -Aldactone and torsemide at home on hold due to mild SANDI.  Continue with potassium  as well.  -Echo from 2020 showed LVEF of 55 to 60%.  Showed diastolic dysfunction.  Mild aortic valve regurgitation and mild mitral regurg.     Atrial fibrillation  -Continue with home amiodarone and Eliquis.  Continue with metoprolol.     COPD  -Continue with breathing treatments     CKD stage III  -Monitor renal functions avoid nephrotoxins  -Any noted to be 1.55>1.56 today holding diuretics and ARB at this time.  Monitor renal functions.  Will likely resume on discharge.     Hypothyroidism  -Levothyroxine has been increased to 225.  Unclear if she has been compliant with her home medications but per record she has.  TSH 40.63     VTE prophylaxis: On Eliquis     Medically stable for discharge, awaiting placement              Khadar Rodrigues DO

## 2024-02-01 VITALS
RESPIRATION RATE: 16 BRPM | TEMPERATURE: 97.9 F | HEART RATE: 63 BPM | BODY MASS INDEX: 32.59 KG/M2 | SYSTOLIC BLOOD PRESSURE: 134 MMHG | WEIGHT: 240.6 LBS | OXYGEN SATURATION: 92 % | HEIGHT: 72 IN | DIASTOLIC BLOOD PRESSURE: 61 MMHG

## 2024-02-01 DIAGNOSIS — S82.102D CLOSED FRACTURE OF PROXIMAL END OF LEFT TIBIA WITH ROUTINE HEALING, UNSPECIFIED FRACTURE MORPHOLOGY, SUBSEQUENT ENCOUNTER: ICD-10-CM

## 2024-02-01 DIAGNOSIS — G89.29 OTHER CHRONIC PAIN: Primary | ICD-10-CM

## 2024-02-01 DIAGNOSIS — G89.4 CHRONIC PAIN SYNDROME: ICD-10-CM

## 2024-02-01 DIAGNOSIS — M17.0 PRIMARY OSTEOARTHRITIS OF BOTH KNEES: ICD-10-CM

## 2024-02-01 PROBLEM — L03.119 CELLULITIS OF LOWER EXTREMITY, UNSPECIFIED LATERALITY: Status: RESOLVED | Noted: 2024-01-26 | Resolved: 2024-02-01

## 2024-02-01 LAB
GLUCOSE BLD MANUAL STRIP-MCNC: 128 MG/DL (ref 74–99)
GLUCOSE BLD MANUAL STRIP-MCNC: 234 MG/DL (ref 74–99)

## 2024-02-01 PROCEDURE — 82947 ASSAY GLUCOSE BLOOD QUANT: CPT

## 2024-02-01 PROCEDURE — 99238 HOSP IP/OBS DSCHRG MGMT 30/<: CPT | Performed by: FAMILY MEDICINE

## 2024-02-01 PROCEDURE — 2500000002 HC RX 250 W HCPCS SELF ADMINISTERED DRUGS (ALT 637 FOR MEDICARE OP, ALT 636 FOR OP/ED): Performed by: INTERNAL MEDICINE

## 2024-02-01 PROCEDURE — 2500000001 HC RX 250 WO HCPCS SELF ADMINISTERED DRUGS (ALT 637 FOR MEDICARE OP): Performed by: INTERNAL MEDICINE

## 2024-02-01 PROCEDURE — 2500000004 HC RX 250 GENERAL PHARMACY W/ HCPCS (ALT 636 FOR OP/ED): Performed by: INTERNAL MEDICINE

## 2024-02-01 RX ORDER — OXYCODONE AND ACETAMINOPHEN 5; 325 MG/1; MG/1
1 TABLET ORAL EVERY 4 HOURS PRN
Qty: 120 TABLET | Refills: 0 | Status: SHIPPED | OUTPATIENT
Start: 2024-02-01 | End: 2024-02-09 | Stop reason: SDUPTHER

## 2024-02-01 RX ADMIN — POTASSIUM CHLORIDE 10 MEQ: 750 TABLET, EXTENDED RELEASE ORAL at 09:22

## 2024-02-01 RX ADMIN — BUPROPION HYDROCHLORIDE 150 MG: 150 TABLET, FILM COATED, EXTENDED RELEASE ORAL at 09:21

## 2024-02-01 RX ADMIN — METOPROLOL SUCCINATE 50 MG: 50 TABLET, EXTENDED RELEASE ORAL at 09:22

## 2024-02-01 RX ADMIN — DESVENLAFAXINE SUCCINATE 100 MG: 100 TABLET, EXTENDED RELEASE ORAL at 09:21

## 2024-02-01 RX ADMIN — INSULIN HUMAN 20 UNITS: 100 INJECTION, SUSPENSION SUBCUTANEOUS at 09:22

## 2024-02-01 RX ADMIN — AMIODARONE HYDROCHLORIDE 200 MG: 200 TABLET ORAL at 09:21

## 2024-02-01 RX ADMIN — CEFAZOLIN SODIUM 2 G: 2 INJECTION, SOLUTION INTRAVENOUS at 00:30

## 2024-02-01 RX ADMIN — INSULIN LISPRO 10 UNITS: 100 INJECTION, SOLUTION INTRAVENOUS; SUBCUTANEOUS at 09:23

## 2024-02-01 RX ADMIN — FAMOTIDINE 40 MG: 20 TABLET ORAL at 09:21

## 2024-02-01 RX ADMIN — CEFAZOLIN SODIUM 2 G: 2 INJECTION, SOLUTION INTRAVENOUS at 09:22

## 2024-02-01 RX ADMIN — APIXABAN 5 MG: 5 TABLET, FILM COATED ORAL at 09:22

## 2024-02-01 RX ADMIN — GABAPENTIN 600 MG: 300 CAPSULE ORAL at 09:20

## 2024-02-01 RX ADMIN — LEVOTHYROXINE SODIUM 225 MCG: 0.17 TABLET ORAL at 05:31

## 2024-02-01 RX ADMIN — POLYETHYLENE GLYCOL 3350 17 G: 17 POWDER, FOR SOLUTION ORAL at 09:21

## 2024-02-01 ASSESSMENT — PAIN SCALES - GENERAL: PAINLEVEL_OUTOF10: 0 - NO PAIN

## 2024-02-01 ASSESSMENT — PAIN - FUNCTIONAL ASSESSMENT: PAIN_FUNCTIONAL_ASSESSMENT: 0-10

## 2024-02-01 NOTE — DISCHARGE SUMMARY
Discharge Diagnosis  Cellulitis of lower extremity, unspecified laterality, poorly controlled type 2 diabetes mellitus, chronic diastolic heart failure, atrial fibrillation, COPD, stage III chronic kidney disease, hypothyroidism     Issues Requiring Follow-Up  Continue follow-up with podiatry and wound care in outpatient settings.  Continue to work with PT OT and SNF.  Follow-up with endocrinology in outpatient settings.  Continue outpatient follow-up with cardiology.     Discharge Meds      Your medication list          START taking these medications         Instructions Last Dose Given Next Dose Due   cephalexin 500 mg capsule  Commonly known as: Keflex       Take 1 capsule (500 mg) by mouth 2 times a day for 5 days.                    CHANGE how you take these medications         Instructions Last Dose Given Next Dose Due   levothyroxine 75 mcg tablet  Commonly known as: Synthroid, Levoxyl  Start taking on: January 30, 2024  What changed:   medication strength  how much to take       Take 3 tablets (225 mcg) by mouth once daily. Do not start before January 30, 2024.                    CONTINUE taking these medications         Instructions Last Dose Given Next Dose Due   amiodarone 200 mg tablet  Commonly known as: Pacerone       Take 1 tablet (200 mg) by mouth once daily.          apixaban 5 mg tablet  Commonly known as: Eliquis       Take 1 tablet (5 mg) by mouth 2 times a day.          ascorbic acid (vitamin C) 500 mg capsule               aspirin 81 mg EC tablet               BIOTIN ORAL               buPROPion  mg 24 hr tablet  Commonly known as: Wellbutrin XL       Take 1 tablet (150 mg) by mouth once daily in the morning. Do not crush, chew, or split.          calcium citrate-vitamin D3 200 mg-6.25 mcg (250 unit) tablet               cholecalciferol 50,000 unit capsule  Commonly known as: Vitamin D-3               clotrimazole-betamethasone cream  Commonly known as: Lotrisone                desvenlafaxine 100 mg 24 hr tablet  Commonly known as: Pristiq       Take 1 tablet (100 mg) by mouth once daily. Do not crush, chew, or split. Instead of venlafaxine.          famotidine 40 mg tablet  Commonly known as: Pepcid       Take 1 tablet (40 mg) by mouth once daily.          FreeStyle Bulmaro 2 Sensor kit  Generic drug: FreeStyle Bulmaro sensor system       Inject 1 each under the skin every 14 (fourteen) days. Test blood sugar 1-4 times per day as needed for diabetes, replace every 14 days          gabapentin 600 mg tablet  Commonly known as: Neurontin       Take 1 tablet (600 mg) by mouth 3 times a day.          glucagon 1 mg injection  Commonly known as: Glucagen       Inject 1 mg into the shoulder, thigh, or buttocks 1 time if needed for low blood sugar - see comments for up to 1 dose. USE AS DIRECTED.          HumaLOG Mix 75-25 KwikPen 100 unit/mL (75-25) injection  Generic drug: insulin lispro protamin-lispro               losartan 25 mg tablet  Commonly known as: Cozaar       Take 1 tablet (25 mg) by mouth once daily.          metFORMIN  mg 24 hr tablet  Commonly known as: Glucophage-XR       Take 2 tablets (1,000 mg) by mouth 2 times a day. Do not crush, chew, or split.          metoprolol succinate XL 50 mg 24 hr tablet  Commonly known as: Toprol-XL       Take 1 tablet (50 mg) by mouth once daily.          naloxone 4 mg/0.1 mL nasal spray  Commonly known as: Narcan       Administer 1 spray (4 mg) into affected nostril(s) if needed for opioid reversal for up to 1 day. May repeat every 2-3 minutes if needed, alternating nostrils, until medical assistance becomes available.          nystatin 100,000 unit/gram powder  Commonly known as: Mycostatin               omeprazole 40 mg DR capsule  Commonly known as: PriLOSEC               OneTouch Ultra Test strip  Generic drug: blood sugar diagnostic               oxyCODONE-acetaminophen  mg tablet  Commonly known as: Percocet       Take 1 tablet by  "mouth every 4 hours if needed for severe pain (7 - 10). Do not start before January 12, 2024.          pen needle 1/2\" 29G X 12mm needle  Commonly known as: Easy Touch       Inject 3 each under the skin 4 times a day as needed (as needed for sugars). Use as instructed          potassium chloride CR 10 mEq ER tablet  Commonly known as: Klor-Con       Take 1 tablet (10 mEq) by mouth in the morning and 1 tablet (10 mEq) before bedtime. Do not crush, chew, or split..          spironolactone 25 mg tablet  Commonly known as: Aldactone               SUPER B-50 COMPLEX ORAL               torsemide 20 mg tablet  Commonly known as: Demadex               traZODone 50 mg tablet  Commonly known as: Desyrel       Take 1 tablet (50 mg) by mouth once daily at bedtime.          underpads pad  Commonly known as: Bed Underpads       1 each 2 times a day.          vibegron 75 mg tablet       Take 1 tablet by mouth once daily.          WOMEN'S MULTIVITAMIN ORAL                         STOP taking these medications       Excedrin Extra Strength 250-250-65 mg tablet  Generic drug: aspirin-acetaminophen-caffeine         promethazine-DM 6.25-15 mg/5 mL syrup  Commonly known as: Phenergan-DM         Sudafed 30 mg tablet  Generic drug: pseudoephedrine                   ASK your doctor about these medications         Instructions Last Dose Given Next Dose Due   insulin lispro 100 unit/mL injection  Commonly known as: HumaLOG U-100 Insulin       Inject 0.1 mL (10 Units) under the skin 3 times a day with meals. Take as directed per insulin instructions. Adjust per Sliding scale- 200-250 6u, 250-300 8u 350-400 10u 400+ 12u                        Where to Get Your Medications          These medications were sent to [facility] [address] [address]        Phone: [phone]   cephalexin 500 mg capsule  levothyroxine 75 mcg tablet            Test Results Pending At Discharge  Pending " Labs         Order Current Status     Blood Culture Preliminary result     Blood Culture Preliminary result     Blood Culture Preliminary result     Blood Culture Preliminary result                Hospital Course   73 y.o. female  with history of poorly controlled diabetes mellitus type 2, atrial fibrillation on anticoagulation, congestive heart failure, left total knee arthroplasty complicated by E. coli infection and MRSA infection of the foot s/p left  total knee explant and synovectomy, comminuted fractures of proximal tibial  and fibular, bedbound, anxiety and depression, GERD, hypothyroidism, morbid obesity, COPD, CKD stage III comes into the hospital due to worsening wound on her left lower extremity and poor living conditions and not able to care for self.  Patient was started on vancomycin and Zosyn initially.  Infectious disease were consulted and antibiotic were switched to cefazolin.  Podiatry were consulted and the patient had some excisional debridement.  Ultrasound of the calf wound with without any evidence of abscess at this time.  ID recommends outpatient Keflex course.  Doppler was done without any evidence of DVT.  Regarding poorly controlled diabetes mellitus and the patient has not been taking her medications at home.  Endocrinology were consulted and recommended to continue NPH and sliding scale at home.  The patient blood glucose improved.  At discharge as patient was going to a nursing facility endocrinology further recommended to continue NPH 25 units subcutaneous twice daily with lispro 10 units before every meal plus corrective scale.  If patient were to return to home Humalog mix 75/25 was recommended. The patient's Synthroid has been increased to 225 and recommend outpatient monitoring of her TSH.  Her TSH was 40.63 here and hemoglobin A1c was more than 15.  Patient also worked with PT OT qualifies for rehab and she is stable for discharge to rehab with outpatient follow-up.      Pertinent Physical Exam At Time of Discharge  Physical Exam  Constitutional:       Appearance: She is obese.   HENT:      Head: Normocephalic and atraumatic.      Nose: Nose normal.      Mouth/Throat:   Eyes:      Extraocular Movements: Extraocular movements intact.      Conjunctiva/sclera: Conjunctivae normal.   Cardiovascular:      Rate and Rhythm: Normal rate and regular rhythm.      Pulses: Normal pulses.      Heart sounds: Normal heart sounds.   Pulmonary:      Effort: Pulmonary effort is normal.      Breath sounds: Normal breath sounds.   Abdominal:      General: Bowel sounds are normal.      Palpations: Abdomen is soft.   Musculoskeletal:         General: Normal range of motion.      Cervical back: Normal range of motion and neck supple.       Skin:     General: Skin is warm and dry.      Comments: Left lower extremity wound currently in dressing.  Dressing is clean dry intact.   Neurological:      General: No focal deficit present.      Mental Status: She is alert and oriented to person, place, and time. Mental status is at baseline.   Psychiatric:         Mood and Affect: Mood normal.         Behavior: Behavior normal.         Thought Content: Thought content normal.         Judgment: Judgment normal.            Outpatient Follow-Up         Future Appointments   Date Time Provider Department Center   2/13/2024  1:40 PM Monica Macario MD [id number] [facility]

## 2024-02-01 NOTE — PROGRESS NOTES
Occupational Therapy                 Therapy Communication Note    Patient Name: Teresa Matthews  MRN: [id number]  Today's Date: 2/1/2024     Discipline: Occupational Therapy    Missed Visit Reason: Patient refused (Pt declined treatment pending discharge to rehab at noon.)    Missed Time: Attempt

## 2024-02-01 NOTE — PROGRESS NOTES
Teresa Matthews is a 73 y.o. female on day 6 of admission presenting with Cellulitis of lower extremity, unspecified laterality.    Subjective   Interval History: no fever, no new complaints        Review of Systems    Objective   Range of Vitals (last 24 hours)  Heart Rate:  [63-70]   Temp:  [36.6 °C (97.9 °F)-36.8 °C (98.2 °F)]   Resp:  [16]   BP: (122-134)/(61-68)   SpO2:  [92 %-93 %]   Daily Weight  01/26/24 : 109 kg (240 lb 9.6 oz)    Body mass index is 32.63 kg/m².    Physical Exam  Constitutional:       Appearance: Normal appearance.   HENT:      Head: Normocephalic and atraumatic.      Mouth/Throat:      Mouth: Mucous membranes are moist.      Pharynx: Oropharynx is clear.   Eyes:      Pupils: Pupils are equal, round, and reactive to light.   Cardiovascular:      Rate and Rhythm: Normal rate and regular rhythm.      Heart sounds: Normal heart sounds.   Pulmonary:      Effort: Pulmonary effort is normal.      Breath sounds: Normal breath sounds.   Abdominal:      General: Abdomen is flat. Bowel sounds are normal.      Palpations: Abdomen is soft.   Musculoskeletal:         General: Swelling present.      Cervical back: Normal range of motion.      Comments: Lt calf wound, less edema and redness   Neurological:      Mental Status: She is alert.         Antibiotics  vancomycin (Vancocin) in dextrose 5 % water (D5W) 500 mL IV 1,500 mg  piperacillin-tazobactam-dextrose (Zosyn) IV 3.375 g  insulin regular (HumuLIN R) injection 10 Units  iohexol (OMNIPaque) 350 mg iodine/mL solution 80 mL    insulin regular (HumuLIN R) injection 10 Units  amiodarone (Pacerone) tablet 200 mg  apixaban (Eliquis) tablet 5 mg  aspirin EC tablet 81 mg  buPROPion XL (Wellbutrin XL) 24 hr tablet 150 mg  desvenlafaxine (Pristiq) 24 hr tablet 100 mg  famotidine (Pepcid) tablet 40 mg  gabapentin (Neurontin) capsule 600 mg  levothyroxine (Synthroid, Levoxyl) tablet 175 mcg  losartan (Cozaar) tablet 25 mg  metoprolol succinate XL  (Toprol-XL) 24 hr tablet 50 mg  nystatin (Mycostatin) 100,000 unit/gram powder 1 Application  oxyCODONE-acetaminophen (Percocet) 5-325 mg per tablet 1 tablet  potassium chloride CR (Klor-Con) ER tablet 10 mEq  spironolactone (Aldactone) tablet 25 mg  torsemide (Demadex) tablet 40 mg  traZODone (Desyrel) tablet 50 mg  ondansetron (Zofran) tablet 4 mg  ondansetron (Zofran) injection 4 mg  polyethylene glycol (Glycolax, Miralax) packet 17 g  dextrose 50 % injection 25 g  glucagon (Glucagen) injection 1 mg  dextrose 10 % in water (D10W) infusion  insulin glargine (Lantus) injection 20 Units  insulin lispro (HumaLOG) injection 8 Units  insulin regular (HumuLIN R) injection 0-10 Units  piperacillin-tazobactam-dextrose (Zosyn) IV 4.5 g  vancomycin (Vancocin) 1,500 mg in dextrose 5 % in water (D5W) 500 mL IV  dextrose 50 % injection 25 g  insulin NPH (Isophane) (HumuLIN N,NovoLIN N) injection 30 Units  insulin lispro (HumaLOG) injection 0-10 Units  levothyroxine (Synthroid, Levoxyl) tablet 225 mcg  oxygen (O2) therapy  ceFAZolin (Ancef) 2 g IV in dextrose 5% 50 mL  ceFAZolin in dextrose (iso-os) (Ancef) IVPB 2 g      Relevant Results  Labs  Results from last 72 hours   Lab Units 01/30/24  0600   WBC AUTO x10*3/uL 5.2   HEMOGLOBIN g/dL 11.0*   HEMATOCRIT % 34.8*   PLATELETS AUTO x10*3/uL 155   NEUTROS PCT AUTO % 52.3   LYMPHS PCT AUTO % 30.4   MONOS PCT AUTO % 12.6   EOS PCT AUTO % 3.1       Results from last 72 hours   Lab Units 01/30/24  0600   SODIUM mmol/L 133*   POTASSIUM mmol/L 3.8   CHLORIDE mmol/L 95*   CO2 mmol/L 31   BUN mg/dL 25*   CREATININE mg/dL 1.55*   GLUCOSE mg/dL 202*   CALCIUM mg/dL 8.1*   ANION GAP mmol/L 11   EGFR mL/min/1.73m*2 35*             Estimated Creatinine Clearance: 45.3 mL/min (A) (by C-G formula based on SCr of 1.55 mg/dL (H)).  C-Reactive Protein   Date Value Ref Range Status   01/26/2024 1.91 (H) <1.00 mg/dL Final     CRP   Date Value Ref Range Status   07/26/2023 1.28 (A) mg/dL Final      Comment:     REF VALUE  < 1.00     06/09/2023 0.39 mg/dL Final     Comment:     REF VALUE  < 1.00     Microbiology  Reviewed  Imaging  reviewed        Assessment/Plan   Left calf wound / stasis / possible cellulitis, US is negative for collection  Legs stasis     Recommendations :  Plan on a short course of oral Keflex with discharge  Discussed with the wound care team     I spent minutes in the professional and overall care of this patient.      Lady Butterfield MD

## 2024-02-02 ENCOUNTER — NURSING HOME VISIT (OUTPATIENT)
Dept: POST ACUTE CARE | Facility: EXTERNAL LOCATION | Age: 74
End: 2024-02-02
Payer: MEDICARE

## 2024-02-02 ENCOUNTER — TELEPHONE (OUTPATIENT)
Dept: PRIMARY CARE | Facility: CLINIC | Age: 74
End: 2024-02-02
Payer: MEDICARE

## 2024-02-02 DIAGNOSIS — J44.9 CHRONIC OBSTRUCTIVE PULMONARY DISEASE, UNSPECIFIED COPD TYPE (MULTI): ICD-10-CM

## 2024-02-02 DIAGNOSIS — R53.1 WEAKNESS: Primary | ICD-10-CM

## 2024-02-02 DIAGNOSIS — L03.116 CELLULITIS OF LEFT LEG: ICD-10-CM

## 2024-02-02 DIAGNOSIS — Z79.4 TYPE 2 DIABETES MELLITUS WITH HYPERGLYCEMIA, WITH LONG-TERM CURRENT USE OF INSULIN (MULTI): ICD-10-CM

## 2024-02-02 DIAGNOSIS — I48.91 ATRIAL FIBRILLATION, UNSPECIFIED TYPE (MULTI): ICD-10-CM

## 2024-02-02 DIAGNOSIS — E11.65 TYPE 2 DIABETES MELLITUS WITH HYPERGLYCEMIA, WITH LONG-TERM CURRENT USE OF INSULIN (MULTI): ICD-10-CM

## 2024-02-02 DIAGNOSIS — I11.0 HYPERTENSIVE HEART DISEASE WITH CONGESTIVE HEART FAILURE, UNSPECIFIED HEART FAILURE TYPE (MULTI): ICD-10-CM

## 2024-02-02 PROCEDURE — 99309 SBSQ NF CARE MODERATE MDM 30: CPT | Performed by: NURSE PRACTITIONER

## 2024-02-02 NOTE — LETTER
Patient: Teresa Matthews  : 1950    Encounter Date: 2024    PROGRESS NOTE    Subjective  Chief complaint: Teresa Matthews is a 73 y.o. female who is an acute skilled patient being seen and evaluated for weakness    HPI:  HPI  This patient was admitted to SNF for therapy due to generalized weakness and for medical management after recent hospitalization cellulitis of the left leg.  Patient to use the antibiotic, tolerating without adverse effects.  Therapy to evaluate and establish plan of care.  Patient seen and examined at bedside in no apparent distress.  Nursing staff voices no new concerns today.  Patient denies nausea vomiting fever chills.     Objective  Vital signs: 100/53, 98.0, 76, 18, blood sugar 373    Physical Exam  Constitutional:       General: She is not in acute distress.  Eyes:      Extraocular Movements: Extraocular movements intact.   Cardiovascular:      Rate and Rhythm: Normal rate.   Pulmonary:      Effort: Pulmonary effort is normal.      Breath sounds: Decreased breath sounds present.   Musculoskeletal:      Cervical back: Neck supple.      Right lower leg: Edema present.      Left lower leg: Edema present.      Comments: Generalized weakness  ACE to LLE   Neurological:      Mental Status: She is alert.   Psychiatric:         Mood and Affect: Mood normal.         Behavior: Behavior is cooperative.         Assessment/Plan  Problem List Items Addressed This Visit       Atrial fibrillation (CMS/HCC)     Bleeding precautions  Amiodarone  Apixaban  Monitor heart rate         Cellulitis of left leg     Continue cephalexin until complete, 2024  ID followed         Chronic obstructive pulmonary disease, unspecified COPD type (CMS/HCC)     On room air  Monitor for shortness of breath         Hypertensive heart disease with CHF (CMS/HCC)     BP at goal  CHF stable, no sob  Monitor blood pressure  Losartan potassium  Toprol-XL  Spironolactone  Torsemide  Monitor for shortness  of breath         Type 2 diabetes mellitus with hyperglycemia, with long-term current use of insulin (CMS/[facility])     Continue insulin  Glucoscan with sliding scale insulin coverage  Low concentrated sweets diet         Weakness - Primary     Therapy to evaluate and establish plan of care          Medications, treatments, and labs reviewed  Continue medications and treatments as listed in EMR      Scribe Attestation  Lorna CHICAS Scribe   attest that this documentation has been prepared under the direction and in the presence of MARISELA Nelson    Provider Attestation - Scribe documentation  All medical record entries made by the Scribe were at my direction and personally dictated by me. I have reviewed the chart and agree that the record accurately reflects my personal performance of the history, physical exam, discussion and plan.   MARISELA Nelson        Electronically Signed By: MARISELA Nelson   2/9/24  3:12 PM

## 2024-02-05 ENCOUNTER — NURSING HOME VISIT (OUTPATIENT)
Dept: POST ACUTE CARE | Facility: EXTERNAL LOCATION | Age: 74
End: 2024-02-05
Payer: MEDICARE

## 2024-02-05 DIAGNOSIS — I48.91 ATRIAL FIBRILLATION, UNSPECIFIED TYPE (MULTI): ICD-10-CM

## 2024-02-05 DIAGNOSIS — K21.9 GASTROESOPHAGEAL REFLUX DISEASE WITHOUT ESOPHAGITIS: ICD-10-CM

## 2024-02-05 DIAGNOSIS — F32.A DEPRESSION, UNSPECIFIED DEPRESSION TYPE: ICD-10-CM

## 2024-02-05 DIAGNOSIS — Z79.4 TYPE 2 DIABETES MELLITUS WITH HYPERGLYCEMIA, WITH LONG-TERM CURRENT USE OF INSULIN (MULTI): ICD-10-CM

## 2024-02-05 DIAGNOSIS — E11.65 TYPE 2 DIABETES MELLITUS WITH HYPERGLYCEMIA, WITH LONG-TERM CURRENT USE OF INSULIN (MULTI): ICD-10-CM

## 2024-02-05 DIAGNOSIS — J44.9 CHRONIC OBSTRUCTIVE PULMONARY DISEASE, UNSPECIFIED COPD TYPE (MULTI): ICD-10-CM

## 2024-02-05 DIAGNOSIS — I11.0 HYPERTENSIVE HEART DISEASE WITH CONGESTIVE HEART FAILURE, UNSPECIFIED HEART FAILURE TYPE (MULTI): ICD-10-CM

## 2024-02-05 DIAGNOSIS — L03.116 CELLULITIS OF LEFT LEG: Primary | ICD-10-CM

## 2024-02-05 PROBLEM — I50.42 CHRONIC COMBINED SYSTOLIC AND DIASTOLIC CONGESTIVE HEART FAILURE (MULTI): Status: RESOLVED | Noted: 2023-04-10 | Resolved: 2024-02-05

## 2024-02-05 PROBLEM — S82.102D CLOSED FRACTURE OF PROXIMAL END OF LEFT TIBIA WITH ROUTINE HEALING: Status: RESOLVED | Noted: 2023-06-21 | Resolved: 2024-02-05

## 2024-02-05 PROCEDURE — 99305 1ST NF CARE MODERATE MDM 35: CPT | Performed by: INTERNAL MEDICINE

## 2024-02-05 NOTE — ASSESSMENT & PLAN NOTE
Monitor blood pressure  Losartan potassium  Toprol-XL  Spironolactone  Torsemide  Monitor for shortness of breath

## 2024-02-05 NOTE — ASSESSMENT & PLAN NOTE
Monitor glucoscans  Insulin  Metformin  Sliding scale insulin  Monitor labs  Low concentrated sweets diet

## 2024-02-05 NOTE — PROGRESS NOTES
HISTORY & PHYSICAL    Subjective   Chief complaint: Teresa Matthews is a 73 y.o. female who is being seen and evaluated for multiple medical problems.  Patient admitted to SNF for therapy due to weakness after recent hospitalization.    HPI:  HPI  Patient is a past medical history of left total knee arthroplasty complicated by E. coli and MRSA infection of the foot status post total knee explant and synovectomy.  Patient presented to ED due to worsening wound of left lower extremity and unable to care for self.  Patient was admitted for further evaluation and management.  Patient diagnosed with cellulitis of left lower extremity.  Patient was started on antibiotics.  ID consulted on patient as well as podiatry.  ID recommended continuing antibiotics.  Doppler completed revealed no evidence of DVT.  Patient followed by endocrinology for diabetes, poorly controlled.  Patient's insulin was adjusted.  PT OT evaluated patient recommending SNF for continued medical management and therapy.  Patient was hemodynamically stable for discharge.  Patient was seen and examined at bedside, appears to be in no apparent distress.  Denies chest pain or shortness of breath.  Denies nausea or vomiting.    Past Medical History:   Diagnosis Date    Abnormal weight gain 04/10/2023    Allergic rhinitis 04/10/2023    Body mass index (BMI) 31.0-31.9, adult 06/30/2020    Body mass index (BMI) of 31.0 to 31.9 in adult    Body mass index (BMI) 32.0-32.9, adult 01/04/2021    Body mass index (BMI) of 32.0 to 32.9 in adult    Body mass index (BMI) 33.0-33.9, adult 01/04/2021    Body mass index (BMI) of 33.0 to 33.9 in adult    Body mass index (BMI) 35.0-35.9, adult 04/09/2020    Body mass index (BMI) of 35.0 to 35.9 in adult    Body mass index (BMI)40.0-44.9, adult 11/14/2019    Body mass index (BMI) of 40.0 to 44.9 in adult    Chronic insomnia 04/10/2023    Depression, major, in remission (CMS/HCC) 04/10/2023    Essential (primary)  hypertension 09/06/2022    Hypertension    H/O bariatric surgery 04/10/2023    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    Hypoxia 04/10/2023    Kidney stone on right side 04/10/2023    Left toe amputee (CMS/[facility]) 04/10/2023    Malaise and fatigue 04/10/2023    Nasal septum ulceration 04/10/2023    Nontoxic multinodular goiter 07/02/2021    Multiple thyroid nodules    Personal history of other diseases of the circulatory system     History of congestive heart failure    Personal history of other diseases of the musculoskeletal system and connective tissue     History of arthritis    Personal history of other diseases of the nervous system and sense organs 10/11/2022    History of sleep apnea    Personal history of other diseases of the respiratory system     History of lung disease    Personal history of other diseases of urinary system 02/19/2020    History of hematuria    Personal history of other endocrine, nutritional and metabolic disease 07/02/2021    History of morbid obesity    Personal history of other endocrine, nutritional and metabolic disease 04/24/2017    History of diabetic neuropathy    Prosthetic joint infection (CMS/[facility]) 04/10/2023    Recurrent major depressive disorder, in remission (CMS/[facility]) 04/10/2023    Status post gastric bypass for obesity 04/10/2023    Vertigo of central origin 04/10/2023       Past Surgical History:   Procedure Laterality Date    HYSTERECTOMY  01/20/2016    Hysterectomy    OTHER SURGICAL HISTORY  01/07/2022    Toe amputation    OTHER SURGICAL HISTORY  05/28/2021    Gastric bypass surgery    TONSILLECTOMY  01/20/2016    Tonsillectomy    TOTAL KNEE ARTHROPLASTY  05/28/2021    Knee Replacement    TUBAL LIGATION  04/24/2017    Tubal Ligation       Family History   Problem Relation Name Age of Onset    Coronary artery disease Mother      Liver disease Mother      Other (non-hodgkins lymphoma) Mother      Stroke Father      Deafness Father      Hypertension Father          Social History     Socioeconomic History    Marital status: Single     Spouse name: Not on file    Number of children: Not on file    Years of education: Not on file    Highest education level: Not on file   Occupational History    Not on file   Tobacco Use    Smoking status: Never    Smokeless tobacco: Never   Vaping Use    Vaping Use: Never used   Substance and Sexual Activity    Alcohol use: Never    Drug use: Never    Sexual activity: Defer   Other Topics Concern    Not on file   Social History Narrative    Not on file     Social Determinants of Health     Financial Resource Strain: Low Risk  (1/31/2024)    Overall Financial Resource Strain (CARDIA)     Difficulty of Paying Living Expenses: Not hard at all   Food Insecurity: Not on file   Transportation Needs: No Transportation Needs (1/31/2024)    PRAPARE - Transportation     Lack of Transportation (Medical): No     Lack of Transportation (Non-Medical): No   Physical Activity: Not on file   Stress: Not on file   Social Connections: Not on file   Intimate Partner Violence: Not on file   Housing Stability: Low Risk  (1/31/2024)    Housing Stability Vital Sign     Unable to Pay for Housing in the Last Year: No     Number of Places Lived in the Last Year: 1     Unstable Housing in the Last Year: No       Vital signs: 125/65, 97.5, 68, 18, blood sugar 160, 96%    Objective   Physical Exam  Constitutional:       General: She is not in acute distress.  Eyes:      Extraocular Movements: Extraocular movements intact.   Cardiovascular:      Rate and Rhythm: Normal rate and regular rhythm.   Pulmonary:      Effort: Pulmonary effort is normal.      Breath sounds: Normal breath sounds.   Abdominal:      General: Bowel sounds are normal.      Palpations: Abdomen is soft.   Musculoskeletal:      Cervical back: Neck supple.      Right lower leg: No edema.      Left lower leg: Edema present.   Neurological:      Mental Status: She is alert.   Psychiatric:         Mood and  Affect: Mood normal.         Behavior: Behavior is cooperative.         Assessment/Plan   Problem List Items Addressed This Visit       Type 2 diabetes mellitus with hyperglycemia, with long-term current use of insulin (CMS/[facility])     Monitor glucoscans  Insulin  Metformin  Sliding scale insulin  Monitor labs  Low concentrated sweets diet         Atrial fibrillation (CMS/[facility])     Bleeding precautions  Amiodarone  Apixaban  Monitor heart rate         GERD (gastroesophageal reflux disease)     PPI  Monitor GI symptoms         Hypertensive heart disease with CHF (CMS/[facility])     Monitor blood pressure  Losartan potassium  Toprol-XL  Spironolactone  Torsemide  Monitor for shortness of breath         Chronic obstructive pulmonary disease, unspecified COPD type (CMS/[facility])     On room air  Monitor for shortness of breath         Cellulitis of left leg - Primary     Continue cephalexin until complete, 2/6/2024  ID followed         Depression     Monitor mood and behaviors.  Bupropion  Desvenlafaxine  Trazodone          Hospital records reviewed  Medications, treatments, and labs reviewed  Continue medications and treatments as listed in EMR  Discussed with nursing and therapy      Scribe Attestation  I, Donato Pageibe   attest that this documentation has been prepared under the direction and in the presence of Pita Virgen MD    Provider Attestation - Scribe documentation  All medical record entries made by the Scribe were at my direction and personally dictated by me. I have reviewed the chart and agree that the record accurately reflects my personal performance of the history, physical exam, discussion and plan.   Pita Virgen MD

## 2024-02-05 NOTE — LETTER
Patient: Teresa Matthews  : 1950    Encounter Date: 2024    HISTORY & PHYSICAL    Subjective  Chief complaint: Teresa Matthews is a 73 y.o. female who is being seen and evaluated for multiple medical problems.  Patient admitted to SNF for therapy due to weakness after recent hospitalization.    HPI:  HPI  Patient is a past medical history of left total knee arthroplasty complicated by E. coli and MRSA infection of the foot status post total knee explant and synovectomy.  Patient presented to ED due to worsening wound of left lower extremity and unable to care for self.  Patient was admitted for further evaluation and management.  Patient diagnosed with cellulitis of left lower extremity.  Patient was started on antibiotics.  ID consulted on patient as well as podiatry.  ID recommended continuing antibiotics.  Doppler completed revealed no evidence of DVT.  Patient followed by endocrinology for diabetes, poorly controlled.  Patient's insulin was adjusted.  PT OT evaluated patient recommending SNF for continued medical management and therapy.  Patient was hemodynamically stable for discharge.  Patient was seen and examined at bedside, appears to be in no apparent distress.  Denies chest pain or shortness of breath.  Denies nausea or vomiting.    Past Medical History:   Diagnosis Date   • Abnormal weight gain 04/10/2023   • Allergic rhinitis 04/10/2023   • Body mass index (BMI) 31.0-31.9, adult 2020    Body mass index (BMI) of 31.0 to 31.9 in adult   • Body mass index (BMI) 32.0-32.9, adult 2021    Body mass index (BMI) of 32.0 to 32.9 in adult   • Body mass index (BMI) 33.0-33.9, adult 2021    Body mass index (BMI) of 33.0 to 33.9 in adult   • Body mass index (BMI) 35.0-35.9, adult 2020    Body mass index (BMI) of 35.0 to 35.9 in adult   • Body mass index (BMI)40.0-44.9, adult 2019    Body mass index (BMI) of 40.0 to 44.9 in adult   • Chronic insomnia 04/10/2023   •  Depression, major, in remission (CMS/HCC) 04/10/2023   • Essential (primary) hypertension 09/06/2022    Hypertension   • H/O bariatric surgery 04/10/2023   • 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   • Hypoxia 04/10/2023   • Kidney stone on right side 04/10/2023   • Left toe amputee (CMS/HCC) 04/10/2023   • Malaise and fatigue 04/10/2023   • Nasal septum ulceration 04/10/2023   • Nontoxic multinodular goiter 07/02/2021    Multiple thyroid nodules   • Personal history of other diseases of the circulatory system     History of congestive heart failure   • Personal history of other diseases of the musculoskeletal system and connective tissue     History of arthritis   • Personal history of other diseases of the nervous system and sense organs 10/11/2022    History of sleep apnea   • Personal history of other diseases of the respiratory system     History of lung disease   • Personal history of other diseases of urinary system 02/19/2020    History of hematuria   • Personal history of other endocrine, nutritional and metabolic disease 07/02/2021    History of morbid obesity   • Personal history of other endocrine, nutritional and metabolic disease 04/24/2017    History of diabetic neuropathy   • 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   • Recurrent major depressive disorder, in remission (CMS/HCC) 04/10/2023   • Status post gastric bypass for obesity 04/10/2023   • Vertigo of central origin 04/10/2023       Past Surgical History:   Procedure Laterality Date   • HYSTERECTOMY  01/20/2016    Hysterectomy   • OTHER SURGICAL HISTORY  01/07/2022    Toe amputation   • OTHER SURGICAL HISTORY  05/28/2021    Gastric bypass surgery   • TONSILLECTOMY  01/20/2016    Tonsillectomy   • TOTAL KNEE ARTHROPLASTY  05/28/2021    Knee Replacement   • TUBAL LIGATION  04/24/2017    Tubal Ligation       Family History   Problem Relation Name Age of Onset   • Coronary artery disease Mother     • Liver disease Mother     • Other  (non-hodgkins lymphoma) Mother     • Stroke Father     • Deafness Father     • Hypertension Father         Social History     Socioeconomic History   • Marital status: Single     Spouse name: Not on file   • Number of children: Not on file   • Years of education: Not on file   • Highest education level: Not on file   Occupational History   • Not on file   Tobacco Use   • Smoking status: Never   • Smokeless tobacco: Never   Vaping Use   • Vaping Use: Never used   Substance and Sexual Activity   • Alcohol use: Never   • Drug use: Never   • Sexual activity: Defer   Other Topics Concern   • Not on file   Social History Narrative   • Not on file     Social Determinants of Health     Financial Resource Strain: Low Risk  (1/31/2024)    Overall Financial Resource Strain (CARDIA)    • Difficulty of Paying Living Expenses: Not hard at all   Food Insecurity: Not on file   Transportation Needs: No Transportation Needs (1/31/2024)    PRAPARE - Transportation    • Lack of Transportation (Medical): No    • Lack of Transportation (Non-Medical): No   Physical Activity: Not on file   Stress: Not on file   Social Connections: Not on file   Intimate Partner Violence: Not on file   Housing Stability: Low Risk  (1/31/2024)    Housing Stability Vital Sign    • Unable to Pay for Housing in the Last Year: No    • Number of Places Lived in the Last Year: 1    • Unstable Housing in the Last Year: No       Vital signs: 125/65, 97.5, 68, 18, blood sugar 160, 96%    Objective  Physical Exam  Constitutional:       General: She is not in acute distress.  Eyes:      Extraocular Movements: Extraocular movements intact.   Cardiovascular:      Rate and Rhythm: Normal rate and regular rhythm.   Pulmonary:      Effort: Pulmonary effort is normal.      Breath sounds: Normal breath sounds.   Abdominal:      General: Bowel sounds are normal.      Palpations: Abdomen is soft.   Musculoskeletal:      Cervical back: Neck supple.      Right lower leg: No  edema.      Left lower leg: Edema present.   Neurological:      Mental Status: She is alert.   Psychiatric:         Mood and Affect: Mood normal.         Behavior: Behavior is cooperative.         Assessment/Plan  Problem List Items Addressed This Visit       Type 2 diabetes mellitus with hyperglycemia, with long-term current use of insulin (CMS/[facility])     Monitor glucoscans  Insulin  Metformin  Sliding scale insulin  Monitor labs  Low concentrated sweets diet         Atrial fibrillation (CMS/[facility])     Bleeding precautions  Amiodarone  Apixaban  Monitor heart rate         GERD (gastroesophageal reflux disease)     PPI  Monitor GI symptoms         Hypertensive heart disease with CHF (CMS/[facility])     Monitor blood pressure  Losartan potassium  Toprol-XL  Spironolactone  Torsemide  Monitor for shortness of breath         Chronic obstructive pulmonary disease, unspecified COPD type (CMS/[facility])     On room air  Monitor for shortness of breath         Cellulitis of left leg - Primary     Continue cephalexin until complete, 2/6/2024  ID followed         Depression     Monitor mood and behaviors.  Bupropion  Desvenlafaxine  Trazodone          Hospital records reviewed  Medications, treatments, and labs reviewed  Continue medications and treatments as listed in EMR  Discussed with nursing and therapy      Scribe Attestation  I, Donato Pageibgénesis   attest that this documentation has been prepared under the direction and in the presence of Pita Virgen MD    Provider Attestation - Scribe documentation  All medical record entries made by the Scribe were at my direction and personally dictated by me. I have reviewed the chart and agree that the record accurately reflects my personal performance of the history, physical exam, discussion and plan.   Pita Virgen MD      Electronically Signed By: Pita Virgen MD   2/5/24  5:43 PM

## 2024-02-06 ENCOUNTER — NURSING HOME VISIT (OUTPATIENT)
Dept: POST ACUTE CARE | Facility: EXTERNAL LOCATION | Age: 74
End: 2024-02-06
Payer: MEDICARE

## 2024-02-06 DIAGNOSIS — Z79.4 TYPE 2 DIABETES MELLITUS WITH HYPERGLYCEMIA, WITH LONG-TERM CURRENT USE OF INSULIN (MULTI): ICD-10-CM

## 2024-02-06 DIAGNOSIS — J44.9 CHRONIC OBSTRUCTIVE PULMONARY DISEASE, UNSPECIFIED COPD TYPE (MULTI): ICD-10-CM

## 2024-02-06 DIAGNOSIS — E11.65 TYPE 2 DIABETES MELLITUS WITH HYPERGLYCEMIA, WITH LONG-TERM CURRENT USE OF INSULIN (MULTI): ICD-10-CM

## 2024-02-06 DIAGNOSIS — K21.9 GASTROESOPHAGEAL REFLUX DISEASE WITHOUT ESOPHAGITIS: ICD-10-CM

## 2024-02-06 DIAGNOSIS — L03.116 CELLULITIS OF LEFT LEG: ICD-10-CM

## 2024-02-06 DIAGNOSIS — I11.0 HYPERTENSIVE HEART DISEASE WITH CONGESTIVE HEART FAILURE, UNSPECIFIED HEART FAILURE TYPE (MULTI): ICD-10-CM

## 2024-02-06 DIAGNOSIS — F32.A DEPRESSION, UNSPECIFIED DEPRESSION TYPE: ICD-10-CM

## 2024-02-06 DIAGNOSIS — I48.91 ATRIAL FIBRILLATION, UNSPECIFIED TYPE (MULTI): ICD-10-CM

## 2024-02-06 PROCEDURE — 99309 SBSQ NF CARE MODERATE MDM 30: CPT | Performed by: INTERNAL MEDICINE

## 2024-02-06 NOTE — ASSESSMENT & PLAN NOTE
BP at goal  CHF stable, no sob  Monitor blood pressure  Losartan potassium  Toprol-XL  Spironolactone  Torsemide  Monitor for shortness of breath

## 2024-02-06 NOTE — LETTER
Patient: Teresa Matthews  : 1950    Encounter Date: 2024    PROGRESS NOTE    Subjective  Chief complaint: Teresa Matthews is a 73 y.o. female who is an acute skilled patient being seen and evaluated for weakness    HPI:  Patient presents for general medical care and f/u.  Patient seen and examined at bedside. Patient with diagnosis of depression, denies feeling down and thoughts of harming self or others. T2DM, Patient denies vision changes, excessive thirst, sweating, urinary frequency. CHF, no changes in weight or SOB. Afib, denies palpitation or chest pain. Patient has diagnosis of GERD, denies heartburn, regurgitation, epigastric discomfort, sour taste, and cough.  She continues on ATB therapy for cellulitis, with no adverse reactions noted. She continues to work with therapy to improve strength and mobility.  No issues per nursing.  Patient has no acute complaints      Objective  Vital signs: 128/70,78,97%, BS 92    Physical Exam  Constitutional:       General: She is not in acute distress.  Eyes:      Extraocular Movements: Extraocular movements intact.   Cardiovascular:      Rate and Rhythm: Normal rate and regular rhythm.   Pulmonary:      Effort: Pulmonary effort is normal.      Breath sounds: Normal breath sounds.   Abdominal:      General: Bowel sounds are normal.      Palpations: Abdomen is soft.   Musculoskeletal:      Cervical back: Neck supple.      Right lower leg: No edema.      Left lower leg: Edema present.   Skin:     Comments: Left calf wound - dsd in place    Neurological:      Mental Status: She is alert.   Psychiatric:         Mood and Affect: Mood normal.         Behavior: Behavior is cooperative.         Assessment/Plan  Problem List Items Addressed This Visit       Type 2 diabetes mellitus with hyperglycemia, with long-term current use of insulin (CMS/[facility])     Monitor glucoscans  Insulin  Metformin  Sliding scale insulin  Monitor labs  Low concentrated sweets diet          Atrial fibrillation (CMS/[facility])     Bleeding precautions  Amiodarone  Apixaban  Monitor heart rate         GERD (gastroesophageal reflux disease)     PPI  Monitor GI symptoms         Hypertensive heart disease with CHF (CMS/[facility])     Monitor blood pressure  Losartan potassium  Toprol-XL  Spironolactone  Torsemide  Monitor for shortness of breath         Chronic obstructive pulmonary disease, unspecified COPD type (CMS/[facility])     On room air  Monitor for shortness of breath         Cellulitis of left leg     Continue cephalexin until complete, 2/6/2024  ID followed         Depression     Monitor mood and behaviors.  Bupropion  Desvenlafaxine  Trazodone          Medications, treatments, and labs reviewed  Continue medications and treatments as listed in EMR    Scribe Attestation  I, Donato Chapmanibe   attest that this documentation has been prepared under the direction and in the presence of Pita Virgen MD.     Provider Attestation - Scribe documentation  All medical record entries made by the Scribe were at my direction and personally dictated by me. I have reviewed the chart and agree that the record accurately reflects my personal performance of the history, physical exam, discussion and plan.   Pita Virgen MD      Electronically Signed By: Pita Virgen MD   2/6/24  4:22 PM

## 2024-02-06 NOTE — PROGRESS NOTES
PROGRESS NOTE    Subjective   Chief complaint: Teresa Matthews is a 73 y.o. female who is an acute skilled patient being seen and evaluated for weakness    HPI:  HPI  This patient was admitted to SNF for therapy due to generalized weakness and for medical management after recent hospitalization cellulitis of the left leg.  Patient to use the antibiotic, tolerating without adverse effects.  Therapy to evaluate and establish plan of care.  Patient seen and examined at bedside in no apparent distress.  Nursing staff voices no new concerns today.  Patient denies nausea vomiting fever chills.     Objective   Vital signs: 100/53, 98.0, 76, 18, blood sugar 373    Physical Exam  Constitutional:       General: She is not in acute distress.  Eyes:      Extraocular Movements: Extraocular movements intact.   Cardiovascular:      Rate and Rhythm: Normal rate.   Pulmonary:      Effort: Pulmonary effort is normal.      Breath sounds: Decreased breath sounds present.   Musculoskeletal:      Cervical back: Neck supple.      Right lower leg: Edema present.      Left lower leg: Edema present.      Comments: Generalized weakness  ACE to LLE   Neurological:      Mental Status: She is alert.   Psychiatric:         Mood and Affect: Mood normal.         Behavior: Behavior is cooperative.         Assessment/Plan   Problem List Items Addressed This Visit       Atrial fibrillation (CMS/HCC)     Bleeding precautions  Amiodarone  Apixaban  Monitor heart rate         Cellulitis of left leg     Continue cephalexin until complete, 2/6/2024  ID followed         Chronic obstructive pulmonary disease, unspecified COPD type (CMS/HCC)     On room air  Monitor for shortness of breath         Hypertensive heart disease with CHF (CMS/HCC)     BP at goal  CHF stable, no sob  Monitor blood pressure  Losartan potassium  Toprol-XL  Spironolactone  Torsemide  Monitor for shortness of breath         Type 2 diabetes mellitus with hyperglycemia, with  long-term current use of insulin (CMS/[facility])     Continue insulin  Glucoscan with sliding scale insulin coverage  Low concentrated sweets diet         Weakness - Primary     Therapy to evaluate and establish plan of care          Medications, treatments, and labs reviewed  Continue medications and treatments as listed in EMR      Scribe Attestation  ILorna Scribe   attest that this documentation has been prepared under the direction and in the presence of MARISELA Nelson    Provider Attestation - Scribe documentation  All medical record entries made by the Scribe were at my direction and personally dictated by me. I have reviewed the chart and agree that the record accurately reflects my personal performance of the history, physical exam, discussion and plan.   MARISELA Nelson

## 2024-02-06 NOTE — PROGRESS NOTES
PROGRESS NOTE    Subjective   Chief complaint: Teresa Matthews is a 73 y.o. female who is an acute skilled patient being seen and evaluated for weakness    HPI:  Patient presents for general medical care and f/u.  Patient seen and examined at bedside. Patient with diagnosis of depression, denies feeling down and thoughts of harming self or others. T2DM, Patient denies vision changes, excessive thirst, sweating, urinary frequency. CHF, no changes in weight or SOB. Afib, denies palpitation or chest pain. Patient has diagnosis of GERD, denies heartburn, regurgitation, epigastric discomfort, sour taste, and cough.  She continues on ATB therapy for cellulitis, with no adverse reactions noted. She continues to work with therapy to improve strength and mobility.  No issues per nursing.  Patient has no acute complaints      Objective   Vital signs: 128/70,78,97%, BS 92    Physical Exam  Constitutional:       General: She is not in acute distress.  Eyes:      Extraocular Movements: Extraocular movements intact.   Cardiovascular:      Rate and Rhythm: Normal rate and regular rhythm.   Pulmonary:      Effort: Pulmonary effort is normal.      Breath sounds: Normal breath sounds.   Abdominal:      General: Bowel sounds are normal.      Palpations: Abdomen is soft.   Musculoskeletal:      Cervical back: Neck supple.      Right lower leg: No edema.      Left lower leg: Edema present.   Skin:     Comments: Left calf wound - dsd in place    Neurological:      Mental Status: She is alert.   Psychiatric:         Mood and Affect: Mood normal.         Behavior: Behavior is cooperative.         Assessment/Plan   Problem List Items Addressed This Visit       Type 2 diabetes mellitus with hyperglycemia, with long-term current use of insulin (CMS/HCC)     Monitor glucoscans  Insulin  Metformin  Sliding scale insulin  Monitor labs  Low concentrated sweets diet         Atrial fibrillation (CMS/HCC)     Bleeding  precautions  Amiodarone  Apixaban  Monitor heart rate         GERD (gastroesophageal reflux disease)     PPI  Monitor GI symptoms         Hypertensive heart disease with CHF (CMS/[facility])     Monitor blood pressure  Losartan potassium  Toprol-XL  Spironolactone  Torsemide  Monitor for shortness of breath         Chronic obstructive pulmonary disease, unspecified COPD type (CMS/[facility])     On room air  Monitor for shortness of breath         Cellulitis of left leg     Continue cephalexin until complete, 2/6/2024  ID followed         Depression     Monitor mood and behaviors.  Bupropion  Desvenlafaxine  Trazodone          Medications, treatments, and labs reviewed  Continue medications and treatments as listed in EMR    Scribe Attestation  ISharon Scribe   attest that this documentation has been prepared under the direction and in the presence of Pita Virgen MD.     Provider Attestation - Scribe documentation  All medical record entries made by the Scribe were at my direction and personally dictated by me. I have reviewed the chart and agree that the record accurately reflects my personal performance of the history, physical exam, discussion and plan.   Pita Virgen MD

## 2024-02-07 ENCOUNTER — NURSING HOME VISIT (OUTPATIENT)
Dept: POST ACUTE CARE | Facility: EXTERNAL LOCATION | Age: 74
End: 2024-02-07
Payer: MEDICARE

## 2024-02-07 DIAGNOSIS — I11.0 HYPERTENSIVE HEART DISEASE WITH CONGESTIVE HEART FAILURE, UNSPECIFIED HEART FAILURE TYPE (MULTI): ICD-10-CM

## 2024-02-07 DIAGNOSIS — R53.1 WEAKNESS: Primary | ICD-10-CM

## 2024-02-07 DIAGNOSIS — J44.9 CHRONIC OBSTRUCTIVE PULMONARY DISEASE, UNSPECIFIED COPD TYPE (MULTI): ICD-10-CM

## 2024-02-07 DIAGNOSIS — E11.65 TYPE 2 DIABETES MELLITUS WITH HYPERGLYCEMIA, WITH LONG-TERM CURRENT USE OF INSULIN (MULTI): ICD-10-CM

## 2024-02-07 DIAGNOSIS — Z79.4 TYPE 2 DIABETES MELLITUS WITH HYPERGLYCEMIA, WITH LONG-TERM CURRENT USE OF INSULIN (MULTI): ICD-10-CM

## 2024-02-07 DIAGNOSIS — K21.9 GASTROESOPHAGEAL REFLUX DISEASE WITHOUT ESOPHAGITIS: ICD-10-CM

## 2024-02-07 PROCEDURE — 99309 SBSQ NF CARE MODERATE MDM 30: CPT | Performed by: NURSE PRACTITIONER

## 2024-02-07 NOTE — LETTER
Patient: Teresa Matthews  : 1950    Encounter Date: 2024    PROGRESS NOTE    Subjective  Chief complaint: Teresa Matthews is a 73 y.o. female who is an acute skilled patient being seen and evaluated for weakness    HPI:  HPI  Patient is working in therapy due to generalized weakness.  Patient is working on gait training, transfers and ADLs.  Speech therapy is also working with patient to address cognition.  Patient is ambulating 5 feet with a walker and min assist.  Patient was seen and examined at bedside, appears to be in no apparent distress.  Denies chest pain or shortness of breath.    Objective  Vital signs: 124/68, 97.1, 72, 18, blood sugar, 95, 98%    Physical Exam  Constitutional:       General: She is not in acute distress.  Eyes:      Extraocular Movements: Extraocular movements intact.   Cardiovascular:      Rate and Rhythm: Normal rate and regular rhythm.   Pulmonary:      Effort: Pulmonary effort is normal.      Breath sounds: Normal breath sounds.   Abdominal:      General: Bowel sounds are normal.      Palpations: Abdomen is soft.   Musculoskeletal:      Cervical back: Neck supple.      Right lower leg: No edema.   Neurological:      Mental Status: She is alert.      Motor: Weakness present.   Psychiatric:         Mood and Affect: Mood normal.         Behavior: Behavior is cooperative.         Assessment/Plan  Problem List Items Addressed This Visit       Chronic obstructive pulmonary disease, unspecified COPD type (CMS/HCC)     Stable  On room air  Monitor for shortness of breath         GERD (gastroesophageal reflux disease)     PPI  Monitor GI symptoms         Hypertensive heart disease with CHF (CMS/[facility])     BP at goal  CHF stable, no sob  Monitor blood pressure  Losartan potassium  Toprol-XL  Spironolactone  Torsemide  Monitor for shortness of breath         Type 2 diabetes mellitus with hyperglycemia, with long-term current use of insulin (CMS/HCC)     Continue  insulin  Glucoscan with sliding scale insulin coverage  Low concentrated sweets diet  Fasting blood glucose at goal         Weakness - Primary     Working in therapy          Medications, treatments, and labs reviewed  Continue medications and treatments as listed in EMR      Scribe Attestation  ILorna Scribe   attest that this documentation has been prepared under the direction and in the presence of MARISELA Nelson    Provider Attestation - Scribe documentation  All medical record entries made by the Scribe were at my direction and personally dictated by me. I have reviewed the chart and agree that the record accurately reflects my personal performance of the history, physical exam, discussion and plan.   MARISELA Nelson        Electronically Signed By: MARISELA Nelson   2/14/24  1:04 PM

## 2024-02-08 ENCOUNTER — NURSING HOME VISIT (OUTPATIENT)
Dept: POST ACUTE CARE | Facility: EXTERNAL LOCATION | Age: 74
End: 2024-02-08
Payer: MEDICARE

## 2024-02-08 DIAGNOSIS — S81.802D WOUND OF LEFT LOWER EXTREMITY, SUBSEQUENT ENCOUNTER: ICD-10-CM

## 2024-02-08 DIAGNOSIS — E11.65 TYPE 2 DIABETES MELLITUS WITH HYPERGLYCEMIA, WITH LONG-TERM CURRENT USE OF INSULIN (MULTI): ICD-10-CM

## 2024-02-08 DIAGNOSIS — J44.9 CHRONIC OBSTRUCTIVE PULMONARY DISEASE, UNSPECIFIED COPD TYPE (MULTI): ICD-10-CM

## 2024-02-08 DIAGNOSIS — Z79.4 TYPE 2 DIABETES MELLITUS WITH HYPERGLYCEMIA, WITH LONG-TERM CURRENT USE OF INSULIN (MULTI): ICD-10-CM

## 2024-02-08 DIAGNOSIS — I48.91 ATRIAL FIBRILLATION, UNSPECIFIED TYPE (MULTI): ICD-10-CM

## 2024-02-08 DIAGNOSIS — R53.1 WEAKNESS: Primary | ICD-10-CM

## 2024-02-08 PROBLEM — S81.802A LEG WOUND, LEFT: Status: ACTIVE | Noted: 2023-04-10

## 2024-02-08 PROCEDURE — 99308 SBSQ NF CARE LOW MDM 20: CPT | Performed by: INTERNAL MEDICINE

## 2024-02-08 NOTE — LETTER
Patient: Teresa Matthews  : 1950    Encounter Date: 2024    PROGRESS NOTE    Subjective  Chief complaint: Teresa Matthews is a 73 y.o. female who is an acute skilled patient being seen and evaluated for weakness    HPI:  HPI  Patient is working in therapy due to generalized.  Patient is performing seated there ex in all planes for bilateral lower extremity strengthening and endurance.  Patient was able to ambulate a few feet, multiple times with min assist using front wheeled walker.  Patient was limited by fatigue and weakness.  Patient also continues with left calf wound with dressings in place with wound consult to be obtained.  Patient was seen and examined at bedside, appears to be in no apparent distress.  Denies chest pain or shortness of breath.  Afebrile.    Objective  Vital signs: 116/74, 97.8, 84, 18, blood sugar 171, 92%    Physical Exam  Constitutional:       General: She is not in acute distress.  Eyes:      Extraocular Movements: Extraocular movements intact.   Cardiovascular:      Rate and Rhythm: Normal rate and regular rhythm.   Pulmonary:      Effort: Pulmonary effort is normal.      Breath sounds: Normal breath sounds.   Abdominal:      General: Bowel sounds are normal.      Palpations: Abdomen is soft.   Musculoskeletal:      Cervical back: Neck supple.      Right lower leg: No edema.      Left lower leg: Edema present.   Skin:     Comments: Left calf wound - dsg in place    Neurological:      Mental Status: She is alert.   Psychiatric:         Mood and Affect: Mood normal.         Behavior: Behavior is cooperative.         Assessment/Plan  Problem List Items Addressed This Visit       Type 2 diabetes mellitus with hyperglycemia, with long-term current use of insulin (CMS/HCC)     Monitor glucoscans  Insulin  Metformin  Sliding scale insulin  Monitor labs  Low concentrated sweets diet         Atrial fibrillation (CMS/HCC)     Bleeding  precautions  Amiodarone  Apixaban  Monitor heart rate         Leg wound, left     Obtain wound consult  Wound care as listed         Chronic obstructive pulmonary disease, unspecified COPD type (CMS/[facility])     On room air  Monitor for shortness of breath         Weakness - Primary     Continue to work in therapy towards established goals          Medications, treatments, and labs reviewed  Continue medications and treatments as listed in EMR      Scribe Attestation  Lorna CHICAS Scribe   attest that this documentation has been prepared under the direction and in the presence of Pita Virgen MD    Provider Attestation - Scribe documentation  All medical record entries made by the Scribe were at my direction and personally dictated by me. I have reviewed the chart and agree that the record accurately reflects my personal performance of the history, physical exam, discussion and plan.   Pita Virgen MD        Electronically Signed By: Pita Virgen MD   2/8/24  2:14 PM

## 2024-02-08 NOTE — PROGRESS NOTES
PROGRESS NOTE    Subjective   Chief complaint: Teresa Matthews is a 73 y.o. female who is an acute skilled patient being seen and evaluated for weakness    HPI:  HPI  Patient is working in therapy due to generalized.  Patient is performing seated there ex in all planes for bilateral lower extremity strengthening and endurance.  Patient was able to ambulate a few feet, multiple times with min assist using front wheeled walker.  Patient was limited by fatigue and weakness.  Patient also continues with left calf wound with dressings in place with wound consult to be obtained.  Patient was seen and examined at bedside, appears to be in no apparent distress.  Denies chest pain or shortness of breath.  Afebrile.    Objective   Vital signs: 116/74, 97.8, 84, 18, blood sugar 171, 92%    Physical Exam  Constitutional:       General: She is not in acute distress.  Eyes:      Extraocular Movements: Extraocular movements intact.   Cardiovascular:      Rate and Rhythm: Normal rate and regular rhythm.   Pulmonary:      Effort: Pulmonary effort is normal.      Breath sounds: Normal breath sounds.   Abdominal:      General: Bowel sounds are normal.      Palpations: Abdomen is soft.   Musculoskeletal:      Cervical back: Neck supple.      Right lower leg: No edema.      Left lower leg: Edema present.   Skin:     Comments: Left calf wound - dsg in place    Neurological:      Mental Status: She is alert.   Psychiatric:         Mood and Affect: Mood normal.         Behavior: Behavior is cooperative.         Assessment/Plan   Problem List Items Addressed This Visit       Type 2 diabetes mellitus with hyperglycemia, with long-term current use of insulin (CMS/HCC)     Monitor glucoscans  Insulin  Metformin  Sliding scale insulin  Monitor labs  Low concentrated sweets diet         Atrial fibrillation (CMS/HCC)     Bleeding precautions  Amiodarone  Apixaban  Monitor heart rate         Leg wound, left     Obtain wound consult  Wound  care as listed         Chronic obstructive pulmonary disease, unspecified COPD type (CMS/[facility])     On room air  Monitor for shortness of breath         Weakness - Primary     Continue to work in therapy towards established goals          Medications, treatments, and labs reviewed  Continue medications and treatments as listed in EMR      Scribe Attestation  I, Kirill Page   attest that this documentation has been prepared under the direction and in the presence of Pita Virgen MD    Provider Attestation - Scribe documentation  All medical record entries made by the Scribe were at my direction and personally dictated by me. I have reviewed the chart and agree that the record accurately reflects my personal performance of the history, physical exam, discussion and plan.   Pita Virgen MD

## 2024-02-09 ENCOUNTER — NURSING HOME VISIT (OUTPATIENT)
Dept: POST ACUTE CARE | Facility: EXTERNAL LOCATION | Age: 74
End: 2024-02-09
Payer: MEDICARE

## 2024-02-09 ENCOUNTER — TELEPHONE (OUTPATIENT)
Dept: PRIMARY CARE | Facility: CLINIC | Age: 74
End: 2024-02-09
Payer: MEDICARE

## 2024-02-09 DIAGNOSIS — I48.91 ATRIAL FIBRILLATION, UNSPECIFIED TYPE (MULTI): ICD-10-CM

## 2024-02-09 DIAGNOSIS — E11.65 TYPE 2 DIABETES MELLITUS WITH HYPERGLYCEMIA, WITH LONG-TERM CURRENT USE OF INSULIN (MULTI): ICD-10-CM

## 2024-02-09 DIAGNOSIS — Z79.4 TYPE 2 DIABETES MELLITUS WITH HYPERGLYCEMIA, WITH LONG-TERM CURRENT USE OF INSULIN (MULTI): ICD-10-CM

## 2024-02-09 DIAGNOSIS — R53.1 WEAKNESS: Primary | ICD-10-CM

## 2024-02-09 DIAGNOSIS — G89.29 OTHER CHRONIC PAIN: ICD-10-CM

## 2024-02-09 DIAGNOSIS — I11.0 HYPERTENSIVE HEART DISEASE WITH CONGESTIVE HEART FAILURE, UNSPECIFIED HEART FAILURE TYPE (MULTI): ICD-10-CM

## 2024-02-09 DIAGNOSIS — K21.9 GASTROESOPHAGEAL REFLUX DISEASE WITHOUT ESOPHAGITIS: ICD-10-CM

## 2024-02-09 PROCEDURE — 99309 SBSQ NF CARE MODERATE MDM 30: CPT | Performed by: NURSE PRACTITIONER

## 2024-02-09 RX ORDER — OXYCODONE AND ACETAMINOPHEN 5; 325 MG/1; MG/1
1 TABLET ORAL EVERY 4 HOURS PRN
Qty: 180 TABLET | Refills: 0 | Status: SHIPPED | OUTPATIENT
Start: 2024-02-09 | End: 2024-02-13 | Stop reason: ENTERED-IN-ERROR

## 2024-02-09 NOTE — TELEPHONE ENCOUNTER
She called cause this needs to be [facility] so if we could send the Oxycodone 5-325 mg takes it 1 every 4 hours

## 2024-02-09 NOTE — TELEPHONE ENCOUNTER
She has appointment with you on Tues. 2/13---she is in the [facility] in [place]  Can this be a virtual visit ?

## 2024-02-09 NOTE — LETTER
Patient: Teresa Matthews  : 1950    Encounter Date: 2024    PROGRESS NOTE    Subjective  Chief complaint: Teresa Matthews is a 73 y.o. female who is an acute skilled patient being seen and evaluated for weakness    HPI:  HPI  Patient is working in therapy.  Patient is ambulating with walker requiring min assist for 5 feet.  Patient is performing transfers with min assist.  Patient is able to perform ADL goals of lower body with min assist.  Speech therapy working with patient to address cognition.  Patient seen and examined at bedside, appears to be in no apparent distress.  Denies chest pain or shortness of breath.  Afebrile.    Objective  Vital signs: 144/80, 97.6, 78, 18, blood sugar 237, 92%    Physical Exam  Constitutional:       General: She is not in acute distress.  Eyes:      Extraocular Movements: Extraocular movements intact.   Cardiovascular:      Rate and Rhythm: Normal rate and regular rhythm.   Pulmonary:      Effort: Pulmonary effort is normal.      Breath sounds: Normal breath sounds.   Abdominal:      General: Bowel sounds are normal.      Palpations: Abdomen is soft.   Musculoskeletal:      Cervical back: Neck supple.      Right lower leg: Edema present.      Left lower leg: Edema present.   Neurological:      Mental Status: She is alert.   Psychiatric:         Mood and Affect: Mood normal.         Behavior: Behavior is cooperative.         Assessment/Plan  Problem List Items Addressed This Visit       Atrial fibrillation (CMS/HCC)     Bleeding precautions  Amiodarone  Apixaban  Monitor heart rate         GERD (gastroesophageal reflux disease)     PPI  Monitor GI symptoms         Hypertensive heart disease with CHF (CMS/HCC)     CHF stable, no sob  Monitor blood pressure  Losartan potassium  Toprol-XL  Spironolactone  Torsemide  Monitor for shortness of breath         Type 2 diabetes mellitus with hyperglycemia, with long-term current use of insulin (CMS/HCC)     Continue  insulin  Glucoscan with sliding scale insulin coverage  Low concentrated sweets diet         Weakness - Primary     Continue to work in therapy towards goals          Medications, treatments, and labs reviewed  Continue medications and treatments as listed in EMR      Scribe Attestation  I, Kirill Page   attest that this documentation has been prepared under the direction and in the presence of MARISELA Nelson    Provider Attestation - Scribe documentation  All medical record entries made by the Scribe were at my direction and personally dictated by me. I have reviewed the chart and agree that the record accurately reflects my personal performance of the history, physical exam, discussion and plan.   MARISELA Nelson        Electronically Signed By: MARISELA Nelson   2/16/24  8:24 AM

## 2024-02-12 ENCOUNTER — NURSING HOME VISIT (OUTPATIENT)
Dept: POST ACUTE CARE | Facility: EXTERNAL LOCATION | Age: 74
End: 2024-02-12
Payer: MEDICARE

## 2024-02-12 ENCOUNTER — APPOINTMENT (OUTPATIENT)
Dept: RADIOLOGY | Facility: HOSPITAL | Age: 74
End: 2024-02-12
Payer: MEDICARE

## 2024-02-12 ENCOUNTER — HOSPITAL ENCOUNTER (EMERGENCY)
Facility: HOSPITAL | Age: 74
Discharge: HOME | End: 2024-02-12
Attending: EMERGENCY MEDICINE
Payer: MEDICARE

## 2024-02-12 VITALS
HEIGHT: 72 IN | HEART RATE: 68 BPM | DIASTOLIC BLOOD PRESSURE: 62 MMHG | SYSTOLIC BLOOD PRESSURE: 106 MMHG | TEMPERATURE: 98.4 F | WEIGHT: 244.93 LBS | RESPIRATION RATE: 20 BRPM | BODY MASS INDEX: 33.18 KG/M2 | OXYGEN SATURATION: 94 %

## 2024-02-12 DIAGNOSIS — S01.01XA SCALP LACERATION, INITIAL ENCOUNTER: ICD-10-CM

## 2024-02-12 DIAGNOSIS — J44.9 CHRONIC OBSTRUCTIVE PULMONARY DISEASE, UNSPECIFIED COPD TYPE (MULTI): ICD-10-CM

## 2024-02-12 DIAGNOSIS — E11.65 TYPE 2 DIABETES MELLITUS WITH HYPERGLYCEMIA, WITH LONG-TERM CURRENT USE OF INSULIN (MULTI): ICD-10-CM

## 2024-02-12 DIAGNOSIS — K21.9 GASTROESOPHAGEAL REFLUX DISEASE WITHOUT ESOPHAGITIS: ICD-10-CM

## 2024-02-12 DIAGNOSIS — I11.0 HYPERTENSIVE HEART DISEASE WITH CONGESTIVE HEART FAILURE, UNSPECIFIED HEART FAILURE TYPE (MULTI): ICD-10-CM

## 2024-02-12 DIAGNOSIS — S09.90XA HEAD INJURY, INITIAL ENCOUNTER: Primary | ICD-10-CM

## 2024-02-12 DIAGNOSIS — I48.91 ATRIAL FIBRILLATION, UNSPECIFIED TYPE (MULTI): ICD-10-CM

## 2024-02-12 DIAGNOSIS — Z79.4 TYPE 2 DIABETES MELLITUS WITH HYPERGLYCEMIA, WITH LONG-TERM CURRENT USE OF INSULIN (MULTI): ICD-10-CM

## 2024-02-12 DIAGNOSIS — F41.9 ANXIETY: ICD-10-CM

## 2024-02-12 DIAGNOSIS — R53.1 WEAKNESS: ICD-10-CM

## 2024-02-12 PROCEDURE — 99285 EMERGENCY DEPT VISIT HI MDM: CPT | Mod: 25 | Performed by: EMERGENCY MEDICINE

## 2024-02-12 PROCEDURE — 73564 X-RAY EXAM KNEE 4 OR MORE: CPT | Mod: 50

## 2024-02-12 PROCEDURE — 99309 SBSQ NF CARE MODERATE MDM 30: CPT | Performed by: INTERNAL MEDICINE

## 2024-02-12 PROCEDURE — 2500000001 HC RX 250 WO HCPCS SELF ADMINISTERED DRUGS (ALT 637 FOR MEDICARE OP)

## 2024-02-12 PROCEDURE — 72125 CT NECK SPINE W/O DYE: CPT | Performed by: RADIOLOGY

## 2024-02-12 PROCEDURE — 70450 CT HEAD/BRAIN W/O DYE: CPT

## 2024-02-12 PROCEDURE — 2500000005 HC RX 250 GENERAL PHARMACY W/O HCPCS: Performed by: NURSE PRACTITIONER

## 2024-02-12 PROCEDURE — 73564 X-RAY EXAM KNEE 4 OR MORE: CPT | Mod: BILATERAL PROCEDURE | Performed by: RADIOLOGY

## 2024-02-12 PROCEDURE — G0390 TRAUMA RESPONS W/HOSP CRITI: HCPCS

## 2024-02-12 PROCEDURE — 72125 CT NECK SPINE W/O DYE: CPT

## 2024-02-12 PROCEDURE — 70450 CT HEAD/BRAIN W/O DYE: CPT | Performed by: RADIOLOGY

## 2024-02-12 RX ORDER — LIDOCAINE HYDROCHLORIDE 10 MG/ML
5 INJECTION INFILTRATION; PERINEURAL ONCE
Status: COMPLETED | OUTPATIENT
Start: 2024-02-12 | End: 2024-02-12

## 2024-02-12 RX ORDER — BACITRACIN ZINC 500 UNIT/G
OINTMENT IN PACKET (EA) TOPICAL
Status: COMPLETED
Start: 2024-02-12 | End: 2024-02-12

## 2024-02-12 RX ORDER — ACETAMINOPHEN 325 MG/1
650 TABLET ORAL ONCE
Status: COMPLETED | OUTPATIENT
Start: 2024-02-12 | End: 2024-02-12

## 2024-02-12 RX ADMIN — ACETAMINOPHEN 650 MG: 325 TABLET ORAL at 12:40

## 2024-02-12 RX ADMIN — BACITRACIN 1 APPLICATION: 500 OINTMENT TOPICAL at 12:00

## 2024-02-12 RX ADMIN — LIDOCAINE HYDROCHLORIDE 50 MG: 10 INJECTION, SOLUTION INFILTRATION; PERINEURAL at 11:04

## 2024-02-12 ASSESSMENT — COLUMBIA-SUICIDE SEVERITY RATING SCALE - C-SSRS
1. IN THE PAST MONTH, HAVE YOU WISHED YOU WERE DEAD OR WISHED YOU COULD GO TO SLEEP AND NOT WAKE UP?: NO
2. HAVE YOU ACTUALLY HAD ANY THOUGHTS OF KILLING YOURSELF?: NO
6. HAVE YOU EVER DONE ANYTHING, STARTED TO DO ANYTHING, OR PREPARED TO DO ANYTHING TO END YOUR LIFE?: NO

## 2024-02-12 ASSESSMENT — PAIN - FUNCTIONAL ASSESSMENT
PAIN_FUNCTIONAL_ASSESSMENT: 0-10

## 2024-02-12 ASSESSMENT — PAIN SCALES - GENERAL
PAINLEVEL_OUTOF10: 6
PAINLEVEL_OUTOF10: 0 - NO PAIN
PAINLEVEL_OUTOF10: 5 - MODERATE PAIN
PAINLEVEL_OUTOF10: 6

## 2024-02-12 ASSESSMENT — LIFESTYLE VARIABLES
HAVE YOU EVER FELT YOU SHOULD CUT DOWN ON YOUR DRINKING: NO
EVER HAD A DRINK FIRST THING IN THE MORNING TO STEADY YOUR NERVES TO GET RID OF A HANGOVER: NO
HAVE PEOPLE ANNOYED YOU BY CRITICIZING YOUR DRINKING: NO
EVER FELT BAD OR GUILTY ABOUT YOUR DRINKING: NO

## 2024-02-12 ASSESSMENT — PAIN DESCRIPTION - LOCATION: LOCATION: HEAD

## 2024-02-12 ASSESSMENT — PAIN DESCRIPTION - PROGRESSION: CLINICAL_PROGRESSION: RESOLVED

## 2024-02-12 ASSESSMENT — PAIN DESCRIPTION - PAIN TYPE: TYPE: ACUTE PAIN

## 2024-02-12 NOTE — ED TRIAGE NOTES
Pt to ER with c/o head laceration after slipping in socks at rehab facility. Pt alert and oriented x 4, follows commands appropriately. Pupils equal and reactive to light.

## 2024-02-12 NOTE — LETTER
Patient: Teresa Matthews  : 1950    Encounter Date: 2024    PROGRESS NOTE    Subjective  Chief complaint: Teresa Matthews is a 73 y.o. female who is an acute skilled patient being seen and evaluated for weakness    HPI:  Patient presents for f/u therapy and general medical care.  Patient seen and examined at beside.  Therapy has been working with the patient to improve strength, endurance, ADLs, and transfers d/t generalized weakness. She is ambulating 5' with walker at min assist. Transfers and ADLs require min assist. ST working with patient on cognition. Patient has no acute concerns today.      Objective  Vital signs: 112/68,87,96%, BS (24) 394    Physical Exam  Constitutional:       General: She is not in acute distress.  Eyes:      Extraocular Movements: Extraocular movements intact.   Cardiovascular:      Rate and Rhythm: Normal rate and regular rhythm.   Pulmonary:      Effort: Pulmonary effort is normal.      Breath sounds: Normal breath sounds.   Abdominal:      General: Bowel sounds are normal.      Palpations: Abdomen is soft.   Musculoskeletal:      Cervical back: Neck supple.      Right lower leg: No edema.      Left lower leg: Edema present.   Skin:     Comments: Left calf wound - dsg in place    Neurological:      Mental Status: She is alert.   Psychiatric:         Mood and Affect: Mood normal.         Behavior: Behavior is cooperative.         Assessment/Plan  Problem List Items Addressed This Visit       Type 2 diabetes mellitus with hyperglycemia, with long-term current use of insulin (CMS/HCC)     Continue insulin  Glucoscan with sliding scale insulin coverage  Low concentrated sweets diet         Anxiety     Controlled  Continue current meds         Atrial fibrillation (CMS/HCC)     Bleeding precautions  Amiodarone  Apixaban  Monitor heart rate         GERD (gastroesophageal reflux disease)     PPI  Monitor GI symptoms         Hypertensive heart disease with CHF  (CMS/[facility])     Monitor blood pressure  Losartan potassium  Toprol-XL  Spironolactone  Torsemide  Monitor for shortness of breath         Chronic obstructive pulmonary disease, unspecified COPD type (CMS/[facility])     On room air  Monitor for shortness of breath         Weakness     Continue to work in therapy towards established goals          Medications, treatments, and labs reviewed  Continue medications and treatments as listed in EMR    Scribe Attestation  Sharon CHICAS Scribe   attest that this documentation has been prepared under the direction and in the presence of Pita Virgen MD.     Provider Attestation - Scribe documentation  All medical record entries made by the Scribe were at my direction and personally dictated by me. I have reviewed the chart and agree that the record accurately reflects my personal performance of the history, physical exam, discussion and plan.   Pita Virgen MD      Electronically Signed By: Pita Virgen MD   2/12/24  5:52 PM

## 2024-02-12 NOTE — PROGRESS NOTES
PROGRESS NOTE    Subjective   Chief complaint: Teresa Matthews is a 73 y.o. female who is an acute skilled patient being seen and evaluated for weakness    HPI:  Patient presents for f/u therapy and general medical care.  Patient seen and examined at beside.  Therapy has been working with the patient to improve strength, endurance, ADLs, and transfers d/t generalized weakness. She is ambulating 5' with walker at min assist. Transfers and ADLs require min assist. ST working with patient on cognition. Patient has no acute concerns today.      Objective   Vital signs: 112/68,87,96%, BS (2/11/24) 394    Physical Exam  Constitutional:       General: She is not in acute distress.  Eyes:      Extraocular Movements: Extraocular movements intact.   Cardiovascular:      Rate and Rhythm: Normal rate and regular rhythm.   Pulmonary:      Effort: Pulmonary effort is normal.      Breath sounds: Normal breath sounds.   Abdominal:      General: Bowel sounds are normal.      Palpations: Abdomen is soft.   Musculoskeletal:      Cervical back: Neck supple.      Right lower leg: No edema.      Left lower leg: Edema present.   Skin:     Comments: Left calf wound - dsg in place    Neurological:      Mental Status: She is alert.   Psychiatric:         Mood and Affect: Mood normal.         Behavior: Behavior is cooperative.         Assessment/Plan   Problem List Items Addressed This Visit       Type 2 diabetes mellitus with hyperglycemia, with long-term current use of insulin (CMS/[facility])     Continue insulin  Glucoscan with sliding scale insulin coverage  Low concentrated sweets diet         Anxiety     Controlled  Continue current meds         Atrial fibrillation (CMS/[facility])     Bleeding precautions  Amiodarone  Apixaban  Monitor heart rate         GERD (gastroesophageal reflux disease)     PPI  Monitor GI symptoms         Hypertensive heart disease with CHF (CMS/[facility])     Monitor blood pressure  Losartan  potassium  Toprol-XL  Spironolactone  Torsemide  Monitor for shortness of breath         Chronic obstructive pulmonary disease, unspecified COPD type (CMS/[facility])     On room air  Monitor for shortness of breath         Weakness     Continue to work in therapy towards established goals          Medications, treatments, and labs reviewed  Continue medications and treatments as listed in EMR    Scribe Attestation  Sharon CHICAS Scribe   attest that this documentation has been prepared under the direction and in the presence of Pita Virgen MD.     Provider Attestation - Scribe documentation  All medical record entries made by the Scribe were at my direction and personally dictated by me. I have reviewed the chart and agree that the record accurately reflects my personal performance of the history, physical exam, discussion and plan.   Pita Virgen MD

## 2024-02-12 NOTE — ED PROVIDER NOTES
Chief Complaint   Patient presents with    Head Injury     C/O head injury with laceration at rehab facility.       HPI       73 year old female presents to the Emergency Department today complaining of suffering a head injury that occurred this am. Notes that her feet slipped causing her to fall forward and land on her knees. Then, she fell forward and hit her head on the ground causing a laceration. Denies any loss of consciousness or seizure-like activity. Denies any other injuries. Denies any associated fever, chills, headache, neck pain, chest pain, shortness of breath, abdominal pain, nausea, vomiting, diarrhea, constipation, or urinary symptoms. Last Tetanus shot was less than 5 years ago.       History provided by:  Patient and EMS personnel             Patient History   Past Medical History:   Diagnosis Date    Abnormal weight gain 04/10/2023    Allergic rhinitis 04/10/2023    Body mass index (BMI) 31.0-31.9, adult 06/30/2020    Body mass index (BMI) of 31.0 to 31.9 in adult    Body mass index (BMI) 32.0-32.9, adult 01/04/2021    Body mass index (BMI) of 32.0 to 32.9 in adult    Body mass index (BMI) 33.0-33.9, adult 01/04/2021    Body mass index (BMI) of 33.0 to 33.9 in adult    Body mass index (BMI) 35.0-35.9, adult 04/09/2020    Body mass index (BMI) of 35.0 to 35.9 in adult    Body mass index (BMI)40.0-44.9, adult 11/14/2019    Body mass index (BMI) of 40.0 to 44.9 in adult    Chronic insomnia 04/10/2023    Depression, major, in remission (CMS/HCC) 04/10/2023    Essential (primary) hypertension 09/06/2022    Hypertension    H/O bariatric surgery 04/10/2023    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    Hypoxia 04/10/2023    Kidney stone on right side 04/10/2023    Left toe amputee (CMS/HCC) 04/10/2023    Malaise and fatigue 04/10/2023    Nasal septum ulceration 04/10/2023    Nontoxic multinodular goiter 07/02/2021    Multiple thyroid nodules    Personal history of other diseases of the circulatory  system     History of congestive heart failure    Personal history of other diseases of the musculoskeletal system and connective tissue     History of arthritis    Personal history of other diseases of the nervous system and sense organs 10/11/2022    History of sleep apnea    Personal history of other diseases of the respiratory system     History of lung disease    Personal history of other diseases of urinary system 02/19/2020    History of hematuria    Personal history of other endocrine, nutritional and metabolic disease 07/02/2021    History of morbid obesity    Personal history of other endocrine, nutritional and metabolic disease 04/24/2017    History of diabetic neuropathy    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    Recurrent major depressive disorder, in remission (CMS/HCC) 04/10/2023    Status post gastric bypass for obesity 04/10/2023    Vertigo of central origin 04/10/2023     Past Surgical History:   Procedure Laterality Date    HYSTERECTOMY  01/20/2016    Hysterectomy    OTHER SURGICAL HISTORY  01/07/2022    Toe amputation    OTHER SURGICAL HISTORY  05/28/2021    Gastric bypass surgery    TONSILLECTOMY  01/20/2016    Tonsillectomy    TOTAL KNEE ARTHROPLASTY  05/28/2021    Knee Replacement    TUBAL LIGATION  04/24/2017    Tubal Ligation     Family History   Problem Relation Name Age of Onset    Coronary artery disease Mother      Liver disease Mother      Other (non-hodgkins lymphoma) Mother      Stroke Father      Deafness Father      Hypertension Father       Social History     Tobacco Use    Smoking status: Never    Smokeless tobacco: Never   Vaping Use    Vaping Use: Never used   Substance Use Topics    Alcohol use: Never    Drug use: Never           Physical Exam  Constitutional:       Appearance: Normal appearance.   HENT:      Head: Normocephalic.      Right Ear: External ear normal.      Left Ear: External ear normal.      Ears:      Comments: Bilateral auditory canals are  non-inflamed and non-reddened. Bilateral TMs are pearly gray with good light reflex. No hemotympanum or villarreal signs noted.      Nose: Nose normal.      Comments: Septum midline without deviation. Turbinates are not inflamed and reddened. No septal hematoma noted.      Mouth/Throat:      Comments:  Mucous membranes are moist. All teeth are intact. Uvula midline without deviation rises upon phonation. Tonsils 1+ without exudate.   Eyes:      Comments: Bilateral conjunctiva are without injection, discharge, or drainage. PERRL with consensual pupil response bilaterally. EOM's are intact without any signs of entrapment. No hyphema or raccoon's eyes.   Cardiovascular:      Rate and Rhythm: Normal rate and regular rhythm.      Pulses:           Radial pulses are 3+ on the right side and 3+ on the left side.      Heart sounds: Normal heart sounds. No murmur heard.     No friction rub. No gallop.   Pulmonary:      Effort: Pulmonary effort is normal.      Breath sounds: Normal breath sounds. No wheezing, rhonchi or rales.   Abdominal:      General: Abdomen is flat. Bowel sounds are normal.      Palpations: Abdomen is soft.      Tenderness: There is no right CVA tenderness, left CVA tenderness, guarding or rebound. Negative signs include Barr's sign and McBurney's sign.   Musculoskeletal:      Cervical back: Full passive range of motion without pain, normal range of motion and neck supple.        Legs:       Comments: No edema, cyanosis, or clubbing noted. No obvious deformities or ecchymosis noted to bilateral knees, but there is edema with tenderness and abrasions to bilateral patellas. Full ROM. Bilateral dorsalis pedis pulses are strong and regular. Capillary refill was within normal limits. Sensation is intact distally.    Lymphadenopathy:      Cervical: No cervical adenopathy.   Skin:     Comments: No rashes, lesions, petechiae, or purpura. No signs of infection. 2-3cm stellate laceration noted to her forehead.     Neurological:      Mental Status: She is alert and oriented to person, place, and time.      Comments: Alert and oriented to person, place, or time. Follows commands well. Hand grasps and push/pulls of upper and lower extremities are equally strong bilaterally. Gait is steady and strong.    Psychiatric:         Attention and Perception: Attention normal.         Mood and Affect: Mood normal.         Speech: Speech normal.         Labs Reviewed - No data to display    XR knee 4+ views bilateral   Final Result   Chronic findings.             MACRO:   None        Signed by: Vidal Johnston 2/12/2024 9:53 AM   Dictation workstation:   [id number]      CT head wo IV contrast   Final Result   No acute intracranial hemorrhage or mass-effect.        MACRO:   None.        Signed by: Trudi Hooper 2/12/2024 8:16 AM   Dictation workstation:   [id number]      CT cervical spine wo IV contrast   Final Result   No acute cervical vertebral displacement or acute displaced fracture.   Multilevel degenerative changes with straightening of the cervical   lordosis and mild spondylolisthesis similar to 08/12/2022.        MACRO:   None.        Signed by: Trudi Hooper 2/12/2024 8:22 AM   Dictation workstation:   [id number]               ED Course & MDM   Diagnoses as of 02/12/24 1149   Head injury, initial encounter   Scalp laceration, initial encounter           Medical Decision Making  Patient was seen and evaluated by Dr. Corrigan. Limited trauma was activated. Bilateral knee x-rays and CT scans of her head and cervical spine show no acute pathology. Site was draped in a sterile manner. The area was cleansed with Shur-Clens and locally anesthetized with 7mLs of 1% Lidocaine. Thoroughly irrigated with large amounts of normal saline. Upon further exploration, there were no foreign bodies or step-offs noted. Wound edges of the stellate laceration were well approximated with 7 interrupted with including one modified corner stitch 6-0  "Ethilon sutures. Bacitracin was applied to the area. Instructed to keep the area clean and dry for the next 24 hours, then wash with soap and water and apply antibiotic ointment daily. Educated on monitoring for signs and symptoms of infection. Educated that there will be a scar. Follow-up with their doctor in 5-7 days to have the sutures removed. See the doctor sooner if any signs or symptoms of infection appear. Discharged in stable condition with computer head injury instructions.     Diagnostic Impression:    1. Closed head injury without concussion    2. 3.5cm forehead laceration with simple closure    3. Acute bilateral knee contusions/abrasions           Your medication list        CONTINUE taking these medications        Instructions Last Dose Given Next Dose Due   FreeStyle Bulmaro 2 Sensor kit  Generic drug: flash glucose sensor kit      Inject 1 each under the skin every 14 (fourteen) days. Test blood sugar 1-4 times per day as needed for diabetes, replace every 14 days       pen needle 1/2\" 29G X 12mm needle  Commonly known as: Easy Touch      Inject 3 each under the skin 4 times a day as needed (as needed for sugars). Use as instructed       underpads pad  Commonly known as: Bed Underpads      1 each 2 times a day.              ASK your doctor about these medications        Instructions Last Dose Given Next Dose Due   amiodarone 200 mg tablet  Commonly known as: Pacerone      Take 1 tablet (200 mg) by mouth once daily.       apixaban 5 mg tablet  Commonly known as: Eliquis      Take 1 tablet (5 mg) by mouth 2 times a day.       ascorbic acid (vitamin C) 500 mg capsule           aspirin 81 mg EC tablet           BIOTIN ORAL           buPROPion  mg 24 hr tablet  Commonly known as: Wellbutrin XL      Take 1 tablet (150 mg) by mouth once daily in the morning. Do not crush, chew, or split.       calcium citrate-vitamin D3 200 mg-6.25 mcg (250 unit) tablet           cholecalciferol 50,000 unit " capsule  Commonly known as: Vitamin D-3           clotrimazole-betamethasone cream  Commonly known as: Lotrisone           desvenlafaxine 100 mg 24 hr tablet  Commonly known as: Pristiq      Take 1 tablet (100 mg) by mouth once daily. Do not crush, chew, or split. Instead of venlafaxine.       famotidine 40 mg tablet  Commonly known as: Pepcid      Take 1 tablet (40 mg) by mouth once daily.       gabapentin 600 mg tablet  Commonly known as: Neurontin      Take 1 tablet (600 mg) by mouth 3 times a day.       glucagon 1 mg injection  Commonly known as: Glucagen      Inject 1 mg into the shoulder, thigh, or buttocks 1 time if needed for low blood sugar - see comments for up to 1 dose. USE AS DIRECTED.       HumaLOG Mix 75-25 KwikPen 100 unit/mL (75-25) injection  Generic drug: insulin lispro protamin-lispro           insulin lispro 100 unit/mL injection  Commonly known as: HumaLOG U-100 Insulin      Inject 0.1 mL (10 Units) under the skin 3 times a day with meals. Take as directed per insulin instructions. Adjust per Sliding scale- 200-250 6u, 250-300 8u 350-400 10u 400+ 12u       insulin lispro 100 unit/mL injection  Commonly known as: HumaLOG      Inject 0.1 mL (10 Units) under the skin 3 times a day with meals. Take as directed per insulin instructions.       insulin NPH (Isophane) 100 unit/mL injection  Commonly known as: HumuLIN N,NovoLIN N      Inject 20 Units under the skin every 12 hours. Take as directed per insulin instructions.       levothyroxine 75 mcg tablet  Commonly known as: Synthroid, Levoxyl      Take 3 tablets (225 mcg) by mouth once daily. Do not start before January 30, 2024.       losartan 25 mg tablet  Commonly known as: Cozaar      Take 1 tablet (25 mg) by mouth once daily.       metFORMIN  mg 24 hr tablet  Commonly known as: Glucophage-XR      Take 2 tablets (1,000 mg) by mouth 2 times a day. Do not crush, chew, or split.       metoprolol succinate XL 50 mg 24 hr tablet  Commonly known  as: Toprol-XL      Take 1 tablet (50 mg) by mouth once daily.       naloxone 4 mg/0.1 mL nasal spray  Commonly known as: Narcan      Administer 1 spray (4 mg) into affected nostril(s) if needed for opioid reversal for up to 1 day. May repeat every 2-3 minutes if needed, alternating nostrils, until medical assistance becomes available.       nystatin 100,000 unit/gram powder  Commonly known as: Mycostatin           omeprazole 40 mg DR capsule  Commonly known as: PriLOSEC           OneTouch Ultra Test strip  Generic drug: blood sugar diagnostic           oxyCODONE-acetaminophen 5-325 mg tablet  Commonly known as: Percocet      Take 1 tablet by mouth every 4 hours if needed for severe pain (7 - 10).       potassium chloride CR 10 mEq ER tablet  Commonly known as: Klor-Con      Take 1 tablet (10 mEq) by mouth in the morning and 1 tablet (10 mEq) before bedtime. Do not crush, chew, or split..       spironolactone 25 mg tablet  Commonly known as: Aldactone           SUPER B-50 COMPLEX ORAL           torsemide 20 mg tablet  Commonly known as: Demadex           traZODone 50 mg tablet  Commonly known as: Desyrel      Take 1 tablet (50 mg) by mouth once daily at bedtime.       vibegron 75 mg tablet      Take 1 tablet by mouth once daily.       WOMEN'S MULTIVITAMIN ORAL                      Procedure  Procedures     Malik Parisi, CORNELIUS-CNP  02/12/24 5420

## 2024-02-13 ENCOUNTER — NURSING HOME VISIT (OUTPATIENT)
Dept: POST ACUTE CARE | Facility: EXTERNAL LOCATION | Age: 74
End: 2024-02-13

## 2024-02-13 ENCOUNTER — TELEPHONE (OUTPATIENT)
Dept: PRIMARY CARE | Facility: CLINIC | Age: 74
End: 2024-02-13

## 2024-02-13 ENCOUNTER — TELEMEDICINE (OUTPATIENT)
Dept: PRIMARY CARE | Facility: CLINIC | Age: 74
End: 2024-02-13
Payer: MEDICARE

## 2024-02-13 VITALS
SYSTOLIC BLOOD PRESSURE: 134 MMHG | DIASTOLIC BLOOD PRESSURE: 61 MMHG | BODY MASS INDEX: 31.97 KG/M2 | WEIGHT: 236 LBS | HEIGHT: 72 IN

## 2024-02-13 DIAGNOSIS — I48.91 ATRIAL FIBRILLATION, UNSPECIFIED TYPE (MULTI): ICD-10-CM

## 2024-02-13 DIAGNOSIS — R53.1 WEAKNESS: ICD-10-CM

## 2024-02-13 DIAGNOSIS — E66.9 OBESITY (BMI 30-39.9): ICD-10-CM

## 2024-02-13 DIAGNOSIS — I71.10 RUPTURED ANEURYSM OF THORACIC AORTA, UNSPECIFIED PART (MULTI): ICD-10-CM

## 2024-02-13 DIAGNOSIS — E66.01 MORBID OBESITY (MULTI): ICD-10-CM

## 2024-02-13 DIAGNOSIS — I11.0 HYPERTENSIVE HEART DISEASE WITH CONGESTIVE HEART FAILURE, UNSPECIFIED HEART FAILURE TYPE (MULTI): ICD-10-CM

## 2024-02-13 DIAGNOSIS — S82.102D CLOSED FRACTURE OF PROXIMAL END OF LEFT TIBIA WITH ROUTINE HEALING, UNSPECIFIED FRACTURE MORPHOLOGY, SUBSEQUENT ENCOUNTER: ICD-10-CM

## 2024-02-13 DIAGNOSIS — F32.1 CURRENT MODERATE EPISODE OF MAJOR DEPRESSIVE DISORDER WITHOUT PRIOR EPISODE (MULTI): Primary | ICD-10-CM

## 2024-02-13 DIAGNOSIS — I50.42 CHRONIC COMBINED SYSTOLIC AND DIASTOLIC CONGESTIVE HEART FAILURE (MULTI): ICD-10-CM

## 2024-02-13 DIAGNOSIS — G89.29 BILATERAL CHRONIC KNEE PAIN: ICD-10-CM

## 2024-02-13 DIAGNOSIS — K70.30 ALCOHOLIC CIRRHOSIS OF LIVER WITHOUT ASCITES (MULTI): ICD-10-CM

## 2024-02-13 DIAGNOSIS — E11.65 TYPE 2 DIABETES MELLITUS WITH HYPERGLYCEMIA, WITH LONG-TERM CURRENT USE OF INSULIN (MULTI): ICD-10-CM

## 2024-02-13 DIAGNOSIS — Z79.4 TYPE 2 DIABETES MELLITUS WITH HYPERGLYCEMIA, WITH LONG-TERM CURRENT USE OF INSULIN (MULTI): ICD-10-CM

## 2024-02-13 DIAGNOSIS — E78.1 HYPERTRIGLYCERIDEMIA: ICD-10-CM

## 2024-02-13 DIAGNOSIS — L89.623 PRESSURE ULCER OF LEFT HEEL, STAGE 3 (MULTI): ICD-10-CM

## 2024-02-13 DIAGNOSIS — F32.A DEPRESSION, UNSPECIFIED DEPRESSION TYPE: ICD-10-CM

## 2024-02-13 DIAGNOSIS — D34 HURTHLE CELL NEOPLASM OF THYROID: ICD-10-CM

## 2024-02-13 DIAGNOSIS — N18.31 STAGE 3A CHRONIC KIDNEY DISEASE (MULTI): ICD-10-CM

## 2024-02-13 DIAGNOSIS — K21.9 GASTROESOPHAGEAL REFLUX DISEASE WITHOUT ESOPHAGITIS: ICD-10-CM

## 2024-02-13 DIAGNOSIS — J44.9 CHRONIC OBSTRUCTIVE PULMONARY DISEASE, UNSPECIFIED COPD TYPE (MULTI): ICD-10-CM
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
## 2024-02-13 DIAGNOSIS — D68.9 COAGULATION DISORDER (MULTI): ICD-10-CM

## 2024-02-13 DIAGNOSIS — F32.1 CURRENT MODERATE EPISODE OF MAJOR DEPRESSIVE DISORDER WITHOUT PRIOR EPISODE (MULTI): ICD-10-CM

## 2024-02-13 DIAGNOSIS — I51.7 CARDIOMEGALY: ICD-10-CM

## 2024-02-13 DIAGNOSIS — M25.562 BILATERAL CHRONIC KNEE PAIN: ICD-10-CM

## 2024-02-13 DIAGNOSIS — M25.561 BILATERAL CHRONIC KNEE PAIN: ICD-10-CM

## 2024-02-13 DIAGNOSIS — M17.0 PRIMARY OSTEOARTHRITIS OF BOTH KNEES: ICD-10-CM

## 2024-02-13 DIAGNOSIS — N18.31 CHRONIC RENAL FAILURE, STAGE 3A (MULTI): ICD-10-CM
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
## 2024-02-13 DIAGNOSIS — G89.4 CHRONIC PAIN SYNDROME: ICD-10-CM

## 2024-02-13 DIAGNOSIS — L03.116 CELLULITIS OF LEFT LEG: ICD-10-CM

## 2024-02-13 PROBLEM — S01.81XD FOREHEAD LACERATION, SUBSEQUENT ENCOUNTER: Status: ACTIVE | Noted: 2024-02-13
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
## 2024-02-13 PROCEDURE — 3008F BODY MASS INDEX DOCD: CPT | Performed by: FAMILY MEDICINE

## 2024-02-13 PROCEDURE — 1170F FXNL STATUS ASSESSED: CPT | Performed by: FAMILY MEDICINE

## 2024-02-13 PROCEDURE — G0439 PPPS, SUBSEQ VISIT: HCPCS | Performed by: FAMILY MEDICINE

## 2024-02-13 PROCEDURE — 1036F TOBACCO NON-USER: CPT | Performed by: FAMILY MEDICINE

## 2024-02-13 PROCEDURE — 1159F MED LIST DOCD IN RCRD: CPT | Performed by: FAMILY MEDICINE

## 2024-02-13 PROCEDURE — 3046F HEMOGLOBIN A1C LEVEL >9.0%: CPT | Performed by: FAMILY MEDICINE

## 2024-02-13 PROCEDURE — 99214 OFFICE O/P EST MOD 30 MIN: CPT | Performed by: FAMILY MEDICINE

## 2024-02-13 PROCEDURE — 3078F DIAST BP <80 MM HG: CPT | Performed by: FAMILY MEDICINE

## 2024-02-13 PROCEDURE — 1123F ACP DISCUSS/DSCN MKR DOCD: CPT | Performed by: FAMILY MEDICINE

## 2024-02-13 PROCEDURE — 4010F ACE/ARB THERAPY RXD/TAKEN: CPT | Performed by: FAMILY MEDICINE

## 2024-02-13 PROCEDURE — 3075F SYST BP GE 130 - 139MM HG: CPT | Performed by: FAMILY MEDICINE

## 2024-02-13 PROCEDURE — 1158F ADVNC CARE PLAN TLK DOCD: CPT | Performed by: FAMILY MEDICINE

## 2024-02-13 PROCEDURE — 1125F AMNT PAIN NOTED PAIN PRSNT: CPT | Performed by: FAMILY MEDICINE

## 2024-02-13 PROCEDURE — 99309 SBSQ NF CARE MODERATE MDM 30: CPT | Performed by: INTERNAL MEDICINE

## 2024-02-13 PROCEDURE — 1157F ADVNC CARE PLAN IN RCRD: CPT | Performed by: FAMILY MEDICINE

## 2024-02-13 PROCEDURE — 1111F DSCHRG MED/CURRENT MED MERGE: CPT | Performed by: FAMILY MEDICINE

## 2024-02-13 RX ORDER — FLASH GLUCOSE SENSOR
1 KIT MISCELLANEOUS
Qty: 6 EACH | Refills: 3 | Status: ON HOLD | OUTPATIENT
Start: 2024-02-13

## 2024-02-13 RX ORDER — OXYCODONE AND ACETAMINOPHEN 10; 325 MG/1; MG/1
1 TABLET ORAL EVERY 4 HOURS PRN
Qty: 180 TABLET | Refills: 0 | Status: SHIPPED | OUTPATIENT
Start: 2024-03-15 | End: 2024-03-30 | Stop reason: HOSPADM

## 2024-02-13 RX ORDER — OXYCODONE AND ACETAMINOPHEN 10; 325 MG/1; MG/1
1 TABLET ORAL EVERY 4 HOURS PRN
Qty: 180 TABLET | Refills: 0 | Status: SHIPPED | OUTPATIENT
Start: 2024-04-14 | End: 2024-03-30 | Stop reason: HOSPADM

## 2024-02-13 RX ORDER — BUPROPION HYDROCHLORIDE 150 MG/1
150 TABLET ORAL EVERY MORNING
Qty: 30 TABLET | Refills: 0 | Status: SHIPPED | OUTPATIENT
Start: 2024-02-13 | End: 2024-04-09

## 2024-02-13 RX ORDER — OXYCODONE AND ACETAMINOPHEN 10; 325 MG/1; MG/1
1 TABLET ORAL EVERY 4 HOURS PRN
Qty: 180 TABLET | Refills: 0 | Status: SHIPPED | OUTPATIENT
Start: 2024-02-14 | End: 2024-03-15

## 2024-02-13 ASSESSMENT — ENCOUNTER SYMPTOMS
HEADACHES: 0
WEIGHT LOSS: 1
SWEATS: 0
SEIZURES: 0
TREMORS: 0
POLYDIPSIA: 1
LOSS OF SENSATION IN FEET: 1
SPEECH DIFFICULTY: 0
CONFUSION: 0
OCCASIONAL FEELINGS OF UNSTEADINESS: 0
WEAKNESS: 1
DEPRESSION: 0
VISUAL CHANGE: 1
FATIGUE: 1
POLYPHAGIA: 1
DIZZINESS: 0
HUNGER: 1
BLACKOUTS: 0
BLURRED VISION: 1

## 2024-02-13 ASSESSMENT — ACTIVITIES OF DAILY LIVING (ADL)
DOING_HOUSEWORK: TOTAL CARE
GROCERY_SHOPPING: TOTAL CARE
TAKING_MEDICATION: INDEPENDENT
MANAGING_FINANCES: INDEPENDENT
DRESSING: INDEPENDENT
BATHING: NEEDS ASSISTANCE

## 2024-02-13 ASSESSMENT — PATIENT HEALTH QUESTIONNAIRE - PHQ9
SUM OF ALL RESPONSES TO PHQ9 QUESTIONS 1 AND 2: 0
1. LITTLE INTEREST OR PLEASURE IN DOING THINGS: NOT AT ALL
2. FEELING DOWN, DEPRESSED OR HOPELESS: NOT AT ALL

## 2024-02-13 NOTE — PROGRESS NOTES
PROGRESS NOTE    Subjective   Chief complaint: Teresa Matthews is a 73 y.o. female who is an acute skilled patient being seen and evaluated for weakness    HPI:  HPI  Patient is working in therapy.  Patient is ambulating with walker requiring min assist for 5 feet.  Patient is performing transfers with min assist.  Patient is able to perform ADL goals of lower body with min assist.  Speech therapy working with patient to address cognition.  Patient seen and examined at bedside, appears to be in no apparent distress.  Denies chest pain or shortness of breath.  Afebrile.    Objective   Vital signs: 144/80, 97.6, 78, 18, blood sugar 237, 92%    Physical Exam  Constitutional:       General: She is not in acute distress.  Eyes:      Extraocular Movements: Extraocular movements intact.   Cardiovascular:      Rate and Rhythm: Normal rate and regular rhythm.   Pulmonary:      Effort: Pulmonary effort is normal.      Breath sounds: Normal breath sounds.   Abdominal:      General: Bowel sounds are normal.      Palpations: Abdomen is soft.   Musculoskeletal:      Cervical back: Neck supple.      Right lower leg: Edema present.      Left lower leg: Edema present.   Neurological:      Mental Status: She is alert.   Psychiatric:         Mood and Affect: Mood normal.         Behavior: Behavior is cooperative.         Assessment/Plan   Problem List Items Addressed This Visit       Atrial fibrillation (CMS/HCC)     Bleeding precautions  Amiodarone  Apixaban  Monitor heart rate         GERD (gastroesophageal reflux disease)     PPI  Monitor GI symptoms         Hypertensive heart disease with CHF (CMS/HCC)     CHF stable, no sob  Monitor blood pressure  Losartan potassium  Toprol-XL  Spironolactone  Torsemide  Monitor for shortness of breath         Type 2 diabetes mellitus with hyperglycemia, with long-term current use of insulin (CMS/HCC)     Continue insulin  Glucoscan with sliding scale insulin coverage  Low concentrated  sweets diet         Weakness - Primary     Continue to work in therapy towards goals          Medications, treatments, and labs reviewed  Continue medications and treatments as listed in EMR      Scribe Attestation  ILorna Scribe   attest that this documentation has been prepared under the direction and in the presence of MARISELA Nelson    Provider Attestation - Scribe documentation  All medical record entries made by the Scribe were at my direction and personally dictated by me. I have reviewed the chart and agree that the record accurately reflects my personal performance of the history, physical exam, discussion and plan.   MARISELA Nelson

## 2024-02-13 NOTE — PROGRESS NOTES
PROGRESS NOTE    Subjective   Chief complaint: Teresa Matthews is a 73 y.o. female who is an acute skilled patient being seen and evaluated for weakness    HPI:  HPI  Patient is working in therapy due to generalized weakness.  Patient is working on gait training, transfers and ADLs.  Speech therapy is also working with patient to address cognition.  Patient is ambulating 5 feet with a walker and min assist.  Patient was seen and examined at bedside, appears to be in no apparent distress.  Denies chest pain or shortness of breath.    Objective   Vital signs: 124/68, 97.1, 72, 18, blood sugar, 95, 98%    Physical Exam  Constitutional:       General: She is not in acute distress.  Eyes:      Extraocular Movements: Extraocular movements intact.   Cardiovascular:      Rate and Rhythm: Normal rate and regular rhythm.   Pulmonary:      Effort: Pulmonary effort is normal.      Breath sounds: Normal breath sounds.   Abdominal:      General: Bowel sounds are normal.      Palpations: Abdomen is soft.   Musculoskeletal:      Cervical back: Neck supple.      Right lower leg: No edema.   Neurological:      Mental Status: She is alert.      Motor: Weakness present.   Psychiatric:         Mood and Affect: Mood normal.         Behavior: Behavior is cooperative.         Assessment/Plan   Problem List Items Addressed This Visit       Chronic obstructive pulmonary disease, unspecified COPD type (CMS/[facility])     Stable  On room air  Monitor for shortness of breath         GERD (gastroesophageal reflux disease)     PPI  Monitor GI symptoms         Hypertensive heart disease with CHF (CMS/[facility])     BP at goal  CHF stable, no sob  Monitor blood pressure  Losartan potassium  Toprol-XL  Spironolactone  Torsemide  Monitor for shortness of breath         Type 2 diabetes mellitus with hyperglycemia, with long-term current use of insulin (CMS/[facility])     Continue insulin  Glucoscan with sliding scale insulin coverage  Low concentrated sweets  diet  Fasting blood glucose at goal         Weakness - Primary     Working in therapy          Medications, treatments, and labs reviewed  Continue medications and treatments as listed in EMR      Scribe Attestation  I, Kirill Page   attest that this documentation has been prepared under the direction and in the presence of MARISELA Nelson    Provider Attestation - Scribe documentation  All medical record entries made by the Scribe were at my direction and personally dictated by me. I have reviewed the chart and agree that the record accurately reflects my personal performance of the history, physical exam, discussion and plan.   MARISELA Nelson

## 2024-02-13 NOTE — PROGRESS NOTES
Patient was identified as a fall risk. Risk prevention instructions provided.Subjective   Patient ID: Teresa Matthews is a 73 y.o. female.  An interactive audio and video telecommunication system which permits real time communications between the patient (at the originating site) and provider (at a distant site) was utilized to provider this telehealth service.   Diabetes  She presents for her follow-up diabetic visit. She has type 2 diabetes mellitus. No MedicAlert identification noted. The initial diagnosis of diabetes was made 43 years ago. Her disease course has been worsening. Hypoglycemia symptoms include hunger, mood changes and sleepiness. Pertinent negatives for hypoglycemia include no confusion, dizziness, headaches, pallor, seizures, speech difficulty, sweats or tremors. Associated symptoms include blurred vision, fatigue, foot paresthesias, polydipsia, polyphagia, polyuria, visual change, weakness and weight loss. Pertinent negatives for diabetes include no chest pain and no foot ulcerations. Hypoglycemia complications include nocturnal hypoglycemia, required assistance and required glucagon injection. Pertinent negatives for hypoglycemia complications include no blackouts and no hospitalization. Diabetic complications include peripheral neuropathy and retinopathy. Pertinent negatives for diabetic complications include no CVA, heart disease, impotence, nephropathy or PVD. Current diabetic treatment includes insulin injections. She is compliant with treatment most of the time. She is currently taking insulin pre-breakfast, pre-lunch, pre-dinner and at bedtime. Insulin injections are given by patient. Rotation sites for injection include the abdominal wall. She is following a diabetic and generally unhealthy diet. She has not had a previous visit with a dietitian. She never participates in exercise. She monitors blood glucose at home 3-4 x per day. Blood glucose monitoring compliance is fair. Her home  blood glucose trend is decreasing steadily. Her breakfast blood glucose is taken between 5-6 am. Her breakfast blood glucose range is generally  mg/dl. Her lunch blood glucose is taken between 12-1 pm. Her lunch blood glucose range is generally 110-130 mg/dl. Her dinner blood glucose is taken between 4-5 pm. Her dinner blood glucose range is generally 130-140 mg/dl. Her overall blood glucose range is 130-140 mg/dl. She sees a podiatrist.Eye exam is not current.   Labs from 1/2024 reviewed and they were awful  OARRS:  Monica Macario MD on 2/13/2024  2:11 PM  I have personally reviewed the OARRS report for Teresa Matthews. I have considered the risks of abuse, dependence, addiction and diversion and I believe that it is clinically appropriate for Teresa Matthews to be prescribed this medication    Is the patient prescribed a combination of a benzodiazepine and opioid?  Yes, I feel it is clincially indicated to continue the medication and have discussed with the patient risks/benefits/alternatives.    Last Urine Drug Screen / ordered today: Yes  Recent Results (from the past 8760 hour(s))   Confirmation Opiate/Opioid/Benzo Prescription Compliance    Collection Time: 11/13/23 11:36 AM   Result Value Ref Range    Clonazepam <25 <25 ng/mL    7-Aminoclonazepam <25 <25 ng/mL    Alprazolam <25 <25 ng/mL    Alpha-Hydroxyalprazolam <25 <25 ng/mL    Midazolam <25 <25 ng/mL    Alpha-Hydroxymidazolam <25 <25 ng/mL    Chlordiazepoxide <25 <25 ng/mL    Diazepam <25 <25 ng/mL    Nordiazepam <25 <25 ng/mL    Temazepam <25 <25 ng/mL    Oxazepam <25 <25 ng/mL    Lorazepam <25 <25 ng/mL    Methadone <25 <25 ng/mL    EDDP <25 <25 ng/mL    6-Acetylmorphine <25 <25 ng/mL    Codeine <50 <50 ng/mL    Hydrocodone <25 <25 ng/mL    Hydromorphone <25 <25 ng/mL    Morphine  <50 <50 ng/mL    Norhydrocodone <25 <25 ng/mL    Noroxycodone <25 <25 ng/mL    Oxycodone <25 <25 ng/mL    Oxymorphone <25 <25 ng/mL    Fentanyl <2.5 <2.5  ng/mL    Norfentanyl <2.5 <2.5 ng/mL    Tramadol <50 <50 ng/mL    O-Desmethyltramadol <50 <50 ng/mL    Zolpidem <25 <25 ng/mL    Zolpidem Metabolite (ZCA) <25 <25 ng/mL   Screen Opiate/Opioid/Benzo Prescription Compliance    Collection Time: 11/13/23 11:36 AM   Result Value Ref Range    Creatinine, Urine Random 48.5 20.0 - 320.0 mg/dL    Amphetamine Screen, Urine Presumptive Negative Presumptive Negative    Barbiturate Screen, Urine Presumptive Negative Presumptive Negative    Cannabinoid Screen, Urine Presumptive Negative Presumptive Negative    Cocaine Metabolite Screen, Urine Presumptive Negative Presumptive Negative    PCP Screen, Urine Presumptive Negative Presumptive Negative     Results are as expected.         Controlled Substance Agreement:  Date of the Last Agreement: 11/2023  Reviewed Controlled Substance Agreement including but not limited to the benefits, risks, and alternatives to treatment with a Controlled Substance medication(s).    Opioids:  What is the patient's goal of therapy? Improved pain  Is this being achieved with current treatment? yes    I have calculated the patient's Morphine Dose Equivalent (MED):   I have considered referral to Pain Management and/or a specialist, and do not feel it is necessary at this time. Has had surgery and pain management.     I feel that it is clinically indicated to continue this current medication regimen after consideration of alternative therapies, and other non-opioid treatment.    Opioid Risk Screening:  No data recorded    Pain Assessment:  7/10  Henny is at [facility], and has been there since hospital admission, 1/26/2024 -2/1/2024, recurrent problem, has diabetic ulcer on calf, and not sure who will follow up when she is out. They will set up [facility] to come and can address wound through.     Review of Systems   Constitutional:  Positive for fatigue and weight loss.   Eyes:  Positive for blurred vision.   Cardiovascular:  Negative for chest pain.    Endocrine: Positive for polydipsia, polyphagia and polyuria.   Genitourinary:  Negative for impotence.   Skin:  Negative for pallor.   Neurological:  Positive for weakness. Negative for dizziness, tremors, seizures, speech difficulty and headaches.   Psychiatric/Behavioral:  Negative for confusion.    All other systems reviewed and are negative.  Leg ulcer  Forehead laceration  Diabetes out of control  Thyroid untreated  Objective   Physical Exam  Virtual visit, but has dressing on head  Assessment/Plan   Diagnoses and all orders for this visit:  Current moderate episode of major depressive disorder without prior episode (CMS/HCC)  Chronic pain syndrome  -     Follow Up In [facility] - PCP - Medicare Annual  -     oxyCODONE-acetaminophen (Percocet)  mg tablet; Take 1 tablet by mouth every 4 hours if needed for severe pain (7 - 10). Do not start before February 14, 2024.  -     oxyCODONE-acetaminophen (Percocet)  mg tablet; Take 1 tablet by mouth every 4 hours if needed for severe pain (7 - 10). Do not start before March 15, 2024.  -     oxyCODONE-acetaminophen (Percocet)  mg tablet; Take 1 tablet by mouth every 4 hours if needed for severe pain (7 - 10). Do not start before April 14, 2024.  Coagulation disorder (CMS/HCC)  Pressure ulcer of left heel, stage 3 (CMS/HCC)  Alcoholic cirrhosis of liver without ascites (CMS/HCC)  Stage 3a chronic kidney disease (CMS/HCC)  -     Follow Up In [facility] - PCP - Established; Future  -     Hemoglobin A1C; Future  -     CBC and Auto Differential; Future  -     Comprehensive Metabolic Panel; Future  -     Lipid Panel; Future  -     TSH with reflex to Free T4 if abnormal; Future  Ruptured aneurysm of thoracic aorta, unspecified part (CMS/HCC)  Morbid obesity (CMS/HCC)  Closed fracture of proximal end of left tibia with routine healing, unspecified fracture morphology, subsequent encounter  -     oxyCODONE-acetaminophen (Percocet)   mg tablet; Take 1 tablet by mouth every 4 hours if needed for severe pain (7 - 10). Do not start before February 14, 2024.  Primary osteoarthritis of both knees  -     oxyCODONE-acetaminophen (Percocet)  mg tablet; Take 1 tablet by mouth every 4 hours if needed for severe pain (7 - 10). Do not start before February 14, 2024.  Type 2 diabetes mellitus with hyperglycemia, with long-term current use of insulin (CMS/[facility])  -     FreeStyle Bulmaro 2 Sensor kit; Inject 1 each under the skin every 14 (fourteen) days. Test blood sugar 1-4 times per day as needed for diabetes, replace every 14 days  -     Follow Up In [facility] - PCP - Established; Future  -     Hemoglobin A1C; Future  -     CBC and Auto Differential; Future  -     Comprehensive Metabolic Panel; Future  -     Lipid Panel; Future  -     TSH with reflex to Free T4 if abnormal; Future  Chronic combined systolic and diastolic congestive heart failure (CMS/[facility])  Cardiomegaly  Atrial fibrillation, unspecified type (CMS/[facility])  -     Follow Up In [facility] - PCP - Established; Future  -     Hemoglobin A1C; Future  -     CBC and Auto Differential; Future  -     Comprehensive Metabolic Panel; Future  -     Lipid Panel; Future  -     TSH with reflex to Free T4 if abnormal; Future  Hypertriglyceridemia  -     Follow Up In [facility] - PCP - Established; Future  -     Hemoglobin A1C; Future  -     CBC and Auto Differential; Future  -     Comprehensive Metabolic Panel; Future  -     Lipid Panel; Future  -     TSH with reflex to Free T4 if abnormal; Future  Obesity (BMI 30-39.9)  -     Follow Up In [facility] - PCP - Established; Future  -     Hemoglobin A1C; Future  -     CBC and Auto Differential; Future  -     Comprehensive Metabolic Panel; Future  -     Lipid Panel; Future  -     TSH with reflex to Free T4 if abnormal; Future  Gastroesophageal reflux disease without esophagitis  -     Follow Up In [facility] -  PCP - Established; Future  -     Hemoglobin A1C; Future  -     CBC and Auto Differential; Future  -     Comprehensive Metabolic Panel; Future  -     Lipid Panel; Future  -     TSH with reflex to Free T4 if abnormal; Future  Chronic renal failure, stage 3a (CMS/HCC)  -     Follow Up In [facility] - PCP - Established; Future  -     Hemoglobin A1C; Future  -     CBC and Auto Differential; Future  -     Comprehensive Metabolic Panel; Future  -     Lipid Panel; Future  -     TSH with reflex to Free T4 if abnormal; Future  Hurthle cell neoplasm of thyroid  Cellulitis of left leg  Depression, unspecified depression type  Bilateral chronic knee pain  Fall, subsequent encounter    A virtual visit was performed today due to the current COVID-19 pandemic. All questions were answered, and medications were reviewed, and renewed where necessary.   A total of  20  minutes was spent on this encounter.

## 2024-02-13 NOTE — ASSESSMENT & PLAN NOTE
S\P fall with injury  Laceration with sutures - order to remove 2/19  Bruising and abrasion noted  Monitor for healing

## 2024-02-13 NOTE — ASSESSMENT & PLAN NOTE
CHF stable, no sob  Monitor blood pressure  Losartan potassium  Toprol-XL  Spironolactone  Torsemide  Monitor for shortness of breath

## 2024-02-13 NOTE — PROGRESS NOTES
PROGRESS NOTE    Subjective   Chief complaint: Teresa Matthews is a 73 y.o. female who is an acute skilled patient being seen and evaluated for weakness    HPI:  Patient presents for f/u therapy and general medical care.  Patient seen and examined at beside.  Therapy has been working with the patient to improve strength, endurance, ADLs, and transfers d/t generalized weakness. She is ambulating 5' with WW at min assist. Transfers and ADLs require min assist. She is also working with ST to address cognition. Nursing reported that yesterday patient had fall when attempting to self transfer. She hit her head and was sent to ED. Bruising noted to eyes, abrasion to nose and laceration to forehead with sutures present. Patient denies pain\discomfort at this time.        Objective   Vital signs: 142/81,83,93%,     Physical Exam  Constitutional:       General: She is not in acute distress.  Eyes:      Extraocular Movements: Extraocular movements intact.   Cardiovascular:      Rate and Rhythm: Normal rate and regular rhythm.   Pulmonary:      Effort: Pulmonary effort is normal.      Breath sounds: Normal breath sounds.   Abdominal:      General: Bowel sounds are normal.      Palpations: Abdomen is soft.   Musculoskeletal:      Cervical back: Neck supple.      Right lower leg: No edema.      Left lower leg: Edema present.   Skin:     Comments: Left calf wound - dsg in place   Bruising around eyes  Laceration to forehead with sutures   Neurological:      Mental Status: She is alert.   Psychiatric:         Mood and Affect: Mood normal.         Behavior: Behavior is cooperative.         Assessment/Plan   Problem List Items Addressed This Visit       Type 2 diabetes mellitus with hyperglycemia, with long-term current use of insulin (CMS/HCC)     Continue insulin  Glucoscan with sliding scale insulin coverage  Low concentrated sweets diet         Atrial fibrillation (CMS/HCC)     Bleeding  precautions  Amiodarone  Apixaban  Monitor heart rate         GERD (gastroesophageal reflux disease)     PPI  Monitor GI symptoms         Hypertensive heart disease with CHF (CMS/[facility])     CHF stable, no sob  Monitor blood pressure  Losartan potassium  Toprol-XL  Spironolactone  Torsemide  Monitor for shortness of breath         Chronic obstructive pulmonary disease, unspecified COPD type (CMS/[facility])     Stable  On room air  Monitor for shortness of breath         Weakness     Continue to work in therapy towards goals         Depression     Monitor mood and behaviors.  Bupropion  Desvenlafaxine  Trazodone         Fall     S\P fall with injury  Laceration with sutures - order to remove 2/19  Bruising and abrasion noted  Monitor for healing          Medications, treatments, and labs reviewed  Continue medications and treatments as listed in EMR    Scribe Attestation  I, Kirill Chapman   attest that this documentation has been prepared under the direction and in the presence of Pita Virgen MD.     Provider Attestation - Scribe documentation  All medical record entries made by the Scribe were at my direction and personally dictated by me. I have reviewed the chart and agree that the record accurately reflects my personal performance of the history, physical exam, discussion and plan.   Pita Virgen MD

## 2024-02-13 NOTE — LETTER
Patient: Teresa Matthews  : 1950    Encounter Date: 2024    PROGRESS NOTE    Subjective  Chief complaint: Teresa Matthews is a 73 y.o. female who is an acute skilled patient being seen and evaluated for weakness    HPI:  Patient presents for f/u therapy and general medical care.  Patient seen and examined at beside.  Therapy has been working with the patient to improve strength, endurance, ADLs, and transfers d/t generalized weakness. She is ambulating 5' with WW at min assist. Transfers and ADLs require min assist. She is also working with ST to address cognition. Nursing reported that yesterday patient had fall when attempting to self transfer. She hit her head and was sent to ED. Bruising noted to eyes, abrasion to nose and laceration to forehead with sutures present. Patient denies pain\discomfort at this time.        Objective  Vital signs: 142/81,83,93%,     Physical Exam  Constitutional:       General: She is not in acute distress.  Eyes:      Extraocular Movements: Extraocular movements intact.   Cardiovascular:      Rate and Rhythm: Normal rate and regular rhythm.   Pulmonary:      Effort: Pulmonary effort is normal.      Breath sounds: Normal breath sounds.   Abdominal:      General: Bowel sounds are normal.      Palpations: Abdomen is soft.   Musculoskeletal:      Cervical back: Neck supple.      Right lower leg: No edema.      Left lower leg: Edema present.   Skin:     Comments: Left calf wound - dsg in place   Bruising around eyes  Laceration to forehead with sutures   Neurological:      Mental Status: She is alert.   Psychiatric:         Mood and Affect: Mood normal.         Behavior: Behavior is cooperative.         Assessment/Plan  Problem List Items Addressed This Visit       Type 2 diabetes mellitus with hyperglycemia, with long-term current use of insulin (CMS/[facility])     Continue insulin  Glucoscan with sliding scale insulin coverage  Low concentrated sweets diet          Atrial fibrillation (CMS/[facility])     Bleeding precautions  Amiodarone  Apixaban  Monitor heart rate         GERD (gastroesophageal reflux disease)     PPI  Monitor GI symptoms         Hypertensive heart disease with CHF (CMS/[facility])     CHF stable, no sob  Monitor blood pressure  Losartan potassium  Toprol-XL  Spironolactone  Torsemide  Monitor for shortness of breath         Chronic obstructive pulmonary disease, unspecified COPD type (CMS/[facility])     Stable  On room air  Monitor for shortness of breath         Weakness     Continue to work in therapy towards goals         Depression     Monitor mood and behaviors.  Bupropion  Desvenlafaxine  Trazodone         Fall     S\P fall with injury  Laceration with sutures - order to remove 2/19  Bruising and abrasion noted  Monitor for healing          Medications, treatments, and labs reviewed  Continue medications and treatments as listed in EMR    Scribe Attestation  I, Kirill Chapman   attest that this documentation has been prepared under the direction and in the presence of Pita Virgen MD.     Provider Attestation - Scribe documentation  All medical record entries made by the Scribe were at my direction and personally dictated by me. I have reviewed the chart and agree that the record accurately reflects my personal performance of the history, physical exam, discussion and plan.   Pita Virgen MD      Electronically Signed By: Pita Virgen MD   2/13/24  4:36 PM

## 2024-02-13 NOTE — PATIENT INSTRUCTIONS
Diagnoses and all orders for this visit:  Current moderate episode of major depressive disorder without prior episode (CMS/[facility])  Chronic pain syndrome  -     Follow Up In [facility] - PCP - Medicare Annual  -     oxyCODONE-acetaminophen (Percocet)  mg tablet; Take 1 tablet by mouth every 4 hours if needed for severe pain (7 - 10). Do not start before February 14, 2024.  -     oxyCODONE-acetaminophen (Percocet)  mg tablet; Take 1 tablet by mouth every 4 hours if needed for severe pain (7 - 10). Do not start before March 15, 2024.  -     oxyCODONE-acetaminophen (Percocet)  mg tablet; Take 1 tablet by mouth every 4 hours if needed for severe pain (7 - 10). Do not start before April 14, 2024.  Coagulation disorder (CMS/HCC)  Pressure ulcer of left heel, stage 3 (CMS/HCC)  Alcoholic cirrhosis of liver without ascites (CMS/[facility])  Stage 3a chronic kidney disease (CMS/[facility])  Ruptured aneurysm of thoracic aorta, unspecified part (CMS/[facility])  Morbid obesity (CMS/[facility])  Closed fracture of proximal end of left tibia with routine healing, unspecified fracture morphology, subsequent encounter  -     oxyCODONE-acetaminophen (Percocet)  mg tablet; Take 1 tablet by mouth every 4 hours if needed for severe pain (7 - 10). Do not start before February 14, 2024.  Primary osteoarthritis of both knees  -     oxyCODONE-acetaminophen (Percocet)  mg tablet; Take 1 tablet by mouth every 4 hours if needed for severe pain (7 - 10). Do not start before February 14, 2024.  Type 2 diabetes mellitus with hyperglycemia, with long-term current use of insulin (CMS/[facility])  -     FreeStyle Bulmaro 2 Sensor kit; Inject 1 each under the skin every 14 (fourteen) days. Test blood sugar 1-4 times per day as needed for diabetes, replace every 14 days  Chronic combined systolic and diastolic congestive heart failure (CMS/[facility])  Cardiomegaly  Atrial fibrillation, unspecified type (CMS/[facility])  Hypertriglyceridemia  Obesity (BMI  30-39.9)  Gastroesophageal reflux disease without esophagitis  Chronic renal failure, stage 3a (CMS/HCC)  Hurthle cell neoplasm of thyroid  Cellulitis of left leg  Depression, unspecified depression type  Bilateral chronic knee pain  Fall, subsequent encounter      Ways to Help Prevent Falls at Home    Quick Tips   ? Ask for help if you need it. Most people want to help!   ? Get up slowly after sitting or laying down   ? Wear a medical alert device or keep cell phone in your pocket   ? Use night lights, especially areas near a bathroom   ? Keep the items you use often within reach on a small stool or end table   ? Use an assistive device such as walker or cane, as directed by provider/physical therapy   ? Use a non-slip mat and grab bars in your bathroom. Look for home health sections for best options     Other Areas to Focus On   ? Exercise and nutrition: Regular exercise or taking a falls prevention class are great ways improve strength and balance. Don’t forget to stay hydrated and bring a snack!   ? Medicine side effects: Some medicines can make you sleepy or dizzy, which could cause a fall. Ask your healthcare provider about the side effects your medicines could cause. Be sure to let them know if you take any vitamins or supplements as well.   ? Tripping hazards: Remove items you could trip on, such as loose mats, rugs, cords, and clutter. Wear closed toe shoes with rubber soles.   ? Health and wellness: Get regular checkups with your healthcare provider, plus routine vision and hearing screenings. Talk with your healthcare provider about:   o Your medicines and the possible side effects - bring them in a bag if that is easier!   o Problems with balance or feeling dizzy   o Ways to promote bone health, such as Vitamin D and calcium supplements   o Questions or concerns about falling     *Ask your healthcare team if you have questions     ©[facility], 2022

## 2024-02-14 ENCOUNTER — NURSING HOME VISIT (OUTPATIENT)
Dept: POST ACUTE CARE | Facility: EXTERNAL LOCATION | Age: 74
End: 2024-02-14
Payer: MEDICARE

## 2024-02-14 DIAGNOSIS — Z79.4 TYPE 2 DIABETES MELLITUS WITH HYPERGLYCEMIA, WITH LONG-TERM CURRENT USE OF INSULIN (MULTI): ICD-10-CM

## 2024-02-14 DIAGNOSIS — R53.1 WEAKNESS: Primary | ICD-10-CM

## 2024-02-14 DIAGNOSIS — I48.91 ATRIAL FIBRILLATION, UNSPECIFIED TYPE (MULTI): ICD-10-CM

## 2024-02-14 DIAGNOSIS — S09.90XA INJURY OF HEAD, INITIAL ENCOUNTER: ICD-10-CM

## 2024-02-14 DIAGNOSIS — J44.9 CHRONIC OBSTRUCTIVE PULMONARY DISEASE, UNSPECIFIED COPD TYPE (MULTI): ICD-10-CM

## 2024-02-14 DIAGNOSIS — I11.0 HYPERTENSIVE HEART DISEASE WITH CONGESTIVE HEART FAILURE, UNSPECIFIED HEART FAILURE TYPE (MULTI): ICD-10-CM

## 2024-02-14 DIAGNOSIS — E11.65 TYPE 2 DIABETES MELLITUS WITH HYPERGLYCEMIA, WITH LONG-TERM CURRENT USE OF INSULIN (MULTI): ICD-10-CM

## 2024-02-14 PROCEDURE — 99309 SBSQ NF CARE MODERATE MDM 30: CPT | Performed by: NURSE PRACTITIONER

## 2024-02-14 NOTE — LETTER
Patient: Teresa Matthews  : 1950    Encounter Date: 2024    PROGRESS NOTE    Subjective  Chief complaint: Teresa Matthews is a 73 y.o. female who is an acute skilled patient being seen and evaluated for weakness    HPI:  HPI  Patient is seen in follow-up to fall on 2\12, patient with bruising to eyes and laceration with sutures.  Patient denies headache or vision changes.  Patient is also working in therapy due to generalized weakness.  Patient is ambulating with wheeled walker and min assist for 5 feet.  Patient is able to complete ADL tasks with moderate independence.  Patient was seen and examined at bedside, appears to be in no acute distress.  Denies chest pain or shortness of breath.  Afebrile.      Objective  Vital signs: 113/66, 98.1, 65, 18, blood sugar 211, 93%    Physical Exam  Constitutional:       General: She is not in acute distress.  Eyes:      Extraocular Movements: Extraocular movements intact.   Cardiovascular:      Rate and Rhythm: Normal rate and regular rhythm.   Pulmonary:      Effort: Pulmonary effort is normal.      Breath sounds: Normal breath sounds.   Abdominal:      General: Bowel sounds are normal.      Palpations: Abdomen is soft.   Musculoskeletal:      Cervical back: Neck supple.      Right lower leg: Edema present.      Left lower leg: Edema present.   Skin:     Comments: Bruising under bilateral eyes  Dressing to forehead   Neurological:      Mental Status: She is alert.      Motor: Weakness present.   Psychiatric:         Mood and Affect: Mood normal.         Behavior: Behavior is cooperative.         Assessment/Plan  Problem List Items Addressed This Visit       Atrial fibrillation (CMS/HCC)     Bleeding precautions  Amiodarone  Apixaban  Monitor heart rate         Chronic obstructive pulmonary disease, unspecified COPD type (CMS/HCC)     Stable, no wheezing or shortness of breath  On room air         Head injury     Continue to monitor         Hypertensive  heart disease with CHF (CMS/[facility])     CHF stable, no sob  Monitor blood pressure  BP at goal  Losartan potassium  Toprol-XL  Spironolactone  Torsemide  Monitor for shortness of breath         Type 2 diabetes mellitus with hyperglycemia, with long-term current use of insulin (CMS/[facility])     Continue insulin  Glucoscan with sliding scale insulin coverage  Low concentrated sweets diet         Weakness - Primary     Continue to work towards goals in therapy          Medications, treatments, and labs reviewed  Continue medications and treatments as listed in EMR      Scribe Attestation  I, Kirill Page   attest that this documentation has been prepared under the direction and in the presence of MARISELA Nelson    Provider Attestation - Scribe documentation  All medical record entries made by the Scribe were at my direction and personally dictated by me. I have reviewed the chart and agree that the record accurately reflects my personal performance of the history, physical exam, discussion and plan.   MARISELA Nelson        Electronically Signed By: MARISELA Nelson   2/23/24 11:09 AM

## 2024-02-14 NOTE — ASSESSMENT & PLAN NOTE
Continue insulin  Glucoscan with sliding scale insulin coverage  Low concentrated sweets diet  Fasting blood glucose at goal

## 2024-02-15 ENCOUNTER — NURSING HOME VISIT (OUTPATIENT)
Dept: POST ACUTE CARE | Facility: EXTERNAL LOCATION | Age: 74
End: 2024-02-15
Payer: MEDICARE

## 2024-02-15 DIAGNOSIS — R53.1 WEAKNESS: Primary | ICD-10-CM

## 2024-02-15 DIAGNOSIS — I11.0 HYPERTENSIVE HEART DISEASE WITH CONGESTIVE HEART FAILURE, UNSPECIFIED HEART FAILURE TYPE (MULTI): ICD-10-CM

## 2024-02-15 DIAGNOSIS — I48.91 ATRIAL FIBRILLATION, UNSPECIFIED TYPE (MULTI): ICD-10-CM

## 2024-02-15 DIAGNOSIS — S01.81XD FOREHEAD LACERATION, SUBSEQUENT ENCOUNTER: ICD-10-CM

## 2024-02-15 PROCEDURE — 99308 SBSQ NF CARE LOW MDM 20: CPT | Performed by: INTERNAL MEDICINE

## 2024-02-15 NOTE — LETTER
Patient: Teresa Matthews  : 1950    Encounter Date: 02/15/2024    PROGRESS NOTE    Subjective  Chief complaint: Teresa Matthews is a 73 y.o. female who is an acute skilled patient being seen and evaluated for weakness    HPI:  HPI  Patient is working in therapy due to generalized weakness.  Therapy is working with patient's focusing on gait training, transfers, ADLs.  Patient is able to ambulate with wheeled walker and min assist.  Patient is able to complete ADL tasks with moderate independence.  Speech therapy is working with patient to address cognition.  Patient seen in follow-up for laceration with sutures that are to be removed on 2024.  Patient seen and examined at bedside, appears to be in no apparent distress.  Patient has no new complaints at this time.    Objective  Vital signs: 142/76, 98.9, 76, 18, blood sugar 200, 93%    Physical Exam  Constitutional:       General: She is not in acute distress.  Eyes:      Extraocular Movements: Extraocular movements intact.   Cardiovascular:      Rate and Rhythm: Normal rate and regular rhythm.   Pulmonary:      Effort: Pulmonary effort is normal.      Breath sounds: Normal breath sounds.   Abdominal:      General: Bowel sounds are normal.      Palpations: Abdomen is soft.   Musculoskeletal:      Cervical back: Neck supple.      Right lower leg: No edema.      Left lower leg: Edema present.   Skin:     Comments: Left calf wound - dsg in place   Bruising around eyes  Laceration to forehead with sutures   Neurological:      Mental Status: She is alert.   Psychiatric:         Mood and Affect: Mood normal.         Behavior: Behavior is cooperative.         Assessment/Plan  Problem List Items Addressed This Visit       Atrial fibrillation (CMS/HCC)     Bleeding precautions  Amiodarone  Apixaban  Monitor heart rate         Hypertensive heart disease with CHF (CMS/HCC)     CHF stable, no sob  Monitor blood pressure  Losartan  potassium  Toprol-XL  Spironolactone  Torsemide  Monitor for shortness of breath         Weakness - Primary     Continue working with therapy, progress towards goals         Forehead laceration, subsequent encounter     Sutures to be removed 2/19/2024  Dressing to forehead TX: Cleanse with normal saline, apply triple ATB cover with foam          Medications, treatments, and labs reviewed  Continue medications and treatments as listed in EMR      Scribe Attestation  I, Lorna Mcmanus Scribe   attest that this documentation has been prepared under the direction and in the presence of Pita Virgen MD    Provider Attestation - Scribe documentation  All medical record entries made by the Scribe were at my direction and personally dictated by me. I have reviewed the chart and agree that the record accurately reflects my personal performance of the history, physical exam, discussion and plan.   Pita Virgen MD        Electronically Signed By: Pita Virgen MD   2/15/24  2:08 PM

## 2024-02-15 NOTE — PROGRESS NOTES
PROGRESS NOTE    Subjective   Chief complaint: Teresa Matthews is a 73 y.o. female who is an acute skilled patient being seen and evaluated for weakness    HPI:  HPI  Patient is working in therapy due to generalized weakness.  Therapy is working with patient's focusing on gait training, transfers, ADLs.  Patient is able to ambulate with wheeled walker and min assist.  Patient is able to complete ADL tasks with moderate independence.  Speech therapy is working with patient to address cognition.  Patient seen in follow-up for laceration with sutures that are to be removed on 2/19/2024.  Patient seen and examined at bedside, appears to be in no apparent distress.  Patient has no new complaints at this time.    Objective   Vital signs: 142/76, 98.9, 76, 18, blood sugar 200, 93%    Physical Exam  Constitutional:       General: She is not in acute distress.  Eyes:      Extraocular Movements: Extraocular movements intact.   Cardiovascular:      Rate and Rhythm: Normal rate and regular rhythm.   Pulmonary:      Effort: Pulmonary effort is normal.      Breath sounds: Normal breath sounds.   Abdominal:      General: Bowel sounds are normal.      Palpations: Abdomen is soft.   Musculoskeletal:      Cervical back: Neck supple.      Right lower leg: No edema.      Left lower leg: Edema present.   Skin:     Comments: Left calf wound - dsg in place   Bruising around eyes  Laceration to forehead with sutures   Neurological:      Mental Status: She is alert.   Psychiatric:         Mood and Affect: Mood normal.         Behavior: Behavior is cooperative.         Assessment/Plan   Problem List Items Addressed This Visit       Atrial fibrillation (CMS/HCC)     Bleeding precautions  Amiodarone  Apixaban  Monitor heart rate         Hypertensive heart disease with CHF (CMS/HCC)     CHF stable, no sob  Monitor blood pressure  Losartan potassium  Toprol-XL  Spironolactone  Torsemide  Monitor for shortness of breath         Weakness -  Primary     Continue working with therapy, progress towards goals         Forehead laceration, subsequent encounter     Sutures to be removed 2/19/2024  Dressing to forehead TX: Cleanse with normal saline, apply triple ATB cover with foam          Medications, treatments, and labs reviewed  Continue medications and treatments as listed in EMR      Scribe Attestation  JUAN FRANCISCO, Donato Pageibe   attest that this documentation has been prepared under the direction and in the presence of Pita Virgen MD    Provider Attestation - Scribe documentation  All medical record entries made by the Scribe were at my direction and personally dictated by me. I have reviewed the chart and agree that the record accurately reflects my personal performance of the history, physical exam, discussion and plan.   Pita Virgen MD

## 2024-02-15 NOTE — ASSESSMENT & PLAN NOTE
Sutures to be removed 2/19/2024  Dressing to forehead TX: Cleanse with normal saline, apply triple ATB cover with foam

## 2024-02-17 DIAGNOSIS — S01.81XD FOREHEAD LACERATION, SUBSEQUENT ENCOUNTER: Primary | ICD-10-CM

## 2024-02-17 DIAGNOSIS — R26.2 INABILITY TO AMBULATE DUE TO KNEE: ICD-10-CM

## 2024-02-21 NOTE — TELEPHONE ENCOUNTER
[facility] is following after discharge from rehab for any referral needs     Requesting appointment for rehab discharge 2/16. Please advise

## 2024-02-22 PROBLEM — S09.90XA HEAD INJURY: Status: ACTIVE | Noted: 2024-02-22

## 2024-02-22 NOTE — PROGRESS NOTES
PROGRESS NOTE    Subjective   Chief complaint: Teresa Matthews is a 73 y.o. female who is an acute skilled patient being seen and evaluated for weakness    HPI:  HPI  Patient is seen in follow-up to fall on 2\12, patient with bruising to eyes and laceration with sutures.  Patient denies headache or vision changes.  Patient is also working in therapy due to generalized weakness.  Patient is ambulating with wheeled walker and min assist for 5 feet.  Patient is able to complete ADL tasks with moderate independence.  Patient was seen and examined at bedside, appears to be in no acute distress.  Denies chest pain or shortness of breath.  Afebrile.      Objective   Vital signs: 113/66, 98.1, 65, 18, blood sugar 211, 93%    Physical Exam  Constitutional:       General: She is not in acute distress.  Eyes:      Extraocular Movements: Extraocular movements intact.   Cardiovascular:      Rate and Rhythm: Normal rate and regular rhythm.   Pulmonary:      Effort: Pulmonary effort is normal.      Breath sounds: Normal breath sounds.   Abdominal:      General: Bowel sounds are normal.      Palpations: Abdomen is soft.   Musculoskeletal:      Cervical back: Neck supple.      Right lower leg: Edema present.      Left lower leg: Edema present.   Skin:     Comments: Bruising under bilateral eyes  Dressing to forehead   Neurological:      Mental Status: She is alert.      Motor: Weakness present.   Psychiatric:         Mood and Affect: Mood normal.         Behavior: Behavior is cooperative.         Assessment/Plan   Problem List Items Addressed This Visit       Atrial fibrillation (CMS/HCC)     Bleeding precautions  Amiodarone  Apixaban  Monitor heart rate         Chronic obstructive pulmonary disease, unspecified COPD type (CMS/HCC)     Stable, no wheezing or shortness of breath  On room air         Head injury     Continue to monitor         Hypertensive heart disease with CHF (CMS/HCC)     CHF stable, no sob  Monitor blood  pressure  BP at goal  Losartan potassium  Toprol-XL  Spironolactone  Torsemide  Monitor for shortness of breath         Type 2 diabetes mellitus with hyperglycemia, with long-term current use of insulin (CMS/[facility])     Continue insulin  Glucoscan with sliding scale insulin coverage  Low concentrated sweets diet         Weakness - Primary     Continue to work towards goals in therapy          Medications, treatments, and labs reviewed  Continue medications and treatments as listed in EMR      Scribe Attestation  ILorna Scribe   attest that this documentation has been prepared under the direction and in the presence of MARISELA Nelson    Provider Attestation - Scribe documentation  All medical record entries made by the Scribe were at my direction and personally dictated by me. I have reviewed the chart and agree that the record accurately reflects my personal performance of the history, physical exam, discussion and plan.   MARISELA Nelson

## 2024-02-22 NOTE — ASSESSMENT & PLAN NOTE
CHF stable, no sob  Monitor blood pressure  BP at goal  Losartan potassium  Toprol-XL  Spironolactone  Torsemide  Monitor for shortness of breath

## 2024-03-12 ENCOUNTER — TELEPHONE (OUTPATIENT)
Dept: PRIMARY CARE | Facility: CLINIC | Age: 74
End: 2024-03-12
Payer: MEDICARE

## 2024-03-12 NOTE — TELEPHONE ENCOUNTER
Patient wanted to let you know that she has stopped taking the Metformin because it was giving her diarrhea.

## 2024-03-25 ENCOUNTER — APPOINTMENT (OUTPATIENT)
Dept: RADIOLOGY | Facility: HOSPITAL | Age: 74
DRG: 871 | End: 2024-03-25
Payer: MEDICARE

## 2024-03-25 ENCOUNTER — HOSPITAL ENCOUNTER (INPATIENT)
Facility: HOSPITAL | Age: 74
LOS: 5 days | Discharge: NURSING, REHAB OR LONG-TERM CARE | DRG: 871 | End: 2024-03-30
Attending: STUDENT IN AN ORGANIZED HEALTH CARE EDUCATION/TRAINING PROGRAM | Admitting: FAMILY MEDICINE
Payer: MEDICARE

## 2024-03-25 ENCOUNTER — APPOINTMENT (OUTPATIENT)
Dept: CARDIOLOGY | Facility: HOSPITAL | Age: 74
DRG: 871 | End: 2024-03-25
Payer: MEDICARE

## 2024-03-25 DIAGNOSIS — H90.3 SENSORINEURAL HEARING LOSS (SNHL) OF BOTH EARS: ICD-10-CM

## 2024-03-25 DIAGNOSIS — E87.6 HYPOKALEMIA: ICD-10-CM

## 2024-03-25 DIAGNOSIS — M48.02 DEGENERATIVE CERVICAL SPINAL STENOSIS: ICD-10-CM

## 2024-03-25 DIAGNOSIS — S01.81XD FOREHEAD LACERATION, SUBSEQUENT ENCOUNTER: ICD-10-CM

## 2024-03-25 DIAGNOSIS — R91.1 LUNG NODULE: ICD-10-CM

## 2024-03-25 DIAGNOSIS — N18.31 STAGE 3A CHRONIC KIDNEY DISEASE (MULTI): ICD-10-CM

## 2024-03-25 DIAGNOSIS — G47.33 OBSTRUCTIVE SLEEP APNEA: ICD-10-CM

## 2024-03-25 DIAGNOSIS — I51.7 CARDIOMEGALY: ICD-10-CM

## 2024-03-25 DIAGNOSIS — I10 ESSENTIAL HYPERTENSION: ICD-10-CM

## 2024-03-25 DIAGNOSIS — R26.2 INABILITY TO AMBULATE DUE TO KNEE: ICD-10-CM

## 2024-03-25 DIAGNOSIS — K74.4 SECONDARY BILIARY CIRRHOSIS (MULTI): ICD-10-CM

## 2024-03-25 DIAGNOSIS — E11.10 DIABETIC KETOACIDOSIS WITHOUT COMA ASSOCIATED WITH TYPE 2 DIABETES MELLITUS (MULTI): ICD-10-CM

## 2024-03-25 DIAGNOSIS — J98.4 RESTRICTIVE LUNG DISEASE: ICD-10-CM

## 2024-03-25 DIAGNOSIS — M17.0 BILATERAL PRIMARY OSTEOARTHRITIS OF KNEE: ICD-10-CM

## 2024-03-25 DIAGNOSIS — L03.116 CELLULITIS OF LEFT LEG: ICD-10-CM

## 2024-03-25 DIAGNOSIS — E66.01 MORBID OBESITY (MULTI): ICD-10-CM

## 2024-03-25 DIAGNOSIS — M62.81 MUSCLE WEAKNESS (GENERALIZED): ICD-10-CM

## 2024-03-25 DIAGNOSIS — E78.1 HYPERTRIGLYCERIDEMIA: ICD-10-CM

## 2024-03-25 DIAGNOSIS — L97.422: ICD-10-CM

## 2024-03-25 DIAGNOSIS — N39.0 COMPLICATED UTI (URINARY TRACT INFECTION): ICD-10-CM

## 2024-03-25 DIAGNOSIS — L89.623 PRESSURE ULCER OF LEFT HEEL, STAGE 3 (MULTI): ICD-10-CM

## 2024-03-25 DIAGNOSIS — Z91.148 PATIENT'S OTHER NONCOMPLIANCE WITH MEDICATION REGIMEN FOR OTHER REASON: ICD-10-CM

## 2024-03-25 DIAGNOSIS — K76.9 LIVER PROBLEM: ICD-10-CM

## 2024-03-25 DIAGNOSIS — I87.2 CHRONIC STASIS DERMATITIS: ICD-10-CM

## 2024-03-25 DIAGNOSIS — T14.8XXA INFECTED WOUND: ICD-10-CM

## 2024-03-25 DIAGNOSIS — F32.1 CURRENT MODERATE EPISODE OF MAJOR DEPRESSIVE DISORDER WITHOUT PRIOR EPISODE (MULTI): ICD-10-CM

## 2024-03-25 DIAGNOSIS — E11.65 TYPE 2 DIABETES MELLITUS WITH HYPERGLYCEMIA, WITH LONG-TERM CURRENT USE OF INSULIN (MULTI): ICD-10-CM

## 2024-03-25 DIAGNOSIS — E83.42 HYPOMAGNESEMIA: ICD-10-CM

## 2024-03-25 DIAGNOSIS — D68.9 COAGULATION DISORDER (MULTI): ICD-10-CM

## 2024-03-25 DIAGNOSIS — Z90.3 INTESTINAL MALABSORPTION FOLLOWING GASTRECTOMY (HHS-HCC): ICD-10-CM

## 2024-03-25 DIAGNOSIS — F51.01 PRIMARY INSOMNIA: ICD-10-CM

## 2024-03-25 DIAGNOSIS — I77.810 THORACIC AORTIC ECTASIA (CMS-HCC): ICD-10-CM

## 2024-03-25 DIAGNOSIS — R41.0 DELIRIUM: Primary | ICD-10-CM

## 2024-03-25 DIAGNOSIS — F11.90 NARCOTIC DRUG USE: ICD-10-CM

## 2024-03-25 DIAGNOSIS — F41.9 ANXIETY: ICD-10-CM

## 2024-03-25 DIAGNOSIS — M24.80 CERVICAL SPINE CREPITUS: ICD-10-CM

## 2024-03-25 DIAGNOSIS — K91.2 INTESTINAL MALABSORPTION FOLLOWING GASTRECTOMY (HHS-HCC): ICD-10-CM

## 2024-03-25 DIAGNOSIS — E13.10 DIABETIC KETOACIDOSIS WITHOUT COMA ASSOCIATED WITH OTHER SPECIFIED DIABETES MELLITUS (MULTI): ICD-10-CM

## 2024-03-25 DIAGNOSIS — R19.5 POSITIVE FIT (FECAL IMMUNOCHEMICAL TEST): ICD-10-CM

## 2024-03-25 DIAGNOSIS — A41.9 SEPSIS WITHOUT ACUTE ORGAN DYSFUNCTION, DUE TO UNSPECIFIED ORGANISM (MULTI): ICD-10-CM

## 2024-03-25 DIAGNOSIS — N39.0 LOWER URINARY TRACT INFECTIOUS DISEASE: ICD-10-CM

## 2024-03-25 DIAGNOSIS — M86.10 ACUTE OSTEOMYELITIS (MULTI): ICD-10-CM

## 2024-03-25 DIAGNOSIS — Z79.4 TYPE 2 DIABETES MELLITUS WITH HYPERGLYCEMIA, WITH LONG-TERM CURRENT USE OF INSULIN (MULTI): ICD-10-CM

## 2024-03-25 DIAGNOSIS — I77.9 PERIPHERAL ARTERIAL OCCLUSIVE DISEASE (CMS-HCC): ICD-10-CM

## 2024-03-25 DIAGNOSIS — R78.81 BACTEREMIA: ICD-10-CM

## 2024-03-25 DIAGNOSIS — J44.9 CHRONIC OBSTRUCTIVE PULMONARY DISEASE, UNSPECIFIED COPD TYPE (MULTI): ICD-10-CM

## 2024-03-25 DIAGNOSIS — L08.9 INFECTED WOUND: ICD-10-CM

## 2024-03-25 DIAGNOSIS — E66.9 OBESITY (BMI 30-39.9): ICD-10-CM

## 2024-03-25 DIAGNOSIS — E83.51 HYPOCALCEMIA: ICD-10-CM

## 2024-03-25 DIAGNOSIS — E89.0 HYPOTHYROIDISM, POSTSURGICAL: ICD-10-CM

## 2024-03-25 DIAGNOSIS — R53.1 WEAKNESS: ICD-10-CM

## 2024-03-25 DIAGNOSIS — E11.42 DIABETIC PERIPHERAL NEUROPATHY (MULTI): ICD-10-CM

## 2024-03-25 PROBLEM — Z86.39 HISTORY OF TYPE 2 DIABETES MELLITUS: Status: RESOLVED | Noted: 2024-03-25 | Resolved: 2024-03-25

## 2024-03-25 PROBLEM — S52.502A DISTAL RADIUS FRACTURE, LEFT: Status: RESOLVED | Noted: 2023-05-19 | Resolved: 2024-03-25

## 2024-03-25 PROBLEM — T07.XXXA LACERATION OF MULTIPLE SITES: Status: RESOLVED | Noted: 2024-03-25 | Resolved: 2024-03-25

## 2024-03-25 PROBLEM — L85.3 DRY SKIN: Status: RESOLVED | Noted: 2023-01-17 | Resolved: 2024-03-25

## 2024-03-25 PROBLEM — H93.13 BILATERAL TINNITUS: Status: RESOLVED | Noted: 2020-07-01 | Resolved: 2024-03-25

## 2024-03-25 PROBLEM — H81.10 BENIGN PAROXYSMAL POSITIONAL VERTIGO: Status: ACTIVE | Noted: 2020-07-01

## 2024-03-25 PROBLEM — M00.869: Status: RESOLVED | Noted: 2022-03-03 | Resolved: 2024-03-25

## 2024-03-25 PROBLEM — J06.9 ACUTE UPPER RESPIRATORY INFECTION: Status: RESOLVED | Noted: 2024-03-25 | Resolved: 2024-03-25

## 2024-03-25 PROBLEM — M00.9 INFECTIVE ARTHRITIS (MULTI): Status: RESOLVED | Noted: 2024-03-25 | Resolved: 2024-03-25

## 2024-03-25 PROBLEM — U07.1 BRONCHITIS DUE TO COVID-19 VIRUS: Status: RESOLVED | Noted: 2023-04-10 | Resolved: 2024-03-25

## 2024-03-25 PROBLEM — N17.9 ACUTE KIDNEY INJURY (CMS-HCC): Status: RESOLVED | Noted: 2024-03-25 | Resolved: 2024-03-25

## 2024-03-25 PROBLEM — E55.9 VITAMIN D DEFICIENCY, UNSPECIFIED: Status: RESOLVED | Noted: 2022-03-04 | Resolved: 2024-03-25

## 2024-03-25 PROBLEM — S31.000A SACRAL WOUND: Status: ACTIVE | Noted: 2024-03-25

## 2024-03-25 PROBLEM — J40 BRONCHITIS DUE TO COVID-19 VIRUS: Status: RESOLVED | Noted: 2023-04-10 | Resolved: 2024-03-25

## 2024-03-25 PROBLEM — Z86.79 HISTORY OF HEART FAILURE: Status: RESOLVED | Noted: 2024-03-25 | Resolved: 2024-03-25

## 2024-03-25 PROBLEM — M06.9 RHEUMATOID ARTHRITIS (MULTI): Status: ACTIVE | Noted: 2023-06-09

## 2024-03-25 LAB
ALBUMIN SERPL BCP-MCNC: 3.3 G/DL (ref 3.4–5)
ALP SERPL-CCNC: 83 U/L (ref 33–136)
ALT SERPL W P-5'-P-CCNC: 8 U/L (ref 7–45)
ANION GAP BLDV CALCULATED.4IONS-SCNC: 21 MMOL/L (ref 10–25)
ANION GAP SERPL CALC-SCNC: 13 MMOL/L (ref 10–20)
ANION GAP SERPL CALC-SCNC: 17 MMOL/L (ref 10–20)
ANION GAP SERPL CALC-SCNC: 21 MMOL/L (ref 10–20)
ANION GAP SERPL CALC-SCNC: 24 MMOL/L (ref 10–20)
APPEARANCE UR: ABNORMAL
AST SERPL W P-5'-P-CCNC: 15 U/L (ref 9–39)
B-OH-BUTYR SERPL-SCNC: 6.24 MMOL/L (ref 0.02–0.27)
BACTERIA #/AREA URNS AUTO: ABNORMAL /HPF
BASE EXCESS BLDV CALC-SCNC: -9.7 MMOL/L (ref -2–3)
BASOPHILS # BLD AUTO: 0.04 X10*3/UL (ref 0–0.1)
BASOPHILS NFR BLD AUTO: 0.2 %
BILIRUB SERPL-MCNC: 0.6 MG/DL (ref 0–1.2)
BILIRUB UR STRIP.AUTO-MCNC: NEGATIVE MG/DL
BODY TEMPERATURE: 37 DEGREES CELSIUS
BUN SERPL-MCNC: 25 MG/DL (ref 6–23)
CA-I BLDV-SCNC: 1.19 MMOL/L (ref 1.1–1.33)
CALCIUM SERPL-MCNC: 8.4 MG/DL (ref 8.6–10.3)
CALCIUM SERPL-MCNC: 8.4 MG/DL (ref 8.6–10.3)
CALCIUM SERPL-MCNC: 8.5 MG/DL (ref 8.6–10.3)
CALCIUM SERPL-MCNC: 8.7 MG/DL (ref 8.6–10.3)
CARDIAC TROPONIN I PNL SERPL HS: 13 NG/L (ref 0–13)
CHLORIDE BLDV-SCNC: 89 MMOL/L (ref 98–107)
CHLORIDE SERPL-SCNC: 88 MMOL/L (ref 98–107)
CHLORIDE SERPL-SCNC: 92 MMOL/L (ref 98–107)
CHLORIDE SERPL-SCNC: 94 MMOL/L (ref 98–107)
CHLORIDE SERPL-SCNC: 98 MMOL/L (ref 98–107)
CO2 SERPL-SCNC: 15 MMOL/L (ref 21–32)
CO2 SERPL-SCNC: 18 MMOL/L (ref 21–32)
CO2 SERPL-SCNC: 21 MMOL/L (ref 21–32)
CO2 SERPL-SCNC: 22 MMOL/L (ref 21–32)
COLOR UR: YELLOW
CREAT SERPL-MCNC: 1.56 MG/DL (ref 0.5–1.05)
CREAT SERPL-MCNC: 1.62 MG/DL (ref 0.5–1.05)
CREAT SERPL-MCNC: 1.62 MG/DL (ref 0.5–1.05)
CREAT SERPL-MCNC: 1.64 MG/DL (ref 0.5–1.05)
EGFRCR SERPLBLD CKD-EPI 2021: 33 ML/MIN/1.73M*2
EGFRCR SERPLBLD CKD-EPI 2021: 34 ML/MIN/1.73M*2
EGFRCR SERPLBLD CKD-EPI 2021: 34 ML/MIN/1.73M*2
EGFRCR SERPLBLD CKD-EPI 2021: 35 ML/MIN/1.73M*2
EOSINOPHIL # BLD AUTO: 0.18 X10*3/UL (ref 0–0.4)
EOSINOPHIL NFR BLD AUTO: 0.9 %
ERYTHROCYTE [DISTWIDTH] IN BLOOD BY AUTOMATED COUNT: 14.7 % (ref 11.5–14.5)
FLUAV RNA RESP QL NAA+PROBE: NOT DETECTED
FLUBV RNA RESP QL NAA+PROBE: NOT DETECTED
GLUCOSE BLD MANUAL STRIP-MCNC: 134 MG/DL (ref 74–99)
GLUCOSE BLD MANUAL STRIP-MCNC: 165 MG/DL (ref 74–99)
GLUCOSE BLD MANUAL STRIP-MCNC: 213 MG/DL (ref 74–99)
GLUCOSE BLD MANUAL STRIP-MCNC: 229 MG/DL (ref 74–99)
GLUCOSE BLD MANUAL STRIP-MCNC: 300 MG/DL (ref 74–99)
GLUCOSE BLD MANUAL STRIP-MCNC: 387 MG/DL (ref 74–99)
GLUCOSE BLD MANUAL STRIP-MCNC: 471 MG/DL (ref 74–99)
GLUCOSE BLD MANUAL STRIP-MCNC: 514 MG/DL (ref 74–99)
GLUCOSE BLD MANUAL STRIP-MCNC: 523 MG/DL (ref 74–99)
GLUCOSE BLDV-MCNC: 576 MG/DL (ref 74–99)
GLUCOSE SERPL-MCNC: 261 MG/DL (ref 74–99)
GLUCOSE SERPL-MCNC: 407 MG/DL (ref 74–99)
GLUCOSE SERPL-MCNC: 573 MG/DL (ref 74–99)
GLUCOSE SERPL-MCNC: 609 MG/DL (ref 74–99)
GLUCOSE UR STRIP.AUTO-MCNC: ABNORMAL MG/DL
HCO3 BLDV-SCNC: 16.6 MMOL/L (ref 22–26)
HCT VFR BLD AUTO: 32.7 % (ref 36–46)
HCT VFR BLD EST: 32 % (ref 36–46)
HGB BLD-MCNC: 10.6 G/DL (ref 12–16)
HGB BLDV-MCNC: 10.6 G/DL (ref 12–16)
IMM GRANULOCYTES # BLD AUTO: 0.4 X10*3/UL (ref 0–0.5)
IMM GRANULOCYTES NFR BLD AUTO: 2 % (ref 0–0.9)
INHALED O2 CONCENTRATION: 21 %
KETONES UR STRIP.AUTO-MCNC: ABNORMAL MG/DL
LACTATE BLDV-SCNC: 2.9 MMOL/L (ref 0.4–2)
LACTATE BLDV-SCNC: 3 MMOL/L (ref 0.4–2)
LACTATE SERPL-SCNC: 2.8 MMOL/L (ref 0.4–2)
LACTATE SERPL-SCNC: 2.9 MMOL/L (ref 0.4–2)
LACTATE SERPL-SCNC: 4.4 MMOL/L (ref 0.4–2)
LACTATE SERPL-SCNC: 5.5 MMOL/L (ref 0.4–2)
LEUKOCYTE ESTERASE UR QL STRIP.AUTO: ABNORMAL
LYMPHOCYTES # BLD AUTO: 0.2 X10*3/UL (ref 0.8–3)
LYMPHOCYTES NFR BLD AUTO: 1 %
MAGNESIUM SERPL-MCNC: 1.36 MG/DL (ref 1.6–2.4)
MAGNESIUM SERPL-MCNC: 1.41 MG/DL (ref 1.6–2.4)
MAGNESIUM SERPL-MCNC: 1.52 MG/DL (ref 1.6–2.4)
MCH RBC QN AUTO: 28.2 PG (ref 26–34)
MCHC RBC AUTO-ENTMCNC: 32.4 G/DL (ref 32–36)
MCV RBC AUTO: 87 FL (ref 80–100)
MONOCYTES # BLD AUTO: 0.72 X10*3/UL (ref 0.05–0.8)
MONOCYTES NFR BLD AUTO: 3.6 %
MUCOUS THREADS #/AREA URNS AUTO: ABNORMAL /LPF
NEUTROPHILS # BLD AUTO: 18.25 X10*3/UL (ref 1.6–5.5)
NEUTROPHILS NFR BLD AUTO: 92.3 %
NITRITE UR QL STRIP.AUTO: NEGATIVE
NRBC BLD-RTO: 0 /100 WBCS (ref 0–0)
OXYHGB MFR BLDV: 33.4 % (ref 45–75)
PCO2 BLDV: 37 MM HG (ref 41–51)
PH BLDV: 7.26 PH (ref 7.33–7.43)
PH UR STRIP.AUTO: 6 [PH]
PHOSPHATE SERPL-MCNC: 1 MG/DL (ref 2.5–4.9)
PHOSPHATE SERPL-MCNC: 1.9 MG/DL (ref 2.5–4.9)
PHOSPHATE SERPL-MCNC: 3.1 MG/DL (ref 2.5–4.9)
PLATELET # BLD AUTO: 156 X10*3/UL (ref 150–450)
PO2 BLDV: 32 MM HG (ref 35–45)
POTASSIUM BLDV-SCNC: 4.9 MMOL/L (ref 3.5–5.3)
POTASSIUM SERPL-SCNC: 3.4 MMOL/L (ref 3.5–5.3)
POTASSIUM SERPL-SCNC: 4.3 MMOL/L (ref 3.5–5.3)
POTASSIUM SERPL-SCNC: 4.5 MMOL/L (ref 3.5–5.3)
POTASSIUM SERPL-SCNC: 4.6 MMOL/L (ref 3.5–5.3)
PROT SERPL-MCNC: 7.2 G/DL (ref 6.4–8.2)
PROT UR STRIP.AUTO-MCNC: ABNORMAL MG/DL
RBC # BLD AUTO: 3.76 X10*6/UL (ref 4–5.2)
RBC # UR STRIP.AUTO: ABNORMAL /UL
RBC #/AREA URNS AUTO: ABNORMAL /HPF
SAO2 % BLDV: 34 % (ref 45–75)
SARS-COV-2 RNA RESP QL NAA+PROBE: NOT DETECTED
SODIUM BLDV-SCNC: 122 MMOL/L (ref 136–145)
SODIUM SERPL-SCNC: 122 MMOL/L (ref 136–145)
SODIUM SERPL-SCNC: 127 MMOL/L (ref 136–145)
SODIUM SERPL-SCNC: 127 MMOL/L (ref 136–145)
SODIUM SERPL-SCNC: 130 MMOL/L (ref 136–145)
SP GR UR STRIP.AUTO: 1.02
SQUAMOUS #/AREA URNS AUTO: ABNORMAL /HPF
TSH SERPL-ACNC: 2.13 MIU/L (ref 0.44–3.98)
UROBILINOGEN UR STRIP.AUTO-MCNC: <2 MG/DL
WBC # BLD AUTO: 19.8 X10*3/UL (ref 4.4–11.3)
WBC #/AREA URNS AUTO: ABNORMAL /HPF
WBC CLUMPS #/AREA URNS AUTO: ABNORMAL /HPF

## 2024-03-25 PROCEDURE — 84132 ASSAY OF SERUM POTASSIUM: CPT | Performed by: STUDENT IN AN ORGANIZED HEALTH CARE EDUCATION/TRAINING PROGRAM

## 2024-03-25 PROCEDURE — 84132 ASSAY OF SERUM POTASSIUM: CPT

## 2024-03-25 PROCEDURE — 99291 CRITICAL CARE FIRST HOUR: CPT | Mod: 25 | Performed by: STUDENT IN AN ORGANIZED HEALTH CARE EDUCATION/TRAINING PROGRAM

## 2024-03-25 PROCEDURE — 83605 ASSAY OF LACTIC ACID: CPT | Mod: MUE

## 2024-03-25 PROCEDURE — 83735 ASSAY OF MAGNESIUM: CPT

## 2024-03-25 PROCEDURE — 70498 CT ANGIOGRAPHY NECK: CPT | Performed by: RADIOLOGY

## 2024-03-25 PROCEDURE — 2550000001 HC RX 255 CONTRASTS: Performed by: STUDENT IN AN ORGANIZED HEALTH CARE EDUCATION/TRAINING PROGRAM

## 2024-03-25 PROCEDURE — 99291 CRITICAL CARE FIRST HOUR: CPT | Performed by: FAMILY MEDICINE

## 2024-03-25 PROCEDURE — 84100 ASSAY OF PHOSPHORUS: CPT

## 2024-03-25 PROCEDURE — 96366 THER/PROPH/DIAG IV INF ADDON: CPT

## 2024-03-25 PROCEDURE — 96365 THER/PROPH/DIAG IV INF INIT: CPT

## 2024-03-25 PROCEDURE — 2020000001 HC ICU ROOM DAILY

## 2024-03-25 PROCEDURE — 87636 SARSCOV2 & INF A&B AMP PRB: CPT | Performed by: STUDENT IN AN ORGANIZED HEALTH CARE EDUCATION/TRAINING PROGRAM

## 2024-03-25 PROCEDURE — 2500000005 HC RX 250 GENERAL PHARMACY W/O HCPCS

## 2024-03-25 PROCEDURE — 84484 ASSAY OF TROPONIN QUANT: CPT | Performed by: STUDENT IN AN ORGANIZED HEALTH CARE EDUCATION/TRAINING PROGRAM

## 2024-03-25 PROCEDURE — 70450 CT HEAD/BRAIN W/O DYE: CPT

## 2024-03-25 PROCEDURE — 96375 TX/PRO/DX INJ NEW DRUG ADDON: CPT

## 2024-03-25 PROCEDURE — 85025 COMPLETE CBC W/AUTO DIFF WBC: CPT | Performed by: STUDENT IN AN ORGANIZED HEALTH CARE EDUCATION/TRAINING PROGRAM

## 2024-03-25 PROCEDURE — 70450 CT HEAD/BRAIN W/O DYE: CPT | Performed by: STUDENT IN AN ORGANIZED HEALTH CARE EDUCATION/TRAINING PROGRAM

## 2024-03-25 PROCEDURE — 81001 URINALYSIS AUTO W/SCOPE: CPT | Performed by: STUDENT IN AN ORGANIZED HEALTH CARE EDUCATION/TRAINING PROGRAM

## 2024-03-25 PROCEDURE — 2500000004 HC RX 250 GENERAL PHARMACY W/ HCPCS (ALT 636 FOR OP/ED)

## 2024-03-25 PROCEDURE — 93005 ELECTROCARDIOGRAM TRACING: CPT

## 2024-03-25 PROCEDURE — 82947 ASSAY GLUCOSE BLOOD QUANT: CPT

## 2024-03-25 PROCEDURE — 71045 X-RAY EXAM CHEST 1 VIEW: CPT

## 2024-03-25 PROCEDURE — 87040 BLOOD CULTURE FOR BACTERIA: CPT | Mod: PORLAB | Performed by: STUDENT IN AN ORGANIZED HEALTH CARE EDUCATION/TRAINING PROGRAM

## 2024-03-25 PROCEDURE — 84145 PROCALCITONIN (PCT): CPT | Mod: PORLAB

## 2024-03-25 PROCEDURE — 36415 COLL VENOUS BLD VENIPUNCTURE: CPT | Performed by: STUDENT IN AN ORGANIZED HEALTH CARE EDUCATION/TRAINING PROGRAM

## 2024-03-25 PROCEDURE — 71045 X-RAY EXAM CHEST 1 VIEW: CPT | Performed by: RADIOLOGY

## 2024-03-25 PROCEDURE — 84132 ASSAY OF SERUM POTASSIUM: CPT | Performed by: FAMILY MEDICINE

## 2024-03-25 PROCEDURE — 83605 ASSAY OF LACTIC ACID: CPT | Mod: MUE | Performed by: STUDENT IN AN ORGANIZED HEALTH CARE EDUCATION/TRAINING PROGRAM

## 2024-03-25 PROCEDURE — 83605 ASSAY OF LACTIC ACID: CPT | Performed by: STUDENT IN AN ORGANIZED HEALTH CARE EDUCATION/TRAINING PROGRAM

## 2024-03-25 PROCEDURE — 2500000004 HC RX 250 GENERAL PHARMACY W/ HCPCS (ALT 636 FOR OP/ED): Performed by: PHARMACIST

## 2024-03-25 PROCEDURE — 84100 ASSAY OF PHOSPHORUS: CPT | Performed by: STUDENT IN AN ORGANIZED HEALTH CARE EDUCATION/TRAINING PROGRAM

## 2024-03-25 PROCEDURE — 2500000001 HC RX 250 WO HCPCS SELF ADMINISTERED DRUGS (ALT 637 FOR MEDICARE OP): Performed by: FAMILY MEDICINE

## 2024-03-25 PROCEDURE — 70498 CT ANGIOGRAPHY NECK: CPT

## 2024-03-25 PROCEDURE — 2500000004 HC RX 250 GENERAL PHARMACY W/ HCPCS (ALT 636 FOR OP/ED): Performed by: STUDENT IN AN ORGANIZED HEALTH CARE EDUCATION/TRAINING PROGRAM

## 2024-03-25 PROCEDURE — 84443 ASSAY THYROID STIM HORMONE: CPT

## 2024-03-25 PROCEDURE — 84100 ASSAY OF PHOSPHORUS: CPT | Mod: MUE | Performed by: FAMILY MEDICINE

## 2024-03-25 PROCEDURE — 96361 HYDRATE IV INFUSION ADD-ON: CPT

## 2024-03-25 PROCEDURE — 83605 ASSAY OF LACTIC ACID: CPT | Mod: MUE | Performed by: FAMILY MEDICINE

## 2024-03-25 PROCEDURE — 84075 ASSAY ALKALINE PHOSPHATASE: CPT | Performed by: STUDENT IN AN ORGANIZED HEALTH CARE EDUCATION/TRAINING PROGRAM

## 2024-03-25 PROCEDURE — 70496 CT ANGIOGRAPHY HEAD: CPT | Performed by: RADIOLOGY

## 2024-03-25 PROCEDURE — 83735 ASSAY OF MAGNESIUM: CPT | Performed by: STUDENT IN AN ORGANIZED HEALTH CARE EDUCATION/TRAINING PROGRAM

## 2024-03-25 PROCEDURE — 87086 URINE CULTURE/COLONY COUNT: CPT | Mod: PORLAB | Performed by: STUDENT IN AN ORGANIZED HEALTH CARE EDUCATION/TRAINING PROGRAM

## 2024-03-25 PROCEDURE — 82010 KETONE BODYS QUAN: CPT | Performed by: STUDENT IN AN ORGANIZED HEALTH CARE EDUCATION/TRAINING PROGRAM

## 2024-03-25 PROCEDURE — 83735 ASSAY OF MAGNESIUM: CPT | Performed by: FAMILY MEDICINE

## 2024-03-25 RX ORDER — DEXTROSE MONOHYDRATE AND SODIUM CHLORIDE 5; .45 G/100ML; G/100ML
150 INJECTION, SOLUTION INTRAVENOUS CONTINUOUS
Status: DISCONTINUED | OUTPATIENT
Start: 2024-03-25 | End: 2024-03-26

## 2024-03-25 RX ORDER — VANCOMYCIN HYDROCHLORIDE 1 G/20ML
INJECTION, POWDER, LYOPHILIZED, FOR SOLUTION INTRAVENOUS DAILY PRN
Status: DISCONTINUED | OUTPATIENT
Start: 2024-03-25 | End: 2024-03-26 | Stop reason: ALTCHOICE

## 2024-03-25 RX ORDER — ACETAMINOPHEN 650 MG/1
650 SUPPOSITORY RECTAL EVERY 4 HOURS PRN
Status: DISCONTINUED | OUTPATIENT
Start: 2024-03-25 | End: 2024-03-30 | Stop reason: HOSPADM

## 2024-03-25 RX ORDER — DESVENLAFAXINE 100 MG/1
100 TABLET, EXTENDED RELEASE ORAL DAILY
Status: DISCONTINUED | OUTPATIENT
Start: 2024-03-25 | End: 2024-03-30 | Stop reason: HOSPADM

## 2024-03-25 RX ORDER — DEXTROSE 50 % IN WATER (D50W) INTRAVENOUS SYRINGE
50
Status: DISCONTINUED | OUTPATIENT
Start: 2024-03-25 | End: 2024-03-25

## 2024-03-25 RX ORDER — DEXTROSE 50 % IN WATER (D50W) INTRAVENOUS SYRINGE
12.5
Status: DISCONTINUED | OUTPATIENT
Start: 2024-03-25 | End: 2024-03-30 | Stop reason: HOSPADM

## 2024-03-25 RX ORDER — POTASSIUM CHLORIDE 14.9 MG/ML
20 INJECTION INTRAVENOUS
Status: COMPLETED | OUTPATIENT
Start: 2024-03-25 | End: 2024-03-26

## 2024-03-25 RX ORDER — POTASSIUM CHLORIDE 14.9 MG/ML
20 INJECTION INTRAVENOUS ONCE
Status: COMPLETED | OUTPATIENT
Start: 2024-03-25 | End: 2024-03-25

## 2024-03-25 RX ORDER — BUPROPION HYDROCHLORIDE 150 MG/1
150 TABLET ORAL EVERY MORNING
Status: DISCONTINUED | OUTPATIENT
Start: 2024-03-26 | End: 2024-03-30 | Stop reason: HOSPADM

## 2024-03-25 RX ORDER — ASPIRIN 81 MG/1
81 TABLET ORAL
Status: DISCONTINUED | OUTPATIENT
Start: 2024-03-25 | End: 2024-03-30 | Stop reason: HOSPADM

## 2024-03-25 RX ORDER — ACETAMINOPHEN 160 MG/5ML
650 SOLUTION ORAL EVERY 4 HOURS PRN
Status: DISCONTINUED | OUTPATIENT
Start: 2024-03-25 | End: 2024-03-30 | Stop reason: HOSPADM

## 2024-03-25 RX ORDER — DEXTROSE 50 % IN WATER (D50W) INTRAVENOUS SYRINGE
25
Status: DISCONTINUED | OUTPATIENT
Start: 2024-03-25 | End: 2024-03-30 | Stop reason: HOSPADM

## 2024-03-25 RX ORDER — MAGNESIUM SULFATE HEPTAHYDRATE 40 MG/ML
2 INJECTION, SOLUTION INTRAVENOUS ONCE
Status: COMPLETED | OUTPATIENT
Start: 2024-03-25 | End: 2024-03-25

## 2024-03-25 RX ORDER — METOPROLOL TARTRATE 25 MG/1
25 TABLET, FILM COATED ORAL 2 TIMES DAILY
Status: DISCONTINUED | OUTPATIENT
Start: 2024-03-25 | End: 2024-03-30 | Stop reason: HOSPADM

## 2024-03-25 RX ORDER — SODIUM CHLORIDE 9 MG/ML
250 INJECTION, SOLUTION INTRAVENOUS CONTINUOUS
Status: DISCONTINUED | OUTPATIENT
Start: 2024-03-25 | End: 2024-03-26

## 2024-03-25 RX ORDER — DEXTROSE MONOHYDRATE 100 MG/ML
150 INJECTION, SOLUTION INTRAVENOUS CONTINUOUS
Status: DISCONTINUED | OUTPATIENT
Start: 2024-03-25 | End: 2024-03-26

## 2024-03-25 RX ORDER — OXYCODONE AND ACETAMINOPHEN 5; 325 MG/1; MG/1
2 TABLET ORAL EVERY 4 HOURS PRN
Status: DISCONTINUED | OUTPATIENT
Start: 2024-03-25 | End: 2024-03-30 | Stop reason: HOSPADM

## 2024-03-25 RX ORDER — PANTOPRAZOLE SODIUM 40 MG/1
40 TABLET, DELAYED RELEASE ORAL
Status: DISCONTINUED | OUTPATIENT
Start: 2024-03-26 | End: 2024-03-30 | Stop reason: HOSPADM

## 2024-03-25 RX ORDER — LOSARTAN POTASSIUM 25 MG/1
25 TABLET ORAL DAILY
Status: DISCONTINUED | OUTPATIENT
Start: 2024-03-25 | End: 2024-03-30 | Stop reason: HOSPADM

## 2024-03-25 RX ORDER — OXYCODONE AND ACETAMINOPHEN 10; 325 MG/1; MG/1
1 TABLET ORAL EVERY 4 HOURS PRN
Status: DISCONTINUED | OUTPATIENT
Start: 2024-03-25 | End: 2024-03-25

## 2024-03-25 RX ORDER — POLYETHYLENE GLYCOL 3350 17 G/17G
17 POWDER, FOR SOLUTION ORAL DAILY PRN
Status: DISCONTINUED | OUTPATIENT
Start: 2024-03-25 | End: 2024-03-30 | Stop reason: HOSPADM

## 2024-03-25 RX ORDER — GABAPENTIN 600 MG/1
600 TABLET ORAL 3 TIMES DAILY
Status: DISCONTINUED | OUTPATIENT
Start: 2024-03-25 | End: 2024-03-27

## 2024-03-25 RX ORDER — TRAZODONE HYDROCHLORIDE 50 MG/1
50 TABLET ORAL NIGHTLY
Status: DISCONTINUED | OUTPATIENT
Start: 2024-03-25 | End: 2024-03-27

## 2024-03-25 RX ORDER — ACETAMINOPHEN 325 MG/1
650 TABLET ORAL EVERY 4 HOURS PRN
Status: DISCONTINUED | OUTPATIENT
Start: 2024-03-25 | End: 2024-03-30 | Stop reason: HOSPADM

## 2024-03-25 RX ORDER — ACETAMINOPHEN 160 MG/5ML
650 SOLUTION ORAL EVERY 4 HOURS PRN
Status: DISCONTINUED | OUTPATIENT
Start: 2024-03-25 | End: 2024-03-26

## 2024-03-25 RX ORDER — ACETAMINOPHEN 650 MG/1
650 SUPPOSITORY RECTAL EVERY 4 HOURS PRN
Status: DISCONTINUED | OUTPATIENT
Start: 2024-03-25 | End: 2024-03-26

## 2024-03-25 RX ORDER — AMIODARONE HYDROCHLORIDE 200 MG/1
200 TABLET ORAL DAILY
Status: DISCONTINUED | OUTPATIENT
Start: 2024-03-25 | End: 2024-03-30 | Stop reason: HOSPADM

## 2024-03-25 RX ORDER — SPIRONOLACTONE 25 MG/1
25 TABLET ORAL DAILY
Status: DISCONTINUED | OUTPATIENT
Start: 2024-03-26 | End: 2024-03-30 | Stop reason: HOSPADM

## 2024-03-25 RX ORDER — ACETAMINOPHEN 325 MG/1
650 TABLET ORAL EVERY 4 HOURS PRN
Status: DISCONTINUED | OUTPATIENT
Start: 2024-03-25 | End: 2024-03-26

## 2024-03-25 RX ADMIN — PIPERACILLIN SODIUM AND TAZOBACTAM SODIUM 3.38 G: 3; .375 INJECTION, SOLUTION INTRAVENOUS at 18:30

## 2024-03-25 RX ADMIN — ACETAMINOPHEN 650 MG: 650 SUPPOSITORY RECTAL at 17:52

## 2024-03-25 RX ADMIN — PIPERACILLIN SODIUM AND TAZOBACTAM SODIUM 4.5 G: 4; .5 INJECTION, SOLUTION INTRAVENOUS at 12:58

## 2024-03-25 RX ADMIN — POTASSIUM CHLORIDE 20 MEQ: 14.9 INJECTION, SOLUTION INTRAVENOUS at 20:43

## 2024-03-25 RX ADMIN — SODIUM CHLORIDE 1000 ML: 9 INJECTION, SOLUTION INTRAVENOUS at 18:32

## 2024-03-25 RX ADMIN — INSULIN HUMAN 12.9 UNITS/HR: 1 INJECTION, SOLUTION INTRAVENOUS at 20:41

## 2024-03-25 RX ADMIN — MAGNESIUM SULFATE HEPTAHYDRATE 2 G: 40 INJECTION, SOLUTION INTRAVENOUS at 18:30

## 2024-03-25 RX ADMIN — POTASSIUM CHLORIDE 20 MEQ: 14.9 INJECTION, SOLUTION INTRAVENOUS at 22:48

## 2024-03-25 RX ADMIN — IOHEXOL 100 ML: 350 INJECTION, SOLUTION INTRAVENOUS at 11:46

## 2024-03-25 RX ADMIN — INSULIN HUMAN 11 UNITS/HR: 1 INJECTION, SOLUTION INTRAVENOUS at 14:06

## 2024-03-25 RX ADMIN — POTASSIUM PHOSPHATE, MONOBASIC AND POTASSIUM PHOSPHATE, DIBASIC 21 MMOL: 224; 236 INJECTION, SOLUTION, CONCENTRATE INTRAVENOUS at 19:56

## 2024-03-25 RX ADMIN — INSULIN HUMAN 11 UNITS/HR: 1 INJECTION, SOLUTION INTRAVENOUS at 19:56

## 2024-03-25 RX ADMIN — VANCOMYCIN HYDROCHLORIDE 2 G: 10 INJECTION, POWDER, LYOPHILIZED, FOR SOLUTION INTRAVENOUS at 14:06

## 2024-03-25 RX ADMIN — SODIUM CHLORIDE 250 ML/HR: 9 INJECTION, SOLUTION INTRAVENOUS at 17:27

## 2024-03-25 RX ADMIN — SODIUM CHLORIDE 1000 ML: 9 INJECTION, SOLUTION INTRAVENOUS at 13:15

## 2024-03-25 RX ADMIN — SODIUM CHLORIDE, POTASSIUM CHLORIDE, SODIUM LACTATE AND CALCIUM CHLORIDE 500 ML: 600; 310; 30; 20 INJECTION, SOLUTION INTRAVENOUS at 11:54

## 2024-03-25 RX ADMIN — INSULIN HUMAN 17.19 UNITS/HR: 1 INJECTION, SOLUTION INTRAVENOUS at 18:54

## 2024-03-25 RX ADMIN — POTASSIUM CHLORIDE 20 MEQ: 14.9 INJECTION, SOLUTION INTRAVENOUS at 13:16

## 2024-03-25 RX ADMIN — DEXTROSE AND SODIUM CHLORIDE 150 ML/HR: 5; 450 INJECTION, SOLUTION INTRAVENOUS at 20:16

## 2024-03-25 SDOH — SOCIAL STABILITY: SOCIAL INSECURITY: ABUSE: ADULT

## 2024-03-25 SDOH — SOCIAL STABILITY: SOCIAL INSECURITY: WERE YOU ABLE TO COMPLETE ALL THE BEHAVIORAL HEALTH SCREENINGS?: YES

## 2024-03-25 SDOH — SOCIAL STABILITY: SOCIAL INSECURITY: HAVE YOU HAD THOUGHTS OF HARMING ANYONE ELSE?: NO

## 2024-03-25 ASSESSMENT — COGNITIVE AND FUNCTIONAL STATUS - GENERAL
PERSONAL GROOMING: A LOT
TURNING FROM BACK TO SIDE WHILE IN FLAT BAD: A LOT
MOVING TO AND FROM BED TO CHAIR: A LOT
MOBILITY SCORE: 12
HELP NEEDED FOR BATHING: A LOT
WALKING IN HOSPITAL ROOM: A LOT
PATIENT BASELINE BEDBOUND: NO
STANDING UP FROM CHAIR USING ARMS: A LOT
DRESSING REGULAR LOWER BODY CLOTHING: A LOT
DRESSING REGULAR UPPER BODY CLOTHING: A LOT
DAILY ACTIVITIY SCORE: 14
MOVING FROM LYING ON BACK TO SITTING ON SIDE OF FLAT BED WITH BEDRAILS: A LOT
TOILETING: A LOT
CLIMB 3 TO 5 STEPS WITH RAILING: A LOT

## 2024-03-25 ASSESSMENT — PATIENT HEALTH QUESTIONNAIRE - PHQ9
SUM OF ALL RESPONSES TO PHQ9 QUESTIONS 1 & 2: 0
2. FEELING DOWN, DEPRESSED OR HOPELESS: NOT AT ALL
1. LITTLE INTEREST OR PLEASURE IN DOING THINGS: NOT AT ALL

## 2024-03-25 ASSESSMENT — ACTIVITIES OF DAILY LIVING (ADL)
HEARING - LEFT EAR: FUNCTIONAL
HEARING - RIGHT EAR: FUNCTIONAL
HEARING - LEFT EAR: FUNCTIONAL
FEEDING YOURSELF: DEPENDENT
HEARING - RIGHT EAR: FUNCTIONAL
ASSISTIVE_DEVICE: WHEELCHAIR;EYEGLASSES
JUDGMENT_ADEQUATE_SAFELY_COMPLETE_DAILY_ACTIVITIES: NO
PATIENT'S MEMORY ADEQUATE TO SAFELY COMPLETE DAILY ACTIVITIES?: NO
JUDGMENT_ADEQUATE_SAFELY_COMPLETE_DAILY_ACTIVITIES: NO
TOILETING: NEEDS ASSISTANCE
LACK_OF_TRANSPORTATION: PATIENT UNABLE TO ANSWER
FEEDING YOURSELF: INDEPENDENT
WALKS IN HOME: NEEDS ASSISTANCE
ADEQUATE_TO_COMPLETE_ADL: YES
DRESSING YOURSELF: NEEDS ASSISTANCE
ASSISTIVE_DEVICE: WHEELCHAIR;EYEGLASSES
PATIENT'S MEMORY ADEQUATE TO SAFELY COMPLETE DAILY ACTIVITIES?: NO
GROOMING: DEPENDENT
WALKS IN HOME: DEPENDENT
GROOMING: NEEDS ASSISTANCE
BATHING: DEPENDENT
BATHING: NEEDS ASSISTANCE
TOILETING: DEPENDENT
ADEQUATE_TO_COMPLETE_ADL: YES
DRESSING YOURSELF: DEPENDENT

## 2024-03-25 ASSESSMENT — LIFESTYLE VARIABLES
AUDIT-C TOTAL SCORE: 0
HOW OFTEN DO YOU HAVE A DRINK CONTAINING ALCOHOL: NEVER
SKIP TO QUESTIONS 9-10: 1
HOW MANY STANDARD DRINKS CONTAINING ALCOHOL DO YOU HAVE ON A TYPICAL DAY: PATIENT DOES NOT DRINK
HOW OFTEN DO YOU HAVE 6 OR MORE DRINKS ON ONE OCCASION: NEVER
AUDIT-C TOTAL SCORE: 0

## 2024-03-25 ASSESSMENT — COLUMBIA-SUICIDE SEVERITY RATING SCALE - C-SSRS
1. IN THE PAST MONTH, HAVE YOU WISHED YOU WERE DEAD OR WISHED YOU COULD GO TO SLEEP AND NOT WAKE UP?: NO
6. HAVE YOU EVER DONE ANYTHING, STARTED TO DO ANYTHING, OR PREPARED TO DO ANYTHING TO END YOUR LIFE?: NO
2. HAVE YOU ACTUALLY HAD ANY THOUGHTS OF KILLING YOURSELF?: NO

## 2024-03-25 ASSESSMENT — PAIN SCALES - GENERAL
PAINLEVEL_OUTOF10: 2
PAINLEVEL_OUTOF10: 0 - NO PAIN
PAINLEVEL_OUTOF10: 0 - NO PAIN

## 2024-03-25 ASSESSMENT — PAIN - FUNCTIONAL ASSESSMENT
PAIN_FUNCTIONAL_ASSESSMENT: 0-10

## 2024-03-25 NOTE — ASSESSMENT & PLAN NOTE
Continue home levothyroxine  Unclear if patient taking 175 or 225mcg  TSH pending, but may be skewed by sepsis and acute illness

## 2024-03-25 NOTE — ASSESSMENT & PLAN NOTE
UA shows moderate LE  Suspect trigger for DKA with AMS  S/p vancomycin and zosyn in ED. Will continue both vancomycin and zosyn for now  Blood and urine cultures pending, plan to de-escalate antibiotics

## 2024-03-25 NOTE — ED PROVIDER NOTES
HPI   Chief Complaint   Patient presents with    Altered Mental Status     Pt presents to the ED with left facial droop and slurred speech. Pt has an unknown last know well time. Pt reportedly lives alone       This is a 73-year-old female past medical history of type 2 diabetes that is poorly controlled on Humalog, atrial fibrillation on Eliquis, congestive heart failure, left total knee arthroplasty complicated by E. coli infection and MRSA infection on right foot, COPD, CKD stage III who presents to the emergency department for confusion.  She does appear to have been discharged in February for cellulitis per EMS patient had slurred speech they do take her quite frequently.  They also state that she has a left-sided facial droop and a right-sided drift for them on their exam.  Here she had no drift on her right arm.  But she did have slurred speech and a left-sided facial droop.  Patient has no last known well.  She is on Eliquis.                          [place] Coma Scale Score: 14         NIH Stroke Scale: 10          Patient History   Past Medical History:   Diagnosis Date    Abnormal weight gain 04/10/2023    Allergic rhinitis 04/10/2023    Body mass index (BMI) 31.0-31.9, adult 06/30/2020    Body mass index (BMI) of 31.0 to 31.9 in adult    Body mass index (BMI) 32.0-32.9, adult 01/04/2021    Body mass index (BMI) of 32.0 to 32.9 in adult    Body mass index (BMI) 33.0-33.9, adult 01/04/2021    Body mass index (BMI) of 33.0 to 33.9 in adult    Body mass index (BMI) 35.0-35.9, adult 04/09/2020    Body mass index (BMI) of 35.0 to 35.9 in adult    Body mass index (BMI)40.0-44.9, adult 11/14/2019    Body mass index (BMI) of 40.0 to 44.9 in adult    Chronic insomnia 04/10/2023    Depression, major, in remission (CMS/HCC) 04/10/2023    Essential (primary) hypertension 09/06/2022    Hypertension    H/O bariatric surgery 04/10/2023    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    Hypoxia 04/10/2023    Kidney stone  on right side 04/10/2023    Left toe amputee (CMS/[facility]) 04/10/2023    Malaise and fatigue 04/10/2023    Nasal septum ulceration 04/10/2023    Nontoxic multinodular goiter 07/02/2021    Multiple thyroid nodules    Personal history of other diseases of the circulatory system     History of congestive heart failure    Personal history of other diseases of the musculoskeletal system and connective tissue     History of arthritis    Personal history of other diseases of the nervous system and sense organs 10/11/2022    History of sleep apnea    Personal history of other diseases of the respiratory system     History of lung disease    Personal history of other diseases of urinary system 02/19/2020    History of hematuria    Personal history of other endocrine, nutritional and metabolic disease 07/02/2021    History of morbid obesity    Personal history of other endocrine, nutritional and metabolic disease 04/24/2017    History of diabetic neuropathy    Prosthetic joint infection (CMS/[facility]) 04/10/2023    Recurrent major depressive disorder, in remission (CMS/[facility]) 04/10/2023    Ruptured aneurysm of thoracic aorta, unspecified part (CMS/[facility]) 04/10/2023    Status post gastric bypass for obesity 04/10/2023    Vertigo of central origin 04/10/2023     Past Surgical History:   Procedure Laterality Date    HYSTERECTOMY  01/20/2016    Hysterectomy    OTHER SURGICAL HISTORY  01/07/2022    Toe amputation    OTHER SURGICAL HISTORY  05/28/2021    Gastric bypass surgery    TONSILLECTOMY  01/20/2016    Tonsillectomy    TOTAL KNEE ARTHROPLASTY  05/28/2021    Knee Replacement    TUBAL LIGATION  04/24/2017    Tubal Ligation     Family History   Problem Relation Name Age of Onset    Coronary artery disease Mother      Liver disease Mother      Other (non-hodgkins lymphoma) Mother      Stroke Father      Deafness Father      Hypertension Father       Social History     Tobacco Use    Smoking status: Never    Smokeless tobacco: Never   Vaping  Use    Vaping Use: Never used   Substance Use Topics    Alcohol use: Never    Drug use: Never       Physical Exam   ED Triage Vitals [03/25/24 1149]   Temperature Heart Rate Respirations BP   36.7 °C (98.1 °F) 92 20 126/78      Pulse Ox Temp Source Heart Rate Source Patient Position   (!) 93 % Temporal Monitor --      BP Location FiO2 (%)     -- --       Physical Exam  Constitutional:       General: She is not in acute distress.  HENT:      Head: Normocephalic and atraumatic.      Right Ear: Tympanic membrane normal.      Left Ear: Tympanic membrane normal.      Mouth/Throat:      Mouth: Mucous membranes are moist.   Eyes:      Extraocular Movements: Extraocular movements intact.      Conjunctiva/sclera: Conjunctivae normal.      Pupils: Pupils are equal, round, and reactive to light.   Cardiovascular:      Rate and Rhythm: Normal rate and regular rhythm.      Heart sounds: No murmur heard.  Pulmonary:      Effort: Pulmonary effort is normal. No respiratory distress.      Breath sounds: Normal breath sounds. No stridor. No wheezing or rales.   Abdominal:      General: Bowel sounds are normal. There is no distension.      Tenderness: There is no abdominal tenderness. There is no guarding or rebound.   Musculoskeletal:         General: No swelling, tenderness or deformity. Normal range of motion.   Skin:     General: Skin is warm and dry.      Coloration: Skin is not jaundiced.      Findings: No bruising or lesion.      Comments: Patient does have a old skin infection on the left calf there is blood does not have erythema does not appear infected.   Neurological:      General: No focal deficit present.      Mental Status: She is alert. Mental status is at baseline.      Cranial Nerves: No cranial nerve deficit.      Motor: No weakness.      Comments: Patient's NIH score is 3 1 for dysarthria and 1 for mild facial droop and 1 for not answering the month correctly   Psychiatric:         Mood and Affect: Mood normal.        Labs Reviewed   CBC WITH AUTO DIFFERENTIAL - Abnormal       Result Value    WBC 19.8 (*)     nRBC 0.0      RBC 3.76 (*)     Hemoglobin 10.6 (*)     Hematocrit 32.7 (*)     MCV 87      MCH 28.2      MCHC 32.4      RDW 14.7 (*)     Platelets 156      Neutrophils % 92.3      Immature Granulocytes %, Automated 2.0 (*)     Lymphocytes % 1.0      Monocytes % 3.6      Eosinophils % 0.9      Basophils % 0.2      Neutrophils Absolute 18.25 (*)     Immature Granulocytes Absolute, Automated 0.40      Lymphocytes Absolute 0.20 (*)     Monocytes Absolute 0.72      Eosinophils Absolute 0.18      Basophils Absolute 0.04     COMPREHENSIVE METABOLIC PANEL - Abnormal    Glucose 609 (*)     Sodium 122 (*)     Potassium 4.6      Chloride 88 (*)     Bicarbonate 15 (*)     Anion Gap 24 (*)     Urea Nitrogen 25 (*)     Creatinine 1.64 (*)     eGFR 33 (*)     Calcium 8.4 (*)     Albumin 3.3 (*)     Alkaline Phosphatase 83      Total Protein 7.2      AST 15      Bilirubin, Total 0.6      ALT 8     BLOOD GAS VENOUS FULL PANEL - Abnormal    POCT pH, Venous 7.26 (*)     POCT pCO2, Venous 37 (*)     POCT pO2, Venous 32 (*)     POCT SO2, Venous 34 (*)     POCT Oxy Hemoglobin, Venous 33.4 (*)     POCT Hematocrit Calculated, Venous 32.0 (*)     POCT Sodium, Venous 122 (*)     POCT Potassium, Venous 4.9      POCT Chloride, Venous 89 (*)     POCT Ionized Calicum, Venous 1.19      POCT Glucose, Venous 576 (*)     POCT Lactate, Venous 3.0 (*)     POCT Base Excess, Venous -9.7 (*)     POCT HCO3 Calculated, Venous 16.6 (*)     POCT Hemoglobin, Venous 10.6 (*)     POCT Anion Gap, Venous 21.0      Patient Temperature 37.0      FiO2 21     BLOOD GAS LACTIC ACID, VENOUS - Abnormal    POCT Lactate, Venous 2.9 (*)    TROPONIN I, HIGH SENSITIVITY - Normal    Troponin I, High Sensitivity 13      Narrative:     Less than 99th percentile of normal range cutoff-  Female and children under 18 years old <14 ng/L; Male <21 ng/L: Negative  Repeat testing should  be performed if clinically indicated.     Female and children under 18 years old 14-50 ng/L; Male 21-50 ng/L:  Consistent with possible cardiac damage and possible increased clinical   risk. Serial measurements may help to assess extent of myocardial damage.     >50 ng/L: Consistent with cardiac damage, increased clinical risk and  myocardial infarction. Serial measurements may help assess extent of   myocardial damage.      NOTE: Children less than 1 year old may have higher baseline troponin   levels and results should be interpreted in conjunction with the overall   clinical context.     NOTE: Troponin I testing is performed using a different   testing methodology at [facility] than at other   [facility]. Direct result comparisons should only   be made within the same method.   SARS-COV-2 PCR - Normal    Coronavirus 2019, PCR Not Detected      Narrative:     This assay has received FDA Emergency Use Authorization (EUA) and is only authorized for the duration of time that circumstances exist to justify the authorization of the emergency use of in vitro diagnostic tests for the detection of SARS-CoV-2 virus and/or diagnosis of COVID-19 infection under section 564(b)(1) of the Act, 21 U.S.C. 360bbb-3(b)(1). This assay is an in vitro diagnostic nucleic acid amplification test for the qualitative detection of SARS-CoV-2 from nasopharyngeal specimens and has been validated for use at [facility]. Negative results do not preclude COVID-19 infections and should not be used as the sole basis for diagnosis, treatment, or other management decisions.     INFLUENZA A AND B PCR - Normal    Flu A Result Not Detected      Flu B Result Not Detected      Narrative:     This assay is an in vitro diagnostic multiplex nucleic acid amplification test for the detection and discrimination of Influenza A & B from nasopharyngeal specimens, and has been validated for use at [facility]. Negative results do not preclude Influenza A/B infections, and should not be used as the sole basis for diagnosis, treatment, or other management decisions. If Influenza A/B and RSV PCR results are negative, testing for Parainfluenza virus, Adenovirus and Metapneumovirus is routinely performed for [facility] pediatric oncology and intensive care inpatients, and is available on other patients by placing an add-on request.   BLOOD CULTURE   BLOOD CULTURE   MRSA SURVEILLANCE FOR VANCOMYCIN DE-ESCALATION, PCR   BETA HYDROXYBUTYRATE    Beta-Hydroxybutyrate        Narrative:     The beta-hydroxybutyrate test performance characteristics have been validated by  [facility]. This test has not been approved by the FDA; however,such approval is not necessary.     PROTIME-INR   APTT   URINALYSIS WITH REFLEX CULTURE AND MICROSCOPIC    Narrative:     The following orders were created for panel order Urinalysis with Reflex Culture and Microscopic.  Procedure                               Abnormality         Status                     ---------                               -----------         ------                     Urinalysis with Reflex C...[[id number]]                                                 Extra Urine Gray Tube[[id number]]                                                         Please view results for these tests on the individual orders.   URINALYSIS WITH REFLEX CULTURE AND MICROSCOPIC   EXTRA URINE GRAY TUBE   LACTATE   BASIC METABOLIC PANEL   MAGNESIUM   PHOSPHORUS   POCT GLUCOSE METER   POCT GLUCOSE METER     CT brain attack angio head and neck W and WO IV contrast   Final Result   The CT angiogram through the upper thorax demonstrates mild   atherosclerotic calcifications along the aortic arch and origin of   the left subclavian artery. No significant stenosis noted along the   origins of right brachiocephalic artery, left common carotid artery,   or left  subclavian artery. No significant stenosis noted along the   origin of the right vertebral artery. There is atherosclerotic   calcification noted along the origin of the left vertebral artery   contributing to mild-to-moderate segmental narrowing.        The CT angiogram of the neck demonstrates atherosclerotic   calcifications noted along the distal common carotid arteries and   origin of the internal carotid arteries contributing to mild non   hemodynamically significant narrowing. No significant stenosis noted   along the cervical segments of the vertebral arteries.        The CT angiogram of the head demonstrates mild atherosclerotic   calcification along the distal left vertebral artery. No significant   stenosis noted along the distal right vertebral artery, basilar   artery, or distal internal carotid arteries. The right posterior   cerebral artery is predominantly supplied via the right posterior   communicating artery. The P1 segment of the right posterior cerebral   artery is asymmetrically small in caliber which may be related to   congenital hypoplasia or secondary narrowing. Otherwise, no large   vessel branch cutoffs of the anterior cerebral arteries, middle   cerebral arteries, or posterior cerebral arteries are noted.        The source CT angiogram images of the head demonstrate moderate brain   parenchymal volume loss. There are nonspecific white matter changes   within the cerebral hemispheres bilaterally as well as vague   hypodensity overlying the brainstem which while nonspecific, given   the patient's age, may represent sequelae of small-vessel ischemic   change. Additional small scattered hypodensities are identified   within the subinsular regions, basal ganglia, and thalami bilaterally   suggesting incidental mildly prominent perivascular spaces and/or   small scattered lacunar infarctions. There is no midline shift.        The source CT angiogram images of the neck demonstrate multilevel    cervical spondylosis.             MACRO:   None        Signed by: Leandro Mcguire 3/25/2024 12:08 PM   Dictation workstation:   [id number]      CT brain attack head wo IV contrast   Final Result   No acute intracranial abnormality.        MACRO:   Krish Randolph discussed the significance and urgency of this   critical finding by Epic secure chat with  KAILEY SMALLWOOD on   3/25/2024 at 11:44 am.  (**-RCF-**) Findings:  See findings.        Signed by: Krish Randolph 3/25/2024 11:46 AM   Dictation workstation:   [id number]      XR chest 1 view    (Results Pending)       ED Course & MDM   ED Course as of 03/25/24 1650   Mon Mar 25, 2024   1201 IMPRESSION:  No acute intracranial abnormality.   [CF]   1201 Patient is on Eliquis and therefore not a candidate for TNK [CF]   1253 GLUCOSE(!!): 609  Suspect patient is in DKA with her bicarb of 15 and her pH is 7.26 she has a gap of 24.  Will start patient on insulin.  Potassium was 4.6 [CF]   1431 WBC, Urine(!): 21-50 [CF]   1431 Leukocyte Esterase, Urine(!): MODERATE (2+)  Suspect patient has a urinary tract infection. [CF]   1649 EKG shows sinus rhythm at a rate of 92 bpm no ST changes or elevation nonischemic EKG, there is a lots of artifact on this EKG [CF]      ED Course User Index  [CF] Kailey Smallwood MD         Diagnoses as of 03/25/24 1650   Delirium   Lower urinary tract infectious disease   Diabetic ketoacidosis without coma associated with other specified diabetes mellitus (CMS/[facility])       Medical Decision Making  This is a 73-year-old female who presents for altered mental status.  Per EMS patient had a left facial droop and a right arm drift.  Here patient does has a left facial droop with slurred speech which EMS states is new.  She was activated as a stroke but she is not a candidate for TNK given she is on Eliquis.  There is no large vessel seen that needs to be intervened.  Her NIH score here is 3 for dysarthria, facial droop and answering months  from.  She was found to have hyperglycemia and a leukocytosis.  She was started on broad-spectrum antibiotics and given fluids for unknown known infection.  Urine does appear infected.  She does appear to also be in DKA for which insulin was started.  Suspect the patient is altered secondary to her DKA and UTI.  At this time will admit patient.    EKG on my read shows sinus rhythm at a rate of 92 bpm no ST changes or elevations there is a lots of artifact on this EKG, QTc appears to be 497 otherwise normal intervals.        Procedure  Critical Care    Performed by: Rosalia Smallwood MD  Authorized by: Rosalia Smallwood MD    Critical care provider statement:     Critical care time (minutes):  30    Critical care time was exclusive of:  Separately billable procedures and treating other patients    Critical care was necessary to treat or prevent imminent or life-threatening deterioration of the following conditions:  Endocrine crisis    Critical care was time spent personally by me on the following activities:  Blood draw for specimens, development of treatment plan with patient or surrogate, discussions with consultants, evaluation of patient's response to treatment, examination of patient, ordering and review of laboratory studies, ordering and review of radiographic studies, ordering and performing treatments and interventions, pulse oximetry, re-evaluation of patient's condition and review of old charts    Care discussed with: admitting provider         Rosalia Smallwood MD  03/25/24 [id number]       Rosalia Smallwood MD  03/25/24 1655

## 2024-03-25 NOTE — CARE PLAN
Problem: Safety  Goal: Patient will be injury free during hospitalization  Outcome: Progressing  Goal: I will remain free of falls  Outcome: Progressing     Problem: Fall/Injury  Goal: Not fall by end of shift  Outcome: Progressing  Goal: Be free from injury by end of the shift  Outcome: Progressing  Goal: Verbalize understanding of personal risk factors for fall in the hospital  Outcome: Progressing  Goal: Verbalize understanding of risk factor reduction measures to prevent injury from fall in the home  Outcome: Progressing  Goal: Use assistive devices by end of the shift  Outcome: Progressing  Goal: Pace activities to prevent fatigue by end of the shift  Outcome: Progressing     Problem: Skin  Goal: Decreased wound size/increased tissue granulation at next dressing change  Outcome: Progressing  Goal: Participates in plan/prevention/treatment measures  Outcome: Progressing  Goal: Prevent/manage excess moisture  Outcome: Progressing  Goal: Prevent/minimize sheer/friction injuries  Outcome: Progressing  Goal: Promote/optimize nutrition  Outcome: Progressing  Goal: Promote skin healing  Outcome: Progressing     Problem: Recent hospitalization or complication while living with DM  Goal: Patient will effectively self-manage their DM disease process  Outcome: Progressing     Problem: Lack of Diabetes disease process knowledge  Goal: Increase knowledge/understanding of diabetes and how to reduce risk of complications  Outcome: Progressing       The clinical goals for the shift include BS < 300 this shift

## 2024-03-25 NOTE — H&P
History of Present Illness:  Teresa Matthews is a 73 y.o. female with a past medical history of type 2 diabetes poorly controlled on Humalog, A-fib on Eliquis, CHF, left total knee arthroplasty complicated by E. coli infection and MRSA infection, COPD, CKD stage III presented to ED today with confusion.  Patient is a poor historian, history supplemented by ED records.  EMS report the patient has slurred speech and left-sided facial droop and right-sided drift on exam.  No last known well.    ED course: Stroke alert was called on arrival.  NIH stroke score 3 for dysarthria and facial droop and answering wrong month.  CT head shows no acute abnormalities, patient is on Eliquis and therefore not a candidate for TNK.  Glucose 609, bicarb 15, pH 7.26 with gap of 24.  Potassium 4.6.  Patient suspected to be in DKA and started on insulin drip.  UA shows WBCs and positive leukocyte esterase with suspected UTI, started on vancomycin and Zosyn.  EKG shows sinus rhythm rate 92 with no ST changes.  Nurse found sacral and extremity wounds and dressed them.      ROS deferred as patient confused       Objective     Physical Exam  Gen: Adult female , no acute distress  HEENT: Normocephalic, atraumatic, good dentition, no oral lesions appreciated.  Patient keeps eyes closed.  Neck: No cervical lymphadenopathy appreciated, no thyroid enlargement appreciated  CV: Regular rate and rhythm, S1 and S2 present, no murmurs rubs or gallops appreciated  Resp: Lungs clear to auscultation bilaterally, no ronchi, rales or wheezes appreciated  Abdomen: soft, nontender, nondistended  MSK: No joint swelling appreciated  Psych: appropriate mood and affect    Last Recorded Vitals  Blood pressure 140/71, pulse 103, temperature 40.1 °C (104.2 °F), resp. rate 14, height 1.829 m (6'), weight 113 kg (250 lb), SpO2 94 %.  Intake/Output last 3 Shifts:  No intake/output data recorded.      Relevant Results  Scheduled medications  amiodarone, 200 mg,  oral, Daily  apixaban, 5 mg, oral, BID  aspirin, 81 mg, oral, Every Day  gabapentin, 600 mg, oral, TID  levothyroxine, 175 mcg, oral, Daily  metoprolol tartrate, 25 mg, oral, BID  [START ON 3/26/2024] pantoprazole, 40 mg, oral, Daily before breakfast      Continuous medications  dextrose 10 % in water (D10W), 150 mL/hr  dextrose 10 % in water (D10W), 150 mL/hr  dextrose 5%-0.45 % sodium chloride, 150 mL/hr  insulin regular, 11 Units/hr, Last Rate: 13.75 Units/hr (03/25/24 1654)  sodium chloride 0.9%, 250 mL/hr, Last Rate: 250 mL/hr (03/25/24 1727)      PRN medications  PRN medications: acetaminophen **OR** acetaminophen **OR** acetaminophen, acetaminophen **OR** acetaminophen **OR** acetaminophen, dextrose, insulin regular, oxyCODONE-acetaminophen, oxygen, polyethylene glycol    Results for orders placed or performed during the hospital encounter of 03/25/24 (from the past 24 hour(s))   CBC and Auto Differential   Result Value Ref Range    WBC 19.8 (H) 4.4 - 11.3 x10*3/uL    nRBC 0.0 0.0 - 0.0 /100 WBCs    RBC 3.76 (L) 4.00 - 5.20 x10*6/uL    Hemoglobin 10.6 (L) 12.0 - 16.0 g/dL    Hematocrit 32.7 (L) 36.0 - 46.0 %    MCV 87 80 - 100 fL    MCH 28.2 26.0 - 34.0 pg    MCHC 32.4 32.0 - 36.0 g/dL    RDW 14.7 (H) 11.5 - 14.5 %    Platelets 156 150 - 450 x10*3/uL    Neutrophils % 92.3 40.0 - 80.0 %    Immature Granulocytes %, Automated 2.0 (H) 0.0 - 0.9 %    Lymphocytes % 1.0 13.0 - 44.0 %    Monocytes % 3.6 2.0 - 10.0 %    Eosinophils % 0.9 0.0 - 6.0 %    Basophils % 0.2 0.0 - 2.0 %    Neutrophils Absolute 18.25 (H) 1.60 - 5.50 x10*3/uL    Immature Granulocytes Absolute, Automated 0.40 0.00 - 0.50 x10*3/uL    Lymphocytes Absolute 0.20 (L) 0.80 - 3.00 x10*3/uL    Monocytes Absolute 0.72 0.05 - 0.80 x10*3/uL    Eosinophils Absolute 0.18 0.00 - 0.40 x10*3/uL    Basophils Absolute 0.04 0.00 - 0.10 x10*3/uL   Comprehensive metabolic panel   Result Value Ref Range    Glucose 609 (HH) 74 - 99 mg/dL    Sodium 122 (L) 136 - 145  mmol/L    Potassium 4.6 3.5 - 5.3 mmol/L    Chloride 88 (L) 98 - 107 mmol/L    Bicarbonate 15 (L) 21 - 32 mmol/L    Anion Gap 24 (H) 10 - 20 mmol/L    Urea Nitrogen 25 (H) 6 - 23 mg/dL    Creatinine 1.64 (H) 0.50 - 1.05 mg/dL    eGFR 33 (L) >60 mL/min/1.73m*2    Calcium 8.4 (L) 8.6 - 10.3 mg/dL    Albumin 3.3 (L) 3.4 - 5.0 g/dL    Alkaline Phosphatase 83 33 - 136 U/L    Total Protein 7.2 6.4 - 8.2 g/dL    AST 15 9 - 39 U/L    Bilirubin, Total 0.6 0.0 - 1.2 mg/dL    ALT 8 7 - 45 U/L   Troponin I, High Sensitivity   Result Value Ref Range    Troponin I, High Sensitivity 13 0 - 13 ng/L   Blood Gas Venous Full Panel   Result Value Ref Range    POCT pH, Venous 7.26 (L) 7.33 - 7.43 pH    POCT pCO2, Venous 37 (L) 41 - 51 mm Hg    POCT pO2, Venous 32 (L) 35 - 45 mm Hg    POCT SO2, Venous 34 (L) 45 - 75 %    POCT Oxy Hemoglobin, Venous 33.4 (L) 45.0 - 75.0 %    POCT Hematocrit Calculated, Venous 32.0 (L) 36.0 - 46.0 %    POCT Sodium, Venous 122 (L) 136 - 145 mmol/L    POCT Potassium, Venous 4.9 3.5 - 5.3 mmol/L    POCT Chloride, Venous 89 (L) 98 - 107 mmol/L    POCT Ionized Calicum, Venous 1.19 1.10 - 1.33 mmol/L    POCT Glucose, Venous 576 (HH) 74 - 99 mg/dL    POCT Lactate, Venous 3.0 (H) 0.4 - 2.0 mmol/L    POCT Base Excess, Venous -9.7 (L) -2.0 - 3.0 mmol/L    POCT HCO3 Calculated, Venous 16.6 (L) 22.0 - 26.0 mmol/L    POCT Hemoglobin, Venous 10.6 (L) 12.0 - 16.0 g/dL    POCT Anion Gap, Venous 21.0 10.0 - 25.0 mmol/L    Patient Temperature 37.0 degrees Celsius    FiO2 21 %   Beta Hydroxybutyrate   Result Value Ref Range    Beta-Hydroxybutyrate 6.24 (H) 0.02 - 0.27 mmol/L   Sars-CoV-2 PCR   Result Value Ref Range    Coronavirus 2019, PCR Not Detected Not Detected   Influenza A, and B PCR   Result Value Ref Range    Flu A Result Not Detected Not Detected    Flu B Result Not Detected Not Detected   ECG 12 lead   Result Value Ref Range    Ventricular Rate 92 BPM    Atrial Rate 93 BPM    ME Interval 199 ms    QRS Duration 117  ms    QT Interval 401 ms    QTC Calculation(Bazett) 497 ms    P Axis 48 degrees    R Axis 99 degrees    T Axis 73 degrees    QRS Count 14 beats    Q Onset 249 ms    T Offset 450 ms    QTC Fredericia 462 ms   Blood Gas Lactic Acid, Venous   Result Value Ref Range    POCT Lactate, Venous 2.9 (H) 0.4 - 2.0 mmol/L   Lactate   Result Value Ref Range    Lactate 2.8 (H) 0.4 - 2.0 mmol/L   Urinalysis with Reflex Culture and Microscopic   Result Value Ref Range    Color, Urine Yellow Straw, Yellow    Appearance, Urine Hazy (N) Clear    Specific Gravity, Urine 1.023 1.005 - 1.035    pH, Urine 6.0 5.0, 5.5, 6.0, 6.5, 7.0, 7.5, 8.0    Protein, Urine 30 (1+) (N) NEGATIVE mg/dL    Glucose, Urine >=500 (3+) (A) NEGATIVE mg/dL    Blood, Urine MODERATE (2+) (A) NEGATIVE    Ketones, Urine 80 (2+) (A) NEGATIVE mg/dL    Bilirubin, Urine NEGATIVE NEGATIVE    Urobilinogen, Urine <2.0 <2.0 mg/dL    Nitrite, Urine NEGATIVE NEGATIVE    Leukocyte Esterase, Urine MODERATE (2+) (A) NEGATIVE   Microscopic Only, Urine   Result Value Ref Range    WBC, Urine 21-50 (A) 1-5, NONE /HPF    WBC Clumps, Urine OCCASIONAL Reference range not established. /HPF    RBC, Urine 3-5 NONE, 1-2, 3-5 /HPF    Squamous Epithelial Cells, Urine 1-9 (SPARSE) Reference range not established. /HPF    Bacteria, Urine 1+ (A) NONE SEEN /HPF    Mucus, Urine 1+ Reference range not established. /LPF   POCT GLUCOSE   Result Value Ref Range    POCT Glucose 523 (H) 74 - 99 mg/dL   Basic metabolic panel   Result Value Ref Range    Glucose 573 (HH) 74 - 99 mg/dL    Sodium 127 (L) 136 - 145 mmol/L    Potassium 4.3 3.5 - 5.3 mmol/L    Chloride 92 (L) 98 - 107 mmol/L    Bicarbonate 18 (L) 21 - 32 mmol/L    Anion Gap 21 (H) 10 - 20 mmol/L    Urea Nitrogen 25 (H) 6 - 23 mg/dL    Creatinine 1.62 (H) 0.50 - 1.05 mg/dL    eGFR 34 (L) >60 mL/min/1.73m*2    Calcium 8.5 (L) 8.6 - 10.3 mg/dL   Magnesium   Result Value Ref Range    Magnesium 1.41 (L) 1.60 - 2.40 mg/dL   Phosphorus   Result  Value Ref Range    Phosphorus 3.1 2.5 - 4.9 mg/dL   Lactate   Result Value Ref Range    Lactate 2.9 (H) 0.4 - 2.0 mmol/L   POCT GLUCOSE   Result Value Ref Range    POCT Glucose 514 (H) 74 - 99 mg/dL   POCT GLUCOSE   Result Value Ref Range    POCT Glucose 471 (H) 74 - 99 mg/dL   Basic metabolic panel   Result Value Ref Range    Glucose 407 (H) 74 - 99 mg/dL    Sodium 127 (L) 136 - 145 mmol/L    Potassium 4.5 3.5 - 5.3 mmol/L    Chloride 94 (L) 98 - 107 mmol/L    Bicarbonate 21 21 - 32 mmol/L    Anion Gap 17 10 - 20 mmol/L    Urea Nitrogen 25 (H) 6 - 23 mg/dL    Creatinine 1.62 (H) 0.50 - 1.05 mg/dL    eGFR 34 (L) >60 mL/min/1.73m*2    Calcium 8.7 8.6 - 10.3 mg/dL   Magnesium   Result Value Ref Range    Magnesium 1.36 (L) 1.60 - 2.40 mg/dL   Phosphorus   Result Value Ref Range    Phosphorus 1.9 (L) 2.5 - 4.9 mg/dL   Lactate   Result Value Ref Range    Lactate 5.5 (HH) 0.4 - 2.0 mmol/L       XR chest 1 view    Result Date: 3/25/2024  Interpreted By:  Jeevan Gomez, STUDY: XR CHEST 1 VIEW; 3/25/2024 12:55 pm   INDICATION: CLINICAL INFORMATION: Signs/Symptoms:AMS.   COMPARISON: 11/09/2022   ACCESSION NUMBER(S): [id number]   ORDERING CLINICIAN: KAILEY CASTRO   TECHNIQUE: Portable chest one view.   FINDINGS: The cardiac size is indeterminate in view of the AP projection.  The aorta is tortuous. Pulmonary arteries are somewhat prominent centrally suggesting pulmonary arterial hypertension. No infiltrates or effusions are identified.       No acute pathologic findings are identified. Possible pulmonary arterial hypertension. There is no interval change when compared to the previous examination.   MACRO: none   Signed by: Jeevan Gomez 3/25/2024 1:25 PM Dictation workstation:   [id number]    CT brain attack angio head and neck W and WO IV contrast    Result Date: 3/25/2024  Interpreted By:  Leandro Mcguire, STUDY: CT BRAIN ATTACK ANGIO HEAD AND NECK W AND WO IV CONTRAST; ; 3/25/2024 11:51 am   INDICATION:  Signs/Symptoms:Stroke Evaluation with VAN Positive.   COMPARISON: None.   ACCESSION NUMBER(S): [id number]   ORDERING CLINICIAN: KAILEY CASTRO   TECHNIQUE: Thin cut axial CT images were generated over the upper thorax, neck, and head during the arterial passage of a full contrast bolus.  The bolus was generated with a power injector and followed with immediate saline flush. These image data were subtracted and then used for 3-D reconstructions. Maximum intensity projections and shaded surface displays were generated in multiple planes. In addition images were transferred into a 3-D processing program and additional projections and displays were reviewed  by the interpreting physician.   FINDINGS: The CT angiogram through the upper thorax demonstrates mild atherosclerotic calcifications along the aortic arch and origin of the left subclavian artery. No significant stenosis noted along the origins of right brachiocephalic artery, left common carotid artery, or left subclavian artery. No significant stenosis noted along the origin of the right vertebral artery. There is atherosclerotic calcification noted along the origin of the left vertebral artery contributing to mild-to-moderate segmental narrowing.   The CT angiogram of the neck demonstrates atherosclerotic calcifications noted along the distal common carotid arteries and origin of the internal carotid arteries contributing to mild non hemodynamically significant narrowing. No significant stenosis noted along the cervical segments of the vertebral arteries.   The CT angiogram of the head demonstrates mild atherosclerotic calcification along the distal left vertebral artery. No significant stenosis noted along the distal right vertebral artery, basilar artery, or distal internal carotid arteries. The right posterior cerebral artery is predominantly supplied via the right posterior communicating artery. The P1 segment of the right posterior cerebral artery is  asymmetrically small in caliber which may be related to congenital hypoplasia or secondary narrowing. Otherwise, no large vessel branch cutoffs of the anterior cerebral arteries, middle cerebral arteries, or posterior cerebral arteries are noted.   The source CT angiogram images of the head demonstrate moderate brain parenchymal volume loss. There are nonspecific white matter changes within the cerebral hemispheres bilaterally as well as vague hypodensity overlying the brainstem which while nonspecific, given the patient's age, may represent sequelae of small-vessel ischemic change. Additional small scattered hypodensities are identified within the subinsular regions, basal ganglia, and thalami bilaterally suggesting incidental mildly prominent perivascular spaces and/or small scattered lacunar infarctions. There is no midline shift.   The source CT angiogram images of the neck demonstrate multilevel cervical spondylosis.       The CT angiogram through the upper thorax demonstrates mild atherosclerotic calcifications along the aortic arch and origin of the left subclavian artery. No significant stenosis noted along the origins of right brachiocephalic artery, left common carotid artery, or left subclavian artery. No significant stenosis noted along the origin of the right vertebral artery. There is atherosclerotic calcification noted along the origin of the left vertebral artery contributing to mild-to-moderate segmental narrowing.   The CT angiogram of the neck demonstrates atherosclerotic calcifications noted along the distal common carotid arteries and origin of the internal carotid arteries contributing to mild non hemodynamically significant narrowing. No significant stenosis noted along the cervical segments of the vertebral arteries.   The CT angiogram of the head demonstrates mild atherosclerotic calcification along the distal left vertebral artery. No significant stenosis noted along the distal right  vertebral artery, basilar artery, or distal internal carotid arteries. The right posterior cerebral artery is predominantly supplied via the right posterior communicating artery. The P1 segment of the right posterior cerebral artery is asymmetrically small in caliber which may be related to congenital hypoplasia or secondary narrowing. Otherwise, no large vessel branch cutoffs of the anterior cerebral arteries, middle cerebral arteries, or posterior cerebral arteries are noted.   The source CT angiogram images of the head demonstrate moderate brain parenchymal volume loss. There are nonspecific white matter changes within the cerebral hemispheres bilaterally as well as vague hypodensity overlying the brainstem which while nonspecific, given the patient's age, may represent sequelae of small-vessel ischemic change. Additional small scattered hypodensities are identified within the subinsular regions, basal ganglia, and thalami bilaterally suggesting incidental mildly prominent perivascular spaces and/or small scattered lacunar infarctions. There is no midline shift.   The source CT angiogram images of the neck demonstrate multilevel cervical spondylosis.     MACRO: None   Signed by: Leandro Mcguire 3/25/2024 12:08 PM Dictation workstation:   [id number]    ECG 12 lead    Result Date: 3/25/2024  Sinus rhythm Atrial premature complex Nonspecific intraventricular conduction delay Borderline low voltage, extremity leads Nonspecific repol abnormality, lateral leads Minimal ST elevation, anterior leads    CT brain attack head wo IV contrast    Result Date: 3/25/2024  Interpreted By:  Krish Randolph, STUDY: CT BRAIN ATTACK HEAD WO IV CONTRAST;  3/25/2024 11:38 am   INDICATION: Signs/Symptoms:Stroke Evaluation.  Slurred speech.   COMPARISON: CT head 02/12/2024.   ACCESSION NUMBER(S): [id number]   ORDERING CLINICIAN: KAILEY CASTRO   TECHNIQUE: Noncontrast axial CT scan of head was performed.   FINDINGS: Parenchyma: There  is no intracranial hemorrhage. The grey-white differentiation is intact. There is no mass effect or midline shift.   CSF Spaces: The ventricles, sulci and basal cisterns are within normal limits for age.   Extra-Axial Fluid: No extraaxial fluid collection.   Calvarium: No acute fracture.   Paranasal sinuses: Visualized paranasal sinuses are clear.   Mastoids: Clear.   Orbits: Normal.   Soft tissues: Unremarkable.       No acute intracranial abnormality.   MACRO: Krish Randolph discussed the significance and urgency of this critical finding by Epic secure chat with  KAILEY CASTRO on 3/25/2024 at 11:44 am.  (**-RCF-**) Findings:  See findings.   Signed by: Krish Randolph 3/25/2024 11:46 AM Dictation workstation:   [id number]           This patient currently has cardiac telemetry ordered; if you would like to modify or discontinue the telemetry order, click here to go to the orders activity to modify/discontinue the order.                 Assessment/Plan   Principal Problem:    Delirium  Active Problems:    Diabetic ketoacidosis without coma associated with type 2 diabetes mellitus (CMS/HCC)    Complicated UTI (urinary tract infection)    Sepsis without acute organ dysfunction (CMS/HCC)    Atrial fibrillation (CMS/HCC)    Hypothyroidism, postsurgical    Sacral wound    Diabetic ketoacidosis without coma associated with type 2 diabetes mellitus (CMS/HCC)  Assessment & Plan   on arrival with open anion gap  Started on insulin drip in ED, will continue   Recheck BMP shows AG still open, will continue drip until AG closes  Serial labs every 4 hours while on drip and until gap closes  NPO for now, will add IVF   Patient reports compliance with regimen, outpatient notes indicate she stopped metformin last week d/t diarrhea  Last A1c 15.4%  Crit care following, plan to discontinue drip and switch to insulin subcutaneous when bicarb >15 and AG<15    Sepsis without acute organ dysfunction (CMS/HCC)  Assessment &  Plan  Patient meets sepsis criteria on arrival with high fever, leukocytosis, tachycardia, and suspected source of infection  S/p IVF, vancomycin and zosyn in ED  Will continue zosyn for now and fluids  Blood and urine cultures pending  Uptrending lactate, will continue IVF and monitor    Complicated UTI (urinary tract infection)  Assessment & Plan  UA shows moderate LE  Suspect trigger for DKA with AMS  S/p vancomycin and zosyn in ED. Will continue both vancomycin and zosyn for now  Blood and urine cultures pending, plan to de-escalate antibiotics    * Delirium  Assessment & Plan  Per EMS patient had a left facial droop and a right arm drift. ED notes left facial droop with slurred speech which EMS states is new.   Stroke alert was activated in ED, but she is not a candidate for TNK given she is on Eliquis. There is no large vessel seen that needs to be intervened.   NIH score 3 for dysarthria, facial droop and answering wrong time, repeat 2.  Suspect AMS secondary to DKA and UTI  Will treat underlying conditions and monitor for improvement.   Also concern for polypharmacy as patient is on narcotics, gabapentin, SSRIs, thyroid meds. If not improving then will evaluate for medication overuse/noncompliance    Hypomagnesemia  Assessment & Plan  Mg 1.41, 1.36  Repleting IV, will monitor. Goal Mg>2.0    Sacral wound  Assessment & Plan  Hx of infected wounds and OM  Wound care nurse consult  Concern for patient self-care at home     Depression  Assessment & Plan  Continue home Bupropion, Desvenlafaxine, Trazodone    Hypothyroidism, postsurgical  Assessment & Plan  Continue home levothyroxine  Unclear if patient taking 175 or 225mcg  TSH pending, but may be skewed by sepsis and acute illness    GERD (gastroesophageal reflux disease)  Assessment & Plan  Hold home famotidine  Home omeprazole substituted with pantoprazole as per formulary    Chronic pain  Assessment & Plan  Continue home percocet, gabapentin. OARRS reviewed  and appropriate.    Atrial fibrillation (CMS/[facility])  Assessment & Plan  Continue home amiodarone, apixaban, metoprolol             I spent 60 minutes in the professional and overall care of this patient.      Abner Mack MD

## 2024-03-25 NOTE — PROGRESS NOTES
"Vancomycin Dosing by Pharmacy- INITIAL    Teresa Matthews is a 73 y.o. year old female who Pharmacy has been consulted for vancomycin dosing for other UTI . Based on the patient's indication and renal status this patient will be dosed based on a goal AUC of 500-600.     Renal function is currently stable.    Visit Vitals  /88   Pulse (!) 125   Temp 40.1 °C (104.2 °F)   Resp (!) 36        Lab Results   Component Value Date    CREATININE 1.62 (H) 03/25/2024    CREATININE 1.62 (H) 03/25/2024    CREATININE 1.64 (H) 03/25/2024    CREATININE 1.55 (H) 01/30/2024        Patient weight is No results found for: \"PTWEIGHT\"    No results found for: \"CULTURE\"     No intake/output data recorded.  [unfilled]    Lab Results   Component Value Date    PATIENTTEMP 37.0 03/25/2024    PATIENTTEMP 37.0 08/12/2022          Assessment/Plan     Patient has already been given a loading dose of 2000 mg.  Will initiate vancomycin maintenance,  1250 mg every 24 hours.    This dosing regimen is predicted by [employer] to result in the following pharmacokinetic parameters:  Regimen: 1250 mg IV every 24 hours.  Start time: 14:06 on 03/26/2024  Exposure target: AUC24 (range)400-600 mg/L.hr   AUC24,ss: 501 mg/L.hr  Probability of AUC24 > 400: 74 %  Ctrough,ss: 16.3 mg/L  Probability of Ctrough,ss > 20: 31 %  Probability of nephrotoxicity (Lodise SONNY 2009): 12 %      Follow-up level will be ordered on 3/27 at 500 unless clinically indicated sooner.  Will continue to monitor renal function daily while on vancomycin and order serum creatinine at least every 48 hours if not already ordered.  Follow for continued vancomycin needs, clinical response, and signs/symptoms of toxicity.       Kirill Metcalf, PharmD       "

## 2024-03-25 NOTE — ASSESSMENT & PLAN NOTE
Per EMS patient had a left facial droop and a right arm drift. ED notes left facial droop with slurred speech which EMS states is new.   Stroke alert was activated in ED, but she is not a candidate for TNK given she is on Eliquis. There is no large vessel seen that needs to be intervened.   NIH score 3 for dysarthria, facial droop and answering wrong time, repeat 2.  Suspect AMS secondary to DKA and UTI  Will treat underlying conditions and monitor for improvement.   Also concern for polypharmacy as patient is on narcotics, gabapentin, SSRIs, thyroid meds. If not improving then will evaluate for medication overuse/noncompliance

## 2024-03-25 NOTE — PROGRESS NOTES
PHARMACY STROKE RESPONSE      Patient Name: Teresa Matthews  MRN: [id number]  Location: [place] [address]    Patient Weight (kg):   Wt Readings from Last 1 Encounters:   02/13/24 107 kg (236 lb)        An acute Brain Attack has been activated, pharmacy participated in multidisciplinary team bedside response for Teresa Matthews.  Contraindications for fibrinolytic therapy have been reviewed by pharmacy and any issues relating to medication therapy have been discussed directly with the provider(s) caring for this patient.     Pharmacy aided in the bedside response for fibrinolytic therapy. Patient did not receive fibrinolysis. Not a candidate for TNK.     Orders Placed This Encounter      iohexol (OMNIPaque) 350 mg iodine/mL solution 100 mL      lactated Ringer's bolus 500 mL      Thank you for allowing me to take part in the care of this patient.     Stacy Chambers, PharmD  3/25/2024  11:45 AM         References:    [facility] [place] Stroke Tools   [facility] [place] IV Thrombolysis Checklist

## 2024-03-25 NOTE — CONSULTS
Critical Care Medicine Consult      Reason For Consult  DKA    History Of Present Illness  Teresa Matthews is a 73 y.o. 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 The patient reportedly lives alone. It is unclear who called EMS to transport patient however per EMS report, the patient was noted to have left-sided facial droop and right sided drift on exam. Upon arrival to the ED, the patient was noted to have slurred speech and a left-sided facial droop. A stroke alert was completed with CT head with no acute intracranial abnormality. Patient not a candidate for TNK. Patient currently on Eliquis. EKG with sinus rhythm with no ST changes or elevations. Labs with WBC 19.8, lactate 2.9, UA with moderate leukocytes and elevated WBCs. Patient treated empirically with Zosyn and Vancomycin for suspected UTI. Additionally, lab work suggestive of DKA with ph 7.26, , bicarb 15, and anion gap 24. Treated with 1.5L IVF and started on insulin infusion. Critical care medicine cosnulted for DKA management.    Patient seen and examined in ED awaiting ICU bed placement. She is awake and oriented to self only. ROS unable to be obtained d/t patient's altered mental status. Denies chest pain, shortness of breath, nausea or vomiting.     Past Medical History:   Diagnosis Date    Abnormal weight gain 04/10/2023    Allergic rhinitis 04/10/2023    Body mass index (BMI) 31.0-31.9, adult 06/30/2020    Body mass index (BMI) of 31.0 to 31.9 in adult    Body mass index (BMI) 32.0-32.9, adult 01/04/2021    Body mass index (BMI) of 32.0 to 32.9 in adult    Body mass index (BMI) 33.0-33.9, adult 01/04/2021    Body mass index (BMI)  of 33.0 to 33.9 in adult    Body mass index (BMI) 35.0-35.9, adult 04/09/2020    Body mass index (BMI) of 35.0 to 35.9 in adult    Body mass index (BMI)40.0-44.9, adult 11/14/2019    Body mass index (BMI) of 40.0 to 44.9 in adult    Chronic insomnia 04/10/2023    Depression, major, in remission (CMS/[facility]) 04/10/2023    Essential (primary) hypertension 09/06/2022    Hypertension    H/O bariatric surgery 04/10/2023    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    Hypoxia 04/10/2023    Kidney stone on right side 04/10/2023    Left toe amputee (CMS/[facility]) 04/10/2023    Malaise and fatigue 04/10/2023    Nasal septum ulceration 04/10/2023    Nontoxic multinodular goiter 07/02/2021    Multiple thyroid nodules    Personal history of other diseases of the circulatory system     History of congestive heart failure    Personal history of other diseases of the musculoskeletal system and connective tissue     History of arthritis    Personal history of other diseases of the nervous system and sense organs 10/11/2022    History of sleep apnea    Personal history of other diseases of the respiratory system     History of lung disease    Personal history of other diseases of urinary system 02/19/2020    History of hematuria    Personal history of other endocrine, nutritional and metabolic disease 07/02/2021    History of morbid obesity    Personal history of other endocrine, nutritional and metabolic disease 04/24/2017    History of diabetic neuropathy    Prosthetic joint infection (CMS/[facility]) 04/10/2023    Recurrent major depressive disorder, in remission (CMS/[facility]) 04/10/2023    Ruptured aneurysm of thoracic aorta, unspecified part (CMS/[facility]) 04/10/2023    Status post gastric bypass for obesity 04/10/2023    Vertigo of central origin 04/10/2023     Past Surgical History:   Procedure Laterality Date    HYSTERECTOMY  01/20/2016    Hysterectomy    OTHER SURGICAL HISTORY  01/07/2022    Toe amputation    OTHER SURGICAL HISTORY  05/28/2021     Gastric bypass surgery    TONSILLECTOMY  01/20/2016    Tonsillectomy    TOTAL KNEE ARTHROPLASTY  05/28/2021    Knee Replacement    TUBAL LIGATION  04/24/2017    Tubal Ligation     (Not in a hospital admission)    Cephalexin, Metformin, Other, Statins-hmg-coa reductase inhibitors, and Adhesive tape-silicones  Social History     Tobacco Use    Smoking status: Never    Smokeless tobacco: Never   Vaping Use    Vaping Use: Never used   Substance Use Topics    Alcohol use: Never    Drug use: Never     Family History   Problem Relation Name Age of Onset    Coronary artery disease Mother      Liver disease Mother      Other (non-hodgkins lymphoma) Mother      Stroke Father      Deafness Father      Hypertension Father         Scheduled Medications:         Continuous Medications:   dextrose 10 % in water (D10W), 150 mL/hr  dextrose 10 % in water (D10W), 150 mL/hr  dextrose 5%-0.45 % sodium chloride, 150 mL/hr  insulin regular, 11 Units/hr, Last Rate: 11 Units/hr (03/25/24 1406)         PRN Medications:   PRN medications: dextrose, insulin regular    Review of Systems:  Review of Systems   Unable to perform ROS: Mental status change        Objective   Vitals:  Most Recent:  Vitals:    03/25/24 1546   BP: 151/89   Pulse:    Resp:    Temp:    SpO2: 94%       24hr Min/Max:  Temp  Min: 36.7 °C (98.1 °F)  Max: 36.7 °C (98.1 °F)  Pulse  Min: 92  Max: 96  BP  Min: 126/78  Max: 151/89  Resp  Min: 20  Max: 20  SpO2  Min: 92 %  Max: 94 %    LDA:   External Urinary Catheter Female (Active)   Placement Date/Time: 02/12/24 0945   Placed by: Gricel  Hand Hygiene Completed: Yes  External Catheter Type: Female   Number of days: 42         Vent settings:       Hemodynamic parameters for last 24 hours:       No intake or output data in the 24 hours ending 03/25/24 1628        Physical exam:    Physical Exam  Constitutional:       Appearance: She is toxic-appearing.      Comments: Alert to person   HENT:      Mouth/Throat:      Mouth:  Mucous membranes are dry.   Cardiovascular:      Rate and Rhythm: Regular rhythm. Tachycardia present.      Pulses: Normal pulses.      Heart sounds: No murmur heard.  Abdominal:      General: Bowel sounds are normal. There is no distension.      Palpations: Abdomen is soft.      Tenderness: There is no abdominal tenderness. There is no guarding.   Musculoskeletal:         General: Deformity and signs of injury present.      Comments: Amputated left great toe.   Skin:     General: Skin is dry.      Capillary Refill: Capillary refill takes 2 to 3 seconds.      Comments: BLE vascular discoloration. Wound to LLE.   Neurological:      Mental Status: She is disoriented.   Psychiatric:         Judgment: Judgment is impulsive.          Lab/Radiology/Diagnostic Review:  Results for orders placed or performed during the hospital encounter of 03/25/24 (from the past 24 hour(s))   CBC and Auto Differential   Result Value Ref Range    WBC 19.8 (H) 4.4 - 11.3 x10*3/uL    nRBC 0.0 0.0 - 0.0 /100 WBCs    RBC 3.76 (L) 4.00 - 5.20 x10*6/uL    Hemoglobin 10.6 (L) 12.0 - 16.0 g/dL    Hematocrit 32.7 (L) 36.0 - 46.0 %    MCV 87 80 - 100 fL    MCH 28.2 26.0 - 34.0 pg    MCHC 32.4 32.0 - 36.0 g/dL    RDW 14.7 (H) 11.5 - 14.5 %    Platelets 156 150 - 450 x10*3/uL    Neutrophils % 92.3 40.0 - 80.0 %    Immature Granulocytes %, Automated 2.0 (H) 0.0 - 0.9 %    Lymphocytes % 1.0 13.0 - 44.0 %    Monocytes % 3.6 2.0 - 10.0 %    Eosinophils % 0.9 0.0 - 6.0 %    Basophils % 0.2 0.0 - 2.0 %    Neutrophils Absolute 18.25 (H) 1.60 - 5.50 x10*3/uL    Immature Granulocytes Absolute, Automated 0.40 0.00 - 0.50 x10*3/uL    Lymphocytes Absolute 0.20 (L) 0.80 - 3.00 x10*3/uL    Monocytes Absolute 0.72 0.05 - 0.80 x10*3/uL    Eosinophils Absolute 0.18 0.00 - 0.40 x10*3/uL    Basophils Absolute 0.04 0.00 - 0.10 x10*3/uL   Comprehensive metabolic panel   Result Value Ref Range    Glucose 609 (HH) 74 - 99 mg/dL    Sodium 122 (L) 136 - 145 mmol/L     Potassium 4.6 3.5 - 5.3 mmol/L    Chloride 88 (L) 98 - 107 mmol/L    Bicarbonate 15 (L) 21 - 32 mmol/L    Anion Gap 24 (H) 10 - 20 mmol/L    Urea Nitrogen 25 (H) 6 - 23 mg/dL    Creatinine 1.64 (H) 0.50 - 1.05 mg/dL    eGFR 33 (L) >60 mL/min/1.73m*2    Calcium 8.4 (L) 8.6 - 10.3 mg/dL    Albumin 3.3 (L) 3.4 - 5.0 g/dL    Alkaline Phosphatase 83 33 - 136 U/L    Total Protein 7.2 6.4 - 8.2 g/dL    AST 15 9 - 39 U/L    Bilirubin, Total 0.6 0.0 - 1.2 mg/dL    ALT 8 7 - 45 U/L   Troponin I, High Sensitivity   Result Value Ref Range    Troponin I, High Sensitivity 13 0 - 13 ng/L   Blood Gas Venous Full Panel   Result Value Ref Range    POCT pH, Venous 7.26 (L) 7.33 - 7.43 pH    POCT pCO2, Venous 37 (L) 41 - 51 mm Hg    POCT pO2, Venous 32 (L) 35 - 45 mm Hg    POCT SO2, Venous 34 (L) 45 - 75 %    POCT Oxy Hemoglobin, Venous 33.4 (L) 45.0 - 75.0 %    POCT Hematocrit Calculated, Venous 32.0 (L) 36.0 - 46.0 %    POCT Sodium, Venous 122 (L) 136 - 145 mmol/L    POCT Potassium, Venous 4.9 3.5 - 5.3 mmol/L    POCT Chloride, Venous 89 (L) 98 - 107 mmol/L    POCT Ionized Calicum, Venous 1.19 1.10 - 1.33 mmol/L    POCT Glucose, Venous 576 (HH) 74 - 99 mg/dL    POCT Lactate, Venous 3.0 (H) 0.4 - 2.0 mmol/L    POCT Base Excess, Venous -9.7 (L) -2.0 - 3.0 mmol/L    POCT HCO3 Calculated, Venous 16.6 (L) 22.0 - 26.0 mmol/L    POCT Hemoglobin, Venous 10.6 (L) 12.0 - 16.0 g/dL    POCT Anion Gap, Venous 21.0 10.0 - 25.0 mmol/L    Patient Temperature 37.0 degrees Celsius    FiO2 21 %   Beta Hydroxybutyrate   Result Value Ref Range    Beta-Hydroxybutyrate 6.24 (H) 0.02 - 0.27 mmol/L   Sars-CoV-2 PCR   Result Value Ref Range    Coronavirus 2019, PCR Not Detected Not Detected   Influenza A, and B PCR   Result Value Ref Range    Flu A Result Not Detected Not Detected    Flu B Result Not Detected Not Detected   ECG 12 lead   Result Value Ref Range    Ventricular Rate 92 BPM    Atrial Rate 93 BPM    CO Interval 199 ms    QRS Duration 117 ms    QT  Interval 401 ms    QTC Calculation(Bazett) 497 ms    P Axis 48 degrees    R Axis 99 degrees    T Axis 73 degrees    QRS Count 14 beats    Q Onset 249 ms    T Offset 450 ms    QTC Fredericia 462 ms   Blood Gas Lactic Acid, Venous   Result Value Ref Range    POCT Lactate, Venous 2.9 (H) 0.4 - 2.0 mmol/L   Lactate   Result Value Ref Range    Lactate 2.8 (H) 0.4 - 2.0 mmol/L   Urinalysis with Reflex Culture and Microscopic   Result Value Ref Range    Color, Urine Yellow Straw, Yellow    Appearance, Urine Hazy (N) Clear    Specific Gravity, Urine 1.023 1.005 - 1.035    pH, Urine 6.0 5.0, 5.5, 6.0, 6.5, 7.0, 7.5, 8.0    Protein, Urine 30 (1+) (N) NEGATIVE mg/dL    Glucose, Urine >=500 (3+) (A) NEGATIVE mg/dL    Blood, Urine MODERATE (2+) (A) NEGATIVE    Ketones, Urine 80 (2+) (A) NEGATIVE mg/dL    Bilirubin, Urine NEGATIVE NEGATIVE    Urobilinogen, Urine <2.0 <2.0 mg/dL    Nitrite, Urine NEGATIVE NEGATIVE    Leukocyte Esterase, Urine MODERATE (2+) (A) NEGATIVE   Microscopic Only, Urine   Result Value Ref Range    WBC, Urine 21-50 (A) 1-5, NONE /HPF    WBC Clumps, Urine OCCASIONAL Reference range not established. /HPF    RBC, Urine 3-5 NONE, 1-2, 3-5 /HPF    Squamous Epithelial Cells, Urine 1-9 (SPARSE) Reference range not established. /HPF    Bacteria, Urine 1+ (A) NONE SEEN /HPF    Mucus, Urine 1+ Reference range not established. /LPF   POCT GLUCOSE   Result Value Ref Range    POCT Glucose 523 (H) 74 - 99 mg/dL   Basic metabolic panel   Result Value Ref Range    Glucose 573 (HH) 74 - 99 mg/dL    Sodium 127 (L) 136 - 145 mmol/L    Potassium 4.3 3.5 - 5.3 mmol/L    Chloride 92 (L) 98 - 107 mmol/L    Bicarbonate 18 (L) 21 - 32 mmol/L    Anion Gap 21 (H) 10 - 20 mmol/L    Urea Nitrogen 25 (H) 6 - 23 mg/dL    Creatinine 1.62 (H) 0.50 - 1.05 mg/dL    eGFR 34 (L) >60 mL/min/1.73m*2    Calcium 8.5 (L) 8.6 - 10.3 mg/dL   Magnesium   Result Value Ref Range    Magnesium 1.41 (L) 1.60 - 2.40 mg/dL   Phosphorus   Result Value Ref  Range    Phosphorus 3.1 2.5 - 4.9 mg/dL   Lactate   Result Value Ref Range    Lactate 2.9 (H) 0.4 - 2.0 mmol/L   POCT GLUCOSE   Result Value Ref Range    POCT Glucose 514 (H) 74 - 99 mg/dL     XR chest 1 view    Result Date: 3/25/2024  Interpreted By:  Jeevan Gomez, STUDY: XR CHEST 1 VIEW; 3/25/2024 12:55 pm   INDICATION: CLINICAL INFORMATION: Signs/Symptoms:AMS.   COMPARISON: 11/09/2022   ACCESSION NUMBER(S): [id number]   ORDERING CLINICIAN: KAILEY CASRTO   TECHNIQUE: Portable chest one view.   FINDINGS: The cardiac size is indeterminate in view of the AP projection.  The aorta is tortuous. Pulmonary arteries are somewhat prominent centrally suggesting pulmonary arterial hypertension. No infiltrates or effusions are identified.       No acute pathologic findings are identified. Possible pulmonary arterial hypertension. There is no interval change when compared to the previous examination.   MACRO: none   Signed by: Jeevan Gomez 3/25/2024 1:25 PM Dictation workstation:   [id number]    CT brain attack angio head and neck W and WO IV contrast    Result Date: 3/25/2024  Interpreted By:  Leandro Mcguire, STUDY: CT BRAIN ATTACK ANGIO HEAD AND NECK W AND WO IV CONTRAST; ; 3/25/2024 11:51 am   INDICATION: Signs/Symptoms:Stroke Evaluation with VAN Positive.   COMPARISON: None.   ACCESSION NUMBER(S): [id number]   ORDERING CLINICIAN: KAILEY CASTRO   TECHNIQUE: Thin cut axial CT images were generated over the upper thorax, neck, and head during the arterial passage of a full contrast bolus.  The bolus was generated with a power injector and followed with immediate saline flush. These image data were subtracted and then used for 3-D reconstructions. Maximum intensity projections and shaded surface displays were generated in multiple planes. In addition images were transferred into a 3-D processing program and additional projections and displays were reviewed  by the interpreting physician.   FINDINGS: The CT  angiogram through the upper thorax demonstrates mild atherosclerotic calcifications along the aortic arch and origin of the left subclavian artery. No significant stenosis noted along the origins of right brachiocephalic artery, left common carotid artery, or left subclavian artery. No significant stenosis noted along the origin of the right vertebral artery. There is atherosclerotic calcification noted along the origin of the left vertebral artery contributing to mild-to-moderate segmental narrowing.   The CT angiogram of the neck demonstrates atherosclerotic calcifications noted along the distal common carotid arteries and origin of the internal carotid arteries contributing to mild non hemodynamically significant narrowing. No significant stenosis noted along the cervical segments of the vertebral arteries.   The CT angiogram of the head demonstrates mild atherosclerotic calcification along the distal left vertebral artery. No significant stenosis noted along the distal right vertebral artery, basilar artery, or distal internal carotid arteries. The right posterior cerebral artery is predominantly supplied via the right posterior communicating artery. The P1 segment of the right posterior cerebral artery is asymmetrically small in caliber which may be related to congenital hypoplasia or secondary narrowing. Otherwise, no large vessel branch cutoffs of the anterior cerebral arteries, middle cerebral arteries, or posterior cerebral arteries are noted.   The source CT angiogram images of the head demonstrate moderate brain parenchymal volume loss. There are nonspecific white matter changes within the cerebral hemispheres bilaterally as well as vague hypodensity overlying the brainstem which while nonspecific, given the patient's age, may represent sequelae of small-vessel ischemic change. Additional small scattered hypodensities are identified within the subinsular regions, basal ganglia, and thalami bilaterally  suggesting incidental mildly prominent perivascular spaces and/or small scattered lacunar infarctions. There is no midline shift.   The source CT angiogram images of the neck demonstrate multilevel cervical spondylosis.       The CT angiogram through the upper thorax demonstrates mild atherosclerotic calcifications along the aortic arch and origin of the left subclavian artery. No significant stenosis noted along the origins of right brachiocephalic artery, left common carotid artery, or left subclavian artery. No significant stenosis noted along the origin of the right vertebral artery. There is atherosclerotic calcification noted along the origin of the left vertebral artery contributing to mild-to-moderate segmental narrowing.   The CT angiogram of the neck demonstrates atherosclerotic calcifications noted along the distal common carotid arteries and origin of the internal carotid arteries contributing to mild non hemodynamically significant narrowing. No significant stenosis noted along the cervical segments of the vertebral arteries.   The CT angiogram of the head demonstrates mild atherosclerotic calcification along the distal left vertebral artery. No significant stenosis noted along the distal right vertebral artery, basilar artery, or distal internal carotid arteries. The right posterior cerebral artery is predominantly supplied via the right posterior communicating artery. The P1 segment of the right posterior cerebral artery is asymmetrically small in caliber which may be related to congenital hypoplasia or secondary narrowing. Otherwise, no large vessel branch cutoffs of the anterior cerebral arteries, middle cerebral arteries, or posterior cerebral arteries are noted.   The source CT angiogram images of the head demonstrate moderate brain parenchymal volume loss. There are nonspecific white matter changes within the cerebral hemispheres bilaterally as well as vague hypodensity overlying the brainstem  which while nonspecific, given the patient's age, may represent sequelae of small-vessel ischemic change. Additional small scattered hypodensities are identified within the subinsular regions, basal ganglia, and thalami bilaterally suggesting incidental mildly prominent perivascular spaces and/or small scattered lacunar infarctions. There is no midline shift.   The source CT angiogram images of the neck demonstrate multilevel cervical spondylosis.     MACRO: None   Signed by: Leandro Mcguire 3/25/2024 12:08 PM Dictation workstation:   [id number]    ECG 12 lead    Result Date: 3/25/2024  Sinus rhythm Atrial premature complex Nonspecific intraventricular conduction delay Borderline low voltage, extremity leads Nonspecific repol abnormality, lateral leads Minimal ST elevation, anterior leads    CT brain attack head wo IV contrast    Result Date: 3/25/2024  Interpreted By:  Krish Randolph, STUDY: CT BRAIN ATTACK HEAD WO IV CONTRAST;  3/25/2024 11:38 am   INDICATION: Signs/Symptoms:Stroke Evaluation.  Slurred speech.   COMPARISON: CT head 02/12/2024.   ACCESSION NUMBER(S): [id number]   ORDERING CLINICIAN: KAILEY CASTRO   TECHNIQUE: Noncontrast axial CT scan of head was performed.   FINDINGS: Parenchyma: There is no intracranial hemorrhage. The grey-white differentiation is intact. There is no mass effect or midline shift.   CSF Spaces: The ventricles, sulci and basal cisterns are within normal limits for age.   Extra-Axial Fluid: No extraaxial fluid collection.   Calvarium: No acute fracture.   Paranasal sinuses: Visualized paranasal sinuses are clear.   Mastoids: Clear.   Orbits: Normal.   Soft tissues: Unremarkable.       No acute intracranial abnormality.   MACRO: Krish Randolph discussed the significance and urgency of this critical finding by Epic secure chat with  KAILEY CASTRO on 3/25/2024 at 11:44 am.  (**-RCF-**) Findings:  See findings.   Signed by: Krish Randolph 3/25/2024 11:46 AM Dictation  workstation:   [id number]      Assessment/Plan     Teresa Matthews is a 73 y.o. 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    Diabetic ketoacidosis likely secondary to urinary tract infection  -Received 1.5L fluid in ED  -Initial   -Currently on insulin infusion @13.75 units/hr  -HCO3 15, anion gap 24  -POCT glucose every hour  -BMP, Mag, Phos every 4 hours while on insulin infusion  -Electrolyte replacements as indicated  -NPO  -Will start on 0.9% @ 250ml/hr and switch IV fluids to D5 0.45% @ 150ml/hr once BGL<250  -Will switch insulin infusion to subcutaneous insulin when anion gap <15 and bicarb >15  -Endocrine consulted    2. Severe sepsis  -Patient with fever, tachycardia, elevated lactic, with evidence of organ dysfunction  -Source not clear although suspect UTI  -Recently treated for cellulitis of lower extremity  -UA with moderate leukocyte esterase and elevated WBC  -Treated empirically with Zosyn and Vancomycin in ED, will continue for now  -Urine culture pending  -Blood cultures pending  -Check procal  -Follow CBC  -Trend Lactic    3. Acute metabolic encephalopathy  -Presented to the hospital with altered mental status, facial droop, right sided drift  -Likely secondary to the above  -CT Head with no acute pathology  -Not a candidate for TNK as patient on OAC  -Check TSH  -Neuro and pain assessments every 4 hours    4. Metabolic acidosis secondary to DKA  -initial VBG with ph 7.26, CO2 37, HCO3 16  -Anion gap 24  -Continue IVF per DKA protocol    5. SANDI on CKD stage III  -BUN 25, Creat 1.62 (Baseline 1.16)  -Continue IVF resuscitation  -Strict intake and output  -Avoid  nephrotoxins    6. Poorly controlled diabetes mellitus  -Hemoglobin A1C 15.4  -Chart review notes patient recently stopped taking Metformin d/t causing diarrhea  -Endocrine consulted    I spent 40 minutes of cumulative critical care time with the patient.  Greater than 50% of that time was spent in the direct collaboration and or coordination of care of the patient.     Dragon dictation software was used to dictate this note and thus there may be minor errors in translation/transcription including garbled speech or misspellings. Please contact for clarification if needed.

## 2024-03-25 NOTE — ASSESSMENT & PLAN NOTE
Patient meets sepsis criteria on arrival with high fever, leukocytosis, tachycardia, and suspected source of infection  S/p IVF, vancomycin and zosyn in ED  Will continue zosyn for now and fluids  Blood and urine cultures pending  Uptrending lactate, will continue IVF and monitor

## 2024-03-25 NOTE — ASSESSMENT & PLAN NOTE
on arrival with open anion gap  Started on insulin drip in ED, will continue   Recheck BMP shows AG still open, will continue drip until AG closes  Serial labs every 4 hours while on drip and until gap closes  NPO for now, will add IVF   Patient reports compliance with regimen, outpatient notes indicate she stopped metformin last week d/t diarrhea  Last A1c 15.4%  Crit care following, plan to discontinue drip and switch to insulin subcutaneous when bicarb >15 and AG<15

## 2024-03-26 ENCOUNTER — APPOINTMENT (OUTPATIENT)
Dept: CARDIOLOGY | Facility: HOSPITAL | Age: 74
DRG: 871 | End: 2024-03-26
Payer: MEDICARE

## 2024-03-26 LAB
ANION GAP BLDV CALCULATED.4IONS-SCNC: 10 MMOL/L (ref 10–25)
ANION GAP SERPL CALC-SCNC: 10 MMOL/L (ref 10–20)
ANION GAP SERPL CALC-SCNC: 14 MMOL/L (ref 10–20)
ANION GAP SERPL CALC-SCNC: 14 MMOL/L (ref 10–20)
ATRIAL RATE: 93 BPM
BACTERIA UR CULT: ABNORMAL
BASE EXCESS BLDV CALC-SCNC: -1.2 MMOL/L (ref -2–3)
BODY TEMPERATURE: 37 DEGREES CELSIUS
BUN SERPL-MCNC: 27 MG/DL (ref 6–23)
BUN SERPL-MCNC: 27 MG/DL (ref 6–23)
BUN SERPL-MCNC: 28 MG/DL (ref 6–23)
CA-I BLDV-SCNC: 1.28 MMOL/L (ref 1.1–1.33)
CALCIUM SERPL-MCNC: 8.2 MG/DL (ref 8.6–10.3)
CALCIUM SERPL-MCNC: 8.4 MG/DL (ref 8.6–10.3)
CALCIUM SERPL-MCNC: 8.8 MG/DL (ref 8.6–10.3)
CHLORIDE BLDV-SCNC: 100 MMOL/L (ref 98–107)
CHLORIDE SERPL-SCNC: 101 MMOL/L (ref 98–107)
CHLORIDE SERPL-SCNC: 97 MMOL/L (ref 98–107)
CHLORIDE SERPL-SCNC: 99 MMOL/L (ref 98–107)
CO2 SERPL-SCNC: 21 MMOL/L (ref 21–32)
CO2 SERPL-SCNC: 22 MMOL/L (ref 21–32)
CO2 SERPL-SCNC: 23 MMOL/L (ref 21–32)
CREAT SERPL-MCNC: 1.65 MG/DL (ref 0.5–1.05)
CREAT SERPL-MCNC: 1.69 MG/DL (ref 0.5–1.05)
CREAT SERPL-MCNC: 1.73 MG/DL (ref 0.5–1.05)
EGFRCR SERPLBLD CKD-EPI 2021: 31 ML/MIN/1.73M*2
EGFRCR SERPLBLD CKD-EPI 2021: 32 ML/MIN/1.73M*2
EGFRCR SERPLBLD CKD-EPI 2021: 33 ML/MIN/1.73M*2
ERYTHROCYTE [DISTWIDTH] IN BLOOD BY AUTOMATED COUNT: 14.7 % (ref 11.5–14.5)
GLUCOSE BLD MANUAL STRIP-MCNC: 141 MG/DL (ref 74–99)
GLUCOSE BLD MANUAL STRIP-MCNC: 141 MG/DL (ref 74–99)
GLUCOSE BLD MANUAL STRIP-MCNC: 157 MG/DL (ref 74–99)
GLUCOSE BLD MANUAL STRIP-MCNC: 158 MG/DL (ref 74–99)
GLUCOSE BLD MANUAL STRIP-MCNC: 161 MG/DL (ref 74–99)
GLUCOSE BLD MANUAL STRIP-MCNC: 168 MG/DL (ref 74–99)
GLUCOSE BLD MANUAL STRIP-MCNC: 170 MG/DL (ref 74–99)
GLUCOSE BLD MANUAL STRIP-MCNC: 173 MG/DL (ref 74–99)
GLUCOSE BLD MANUAL STRIP-MCNC: 181 MG/DL (ref 74–99)
GLUCOSE BLD MANUAL STRIP-MCNC: 190 MG/DL (ref 74–99)
GLUCOSE BLD MANUAL STRIP-MCNC: 268 MG/DL (ref 74–99)
GLUCOSE BLD MANUAL STRIP-MCNC: 270 MG/DL (ref 74–99)
GLUCOSE BLDV-MCNC: 142 MG/DL (ref 74–99)
GLUCOSE SERPL-MCNC: 148 MG/DL (ref 74–99)
GLUCOSE SERPL-MCNC: 152 MG/DL (ref 74–99)
GLUCOSE SERPL-MCNC: 165 MG/DL (ref 74–99)
HCO3 BLDV-SCNC: 23.6 MMOL/L (ref 22–26)
HCT VFR BLD AUTO: 29.9 % (ref 36–46)
HCT VFR BLD EST: 30 % (ref 36–46)
HGB BLD-MCNC: 10 G/DL (ref 12–16)
HGB BLDV-MCNC: 10 G/DL (ref 12–16)
HOLD SPECIMEN: NORMAL
INHALED O2 CONCENTRATION: 36 %
LACTATE BLDV-SCNC: 1.9 MMOL/L (ref 0.4–2)
LACTATE SERPL-SCNC: 1.9 MMOL/L (ref 0.4–2)
LACTATE SERPL-SCNC: 2.7 MMOL/L (ref 0.4–2)
MAGNESIUM SERPL-MCNC: 1.73 MG/DL (ref 1.6–2.4)
MAGNESIUM SERPL-MCNC: 1.74 MG/DL (ref 1.6–2.4)
MAGNESIUM SERPL-MCNC: 1.77 MG/DL (ref 1.6–2.4)
MCH RBC QN AUTO: 27.9 PG (ref 26–34)
MCHC RBC AUTO-ENTMCNC: 33.4 G/DL (ref 32–36)
MCV RBC AUTO: 83 FL (ref 80–100)
MRSA DNA SPEC QL NAA+PROBE: NOT DETECTED
NRBC BLD-RTO: 0 /100 WBCS (ref 0–0)
OXYHGB MFR BLDV: 75 % (ref 45–75)
P AXIS: 48 DEGREES
PCO2 BLDV: 39 MM HG (ref 41–51)
PH BLDV: 7.39 PH (ref 7.33–7.43)
PHOSPHATE SERPL-MCNC: 2.1 MG/DL (ref 2.5–4.9)
PHOSPHATE SERPL-MCNC: 2.4 MG/DL (ref 2.5–4.9)
PHOSPHATE SERPL-MCNC: 2.5 MG/DL (ref 2.5–4.9)
PLATELET # BLD AUTO: 144 X10*3/UL (ref 150–450)
PO2 BLDV: 47 MM HG (ref 35–45)
POTASSIUM BLDV-SCNC: 3.5 MMOL/L (ref 3.5–5.3)
POTASSIUM SERPL-SCNC: 3.3 MMOL/L (ref 3.5–5.3)
POTASSIUM SERPL-SCNC: 3.9 MMOL/L (ref 3.5–5.3)
POTASSIUM SERPL-SCNC: 4.1 MMOL/L (ref 3.5–5.3)
PR INTERVAL: 199 MS
PROCALCITONIN SERPL-MCNC: 78.04 NG/ML
Q ONSET: 249 MS
QRS COUNT: 14 BEATS
QRS DURATION: 117 MS
QT INTERVAL: 401 MS
QTC CALCULATION(BAZETT): 497 MS
QTC FREDERICIA: 462 MS
R AXIS: 99 DEGREES
RBC # BLD AUTO: 3.59 X10*6/UL (ref 4–5.2)
RIGHT VENTRICLE PEAK SYSTOLIC PRESSURE: 28.2 MMHG
SAO2 % BLDV: 76 % (ref 45–75)
SODIUM BLDV-SCNC: 130 MMOL/L (ref 136–145)
SODIUM SERPL-SCNC: 130 MMOL/L (ref 136–145)
T AXIS: 73 DEGREES
T OFFSET: 450 MS
VENTRICULAR RATE: 92 BPM
WBC # BLD AUTO: 16.6 X10*3/UL (ref 4.4–11.3)

## 2024-03-26 PROCEDURE — 84100 ASSAY OF PHOSPHORUS: CPT | Mod: MUE

## 2024-03-26 PROCEDURE — 92610 EVALUATE SWALLOWING FUNCTION: CPT | Mod: GN | Performed by: SPEECH-LANGUAGE PATHOLOGIST

## 2024-03-26 PROCEDURE — 93321 DOPPLER ECHO F-UP/LMTD STD: CPT | Performed by: INTERNAL MEDICINE

## 2024-03-26 PROCEDURE — 82947 ASSAY GLUCOSE BLOOD QUANT: CPT | Mod: MUE

## 2024-03-26 PROCEDURE — 2500000002 HC RX 250 W HCPCS SELF ADMINISTERED DRUGS (ALT 637 FOR MEDICARE OP, ALT 636 FOR OP/ED)

## 2024-03-26 PROCEDURE — 83735 ASSAY OF MAGNESIUM: CPT | Performed by: FAMILY MEDICINE

## 2024-03-26 PROCEDURE — 97166 OT EVAL MOD COMPLEX 45 MIN: CPT | Mod: GO

## 2024-03-26 PROCEDURE — 97161 PT EVAL LOW COMPLEX 20 MIN: CPT | Mod: GP | Performed by: PHYSICAL THERAPIST

## 2024-03-26 PROCEDURE — 36415 COLL VENOUS BLD VENIPUNCTURE: CPT | Performed by: FAMILY MEDICINE

## 2024-03-26 PROCEDURE — 1200000002 HC GENERAL ROOM WITH TELEMETRY DAILY

## 2024-03-26 PROCEDURE — 2500000002 HC RX 250 W HCPCS SELF ADMINISTERED DRUGS (ALT 637 FOR MEDICARE OP, ALT 636 FOR OP/ED): Mod: MUE | Performed by: FAMILY MEDICINE

## 2024-03-26 PROCEDURE — 99233 SBSQ HOSP IP/OBS HIGH 50: CPT | Performed by: FAMILY MEDICINE

## 2024-03-26 PROCEDURE — 93325 DOPPLER ECHO COLOR FLOW MAPG: CPT | Performed by: INTERNAL MEDICINE

## 2024-03-26 PROCEDURE — 93325 DOPPLER ECHO COLOR FLOW MAPG: CPT

## 2024-03-26 PROCEDURE — 2500000005 HC RX 250 GENERAL PHARMACY W/O HCPCS

## 2024-03-26 PROCEDURE — 85027 COMPLETE CBC AUTOMATED: CPT

## 2024-03-26 PROCEDURE — 87205 SMEAR GRAM STAIN: CPT | Mod: PORLAB

## 2024-03-26 PROCEDURE — 2500000004 HC RX 250 GENERAL PHARMACY W/ HCPCS (ALT 636 FOR OP/ED)

## 2024-03-26 PROCEDURE — 84100 ASSAY OF PHOSPHORUS: CPT | Performed by: FAMILY MEDICINE

## 2024-03-26 PROCEDURE — 83605 ASSAY OF LACTIC ACID: CPT | Performed by: FAMILY MEDICINE

## 2024-03-26 PROCEDURE — 2500000001 HC RX 250 WO HCPCS SELF ADMINISTERED DRUGS (ALT 637 FOR MEDICARE OP): Performed by: FAMILY MEDICINE

## 2024-03-26 PROCEDURE — 87641 MR-STAPH DNA AMP PROBE: CPT | Performed by: PHARMACIST

## 2024-03-26 PROCEDURE — 80048 BASIC METABOLIC PNL TOTAL CA: CPT | Performed by: FAMILY MEDICINE

## 2024-03-26 PROCEDURE — 84132 ASSAY OF SERUM POTASSIUM: CPT

## 2024-03-26 PROCEDURE — 87040 BLOOD CULTURE FOR BACTERIA: CPT | Mod: PORLAB | Performed by: STUDENT IN AN ORGANIZED HEALTH CARE EDUCATION/TRAINING PROGRAM

## 2024-03-26 PROCEDURE — 2500000001 HC RX 250 WO HCPCS SELF ADMINISTERED DRUGS (ALT 637 FOR MEDICARE OP)

## 2024-03-26 PROCEDURE — 83735 ASSAY OF MAGNESIUM: CPT

## 2024-03-26 PROCEDURE — 93308 TTE F-UP OR LMTD: CPT | Performed by: INTERNAL MEDICINE

## 2024-03-26 PROCEDURE — 99291 CRITICAL CARE FIRST HOUR: CPT

## 2024-03-26 RX ORDER — PETROLATUM 420 MG/G
OINTMENT TOPICAL 2 TIMES DAILY
Status: DISCONTINUED | OUTPATIENT
Start: 2024-03-26 | End: 2024-03-30 | Stop reason: HOSPADM

## 2024-03-26 RX ORDER — LORAZEPAM 2 MG/ML
2 INJECTION INTRAMUSCULAR ONCE
Status: DISCONTINUED | OUTPATIENT
Start: 2024-03-26 | End: 2024-03-26

## 2024-03-26 RX ORDER — INSULIN LISPRO 100 [IU]/ML
0-10 INJECTION, SOLUTION INTRAVENOUS; SUBCUTANEOUS
Status: DISCONTINUED | OUTPATIENT
Start: 2024-03-26 | End: 2024-03-30 | Stop reason: HOSPADM

## 2024-03-26 RX ORDER — MIDAZOLAM HYDROCHLORIDE 1 MG/ML
2 INJECTION, SOLUTION INTRAVENOUS CONTINUOUS
Status: DISCONTINUED | OUTPATIENT
Start: 2024-03-26 | End: 2024-03-26

## 2024-03-26 RX ORDER — NYSTATIN 100000 [USP'U]/G
1 POWDER TOPICAL 2 TIMES DAILY
Status: DISCONTINUED | OUTPATIENT
Start: 2024-03-26 | End: 2024-03-30 | Stop reason: HOSPADM

## 2024-03-26 RX ORDER — POTASSIUM CHLORIDE 750 MG/1
20 TABLET, FILM COATED, EXTENDED RELEASE ORAL ONCE
Status: COMPLETED | OUTPATIENT
Start: 2024-03-26 | End: 2024-03-26

## 2024-03-26 RX ADMIN — DEXTROSE AND SODIUM CHLORIDE 150 ML/HR: 5; 450 INJECTION, SOLUTION INTRAVENOUS at 09:00

## 2024-03-26 RX ADMIN — PIPERACILLIN SODIUM AND TAZOBACTAM SODIUM 3.38 G: 3; .375 INJECTION, SOLUTION INTRAVENOUS at 13:38

## 2024-03-26 RX ADMIN — APIXABAN 5 MG: 5 TABLET, FILM COATED ORAL at 08:40

## 2024-03-26 RX ADMIN — APIXABAN 5 MG: 5 TABLET, FILM COATED ORAL at 21:34

## 2024-03-26 RX ADMIN — INSULIN HUMAN 20 UNITS: 100 INJECTION, SUSPENSION SUBCUTANEOUS at 21:33

## 2024-03-26 RX ADMIN — SPIRONOLACTONE 25 MG: 25 TABLET ORAL at 08:44

## 2024-03-26 RX ADMIN — BUPROPION HYDROCHLORIDE 150 MG: 150 TABLET, EXTENDED RELEASE ORAL at 08:41

## 2024-03-26 RX ADMIN — POTASSIUM CHLORIDE 20 MEQ: 750 TABLET, FILM COATED, EXTENDED RELEASE ORAL at 11:30

## 2024-03-26 RX ADMIN — ASPIRIN 81 MG: 81 TABLET, COATED ORAL at 18:52

## 2024-03-26 RX ADMIN — GABAPENTIN 600 MG: 600 TABLET, FILM COATED ORAL at 21:34

## 2024-03-26 RX ADMIN — HYDROPHOR: 42 OINTMENT TOPICAL at 21:32

## 2024-03-26 RX ADMIN — INSULIN LISPRO 6 UNITS: 100 INJECTION, SOLUTION INTRAVENOUS; SUBCUTANEOUS at 16:53

## 2024-03-26 RX ADMIN — PIPERACILLIN SODIUM AND TAZOBACTAM SODIUM 3.38 G: 3; .375 INJECTION, SOLUTION INTRAVENOUS at 18:54

## 2024-03-26 RX ADMIN — PIPERACILLIN SODIUM AND TAZOBACTAM SODIUM 3.38 G: 3; .375 INJECTION, SOLUTION INTRAVENOUS at 00:56

## 2024-03-26 RX ADMIN — TRAZODONE HYDROCHLORIDE 50 MG: 50 TABLET ORAL at 21:34

## 2024-03-26 RX ADMIN — AMIODARONE HYDROCHLORIDE 200 MG: 200 TABLET ORAL at 08:40

## 2024-03-26 RX ADMIN — POTASSIUM PHOSPHATE, MONOBASIC AND POTASSIUM PHOSPHATE, DIBASIC 15 MMOL: 224; 236 INJECTION, SOLUTION, CONCENTRATE INTRAVENOUS at 13:38

## 2024-03-26 RX ADMIN — INSULIN LISPRO 2 UNITS: 100 INJECTION, SOLUTION INTRAVENOUS; SUBCUTANEOUS at 13:30

## 2024-03-26 RX ADMIN — INSULIN HUMAN 20 UNITS: 100 INJECTION, SUSPENSION SUBCUTANEOUS at 10:53

## 2024-03-26 RX ADMIN — NYSTATIN 1 APPLICATION: 100000 POWDER TOPICAL at 21:32

## 2024-03-26 RX ADMIN — METOPROLOL TARTRATE 25 MG: 25 TABLET, FILM COATED ORAL at 21:34

## 2024-03-26 RX ADMIN — DESVENLAFAXINE SUCCINATE 100 MG: 100 TABLET, FILM COATED, EXTENDED RELEASE ORAL at 08:41

## 2024-03-26 RX ADMIN — LEVOTHYROXINE SODIUM 175 MCG: 125 TABLET ORAL at 08:40

## 2024-03-26 RX ADMIN — PIPERACILLIN SODIUM AND TAZOBACTAM SODIUM 3.38 G: 3; .375 INJECTION, SOLUTION INTRAVENOUS at 07:24

## 2024-03-26 ASSESSMENT — COGNITIVE AND FUNCTIONAL STATUS - GENERAL
CLIMB 3 TO 5 STEPS WITH RAILING: TOTAL
MOVING TO AND FROM BED TO CHAIR: TOTAL
STANDING UP FROM CHAIR USING ARMS: TOTAL
MOBILITY SCORE: 8
TOILETING: TOTAL
TURNING FROM BACK TO SIDE WHILE IN FLAT BAD: A LOT
PERSONAL GROOMING: A LITTLE
WALKING IN HOSPITAL ROOM: TOTAL
DAILY ACTIVITIY SCORE: 12
EATING MEALS: A LITTLE
HELP NEEDED FOR BATHING: A LOT
DRESSING REGULAR UPPER BODY CLOTHING: A LOT
MOVING FROM LYING ON BACK TO SITTING ON SIDE OF FLAT BED WITH BEDRAILS: A LOT
DRESSING REGULAR LOWER BODY CLOTHING: TOTAL

## 2024-03-26 ASSESSMENT — PAIN - FUNCTIONAL ASSESSMENT
PAIN_FUNCTIONAL_ASSESSMENT: 0-10

## 2024-03-26 ASSESSMENT — ACTIVITIES OF DAILY LIVING (ADL)
ADL_ASSISTANCE: INDEPENDENT
BATHING_ASSISTANCE: MAXIMAL

## 2024-03-26 ASSESSMENT — PAIN SCALES - GENERAL
PAINLEVEL_OUTOF10: 0 - NO PAIN

## 2024-03-26 NOTE — PROGRESS NOTES
Physical Therapy    Physical Therapy Evaluation    Patient Name: Teresa Matthews  MRN: [id number]  Today's Date: 3/26/2024   Time Calculation  Start Time: 0945  Stop Time: 1003  Time Calculation (min): 18 min    Assessment/Plan   PT Assessment  PT Assessment Results: Decreased strength, Decreased endurance, Impaired balance, Decreased mobility, Decreased cognition, Pain, Obesity  Rehab Prognosis: Good  Barriers to Discharge: none  Evaluation/Treatment Tolerance: Patient limited by fatigue, Patient limited by pain  Medical Staff Made Aware: Yes  End of Session Communication: Bedside nurse  Assessment Comment: Patient requires heavy assist x 2 for transfers and assist for static sitting balance. Patient would benefit from continued therapy at discharge.  End of Session Patient Position: Bed, 3 rail up, Alarm off, not on at start of session (EMELI wrist restraints on)  IP OR SWING BED PT PLAN  Inpatient or Swing Bed: Inpatient  PT Plan  Treatment/Interventions: Bed mobility, Transfer training, Balance training, Endurance training, Strengthening, Therapeutic exercise, Therapeutic activity  PT Plan: Skilled PT  PT Frequency: 3 times per week  PT Discharge Recommendations: Moderate intensity level of continued care  PT - OK to Discharge: Yes (when medically cleared)      General Visit Information:  General  Reason for Referral: Dx: slurred speech, DKA, sepsis, UTI  Referred By: Frederick  Past Medical History Relevant to Rehab: DM, A fib, CHF, L TKA, COPD, CKD, morbid obesity  Co-Treatment:  (with OT for safety and mobility)  Prior to Session Communication: Bedside nurse  Patient Position Received: Bed, 3 rail up, Alarm off, not on at start of session  General Comment: Seen in room 1014, tele, O2 on 2L/min, external Kauf[place], IV, EMELI wrist restraints    Home Living:  Home Living  Type of Home:  (Patient states she lives alone in apt, transfers self to/from W/C without AD, famly assists with groceries. RN states sister was  "present yesterday and confirmed this info.)    Prior Level of Function:       Precautions:  Precautions  Precautions Comment: falls, O2    Vital Signs:     Objective     Pain:  Pain Assessment  Pain Assessment:  (answered yes to having pain, however could not eleaborate/specify or rate)    Cognition:  Cognition  Overall Cognitive Status:  (Oriented to self only; was able to state \"yes\" to hospital? when given choices)  Processing Speed:  (Noted mild delayed processing and some expressive difficulties, occasional word salad when trying to answer, or unable to coome up with word/answer at all)    General Assessments:  General Observation  General Observation: [place] EOB 3-4 mins with Mod to Max assist for balance.   Activity Tolerance  Endurance: Tolerates less than 10 min exercise, no significant change in vital signs     Strength  Strength Comments: generalized weakness throughout; EMELI LE quads grossly 2+/5        Postural Control  Postural Control:  (sitting balance poor with Mod to Max assist needed to maintain static balance due to heavy lean to R side)          Functional Assessments:     Bed Mobility  Bed Mobility:  (Supine to/from sit with Max assist x 2; cues for sequencing, safety, balance, posture, wt shifting)  Transfers  Transfer:  (not safe/appropriate to stand yet)                        Outcome Measures:  [facility] Basic Mobility  Turning from your back to your side while in a flat bed without using bedrails: A lot  Moving from lying on your back to sitting on the side of a flat bed without using bedrails: A lot  Moving to and from bed to chair (including a wheelchair): Total  Standing up from a chair using your arms (e.g. wheelchair or bedside chair): Total  To walk in hospital room: Total  Climbing 3-5 steps with railing: Total  Basic Mobility - Total Score: 8                            Goals:  Encounter Problems       Encounter Problems (Active)       PT Problem       transfers       Start:  03/26/24   "  Expected End:  04/09/24       Patient will perform all transfers with Min assist x 2          balance        Start:  03/26/24    Expected End:  04/09/24       Patient will demonstrate >/= fair/fair+ static sitting balance to prepare for OOB activity and increase safety          strengthening        Start:  03/26/24    Expected End:  04/09/24       Patient will perform 20+ reps of AROM/AAROM for EMELI LE's to improve safety and functional independence                Education Documentation  Precautions, taught by Sveta Jerry, PT at 3/26/2024 10:47 AM.  Learner: Patient  Readiness: Acceptance  Method: Explanation  Response: Needs Reinforcement    Mobility Training, taught by Sveta Jerry, PT at 3/26/2024 10:47 AM.  Learner: Patient  Readiness: Acceptance  Method: Explanation  Response: Needs Reinforcement    Education Comments  No comments found.

## 2024-03-26 NOTE — CONSULTS
Wound Care Consult     Visit Date: 3/26/2024      Patient Name: Teresa Matthews         MRN: [id number]           YOB: 1950      Pertinent Labs:   Albumin   Date Value Ref Range Status   03/25/2024 3.3 (L) 3.4 - 5.0 g/dL Final     ALBUMIN (MG/L) IN URINE   Date Value Ref Range Status   05/12/2023 131.3 Not Established mg/L Final     Albumin, Urine Random   Date Value Ref Range Status   11/13/2023 101.2 Not established mg/L Final       Wound Assessment:  Wound 01/26/24 Venous Ulcer Tibial Dorsal;Left (Active)   Wound Image   03/26/24 1451   Site Assessment Pink;Red;Tan 03/26/24 1451   Leatha-Wound Assessment Dry 03/26/24 1451   Non-staged Wound Description Partial thickness 03/26/24 1451   Shape irregular 03/26/24 1451   Wound Length (cm) 11 cm 03/26/24 1451   Wound Width (cm) 6 cm 03/26/24 1451   Wound Surface Area (cm^2) 66 cm^2 03/26/24 1451   Wound Depth (cm) 0.2 cm 03/26/24 1451   Wound Volume (cm^3) 13.2 cm^3 03/26/24 1451   Margins Poorly defined 03/26/24 1451   Drainage Description Serosanguineous 03/26/24 1451   Drainage Amount Small 03/26/24 1451   Dressing Xeroform;Dry dressing 03/26/24 1451   Dressing Changed New 03/26/24 1451   Dressing Status Clean;Dry;Occlusive 03/26/24 1451       Wound 03/25/24 Other (comment) Sacrum Medial (Active)   Wound Image   03/26/24 1458   Site Assessment White;Maceration 03/26/24 1458   Leatha-Wound Assessment Blanchable erythema 03/26/24 1458   Non-staged Wound Description Partial thickness 03/26/24 1458   Wound Length (cm) 0.9 cm 03/26/24 1458   Wound Width (cm) 0.4 cm 03/26/24 1458   Wound Surface Area (cm^2) 0.36 cm^2 03/26/24 1458   Margins Undefined edges 03/26/24 1458   Drainage Description None 03/26/24 1458   Drainage Amount None 03/26/24 1458   Dressing Foam 03/26/24 1458   Dressing Changed Changed 03/26/24 1458   Dressing Status Clean;Dry;Occlusive 03/26/24 1458       Wound 03/25/24 Pressure Injury Buttocks Right (Active)   Wound Image   03/26/24  1458   Site Assessment Pink;Red;Denuded 03/26/24 1458   Leatha-Wound Assessment Non-blanchable erythema 03/26/24 1458   Pressure Injury Stage 2 03/26/24 1458   Shape oval 03/26/24 1458   Wound Length (cm) 3.5 cm 03/26/24 1458   Wound Width (cm) 1.5 cm 03/26/24 1458   Wound Surface Area (cm^2) 5.25 cm^2 03/26/24 1458   Wound Depth (cm) 0.1 cm 03/26/24 1458   Wound Volume (cm^3) 0.525 cm^3 03/26/24 1458   Margins Well-defined edges 03/26/24 1458   Drainage Description None 03/26/24 1458   Drainage Amount None 03/26/24 1458   Dressing Foam 03/26/24 1458   Dressing Changed Changed 03/26/24 1458   Dressing Status Clean;Dry;Occlusive 03/26/24 1458       Wound 03/25/24 Diabetic Ulcer Foot Left;Plantar (Active)   Wound Image   03/26/24 1448   Site Assessment Dry;[place] 03/26/24 1448   Leatha-Wound Assessment Dry;Calloused 03/26/24 1448   Non-staged Wound Description Full thickness 03/26/24 1448   Shape irregular 03/26/24 1448   Wound Length (cm) 3 cm 03/26/24 1448   Wound Width (cm) 1.1 cm 03/26/24 1448   Wound Surface Area (cm^2) 3.3 cm^2 03/26/24 1448   Margins Poorly defined 03/26/24 1448   Drainage Description None 03/26/24 1448   Drainage Amount None 03/26/24 1448   Dressing Foam 03/26/24 1448   Dressing Changed Changed 03/26/24 1448   Dressing Status Clean;Dry;Occlusive 03/26/24 1448     Patient seen for multiple wounds (present on admission) complicated by PMH: type 2 diabetes poorly controlled on Humalog, A-fib on Eliquis, CHF, left total knee arthroplasty complicated by E. coli infection and MRSA infection, COPD, CKD stage III per chat. Exam conducted with CNP to assist with positioning. Patient alert, unable to provide information regarding wounds however she does state that she had had them “awhile”. Stasis derm noted to BLE, and MASD to skin folds- CNP previously placed orders for nystatin and aquaphor, also ordered a wound culture and obtained it. Patient has had history of cellulitis to left lower leg per chart  review. Patient has been seen in past at wound center in 2023 for diabetic wound to plantar foot, wound appears to be stable, dry at this time. Skin hygiene and dressing care provided. See detailed assessment above from flowsheet. Recommendations below, reviewed with Dr. Bello.     Wound location: Sacrum, right buttock   Treatment protocol recommended:  Cleanse with NSS and pat dry. Apply mepilex foam every 3 days/prn.   Continue to off load, turning at least every 2 hours. Offload heels.    Wound location: Left heel   Treatment protocol recommended:  Cover with adaptic and mepilex foam every 3 days/prn.  Continue to off load, turning at least every 2 hours. Offload heels.    Wound location: Left posterior leg    Treatment protocol recommended:  1. Cleanse with NSS and pat dry.  2. Apply xeroform, and cover with clean dry dressing every other day/prn.  3. Continue to off load, turning at least every 2 hours. Offload heels      Therapeutic surface: Patient on Progressa AIR (ICU bed) during exam with turn and reposition system in place. Patient positioned onto left side after exam. Preventative foam placed to right heel. Staff to continue to turn/reposition patient atleast every 2 hours.     Nursing updated, continue pressure injury preventions, nursing to follow provider orders and re-consult wound RN if needed.    See above recommendations for treatment. Patient will likely continue to require skilled assistance with skin hygiene and wound care upon discharge.    Please contact me with questions or changes in patient condition.  Connie Durham RN  [facility]   [phone]

## 2024-03-26 NOTE — PROGRESS NOTES
Teresa Matthews is a 73 y.o. female on day 1 of admission presenting with Delirium.      Subjective   73 y.o. female with a past medical history of type 2 diabetes poorly controlled on Humalog, A-fib on Eliquis, CHF, left total knee arthroplasty complicated by E. coli infection and MRSA infection, COPD, CKD stage III presented to ED today with confusion.  Patient is a poor historian, history supplemented by ED records.  EMS report the patient has slurred speech and left-sided facial droop and right-sided drift on exam.  No last known well.     ED course: Stroke alert was called on arrival.  NIH stroke score 3 for dysarthria and facial droop and answering wrong month.  CT head shows no acute abnormalities, patient is on Eliquis and therefore not a candidate for TNK.  Glucose 609, bicarb 15, pH 7.26 with gap of 24.  Potassium 4.6.  Patient suspected to be in DKA and started on insulin drip.  UA shows WBCs and positive leukocyte esterase with suspected UTI, started on vancomycin and Zosyn.  EKG shows sinus rhythm rate 92 with no ST changes.  Nurse found sacral and extremity wounds and dressed them.      3/26:                Today patient seen in the ICU in the presence of family member.  She is awake alert and interactive appropriately but appears weak and ill.  Voices no specific complaints and no acute events overnight.  Echocardiogram is suggesting mitral valve endocarditis.  Blood cultures 2 out of 2 sets growing group A strep.  Continues on Zosyn at this time.  Anion gap has closed and transition to basal and bolus insulin.  Significant temperature elevation of 104 at 7 PM last evening.  Has remained afebrile through the day today.  Otherwise denies interval new symptoms or complaints including chest pain palpitations pleuritic type pain worsening shortness of breath cough sputum nausea abdominal pain flank pain diarrhea fever or chills.       Objective     Last Recorded Vitals  /68   Pulse 88   Temp  37.4 °C (99.3 °F)   Resp 20   Wt 113 kg (250 lb)   SpO2 96%   Intake/Output last 3 Shifts:    Intake/Output Summary (Last 24 hours) at 3/26/2024 1912  Last data filed at 3/26/2024 1152  Gross per 24 hour   Intake 4290.69 ml   Output 600 ml   Net 3690.69 ml       Admission Weight  Weight: 113 kg (250 lb) (03/25/24 1149)    Daily Weight  03/25/24 : 113 kg (250 lb)    Image Results  ECG 12 lead  Sinus rhythm  Atrial premature complex  Nonspecific intraventricular conduction delay  Borderline low voltage, extremity leads  Nonspecific repol abnormality, lateral leads  Minimal ST elevation, anterior leads    See ED provider note for full interpretation and clinical correlation  Confirmed by Rachael Coreas ([id number]) on 3/26/2024 5:21:24 PM  Transthoracic Echo (TTE) [place] [address]       Phone [phone] Fax [phone]    TRANSTHORACIC ECHOCARDIOGRAM REPORT       Patient Name:      REYES Soto Physician:    [id number] Hai RAMESH MD  Study Date:        3/26/2024           Ordering Provider:    [id number] GONCALVES  MRN/PID:           [id number]            Fellow:  Accession#:        [id number]        Nurse:  Date of Birth/Age: 1950 / 73     Sonographer:          Brittnee mcfarland RDCS  Gender:            F                   Additional Staff:  Height:            182.88 cm           Admit Date:           3/25/2024  Weight:            113.40 kg           Admission Status:     Inpatient - Routine  BSA / BMI:         2.34 m2 / 33.91     Department Location:  [facility]                     kg/m2  Blood Pressure: 114 /63 mmHg    Study Type:    TRANSTHORACIC ECHO (TTE) LIMITED  Diagnosis/ICD: Bacteremia-R78.81  Indication:    BACTEREMIA  CPT Codes:     Echo Limited-41362    Patient History:  Pertinent History: CHF,  A-FIB, HTN.    Study Detail: The following Echo studies were performed: 2D, Doppler and color                flow.       Critical Event  Critical Event: Test was completed as per department protocol.  Critical Finding: Possible veg on MV.  Time Test was Completed: 2:35:00 PM  Notified: Dr. Gerber.  Attending notification time: 2:45:37 PM     PHYSICIAN INTERPRETATION:  Left Ventricle: Left ventricular systolic function was not assessed. There are no regional wall motion abnormalities. The left ventricular cavity size was not assessed. Left ventricular diastolic filling was not assessed.  Left Atrium: The left atrium was not assessed.  Right Ventricle: The right ventricle was not assessed. Right ventricular systolic function not assessed.  Right Atrium: The right atrium was not assessed.  Aortic Valve: There is no visualized aortic valve vegetation. The aortic valve was not assessed. Aortic valve regurgitation was not assessed.  Mitral Valve: The mitral valve was not assessed. Mitral valve regurgitation was not assessed. Possible veg on anterior leaflet of MV.  Tricuspid Valve: No vegetation is seen on the tricuspid valve. The tricuspid valve was not assessed. Tricuspid regurgitation was not assessed.  Pulmonic Valve: No pulmonic valve vegetation visualized. The pulmonic valve was not assessed. The pulmonic valve regurgitation was not assessed.  Pericardium: Pericardial effusion was not assessed.  Aorta: The aortic root was not assessed.       CONCLUSIONS:   1. Left ventricular systolic function was not assessed.   2. Possible veg on anterior leaflet of MV.   3. No tricuspid valve vegetation.   4. No aortic valve vegetation visualized.   5. No pulmonic valve vegetation visualized.    QUANTITATIVE DATA SUMMARY:  2D MEASUREMENTS:               Normal Ranges:  LAs: 2.10 cm (2.7-4.0cm)    TRICUSPID VALVE/RVSP:                              Normal Ranges:  Peak TR Velocity: 2.51 m/s  RV Syst Pressure: 28.2 mmHg (<  30mmHg)       [id number] Hai Gerber MD  Electronically signed on 3/26/2024 at 4:16:23 PM       ** Final **      Physical Exam  Gen: Obese elderly  female , no acute distress but does appear ill but nontoxic  HEENT: Normocephalic, atraumatic, good dentition, no oral lesions appreciated.  Sclera nonicteric  Neck: No cervical lymphadenopathy appreciated, no thyroid enlargement appreciated  CV: Regular rate and rhythm, S1 and S2 present, no murmurs rubs or gallops appreciated  Resp: Lungs clear to auscultation bilaterally, no ronchi, rales or wheezes appreciated  Abdomen: soft, nontender, nondistended  MSK: No joint swelling appreciated  Psych: Mildly flat mood and affect     Relevant Results               Assessment/Plan   This patient currently has cardiac telemetry ordered; if you would like to modify or discontinue the telemetry order, click here to go to the orders activity to modify/discontinue the order.              Principal Problem:    Delirium  Active Problems:    Atrial fibrillation (CMS/HCC)    Chronic pain    GERD (gastroesophageal reflux disease)    Hypothyroidism, postsurgical    Complicated UTI (urinary tract infection)    Depression    Diabetic ketoacidosis without coma associated with type 2 diabetes mellitus (CMS/HCC)    Sacral wound    Sepsis without acute organ dysfunction (CMS/HCC)    Hypomagnesemia    Diabetic ketoacidosis without coma associated with type 2 diabetes mellitus (CMS/HCC)  Assessment & Plan   on arrival with open anion gap  Started on insulin drip in ED, will continue   Recheck BMP shows AG still open, will continue drip until AG closes  Serial labs every 4 hours while on drip and until gap closes  NPO for now, will add IVF   Patient reports compliance with regimen, outpatient notes indicate she stopped metformin last week d/t diarrhea  Last A1c 15.4%  Crit care following, plan to discontinue drip and switch to insulin subcutaneous when bicarb >15 and AG<15  3/26: DKA  resolved and now on subcu insulin.  Awaiting endocrinology evaluation.  Likely triggered by sepsis.     Sepsis without acute organ dysfunction (CMS/[facility])  Assessment & Plan  Patient meets sepsis criteria on arrival with high fever, leukocytosis, tachycardia, and suspected source of infection  S/p IVF, vancomycin and zosyn in ED  Will continue zosyn for now and fluids  Blood and urine cultures pending  Uptrending lactate, will continue IVF and monitor  3/26: Lactic trending down.  Likely related to group A strep bacteremia.  Also appears likely endocarditis.     Complicated UTI (urinary tract infection)  Assessment & Plan  UA shows moderate LE  Suspect trigger for DKA with AMS  S/p vancomycin and zosyn in ED. Will continue both vancomycin and zosyn for now  Blood and urine cultures pending, plan to de-escalate antibiotics  3/26: Urine culture growing multiple organisms.     * Delirium  Assessment & Plan  Per EMS patient had a left facial droop and a right arm drift. ED notes left facial droop with slurred speech which EMS states is new.   Stroke alert was activated in ED, but she is not a candidate for TNK given she is on Eliquis. There is no large vessel seen that needs to be intervened.   NIH score 3 for dysarthria, facial droop and answering wrong time, repeat 2.  Suspect AMS secondary to DKA and UTI  Will treat underlying conditions and monitor for improvement.   Also concern for polypharmacy as patient is on narcotics, gabapentin, SSRIs, thyroid meds. If not improving then will evaluate for medication overuse/noncompliance  3/26: Appears significantly improved cognitive status likely related to sepsis.     Hypomagnesemia  Assessment & Plan  Mg 1.41, 1.36  Repleting IV, will monitor. Goal Mg>2.0     Sacral wound  Assessment & Plan  Hx of infected wounds and OM  Wound care nurse consult  Concern for patient self-care at home      Depression  Assessment & Plan  Continue home Bupropion, Desvenlafaxine, Trazodone      Hypothyroidism, postsurgical  Assessment & Plan  Continue home levothyroxine  Unclear if patient taking 175 or 225mcg  TSH pending, but may be skewed by sepsis and acute illness     GERD (gastroesophageal reflux disease)  Assessment & Plan  Hold home famotidine  Home omeprazole substituted with pantoprazole as per formulary     Chronic pain  Assessment & Plan  Continue home percocet, gabapentin. OARRS reviewed and appropriate.     Atrial fibrillation (CMS/[facility])  Assessment & Plan  Continue home amiodarone, apixaban, metoprolol                       Abner Bello MD

## 2024-03-26 NOTE — PROGRESS NOTES
Occupational Therapy    Evaluation    Patient Name: Teresa Matthews  MRN: [id number]  Today's Date: 3/26/2024  Time Calculation  Start Time: 0942  Stop Time: 1004  Time Calculation (min): 22 min    Assessment  IP OT Assessment  OT Assessment: OT eval completed. The patient is functioning below baseline for ADLs and mobility. can benefit from continued OT  Barriers to Discharge: Decreased caregiver support  End of Session Communication: Bedside nurse  End of Session Patient Position: Bed, 3 rail up, Alarm off, not on at start of session    Plan:  Treatment Interventions: ADL retraining, Functional transfer training, UE strengthening/ROM, Endurance training, Cognitive reorientation, Equipment evaluation/education, Neuromuscular reeducation, Compensatory technique education, Patient/family training  OT Frequency: 3 times per week  OT Discharge Recommendations: Moderate intensity level of continued care  OT - OK to Discharge: Yes To next level of care, with consideration of recommendations established on OT eval, and once cleared by medical team/physician for DC.     Subjective     Current Problem:  1. Delirium        2. Lower urinary tract infectious disease        3. Diabetic ketoacidosis without coma associated with other specified diabetes mellitus (CMS/HCC)            General:  General  Reason for Referral: ADLs, safety assessment  Referred By: Frederick  Past Medical History Relevant to Rehab: DM, A fib, CHF, L TKA, COPD, CKD, morbid obesity  Co-Treatment: PT  Co-Treatment Reason: to optimize safety and mobility, while focusing on discipline specific goals  Prior to Session Communication: Bedside nurse  Patient Position Received: Bed, 3 rail up, Alarm off, not on at start of session  General Comment: pt alert and agreeable to therapy    Precautions:  Medical Precautions: Fall precautions, Oxygen therapy device and L/min  Precautions Comment: bilateral soft wrist restraints        Pain:  Pain Assessment  Pain  "Assessment: 0-10  Pain Score:  (no rating given. anwswered \"yes\" to pain but could not give details or localize pain)    Objective     Cognition:  Overall Cognitive Status: Impaired  Arousal/Alertness: Delayed responses to stimuli  Orientation Level:  (oriented to self only. wiht multile cues and coaching pt able to answer general place \"hopsital\". not oriented to time or situation)  Processing Speed: Delayed             Home Living:  Type of Home: Apartment  Lives With: Alone  Home Adaptive Equipment: Walker rolling or standard, Wheelchair-manual  Home Layout: One level  Home Access: Level entry (first floor apartment)  Home Living Comments: information obtained from patient, unsure of accuracy of report     Prior Function:  ADL Assistance: Independent  Homemaking Assistance: Needs assistance  Ambulatory Assistance:  (non ambulatory at baseline. semi stand pivots to a WC without assistance)        ADL:  Eating Assistance: Minimal (anticipated)  Grooming Assistance: Minimal  Grooming Deficit: Wash/dry face  Bathing Assistance: Maximal (anticipated)  UE Dressing Assistance: Maximal (anticipated)  LE Dressing Assistance: Total (anticipated)  Toileting Assistance with Device: Total (anticipated)    Activity Tolerance:  Endurance: Decreased tolerance for upright activites    Bed Mobility/Transfers:   Bed Mobility  Bed Mobility: Yes  Bed Mobility 1  Bed Mobility 1: Supine to sitting, Sitting to supine  Level of Assistance 1: Maximum assistance (x2)  Bed Mobility Comments 1: tolerated 3-4 sitting eob with mod/max for balance. easily fatigued  Transfers  Transfer:  (not assessed. pt tolerated only sitting eob today)    Ambulation/Gait Training:  Functional Mobility  Functional Mobility Performed:  (pt unable to progress towards functional mobility. tolerated EOB only)    Sitting Balance:  Static Sitting Balance  Static Sitting-Level of Assistance: Minimum assistance, Moderate assistance  Dynamic Sitting Balance  Dynamic " Sitting-Balance: Reaching for objects, Head control activities (Comment), Trunk control activities  Dynamic Sitting-Comments: mod to max A      Vision: Vision - Basic Assessment  Current Vision: Wears glasses all the time   and      Sensation:  Light Touch: No apparent deficits    Strength:  Strength Comments: BUE grossly 3+/5 to 4-/5    Perception:  Inattention/Neglect: Appears intact    Coordination:  Movements are Fluid and Coordinated: Yes     Hand Function:  Hand Function  Gross Grasp: Functional  Coordination: Functional    Extremities: RUE   RUE : Within Functional Limits and LUE   LUE: Within Functional Limits    Outcome Measures: [facility] Daily Activity  Putting on and taking off regular lower body clothing: Total  Bathing (including washing, rinsing, drying): A lot  Putting on and taking off regular upper body clothing: A lot  Toileting, which includes using toilet, bedpan or urinal: Total  Taking care of personal grooming such as brushing teeth: A little  Eating Meals: A little  Daily Activity - Total Score: 12                    EDUCATION:     Education Documentation  ADL Training, taught by Yoon Landin OT at 3/26/2024 11:04 AM.  Learner: Patient  Readiness: Acceptance  Method: Explanation  Response: Needs Reinforcement    Education Comments  No comments found.        Goals:   Encounter Problems       Encounter Problems (Active)       ADLs       Patient with complete upper body dressing with stand by assist level of assistance donning and doffing all UE clothes with PRN adaptive equipment while supported sitting (Progressing)       Start:  03/26/24    Expected End:  04/09/24            Patient will complete daily grooming tasks brushing teeth and washing face/hair with stand by assist level of assistance and PRN adaptive equipment while supported sitting. (Progressing)       Start:  03/26/24    Expected End:  04/09/24               EXERCISE/STRENGTHENING       Patient with increase BUE to >4/5 MMT  strength. (Progressing)       Start:  03/26/24    Expected End:  04/09/24               TRANSFERS       Patient will complete functional transfers bed level to out of bed as able with least restrictive device with minimal assist  level of assistance. (Progressing)       Start:  03/26/24    Expected End:  04/09/24

## 2024-03-26 NOTE — CONSULTS
Consults      Primary MD: Monica Macario MD    Reason For Consult  Antibiotic management    History Of Present Illness  Teresa Matthews is a 73 y.o. female with a past medical history of type 2 diabetes poorly controlled on Humalog, A-fib on Eliquis, CHF, left total knee arthroplasty complicated by E. coli infection and MRSA infection, COPD, CKD stage III presented to ED today with confusion.  Patient is a poor historian, history supplemented by ED records.  EMS report the patient has slurred speech and left-sided facial droop and right-sided drift on exam.  No last known well.     ED course: Stroke alert was called on arrival.  NIH stroke score 3 for dysarthria and facial droop and answering wrong month.  CT head shows no acute abnormalities, patient is on Eliquis and therefore not a candidate for TNK.  Glucose 609, bicarb 15, pH 7.26 with gap of 24.  Potassium 4.6.  Patient suspected to be in DKA and started on insulin drip.  UA shows WBCs and positive leukocyte esterase with suspected UTI, started on vancomycin and Zosyn.  EKG shows sinus rhythm rate 92 with no ST changes.  Nurse found sacral and extremity wounds and dressed them.        Past Medical History  She has a past medical history of Abnormal weight gain (04/10/2023), Acute kidney injury (CMS/[facility]) (03/25/2024), Acute upper respiratory infection (03/25/2024), Allergic rhinitis (04/10/2023), Arthritis due to other bacteria, unspecified knee (CMS/[facility]) (03/03/2022), Bilateral tinnitus (07/01/2020), Body mass index (BMI) 31.0-31.9, adult (06/30/2020), Body mass index (BMI) 32.0-32.9, adult (01/04/2021), Body mass index (BMI) 33.0-33.9, adult (01/04/2021), Body mass index (BMI) 35.0-35.9, adult (04/09/2020), Body mass index (BMI)40.0-44.9, adult (11/14/2019), Bronchitis due to COVID-19 virus (04/10/2023), Chronic insomnia (04/10/2023), Depression, major, in remission (CMS/[facility]) (04/10/2023), Distal radius fracture, left (05/19/2023), Dry skin  (01/17/2023), Essential (primary) hypertension (09/06/2022), H/O bariatric surgery (04/10/2023), History of heart failure (03/25/2024), History of type 2 diabetes mellitus (03/25/2024), 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), Hypoxia (04/10/2023), Infective arthritis (CMS/[facility]) (03/25/2024), Kidney stone on right side (04/10/2023), Laceration of multiple sites (03/25/2024), Left toe amputee (CMS/[facility]) (04/10/2023), Malaise and fatigue (04/10/2023), Nasal septum ulceration (04/10/2023), Nontoxic multinodular goiter (07/02/2021), Personal history of other diseases of the circulatory system, Personal history of other diseases of the musculoskeletal system and connective tissue, Personal history of other diseases of the nervous system and sense organs (10/11/2022), Personal history of other diseases of the respiratory system, Personal history of other diseases of urinary system (02/19/2020), Personal history of other endocrine, nutritional and metabolic disease (07/02/2021), Personal history of other endocrine, nutritional and metabolic disease (04/24/2017), Prosthetic joint infection (CMS/[facility]) (04/10/2023), Recurrent major depressive disorder, in remission (CMS/[facility]) (04/10/2023), Ruptured aneurysm of thoracic aorta, unspecified part (CMS/[facility]) (04/10/2023), Status post gastric bypass for obesity (04/10/2023), and 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    Surgical History  She has a past surgical history that includes Hysterectomy (01/20/2016); Tonsillectomy (01/20/2016); Total knee arthroplasty (05/28/2021); Other surgical history (01/07/2022); Other surgical history (05/28/2021); and Tubal ligation (04/24/2017).     Social History     Occupational History    Not on file   Tobacco Use    Smoking status: Never    Smokeless tobacco: Never   Vaping Use    Vaping Use: Never used   Substance and Sexual Activity    Alcohol use: Never    Drug use: Never    Sexual activity: Defer     Travel History   Travel since 02/26/24     No documented travel since 02/26/24         Family History  Family History   Problem Relation Name Age of Onset    Coronary artery disease Mother      Liver disease Mother      Other (non-hodgkins lymphoma) Mother      Stroke Father      Deafness Father      Hypertension Father       Allergies  Cephalexin, Metformin, Other, Statins-hmg-coa reductase inhibitors, and Adhesive tape-silicones     Immunization History   Administered Date(s) Administered    Flu vaccine (IIV4), preservative free *Check age/dose* 11/04/2016, 10/18/2017, 10/29/2019, 09/28/2020    Flu vaccine, quadrivalent, high-dose, preservative free, age 65y+ (FLUZONE) 11/13/2023    Influenza, Unspecified 09/30/2022    Influenza, injectable, quadrivalent 10/02/2019, 10/01/2021    Influenza, seasonal, injectable 09/19/2011, 10/01/2021    Influenza, seasonal, injectable, preservative free 09/17/2015    Dio SARS-CoV-2 Vaccination 02/13/2021, 03/06/2021, 10/25/2021    Pfizer Purple Cap SARS-CoV-2 10/25/2021    Pneumococcal conjugate vaccine, 13-valent (PREVNAR 13) 09/17/2015, 05/31/2018    Pneumococcal conjugate vaccine, 20-valent (PREVNAR 20) 05/12/2023    Pneumococcal polysaccharide vaccine, 23-valent, age 2 years and older (PNEUMOVAX 23) 11/04/2016, 05/31/2018    Tdap vaccine, age 7 year and older (BOOSTRIX, ADACEL) 07/17/2018    Zoster vaccine, recombinant, adult (SHINGRIX) 04/25/2019, 08/02/2019, 10/29/2019     Medications  Home medications:  Medications Prior to Admission   Medication Sig Dispense Refill Last Dose    amiodarone (Pacerone) 200 mg tablet Take 1 tablet (200 mg) by mouth once daily. 90 tablet 3     apixaban (Eliquis) 5 mg tablet Take 1 tablet (5 mg) by mouth 2 times a day. 180 tablet 3     ascorbic acid, vitamin C, 500 mg capsule Take 1 capsule by mouth once daily.       aspirin 81 mg EC tablet Take 1 tablet (81 mg) by mouth once every day.       BIOTIN ORAL Take 1 capsule by mouth once daily.       buPROPion XL (Wellbutrin XL) 150 mg  24 hr tablet Take 1 tablet (150 mg) by mouth once daily in the morning. Do not crush, chew, or split. 30 tablet 0     calcium citrate-vitamin D3 200 mg-6.25 mcg (250 unit) tablet Take 1 tablet by mouth once daily.       cholecalciferol (Vitamin D-3) 1,250 mcg (50,000 unit) capsule Take 1 capsule (50,000 Units) by mouth 1 (one) time per week.       clotrimazole-betamethasone (Lotrisone) cream Apply 1 Application topically 2 times a day as needed. APPLY  AND RUB  IN A THIN FILM TO AFFECTED AREAS TWICE DAILY.(AM AND PM).       desvenlafaxine (Pristiq) 100 mg 24 hr tablet Take 1 tablet (100 mg) by mouth once daily. Do not crush, chew, or split. Instead of venlafaxine. 90 tablet 3     famotidine (Pepcid) 40 mg tablet Take 1 tablet (40 mg) by mouth once daily. 30 tablet 0     FreeStyle Bulmaro 2 Sensor kit Inject 1 each under the skin every 14 (fourteen) days. Test blood sugar 1-4 times per day as needed for diabetes, replace every 14 days 6 each 3     gabapentin (Neurontin) 600 mg tablet Take 1 tablet (600 mg) by mouth 3 times a day. 90 tablet 5     glucagon 1 mg injection Inject 1 mg into the shoulder, thigh, or buttocks 1 time if needed for low blood sugar - see comments for up to 1 dose. USE AS DIRECTED. 1 each 5     HumaLOG Mix 75-25 KwikPen 100 unit/mL (75-25) injection Inject 30 Units under the skin 2 times a day.       insulin lispro (HumaLOG U-100 Insulin) 100 unit/mL injection Inject 0.1 mL (10 Units) under the skin 3 times a day with meals. Take as directed per insulin instructions. Adjust per Sliding scale- 200-250 6u, 250-300 8u 350-400 10u 400+ 12u 10 mL 11     insulin lispro (HumaLOG) 100 unit/mL injection Inject 0.1 mL (10 Units) under the skin 3 times a day with meals. Take as directed per insulin instructions. 10 mL 0     insulin NPH, Isophane, (HumuLIN N,NovoLIN N) 100 unit/mL injection Inject 20 Units under the skin every 12 hours. Take as directed per insulin instructions. 10 mL 0     levothyroxine  "(Synthroid, Levoxyl) 175 mcg tablet Take 1 tablet by mouth daily 90 tablet 3     levothyroxine (Synthroid, Levoxyl) 75 mcg tablet Take 3 tablets (225 mcg) by mouth once daily. Do not start before January 30, 2024. 90 tablet 0     losartan (Cozaar) 25 mg tablet Take 1 tablet (25 mg) by mouth once daily. 90 tablet 3     metoprolol succinate XL (Toprol-XL) 50 mg 24 hr tablet Take 1 tablet (50 mg) by mouth once daily. 90 tablet 3     mv-min/iron/folic/calcium/vitK (WOMEN'S MULTIVITAMIN ORAL) Take 1 tablet by mouth once daily.       naloxone (Narcan) 4 mg/0.1 mL nasal spray Administer 1 spray (4 mg) into affected nostril(s) if needed for opioid reversal for up to 1 day. May repeat every 2-3 minutes if needed, alternating nostrils, until medical assistance becomes available. 1 each 0     nystatin (Mycostatin) 100,000 unit/gram powder Apply 1 Application topically 2 times a day as needed. APPLY SPARINGLY TO AFFECTED AREA(S) TWICE DAILY       omeprazole (PriLOSEC) 40 mg DR capsule TAKE 1 CAPSULE BY MOUTH  DAILY OPEN CAPSULE AND  SPRINKLE CONTENTS ON SUGAR  FREE APPLESAUCE OR PUDDING.       OneTouch Ultra Test strip TO TEST BLOOD SUGAR 4 TIMES A DAY FOR DIABETES       oxyCODONE-acetaminophen (Percocet)  mg tablet Take 1 tablet by mouth every 4 hours if needed for severe pain (7 - 10). Do not start before March 15, 2024. 180 tablet 0     [START ON 4/14/2024] oxyCODONE-acetaminophen (Percocet)  mg tablet Take 1 tablet by mouth every 4 hours if needed for severe pain (7 - 10). Do not start before April 14, 2024. 180 tablet 0     pen needle 1/2\" (Easy Touch) 29G X 12mm needle Inject 3 each under the skin 4 times a day as needed (as needed for sugars). Use as instructed 450 each 3     potassium chloride CR 10 mEq ER tablet Take 1 tablet (10 mEq) by mouth in the morning and 1 tablet (10 mEq) before bedtime. Do not crush, chew, or split.. 180 tablet 3     spironolactone (Aldactone) 25 mg tablet Take 1 tablet (25 mg) by " mouth once daily.       torsemide (Demadex) 20 mg tablet Take 2 tablets (40 mg) by mouth once daily.       traZODone (Desyrel) 50 mg tablet Take 1 tablet (50 mg) by mouth once daily at bedtime. 90 tablet 3     underpads (Bed Underpads) pad 1 each 2 times a day. 60 each 11     vibegron 75 mg tablet Take 1 tablet by mouth once daily. 90 tablet 3     vitamin B complex (SUPER B-50 COMPLEX ORAL) Take 1 capsule by mouth once daily.        Current medications:  Scheduled medications  amiodarone, 200 mg, oral, Daily  apixaban, 5 mg, oral, BID  aspirin, 81 mg, oral, Every Day  buPROPion XL, 150 mg, oral, q AM  desvenlafaxine, 100 mg, oral, Daily  gabapentin, 600 mg, oral, TID  insulin lispro, 0-10 Units, subcutaneous, TID with meals  insulin NPH (Isophane), 20 Units, subcutaneous, BID  levothyroxine, 175 mcg, oral, Daily  losartan, 25 mg, oral, Daily  metoprolol tartrate, 25 mg, oral, BID  pantoprazole, 40 mg, oral, Daily before breakfast  piperacillin-tazobactam, 3.375 g, intravenous, q6h  potassium phosphate, 15 mmol, intravenous, Once  spironolactone, 25 mg, oral, Daily  traZODone, 50 mg, oral, Nightly  vancomycin, 1,250 mg, intravenous, q24h      Continuous medications  dextrose 10 % in water (D10W), 150 mL/hr  dextrose 10 % in water (D10W), 150 mL/hr  dextrose 5%-0.45 % sodium chloride, 150 mL/hr, Last Rate: 150 mL/hr (03/26/24 1152)  insulin regular infusion, 0-20 Units/hr, Last Rate: 4 Units/hr (03/26/24 1046)  sodium chloride 0.9%, 250 mL/hr, Last Rate: 250 mL/hr (03/26/24 0502)      PRN medications  PRN medications: acetaminophen **OR** acetaminophen **OR** acetaminophen, acetaminophen **OR** acetaminophen **OR** acetaminophen, dextrose, dextrose, glucagon, insulin regular, oxyCODONE-acetaminophen, oxygen, polyethylene glycol, vancomycin    Review of Systems   As per HPI    Objective  Range of Vitals (last 24 hours)  Heart Rate:  []   Temp:  [36.6 °C (97.9 °F)-40.1 °C (104.2 °F)]   Resp:  [13-38]   BP:  "()/(53-89)   SpO2:  [88 %-100 %]   Daily Weight  03/25/24 : 113 kg (250 lb)    Body mass index is 33.91 kg/m².     Physical Exam   General: AAO*3  Skin: no rashes  Neck: supple  CVS: S1S2  Chest:CTAB  GI: soft, non tender  : no CVAT  Psych: alert,oriented  CNS: no FND    Relevant Results  Outside Hospital Results  No  Labs  Results from last 72 hours   Lab Units 03/26/24  1009 03/25/24  1145   WBC AUTO x10*3/uL 16.6* 19.8*   HEMOGLOBIN g/dL 10.0* 10.6*   HEMATOCRIT % 29.9* 32.7*   PLATELETS AUTO x10*3/uL 144* 156   NEUTROS PCT AUTO %  --  92.3   LYMPHS PCT AUTO %  --  1.0   MONOS PCT AUTO %  --  3.6   EOS PCT AUTO %  --  0.9     Results from last 72 hours   Lab Units 03/26/24  1009 03/26/24  0352 03/26/24  0033   SODIUM mmol/L 130* 130* 130*   POTASSIUM mmol/L 3.3* 3.9 4.1   CHLORIDE mmol/L 101 97* 99   CO2 mmol/L 22 23 21   BUN mg/dL 27* 28* 27*   CREATININE mg/dL 1.73* 1.69* 1.65*   GLUCOSE mg/dL 148* 165* 152*   CALCIUM mg/dL 8.2* 8.8 8.4*   ANION GAP mmol/L 10 14 14   EGFR mL/min/1.73m*2 31* 32* 33*   PHOSPHORUS mg/dL 2.1* 2.5 2.4*     Results from last 72 hours   Lab Units 03/25/24  1145   ALK PHOS U/L 83   BILIRUBIN TOTAL mg/dL 0.6   PROTEIN TOTAL g/dL 7.2   ALT U/L 8   AST U/L 15   ALBUMIN g/dL 3.3*     Estimated Creatinine Clearance: 41.3 mL/min (A) (by C-G formula based on SCr of 1.73 mg/dL (H)).  C-Reactive Protein   Date Value Ref Range Status   01/26/2024 1.91 (H) <1.00 mg/dL Final     CRP   Date Value Ref Range Status   07/26/2023 1.28 (A) mg/dL Final     Comment:     REF VALUE  < 1.00     06/09/2023 0.39 mg/dL Final     Comment:     REF VALUE  < 1.00     Sedimentation Rate   Date Value Ref Range Status   01/26/2024 45 (H) 0 - 30 mm/h Final   07/26/2023 20 0 - 30 mm/h Final   06/09/2023 44 (H) 0 - 30 mm/h Final     No results found for: \"HIV1X2\", \"HIVCONF\", \"PTDQMW7BM\"  Hepatitis C Ab   Date Value Ref Range Status   05/16/2019 NON-REACTIVE NONREACTIVE Final     Comment:      Patients receiving " more than 5 mg/day of biotin may have interference   in test results.  A sample should be taken no sooner than eight hours   after  previous dose. Contact the testing laboratory for additional    information.        Microbiology  Susceptibility data from last 90 days.  Collected Specimen Info Organism   03/25/24 Blood culture from Peripheral Venipuncture Streptococcus pyogenes (Group A Streptococcus)   03/25/24 Blood culture from Peripheral Venipuncture Streptococcus pyogenes (Group A Streptococcus)     Assessment/Plan     Sepsis   Strep pyogenes bacteremia   Right leg cellulitis   H/o left TKA c/b e.coli and MRSA   DM  Afib  CHF  COPD  CKD      Suggest:  Repeat blood cx  Check TTE  C/w zosyn   Dc vancomycin   Follow wound cx  Trend wbc and temps      Kristine Koch MD

## 2024-03-26 NOTE — CONSULTS
Nutrition Initial Assessment:   Nutrition Assessment    Reason for Assessment: Provider consult order    Medical history per chart:   type 2 diabetes poorly controlled on Humalog, A-fib on Eliquis, CHF, left total knee arthroplasty complicated by E. coli infection and MRSA infection, COPD, CKD stage III     HPI:  Patient presents with altered mental status, DKA, UTI    3/26:  Patient seen in ICU, poor historian as patient was in and out of sleep and confused, history obtained from chart review, IDT rounds, and RN.  Patient lives alone, medication non-compliance PTA reported.  Multiple wounds noted, will monitor diet adequacy and provide Ensure max protein daily (low carbohydrate, high protein) and Brennan BID (wound healing supplement)      Nutrition History:  Food and Nutrient History: Unable to determine at this time, patient reports eating yogurt.       Current Diet: Adult diet Carb Controlled, Cardiac; 75 gram carb/meal, 45 gram Carb evening snack; 70 gm fat; 2 - 3 grams Sodium  Supplement(s): No  Average meal Intake during admission:  diet just advanced, will monitor.          Nutrition Related Findings:   Oral Symptoms: unable to assess at this time     GI symptoms: RD unable to assess GI symptoms at this time.   BM: Last BM Date: 03/25/24  Wound Type: multiple (nursing/wound notes provide further details)    Food allergies: NKFA. is allergic to cephalexin, metformin, other, statins-hmg-coa reductase inhibitors, and adhesive tape-silicones.  Meds/Labs reviewed.  amiodarone, 200 mg, oral, Daily  apixaban, 5 mg, oral, BID  aspirin, 81 mg, oral, Every Day  buPROPion XL, 150 mg, oral, q AM  desvenlafaxine, 100 mg, oral, Daily  gabapentin, 600 mg, oral, TID  insulin lispro, 0-10 Units, subcutaneous, TID with meals  insulin NPH (Isophane), 20 Units, subcutaneous, BID  levothyroxine, 175 mcg, oral, Daily  losartan, 25 mg, oral, Daily  metoprolol tartrate, 25 mg, oral, BID  pantoprazole, 40 mg, oral, Daily before  breakfast  piperacillin-tazobactam, 3.375 g, intravenous, q6h  potassium phosphate, 15 mmol, intravenous, Once  spironolactone, 25 mg, oral, Daily  traZODone, 50 mg, oral, Nightly  vancomycin, 1,250 mg, intravenous, q24h       dextrose 10 % in water (D10W), 150 mL/hr  dextrose 10 % in water (D10W), 150 mL/hr  dextrose 5%-0.45 % sodium chloride, 150 mL/hr, Last Rate: 150 mL/hr (03/26/24 1152)  insulin regular infusion, 0-20 Units/hr, Last Rate: 4 Units/hr (03/26/24 1046)  sodium chloride 0.9%, 250 mL/hr, Last Rate: 250 mL/hr (03/26/24 0502)         Nutrition Significant Labs:    Results from last 7 days   Lab Units 03/26/24  1009 03/26/24  0352 03/26/24  0033   GLUCOSE mg/dL 148* 165* 152*   SODIUM mmol/L 130* 130* 130*   POTASSIUM mmol/L 3.3* 3.9 4.1   CHLORIDE mmol/L 101 97* 99   CO2 mmol/L 22 23 21   BUN mg/dL 27* 28* 27*   CREATININE mg/dL 1.73* 1.69* 1.65*   EGFR mL/min/1.73m*2 31* 32* 33*   CALCIUM mg/dL 8.2* 8.8 8.4*   PHOSPHORUS mg/dL 2.1* 2.5 2.4*   MAGNESIUM mg/dL 1.73 1.77 1.74     Lab Results   Component Value Date    HGBA1C 15.4 (H) 01/27/2024    HGBA1C 13.9 (A) 11/13/2023     Results from last 7 days   Lab Units 03/26/24  1217 03/26/24  1018 03/26/24  0825 03/26/24  0633 03/26/24  0526 03/26/24  0423 03/26/24  0326 03/26/24  0228   POCT GLUCOSE mg/dL 190* 141* 157* 158* 173* 168* 170* 181*       Anthropometrics:  Height: 182.9 cm (6')   Weight: 113 kg (250 lb)   BMI (Calculated): 33.9  IBW/kg (Dietitian Calculated): 72.7 kg            Weight History:   Wt Readings from Last 10 Encounters:   03/25/24 113 kg (250 lb)   02/13/24 107 kg (236 lb)   02/12/24 111 kg (244 lb 14.9 oz)   01/26/24 109 kg (240 lb 9.6 oz)   11/13/23 113 kg (250 lb)   05/12/23 111 kg (245 lb)   10/31/22 102 kg (224 lb 0.9 oz)   10/11/22 107 kg (236 lb)   08/12/22 107 kg (235 lb 7.2 oz)   07/19/22 102 kg (224 lb 13.9 oz)        Weight Change %:  Weight History / % Weight Change: Variable weights noted per history, no significant  loss.  Significant Weight Loss: No          Nutrition Focused Physical Exam Findings:  defer: patient unable to participate     Estimated Needs:   Total Energy Estimated Needs (kCal):  (3365-6199)  Method for Estimating Needs: 25-30, IBW  Total Protein Estimated Needs (g):  (85-90)  Method for Estimating Needs: 1.2, IBW (monitor renal function)     Method for Estimating Needs: 1 mL, kcal or per physician        Nutrition Diagnosis   Nutrition Diagnosis:       Nutrition Diagnosis  Patient has Nutrition Diagnosis: Yes  Diagnosis Status (1): New  Nutrition Diagnosis 1: Increased nutrient needs  Related to (1): wound healing  As Evidenced by (1): multiple noted wounds       Nutrition Interventions/Recommendations   Nutrition Interventions and Recommendations:        Nutrition Prescription:  Individualized Nutrition Prescription Provided for : Carbohydrate controlled diet.  Trial Ensure max protein once daily and Brennan BID.        Nutrition Interventions:   Food and/or Nutrient Delivery Interventions  Interventions: Meals and snacks, Medical food supplement  Meals and Snacks: Carbohydrate-modified diet  Goal: >75% intake of meals  Medical Food Supplement: Commercial beverage  Goal: >75% of ONS    Coordination of Nutrition Care by a Nutrition Professional  Collaboration and Referral of Nutrition Care: Collaboration by nutrition professional with other providers  Goal: IDBEATRIZ breaux RN Evelin    Nutrition Education:   Education Documentation  No documentation found.      Nutrition Counseling  Counseling Theoretical Approach: Other (Comment)  Goal: N/A - AMS       Nutrition Monitoring and Evaluation   Monitoring/Evaluation:   Food/Nutrient Related History Monitoring  Monitoring and Evaluation Plan: Energy intake  Energy Intake: Estimated energy intake  Criteria: meet >75% of estimated needs from diet and ONS    Body Composition/Growth/Weight History  Monitoring and Evaluation Plan: Weight  Weight: Weight change  Criteria:  Maintain stable weight    Biochemical Data, Medical Tests and Procedures  Monitoring and Evaluation Plan: Electrolyte/renal panel, Glucose/endocrine profile  Electrolyte and Renal Panel: BUN, Creatinine, Sodium  Criteria: WNL  Glucose/Endocrine Profile: Glucose, casual  Criteria: WNL    Nutrition Focused Physical Findings  Monitoring and Evaluation Plan: Skin  Skin: Impaired wound healing  Criteria: Promote healing            Time Spent/Follow-up Reminder:   Follow Up  Time Spent (min): 45 minutes  Last Date of Nutrition Visit: 03/26/24  Nutrition Follow-Up Needed?: Dietitian to reassess per policy  Follow up Comment: 3/29-4/1

## 2024-03-26 NOTE — PROGRESS NOTES
Critical Care Daily Progress        Subjective   Patient is a 73 y.o. female admitted on 3/25/2024 11:29 AM with the following indication(s) for ICU care: DKA.     Interval History: Teresa Matthews is a 73 y.o. 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 Upon ED workup, patient noted to have slurred speech, dysrthria, and left-sided facial droop. Stroke alert was initiated and CT head negative for acute intracranial abnormalities. Pateint not a candidate for TNK due to current eliquis use. Patient was found to be in DKA and DKA management was initiated.    3/26/24: Patient alert and oriented to self only. Discussed with bedside nurse and no events overnight. Patient noted confusion and difficulty word-finding. Anion gap closed this morning. Will bridge off insulin infusion to ISS and NPH. Blood cultures positive for GPC in chains and pairs.     Complaints: has none..     Scheduled Medications:   amiodarone, 200 mg, oral, Daily  apixaban, 5 mg, oral, BID  aspirin, 81 mg, oral, Every Day  buPROPion XL, 150 mg, oral, q AM  desvenlafaxine, 100 mg, oral, Daily  gabapentin, 600 mg, oral, TID  insulin lispro, 0-10 Units, subcutaneous, TID with meals  insulin NPH (Isophane), 20 Units, subcutaneous, BID  levothyroxine, 175 mcg, oral, Daily  losartan, 25 mg, oral, Daily  metoprolol tartrate, 25 mg, oral, BID  pantoprazole, 40 mg, oral, Daily before breakfast  piperacillin-tazobactam, 3.375 g, intravenous, q6h  spironolactone, 25 mg, oral, Daily  traZODone, 50 mg, oral, Nightly  vancomycin, 1,250 mg, intravenous, q24h         Continuous Medications:   dextrose 10 % in water (D10W), 150 mL/hr  dextrose 10 % in water  (D10W), 150 mL/hr  dextrose 5%-0.45 % sodium chloride, 150 mL/hr, Last Rate: 150 mL/hr (03/26/24 0502)  insulin regular infusion, 0-20 Units/hr, Last Rate: 4 Units/hr (03/26/24 0830)  sodium chloride 0.9%, 250 mL/hr, Last Rate: 250 mL/hr (03/26/24 0502)         PRN Medications:   PRN medications: acetaminophen **OR** acetaminophen **OR** acetaminophen, acetaminophen **OR** acetaminophen **OR** acetaminophen, dextrose, dextrose, glucagon, insulin regular, oxyCODONE-acetaminophen, oxygen, polyethylene glycol, vancomycin    Objective   Vitals:  Most Recent:  Vitals:    03/26/24 1000   BP: 100/53   Pulse: 87   Resp: 13   Temp:    SpO2: 99%       24hr Min/Max:  Temp  Min: 36.6 °C (97.9 °F)  Max: 40.1 °C (104.2 °F)  Pulse  Min: 80  Max: 125  BP  Min: 93/56  Max: 161/77  Resp  Min: 13  Max: 38  SpO2  Min: 88 %  Max: 100 %    LDA:  External Urinary Catheter Female (Active)   Placement Date/Time: 03/26/24 0300   Hand Hygiene Completed: Yes  External Catheter Type: Female   Number of days: 0         Vent settings:       Hemodynamic parameters for last 24 hours:       I/O:    Intake/Output Summary (Last 24 hours) at 3/26/2024 1018  Last data filed at 3/26/2024 0830  Gross per 24 hour   Intake 3256.62 ml   Output 600 ml   Net 2656.62 ml       Physical Exam:   Physical Exam  Constitutional:       General: She is not in acute distress.     Appearance: She is ill-appearing.      Interventions: Nasal cannula in place.      Comments: 4 liters of oxygen via nasal cannula    HENT:      Head: Normocephalic.      Nose: Nose normal.      Mouth/Throat:      Mouth: Mucous membranes are moist.   Eyes:      Pupils: Pupils are equal, round, and reactive to light.   Cardiovascular:      Rate and Rhythm: Normal rate and regular rhythm.      Pulses: Normal pulses.      Heart sounds: Normal heart sounds. No murmur heard.  Pulmonary:      Effort: Pulmonary effort is normal. No respiratory distress.      Breath sounds: Normal breath sounds.    Abdominal:      General: Bowel sounds are normal.      Palpations: Abdomen is soft.   Musculoskeletal:      Cervical back: Normal range of motion.   Feet:      Right foot:      Skin integrity: Skin breakdown and dry skin present.      Left foot:      Skin integrity: Dry skin present.      Comments: Left great toe amputated  Dry wound to left heel  Skin:     General: Skin is warm and dry.      Capillary Refill: Capillary refill takes less than 2 seconds.      Findings: Erythema and wound present.      Comments: Vascular discoloration and dry, flaky skin to bilateral lower extermities. Erythema and warmth noted from left ankle extending to mid medial thigh. Wounds noted to left dorsal tibial, left heel, sacrum, and right buttock.  Left dorsal tibial wound with slough and yellow drainage. MASD to bilateral groin and abdominal pannus.   Neurological:      Mental Status: She is alert. She is disoriented and confused.      Motor: Weakness present.      Comments: Alert and oriented to self only   Psychiatric:         Behavior: Behavior is cooperative.         Judgment: Judgment is impulsive.          Lab/Radiology/Diagnostic Review:  Results for orders placed or performed during the hospital encounter of 03/25/24 (from the past 24 hour(s))   CBC and Auto Differential   Result Value Ref Range    WBC 19.8 (H) 4.4 - 11.3 x10*3/uL    nRBC 0.0 0.0 - 0.0 /100 WBCs    RBC 3.76 (L) 4.00 - 5.20 x10*6/uL    Hemoglobin 10.6 (L) 12.0 - 16.0 g/dL    Hematocrit 32.7 (L) 36.0 - 46.0 %    MCV 87 80 - 100 fL    MCH 28.2 26.0 - 34.0 pg    MCHC 32.4 32.0 - 36.0 g/dL    RDW 14.7 (H) 11.5 - 14.5 %    Platelets 156 150 - 450 x10*3/uL    Neutrophils % 92.3 40.0 - 80.0 %    Immature Granulocytes %, Automated 2.0 (H) 0.0 - 0.9 %    Lymphocytes % 1.0 13.0 - 44.0 %    Monocytes % 3.6 2.0 - 10.0 %    Eosinophils % 0.9 0.0 - 6.0 %    Basophils % 0.2 0.0 - 2.0 %    Neutrophils Absolute 18.25 (H) 1.60 - 5.50 x10*3/uL    Immature Granulocytes Absolute,  Automated 0.40 0.00 - 0.50 x10*3/uL    Lymphocytes Absolute 0.20 (L) 0.80 - 3.00 x10*3/uL    Monocytes Absolute 0.72 0.05 - 0.80 x10*3/uL    Eosinophils Absolute 0.18 0.00 - 0.40 x10*3/uL    Basophils Absolute 0.04 0.00 - 0.10 x10*3/uL   Comprehensive metabolic panel   Result Value Ref Range    Glucose 609 (HH) 74 - 99 mg/dL    Sodium 122 (L) 136 - 145 mmol/L    Potassium 4.6 3.5 - 5.3 mmol/L    Chloride 88 (L) 98 - 107 mmol/L    Bicarbonate 15 (L) 21 - 32 mmol/L    Anion Gap 24 (H) 10 - 20 mmol/L    Urea Nitrogen 25 (H) 6 - 23 mg/dL    Creatinine 1.64 (H) 0.50 - 1.05 mg/dL    eGFR 33 (L) >60 mL/min/1.73m*2    Calcium 8.4 (L) 8.6 - 10.3 mg/dL    Albumin 3.3 (L) 3.4 - 5.0 g/dL    Alkaline Phosphatase 83 33 - 136 U/L    Total Protein 7.2 6.4 - 8.2 g/dL    AST 15 9 - 39 U/L    Bilirubin, Total 0.6 0.0 - 1.2 mg/dL    ALT 8 7 - 45 U/L   Troponin I, High Sensitivity   Result Value Ref Range    Troponin I, High Sensitivity 13 0 - 13 ng/L   Blood Gas Venous Full Panel   Result Value Ref Range    POCT pH, Venous 7.26 (L) 7.33 - 7.43 pH    POCT pCO2, Venous 37 (L) 41 - 51 mm Hg    POCT pO2, Venous 32 (L) 35 - 45 mm Hg    POCT SO2, Venous 34 (L) 45 - 75 %    POCT Oxy Hemoglobin, Venous 33.4 (L) 45.0 - 75.0 %    POCT Hematocrit Calculated, Venous 32.0 (L) 36.0 - 46.0 %    POCT Sodium, Venous 122 (L) 136 - 145 mmol/L    POCT Potassium, Venous 4.9 3.5 - 5.3 mmol/L    POCT Chloride, Venous 89 (L) 98 - 107 mmol/L    POCT Ionized Calicum, Venous 1.19 1.10 - 1.33 mmol/L    POCT Glucose, Venous 576 (HH) 74 - 99 mg/dL    POCT Lactate, Venous 3.0 (H) 0.4 - 2.0 mmol/L    POCT Base Excess, Venous -9.7 (L) -2.0 - 3.0 mmol/L    POCT HCO3 Calculated, Venous 16.6 (L) 22.0 - 26.0 mmol/L    POCT Hemoglobin, Venous 10.6 (L) 12.0 - 16.0 g/dL    POCT Anion Gap, Venous 21.0 10.0 - 25.0 mmol/L    Patient Temperature 37.0 degrees Celsius    FiO2 21 %   Beta Hydroxybutyrate   Result Value Ref Range    Beta-Hydroxybutyrate 6.24 (H) 0.02 - 0.27 mmol/L    Sars-CoV-2 PCR   Result Value Ref Range    Coronavirus 2019, PCR Not Detected Not Detected   Influenza A, and B PCR   Result Value Ref Range    Flu A Result Not Detected Not Detected    Flu B Result Not Detected Not Detected   ECG 12 lead   Result Value Ref Range    Ventricular Rate 92 BPM    Atrial Rate 93 BPM    IN Interval 199 ms    QRS Duration 117 ms    QT Interval 401 ms    QTC Calculation(Bazett) 497 ms    P Axis 48 degrees    R Axis 99 degrees    T Axis 73 degrees    QRS Count 14 beats    Q Onset 249 ms    T Offset 450 ms    QTC Fredericia 462 ms   Blood Gas Lactic Acid, Venous   Result Value Ref Range    POCT Lactate, Venous 2.9 (H) 0.4 - 2.0 mmol/L   Lactate   Result Value Ref Range    Lactate 2.8 (H) 0.4 - 2.0 mmol/L   Blood Culture    Specimen: Peripheral Venipuncture; Blood culture   Result Value Ref Range    Blood Culture       Identification and susceptibility testing to follow    Gram Stain Gram positive cocci, pairs and chains (AA)    Blood Culture    Specimen: Peripheral Venipuncture; Blood culture   Result Value Ref Range    Blood Culture       Identification and susceptibility testing to follow    Gram Stain Gram positive cocci, pairs and chains (AA)    Urinalysis with Reflex Culture and Microscopic   Result Value Ref Range    Color, Urine Yellow Straw, Yellow    Appearance, Urine Hazy (N) Clear    Specific Gravity, Urine 1.023 1.005 - 1.035    pH, Urine 6.0 5.0, 5.5, 6.0, 6.5, 7.0, 7.5, 8.0    Protein, Urine 30 (1+) (N) NEGATIVE mg/dL    Glucose, Urine >=500 (3+) (A) NEGATIVE mg/dL    Blood, Urine MODERATE (2+) (A) NEGATIVE    Ketones, Urine 80 (2+) (A) NEGATIVE mg/dL    Bilirubin, Urine NEGATIVE NEGATIVE    Urobilinogen, Urine <2.0 <2.0 mg/dL    Nitrite, Urine NEGATIVE NEGATIVE    Leukocyte Esterase, Urine MODERATE (2+) (A) NEGATIVE   Extra Urine Gray Tube   Result Value Ref Range    Extra Tube Hold for add-ons.    Microscopic Only, Urine   Result Value Ref Range    WBC, Urine 21-50 (A) 1-5,  NONE /HPF    WBC Clumps, Urine OCCASIONAL Reference range not established. /HPF    RBC, Urine 3-5 NONE, 1-2, 3-5 /HPF    Squamous Epithelial Cells, Urine 1-9 (SPARSE) Reference range not established. /HPF    Bacteria, Urine 1+ (A) NONE SEEN /HPF    Mucus, Urine 1+ Reference range not established. /LPF   POCT GLUCOSE   Result Value Ref Range    POCT Glucose 523 (H) 74 - 99 mg/dL   Basic metabolic panel   Result Value Ref Range    Glucose 573 (HH) 74 - 99 mg/dL    Sodium 127 (L) 136 - 145 mmol/L    Potassium 4.3 3.5 - 5.3 mmol/L    Chloride 92 (L) 98 - 107 mmol/L    Bicarbonate 18 (L) 21 - 32 mmol/L    Anion Gap 21 (H) 10 - 20 mmol/L    Urea Nitrogen 25 (H) 6 - 23 mg/dL    Creatinine 1.62 (H) 0.50 - 1.05 mg/dL    eGFR 34 (L) >60 mL/min/1.73m*2    Calcium 8.5 (L) 8.6 - 10.3 mg/dL   Magnesium   Result Value Ref Range    Magnesium 1.41 (L) 1.60 - 2.40 mg/dL   Phosphorus   Result Value Ref Range    Phosphorus 3.1 2.5 - 4.9 mg/dL   Lactate   Result Value Ref Range    Lactate 2.9 (H) 0.4 - 2.0 mmol/L   POCT GLUCOSE   Result Value Ref Range    POCT Glucose 514 (H) 74 - 99 mg/dL   POCT GLUCOSE   Result Value Ref Range    POCT Glucose 471 (H) 74 - 99 mg/dL   Basic metabolic panel   Result Value Ref Range    Glucose 407 (H) 74 - 99 mg/dL    Sodium 127 (L) 136 - 145 mmol/L    Potassium 4.5 3.5 - 5.3 mmol/L    Chloride 94 (L) 98 - 107 mmol/L    Bicarbonate 21 21 - 32 mmol/L    Anion Gap 17 10 - 20 mmol/L    Urea Nitrogen 25 (H) 6 - 23 mg/dL    Creatinine 1.62 (H) 0.50 - 1.05 mg/dL    eGFR 34 (L) >60 mL/min/1.73m*2    Calcium 8.7 8.6 - 10.3 mg/dL   Magnesium   Result Value Ref Range    Magnesium 1.36 (L) 1.60 - 2.40 mg/dL   Phosphorus   Result Value Ref Range    Phosphorus 1.9 (L) 2.5 - 4.9 mg/dL   Lactate   Result Value Ref Range    Lactate 5.5 (HH) 0.4 - 2.0 mmol/L   TSH   Result Value Ref Range    Thyroid Stimulating Hormone 2.13 0.44 - 3.98 mIU/L   POCT GLUCOSE   Result Value Ref Range    POCT Glucose 387 (H) 74 - 99 mg/dL    POCT GLUCOSE   Result Value Ref Range    POCT Glucose 300 (H) 74 - 99 mg/dL   Basic metabolic panel   Result Value Ref Range    Glucose 261 (H) 74 - 99 mg/dL    Sodium 130 (L) 136 - 145 mmol/L    Potassium 3.4 (L) 3.5 - 5.3 mmol/L    Chloride 98 98 - 107 mmol/L    Bicarbonate 22 21 - 32 mmol/L    Anion Gap 13 10 - 20 mmol/L    Urea Nitrogen 25 (H) 6 - 23 mg/dL    Creatinine 1.56 (H) 0.50 - 1.05 mg/dL    eGFR 35 (L) >60 mL/min/1.73m*2    Calcium 8.4 (L) 8.6 - 10.3 mg/dL   Lactate   Result Value Ref Range    Lactate 4.4 (HH) 0.4 - 2.0 mmol/L   Magnesium   Result Value Ref Range    Magnesium 1.52 (L) 1.60 - 2.40 mg/dL   Phosphorus   Result Value Ref Range    Phosphorus 1.0 (L) 2.5 - 4.9 mg/dL   Procalcitonin   Result Value Ref Range    Procalcitonin 78.04 (H) <=0.07 ng/mL   POCT GLUCOSE   Result Value Ref Range    POCT Glucose 229 (H) 74 - 99 mg/dL   POCT GLUCOSE   Result Value Ref Range    POCT Glucose 213 (H) 74 - 99 mg/dL   POCT GLUCOSE   Result Value Ref Range    POCT Glucose 165 (H) 74 - 99 mg/dL   POCT GLUCOSE   Result Value Ref Range    POCT Glucose 134 (H) 74 - 99 mg/dL   POCT GLUCOSE   Result Value Ref Range    POCT Glucose 141 (H) 74 - 99 mg/dL   Basic metabolic panel   Result Value Ref Range    Glucose 152 (H) 74 - 99 mg/dL    Sodium 130 (L) 136 - 145 mmol/L    Potassium 4.1 3.5 - 5.3 mmol/L    Chloride 99 98 - 107 mmol/L    Bicarbonate 21 21 - 32 mmol/L    Anion Gap 14 10 - 20 mmol/L    Urea Nitrogen 27 (H) 6 - 23 mg/dL    Creatinine 1.65 (H) 0.50 - 1.05 mg/dL    eGFR 33 (L) >60 mL/min/1.73m*2    Calcium 8.4 (L) 8.6 - 10.3 mg/dL   Lactate   Result Value Ref Range    Lactate 1.9 0.4 - 2.0 mmol/L   Magnesium   Result Value Ref Range    Magnesium 1.74 1.60 - 2.40 mg/dL   Phosphorus   Result Value Ref Range    Phosphorus 2.4 (L) 2.5 - 4.9 mg/dL   POCT GLUCOSE   Result Value Ref Range    POCT Glucose 161 (H) 74 - 99 mg/dL   POCT GLUCOSE   Result Value Ref Range    POCT Glucose 181 (H) 74 - 99 mg/dL   POCT  GLUCOSE   Result Value Ref Range    POCT Glucose 170 (H) 74 - 99 mg/dL   Basic metabolic panel   Result Value Ref Range    Glucose 165 (H) 74 - 99 mg/dL    Sodium 130 (L) 136 - 145 mmol/L    Potassium 3.9 3.5 - 5.3 mmol/L    Chloride 97 (L) 98 - 107 mmol/L    Bicarbonate 23 21 - 32 mmol/L    Anion Gap 14 10 - 20 mmol/L    Urea Nitrogen 28 (H) 6 - 23 mg/dL    Creatinine 1.69 (H) 0.50 - 1.05 mg/dL    eGFR 32 (L) >60 mL/min/1.73m*2    Calcium 8.8 8.6 - 10.3 mg/dL   Lactate   Result Value Ref Range    Lactate 2.7 (H) 0.4 - 2.0 mmol/L   Magnesium   Result Value Ref Range    Magnesium 1.77 1.60 - 2.40 mg/dL   Phosphorus   Result Value Ref Range    Phosphorus 2.5 2.5 - 4.9 mg/dL   POCT GLUCOSE   Result Value Ref Range    POCT Glucose 168 (H) 74 - 99 mg/dL   POCT GLUCOSE   Result Value Ref Range    POCT Glucose 173 (H) 74 - 99 mg/dL   POCT GLUCOSE   Result Value Ref Range    POCT Glucose 158 (H) 74 - 99 mg/dL   POCT GLUCOSE   Result Value Ref Range    POCT Glucose 157 (H) 74 - 99 mg/dL     XR chest 1 view    Result Date: 3/25/2024  Interpreted By:  Jeevan Gomez, STUDY: XR CHEST 1 VIEW; 3/25/2024 12:55 pm   INDICATION: CLINICAL INFORMATION: Signs/Symptoms:AMS.   COMPARISON: 11/09/2022   ACCESSION NUMBER(S): [id number]   ORDERING CLINICIAN: KAILEY CASTRO   TECHNIQUE: Portable chest one view.   FINDINGS: The cardiac size is indeterminate in view of the AP projection.  The aorta is tortuous. Pulmonary arteries are somewhat prominent centrally suggesting pulmonary arterial hypertension. No infiltrates or effusions are identified.       No acute pathologic findings are identified. Possible pulmonary arterial hypertension. There is no interval change when compared to the previous examination.   MACRO: none   Signed by: Jeevan Gomez 3/25/2024 1:25 PM Dictation workstation:   [id number]    CT brain attack angio head and neck W and WO IV contrast    Result Date: 3/25/2024  Interpreted By:  Leandro Mcguire, STUDY: CT BRAIN  ATTACK ANGIO HEAD AND NECK W AND WO IV CONTRAST; ; 3/25/2024 11:51 am   INDICATION: Signs/Symptoms:Stroke Evaluation with VAN Positive.   COMPARISON: None.   ACCESSION NUMBER(S): [id number]   ORDERING CLINICIAN: KAILEY CASTRO   TECHNIQUE: Thin cut axial CT images were generated over the upper thorax, neck, and head during the arterial passage of a full contrast bolus.  The bolus was generated with a power injector and followed with immediate saline flush. These image data were subtracted and then used for 3-D reconstructions. Maximum intensity projections and shaded surface displays were generated in multiple planes. In addition images were transferred into a 3-D processing program and additional projections and displays were reviewed  by the interpreting physician.   FINDINGS: The CT angiogram through the upper thorax demonstrates mild atherosclerotic calcifications along the aortic arch and origin of the left subclavian artery. No significant stenosis noted along the origins of right brachiocephalic artery, left common carotid artery, or left subclavian artery. No significant stenosis noted along the origin of the right vertebral artery. There is atherosclerotic calcification noted along the origin of the left vertebral artery contributing to mild-to-moderate segmental narrowing.   The CT angiogram of the neck demonstrates atherosclerotic calcifications noted along the distal common carotid arteries and origin of the internal carotid arteries contributing to mild non hemodynamically significant narrowing. No significant stenosis noted along the cervical segments of the vertebral arteries.   The CT angiogram of the head demonstrates mild atherosclerotic calcification along the distal left vertebral artery. No significant stenosis noted along the distal right vertebral artery, basilar artery, or distal internal carotid arteries. The right posterior cerebral artery is predominantly supplied via the right  posterior communicating artery. The P1 segment of the right posterior cerebral artery is asymmetrically small in caliber which may be related to congenital hypoplasia or secondary narrowing. Otherwise, no large vessel branch cutoffs of the anterior cerebral arteries, middle cerebral arteries, or posterior cerebral arteries are noted.   The source CT angiogram images of the head demonstrate moderate brain parenchymal volume loss. There are nonspecific white matter changes within the cerebral hemispheres bilaterally as well as vague hypodensity overlying the brainstem which while nonspecific, given the patient's age, may represent sequelae of small-vessel ischemic change. Additional small scattered hypodensities are identified within the subinsular regions, basal ganglia, and thalami bilaterally suggesting incidental mildly prominent perivascular spaces and/or small scattered lacunar infarctions. There is no midline shift.   The source CT angiogram images of the neck demonstrate multilevel cervical spondylosis.       The CT angiogram through the upper thorax demonstrates mild atherosclerotic calcifications along the aortic arch and origin of the left subclavian artery. No significant stenosis noted along the origins of right brachiocephalic artery, left common carotid artery, or left subclavian artery. No significant stenosis noted along the origin of the right vertebral artery. There is atherosclerotic calcification noted along the origin of the left vertebral artery contributing to mild-to-moderate segmental narrowing.   The CT angiogram of the neck demonstrates atherosclerotic calcifications noted along the distal common carotid arteries and origin of the internal carotid arteries contributing to mild non hemodynamically significant narrowing. No significant stenosis noted along the cervical segments of the vertebral arteries.   The CT angiogram of the head demonstrates mild atherosclerotic calcification along the  distal left vertebral artery. No significant stenosis noted along the distal right vertebral artery, basilar artery, or distal internal carotid arteries. The right posterior cerebral artery is predominantly supplied via the right posterior communicating artery. The P1 segment of the right posterior cerebral artery is asymmetrically small in caliber which may be related to congenital hypoplasia or secondary narrowing. Otherwise, no large vessel branch cutoffs of the anterior cerebral arteries, middle cerebral arteries, or posterior cerebral arteries are noted.   The source CT angiogram images of the head demonstrate moderate brain parenchymal volume loss. There are nonspecific white matter changes within the cerebral hemispheres bilaterally as well as vague hypodensity overlying the brainstem which while nonspecific, given the patient's age, may represent sequelae of small-vessel ischemic change. Additional small scattered hypodensities are identified within the subinsular regions, basal ganglia, and thalami bilaterally suggesting incidental mildly prominent perivascular spaces and/or small scattered lacunar infarctions. There is no midline shift.   The source CT angiogram images of the neck demonstrate multilevel cervical spondylosis.     MACRO: None   Signed by: Leandro Mcguire 3/25/2024 12:08 PM Dictation workstation:   [id number]    ECG 12 lead    Result Date: 3/25/2024  Sinus rhythm Atrial premature complex Nonspecific intraventricular conduction delay Borderline low voltage, extremity leads Nonspecific repol abnormality, lateral leads Minimal ST elevation, anterior leads    CT brain attack head wo IV contrast    Result Date: 3/25/2024  Interpreted By:  Krish Randolph, STUDY: CT BRAIN ATTACK HEAD WO IV CONTRAST;  3/25/2024 11:38 am   INDICATION: Signs/Symptoms:Stroke Evaluation.  Slurred speech.   COMPARISON: CT head 02/12/2024.   ACCESSION NUMBER(S): [id number]   ORDERING CLINICIAN: KAILEY CASTRO    TECHNIQUE: Noncontrast axial CT scan of head was performed.   FINDINGS: Parenchyma: There is no intracranial hemorrhage. The grey-white differentiation is intact. There is no mass effect or midline shift.   CSF Spaces: The ventricles, sulci and basal cisterns are within normal limits for age.   Extra-Axial Fluid: No extraaxial fluid collection.   Calvarium: No acute fracture.   Paranasal sinuses: Visualized paranasal sinuses are clear.   Mastoids: Clear.   Orbits: Normal.   Soft tissues: Unremarkable.       No acute intracranial abnormality.   MACRO: Krish Randolph discussed the significance and urgency of this critical finding by Epic secure chat with  KAILEY CASTRO on 3/25/2024 at 11:44 am.  (**-RCF-**) Findings:  See findings.   Signed by: Krish Randolph 3/25/2024 11:46 AM Dictation workstation:   [id number]        Assessment/Plan   Principal Problem:    Delirium  Active Problems:    Atrial fibrillation (CMS/HCC)    Chronic pain    GERD (gastroesophageal reflux disease)    Hypothyroidism, postsurgical    Complicated UTI (urinary tract infection)    Depression    Diabetic ketoacidosis without coma associated with type 2 diabetes mellitus (CMS/HCC)    Sacral wound    Sepsis without acute organ dysfunction (CMS/HCC)    Hypomagnesemia    DKA without coma associated with type 2 diabetes mellitus- resolved  - Blood glucose 609 upon presentation  - Anion gap 24 bicarbonate 15, and BHB 6.24  - History of type 2 DM with medication noncompliance as evidenced by HgB A1C of 15.4  - Insulin infusion and DKA IV fluids per protocol  - BMP, VBG full panel  - Anion gap 14 and bicarbonate 23 this morning  -Endocrine consulted, input appreciated.   - Bridge off of insulin infusion to #2 corrective sliding scale and NPH 20 units twice daily  - Give NPH and stop insulin infusion 2 hours after administration.   -Discontinue insulin infusion and DKA IV fluids  -POCT glucose before meals and at bedtime    Severe sepsis without  septic shock likely secondary to UTI and cellulitis  - WBC 19.8>>16.6  - Patient noted with fever, tachycardia, elevated lactate, infection source, and evidence of organ dysfunction  - Recently treated for cellulitis of left lower extremity   - UA positive for leukocyte esterase, blood, and 21-50 WBC  - History of wounds to LLE and sacrum  - Erythema and warmth to LLE from ankle to mid thigh  - Procalcitonin 78.04  - Lactate 5.5>>4.4>>1.9>>2.7>>1.9  -Blood cultures positive for streptococcus pyogenes  - Continue zosyn and vancomycin  - MRSA PCR  - Follow CBC  - Follow urine culture  - Trend fevers    Group A streptococcus bacteremia likely secondary to soft tissue infection  -Chronic wounds to left heel and left tibia, recently treated for cellulitis of left lower extremity  - Erythema and warmth extending from ankle to medial mid thigh of left lower extremity  - Urinalysis positive for WBC, leukocyte esterase, and blood.   - ID consult, input appreciated  - Continue zosyn and vancomycin for empiric antibiotic coverage    UTI  - Urinalysis positive for WBC, leukocyte esterase, and blood.  - Altered mental status  - Continue zosyn and vanco  -Follow urine culture    Cellulitis left lower extremity  -Wounds to left heel and left dorsal tibial with erythema and warmth extending from ankle to medial mid thigh   - Recently treated for cellulitis of left lower extremity   -Wound care consult  -Wound culture left dorsal tibial wound  - Dietitian consult  - Continue zosyn and vancomycin for empiric antibiotic coverage  - Daily dressing changes    Acute metabolic encephalopathy  - Presented to ED with AMS, left facial droop, slurred speech, and right arm drift  -CT head negative for acute intracranial process  - Stroke alert initiated and patient not a candidate for TNK d/t on eliquis and unknown last known well  - Family reports history of Dementia and worsening mentation for last several months.   - TSH 2.13  - DKA upon  presentation- now resolved.   -Suspect AMS related to DKA, UTI, and possibly polypharmacy.  - Follow ICU delirium protocol  - SLP eval  - Neuro and pain assessments every 4 hours    SANDI on CKD stage III  - Creatinine 1.73, baseline creatinine ~1.2-1.55  - Received IV fluid resuscitation   -Strict intake and output  -Avoid nephrotoxic agents  - Follow BMP    Hypokalemia  - Potassium level 3.3  - On insulin infusion  - Replace potassium with 20meq oral potassium and 15mmol of potassium phosphate  - Follow BMP    Hypophosphatemia  - Phosphorus level 2.1  - Replace with 15mmol of potassium phsophate  -Check phosphorus in morning      I spent 45 minutes of cumulative critical care time with the patient.  Greater than 50% of that time was spent in the direct collaboration and or coordination of care of the patient.     Dragon dictation software was used to dictate this note and thus there may be minor errors in translation/transcription including garbled speech or misspellings. Please contact for clarification if needed.

## 2024-03-26 NOTE — CARE PLAN
Problem: Pain  Goal: My pain/discomfort is manageable  Outcome: Progressing     Problem: Safety  Goal: Patient will be injury free during hospitalization  Outcome: Progressing  Goal: I will remain free of falls  Outcome: Progressing     Problem: Daily Care  Goal: Daily care needs are met  Outcome: Progressing     Problem: Psychosocial Needs  Goal: Demonstrates ability to cope with hospitalization/illness  Outcome: Progressing  Goal: Collaborate with me, my family, and caregiver to identify my specific goals  Outcome: Progressing     Problem: Discharge Barriers  Goal: My discharge needs are met  Outcome: Progressing     Problem: Fall/Injury  Goal: Not fall by end of shift  Outcome: Progressing  Goal: Be free from injury by end of the shift  Outcome: Progressing  Goal: Verbalize understanding of personal risk factors for fall in the hospital  Outcome: Progressing  Goal: Verbalize understanding of risk factor reduction measures to prevent injury from fall in the home  Outcome: Progressing  Goal: Use assistive devices by end of the shift  Outcome: Progressing  Goal: Pace activities to prevent fatigue by end of the shift  Outcome: Progressing     Problem: Skin  Goal: Decreased wound size/increased tissue granulation at next dressing change  Outcome: Progressing  Flowsheets (Taken 3/25/2024 2141)  Decreased wound size/increased tissue granulation at next dressing change: Promote sleep for wound healing  Goal: Participates in plan/prevention/treatment measures  Outcome: Progressing  Flowsheets (Taken 3/25/2024 2141)  Participates in plan/prevention/treatment measures: Elevate heels  Goal: Prevent/manage excess moisture  Outcome: Progressing  Flowsheets (Taken 3/25/2024 2141)  Prevent/manage excess moisture: Cleanse incontinence/protect with barrier cream  Goal: Prevent/minimize sheer/friction injuries  Outcome: Progressing  Flowsheets (Taken 3/25/2024 2141)  Prevent/minimize sheer/friction injuries: Use pull sheet  Goal:  Promote/optimize nutrition  Outcome: Progressing  Flowsheets (Taken 3/25/2024 2141)  Promote/optimize nutrition: Monitor/record intake including meals  Goal: Promote skin healing  Outcome: Progressing  Flowsheets (Taken 3/25/2024 2141)  Promote skin healing: Turn/reposition every 2 hours/use positioning/transfer devices     Problem: Recent hospitalization or complication while living with DM  Goal: Patient will effectively self-manage their DM disease process  Outcome: Progressing     Problem: Lack of Diabetes disease process knowledge  Goal: Increase knowledge/understanding of diabetes and how to reduce risk of complications  Outcome: Progressing     Problem: Lack of glucose monitoring and target numbers for before and after meals knowledge  Goal: Increase knowledge/understanding of monitoring devices and interpret data to assist with lifestyle change  Outcome: Progressing     Problem: Lack of knowledge on diet for diabetes  Goal: Discover best plan for balanced nutrition to manage diabetes  Outcome: Progressing     Problem: Lack of knowledge on diet for diabetes  Goal: Discover best plan for balanced nutrition to manage diabetes  Outcome: Progressing     Problem: Address barriers to lifestyle change  Goal: Find workable solutions to meet health goals  Outcome: Progressing     Problem: Lack of knowledge on benefits of activity for meeting blood glucose targets and health goals  Goal: Discover best plan for being active and address any barriers  Outcome: Progressing     Problem: Lack of knowldge regarding diabetes medications  Goal: Increase knowledge via education  Outcome: Progressing     Problem: Lack of knowledge on where to find additional support for diabetes  Goal: Increase knowledge of where support can be found to best address patient's need  Outcome: Progressing     Problem: Safety - Medical Restraint  Goal: Remains free of injury from restraints (Restraint for Interference with Medical Device)  Outcome:  Progressing  Goal: Free from restraint(s) (Restraint for Interference with Medical Device)  Outcome: Progressing   The patient's goals for the shift include      The clinical goals for the shift include BS < 300 this shift    Over the shift, the patient did not make progress toward the following goals. Barriers to progression include the patient's altered mental status Recommendations to address these barriers include improve mentation.

## 2024-03-26 NOTE — PROGRESS NOTES
Vancomycin Dosing by Pharmacy- Cessation of Therapy    Consult to pharmacy for vancomycin dosing has been discontinued by the prescriber, pharmacy will sign off at this time.    Please call pharmacy if there are further questions or re-enter a consult if vancomycin is resumed.     Jefe Costa, PharmD

## 2024-03-26 NOTE — PROGRESS NOTES
03/26/24 1229   Discharge Planning   Living Arrangements Alone   Support Systems Family members   Assistance Needed ADL's, IADL's, wound care, transportation   Type of Residence Private residence   Home or Post Acute Services None   Patient expects to be discharged to: SNF   Does the patient need discharge transport arranged? Yes   RoundTrip coordination needed? Yes   Has discharge transport been arranged? No   Patient Choice   Provider Choice list and CMS website (https://medicare.gov/care-compare#search) for post-acute Quality and Resource Measure Data were provided and reviewed with: Family     Patient admitted to ICU with DKA, UTI, delirium. Call to patient's sister Sandra to discuss discharge needs. PT/OT evaluations have been completed with patient recommended for MOD intensity therapies at discharge. Patient lives alone in a one bedroom apartment. She is non-ambulatory at baseline. She has hx of knee replacement but due to infection, the hardware was removed and there are no plans to replace. Sandra described patient as having a history of noncompliance with her medical care. Patient was hospitalized a couple of months ago after having stopped taking all of her medications because she was upset with her pharmacy. About a month ago, patient was started on oral ATBX for a UTI. Sandra is not certain if patient actually completed her course of medication. Patient typically sits in her wheelchair throughout the day. She has become too weak to transfer herself to her BSC so usually wears multiple incontinence pads or diapers at once. Additionally, patient has wounds that have been difficult to heal. After her most recent hospitalization, patient had a skilled stay at the [facility]. She was discharged home with home health care, but patient has been discharged from those services. Patient has been to the [facility] several times, and has been planning to move there long term in June of this year. Sandra is  requesting a referral to the [facility] at this time, and is hoping that patient will be able to remain at that facility as a long term resident after her skilled days end. [facility] to [facility] to [facility] following.     [address] [facility] is able to accept. Patient will need insurance authorization to admit. This process will be started once patient is closer to medical readiness for discharge.

## 2024-03-26 NOTE — PROGRESS NOTES
Speech-Language Pathology Clinical Swallow Evaluation    Patient Name: Teresa Matthews  MRN: [id number]  : 1950  Today's Date: 24  Start time: Start Time: 1042  Stop time: Stop Time: 1110  Time calculation (min) : Time Calculation (min): 28 min    ASSESSMENT  Impressions:  No oropharyngeal dysphagia identified during swallow assessment.     Prognosis: Good      PLAN  Recommendations:  MBSS recommended: No; no pharyngeal dysphagia suspected.  Solid consistency: Regular (IDDSI level 7)  Liquid consistency: Thin (IDDSI 0)  Medication administration: Whole, in thin liquid  Compensatory swallow strategies: Upright positioning for all PO intake, Remain upright for >30 min after meals, Slow rate of intake, Small bites, Small sips, Alternate bites and sips, and Do not feed unless pt is fully alert and upright    Recommended frequency/duration:  Skilled SLP services recommended: No  Frequency: N/A  Duration: N/A  Discharge recommendation: See PT/OT notes for discharge plan  Strengths: Anticipated fair or good medical prognosis  Barriers to participation in tx: Severity of symptoms and Baseline impaired functional status      Goals: N/A    REASON FOR ADMISSION:  CHIEF COMPLAINT: presented to ED today with confusion. Patient is a poor historian, history supplemented by ED records. EMS report the patient has slurred speech and left-sided facial droop and right-sided drift on exam. No last known well.     Patient suspected to be in DKA and started on insulin drip. UA shows WBCs and positive leukocyte esterase with suspected UTI, started on vancomycin and Zosyn.     PMHx relevant to rehab: type 2 diabetes poorly controlled on Humalog, A-fib on Eliquis, CHF, left total knee arthroplasty complicated by E. coli infection and MRSA infection, COPD, CKD stage III     Relevant imaging results: CT head shows no acute abnormalities, patient is on Eliquis and therefore not a candidate for TNK.       SUBJECTIVE  SLP  Received On: 03/26/24  Patient Class: Inpatient  Living Environment: Lives alone  Ordering Physician: Dr. Mack  Reason for Referral: suspect oropharygneal dysphagia  Prior to Session Communication: Bedside nurse    RN cleared pt to participate in session and reported that pt took her pills with water via straw.     Pt/family reported N/A    Nutritional status: Appears well-nourished/no concerns          [employer] Diet: Regular diet and Thin Liquids  Current Diet : Regular diet and Thin Liquids    Pain Assessment  Pain Assessment: 0-10  Pain Score: 0 - No pain  Clinical Progression: Not changed    Behavior/Cognition: Alert, Cooperative, Pleasant mood  Orientation: Oriented to self and Oriented to location, city and president. With cues season.  Needed month and year.   Ability to follow functional commands: Follows simple commands - for oral motor exam.   Respiratory Status: Oxygen via nasal cannula (4lt nc - room air at baseline)     Volitional Cough: Strong  Volitional Swallow: Within Functional Limits  Patient positioning: Upright in bed      OBJECTIVE  Clinical swallow evaluation completed and consisted of interview, oral motor assessment, and PO trials (8 bites applesauce, single and multiple sips of water via straw and the 3oz water protocol via straw, 5 bites cubed pears drained and in liquid with 3 to 4 cubes per spoonful and 2 bites of kristina cracker).    ORAL MOTOR: Dentition: Natural dentition in good condition.  Oral Hygiene: Oral mucosa were pink, moist, and free of obvious lesions. Lingual strength and ROM were WFL. Labial strength/ROM were WFL. Labial seal was adequate. Palatal elevation: WFL    ORAL PHASE: Mastication of regular solids was slow, but functional.  A/P transit was slow, but functional.  Piecemeal deglutition noted with soft and hard solids however, No oral residuals were seen after the final swallow.    PHARYNGEAL PHASE: Laryngeal elevation was visualized or palpated with all trials,  however adequacy of hyolaryngeal elevation/excursion cannot be determined at bedside. No immediate or delayed s/sx aspiration/penetration were observed with any consistencies.    Continue baseline diet of Regular diet Texture and Thin liquids as tolerated.    D/W RN to order softer foods to start and advance as tolerated.     Pt at baseline for swallow function.  SLP will sign off.       Treatment/Education:  Results and recommendations were relayed to: Patient, Bedside nurse, and Physician  Education provided: Yes   Learner: Patient   Barriers to learning: Acuteness of illness barrier   Method of teaching: Verbal   Topic: role of ST and results of assessment   Outcome of teaching: Pt verbalized understanding  Treatment provided: No    Next Treatment Priority: N/A pt at baseline with swallow function

## 2024-03-26 NOTE — SIGNIFICANT EVENT
Palliative care meeting scheduled for 3/27 at 10 am via phone with sister, Sandra    Patient confused and unable to participate. Phone call with patient's niece, Shleley, who is listed as health care power of . She is deferring to her mother, Sandra, who is also patient's sister and 1st alternate listed on health care power of .     Updated care team

## 2024-03-27 ENCOUNTER — APPOINTMENT (OUTPATIENT)
Dept: CARDIOLOGY | Facility: HOSPITAL | Age: 74
DRG: 871 | End: 2024-03-27
Payer: MEDICARE

## 2024-03-27 LAB
ANION GAP SERPL CALC-SCNC: 14 MMOL/L (ref 10–20)
ATRIAL RATE: 70 BPM
BUN SERPL-MCNC: 27 MG/DL (ref 6–23)
CALCIUM SERPL-MCNC: 8.2 MG/DL (ref 8.6–10.3)
CHLORIDE SERPL-SCNC: 98 MMOL/L (ref 98–107)
CO2 SERPL-SCNC: 20 MMOL/L (ref 21–32)
CREAT SERPL-MCNC: 1.56 MG/DL (ref 0.5–1.05)
EGFRCR SERPLBLD CKD-EPI 2021: 35 ML/MIN/1.73M*2
ERYTHROCYTE [DISTWIDTH] IN BLOOD BY AUTOMATED COUNT: 14.6 % (ref 11.5–14.5)
GLUCOSE BLD MANUAL STRIP-MCNC: 173 MG/DL (ref 74–99)
GLUCOSE BLD MANUAL STRIP-MCNC: 177 MG/DL (ref 74–99)
GLUCOSE BLD MANUAL STRIP-MCNC: 198 MG/DL (ref 74–99)
GLUCOSE BLD MANUAL STRIP-MCNC: 288 MG/DL (ref 74–99)
GLUCOSE BLD MANUAL STRIP-MCNC: 295 MG/DL (ref 74–99)
GLUCOSE SERPL-MCNC: 291 MG/DL (ref 74–99)
HCT VFR BLD AUTO: 29.7 % (ref 36–46)
HGB BLD-MCNC: 9.8 G/DL (ref 12–16)
MAGNESIUM SERPL-MCNC: 1.72 MG/DL (ref 1.6–2.4)
MCH RBC QN AUTO: 27.6 PG (ref 26–34)
MCHC RBC AUTO-ENTMCNC: 33 G/DL (ref 32–36)
MCV RBC AUTO: 84 FL (ref 80–100)
NRBC BLD-RTO: 0 /100 WBCS (ref 0–0)
P AXIS: -84 DEGREES
P OFFSET: 197 MS
P ONSET: 131 MS
PHOSPHATE SERPL-MCNC: 3.1 MG/DL (ref 2.5–4.9)
PLATELET # BLD AUTO: 127 X10*3/UL (ref 150–450)
POTASSIUM SERPL-SCNC: 3.5 MMOL/L (ref 3.5–5.3)
PR INTERVAL: 168 MS
Q ONSET: 215 MS
QRS COUNT: 12 BEATS
QRS DURATION: 106 MS
QT INTERVAL: 426 MS
QTC CALCULATION(BAZETT): 460 MS
QTC FREDERICIA: 448 MS
R AXIS: 96 DEGREES
RBC # BLD AUTO: 3.55 X10*6/UL (ref 4–5.2)
SODIUM SERPL-SCNC: 128 MMOL/L (ref 136–145)
T AXIS: 57 DEGREES
T OFFSET: 428 MS
VENTRICULAR RATE: 70 BPM
WBC # BLD AUTO: 11.4 X10*3/UL (ref 4.4–11.3)

## 2024-03-27 PROCEDURE — 2500000004 HC RX 250 GENERAL PHARMACY W/ HCPCS (ALT 636 FOR OP/ED)

## 2024-03-27 PROCEDURE — 93010 ELECTROCARDIOGRAM REPORT: CPT | Performed by: INTERNAL MEDICINE

## 2024-03-27 PROCEDURE — 2500000004 HC RX 250 GENERAL PHARMACY W/ HCPCS (ALT 636 FOR OP/ED): Performed by: STUDENT IN AN ORGANIZED HEALTH CARE EDUCATION/TRAINING PROGRAM

## 2024-03-27 PROCEDURE — 2500000002 HC RX 250 W HCPCS SELF ADMINISTERED DRUGS (ALT 637 FOR MEDICARE OP, ALT 636 FOR OP/ED): Performed by: INTERNAL MEDICINE

## 2024-03-27 PROCEDURE — 2500000002 HC RX 250 W HCPCS SELF ADMINISTERED DRUGS (ALT 637 FOR MEDICARE OP, ALT 636 FOR OP/ED): Mod: MUE

## 2024-03-27 PROCEDURE — 36415 COLL VENOUS BLD VENIPUNCTURE: CPT | Performed by: STUDENT IN AN ORGANIZED HEALTH CARE EDUCATION/TRAINING PROGRAM

## 2024-03-27 PROCEDURE — 2500000001 HC RX 250 WO HCPCS SELF ADMINISTERED DRUGS (ALT 637 FOR MEDICARE OP)

## 2024-03-27 PROCEDURE — 99222 1ST HOSP IP/OBS MODERATE 55: CPT | Performed by: INTERNAL MEDICINE

## 2024-03-27 PROCEDURE — 36415 COLL VENOUS BLD VENIPUNCTURE: CPT

## 2024-03-27 PROCEDURE — 99233 SBSQ HOSP IP/OBS HIGH 50: CPT | Performed by: FAMILY MEDICINE

## 2024-03-27 PROCEDURE — 97110 THERAPEUTIC EXERCISES: CPT | Mod: GO,CO

## 2024-03-27 PROCEDURE — 97530 THERAPEUTIC ACTIVITIES: CPT | Mod: GP | Performed by: PHYSICAL THERAPIST

## 2024-03-27 PROCEDURE — 99418 PROLNG IP/OBS E/M EA 15 MIN: CPT

## 2024-03-27 PROCEDURE — 83735 ASSAY OF MAGNESIUM: CPT

## 2024-03-27 PROCEDURE — 1200000002 HC GENERAL ROOM WITH TELEMETRY DAILY

## 2024-03-27 PROCEDURE — 2500000002 HC RX 250 W HCPCS SELF ADMINISTERED DRUGS (ALT 637 FOR MEDICARE OP, ALT 636 FOR OP/ED)

## 2024-03-27 PROCEDURE — 97530 THERAPEUTIC ACTIVITIES: CPT | Mod: GO,CO

## 2024-03-27 PROCEDURE — 2500000004 HC RX 250 GENERAL PHARMACY W/ HCPCS (ALT 636 FOR OP/ED): Performed by: FAMILY MEDICINE

## 2024-03-27 PROCEDURE — 82947 ASSAY GLUCOSE BLOOD QUANT: CPT

## 2024-03-27 PROCEDURE — 2500000002 HC RX 250 W HCPCS SELF ADMINISTERED DRUGS (ALT 637 FOR MEDICARE OP, ALT 636 FOR OP/ED): Performed by: FAMILY MEDICINE

## 2024-03-27 PROCEDURE — 99223 1ST HOSP IP/OBS HIGH 75: CPT

## 2024-03-27 PROCEDURE — 84100 ASSAY OF PHOSPHORUS: CPT

## 2024-03-27 PROCEDURE — 93005 ELECTROCARDIOGRAM TRACING: CPT

## 2024-03-27 PROCEDURE — 87040 BLOOD CULTURE FOR BACTERIA: CPT | Mod: PORLAB | Performed by: STUDENT IN AN ORGANIZED HEALTH CARE EDUCATION/TRAINING PROGRAM

## 2024-03-27 PROCEDURE — 80048 BASIC METABOLIC PNL TOTAL CA: CPT

## 2024-03-27 PROCEDURE — 97110 THERAPEUTIC EXERCISES: CPT | Mod: GP | Performed by: PHYSICAL THERAPIST

## 2024-03-27 PROCEDURE — 85027 COMPLETE CBC AUTOMATED: CPT

## 2024-03-27 RX ORDER — TRAZODONE HYDROCHLORIDE 50 MG/1
50 TABLET ORAL NIGHTLY PRN
Status: DISCONTINUED | OUTPATIENT
Start: 2024-03-27 | End: 2024-03-30 | Stop reason: HOSPADM

## 2024-03-27 RX ORDER — POTASSIUM CHLORIDE 20 MEQ/1
20 TABLET, EXTENDED RELEASE ORAL ONCE
Status: COMPLETED | OUTPATIENT
Start: 2024-03-27 | End: 2024-03-27

## 2024-03-27 RX ORDER — METFORMIN HYDROCHLORIDE 500 MG/1
1000 TABLET, EXTENDED RELEASE ORAL 2 TIMES DAILY
Status: ON HOLD | COMMUNITY
End: 2024-06-06 | Stop reason: SINTOL

## 2024-03-27 RX ORDER — INSULIN LISPRO 100 [IU]/ML
10 INJECTION, SOLUTION INTRAVENOUS; SUBCUTANEOUS
Status: DISCONTINUED | OUTPATIENT
Start: 2024-03-27 | End: 2024-03-30 | Stop reason: HOSPADM

## 2024-03-27 RX ORDER — LANOLIN ALCOHOL/MO/W.PET/CERES
400 CREAM (GRAM) TOPICAL DAILY
Status: DISCONTINUED | OUTPATIENT
Start: 2024-03-27 | End: 2024-03-30 | Stop reason: HOSPADM

## 2024-03-27 RX ADMIN — AMIODARONE HYDROCHLORIDE 200 MG: 200 TABLET ORAL at 08:08

## 2024-03-27 RX ADMIN — INSULIN HUMAN 20 UNITS: 100 INJECTION, SUSPENSION SUBCUTANEOUS at 21:12

## 2024-03-27 RX ADMIN — APIXABAN 5 MG: 5 TABLET, FILM COATED ORAL at 08:09

## 2024-03-27 RX ADMIN — INSULIN LISPRO 6 UNITS: 100 INJECTION, SOLUTION INTRAVENOUS; SUBCUTANEOUS at 08:04

## 2024-03-27 RX ADMIN — INSULIN LISPRO 2 UNITS: 100 INJECTION, SOLUTION INTRAVENOUS; SUBCUTANEOUS at 17:47

## 2024-03-27 RX ADMIN — INSULIN LISPRO 10 UNITS: 100 INJECTION, SOLUTION INTRAVENOUS; SUBCUTANEOUS at 17:45

## 2024-03-27 RX ADMIN — AMPICILLIN SODIUM AND SULBACTAM SODIUM 3 G: 2; 1 INJECTION, POWDER, FOR SOLUTION INTRAMUSCULAR; INTRAVENOUS at 15:37

## 2024-03-27 RX ADMIN — PIPERACILLIN SODIUM AND TAZOBACTAM SODIUM 3.38 G: 3; .375 INJECTION, SOLUTION INTRAVENOUS at 06:19

## 2024-03-27 RX ADMIN — INSULIN LISPRO 10 UNITS: 100 INJECTION, SOLUTION INTRAVENOUS; SUBCUTANEOUS at 13:00

## 2024-03-27 RX ADMIN — SPIRONOLACTONE 25 MG: 25 TABLET ORAL at 08:12

## 2024-03-27 RX ADMIN — POTASSIUM CHLORIDE 20 MEQ: 1500 TABLET, EXTENDED RELEASE ORAL at 10:21

## 2024-03-27 RX ADMIN — METOPROLOL TARTRATE 25 MG: 25 TABLET, FILM COATED ORAL at 08:09

## 2024-03-27 RX ADMIN — INSULIN HUMAN 20 UNITS: 100 INJECTION, SUSPENSION SUBCUTANEOUS at 08:05

## 2024-03-27 RX ADMIN — Medication 400 MG: at 10:21

## 2024-03-27 RX ADMIN — INSULIN LISPRO 6 UNITS: 100 INJECTION, SOLUTION INTRAVENOUS; SUBCUTANEOUS at 13:00

## 2024-03-27 RX ADMIN — LOSARTAN POTASSIUM 25 MG: 25 TABLET, FILM COATED ORAL at 08:09

## 2024-03-27 RX ADMIN — PANTOPRAZOLE SODIUM 40 MG: 40 TABLET, DELAYED RELEASE ORAL at 06:19

## 2024-03-27 RX ADMIN — APIXABAN 5 MG: 5 TABLET, FILM COATED ORAL at 21:11

## 2024-03-27 RX ADMIN — ASPIRIN 81 MG: 81 TABLET, COATED ORAL at 18:43

## 2024-03-27 RX ADMIN — BUPROPION HYDROCHLORIDE 150 MG: 150 TABLET, EXTENDED RELEASE ORAL at 08:09

## 2024-03-27 RX ADMIN — OXYCODONE HYDROCHLORIDE AND ACETAMINOPHEN 2 TABLET: 5; 325 TABLET ORAL at 13:06

## 2024-03-27 RX ADMIN — LEVOTHYROXINE SODIUM 175 MCG: 125 TABLET ORAL at 08:08

## 2024-03-27 RX ADMIN — HYDROPHOR: 42 OINTMENT TOPICAL at 08:10

## 2024-03-27 RX ADMIN — DESVENLAFAXINE SUCCINATE 100 MG: 100 TABLET, FILM COATED, EXTENDED RELEASE ORAL at 08:09

## 2024-03-27 RX ADMIN — PIPERACILLIN SODIUM AND TAZOBACTAM SODIUM 3.38 G: 3; .375 INJECTION, SOLUTION INTRAVENOUS at 13:00

## 2024-03-27 RX ADMIN — PIPERACILLIN SODIUM AND TAZOBACTAM SODIUM 3.38 G: 3; .375 INJECTION, SOLUTION INTRAVENOUS at 00:51

## 2024-03-27 RX ADMIN — NYSTATIN 1 APPLICATION: 100000 POWDER TOPICAL at 08:11

## 2024-03-27 RX ADMIN — AMPICILLIN SODIUM AND SULBACTAM SODIUM 3 G: 2; 1 INJECTION, POWDER, FOR SOLUTION INTRAMUSCULAR; INTRAVENOUS at 23:15

## 2024-03-27 RX ADMIN — METOPROLOL TARTRATE 25 MG: 25 TABLET, FILM COATED ORAL at 21:11

## 2024-03-27 ASSESSMENT — COGNITIVE AND FUNCTIONAL STATUS - GENERAL
DRESSING REGULAR UPPER BODY CLOTHING: A LOT
TURNING FROM BACK TO SIDE WHILE IN FLAT BAD: A LOT
STANDING UP FROM CHAIR USING ARMS: TOTAL
HELP NEEDED FOR BATHING: A LOT
TOILETING: TOTAL
DRESSING REGULAR LOWER BODY CLOTHING: TOTAL
EATING MEALS: A LITTLE
MOBILITY SCORE: 8
WALKING IN HOSPITAL ROOM: TOTAL
MOVING TO AND FROM BED TO CHAIR: TOTAL
MOVING FROM LYING ON BACK TO SITTING ON SIDE OF FLAT BED WITH BEDRAILS: A LOT
PERSONAL GROOMING: A LITTLE
CLIMB 3 TO 5 STEPS WITH RAILING: TOTAL
DAILY ACTIVITIY SCORE: 12

## 2024-03-27 ASSESSMENT — ENCOUNTER SYMPTOMS
ACTIVITY CHANGE: 1
UNEXPECTED WEIGHT CHANGE: 0
CONFUSION: 1
CONFUSION: 0
FATIGUE: 1
POLYDIPSIA: 1
WEAKNESS: 1

## 2024-03-27 ASSESSMENT — PAIN SCALES - GENERAL
PAINLEVEL_OUTOF10: 0 - NO PAIN
PAINLEVEL_OUTOF10: 8
PAINLEVEL_OUTOF10: 0 - NO PAIN
PAINLEVEL_OUTOF10: 0 - NO PAIN

## 2024-03-27 ASSESSMENT — PAIN - FUNCTIONAL ASSESSMENT
PAIN_FUNCTIONAL_ASSESSMENT: 0-10

## 2024-03-27 ASSESSMENT — PAIN DESCRIPTION - LOCATION: LOCATION: KNEE

## 2024-03-27 ASSESSMENT — PAIN DESCRIPTION - ORIENTATION: ORIENTATION: LEFT

## 2024-03-27 NOTE — PROGRESS NOTES
Teresa Matthews is a 73 y.o. female on day 2 of admission presenting with Delirium.      Subjective   73 y.o. female with a past medical history of type 2 diabetes poorly controlled on Humalog, A-fib on Eliquis, CHF, left total knee arthroplasty complicated by E. coli infection and MRSA infection, COPD, CKD stage III presented to ED today with confusion.  Patient is a poor historian, history supplemented by ED records.  EMS report the patient has slurred speech and left-sided facial droop and right-sided drift on exam.  No last known well.     ED course: Stroke alert was called on arrival.  NIH stroke score 3 for dysarthria and facial droop and answering wrong month.  CT head shows no acute abnormalities, patient is on Eliquis and therefore not a candidate for TNK.  Glucose 609, bicarb 15, pH 7.26 with gap of 24.  Potassium 4.6.  Patient suspected to be in DKA and started on insulin drip.  UA shows WBCs and positive leukocyte esterase with suspected UTI, started on vancomycin and Zosyn.  EKG shows sinus rhythm rate 92 with no ST changes.  Nurse found sacral and extremity wounds and dressed them.      3/26:                Today patient seen in the ICU in the presence of family member.  She is awake alert and interactive appropriately but appears weak and ill.  Voices no specific complaints and no acute events overnight.  Echocardiogram is suggesting mitral valve endocarditis.  Blood cultures 2 out of 2 sets growing group A strep.  Continues on Zosyn at this time.  Anion gap has closed and transition to basal and bolus insulin.  Significant temperature elevation of 104 at 7 PM last evening.  Has remained afebrile through the day today.  Otherwise denies interval new symptoms or complaints including chest pain palpitations pleuritic type pain worsening shortness of breath cough sputum nausea abdominal pain flank pain diarrhea fever or chills.    3/27:                Today patient remains in the ICU.  She is  somewhat somnolent today.  Voices no specific complaints and no acute events overnight.  Endocrinology describes hemoglobin A1c of 15.4.  Starting on NPH 20 twice daily as well as lispro 10 3 times daily and SSI.  Cardiology planning ELISEO in view of possible mitral valve vegetations on TTE.  Continues on Rocephin at this point in place of Zosyn in view of group A strep bacteremia and right leg cellulitis.  Otherwise denies interval new symptoms or complaints including chest pain palpitations pleuritic type pain worsening shortness of breath cough sputum nausea abdominal pain flank pain diarrhea fever or chills.         Objective     Last Recorded Vitals  /66   Pulse 68   Temp 36 °C (96.8 °F) (Temporal)   Resp 17   Wt 113 kg (250 lb)   SpO2 98%   Intake/Output last 3 Shifts:    Intake/Output Summary (Last 24 hours) at 3/27/2024 1801  Last data filed at 3/27/2024 1751  Gross per 24 hour   Intake 0 ml   Output 2151 ml   Net -2151 ml         Admission Weight  Weight: 113 kg (250 lb) (03/25/24 1149)    Daily Weight  03/25/24 : 113 kg (250 lb)    Image Results  Electrocardiogram, 12-lead PRN ACS symptoms  Unusual P axis, possible ectopic atrial rhythm with undetermined rhythm irregularity  Rightward axis  Incomplete left bundle branch block  Abnormal ECG  When compared with ECG of 27-MAR-2024 09:27, (unconfirmed)  No significant change was found  Confirmed by Raffi Hendrickson ([id number]) on 3/27/2024 9:49:24 AM      Physical Exam  Gen: Obese elderly  female , no acute distress but does appear ill but nontoxic  HEENT: Normocephalic, atraumatic, good dentition, no oral lesions appreciated.  Sclera nonicteric  Neck: No cervical lymphadenopathy appreciated, no thyroid enlargement appreciated  CV: Regular rate and rhythm, S1 and S2 present, no murmurs rubs or gallops appreciated  Resp: Lungs clear to auscultation bilaterally, no ronchi, rales or wheezes appreciated  Abdomen: soft, nontender, nondistended  MSK: No  joint swelling appreciated  Psych: Mildly flat mood and affect     Relevant Results               Assessment/Plan   This patient currently has cardiac telemetry ordered; if you would like to modify or discontinue the telemetry order, click here to go to the orders activity to modify/discontinue the order.              Principal Problem:    Delirium  Active Problems:    Atrial fibrillation (CMS/[facility])    Chronic pain    GERD (gastroesophageal reflux disease)    Hypothyroidism, postsurgical    Complicated UTI (urinary tract infection)    Depression    Diabetic ketoacidosis without coma associated with type 2 diabetes mellitus (CMS/HCC)    Sacral wound    Sepsis without acute organ dysfunction (CMS/[facility])    Hypomagnesemia    Diabetic ketoacidosis without coma associated with type 2 diabetes mellitus (CMS/[facility])  Assessment & Plan   on arrival with open anion gap  Started on insulin drip in ED, will continue   Recheck BMP shows AG still open, will continue drip until AG closes  Serial labs every 4 hours while on drip and until gap closes  NPO for now, will add IVF   Patient reports compliance with regimen, outpatient notes indicate she stopped metformin last week d/t diarrhea  Last A1c 15.4%  Crit care following, plan to discontinue drip and switch to insulin subcutaneous when bicarb >15 and AG<15  3/26: DKA resolved and now on subcu insulin.  Awaiting endocrinology evaluation.  Likely triggered by sepsis.     Sepsis without acute organ dysfunction (CMS/[facility])  Assessment & Plan  Patient meets sepsis criteria on arrival with high fever, leukocytosis, tachycardia, and suspected source of infection  S/p IVF, vancomycin and zosyn in ED  Will continue zosyn for now and fluids  Blood and urine cultures pending  Uptrending lactate, will continue IVF and monitor  3/26: Lactic trending down.  Likely related to group A strep bacteremia.  Also appears likely endocarditis.     Complicated UTI (urinary tract infection)  Assessment &  Plan  UA shows moderate LE  Suspect trigger for DKA with AMS  S/p vancomycin and zosyn in ED. Will continue both vancomycin and zosyn for now  Blood and urine cultures pending, plan to de-escalate antibiotics  3/26: Urine culture growing multiple organisms.     * Delirium  Assessment & Plan  Per EMS patient had a left facial droop and a right arm drift. ED notes left facial droop with slurred speech which EMS states is new.   Stroke alert was activated in ED, but she is not a candidate for TNK given she is on Eliquis. There is no large vessel seen that needs to be intervened.   NIH score 3 for dysarthria, facial droop and answering wrong time, repeat 2.  Suspect AMS secondary to DKA and UTI  Will treat underlying conditions and monitor for improvement.   Also concern for polypharmacy as patient is on narcotics, gabapentin, SSRIs, thyroid meds. If not improving then will evaluate for medication overuse/noncompliance  3/26: Appears significantly improved cognitive status likely related to sepsis.     Hypomagnesemia  Assessment & Plan  Mg 1.41, 1.36  Repleting IV, will monitor. Goal Mg>2.0     Sacral wound  Assessment & Plan  Hx of infected wounds and OM  Wound care nurse consult  Concern for patient self-care at home      Depression  Assessment & Plan  Continue home Bupropion, Desvenlafaxine, Trazodone     Hypothyroidism, postsurgical  Assessment & Plan  Continue home levothyroxine  Unclear if patient taking 175 or 225mcg  TSH pending, but may be skewed by sepsis and acute illness     GERD (gastroesophageal reflux disease)  Assessment & Plan  Hold home famotidine  Home omeprazole substituted with pantoprazole as per formulary     Chronic pain  Assessment & Plan  Continue home percocet, gabapentin. OARRS reviewed and appropriate.     Atrial fibrillation (CMS/[facility])  Assessment & Plan  Continue home amiodarone, apixaban, metoprolol    Endocarditis, group A strep bacteremia, right lower extremity cellulitis        At this  point continues on Rocephin.  Awaiting ELISEO.                       Abner Bello MD

## 2024-03-27 NOTE — PROGRESS NOTES
Per MESSI ANTONIO, palliative care coordinator: Met with pt and/or family to discuss goals of care, palliative and hospice services. Pt and/or family chose palliative services. Discussed [facility] (with financial disclosure) and community palliative options. Pt and/or family chose [facility]. Social work placed referral, care team notified, agency will arrange meeting with family to discuss services.

## 2024-03-27 NOTE — CONSULTS
Palliative Care Consultation    History obtained from: patient, sister/1st alternate health care power of  Sandra (acting as HCPOA as agent deferred) as well as medical records    HPI: Tereas Matthews is a 73 y.o. female with past medical history significant for poorly controlled DM II, Afib, CHF, left total knee arthroplasty complicated by E.coli and MRSA infection, COPD, CKD stage III, hypothyroidism, depression, chronic pain on opioids, and non-adherence with diuretics/medications. Patient presented to the ED 3/25 d/t altered mental status and slurred speech. Patient was found to have DKA, sacral and extremity wounds, severe sepsis, complicated UTI, group A streptococcus bacteremia likely secondary to soft tissue cellulitis of left lower extremity, acute metabolic encephalopathy, and SANDI on CKD stage III.      PSH: liseth knee surgeries, left toes removed 3 years ago, left knee surgery s/p hardware removal d/t infection, gastric bypass surgery 10 years ago     FH: father strokes, mother coronary artery disease-lymphoma-lung cancer     Palliative care consulted for goals of care, code status, advanced directive, high risk for readmissions, symptom management and outpatient support.    Spoke to sister Sandra who is 1st alternate and acting as health care agent as niece Shelley has deferred. Patient provided permission for me to speak to Sandra. Discussed PMH, HPI, and current hospitalization. Sandra does not feel patient can return back to her apartment and wishes for patient to go to [facility] under skilled followed by long term care. Later returned to patient's room who was in agreement with DNR/DNI and OPPC with [facility]. Answered questions, provided support and education.     Social History   Marital Status: single  Children: 1 biological child at age 18 who was adopted to another family at birth   Employment: banking, income  for [employer]   Hepzibah Status: not a   "  Tobacco/alcohol/elicit substance use history: no smoking, alcohol or recreational drug use   Moravian/Cultural/Spiritual: Restorationist, member of [employer]   Patient finds aaliyah and happiness in: food, baked good, candy, great nephew, niece Shelley     Living arrangement and functional status prior to admission: Teresa lives in a one bedroom apartment alone where she is non-ambulatory at baseline d/t knee replacement complicated by infection and having increased difficulty transferring to wheelchair and performing ADL's   Recent falls: Feb head laceration, June tib/fib fx including 3 major falls over last 8-9 months  Cognitive status: worsening over last 3 months short term, personality changes   ADL's dressing/bathing: sponge baths, family assist washing hair in sink. No shower for months. Patient able to change her pajamas in the wheelchair   Assistive devices: wheelchair, sits in it throughout the day. Has a BSC but unable to transfer herself from wheelchair and uses briefs/incontinence care  Cooking/cleaning: able to prepare simple things, family brought meals. Cleaning lady once a week  Managing finances: patient   Grocery shopping: family or neighbor   Medication management: non-adherent, stopped taking all of her medications a couple of months ago because she was upset with her pharmacy  Transportation: wheelchair accessible  [employer] bus, unable to get in/out care    Prior Hospitalizations: patient has had 4 hospitalizations and 1 ED visit since June 2/12/24: ED fall with head injury/laceration  [facility] cell[facility] discharged to [facility]  7/26-7/29/24: [facility]: left heel ulcer  6/18-6/20/24: [facility]: fall left tib/fib fx     Patient/family description of QOL over the last 6 months: per Sandra \"doesn't really have one because she can't do anything, can't go anywhere\"     Goals of Care  Decisional Capacity: unlikely but appears to have improved " "cognition since admit, waxing/waning  Understanding of illness: Sandra=good, patient=fair   Most important at this time: Sandra= \"to be taken care of and safe\"  Expectations of treatment: continue current aggressive care with DNR/DNI, further hospitalizations as needed, SNF on discharge with plan for likely LTC following, [facility] outpatient palliative care referral  Fears: denies     Advanced Care Planning/Advanced Directives   Health care power of : on file    Living Will: on file   [place] form: [place]    Current code status: Full Code   Code status discussed today? DNR/DNI    Is the patient outpatient palliative eligible? yes  Was a discussion/recommendation held regarding palliative care services? yes, provided information   Agency of patient and family choice: [facility]     Review of Systems   Constitutional:  Positive for activity change and fatigue.   Neurological:  Positive for weakness.   Psychiatric/Behavioral:  Positive for confusion.        Symptom Assessment  Pain: Chronic, currently denies  OARRS reviewed oxycodone-acetaminophen , 6 tabs per day, gabapentin 600 mg tabs TID  Inpatient: wilian 600 mg TID and oxycodone-acetaminophen 5-325 mg 2 tabs q 4 PRN (not used since admission 2 days ago), recommend monitoring for w/d symptoms   Depression: wellbutrin  Insomnia: trazodone 50 mg at HS  Appetite: good     Physical Exam  Constitutional:       General: She is not in acute distress.  HENT:      Head: Normocephalic.      Nose: Nose normal.      Mouth/Throat:      Mouth: Mucous membranes are moist.   Eyes:      General: No scleral icterus.  Cardiovascular:      Rate and Rhythm: Normal rate.   Pulmonary:      Effort: Pulmonary effort is normal.   Abdominal:      Palpations: Abdomen is soft.   Musculoskeletal:      Cervical back: Neck supple.   Skin:     General: Skin is warm and dry.   Neurological:      Mental Status: She is alert.      Motor: Weakness present.      Comments: " Answering questions appropriately, follows commands   Psychiatric:         Mood and Affect: Mood normal.       Plan    Goals of care: continue current aggressive care with DNR/DNI, further hospitalizations as needed, SNF on discharge with plan for likely LTC following, [facility] outpatient palliative care referral  Health care power of : chandana Rosa who has deferred to patient's sister Sandra  Code status: DNR/DNI per acting POA and patient wishes [place]: completed [place] D[place] formerly [facility] and provided to patient, copy placed in hospital chart  Consult music therapy and pastoral care   Outpatient referral: [facility] outpatient palliative care    Collaborated with: RN, Dr. Bello, TCC, SW    I spent 90 minutes in the professional and overall care of this patient.      Yo Dominguez, APRN-CNP

## 2024-03-27 NOTE — PROGRESS NOTES
Occupational Therapy    Occupational Therapy Treatment    Name: Teresa Matthews  MRN: [id number]  : 1950  Date: 24  Time Calculation  Start Time: 1112  Stop Time: 1135  Time Calculation (min): 23 min    Assessment:  OT Assessment: pt tolerated session well this date, with improvement in sitting balance and EOB sitting tolerance. Pt would benefit from continued OT services to increase strength and endurance required to return to PLOF safely and independently.  End of Session Communication: Bedside nurse  End of Session Patient Position: Bed, 3 rail up, Alarm on  Plan:  Treatment Interventions: ADL retraining, Functional transfer training, UE strengthening/ROM, Endurance training, Neuromuscular reeducation  OT Frequency: 3 times per week  OT Discharge Recommendations: Moderate intensity level of continued care  OT - OK to Discharge: Yes    Subjective   Previous Visit Info:  OT Last Visit  OT Received On: 24  General:  General  Reason for Referral: Dx: slurred speech, DKA, sepsis, UTI  Referred By: Frederick  Past Medical History Relevant to Rehab: DM, A fib, CHF, L TKA, COPD, CKD, morbid obesity  Co-Treatment: PT  Co-Treatment Reason: to optimize safety and mobility, while focusing on discipline specific goals  Prior to Session Communication: Bedside nurse, PCT/NA/CTA  Patient Position Received: Bed, 3 rail up, Alarm on  General Comment: pt agreeable to OT tx session  Precautions:  Medical Precautions: Fall precautions, Oxygen therapy device and L/min  Precautions Comment: falls, O2  Vitals:     Pain Assessment:  Pain Assessment  Pain Score:  (no rating of pain, c/o pain in LLE. Pt requesting pain meds at end of session. RN notified)     Objective   Activities of Daily Living: Grooming  Grooming Level of Assistance: Setup, Minimum assistance  Grooming Where Assessed: Edge of bed  Grooming Comments: Dominic to reach back of hair d/t poor ROM in B shoulders       Bed Mobility/Transfers: Bed  Mobility  Bed Mobility: Yes  Bed Mobility 1  Bed Mobility 1: Supine to sitting, Sitting to supine, Rolling right, Rolling left  Level of Assistance 1: Maximum assistance (x2)       Sitting Balance:  Static Sitting Balance  Static Sitting-Level of Assistance: Contact guard, Minimum assistance  Dynamic Sitting Balance  Dynamic Sitting-Balance: Reaching for objects, Reaching across midline  Dynamic Sitting-Comments: cga/min for dynamic sitting balance. Therapist implemented functional reaching to targets and crossing midline to improve dynamic sitting balance and trunk control. Pt also performed modified sit ups with Dominic for posterior support. No LOB noted    Therapy/Activity: Therapeutic Exercise  Therapeutic Exercise Performed: Yes  Therapeutic Exercise Activity 1: 1x10 bicep curls  Therapeutic Exercise Activity 2: 1x10 chest pulls with yellow theraband         Balance/Neuromuscular Re-Education  Balance/Neuromuscular Re-Education Activity Performed: Yes  Balance/Neuromuscular Re-Education Activity 1: pt achieved sitting EOB for ~ 11 mins and tolerated well with cga/Dominic for sitting balance support. No LOB noted. Step stool placed under B feet to improve support.      Outcome Measures:  [facility] Daily Activity  Putting on and taking off regular lower body clothing: Total  Bathing (including washing, rinsing, drying): A lot  Putting on and taking off regular upper body clothing: A lot  Toileting, which includes using toilet, bedpan or urinal: Total  Taking care of personal grooming such as brushing teeth: A little  Eating Meals: A little  Daily Activity - Total Score: 12        Education Documentation  ADL Training, taught by TIMMY Lindsey at 3/27/2024 11:59 AM.  Learner: Patient  Readiness: Acceptance  Method: Explanation  Response: Verbalizes Understanding    Education Comments  No comments found.      Goals:  Encounter Problems       Encounter Problems (Active)       ADLs       Patient with complete upper body  dressing with stand by assist level of assistance donning and doffing all UE clothes with PRN adaptive equipment while supported sitting (Progressing)       Start:  03/26/24    Expected End:  04/09/24            Patient will complete daily grooming tasks brushing teeth and washing face/hair with stand by assist level of assistance and PRN adaptive equipment while supported sitting. (Progressing)       Start:  03/26/24    Expected End:  04/09/24               EXERCISE/STRENGTHENING       Patient with increase BUE to >4/5 MMT strength. (Progressing)       Start:  03/26/24    Expected End:  04/09/24               TRANSFERS       Patient will complete functional transfers bed level to out of bed as able with least restrictive device with minimal assist  level of assistance. (Progressing)       Start:  03/26/24    Expected End:  04/09/24

## 2024-03-27 NOTE — CONSULTS
Inpatient consult to Endocrinology  Consult performed by: Geovanna Pérez MD  Consult ordered by: CORNELIUS Valiente-CNP  Reason for consult: DKA          Reason For Consult  DKA    History Of Present Illness  Teresa Matthews is a 73 y.o. female presenting with confusion.  As per EMS reports, she had slurred speech and left-sided facial droop.   CT head revealed no acute abnormalities.  Initial lab data revealed a glucose value of 609mg/dL, bicarbonate of 15, pH 7.26, anion gap of 24.   UA revealed WBCs and positive leukocyte esterase with suspected UTI.  She was started on an insulin infusion as well as IV antibiotics and admitted to the ICU.    She has been bridged with NPH 20 units subcutaneous twice daily and is on an insulin correction scale.      Duration of type 2 diabetes mellitus:  28 years  Complications: retinopathy, peripheral neuropathy, cardiovascular disease, and left Charcot foot, left hallux amputation  CKD  Bariatric surgery status: RNYGB 2019       Home regimen:  Humalog Mix 75/25 30 units twice daily  Humalog 20 units with every meal plus sliding scale  Metformin ER 1000 mg BID    The patient was admitted in January.  At that time she stopped taking insulin 2 months prior to that.  She states that she is nonadherent with her medication regimen as she is finds it to be a hassle.  She is managed by her PCP, Dr. Macario.    She denies any unintentional weight loss.  She admits to increased thirst.  She admits to nocturia x 1.  She admits to blurry vision.    Last available A1c 15.4% as of two months ago     She also has postoperative hypothyroidism.  She underwent a thyroidectomy in 2021  Records indicate 175 mcg/day    She is tolerating meals.    Upon discussing with RN, she is much better with regards to mental status.       Past Medical History  She has a past medical history of Abnormal weight gain (04/10/2023), Acute kidney injury (CMS/HCC) (03/25/2024), Acute upper  respiratory infection (03/25/2024), Allergic rhinitis (04/10/2023), Arthritis due to other bacteria, unspecified knee (CMS/[facility]) (03/03/2022), Bilateral tinnitus (07/01/2020), Body mass index (BMI) 31.0-31.9, adult (06/30/2020), Body mass index (BMI) 32.0-32.9, adult (01/04/2021), Body mass index (BMI) 33.0-33.9, adult (01/04/2021), Body mass index (BMI) 35.0-35.9, adult (04/09/2020), Body mass index (BMI)40.0-44.9, adult (11/14/2019), Bronchitis due to COVID-19 virus (04/10/2023), Chronic insomnia (04/10/2023), Depression, major, in remission (CMS/HCC) (04/10/2023), Distal radius fracture, left (05/19/2023), Dry skin (01/17/2023), Essential (primary) hypertension (09/06/2022), H/O bariatric surgery (04/10/2023), History of heart failure (03/25/2024), History of type 2 diabetes mellitus (03/25/2024), 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), Hypoxia (04/10/2023), Infective arthritis (CMS/[facility]) (03/25/2024), Kidney stone on right side (04/10/2023), Laceration of multiple sites (03/25/2024), Left toe amputee (CMS/[facility]) (04/10/2023), Malaise and fatigue (04/10/2023), Nasal septum ulceration (04/10/2023), Nontoxic multinodular goiter (07/02/2021), Personal history of other diseases of the circulatory system, Personal history of other diseases of the musculoskeletal system and connective tissue, Personal history of other diseases of the nervous system and sense organs (10/11/2022), Personal history of other diseases of the respiratory system, Personal history of other diseases of urinary system (02/19/2020), Personal history of other endocrine, nutritional and metabolic disease (07/02/2021), Personal history of other endocrine, nutritional and metabolic disease (04/24/2017), Prosthetic joint infection (CMS/[facility]) (04/10/2023), Recurrent major depressive disorder, in remission (CMS/[facility]) (04/10/2023), Ruptured aneurysm of thoracic aorta, unspecified part (CMS/[facility]) (04/10/2023), Status post gastric bypass for obesity  (04/10/2023), and 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    Surgical History  She has a past surgical history that includes Hysterectomy (01/20/2016); Tonsillectomy (01/20/2016); Total knee arthroplasty (05/28/2021); Other surgical history (01/07/2022); Other surgical history (05/28/2021); and Tubal ligation (04/24/2017).     Social History  She reports that she has never smoked. She has never used smokeless tobacco. She reports that she does not drink alcohol and does not use drugs.    Family History  Family History   Problem Relation Name Age of Onset    Coronary artery disease Mother      Liver disease Mother      Other (non-hodgkins lymphoma) Mother      Stroke Father      Deafness Father      Hypertension Father          Allergies  Cephalexin, Metformin, Other, Statins-hmg-coa reductase inhibitors, and Adhesive tape-silicones    Review of Systems   Constitutional:  Negative for unexpected weight change.   Eyes:  Positive for visual disturbance.   Endocrine: Positive for polydipsia and polyuria.   Psychiatric/Behavioral:  Negative for confusion.    All other systems reviewed and are negative.       Physical Exam  Vitals reviewed.   Constitutional:       General: She is not in acute distress.     Appearance: Normal appearance. She is obese.   HENT:      Head: Normocephalic and atraumatic.      Nose: Nose normal.      Mouth/Throat:      Mouth: Mucous membranes are moist.   Eyes:      Extraocular Movements: Extraocular movements intact.   Cardiovascular:      Rate and Rhythm: Normal rate and regular rhythm.   Pulmonary:      Effort: Pulmonary effort is normal.      Breath sounds: Normal breath sounds.   Musculoskeletal:         General: Normal range of motion.   Skin:     General: Skin is warm.   Neurological:      Mental Status: She is alert and oriented to person, place, and time.   Psychiatric:         Mood and Affect: Mood normal.          Last Recorded Vitals  Blood pressure 132/81, pulse 75, temperature 37.2  °C (99 °F), temperature source Temporal, resp. rate 23, height 1.829 m (6'), weight 113 kg (250 lb), SpO2 97 %.    Relevant Results  Scheduled medications  amiodarone, 200 mg, oral, Daily  apixaban, 5 mg, oral, BID  aspirin, 81 mg, oral, Every Day  buPROPion XL, 150 mg, oral, q AM  desvenlafaxine, 100 mg, oral, Daily  gabapentin, 600 mg, oral, TID  insulin lispro, 0-10 Units, subcutaneous, TID with meals  insulin NPH (Isophane), 20 Units, subcutaneous, BID  levothyroxine, 175 mcg, oral, Daily  losartan, 25 mg, oral, Daily  metoprolol tartrate, 25 mg, oral, BID  nystatin, 1 Application, Topical, BID  pantoprazole, 40 mg, oral, Daily before breakfast  piperacillin-tazobactam, 3.375 g, intravenous, q6h  spironolactone, 25 mg, oral, Daily  traZODone, 50 mg, oral, Nightly  white petrolatum, , Topical, BID      Continuous medications     PRN medications  PRN medications: acetaminophen **OR** acetaminophen **OR** acetaminophen, dextrose, dextrose, glucagon, oxyCODONE-acetaminophen, oxygen, polyethylene glycol    Results for orders placed or performed during the hospital encounter of 03/25/24 (from the past 24 hour(s))   Blood Gas Venous Full Panel   Result Value Ref Range    POCT pH, Venous 7.39 7.33 - 7.43 pH    POCT pCO2, Venous 39 (L) 41 - 51 mm Hg    POCT pO2, Venous 47 (H) 35 - 45 mm Hg    POCT SO2, Venous 76 (H) 45 - 75 %    POCT Oxy Hemoglobin, Venous 75.0 45.0 - 75.0 %    POCT Hematocrit Calculated, Venous 30.0 (L) 36.0 - 46.0 %    POCT Sodium, Venous 130 (L) 136 - 145 mmol/L    POCT Potassium, Venous 3.5 3.5 - 5.3 mmol/L    POCT Chloride, Venous 100 98 - 107 mmol/L    POCT Ionized Calicum, Venous 1.28 1.10 - 1.33 mmol/L    POCT Glucose, Venous 142 (H) 74 - 99 mg/dL    POCT Lactate, Venous 1.9 0.4 - 2.0 mmol/L    POCT Base Excess, Venous -1.2 -2.0 - 3.0 mmol/L    POCT HCO3 Calculated, Venous 23.6 22.0 - 26.0 mmol/L    POCT Hemoglobin, Venous 10.0 (L) 12.0 - 16.0 g/dL    POCT Anion Gap, Venous 10.0 10.0 - 25.0  mmol/L    Patient Temperature 37.0 degrees Celsius    FiO2 36 %   Basic Metabolic Panel   Result Value Ref Range    Glucose 148 (H) 74 - 99 mg/dL    Sodium 130 (L) 136 - 145 mmol/L    Potassium 3.3 (L) 3.5 - 5.3 mmol/L    Chloride 101 98 - 107 mmol/L    Bicarbonate 22 21 - 32 mmol/L    Anion Gap 10 10 - 20 mmol/L    Urea Nitrogen 27 (H) 6 - 23 mg/dL    Creatinine 1.73 (H) 0.50 - 1.05 mg/dL    eGFR 31 (L) >60 mL/min/1.73m*2    Calcium 8.2 (L) 8.6 - 10.3 mg/dL   Magnesium   Result Value Ref Range    Magnesium 1.73 1.60 - 2.40 mg/dL   Phosphorus   Result Value Ref Range    Phosphorus 2.1 (L) 2.5 - 4.9 mg/dL   CBC   Result Value Ref Range    WBC 16.6 (H) 4.4 - 11.3 x10*3/uL    nRBC 0.0 0.0 - 0.0 /100 WBCs    RBC 3.59 (L) 4.00 - 5.20 x10*6/uL    Hemoglobin 10.0 (L) 12.0 - 16.0 g/dL    Hematocrit 29.9 (L) 36.0 - 46.0 %    MCV 83 80 - 100 fL    MCH 27.9 26.0 - 34.0 pg    MCHC 33.4 32.0 - 36.0 g/dL    RDW 14.7 (H) 11.5 - 14.5 %    Platelets 144 (L) 150 - 450 x10*3/uL   POCT GLUCOSE   Result Value Ref Range    POCT Glucose 141 (H) 74 - 99 mg/dL   MRSA Surveillance for Vancomycin De-escalation, PCR    Specimen: Anterior Nares; Swab   Result Value Ref Range    MRSA PCR Not Detected Not Detected   POCT GLUCOSE   Result Value Ref Range    POCT Glucose 190 (H) 74 - 99 mg/dL   Transthoracic Echo (TTE) Limited   Result Value Ref Range    RVSP 28.2 mmHg   Blood Culture    Specimen: Peripheral Venipuncture; Blood culture   Result Value Ref Range    Blood Culture Loaded on Instrument - Culture in progress    POCT GLUCOSE   Result Value Ref Range    POCT Glucose 270 (H) 74 - 99 mg/dL   POCT GLUCOSE   Result Value Ref Range    POCT Glucose 268 (H) 74 - 99 mg/dL   Blood Culture    Specimen: Peripheral Venipuncture; Blood culture   Result Value Ref Range    Blood Culture Loaded on Instrument - Culture in progress    Basic Metabolic Panel   Result Value Ref Range    Glucose 291 (H) 74 - 99 mg/dL    Sodium 128 (L) 136 - 145 mmol/L     Potassium 3.5 3.5 - 5.3 mmol/L    Chloride 98 98 - 107 mmol/L    Bicarbonate 20 (L) 21 - 32 mmol/L    Anion Gap 14 10 - 20 mmol/L    Urea Nitrogen 27 (H) 6 - 23 mg/dL    Creatinine 1.56 (H) 0.50 - 1.05 mg/dL    eGFR 35 (L) >60 mL/min/1.73m*2    Calcium 8.2 (L) 8.6 - 10.3 mg/dL   CBC   Result Value Ref Range    WBC 11.4 (H) 4.4 - 11.3 x10*3/uL    nRBC 0.0 0.0 - 0.0 /100 WBCs    RBC 3.55 (L) 4.00 - 5.20 x10*6/uL    Hemoglobin 9.8 (L) 12.0 - 16.0 g/dL    Hematocrit 29.7 (L) 36.0 - 46.0 %    MCV 84 80 - 100 fL    MCH 27.6 26.0 - 34.0 pg    MCHC 33.0 32.0 - 36.0 g/dL    RDW 14.6 (H) 11.5 - 14.5 %    Platelets 127 (L) 150 - 450 x10*3/uL   Phosphorus   Result Value Ref Range    Phosphorus 3.1 2.5 - 4.9 mg/dL   Magnesium   Result Value Ref Range    Magnesium 1.72 1.60 - 2.40 mg/dL   POCT GLUCOSE   Result Value Ref Range    POCT Glucose 288 (H) 74 - 99 mg/dL   Electrocardiogram, 12-lead PRN ACS symptoms   Result Value Ref Range    Ventricular Rate 70 BPM    Atrial Rate 70 BPM    SC Interval 168 ms    QRS Duration 106 ms    QT Interval 426 ms    QTC Calculation(Bazett) 460 ms    P Axis -84 degrees    R Axis 96 degrees    T Axis 57 degrees    QRS Count 12 beats    Q Onset 215 ms    P Onset 131 ms    P Offset 197 ms    T Offset 428 ms    QTC Fredericia 448 ms      Electrocardiogram, 12-lead PRN ACS symptoms    Result Date: 3/27/2024  Unusual P axis, possible ectopic atrial rhythm with undetermined rhythm irregularity Rightward axis Incomplete left bundle branch block Abnormal ECG When compared with ECG of 27-MAR-2024 09:27, (unconfirmed) No significant change was found Confirmed by Raffi Hendrickson ([id number]) on 3/27/2024 9:49:24 AM    ECG 12 lead    Result Date: 3/26/2024  Sinus rhythm Atrial premature complex Nonspecific intraventricular conduction delay Borderline low voltage, extremity leads Nonspecific repol abnormality, lateral leads Minimal ST elevation, anterior leads See ED provider note for full interpretation and clinical  correlation Confirmed by Rachael Coreas ([id number]) on 3/26/2024 5:21:24 PM    Transthoracic Echo (TTE) Limited    Result Date: 3/26/2024              [place] [address]      Phone [phone] Fax [phone] TRANSTHORACIC ECHOCARDIOGRAM REPORT  Patient Name:      REYES SENIOR           Brittany Physician:    [id number] Hai RAMESH MD Study Date:        3/26/2024           Ordering Provider:    [id number] GONCALVES MRN/PID:           [id number]            Fellow: Accession#:        [id number]        Nurse: Date of Birth/Age: 1950 / 73     Sonographer:          Brittnee mcfarland RDCS Gender:            F                   Additional Staff: Height:            182.88 cm           Admit Date:           3/25/2024 Weight:            113.40 kg           Admission Status:     Inpatient - Routine BSA / BMI:         2.34 m2 / 33.91     Department Location:  [place] [facility]                    kg/m2 Blood Pressure: 114 /63 mmHg Study Type:    TRANSTHORACIC ECHO (TTE) LIMITED Diagnosis/ICD: Bacteremia-R78.81 Indication:    BACTEREMIA CPT Codes:     Echo Limited-85443 Patient History: Pertinent History: CHF, A-FIB, HTN. Study Detail: The following Echo studies were performed: 2D, Doppler and color               flow.  Critical Event Critical Event: Test was completed as per department protocol. Critical Finding: Possible veg on MV. Time Test was Completed: 2:35:00 PM Notified: Dr. Gerber. Attending notification time: 2:45:37 PM  PHYSICIAN INTERPRETATION: Left Ventricle: Left ventricular systolic function was not assessed. There are no regional wall motion abnormalities. The left ventricular cavity size was not assessed. Left ventricular diastolic filling was not assessed. Left Atrium: The left atrium was not assessed. Right Ventricle: The right ventricle was not assessed. Right  ventricular systolic function not assessed. Right Atrium: The right atrium was not assessed. Aortic Valve: There is no visualized aortic valve vegetation. The aortic valve was not assessed. Aortic valve regurgitation was not assessed. Mitral Valve: The mitral valve was not assessed. Mitral valve regurgitation was not assessed. Possible veg on anterior leaflet of MV. Tricuspid Valve: No vegetation is seen on the tricuspid valve. The tricuspid valve was not assessed. Tricuspid regurgitation was not assessed. Pulmonic Valve: No pulmonic valve vegetation visualized. The pulmonic valve was not assessed. The pulmonic valve regurgitation was not assessed. Pericardium: Pericardial effusion was not assessed. Aorta: The aortic root was not assessed.  CONCLUSIONS:  1. Left ventricular systolic function was not assessed.  2. Possible veg on anterior leaflet of MV.  3. No tricuspid valve vegetation.  4. No aortic valve vegetation visualized.  5. No pulmonic valve vegetation visualized. QUANTITATIVE DATA SUMMARY: 2D MEASUREMENTS:              Normal Ranges: LAs: 2.10 cm (2.7-4.0cm) TRICUSPID VALVE/RVSP:                             Normal Ranges: Peak TR Velocity: 2.51 m/s RV Syst Pressure: 28.2 mmHg (< 30mmHg)  [id number] Hai Gerber MD Electronically signed on 3/26/2024 at 4:16:23 PM  ** Final **     XR chest 1 view    Result Date: 3/25/2024  Interpreted By:  Jeevan Gomez, STUDY: XR CHEST 1 VIEW; 3/25/2024 12:55 pm   INDICATION: CLINICAL INFORMATION: Signs/Symptoms:AMS.   COMPARISON: 11/09/2022   ACCESSION NUMBER(S): [id number]   ORDERING CLINICIAN: KAILEY CASTRO   TECHNIQUE: Portable chest one view.   FINDINGS: The cardiac size is indeterminate in view of the AP projection.  The aorta is tortuous. Pulmonary arteries are somewhat prominent centrally suggesting pulmonary arterial hypertension. No infiltrates or effusions are identified.       No acute pathologic findings are identified. Possible pulmonary arterial  hypertension. There is no interval change when compared to the previous examination.   MACRO: none   Signed by: Jeevan Gomez 3/25/2024 1:25 PM Dictation workstation:   [id number]    CT brain attack angio head and neck W and WO IV contrast    Result Date: 3/25/2024  Interpreted By:  Leandro Mcguire, STUDY: CT BRAIN ATTACK ANGIO HEAD AND NECK W AND WO IV CONTRAST; ; 3/25/2024 11:51 am   INDICATION: Signs/Symptoms:Stroke Evaluation with VAN Positive.   COMPARISON: None.   ACCESSION NUMBER(S): [id number]   ORDERING CLINICIAN: KAILEY CASTRO   TECHNIQUE: Thin cut axial CT images were generated over the upper thorax, neck, and head during the arterial passage of a full contrast bolus.  The bolus was generated with a power injector and followed with immediate saline flush. These image data were subtracted and then used for 3-D reconstructions. Maximum intensity projections and shaded surface displays were generated in multiple planes. In addition images were transferred into a 3-D processing program and additional projections and displays were reviewed  by the interpreting physician.   FINDINGS: The CT angiogram through the upper thorax demonstrates mild atherosclerotic calcifications along the aortic arch and origin of the left subclavian artery. No significant stenosis noted along the origins of right brachiocephalic artery, left common carotid artery, or left subclavian artery. No significant stenosis noted along the origin of the right vertebral artery. There is atherosclerotic calcification noted along the origin of the left vertebral artery contributing to mild-to-moderate segmental narrowing.   The CT angiogram of the neck demonstrates atherosclerotic calcifications noted along the distal common carotid arteries and origin of the internal carotid arteries contributing to mild non hemodynamically significant narrowing. No significant stenosis noted along the cervical segments of the vertebral arteries.   The  CT angiogram of the head demonstrates mild atherosclerotic calcification along the distal left vertebral artery. No significant stenosis noted along the distal right vertebral artery, basilar artery, or distal internal carotid arteries. The right posterior cerebral artery is predominantly supplied via the right posterior communicating artery. The P1 segment of the right posterior cerebral artery is asymmetrically small in caliber which may be related to congenital hypoplasia or secondary narrowing. Otherwise, no large vessel branch cutoffs of the anterior cerebral arteries, middle cerebral arteries, or posterior cerebral arteries are noted.   The source CT angiogram images of the head demonstrate moderate brain parenchymal volume loss. There are nonspecific white matter changes within the cerebral hemispheres bilaterally as well as vague hypodensity overlying the brainstem which while nonspecific, given the patient's age, may represent sequelae of small-vessel ischemic change. Additional small scattered hypodensities are identified within the subinsular regions, basal ganglia, and thalami bilaterally suggesting incidental mildly prominent perivascular spaces and/or small scattered lacunar infarctions. There is no midline shift.   The source CT angiogram images of the neck demonstrate multilevel cervical spondylosis.       The CT angiogram through the upper thorax demonstrates mild atherosclerotic calcifications along the aortic arch and origin of the left subclavian artery. No significant stenosis noted along the origins of right brachiocephalic artery, left common carotid artery, or left subclavian artery. No significant stenosis noted along the origin of the right vertebral artery. There is atherosclerotic calcification noted along the origin of the left vertebral artery contributing to mild-to-moderate segmental narrowing.   The CT angiogram of the neck demonstrates atherosclerotic calcifications noted along the  distal common carotid arteries and origin of the internal carotid arteries contributing to mild non hemodynamically significant narrowing. No significant stenosis noted along the cervical segments of the vertebral arteries.   The CT angiogram of the head demonstrates mild atherosclerotic calcification along the distal left vertebral artery. No significant stenosis noted along the distal right vertebral artery, basilar artery, or distal internal carotid arteries. The right posterior cerebral artery is predominantly supplied via the right posterior communicating artery. The P1 segment of the right posterior cerebral artery is asymmetrically small in caliber which may be related to congenital hypoplasia or secondary narrowing. Otherwise, no large vessel branch cutoffs of the anterior cerebral arteries, middle cerebral arteries, or posterior cerebral arteries are noted.   The source CT angiogram images of the head demonstrate moderate brain parenchymal volume loss. There are nonspecific white matter changes within the cerebral hemispheres bilaterally as well as vague hypodensity overlying the brainstem which while nonspecific, given the patient's age, may represent sequelae of small-vessel ischemic change. Additional small scattered hypodensities are identified within the subinsular regions, basal ganglia, and thalami bilaterally suggesting incidental mildly prominent perivascular spaces and/or small scattered lacunar infarctions. There is no midline shift.   The source CT angiogram images of the neck demonstrate multilevel cervical spondylosis.     MACRO: None   Signed by: Leandro Mcguire 3/25/2024 12:08 PM Dictation workstation:   [id number]    CT brain attack head wo IV contrast    Result Date: 3/25/2024  Interpreted By:  Krish Randolph, STUDY: CT BRAIN ATTACK HEAD WO IV CONTRAST;  3/25/2024 11:38 am   INDICATION: Signs/Symptoms:Stroke Evaluation.  Slurred speech.   COMPARISON: CT head 02/12/2024.   ACCESSION  NUMBER(S): [id number]   ORDERING CLINICIAN: KAILEY CASTRO   TECHNIQUE: Noncontrast axial CT scan of head was performed.   FINDINGS: Parenchyma: There is no intracranial hemorrhage. The grey-white differentiation is intact. There is no mass effect or midline shift.   CSF Spaces: The ventricles, sulci and basal cisterns are within normal limits for age.   Extra-Axial Fluid: No extraaxial fluid collection.   Calvarium: No acute fracture.   Paranasal sinuses: Visualized paranasal sinuses are clear.   Mastoids: Clear.   Orbits: Normal.   Soft tissues: Unremarkable.       No acute intracranial abnormality.   MACRO: Kirsh Randolph discussed the significance and urgency of this critical finding by Epic secure chat with  KAILEY CASTRO on 3/25/2024 at 11:44 am.  (**-RCF-**) Findings:  See findings.   Signed by: Krish Randolph 3/25/2024 11:46 AM Dictation workstation:   [id number]       Assessment/Plan   IMPRESSION  Poorly controlled type 2 diabetes mellitus  Long-term use of insulin  A1c of 15.4%  DKA - resolved     Recommendations:  To continue NPH 20 units subcutaneous twice daily  To commence Insulin Lispro 10 units TID AC  Continue insulin correction scale TID AC   Diabetic diet as tolerated   Accu-Cheks ACHS    Hypoglycemic protocol   Counseled that the goal A1C should be 7% or less  Counseled glycemic control is warranted to prevent microvascular complications  Encouraged the patient to use this admission as motivation to improve glycemic control  Will try to discharge on mixed insulin twice daily to make insulin use convenient   Will continue to follow    Thank you for the courtesy of this consult     Geovanna Pérez MD

## 2024-03-27 NOTE — PROGRESS NOTES
Teresa Matthews is a 73 y.o. female admitted for Delirium. Pharmacy reviewed the patient's hfsfz-ie-fhbrhjxaz medications and allergies for accuracy.    The list below reflects the PTA list prior to pharmacy medication history. A summary a changes to the PTA medication list has been listed below. Please review each medication in order reconciliation for additional clarification and justification.    Source of information:  PATIENT  PHARMACY      Medications added:  METFORMIN ER 500MG 2 TABS BID ([facility] STATES SHE HAS NOT BEEN TAKING FOR 2 MONTHS    TRAZADONE 50MG at bedtime--> PRN HS    Medications to be removed:  BIOTIN  VITAMIN D3  CLOTRIMAZOLE/BETAMETHASONE CREAM  HUMALOG 75/25 LAST FILL 1/26 30DS PER PHARMACY  NYSTATIN POWDER  OXYCODONE/ACET 10/325  TORSEMIDE    Medications of concern:  GABAPENTIN 600MG 1 TID PT STOPPED      Prior to Admission Medications   Prescriptions Last Dose Informant Patient Reported? Taking?   BIOTIN ORAL   Yes No   Sig: Take 1 capsule by mouth once daily.   FreeStyle Bulmaro 2 Sensor kit   No No   Sig: Inject 1 each under the skin every 14 (fourteen) days. Test blood sugar 1-4 times per day as needed for diabetes, replace every 14 days   HumaLOG Mix 75-25 KwikPen 100 unit/mL (75-25) injection   Yes No   Sig: Inject 30 Units under the skin 2 times a day.   OneTouch Ultra Test strip   Yes No   Sig: TO TEST BLOOD SUGAR 4 TIMES A DAY FOR DIABETES   amiodarone (Pacerone) 200 mg tablet   No No   Sig: Take 1 tablet (200 mg) by mouth once daily.   apixaban (Eliquis) 5 mg tablet   No No   Sig: Take 1 tablet (5 mg) by mouth 2 times a day.   ascorbic acid, vitamin C, 500 mg capsule   Yes No   Sig: Take 1 capsule by mouth once daily.   aspirin 81 mg EC tablet   Yes No   Sig: Take 1 tablet (81 mg) by mouth once every day.   buPROPion XL (Wellbutrin XL) 150 mg 24 hr tablet   No No   Sig: Take 1 tablet (150 mg) by mouth once daily in the morning. Do not crush, chew, or split.   calcium  citrate-vitamin D3 200 mg-6.25 mcg (250 unit) tablet   Yes No   Sig: Take 1 tablet by mouth once daily.   cholecalciferol (Vitamin D-3) 1,250 mcg (50,000 unit) capsule   Yes No   Sig: Take 1 capsule (50,000 Units) by mouth 1 (one) time per week.   clotrimazole-betamethasone (Lotrisone) cream   Yes No   Sig: Apply 1 Application topically 2 times a day as needed. APPLY  AND RUB  IN A THIN FILM TO AFFECTED AREAS TWICE DAILY.(AM AND PM).   desvenlafaxine (Pristiq) 100 mg 24 hr tablet   No No   Sig: Take 1 tablet (100 mg) by mouth once daily. Do not crush, chew, or split. Instead of venlafaxine.   famotidine (Pepcid) 40 mg tablet   No No   Sig: Take 1 tablet (40 mg) by mouth once daily.   gabapentin (Neurontin) 600 mg tablet   No No   Sig: Take 1 tablet (600 mg) by mouth 3 times a day.   glucagon 1 mg injection   No No   Sig: Inject 1 mg into the shoulder, thigh, or buttocks 1 time if needed for low blood sugar - see comments for up to 1 dose. USE AS DIRECTED.   insulin NPH, Isophane, (HumuLIN N,NovoLIN N) 100 unit/mL injection   No No   Sig: Inject 20 Units under the skin every 12 hours. Take as directed per insulin instructions.   insulin lispro (HumaLOG U-100 Insulin) 100 unit/mL injection   No No   Sig: Inject 0.1 mL (10 Units) under the skin 3 times a day with meals. Take as directed per insulin instructions. Adjust per Sliding scale- 200-250 6u, 250-300 8u 350-400 10u 400+ 12u   insulin lispro (HumaLOG) 100 unit/mL injection   No No   Sig: Inject 0.1 mL (10 Units) under the skin 3 times a day with meals. Take as directed per insulin instructions.   levothyroxine (Synthroid, Levoxyl) 175 mcg tablet   No No   Sig: Take 1 tablet by mouth daily   levothyroxine (Synthroid, Levoxyl) 75 mcg tablet   No No   Sig: Take 3 tablets (225 mcg) by mouth once daily. Do not start before January 30, 2024.   losartan (Cozaar) 25 mg tablet   No No   Sig: Take 1 tablet (25 mg) by mouth once daily.   metoprolol succinate XL (Toprol-XL)  "50 mg 24 hr tablet   No No   Sig: Take 1 tablet (50 mg) by mouth once daily.   mv-min/iron/folic/calcium/vitK (WOMEN'S MULTIVITAMIN ORAL)   Yes No   Sig: Take 1 tablet by mouth once daily.   naloxone (Narcan) 4 mg/0.1 mL nasal spray   No No   Sig: Administer 1 spray (4 mg) into affected nostril(s) if needed for opioid reversal for up to 1 day. May repeat every 2-3 minutes if needed, alternating nostrils, until medical assistance becomes available.   nystatin (Mycostatin) 100,000 unit/gram powder   Yes No   Sig: Apply 1 Application topically 2 times a day as needed. APPLY SPARINGLY TO AFFECTED AREA(S) TWICE DAILY   omeprazole (PriLOSEC) 40 mg DR capsule   Yes No   Sig: TAKE 1 CAPSULE BY MOUTH  DAILY OPEN CAPSULE AND  SPRINKLE CONTENTS ON SUGAR  FREE APPLESAUCE OR PUDDING.   oxyCODONE-acetaminophen (Percocet)  mg tablet   No No   Sig: Take 1 tablet by mouth every 4 hours if needed for severe pain (7 - 10). Do not start before March 15, 2024.   oxyCODONE-acetaminophen (Percocet)  mg tablet   No No   Sig: Take 1 tablet by mouth every 4 hours if needed for severe pain (7 - 10). Do not start before 2024.   pen needle 1/2\" (Easy Touch) 29G X 12mm needle   No No   Sig: Inject 3 each under the skin 4 times a day as needed (as needed for sugars). Use as instructed   potassium chloride CR 10 mEq ER tablet   No No   Sig: Take 1 tablet (10 mEq) by mouth in the morning and 1 tablet (10 mEq) before bedtime. Do not crush, chew, or split..   spironolactone (Aldactone) 25 mg tablet   Yes No   Sig: Take 1 tablet (25 mg) by mouth once daily.   torsemide (Demadex) 20 mg tablet   Yes No   Sig: Take 2 tablets (40 mg) by mouth once daily.   traZODone (Desyrel) 50 mg tablet   No No   Sig: Take 1 tablet (50 mg) by mouth once daily at bedtime.   underpads (Bed Underpads) pad   No No   Si each 2 times a day.   vibegron 75 mg tablet   No No   Sig: Take 1 tablet by mouth once daily.   vitamin B complex (SUPER B-50 " COMPLEX ORAL)   Yes No   Sig: Take 1 capsule by mouth once daily.      Facility-Administered Medications: None       Ro Evans

## 2024-03-27 NOTE — PROGRESS NOTES
Physical Therapy    Physical Therapy Treatment    Patient Name: Teresa Matthews  MRN: [id number]  Today's Date: 3/27/2024  Time Calculation  Start Time: 1104  Stop Time: 1135  Time Calculation (min): 31 min       Assessment/Plan   PT Assessment  PT Assessment Results: Decreased strength, Decreased endurance, Impaired balance, Decreased mobility, Decreased range of motion, Decreased cognition, Pain, Obesity  Rehab Prognosis: Good  Barriers to Discharge: none  Evaluation/Treatment Tolerance: Patient tolerated treatment well  Medical Staff Made Aware: Yes  End of Session Communication: Bedside nurse  Assessment Comment: Patient still requires 2 assist for mobility but able to tolerate increased time EOB and increased acitivty today, showing good improvement. Continue with POC and increase as tolerated.  End of Session Patient Position: Bed, 3 rail up, Alarm on  PT Plan  Inpatient/Swing Bed or Outpatient: Inpatient  PT Plan  Treatment/Interventions: Bed mobility, Transfer training, Balance training, Strengthening, Endurance training, Range of motion, Therapeutic exercise, Therapeutic activity  PT Plan: Skilled PT  PT Frequency: 3 times per week  PT Discharge Recommendations: Moderate intensity level of continued care  PT - OK to Discharge: Yes (when medically cleared)      General Visit Information:   PT  Visit  PT Received On: 03/27/24  Response to Previous Treatment: Patient with no complaints from previous session.  General  Reason for Referral: Dx: slurred speech, DKA, sepsis, UTI  Referred By: Frederick  Past Medical History Relevant to Rehab: DM, A fib, CHF, L TKA, COPD, CKD, morbid obesity  Co-Treatment:  (with JI for safety and mobility)  Prior to Session Communication: Bedside nurse, PCT/NA/CTA  Patient Position Received: Bed, 3 rail up, Alarm on  General Comment: Seen in rom 1014, tele, External [place], IV, O2 on 4L/min, EMELI heel pressure relief boots on  Subjective  "    Precautions:  Precautions  Precautions Comment: falls, O2    Vital Signs:     Objective     Pain:  Pain Assessment  Pain Assessment:  (answered yes to having pain, however could not eleaborate/specify or rate)  Pain Score:  (chronic pain L LE, mild pain \"all over\" - did not rate; RN notified after treatment)    Cognition:  Cognition  Overall Cognitive Status:  (more clear today, oriented to person, partially to place. Following directions well, participating well with treatment)    Postural Control:  Postural Control  Postural Control:  (sitting balance fair-/fair; CGA to MIN assist prn for static and dynamic balance)    Extremity/Trunk Assessments:           LLE :  (decreased L knee flex (partially due to chronic pain/immobility post multiple surgeries))    Activity Tolerance:  Activity Tolerance  Endurance:  (tolerates 20+ mins of activity today)    Treatments:  Therapeutic Exercise  Therapeutic Exercise Performed:  (Completed EMELI LE supine exercises including ankle pumps, heel slides, hip flex, and hip abd/add x 6-8 reps each, all with assist and cues prn.)  Therapeutic Activity  Therapeutic Activity Performed:  (Worked on balance, posture, and activity tolerance in sitting - sat EOB x 11 mins, verbal and tactile cues given prn)     Bed Mobility  Bed Mobility:  (Rolling EMELI with Max assist x 1-2 and supine to/from sit with Max assist x 2, cues for sequencig, safety, balance, posture, wt shifting)     Transfers  Transfer:  (not able to work on sit to stand yet due to weakness and height of bed; will continue to work on exercises and pre-standing activities)          Outcome Measures:  [facility] Basic Mobility  Turning from your back to your side while in a flat bed without using bedrails: A lot  Moving from lying on your back to sitting on the side of a flat bed without using bedrails: A lot  Moving to and from bed to chair (including a wheelchair): Total  Standing up from a chair using your arms (e.g. wheelchair " or bedside chair): Total  To walk in hospital room: Total  Climbing 3-5 steps with railing: Total  Basic Mobility - Total Score: 8  Education Documentation  Precautions, taught by Sveta Jerry PT at 3/27/2024 11:52 AM.  Learner: Patient  Readiness: Acceptance  Method: Explanation  Response: Needs Reinforcement    Mobility Training, taught by Sveta Jerry, PT at 3/27/2024 11:52 AM.  Learner: Patient  Readiness: Acceptance  Method: Explanation  Response: Needs Reinforcement    Education Comments  No comments found.        EDUCATION:     Encounter Problems       Encounter Problems (Active)       PT Problem       transfers (Progressing)       Start:  03/26/24    Expected End:  04/09/24       Patient will perform all transfers with Min assist x 2          balance  (Progressing)       Start:  03/26/24    Expected End:  04/09/24       Patient will demonstrate >/= fair/fair+ static sitting balance to prepare for OOB activity and increase safety          strengthening  (Progressing)       Start:  03/26/24    Expected End:  04/09/24       Patient will perform 20+ reps of AROM/AAROM for EMELI LE's to improve safety and functional independence

## 2024-03-27 NOTE — CONSULTS
[facility]    Patient Name: Teresa Matthews  Patient : 1950  Room/Bed: 14-A    Date: 24  Time: 2:31 PM    Referred by Dr. Lynn ref. provider found for Altered Mental Status (Pt presents to the ED with left facial droop and slurred speech. Pt has an unknown last know well time. Pt reportedly lives alone)     History Of Present Illness:    Teresa Matthews is a 73 y.o. female initially admitted on 3/25/2024 with DKA, sepsis, and delirium.  Patient has since been evaluated by infectious disease and treated with antibiotic therapy.  Patient found to have strep pyogenes bacteremia.  Echocardiogram showing questionable vegetation on the anterior mitral valve leaflet.  Cardiology consulted for transesophageal echocardiogram.  The patient otherwise denies any cardiac complaints such as chest pain, palpitations, lightheadedness, or new shortness of breath.  BMP shows a serum sodium 128, serum potassium 3.5, serum creatinine of 1.56.  Serum magnesium was 1.72.  CBC showed hemoglobin 9.8.  Chest x-ray showed no acute pathologic findings.  Limited echocardiogram showed normal LV function in the parasternal view with a possible vegetation on anterior mitral valve leaflet.  During my exam the patient was resting comfortably in bed.    Assessment/Plan:   1.  Bacteremia/abnormal echocardiogram  Patient found to have strep pyogenes bacteremia on antibiotic therapy per infectious disease.  Limited echocardiogram done 3/26/2024 showed a possible vegetation on the anterior mitral valve leaflet.  Will keep patient n.p.o. after midnight for possible ELISEO.  Risks/benefits discussed with patient and she is agreeable to proceed.    2.  Paroxysmal atrial fibrillation  Patient has a history of paroxysmal atrial fibrillation and is on apixaban for thromboembolism prophylaxis as well as amiodarone to reduce her burden of atrial fibrillation.  Telemetry showing sinus rhythm with frequent  PACs and a heart rate in the 70s.  Continue current medical therapy      Past Medical History:  She has a past medical history of Abnormal weight gain (04/10/2023), Acute kidney injury (CMS/[facility]) (03/25/2024), Acute upper respiratory infection (03/25/2024), Allergic rhinitis (04/10/2023), Arthritis due to other bacteria, unspecified knee (CMS/[facility]) (03/03/2022), Bilateral tinnitus (07/01/2020), Body mass index (BMI) 31.0-31.9, adult (06/30/2020), Body mass index (BMI) 32.0-32.9, adult (01/04/2021), Body mass index (BMI) 33.0-33.9, adult (01/04/2021), Body mass index (BMI) 35.0-35.9, adult (04/09/2020), Body mass index (BMI)40.0-44.9, adult (11/14/2019), Bronchitis due to COVID-19 virus (04/10/2023), Chronic insomnia (04/10/2023), Depression, major, in remission (CMS/[facility]) (04/10/2023), Distal radius fracture, left (05/19/2023), Dry skin (01/17/2023), Essential (primary) hypertension (09/06/2022), H/O bariatric surgery (04/10/2023), History of heart failure (03/25/2024), History of type 2 diabetes mellitus (03/25/2024), 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), Hypoxia (04/10/2023), Infective arthritis (CMS/[facility]) (03/25/2024), Kidney stone on right side (04/10/2023), Laceration of multiple sites (03/25/2024), Left toe amputee (CMS/[facility]) (04/10/2023), Malaise and fatigue (04/10/2023), Nasal septum ulceration (04/10/2023), Nontoxic multinodular goiter (07/02/2021), Personal history of other diseases of the circulatory system, Personal history of other diseases of the musculoskeletal system and connective tissue, Personal history of other diseases of the nervous system and sense organs (10/11/2022), Personal history of other diseases of the respiratory system, Personal history of other diseases of urinary system (02/19/2020), Personal history of other endocrine, nutritional and metabolic disease (07/02/2021), Personal history of other endocrine, nutritional and metabolic disease (04/24/2017), Prosthetic joint infection  (CMS/[facility]) (04/10/2023), Recurrent major depressive disorder, in remission (CMS/[facility]) (04/10/2023), Ruptured aneurysm of thoracic aorta, unspecified part (CMS/[facility]) (04/10/2023), Status post gastric bypass for obesity (04/10/2023), and 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    Past Surgical History:  She has a past surgical history that includes Hysterectomy (01/20/2016); Tonsillectomy (01/20/2016); Total knee arthroplasty (05/28/2021); Other surgical history (01/07/2022); Other surgical history (05/28/2021); and Tubal ligation (04/24/2017).      Social History:  She reports that she has never smoked. She has never used smokeless tobacco. She reports that she does not drink alcohol and does not use drugs.    Family History:  Family History   Problem Relation Name Age of Onset    Coronary artery disease Mother      Liver disease Mother      Other (non-hodgkins lymphoma) Mother      Stroke Father      Deafness Father      Hypertension Father          Allergies:  Cephalexin, Metformin, Other, Statins-hmg-coa reductase inhibitors, and Adhesive tape-silicones    Outpatient Medications:  Current Outpatient Medications   Medication Instructions    amiodarone (PACERONE) 200 mg, oral, Daily    apixaban (ELIQUIS) 5 mg, oral, 2 times daily    ascorbic acid, vitamin C, 500 mg capsule Take 1 capsule by mouth once daily.    aspirin 81 mg EC tablet 1 tablet, oral, Every Day    buPROPion XL (WELLBUTRIN XL) 150 mg, oral, Every morning, Do not crush, chew, or split.    calcium citrate-vitamin D3 200 mg-6.25 mcg (250 unit) tablet Take 1 tablet by mouth once daily.    desvenlafaxine (PRISTIQ) 100 mg, oral, Daily, Do not crush, chew, or split. Instead of venlafaxine.    famotidine (PEPCID) 40 mg, oral, Daily    FreeStyle Bulmaro 2 Sensor kit 1 each, subcutaneous, Every 14 days, Test blood sugar 1-4 times per day as needed for diabetes, replace every 14 days    gabapentin (NEURONTIN) 600 mg, oral, 3 times daily    glucagon (GLUCAGEN) 1 mg,  "intramuscular, Once as needed, USE AS DIRECTED.    insulin lispro (HUMALOG U-100 INSULIN) 10 Units, subcutaneous, 3 times daily with meals, Take as directed per insulin instructions. Adjust per Sliding scale- 200-250 6u, 250-300 8u 350-400 10u 400+ 12u    insulin lispro (HUMALOG) 10 Units, subcutaneous, 3 times daily with meals, Take as directed per insulin instructions.    insulin NPH (Isophane) (HUMULIN N,NOVOLIN N) 20 Units, subcutaneous, Every 12 hours, Take as directed per insulin instructions.    levothyroxine (SYNTHROID, LEVOXYL) 225 mcg, oral, Daily    levothyroxine (SYNTHROID, LEVOXYL) 175 mcg, oral, Daily    losartan (COZAAR) 25 mg, oral, Daily    metFORMIN XR (GLUCOPHAGE-XR) 1,000 mg, oral, 2 times daily, Do not crush, chew, or split.    metoprolol succinate XL (TOPROL-XL) 50 mg, oral, Daily    mv-min/iron/folic/calcium/vitK (WOMEN'S MULTIVITAMIN ORAL) 1 tablet, oral, Daily    naloxone (NARCAN) 4 mg, nasal, As needed, May repeat every 2-3 minutes if needed, alternating nostrils, until medical assistance becomes available.    omeprazole (PriLOSEC) 40 mg DR capsule TAKE 1 CAPSULE BY MOUTH  DAILY OPEN CAPSULE AND  SPRINKLE CONTENTS ON SUGAR  FREE APPLESAUCE OR PUDDING.    OneTouch Ultra Test strip TO TEST BLOOD SUGAR 4 TIMES A DAY FOR DIABETES    oxyCODONE-acetaminophen (Percocet)  mg tablet 1 tablet, oral, Every 4 hours PRN    [START ON 4/14/2024] oxyCODONE-acetaminophen (Percocet)  mg tablet 1 tablet, oral, Every 4 hours PRN    pen needle 1/2\" (Easy Touch) 29G X 12mm needle 3 each, subcutaneous, 4 times daily PRN, Use as instructed    potassium chloride CR 10 mEq ER tablet 10 mEq, oral, 2 times daily, Do not crush, chew, or split.    spironolactone (Aldactone) 25 mg tablet 1 tablet, oral, Daily    traZODone (DESYREL) 50 mg, oral, Nightly    underpads (Bed Underpads) pad 1 each, miscellaneous, 2 times daily    vibegron 75 mg tablet 1 tablet, oral, Daily    vitamin B complex (SUPER B-50 COMPLEX " ORAL) Take 1 capsule by mouth once daily.        ROS:  A 14 point review of systems was done and is negative other than as stated in HPI    Vitals:  Vitals:    03/27/24 0800 03/27/24 1100 03/27/24 1200 03/27/24 1307   BP: 132/81  135/75 135/85   Pulse: 75  71 75   Resp: 23  16 22   Temp: 37.2 °C (99 °F) 37.2 °C (99 °F)     TempSrc: Temporal Temporal     SpO2: 97%      Weight:       Height:           Physical Exam:     Constitutional: Cooperative, in no acute distress, alert, appears stated age.  Skin: Skin color, texture, turgor normal. No rashes or lesions.  Head: Normocephalic. No masses, lesions, tenderness or abnormalities  Eyes: Extraocular movements are grossly intact.  Mouth and throat: Mucous membranes moist  Neck: Neck supple, no carotid bruits, no JVD  Respiratory: Lungs clear to auscultation, no wheezing or rhonchi, no use of accessory muscles  Chest wall: No scars, normal excursion with respiration  Cardiovascular: Irregular rhythm, no murmur  Gastrointestinal: Abdomen soft, nontender. Bowel sounds normal.  Obese  Musculoskeletal: Strength equal in upper extremities  Extremities: 1+ pitting edema bilaterally  Neurologic: Sensation grossly intact, alert and oriented ×3    Intake/Output:   I/O last 2 completed shifts:  In: 1047.9 (9.2 mL/kg) [I.V.:1047.9 (9.2 mL/kg)]  Out: 1300 (11.5 mL/kg) [Urine:1300 (0.5 mL/kg/hr)]  Weight: 113.4 kg     Outpatient Medications  No current facility-administered medications on file prior to encounter.     Current Outpatient Medications on File Prior to Encounter   Medication Sig Dispense Refill    amiodarone (Pacerone) 200 mg tablet Take 1 tablet (200 mg) by mouth once daily. 90 tablet 3    apixaban (Eliquis) 5 mg tablet Take 1 tablet (5 mg) by mouth 2 times a day. 180 tablet 3    ascorbic acid, vitamin C, 500 mg capsule Take 1 capsule by mouth once daily.      aspirin 81 mg EC tablet Take 1 tablet (81 mg) by mouth once every day.      buPROPion XL (Wellbutrin XL) 150 mg  24 hr tablet Take 1 tablet (150 mg) by mouth once daily in the morning. Do not crush, chew, or split. 30 tablet 0    calcium citrate-vitamin D3 200 mg-6.25 mcg (250 unit) tablet Take 1 tablet by mouth once daily.      desvenlafaxine (Pristiq) 100 mg 24 hr tablet Take 1 tablet (100 mg) by mouth once daily. Do not crush, chew, or split. Instead of venlafaxine. 90 tablet 3    famotidine (Pepcid) 40 mg tablet Take 1 tablet (40 mg) by mouth once daily. 30 tablet 0    FreeStyle Bulmaro 2 Sensor kit Inject 1 each under the skin every 14 (fourteen) days. Test blood sugar 1-4 times per day as needed for diabetes, replace every 14 days 6 each 3    gabapentin (Neurontin) 600 mg tablet Take 1 tablet (600 mg) by mouth 3 times a day. 90 tablet 5    glucagon 1 mg injection Inject 1 mg into the shoulder, thigh, or buttocks 1 time if needed for low blood sugar - see comments for up to 1 dose. USE AS DIRECTED. 1 each 5    insulin lispro (HumaLOG U-100 Insulin) 100 unit/mL injection Inject 0.1 mL (10 Units) under the skin 3 times a day with meals. Take as directed per insulin instructions. Adjust per Sliding scale- 200-250 6u, 250-300 8u 350-400 10u 400+ 12u 10 mL 11    insulin lispro (HumaLOG) 100 unit/mL injection Inject 0.1 mL (10 Units) under the skin 3 times a day with meals. Take as directed per insulin instructions. 10 mL 0    insulin NPH, Isophane, (HumuLIN N,NovoLIN N) 100 unit/mL injection Inject 20 Units under the skin every 12 hours. Take as directed per insulin instructions. 10 mL 0    levothyroxine (Synthroid, Levoxyl) 175 mcg tablet Take 1 tablet by mouth daily 90 tablet 3    levothyroxine (Synthroid, Levoxyl) 75 mcg tablet Take 3 tablets (225 mcg) by mouth once daily. Do not start before January 30, 2024. 90 tablet 0    losartan (Cozaar) 25 mg tablet Take 1 tablet (25 mg) by mouth once daily. 90 tablet 3    metFORMIN  mg 24 hr tablet Take 2 tablets (1,000 mg) by mouth 2 times a day. Do not crush, chew, or split.    "   metoprolol succinate XL (Toprol-XL) 50 mg 24 hr tablet Take 1 tablet (50 mg) by mouth once daily. 90 tablet 3    mv-min/iron/folic/calcium/vitK (WOMEN'S MULTIVITAMIN ORAL) Take 1 tablet by mouth once daily.      naloxone (Narcan) 4 mg/0.1 mL nasal spray Administer 1 spray (4 mg) into affected nostril(s) if needed for opioid reversal for up to 1 day. May repeat every 2-3 minutes if needed, alternating nostrils, until medical assistance becomes available. 1 each 0    omeprazole (PriLOSEC) 40 mg DR capsule TAKE 1 CAPSULE BY MOUTH  DAILY OPEN CAPSULE AND  SPRINKLE CONTENTS ON SUGAR  FREE APPLESAUCE OR PUDDING.      OneTouch Ultra Test strip TO TEST BLOOD SUGAR 4 TIMES A DAY FOR DIABETES      oxyCODONE-acetaminophen (Percocet)  mg tablet Take 1 tablet by mouth every 4 hours if needed for severe pain (7 - 10). Do not start before March 15, 2024. 180 tablet 0    [START ON 4/14/2024] oxyCODONE-acetaminophen (Percocet)  mg tablet Take 1 tablet by mouth every 4 hours if needed for severe pain (7 - 10). Do not start before April 14, 2024. 180 tablet 0    pen needle 1/2\" (Easy Touch) 29G X 12mm needle Inject 3 each under the skin 4 times a day as needed (as needed for sugars). Use as instructed 450 each 3    potassium chloride CR 10 mEq ER tablet Take 1 tablet (10 mEq) by mouth in the morning and 1 tablet (10 mEq) before bedtime. Do not crush, chew, or split.. 180 tablet 3    spironolactone (Aldactone) 25 mg tablet Take 1 tablet (25 mg) by mouth once daily.      traZODone (Desyrel) 50 mg tablet Take 1 tablet (50 mg) by mouth once daily at bedtime. 90 tablet 3    underpads (Bed Underpads) pad 1 each 2 times a day. 60 each 11    vibegron 75 mg tablet Take 1 tablet by mouth once daily. 90 tablet 3    vitamin B complex (SUPER B-50 COMPLEX ORAL) Take 1 capsule by mouth once daily.      [DISCONTINUED] BIOTIN ORAL Take 1 capsule by mouth once daily.      [DISCONTINUED] cholecalciferol (Vitamin D-3) 1,250 mcg (50,000 " unit) capsule Take 1 capsule (50,000 Units) by mouth 1 (one) time per week.      [DISCONTINUED] clotrimazole-betamethasone (Lotrisone) cream Apply 1 Application topically 2 times a day as needed. APPLY  AND RUB  IN A THIN FILM TO AFFECTED AREAS TWICE DAILY.(AM AND PM).      [DISCONTINUED] HumaLOG Mix 75-25 KwikPen 100 unit/mL (75-25) injection Inject 30 Units under the skin 2 times a day.      [DISCONTINUED] nystatin (Mycostatin) 100,000 unit/gram powder Apply 1 Application topically 2 times a day as needed. APPLY SPARINGLY TO AFFECTED AREA(S) TWICE DAILY      [DISCONTINUED] torsemide (Demadex) 20 mg tablet Take 2 tablets (40 mg) by mouth once daily.         Scheduled medications  amiodarone, 200 mg, oral, Daily  ampicillin-sulbactam, 3 g, intravenous, q6h  apixaban, 5 mg, oral, BID  aspirin, 81 mg, oral, Every Day  buPROPion XL, 150 mg, oral, q AM  desvenlafaxine, 100 mg, oral, Daily  insulin lispro, 0-10 Units, subcutaneous, TID with meals  insulin lispro, 10 Units, subcutaneous, TID with meals  insulin NPH (Isophane), 20 Units, subcutaneous, BID  levothyroxine, 175 mcg, oral, Daily  losartan, 25 mg, oral, Daily  magnesium oxide, 400 mg, oral, Daily  metoprolol tartrate, 25 mg, oral, BID  nystatin, 1 Application, Topical, BID  pantoprazole, 40 mg, oral, Daily before breakfast  spironolactone, 25 mg, oral, Daily  white petrolatum, , Topical, BID      Continuous medications     PRN medications  PRN medications: acetaminophen **OR** acetaminophen **OR** acetaminophen, dextrose, dextrose, glucagon, oxyCODONE-acetaminophen, oxygen, polyethylene glycol, traZODone   Medications Prior to Admission   Medication Sig Dispense Refill Last Dose    amiodarone (Pacerone) 200 mg tablet Take 1 tablet (200 mg) by mouth once daily. 90 tablet 3     apixaban (Eliquis) 5 mg tablet Take 1 tablet (5 mg) by mouth 2 times a day. 180 tablet 3     ascorbic acid, vitamin C, 500 mg capsule Take 1 capsule by mouth once daily.       aspirin 81  mg EC tablet Take 1 tablet (81 mg) by mouth once every day.       buPROPion XL (Wellbutrin XL) 150 mg 24 hr tablet Take 1 tablet (150 mg) by mouth once daily in the morning. Do not crush, chew, or split. 30 tablet 0     calcium citrate-vitamin D3 200 mg-6.25 mcg (250 unit) tablet Take 1 tablet by mouth once daily.       desvenlafaxine (Pristiq) 100 mg 24 hr tablet Take 1 tablet (100 mg) by mouth once daily. Do not crush, chew, or split. Instead of venlafaxine. 90 tablet 3     famotidine (Pepcid) 40 mg tablet Take 1 tablet (40 mg) by mouth once daily. 30 tablet 0     FreeStyle Bulmaro 2 Sensor kit Inject 1 each under the skin every 14 (fourteen) days. Test blood sugar 1-4 times per day as needed for diabetes, replace every 14 days 6 each 3     gabapentin (Neurontin) 600 mg tablet Take 1 tablet (600 mg) by mouth 3 times a day. 90 tablet 5     glucagon 1 mg injection Inject 1 mg into the shoulder, thigh, or buttocks 1 time if needed for low blood sugar - see comments for up to 1 dose. USE AS DIRECTED. 1 each 5     insulin lispro (HumaLOG U-100 Insulin) 100 unit/mL injection Inject 0.1 mL (10 Units) under the skin 3 times a day with meals. Take as directed per insulin instructions. Adjust per Sliding scale- 200-250 6u, 250-300 8u 350-400 10u 400+ 12u 10 mL 11     insulin lispro (HumaLOG) 100 unit/mL injection Inject 0.1 mL (10 Units) under the skin 3 times a day with meals. Take as directed per insulin instructions. 10 mL 0     insulin NPH, Isophane, (HumuLIN N,NovoLIN N) 100 unit/mL injection Inject 20 Units under the skin every 12 hours. Take as directed per insulin instructions. 10 mL 0     levothyroxine (Synthroid, Levoxyl) 175 mcg tablet Take 1 tablet by mouth daily 90 tablet 3     levothyroxine (Synthroid, Levoxyl) 75 mcg tablet Take 3 tablets (225 mcg) by mouth once daily. Do not start before January 30, 2024. 90 tablet 0     losartan (Cozaar) 25 mg tablet Take 1 tablet (25 mg) by mouth once daily. 90 tablet 3      "metFORMIN  mg 24 hr tablet Take 2 tablets (1,000 mg) by mouth 2 times a day. Do not crush, chew, or split.       metoprolol succinate XL (Toprol-XL) 50 mg 24 hr tablet Take 1 tablet (50 mg) by mouth once daily. 90 tablet 3     mv-min/iron/folic/calcium/vitK (WOMEN'S MULTIVITAMIN ORAL) Take 1 tablet by mouth once daily.       naloxone (Narcan) 4 mg/0.1 mL nasal spray Administer 1 spray (4 mg) into affected nostril(s) if needed for opioid reversal for up to 1 day. May repeat every 2-3 minutes if needed, alternating nostrils, until medical assistance becomes available. 1 each 0     omeprazole (PriLOSEC) 40 mg DR capsule TAKE 1 CAPSULE BY MOUTH  DAILY OPEN CAPSULE AND  SPRINKLE CONTENTS ON SUGAR  FREE APPLESAUCE OR PUDDING.       OneTouch Ultra Test strip TO TEST BLOOD SUGAR 4 TIMES A DAY FOR DIABETES       oxyCODONE-acetaminophen (Percocet)  mg tablet Take 1 tablet by mouth every 4 hours if needed for severe pain (7 - 10). Do not start before March 15, 2024. 180 tablet 0     [START ON 4/14/2024] oxyCODONE-acetaminophen (Percocet)  mg tablet Take 1 tablet by mouth every 4 hours if needed for severe pain (7 - 10). Do not start before April 14, 2024. 180 tablet 0     pen needle 1/2\" (Easy Touch) 29G X 12mm needle Inject 3 each under the skin 4 times a day as needed (as needed for sugars). Use as instructed 450 each 3     potassium chloride CR 10 mEq ER tablet Take 1 tablet (10 mEq) by mouth in the morning and 1 tablet (10 mEq) before bedtime. Do not crush, chew, or split.. 180 tablet 3     spironolactone (Aldactone) 25 mg tablet Take 1 tablet (25 mg) by mouth once daily.       traZODone (Desyrel) 50 mg tablet Take 1 tablet (50 mg) by mouth once daily at bedtime. 90 tablet 3     underpads (Bed Underpads) pad 1 each 2 times a day. 60 each 11     vibegron 75 mg tablet Take 1 tablet by mouth once daily. 90 tablet 3     vitamin B complex (SUPER B-50 COMPLEX ORAL) Take 1 capsule by mouth once daily.    "       Recent Labs: (past 2 days)  Recent Results (from the past 48 hour(s))   Basic metabolic panel    Collection Time: 03/25/24  3:11 PM   Result Value Ref Range    Glucose 573 (HH) 74 - 99 mg/dL    Sodium 127 (L) 136 - 145 mmol/L    Potassium 4.3 3.5 - 5.3 mmol/L    Chloride 92 (L) 98 - 107 mmol/L    Bicarbonate 18 (L) 21 - 32 mmol/L    Anion Gap 21 (H) 10 - 20 mmol/L    Urea Nitrogen 25 (H) 6 - 23 mg/dL    Creatinine 1.62 (H) 0.50 - 1.05 mg/dL    eGFR 34 (L) >60 mL/min/1.73m*2    Calcium 8.5 (L) 8.6 - 10.3 mg/dL   Magnesium    Collection Time: 03/25/24  3:11 PM   Result Value Ref Range    Magnesium 1.41 (L) 1.60 - 2.40 mg/dL   Phosphorus    Collection Time: 03/25/24  3:11 PM   Result Value Ref Range    Phosphorus 3.1 2.5 - 4.9 mg/dL   Lactate    Collection Time: 03/25/24  3:11 PM   Result Value Ref Range    Lactate 2.9 (H) 0.4 - 2.0 mmol/L   POCT GLUCOSE    Collection Time: 03/25/24  3:14 PM   Result Value Ref Range    POCT Glucose 514 (H) 74 - 99 mg/dL   POCT GLUCOSE    Collection Time: 03/25/24  4:52 PM   Result Value Ref Range    POCT Glucose 471 (H) 74 - 99 mg/dL   Basic metabolic panel    Collection Time: 03/25/24  5:28 PM   Result Value Ref Range    Glucose 407 (H) 74 - 99 mg/dL    Sodium 127 (L) 136 - 145 mmol/L    Potassium 4.5 3.5 - 5.3 mmol/L    Chloride 94 (L) 98 - 107 mmol/L    Bicarbonate 21 21 - 32 mmol/L    Anion Gap 17 10 - 20 mmol/L    Urea Nitrogen 25 (H) 6 - 23 mg/dL    Creatinine 1.62 (H) 0.50 - 1.05 mg/dL    eGFR 34 (L) >60 mL/min/1.73m*2    Calcium 8.7 8.6 - 10.3 mg/dL   Magnesium    Collection Time: 03/25/24  5:28 PM   Result Value Ref Range    Magnesium 1.36 (L) 1.60 - 2.40 mg/dL   Phosphorus    Collection Time: 03/25/24  5:28 PM   Result Value Ref Range    Phosphorus 1.9 (L) 2.5 - 4.9 mg/dL   Lactate    Collection Time: 03/25/24  5:28 PM   Result Value Ref Range    Lactate 5.5 (HH) 0.4 - 2.0 mmol/L   TSH    Collection Time: 03/25/24  5:28 PM   Result Value Ref Range    Thyroid Stimulating  Hormone 2.13 0.44 - 3.98 mIU/L   POCT GLUCOSE    Collection Time: 03/25/24  5:58 PM   Result Value Ref Range    POCT Glucose 387 (H) 74 - 99 mg/dL   POCT GLUCOSE    Collection Time: 03/25/24  7:06 PM   Result Value Ref Range    POCT Glucose 300 (H) 74 - 99 mg/dL   Basic metabolic panel    Collection Time: 03/25/24  7:51 PM   Result Value Ref Range    Glucose 261 (H) 74 - 99 mg/dL    Sodium 130 (L) 136 - 145 mmol/L    Potassium 3.4 (L) 3.5 - 5.3 mmol/L    Chloride 98 98 - 107 mmol/L    Bicarbonate 22 21 - 32 mmol/L    Anion Gap 13 10 - 20 mmol/L    Urea Nitrogen 25 (H) 6 - 23 mg/dL    Creatinine 1.56 (H) 0.50 - 1.05 mg/dL    eGFR 35 (L) >60 mL/min/1.73m*2    Calcium 8.4 (L) 8.6 - 10.3 mg/dL   Lactate    Collection Time: 03/25/24  7:51 PM   Result Value Ref Range    Lactate 4.4 (HH) 0.4 - 2.0 mmol/L   Magnesium    Collection Time: 03/25/24  7:51 PM   Result Value Ref Range    Magnesium 1.52 (L) 1.60 - 2.40 mg/dL   Phosphorus    Collection Time: 03/25/24  7:51 PM   Result Value Ref Range    Phosphorus 1.0 (L) 2.5 - 4.9 mg/dL   Procalcitonin    Collection Time: 03/25/24  7:51 PM   Result Value Ref Range    Procalcitonin 78.04 (H) <=0.07 ng/mL   POCT GLUCOSE    Collection Time: 03/25/24  8:13 PM   Result Value Ref Range    POCT Glucose 229 (H) 74 - 99 mg/dL   POCT GLUCOSE    Collection Time: 03/25/24  9:04 PM   Result Value Ref Range    POCT Glucose 213 (H) 74 - 99 mg/dL   POCT GLUCOSE    Collection Time: 03/25/24 10:00 PM   Result Value Ref Range    POCT Glucose 165 (H) 74 - 99 mg/dL   POCT GLUCOSE    Collection Time: 03/25/24 11:04 PM   Result Value Ref Range    POCT Glucose 134 (H) 74 - 99 mg/dL   POCT GLUCOSE    Collection Time: 03/26/24 12:09 AM   Result Value Ref Range    POCT Glucose 141 (H) 74 - 99 mg/dL   Basic metabolic panel    Collection Time: 03/26/24 12:33 AM   Result Value Ref Range    Glucose 152 (H) 74 - 99 mg/dL    Sodium 130 (L) 136 - 145 mmol/L    Potassium 4.1 3.5 - 5.3 mmol/L    Chloride 99 98 - 107  mmol/L    Bicarbonate 21 21 - 32 mmol/L    Anion Gap 14 10 - 20 mmol/L    Urea Nitrogen 27 (H) 6 - 23 mg/dL    Creatinine 1.65 (H) 0.50 - 1.05 mg/dL    eGFR 33 (L) >60 mL/min/1.73m*2    Calcium 8.4 (L) 8.6 - 10.3 mg/dL   Lactate    Collection Time: 03/26/24 12:33 AM   Result Value Ref Range    Lactate 1.9 0.4 - 2.0 mmol/L   Magnesium    Collection Time: 03/26/24 12:33 AM   Result Value Ref Range    Magnesium 1.74 1.60 - 2.40 mg/dL   Phosphorus    Collection Time: 03/26/24 12:33 AM   Result Value Ref Range    Phosphorus 2.4 (L) 2.5 - 4.9 mg/dL   POCT GLUCOSE    Collection Time: 03/26/24  1:19 AM   Result Value Ref Range    POCT Glucose 161 (H) 74 - 99 mg/dL   POCT GLUCOSE    Collection Time: 03/26/24  2:28 AM   Result Value Ref Range    POCT Glucose 181 (H) 74 - 99 mg/dL   POCT GLUCOSE    Collection Time: 03/26/24  3:26 AM   Result Value Ref Range    POCT Glucose 170 (H) 74 - 99 mg/dL   Basic metabolic panel    Collection Time: 03/26/24  3:52 AM   Result Value Ref Range    Glucose 165 (H) 74 - 99 mg/dL    Sodium 130 (L) 136 - 145 mmol/L    Potassium 3.9 3.5 - 5.3 mmol/L    Chloride 97 (L) 98 - 107 mmol/L    Bicarbonate 23 21 - 32 mmol/L    Anion Gap 14 10 - 20 mmol/L    Urea Nitrogen 28 (H) 6 - 23 mg/dL    Creatinine 1.69 (H) 0.50 - 1.05 mg/dL    eGFR 32 (L) >60 mL/min/1.73m*2    Calcium 8.8 8.6 - 10.3 mg/dL   Lactate    Collection Time: 03/26/24  3:52 AM   Result Value Ref Range    Lactate 2.7 (H) 0.4 - 2.0 mmol/L   Magnesium    Collection Time: 03/26/24  3:52 AM   Result Value Ref Range    Magnesium 1.77 1.60 - 2.40 mg/dL   Phosphorus    Collection Time: 03/26/24  3:52 AM   Result Value Ref Range    Phosphorus 2.5 2.5 - 4.9 mg/dL   POCT GLUCOSE    Collection Time: 03/26/24  4:23 AM   Result Value Ref Range    POCT Glucose 168 (H) 74 - 99 mg/dL   POCT GLUCOSE    Collection Time: 03/26/24  5:26 AM   Result Value Ref Range    POCT Glucose 173 (H) 74 - 99 mg/dL   POCT GLUCOSE    Collection Time: 03/26/24  6:33 AM    Result Value Ref Range    POCT Glucose 158 (H) 74 - 99 mg/dL   POCT GLUCOSE    Collection Time: 03/26/24  8:25 AM   Result Value Ref Range    POCT Glucose 157 (H) 74 - 99 mg/dL   Blood Gas Venous Full Panel    Collection Time: 03/26/24 10:09 AM   Result Value Ref Range    POCT pH, Venous 7.39 7.33 - 7.43 pH    POCT pCO2, Venous 39 (L) 41 - 51 mm Hg    POCT pO2, Venous 47 (H) 35 - 45 mm Hg    POCT SO2, Venous 76 (H) 45 - 75 %    POCT Oxy Hemoglobin, Venous 75.0 45.0 - 75.0 %    POCT Hematocrit Calculated, Venous 30.0 (L) 36.0 - 46.0 %    POCT Sodium, Venous 130 (L) 136 - 145 mmol/L    POCT Potassium, Venous 3.5 3.5 - 5.3 mmol/L    POCT Chloride, Venous 100 98 - 107 mmol/L    POCT Ionized Calicum, Venous 1.28 1.10 - 1.33 mmol/L    POCT Glucose, Venous 142 (H) 74 - 99 mg/dL    POCT Lactate, Venous 1.9 0.4 - 2.0 mmol/L    POCT Base Excess, Venous -1.2 -2.0 - 3.0 mmol/L    POCT HCO3 Calculated, Venous 23.6 22.0 - 26.0 mmol/L    POCT Hemoglobin, Venous 10.0 (L) 12.0 - 16.0 g/dL    POCT Anion Gap, Venous 10.0 10.0 - 25.0 mmol/L    Patient Temperature 37.0 degrees Celsius    FiO2 36 %   Basic Metabolic Panel    Collection Time: 03/26/24 10:09 AM   Result Value Ref Range    Glucose 148 (H) 74 - 99 mg/dL    Sodium 130 (L) 136 - 145 mmol/L    Potassium 3.3 (L) 3.5 - 5.3 mmol/L    Chloride 101 98 - 107 mmol/L    Bicarbonate 22 21 - 32 mmol/L    Anion Gap 10 10 - 20 mmol/L    Urea Nitrogen 27 (H) 6 - 23 mg/dL    Creatinine 1.73 (H) 0.50 - 1.05 mg/dL    eGFR 31 (L) >60 mL/min/1.73m*2    Calcium 8.2 (L) 8.6 - 10.3 mg/dL   Magnesium    Collection Time: 03/26/24 10:09 AM   Result Value Ref Range    Magnesium 1.73 1.60 - 2.40 mg/dL   Phosphorus    Collection Time: 03/26/24 10:09 AM   Result Value Ref Range    Phosphorus 2.1 (L) 2.5 - 4.9 mg/dL   CBC    Collection Time: 03/26/24 10:09 AM   Result Value Ref Range    WBC 16.6 (H) 4.4 - 11.3 x10*3/uL    nRBC 0.0 0.0 - 0.0 /100 WBCs    RBC 3.59 (L) 4.00 - 5.20 x10*6/uL    Hemoglobin  10.0 (L) 12.0 - 16.0 g/dL    Hematocrit 29.9 (L) 36.0 - 46.0 %    MCV 83 80 - 100 fL    MCH 27.9 26.0 - 34.0 pg    MCHC 33.4 32.0 - 36.0 g/dL    RDW 14.7 (H) 11.5 - 14.5 %    Platelets 144 (L) 150 - 450 x10*3/uL   POCT GLUCOSE    Collection Time: 03/26/24 10:18 AM   Result Value Ref Range    POCT Glucose 141 (H) 74 - 99 mg/dL   MRSA Surveillance for Vancomycin De-escalation, PCR    Collection Time: 03/26/24 10:59 AM    Specimen: Anterior Nares; Swab   Result Value Ref Range    MRSA PCR Not Detected Not Detected   POCT GLUCOSE    Collection Time: 03/26/24 12:17 PM   Result Value Ref Range    POCT Glucose 190 (H) 74 - 99 mg/dL   Tissue/Wound Culture/Smear    Collection Time: 03/26/24  2:04 PM    Specimen: Wound/Tissue; Tissue/Biopsy   Result Value Ref Range    Tissue/Wound Culture/Smear No growth to date    Transthoracic Echo (TTE) Limited    Collection Time: 03/26/24  2:35 PM   Result Value Ref Range    RVSP 28.2 mmHg   Blood Culture    Collection Time: 03/26/24  3:00 PM    Specimen: Peripheral Venipuncture; Blood culture   Result Value Ref Range    Blood Culture Loaded on Instrument - Culture in progress    POCT GLUCOSE    Collection Time: 03/26/24  4:37 PM   Result Value Ref Range    POCT Glucose 270 (H) 74 - 99 mg/dL   POCT GLUCOSE    Collection Time: 03/26/24  8:15 PM   Result Value Ref Range    POCT Glucose 268 (H) 74 - 99 mg/dL   Blood Culture    Collection Time: 03/27/24  2:20 AM    Specimen: Peripheral Venipuncture; Blood culture   Result Value Ref Range    Blood Culture Loaded on Instrument - Culture in progress    Basic Metabolic Panel    Collection Time: 03/27/24  2:20 AM   Result Value Ref Range    Glucose 291 (H) 74 - 99 mg/dL    Sodium 128 (L) 136 - 145 mmol/L    Potassium 3.5 3.5 - 5.3 mmol/L    Chloride 98 98 - 107 mmol/L    Bicarbonate 20 (L) 21 - 32 mmol/L    Anion Gap 14 10 - 20 mmol/L    Urea Nitrogen 27 (H) 6 - 23 mg/dL    Creatinine 1.56 (H) 0.50 - 1.05 mg/dL    eGFR 35 (L) >60 mL/min/1.73m*2     Calcium 8.2 (L) 8.6 - 10.3 mg/dL   CBC    Collection Time: 03/27/24  2:20 AM   Result Value Ref Range    WBC 11.4 (H) 4.4 - 11.3 x10*3/uL    nRBC 0.0 0.0 - 0.0 /100 WBCs    RBC 3.55 (L) 4.00 - 5.20 x10*6/uL    Hemoglobin 9.8 (L) 12.0 - 16.0 g/dL    Hematocrit 29.7 (L) 36.0 - 46.0 %    MCV 84 80 - 100 fL    MCH 27.6 26.0 - 34.0 pg    MCHC 33.0 32.0 - 36.0 g/dL    RDW 14.6 (H) 11.5 - 14.5 %    Platelets 127 (L) 150 - 450 x10*3/uL   Phosphorus    Collection Time: 03/27/24  2:20 AM   Result Value Ref Range    Phosphorus 3.1 2.5 - 4.9 mg/dL   Magnesium    Collection Time: 03/27/24  2:20 AM   Result Value Ref Range    Magnesium 1.72 1.60 - 2.40 mg/dL   POCT GLUCOSE    Collection Time: 03/27/24  7:53 AM   Result Value Ref Range    POCT Glucose 288 (H) 74 - 99 mg/dL   Electrocardiogram, 12-lead PRN ACS symptoms    Collection Time: 03/27/24  9:30 AM   Result Value Ref Range    Ventricular Rate 70 BPM    Atrial Rate 70 BPM    MA Interval 168 ms    QRS Duration 106 ms    QT Interval 426 ms    QTC Calculation(Bazett) 460 ms    P Axis -84 degrees    R Axis 96 degrees    T Axis 57 degrees    QRS Count 12 beats    Q Onset 215 ms    P Onset 131 ms    P Offset 197 ms    T Offset 428 ms    QTC Fredericia 448 ms   POCT GLUCOSE    Collection Time: 03/27/24 11:09 AM   Result Value Ref Range    POCT Glucose 295 (H) 74 - 99 mg/dL       CV Studies:  EKG:  Encounter Date: 03/25/24   Electrocardiogram, 12-lead PRN ACS symptoms   Result Value    Ventricular Rate 70    Atrial Rate 70    MA Interval 168    QRS Duration 106    QT Interval 426    QTC Calculation(Bazett) 460    P Axis -84    R Axis 96    T Axis 57    QRS Count 12    Q Onset 215    P Onset 131    P Offset 197    T Offset 428    QTC Fredericia 448    Narrative    Unusual P axis, possible ectopic atrial rhythm with undetermined rhythm irregularity  Rightward axis  Incomplete left bundle branch block  Abnormal ECG  When compared with ECG of 27-MAR-2024 09:27, (unconfirmed)  No  significant change was found  Confirmed by Raffi Hendrickson ([id number]) on 3/27/2024 9:49:24 AM     Echocardiogram:   Echocardiogram     Narrative  [facility], [address] [address]  Tel [phone] and Fax [phone]    TRANSTHORACIC ECHOCARDIOGRAM REPORT      Patient Name:     REYES SENIOR          Reading Physician:   [id number] Michele RAMESH MD  Study Date:       12/21/2020         Referring Physician: Michele Cardona MD  MRN/PID:          [id number]           PCP:  Accession/Order#: [id number]          Department Location: [facility] Non  Invasive  YOB: 1950         Fellow:  Gender:           F                  Nurse:               Basia Laws RN  Admit Date:       12/20/2020         Sonographer:         Lida Malagon YUNG  Admission Status: Inpatient -        Additional Staff:  Routine  Height:           182.88 cm          CC Report to:        1 [facility]  Weight:           107.50 kg          Study Type:          Echocardiogram  BSA:              2.29 m2  Blood Pressure: 110 /67 mmHg    Diagnosis/ICD: I50.20-Unspecified systolic (congestive) heart failure (CHF)  Indication:    Congestive Heart Failure  Procedure/CPT: Echo Complete w Full [id number]    Patient History:  Diabetes:          Yes  Pertinent History: Previous DVT, HTN and LE Edema.    Study Detail: The following Echo studies were performed: 2D, M-Mode, Doppler and  color flow. Definity used as a contrast agent for endocardial  border definition. Total contrast used for this procedure was 1 mL  via IV push. The patient was awake.      PHYSICIAN INTERPRETATION:  Left Ventricle: The left ventricular systolic function is normal, with an estimated ejection fraction of 55-60%. There are no regional wall motion abnormalities. The left ventricular cavity size is normal. There is concentric left ventricular hypertrophy. Spectral Doppler shows an impaired relaxation pattern  of left ventricular diastolic filling.  Left Atrium: The left atrium is mildly dilated.  Right Ventricle: The right ventricle is normal in size. There is normal right ventricular global systolic function.  Right Atrium: The right atrium is normal in size.  Aortic Valve: The aortic valve is trileaflet. There is no evidence of aortic valve stenosis.  There is mild aortic valve regurgitation. The peak instantaneous gradient of the aortic valve is 10.9 mmHg. The mean gradient of the aortic valve is 7.0 mmHg.  Mitral Valve: The mitral valve is normal in structure. There is moderate mitral annular calcification. There is mild mitral valve regurgitation.  Tricuspid Valve: The tricuspid valve is structurally normal. There is mild tricuspid regurgitation.  Pulmonic Valve: The pulmonic valve is structurally normal. There is mild pulmonic valve regurgitation.  Pericardium: There is no pericardial effusion noted.  Aorta: The aortic root is normal. There is mild dilatation of the ascending aorta.  Pulmonary Artery: The tricuspid regurgitant velocity is 2.95 m/s, and with an estimated right atrial pressure of 10 mmHg, the estimated pulmonary artery pressure is mildly elevated with the RVSP at 44.8 mmHg.  Systemic Veins: The inferior vena cava appears to be of normal size.      CONCLUSIONS:  1. The left ventricular systolic function is normal with a 55-60% estimated ejection fraction.  2. Spectral Doppler shows an impaired relaxation pattern of left ventricular diastolic filling.  3. There is moderate mitral annular calcification.  4. There is mild aortic valve regurgitation.  5. There is mild mitral, tricuspid and pulmonic regurgitation.  6. The estimated pulmonary artery pressure is mildly elevated with the RVSP at 44.8 mmHg.    QUANTITATIVE DATA SUMMARY:  2D MEASUREMENTS:  Normal Ranges:  Ao Root d:     3.70 cm    (2.0-3.7cm)  IVSd:          1.27 cm    (0.6-1.1cm)  LVPWd:         1.27 cm    (0.6-1.1cm)  LVIDd:         5.03 cm     (3.9-5.9cm)  LVIDs:         3.43 cm  LV Mass Index: 111.6 g/m2  LV % FS        31.8 %    LA VOLUME:  Normal Ranges:  LA Vol A4C:        83.6 ml    (22+/-6mL/m2)  LA Vol A2C:        87.0 ml  LA Vol BP:         90.1 ml  LA Vol Index A4C:  36.5ml/m2  LA Vol Index A2C:  38.0 ml/m2  LA Vol Index BP:   39.3 ml/m2  LA Area A4C:       26.7 cm2  LA Area A2C:       25.8 cm2  LA Major Axis A4C: 7.2 cm  LA Major Axis A2C: 6.5 cm  LA Volume Index:   37.3 ml/m2  LA Vol A4C:        78.5 ml  LA Vol A2C:        83.8 ml    RA VOLUME BY A/L METHOD:  Normal Ranges:  RA Area A4C: 22.1 cm2    M-MODE MEASUREMENTS:  Normal Ranges:  Ao Root: 3.30 cm (2.0-3.7cm)  AoV Exc: 1.90 cm (1.5-2.5cm)  LAs:     4.00 cm (2.7-4.0cm)    AORTA MEASUREMENTS:  Normal Ranges:  AoV Exc:     1.90 cm (1.5-2.5cm)  Ao Sinus, d: 3.70 cm (2.1-3.5cm)  Asc Ao, d:   3.90 cm (2.1-3.4cm)    LV SYSTOLIC FUNCTION BY 2D PLANIMETRY (MOD):  Normal Ranges:  EF-A4C View: 58.0 % (>55%)  EF-A2C View: 58.4 %  EF-Biplane:  56.8 %    LV DIASTOLIC FUNCTION:  Normal Ranges:  MV Peak E:        0.97 m/s    (0.7-1.2 m/s)  MV Peak A:        1.09 m/s    (0.42-0.7 m/s)  E/A Ratio:        0.89        (1.0-2.2)  MV e'             0.09 m/s    (>8.0)  MV lateral e'     0.09 m/s  MV medial e'      0.07 m/s  MV A Dur:         161.00 msec  E/e' Ratio:       10.78       (<8.0)  PulmV Sys Chau:    40.30 cm/s  PulmV Ayala Chau:   29.10 cm/s  PulmV S/D Chau:    1.40  PulmV A Revs Chau: 26.10 cm/s  PulmV A Revs Dur: 135.00 msec    MITRAL VALVE:  Normal Ranges:  MV DT: 327 msec (150-240msec)    AORTIC VALVE:  Normal Ranges:  AoV Vmax:                1.65 m/s  (<1.7m/s)  AoV Peak PG:             10.9 mmHg (<20mmHg)  AoV Mean P.0 mmHg  (1.7-11.5mmHg)  LVOT Max Chau:            1.25 m/s  (<1.1m/s)  AoV VTI:                 37.20 cm  (18-25cm)  LVOT VTI:                26.60 cm  LVOT Diameter:           2.00 cm   (1.8-2.4cm)  AoV Area, VTI:           2.25 cm2  (2.5-5.5cm2)  AoV Area,Vmax:            2.38 cm2  (2.5-4.5cm2)  AoV Dimensionless Index: 0.72    AORTIC INSUFFICIENCY:  AI Vmax:       4.04 m/s  AI Half-time:  602 msec  AI Decel Rate: 197.00 cm/s2    RIGHT VENTRICLE:  RV 1   3.60 cm  RV 2   2.79 cm  RV 3   7.45 cm  TAPSE: 23.8 mm  RV s'  0.14 m/s    TRICUSPID VALVE/RVSP:  Normal Ranges:  Peak TR Velocity: 2.95 m/s  RV Syst Pressure: 44.8 mmHg (< 30mmHg)  TV E Vmax:        0.29 m/s  (0.3-0.7m/s)  TV A Vmax:        0.40 m/s  IVC Diam:         1.81 cm    PULMONIC VALVE:  Normal Ranges:  PV Max Chau: 1.2 m/s  (0.6-0.9m/s)  PV Max P.8 mmHg    Pulmonary Veins:  PulmV A Revs Dur: 135.00 msec  PulmV A Revs Chau: 26.10 cm/s  PulmV Ayala Chau:   29.10 cm/s  PulmV S/D Chau:    1.40  PulmV Sys Chau:    40.30 cm/s      [id number] Michele Cardona MD  Electronically signed on 2020 at 8:18:25 AM         Final     Stress Testing IMGRESULT([id number]:1:1825): No results found for this or any previous visit from the past 1825 days.    Cardiac Catheterization: No results found for this or any previous visit from the past 5 days.  No results found for this or any previous visit from the past 3650 days.     Cardiac Scoring: No results found for this or any previous visit from the past 1825 days.    AAA : No results found for this or any previous visit from the past 1825 days.    OTHER: No results found for this or any previous visit from the past 1825 days.    LAST IMAGING RESULTS  Electrocardiogram, 12-lead PRN ACS symptoms  Unusual P axis, possible ectopic atrial rhythm with undetermined rhythm irregularity  Rightward axis  Incomplete left bundle branch block  Abnormal ECG  When compared with ECG of 27-MAR-2024 09:27, (unconfirmed)  No significant change was found  Confirmed by Raffi Hendrickson ([id number]) on 3/27/2024 9:49:24 AM        LAITH Almanza DOC, FACOI  3/27/2024  2:31 PM

## 2024-03-27 NOTE — CARE PLAN
Problem: Pain  Goal: My pain/discomfort is manageable  Outcome: Progressing     Problem: Safety  Goal: Patient will be injury free during hospitalization  Outcome: Progressing  Goal: I will remain free of falls  Outcome: Progressing     Problem: Daily Care  Goal: Daily care needs are met  Outcome: Progressing     Problem: Psychosocial Needs  Goal: Demonstrates ability to cope with hospitalization/illness  Outcome: Progressing  Goal: Collaborate with me, my family, and caregiver to identify my specific goals  Outcome: Progressing     Problem: Fall/Injury  Goal: Not fall by end of shift  Outcome: Progressing  Goal: Be free from injury by end of the shift  Outcome: Progressing  Goal: Verbalize understanding of personal risk factors for fall in the hospital  Outcome: Progressing  Goal: Verbalize understanding of risk factor reduction measures to prevent injury from fall in the home  Outcome: Progressing  Goal: Use assistive devices by end of the shift  Outcome: Progressing  Goal: Pace activities to prevent fatigue by end of the shift  Outcome: Progressing   The patient's goals for the shift include      The clinical goals for the shift include Blood sugar will remain <250

## 2024-03-27 NOTE — CARE PLAN
Problem: Pain  Goal: My pain/discomfort is manageable  3/27/2024 0131 by Marion Sullivan RN  Outcome: Progressing  3/27/2024 0059 by Marion Sullivan RN  Outcome: Progressing     Problem: Safety  Goal: Patient will be injury free during hospitalization  3/27/2024 0131 by Marion Sullivan RN  Outcome: Progressing  3/27/2024 0059 by Marion Sullivan RN  Outcome: Progressing  Goal: I will remain free of falls  3/27/2024 0131 by Marion Sullivan RN  Outcome: Progressing  3/27/2024 0059 by Marion Sullivan RN  Outcome: Progressing     Problem: Daily Care  Goal: Daily care needs are met  3/27/2024 0131 by Marion Sullivan RN  Outcome: Progressing  3/27/2024 0059 by Marion Sullivan RN  Outcome: Progressing   The patient's goals for the shift include      The clinical goals for the shift include Blood sugar will remain <250

## 2024-03-27 NOTE — PROGRESS NOTES
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      PHYSICAL EXAM:  Vital signs: /85   Pulse 75   Temp 37.2 °C (99 °F) (Temporal)   Resp 22   Ht 1.829 m (6')   Wt 113 kg (250 lb)   SpO2 97%   BMI 33.91 kg/m²   Temp (24hrs), Av.1 °C (98.7 °F), Min:36.5 °C (97.7 °F), Max:37.4 °C (99.3 °F)    General: alert, oriented, NAD  Lungs: bilaterally clear to auscultation  Heart: regular rate and rhythm  Abdomen: soft, non tender, non distended, BS+  Extremities: no edema  No rashes  No joint inflammation  Neck supple  Lines ok  No CVAT    Labs:  reviewed    Microbiology data: reviewed    Imaging data: reviewed      Kristine Koch   Pager:[phone]  Date of service: 3/27/2024  Time of service: 1:34 PM

## 2024-03-27 NOTE — PROGRESS NOTES
Patient with blood cultures positive for Group A strep, also found to have endocarditis. ID is on board and workup is in progress. Once IV ATBX have been selected, will need to confirm that they are able to administer at [facility]. Otherwise would need to choose a new facility. Patient is awake and interactive today. Pall care meeting took place this morning and SW is following for a referral for outpatient pall care. TCC continues to follow.

## 2024-03-28 ENCOUNTER — APPOINTMENT (OUTPATIENT)
Dept: CARDIOLOGY | Facility: HOSPITAL | Age: 74
DRG: 871 | End: 2024-03-28
Payer: MEDICARE

## 2024-03-28 LAB
ALBUMIN SERPL BCP-MCNC: 2.7 G/DL (ref 3.4–5)
ALP SERPL-CCNC: 68 U/L (ref 33–136)
ALT SERPL W P-5'-P-CCNC: 12 U/L (ref 7–45)
ANION GAP SERPL CALC-SCNC: 11 MMOL/L (ref 10–20)
AST SERPL W P-5'-P-CCNC: 13 U/L (ref 9–39)
ATRIAL RATE: 68 BPM
BACTERIA BLD AEROBE CULT: ABNORMAL
BACTERIA BLD AEROBE CULT: ABNORMAL
BACTERIA BLD CULT: ABNORMAL
BACTERIA BLD CULT: ABNORMAL
BACTERIA SPEC CULT: ABNORMAL
BILIRUB SERPL-MCNC: 0.4 MG/DL (ref 0–1.2)
BUN SERPL-MCNC: 26 MG/DL (ref 6–23)
CALCIUM SERPL-MCNC: 8.4 MG/DL (ref 8.6–10.3)
CHLORIDE SERPL-SCNC: 99 MMOL/L (ref 98–107)
CO2 SERPL-SCNC: 22 MMOL/L (ref 21–32)
CREAT SERPL-MCNC: 1.36 MG/DL (ref 0.5–1.05)
EGFRCR SERPLBLD CKD-EPI 2021: 41 ML/MIN/1.73M*2
ERYTHROCYTE [DISTWIDTH] IN BLOOD BY AUTOMATED COUNT: 14.7 % (ref 11.5–14.5)
GLUCOSE BLD MANUAL STRIP-MCNC: 146 MG/DL (ref 74–99)
GLUCOSE BLD MANUAL STRIP-MCNC: 166 MG/DL (ref 74–99)
GLUCOSE BLD MANUAL STRIP-MCNC: 170 MG/DL (ref 74–99)
GLUCOSE BLD MANUAL STRIP-MCNC: 219 MG/DL (ref 74–99)
GLUCOSE SERPL-MCNC: 200 MG/DL (ref 74–99)
GRAM STN SPEC: ABNORMAL
HCT VFR BLD AUTO: 30.5 % (ref 36–46)
HGB BLD-MCNC: 10 G/DL (ref 12–16)
MAGNESIUM SERPL-MCNC: 1.69 MG/DL (ref 1.6–2.4)
MCH RBC QN AUTO: 27.5 PG (ref 26–34)
MCHC RBC AUTO-ENTMCNC: 32.8 G/DL (ref 32–36)
MCV RBC AUTO: 84 FL (ref 80–100)
NRBC BLD-RTO: 0 /100 WBCS (ref 0–0)
P OFFSET: 193 MS
P ONSET: 142 MS
PLATELET # BLD AUTO: 118 X10*3/UL (ref 150–450)
POTASSIUM SERPL-SCNC: 3.6 MMOL/L (ref 3.5–5.3)
PR INTERVAL: 146 MS
PROT SERPL-MCNC: 6.1 G/DL (ref 6.4–8.2)
Q ONSET: 215 MS
QRS COUNT: 11 BEATS
QRS DURATION: 106 MS
QT INTERVAL: 438 MS
QTC CALCULATION(BAZETT): 465 MS
QTC FREDERICIA: 456 MS
R AXIS: 88 DEGREES
RBC # BLD AUTO: 3.63 X10*6/UL (ref 4–5.2)
SODIUM SERPL-SCNC: 128 MMOL/L (ref 136–145)
T AXIS: 34 DEGREES
T OFFSET: 434 MS
VENTRICULAR RATE: 68 BPM
WBC # BLD AUTO: 8.8 X10*3/UL (ref 4.4–11.3)

## 2024-03-28 PROCEDURE — B24BZZ4 ULTRASONOGRAPHY OF HEART WITH AORTA, TRANSESOPHAGEAL: ICD-10-PCS | Performed by: STUDENT IN AN ORGANIZED HEALTH CARE EDUCATION/TRAINING PROGRAM

## 2024-03-28 PROCEDURE — 2500000002 HC RX 250 W HCPCS SELF ADMINISTERED DRUGS (ALT 637 FOR MEDICARE OP, ALT 636 FOR OP/ED)

## 2024-03-28 PROCEDURE — 36415 COLL VENOUS BLD VENIPUNCTURE: CPT | Performed by: FAMILY MEDICINE

## 2024-03-28 PROCEDURE — 85027 COMPLETE CBC AUTOMATED: CPT | Performed by: FAMILY MEDICINE

## 2024-03-28 PROCEDURE — 1200000002 HC GENERAL ROOM WITH TELEMETRY DAILY

## 2024-03-28 PROCEDURE — B24CZZ4 ULTRASONOGRAPHY OF PERICARDIUM, TRANSESOPHAGEAL: ICD-10-PCS | Performed by: STUDENT IN AN ORGANIZED HEALTH CARE EDUCATION/TRAINING PROGRAM

## 2024-03-28 PROCEDURE — 99233 SBSQ HOSP IP/OBS HIGH 50: CPT | Performed by: FAMILY MEDICINE

## 2024-03-28 PROCEDURE — 82947 ASSAY GLUCOSE BLOOD QUANT: CPT

## 2024-03-28 PROCEDURE — 83735 ASSAY OF MAGNESIUM: CPT | Performed by: FAMILY MEDICINE

## 2024-03-28 PROCEDURE — 93312 ECHO TRANSESOPHAGEAL: CPT | Performed by: STUDENT IN AN ORGANIZED HEALTH CARE EDUCATION/TRAINING PROGRAM

## 2024-03-28 PROCEDURE — 2500000001 HC RX 250 WO HCPCS SELF ADMINISTERED DRUGS (ALT 637 FOR MEDICARE OP): Performed by: STUDENT IN AN ORGANIZED HEALTH CARE EDUCATION/TRAINING PROGRAM

## 2024-03-28 PROCEDURE — 2500000001 HC RX 250 WO HCPCS SELF ADMINISTERED DRUGS (ALT 637 FOR MEDICARE OP)

## 2024-03-28 PROCEDURE — 99231 SBSQ HOSP IP/OBS SF/LOW 25: CPT

## 2024-03-28 PROCEDURE — 80053 COMPREHEN METABOLIC PANEL: CPT | Performed by: FAMILY MEDICINE

## 2024-03-28 PROCEDURE — 93320 DOPPLER ECHO COMPLETE: CPT

## 2024-03-28 PROCEDURE — 2500000004 HC RX 250 GENERAL PHARMACY W/ HCPCS (ALT 636 FOR OP/ED): Performed by: STUDENT IN AN ORGANIZED HEALTH CARE EDUCATION/TRAINING PROGRAM

## 2024-03-28 PROCEDURE — 99232 SBSQ HOSP IP/OBS MODERATE 35: CPT | Performed by: INTERNAL MEDICINE

## 2024-03-28 RX ORDER — FENTANYL CITRATE 50 UG/ML
INJECTION, SOLUTION INTRAMUSCULAR; INTRAVENOUS AS NEEDED
Status: COMPLETED | OUTPATIENT
Start: 2024-03-28 | End: 2024-03-28

## 2024-03-28 RX ORDER — LIDOCAINE HYDROCHLORIDE 20 MG/ML
SOLUTION OROPHARYNGEAL AS NEEDED
Status: COMPLETED | OUTPATIENT
Start: 2024-03-28 | End: 2024-03-28

## 2024-03-28 RX ORDER — MIDAZOLAM HYDROCHLORIDE 1 MG/ML
INJECTION, SOLUTION INTRAMUSCULAR; INTRAVENOUS AS NEEDED
Status: COMPLETED | OUTPATIENT
Start: 2024-03-28 | End: 2024-03-28

## 2024-03-28 RX ADMIN — BENZOCAINE 1 SPRAY: 200 SPRAY DENTAL; ORAL; PERIODONTAL at 10:14

## 2024-03-28 RX ADMIN — HYDROPHOR: 42 OINTMENT TOPICAL at 21:33

## 2024-03-28 RX ADMIN — APIXABAN 5 MG: 5 TABLET, FILM COATED ORAL at 21:31

## 2024-03-28 RX ADMIN — INSULIN HUMAN 20 UNITS: 100 INJECTION, SUSPENSION SUBCUTANEOUS at 22:18

## 2024-03-28 RX ADMIN — INSULIN LISPRO 2 UNITS: 100 INJECTION, SOLUTION INTRAVENOUS; SUBCUTANEOUS at 17:24

## 2024-03-28 RX ADMIN — MIDAZOLAM 1 MG: 1 INJECTION INTRAMUSCULAR; INTRAVENOUS at 10:21

## 2024-03-28 RX ADMIN — LOSARTAN POTASSIUM 25 MG: 25 TABLET, FILM COATED ORAL at 08:14

## 2024-03-28 RX ADMIN — HYDROPHOR: 42 OINTMENT TOPICAL at 08:14

## 2024-03-28 RX ADMIN — METOPROLOL TARTRATE 25 MG: 25 TABLET, FILM COATED ORAL at 08:14

## 2024-03-28 RX ADMIN — LIDOCAINE HYDROCHLORIDE 1.25 ML: 20 SOLUTION ORAL at 10:11

## 2024-03-28 RX ADMIN — LEVOTHYROXINE SODIUM 175 MCG: 125 TABLET ORAL at 08:14

## 2024-03-28 RX ADMIN — METOPROLOL TARTRATE 25 MG: 25 TABLET, FILM COATED ORAL at 21:31

## 2024-03-28 RX ADMIN — AMPICILLIN SODIUM AND SULBACTAM SODIUM 3 G: 2; 1 INJECTION, POWDER, FOR SOLUTION INTRAMUSCULAR; INTRAVENOUS at 22:13

## 2024-03-28 RX ADMIN — NYSTATIN 1 APPLICATION: 100000 POWDER TOPICAL at 21:33

## 2024-03-28 RX ADMIN — INSULIN LISPRO 4 UNITS: 100 INJECTION, SOLUTION INTRAVENOUS; SUBCUTANEOUS at 09:16

## 2024-03-28 RX ADMIN — AMPICILLIN SODIUM AND SULBACTAM SODIUM 3 G: 2; 1 INJECTION, POWDER, FOR SOLUTION INTRAMUSCULAR; INTRAVENOUS at 17:24

## 2024-03-28 RX ADMIN — AMPICILLIN SODIUM AND SULBACTAM SODIUM 3 G: 2; 1 INJECTION, POWDER, FOR SOLUTION INTRAMUSCULAR; INTRAVENOUS at 04:20

## 2024-03-28 RX ADMIN — ASPIRIN 81 MG: 81 TABLET, COATED ORAL at 17:24

## 2024-03-28 RX ADMIN — OXYCODONE HYDROCHLORIDE AND ACETAMINOPHEN 2 TABLET: 5; 325 TABLET ORAL at 08:22

## 2024-03-28 RX ADMIN — AMPICILLIN SODIUM AND SULBACTAM SODIUM 3 G: 2; 1 INJECTION, POWDER, FOR SOLUTION INTRAMUSCULAR; INTRAVENOUS at 11:33

## 2024-03-28 RX ADMIN — FENTANYL CITRATE 25 MCG: 0.05 INJECTION, SOLUTION INTRAMUSCULAR; INTRAVENOUS at 10:21

## 2024-03-28 RX ADMIN — DESVENLAFAXINE SUCCINATE 100 MG: 100 TABLET, FILM COATED, EXTENDED RELEASE ORAL at 08:14

## 2024-03-28 RX ADMIN — INSULIN HUMAN 20 UNITS: 100 INJECTION, SUSPENSION SUBCUTANEOUS at 09:17

## 2024-03-28 RX ADMIN — APIXABAN 5 MG: 5 TABLET, FILM COATED ORAL at 08:14

## 2024-03-28 RX ADMIN — BUPROPION HYDROCHLORIDE 150 MG: 150 TABLET, EXTENDED RELEASE ORAL at 08:14

## 2024-03-28 RX ADMIN — INSULIN LISPRO 2 UNITS: 100 INJECTION, SOLUTION INTRAVENOUS; SUBCUTANEOUS at 11:33

## 2024-03-28 RX ADMIN — NYSTATIN 1 APPLICATION: 100000 POWDER TOPICAL at 08:14

## 2024-03-28 RX ADMIN — AMIODARONE HYDROCHLORIDE 200 MG: 200 TABLET ORAL at 08:14

## 2024-03-28 RX ADMIN — SPIRONOLACTONE 25 MG: 25 TABLET ORAL at 08:14

## 2024-03-28 ASSESSMENT — COGNITIVE AND FUNCTIONAL STATUS - GENERAL
DAILY ACTIVITIY SCORE: 13
EATING MEALS: A LITTLE
TOILETING: TOTAL
TOILETING: TOTAL
DAILY ACTIVITIY SCORE: 13
WALKING IN HOSPITAL ROOM: TOTAL
MOVING TO AND FROM BED TO CHAIR: A LOT
PERSONAL GROOMING: A LITTLE
CLIMB 3 TO 5 STEPS WITH RAILING: TOTAL
STANDING UP FROM CHAIR USING ARMS: A LOT
STANDING UP FROM CHAIR USING ARMS: A LOT
DRESSING REGULAR LOWER BODY CLOTHING: A LOT
TURNING FROM BACK TO SIDE WHILE IN FLAT BAD: A LITTLE
EATING MEALS: A LITTLE
MOVING FROM LYING ON BACK TO SITTING ON SIDE OF FLAT BED WITH BEDRAILS: A LITTLE
MOBILITY SCORE: 12
HELP NEEDED FOR BATHING: A LOT
MOBILITY SCORE: 12
DRESSING REGULAR UPPER BODY CLOTHING: A LOT
DRESSING REGULAR LOWER BODY CLOTHING: A LOT
TURNING FROM BACK TO SIDE WHILE IN FLAT BAD: A LITTLE
MOVING FROM LYING ON BACK TO SITTING ON SIDE OF FLAT BED WITH BEDRAILS: A LITTLE
CLIMB 3 TO 5 STEPS WITH RAILING: TOTAL
DRESSING REGULAR UPPER BODY CLOTHING: A LOT
WALKING IN HOSPITAL ROOM: TOTAL
HELP NEEDED FOR BATHING: A LOT
MOVING TO AND FROM BED TO CHAIR: A LOT
PERSONAL GROOMING: A LITTLE

## 2024-03-28 ASSESSMENT — PAIN - FUNCTIONAL ASSESSMENT
PAIN_FUNCTIONAL_ASSESSMENT: 0-10

## 2024-03-28 ASSESSMENT — PAIN SCALES - GENERAL
PAINLEVEL_OUTOF10: 7
PAINLEVEL_OUTOF10: 3

## 2024-03-28 NOTE — PROGRESS NOTES
Spiritual Care Visit    Clinical Encounter Type  Visited With: Patient  Routine Visit: Introduction    Rastafarian Encounters  Rastafarian Needs: Prayer

## 2024-03-28 NOTE — CARE PLAN
Problem: Pain  Goal: My pain/discomfort is manageable  Outcome: Progressing   The patient's goals for the shift include  Pt sallie remain safe    The clinical goals for the shift include pt will remain fall free throughout my shift

## 2024-03-28 NOTE — PROGRESS NOTES
Palliative Care Follow-Up Progress Note    Teresa Matthews is a 73 y.o. female on day 3 of admission presenting with altered mental status and slurred speech. Patient was found to have DKA, sacral and extremity wounds, severe sepsis, complicated UTI, group A streptococcus bacteremia likely secondary to soft tissue cellulitis of left lower extremity, acute metabolic encephalopathy, and SANDI on CKD stage III. Past medical history significant for poorly controlled DM II, Afib, CHF, left total knee arthroplasty complicated by E.coli and MRSA infection, COPD, CKD stage III, hypothyroidism, depression, chronic pain on opioids, and non-adherence with diuretics/medications. Patient lives at home alone and she is no longer able to care for herself. She has had 4 hospitalizations and 1 ED visit since June as well as functional and cognitive decline over the last few months.     3/28/2024: Patient seen and examined, no visitors at bedside. She denies pain or other complaints and appears sleepy today, nodding off during visit. Patient going for ELISEO today d/t possible endocarditis.     [facility] outpatient palliative care referral has been sent    Physical Exam  Constitutional:       General: She is not in acute distress.     Comments: sleepy   HENT:      Head: Normocephalic.      Nose: Nose normal.      Mouth/Throat:      Mouth: Mucous membranes are moist.   Eyes:      General: No scleral icterus.  Cardiovascular:      Rate and Rhythm: Normal rate.   Pulmonary:      Effort: Pulmonary effort is normal.      Comments: 2.5 L NC  Abdominal:      Palpations: Abdomen is soft.   Genitourinary:     Comments: External catheter   Musculoskeletal:      Cervical back: Neck supple.      Right lower leg: No edema.      Left lower leg: No edema.   Skin:     General: Skin is warm and dry.   Neurological:      Comments: Answering questions appropriately, difficulty to assess orientation d/t sleepiness    Psychiatric:      Comments: Calm       Plan     Goals of Care: continue current aggressive care with DNR/DNI, further hospitalizations as needed, SNF on discharge with plan for likely LTC following hoping to improve functioning and compliance with medications, [facility] outpatient palliative care referral   Advanced Directives: HCPOA and LW has been scanned into EMR. Completed [place] DNR CCA which will also be scanned into EMR upon patients discharge from the hospital.   Health care power of : niece Shelley agent and sister Sandra is 1st alternate. I have spoken to Shelley who is deferring to Sandra at this time.   Outpatient services: Referral sent to [facility] outpatient palliative care     I spent 10 minutes in the professional and overall care of this patient.    Will sign off, please call with further questions or concerns.     Yo Dominguez, APRN-CNP

## 2024-03-28 NOTE — PROGRESS NOTES
Teresa Matthews is a 73 y.o. female on day 3 of admission presenting with Delirium.    Subjective   Patient being taken off the floor for ELISEO.     I have reviewed histories, allergies and medications have been reviewed and there are no changes       Objective     Physical Exam  Vitals and nursing note reviewed.   Constitutional:       General: She is not in acute distress.     Appearance: Normal appearance. She is obese.   HENT:      Head: Normocephalic and atraumatic.      Nose: Nose normal.      Mouth/Throat:      Mouth: Mucous membranes are moist.   Eyes:      Extraocular Movements: Extraocular movements intact.   Pulmonary:      Effort: Pulmonary effort is normal.   Neurological:      Mental Status: She is alert.         Last Recorded Vitals  Blood pressure 128/77, pulse 75, temperature 37.3 °C (99.2 °F), temperature source Temporal, resp. rate 18, height 1.829 m (6'), weight 113 kg (250 lb), SpO2 97 %.  Intake/Output last 3 Shifts:  I/O last 3 completed shifts:  In: 250 (2.2 mL/kg) [IV Piggyback:250]  Out: 2801 (24.7 mL/kg) [Urine:2800 (0.7 mL/kg/hr); Stool:1]  Weight: 113.4 kg     Relevant Results  Results from last 7 days   Lab Units 03/28/24  0633 03/28/24  0420 03/27/24  2112 03/27/24  1730 03/27/24  1527 03/27/24  1109 03/27/24  0753 03/27/24  0220 03/26/24  1018 03/26/24  1009 03/26/24  0423 03/26/24  0352 03/26/24  0119 03/26/24  0033   POCT GLUCOSE mg/dL 219*  --  177* 173* 198* 295*   < >  --    < >  --    < >  --    < >  --    GLUCOSE mg/dL  --  200*  --   --   --   --   --  291*  --  148*  --  165*  --  152*    < > = values in this interval not displayed.     Scheduled medications  amiodarone, 200 mg, oral, Daily  ampicillin-sulbactam, 3 g, intravenous, q6h  apixaban, 5 mg, oral, BID  aspirin, 81 mg, oral, Every Day  buPROPion XL, 150 mg, oral, q AM  desvenlafaxine, 100 mg, oral, Daily  insulin lispro, 0-10 Units, subcutaneous, TID with meals  [MAR Hold] insulin lispro, 10 Units, subcutaneous,  TID with meals  insulin NPH (Isophane), 20 Units, subcutaneous, BID  levothyroxine, 175 mcg, oral, Daily  losartan, 25 mg, oral, Daily  [MAR Hold] magnesium oxide, 400 mg, oral, Daily  metoprolol tartrate, 25 mg, oral, BID  nystatin, 1 Application, Topical, BID  pantoprazole, 40 mg, oral, Daily before breakfast  perflutren lipid microspheres, 0.5-10 mL of dilution, intravenous, Once in imaging  perflutren protein A microsphere, 0.5 mL, intravenous, Once in imaging  spironolactone, 25 mg, oral, Daily  sulfur hexafluoride microsphr, 2 mL, intravenous, Once in imaging  white petrolatum, , Topical, BID      Continuous medications     PRN medications  PRN medications: acetaminophen **OR** acetaminophen **OR** acetaminophen, dextrose, dextrose, glucagon, oxyCODONE-acetaminophen, oxygen, polyethylene glycol, [MAR Hold] traZODone    Results for orders placed or performed during the hospital encounter of 03/25/24 (from the past 24 hour(s))   POCT GLUCOSE   Result Value Ref Range    POCT Glucose 295 (H) 74 - 99 mg/dL   POCT GLUCOSE   Result Value Ref Range    POCT Glucose 198 (H) 74 - 99 mg/dL   Electrocardiogram, 12-lead PRN ACS symptoms   Result Value Ref Range    Ventricular Rate 68 BPM    Atrial Rate 68 BPM    KS Interval 146 ms    QRS Duration 106 ms    QT Interval 438 ms    QTC Calculation(Bazett) 465 ms    R Axis 88 degrees    T Axis 34 degrees    QRS Count 11 beats    Q Onset 215 ms    P Onset 142 ms    P Offset 193 ms    T Offset 434 ms    QTC Fredericia 456 ms   POCT GLUCOSE   Result Value Ref Range    POCT Glucose 173 (H) 74 - 99 mg/dL   POCT GLUCOSE   Result Value Ref Range    POCT Glucose 177 (H) 74 - 99 mg/dL   Comprehensive Metabolic Panel   Result Value Ref Range    Glucose 200 (H) 74 - 99 mg/dL    Sodium 128 (L) 136 - 145 mmol/L    Potassium 3.6 3.5 - 5.3 mmol/L    Chloride 99 98 - 107 mmol/L    Bicarbonate 22 21 - 32 mmol/L    Anion Gap 11 10 - 20 mmol/L    Urea Nitrogen 26 (H) 6 - 23 mg/dL    Creatinine  1.36 (H) 0.50 - 1.05 mg/dL    eGFR 41 (L) >60 mL/min/1.73m*2    Calcium 8.4 (L) 8.6 - 10.3 mg/dL    Albumin 2.7 (L) 3.4 - 5.0 g/dL    Alkaline Phosphatase 68 33 - 136 U/L    Total Protein 6.1 (L) 6.4 - 8.2 g/dL    AST 13 9 - 39 U/L    Bilirubin, Total 0.4 0.0 - 1.2 mg/dL    ALT 12 7 - 45 U/L   Magnesium   Result Value Ref Range    Magnesium 1.69 1.60 - 2.40 mg/dL   CBC   Result Value Ref Range    WBC 8.8 4.4 - 11.3 x10*3/uL    nRBC 0.0 0.0 - 0.0 /100 WBCs    RBC 3.63 (L) 4.00 - 5.20 x10*6/uL    Hemoglobin 10.0 (L) 12.0 - 16.0 g/dL    Hematocrit 30.5 (L) 36.0 - 46.0 %    MCV 84 80 - 100 fL    MCH 27.5 26.0 - 34.0 pg    MCHC 32.8 32.0 - 36.0 g/dL    RDW 14.7 (H) 11.5 - 14.5 %    Platelets 118 (L) 150 - 450 x10*3/uL   POCT GLUCOSE   Result Value Ref Range    POCT Glucose 219 (H) 74 - 99 mg/dL      Electrocardiogram, 12-lead PRN ACS symptoms    Result Date: 3/28/2024  Sinus rhythm with marked sinus arrhythmia Otherwise normal ECG When compared with ECG of 27-MAR-2024 16:54, (unconfirmed) No significant change was found    Electrocardiogram, 12-lead PRN ACS symptoms    Result Date: 3/27/2024  Unusual P axis, possible ectopic atrial rhythm with undetermined rhythm irregularity Rightward axis Incomplete left bundle branch block Abnormal ECG When compared with ECG of 27-MAR-2024 09:27, (unconfirmed) No significant change was found Confirmed by Raffi Hendrickson ([id number]) on 3/27/2024 9:49:24 AM    ECG 12 lead    Result Date: 3/26/2024  Sinus rhythm Atrial premature complex Nonspecific intraventricular conduction delay Borderline low voltage, extremity leads Nonspecific repol abnormality, lateral leads Minimal ST elevation, anterior leads See ED provider note for full interpretation and clinical correlation Confirmed by Rachael Coreas ([id number]) on 3/26/2024 5:21:24 PM    Transthoracic Echo (TTE) Limited    Result Date: 3/26/2024              [place] [id number]      Phone [phone] Fax  [phone] TRANSTHORACIC ECHOCARDIOGRAM REPORT  Patient Name:      REYES SENIOR           Brittany Physician:    [id number] Hai RAMESH MD Study Date:        3/26/2024           Ordering Provider:    [id number] KATHY IVANNA MRN/PID:           [id number]            Fellow: Accession#:        [id number]        Nurse: Date of Birth/Age: 1950 / 73     Sonographer:          Brittnee [email or web address]                                     RDCS Gender:            F                   Additional Staff: Height:            182.88 cm           Admit Date:           3/25/2024 Weight:            113.40 kg           Admission Status:     Inpatient - Routine BSA / BMI:         2.34 m2 / 33.91     Department Location:  [facility]                    kg/m2 Blood Pressure: 114 /63 mmHg Study Type:    TRANSTHORACIC ECHO (TTE) LIMITED Diagnosis/ICD: Bacteremia-R78.81 Indication:    BACTEREMIA CPT Codes:     Echo Limited-92125 Patient History: Pertinent History: CHF, A-FIB, HTN. Study Detail: The following Echo studies were performed: 2D, Doppler and color               flow.  Critical Event Critical Event: Test was completed as per department protocol. Critical Finding: Possible veg on MV. Time Test was Completed: 2:35:00 PM Notified: Dr. Gerber. Attending notification time: 2:45:37 PM  PHYSICIAN INTERPRETATION: Left Ventricle: Left ventricular systolic function was not assessed. There are no regional wall motion abnormalities. The left ventricular cavity size was not assessed. Left ventricular diastolic filling was not assessed. Left Atrium: The left atrium was not assessed. Right Ventricle: The right ventricle was not assessed. Right ventricular systolic function not assessed. Right Atrium: The right atrium was not assessed. Aortic Valve: There is no visualized aortic valve vegetation. The aortic valve was not assessed. Aortic valve regurgitation was not assessed.  Mitral Valve: The mitral valve was not assessed. Mitral valve regurgitation was not assessed. Possible veg on anterior leaflet of MV. Tricuspid Valve: No vegetation is seen on the tricuspid valve. The tricuspid valve was not assessed. Tricuspid regurgitation was not assessed. Pulmonic Valve: No pulmonic valve vegetation visualized. The pulmonic valve was not assessed. The pulmonic valve regurgitation was not assessed. Pericardium: Pericardial effusion was not assessed. Aorta: The aortic root was not assessed.  CONCLUSIONS:  1. Left ventricular systolic function was not assessed.  2. Possible veg on anterior leaflet of MV.  3. No tricuspid valve vegetation.  4. No aortic valve vegetation visualized.  5. No pulmonic valve vegetation visualized. QUANTITATIVE DATA SUMMARY: 2D MEASUREMENTS:              Normal Ranges: LAs: 2.10 cm (2.7-4.0cm) TRICUSPID VALVE/RVSP:                             Normal Ranges: Peak TR Velocity: 2.51 m/s RV Syst Pressure: 28.2 mmHg (< 30mmHg)  [id number] Hai Gerber MD Electronically signed on 3/26/2024 at 4:16:23 PM  ** Final **     XR chest 1 view    Result Date: 3/25/2024  Interpreted By:  Jeevan Gomez, STUDY: XR CHEST 1 VIEW; 3/25/2024 12:55 pm   INDICATION: CLINICAL INFORMATION: Signs/Symptoms:AMS.   COMPARISON: 11/09/2022   ACCESSION NUMBER(S): [id number]   ORDERING CLINICIAN: KAILEY CASTRO   TECHNIQUE: Portable chest one view.   FINDINGS: The cardiac size is indeterminate in view of the AP projection.  The aorta is tortuous. Pulmonary arteries are somewhat prominent centrally suggesting pulmonary arterial hypertension. No infiltrates or effusions are identified.       No acute pathologic findings are identified. Possible pulmonary arterial hypertension. There is no interval change when compared to the previous examination.   MACRO: none   Signed by: Jeevan Gomez 3/25/2024 1:25 PM Dictation workstation:   [id number]    CT brain attack angio head and neck W and WO IV  contrast    Result Date: 3/25/2024  Interpreted By:  Leandro Mcguire, STUDY: CT BRAIN ATTACK ANGIO HEAD AND NECK W AND WO IV CONTRAST; ; 3/25/2024 11:51 am   INDICATION: Signs/Symptoms:Stroke Evaluation with VAN Positive.   COMPARISON: None.   ACCESSION NUMBER(S): [id number]   ORDERING CLINICIAN: KAILEY CASTRO   TECHNIQUE: Thin cut axial CT images were generated over the upper thorax, neck, and head during the arterial passage of a full contrast bolus.  The bolus was generated with a power injector and followed with immediate saline flush. These image data were subtracted and then used for 3-D reconstructions. Maximum intensity projections and shaded surface displays were generated in multiple planes. In addition images were transferred into a 3-D processing program and additional projections and displays were reviewed  by the interpreting physician.   FINDINGS: The CT angiogram through the upper thorax demonstrates mild atherosclerotic calcifications along the aortic arch and origin of the left subclavian artery. No significant stenosis noted along the origins of right brachiocephalic artery, left common carotid artery, or left subclavian artery. No significant stenosis noted along the origin of the right vertebral artery. There is atherosclerotic calcification noted along the origin of the left vertebral artery contributing to mild-to-moderate segmental narrowing.   The CT angiogram of the neck demonstrates atherosclerotic calcifications noted along the distal common carotid arteries and origin of the internal carotid arteries contributing to mild non hemodynamically significant narrowing. No significant stenosis noted along the cervical segments of the vertebral arteries.   The CT angiogram of the head demonstrates mild atherosclerotic calcification along the distal left vertebral artery. No significant stenosis noted along the distal right vertebral artery, basilar artery, or distal internal carotid  arteries. The right posterior cerebral artery is predominantly supplied via the right posterior communicating artery. The P1 segment of the right posterior cerebral artery is asymmetrically small in caliber which may be related to congenital hypoplasia or secondary narrowing. Otherwise, no large vessel branch cutoffs of the anterior cerebral arteries, middle cerebral arteries, or posterior cerebral arteries are noted.   The source CT angiogram images of the head demonstrate moderate brain parenchymal volume loss. There are nonspecific white matter changes within the cerebral hemispheres bilaterally as well as vague hypodensity overlying the brainstem which while nonspecific, given the patient's age, may represent sequelae of small-vessel ischemic change. Additional small scattered hypodensities are identified within the subinsular regions, basal ganglia, and thalami bilaterally suggesting incidental mildly prominent perivascular spaces and/or small scattered lacunar infarctions. There is no midline shift.   The source CT angiogram images of the neck demonstrate multilevel cervical spondylosis.       The CT angiogram through the upper thorax demonstrates mild atherosclerotic calcifications along the aortic arch and origin of the left subclavian artery. No significant stenosis noted along the origins of right brachiocephalic artery, left common carotid artery, or left subclavian artery. No significant stenosis noted along the origin of the right vertebral artery. There is atherosclerotic calcification noted along the origin of the left vertebral artery contributing to mild-to-moderate segmental narrowing.   The CT angiogram of the neck demonstrates atherosclerotic calcifications noted along the distal common carotid arteries and origin of the internal carotid arteries contributing to mild non hemodynamically significant narrowing. No significant stenosis noted along the cervical segments of the vertebral arteries.    The CT angiogram of the head demonstrates mild atherosclerotic calcification along the distal left vertebral artery. No significant stenosis noted along the distal right vertebral artery, basilar artery, or distal internal carotid arteries. The right posterior cerebral artery is predominantly supplied via the right posterior communicating artery. The P1 segment of the right posterior cerebral artery is asymmetrically small in caliber which may be related to congenital hypoplasia or secondary narrowing. Otherwise, no large vessel branch cutoffs of the anterior cerebral arteries, middle cerebral arteries, or posterior cerebral arteries are noted.   The source CT angiogram images of the head demonstrate moderate brain parenchymal volume loss. There are nonspecific white matter changes within the cerebral hemispheres bilaterally as well as vague hypodensity overlying the brainstem which while nonspecific, given the patient's age, may represent sequelae of small-vessel ischemic change. Additional small scattered hypodensities are identified within the subinsular regions, basal ganglia, and thalami bilaterally suggesting incidental mildly prominent perivascular spaces and/or small scattered lacunar infarctions. There is no midline shift.   The source CT angiogram images of the neck demonstrate multilevel cervical spondylosis.     MACRO: None   Signed by: Leandro Mcguire 3/25/2024 12:08 PM Dictation workstation:   [id number]    CT brain attack head wo IV contrast    Result Date: 3/25/2024  Interpreted By:  Krish Randolph, STUDY: CT BRAIN ATTACK HEAD WO IV CONTRAST;  3/25/2024 11:38 am   INDICATION: Signs/Symptoms:Stroke Evaluation.  Slurred speech.   COMPARISON: CT head 02/12/2024.   ACCESSION NUMBER(S): [id number]   ORDERING CLINICIAN: KAILEY CASTRO   TECHNIQUE: Noncontrast axial CT scan of head was performed.   FINDINGS: Parenchyma: There is no intracranial hemorrhage. The grey-white differentiation is intact. There  is no mass effect or midline shift.   CSF Spaces: The ventricles, sulci and basal cisterns are within normal limits for age.   Extra-Axial Fluid: No extraaxial fluid collection.   Calvarium: No acute fracture.   Paranasal sinuses: Visualized paranasal sinuses are clear.   Mastoids: Clear.   Orbits: Normal.   Soft tissues: Unremarkable.       No acute intracranial abnormality.   MACRO: Krish Randolph discussed the significance and urgency of this critical finding by Epic secure chat with  KAILEY CASTRO on 3/25/2024 at 11:44 am.  (**-RCF-**) Findings:  See findings.   Signed by: Krish Randolph 3/25/2024 11:46 AM Dictation workstation:   [id number]               Assessment/Plan   Principal Problem:    Delirium  Active Problems:    Atrial fibrillation (CMS/HCC)    Chronic pain    GERD (gastroesophageal reflux disease)    Hypothyroidism, postsurgical    Complicated UTI (urinary tract infection)    Depression    Diabetic ketoacidosis without coma associated with type 2 diabetes mellitus (CMS/HCC)    Sacral wound    Sepsis without acute organ dysfunction (CMS/HCC)    Hypomagnesemia    IMPRESSION  Poorly controlled type 2 diabetes mellitus  Long-term use of insulin  A1c of 15.4%  DKA - resolved      Recommendations:  To continue NPH 20 units subcutaneous twice daily  To conitnuye Insulin Lispro 10 units TID AC  To continue insulin correction scale TID AC   Diabetic diet as tolerated   Accu-Cheks ACHS    Hypoglycemic protocol   Will continue to follow    Geovanna Pérez MD

## 2024-03-28 NOTE — PROGRESS NOTES
Teresa Matthews is a 73 y.o. female on day 3 of admission presenting with Delirium.      Subjective   73 y.o. female with a past medical history of type 2 diabetes poorly controlled on Humalog, A-fib on Eliquis, CHF, left total knee arthroplasty complicated by E. coli infection and MRSA infection, COPD, CKD stage III presented to ED today with confusion.  Patient is a poor historian, history supplemented by ED records.  EMS report the patient has slurred speech and left-sided facial droop and right-sided drift on exam.  No last known well.     ED course: Stroke alert was called on arrival.  NIH stroke score 3 for dysarthria and facial droop and answering wrong month.  CT head shows no acute abnormalities, patient is on Eliquis and therefore not a candidate for TNK.  Glucose 609, bicarb 15, pH 7.26 with gap of 24.  Potassium 4.6.  Patient suspected to be in DKA and started on insulin drip.  UA shows WBCs and positive leukocyte esterase with suspected UTI, started on vancomycin and Zosyn.  EKG shows sinus rhythm rate 92 with no ST changes.  Nurse found sacral and extremity wounds and dressed them.      3/26:                Today patient seen in the ICU in the presence of family member.  She is awake alert and interactive appropriately but appears weak and ill.  Voices no specific complaints and no acute events overnight.  Echocardiogram is suggesting mitral valve endocarditis.  Blood cultures 2 out of 2 sets growing group A strep.  Continues on Zosyn at this time.  Anion gap has closed and transition to basal and bolus insulin.  Significant temperature elevation of 104 at 7 PM last evening.  Has remained afebrile through the day today.  Otherwise denies interval new symptoms or complaints including chest pain palpitations pleuritic type pain worsening shortness of breath cough sputum nausea abdominal pain flank pain diarrhea fever or chills.    3/27:                Today patient remains in the ICU.  She is  somewhat somnolent today.  Voices no specific complaints and no acute events overnight.  Endocrinology describes hemoglobin A1c of 15.4.  Starting on NPH 20 twice daily as well as lispro 10 3 times daily and SSI.  Cardiology planning ELISEO in view of possible mitral valve vegetations on TTE.  Continues on Rocephin at this point in place of Zosyn in view of group A strep bacteremia and right leg cellulitis.  Otherwise denies interval new symptoms or complaints including chest pain palpitations pleuritic type pain worsening shortness of breath cough sputum nausea abdominal pain flank pain diarrhea fever or chills.    3/28:                Today patient is seen on medical floor after transfer from ICU.  She is seen following her ELISEO which describes no evidence of vegetations.  She states she continues to feel improved primarily less weak.  Not much appetite at this point.  Wound culture growing group A strep as well which likely represents source for her strep bacteremia.  At this point ID recommending 2 more days of IV antibiotics followed by Augmentin through 4/14.  Likely discharge at this point when ready to SNF with transition to LTC.  Creatinine trending down.  Sodium stable.  WBC normal.  Otherwise denies interval new symptoms or complaints including chest pain palpitations pleuritic type pain worsening shortness of breath cough sputum nausea abdominal pain flank pain diarrhea fever or chills.         Objective     Last Recorded Vitals  /73 (BP Location: Left arm, Patient Position: Sitting)   Pulse 76   Temp 36.1 °C (97 °F) (Temporal)   Resp 18   Wt 113 kg (250 lb)   SpO2 96%   Intake/Output last 3 Shifts:    Intake/Output Summary (Last 24 hours) at 3/28/2024 1449  Last data filed at 3/28/2024 1346  Gross per 24 hour   Intake 490 ml   Output 1175 ml   Net -685 ml         Admission Weight  Weight: 113 kg (250 lb) (03/25/24 1149)    Daily Weight  03/25/24 : 113 kg (250 lb)    Image  Results  Electrocardiogram, 12-lead PRN ACS symptoms  Sinus rhythm with marked sinus arrhythmia /PACs  Otherwise normal ECG  When compared with ECG of 27-MAR-2024 16:54, (unconfirmed)  No significant change was found  Confirmed by Raffi Hendrickson ([id number]) on 3/28/2024 9:53:27 AM      Physical Exam  Gen: Obese elderly  female , no acute distress appears less ill, nontoxic  HEENT: Normocephalic, atraumatic, good dentition, no oral lesions appreciated.  Sclera nonicteric  Neck: No cervical lymphadenopathy appreciated, no thyroid enlargement appreciated  CV: Regular rate and rhythm, S1 and S2 present, no murmurs rubs or gallops appreciated  Resp: Lungs clear to auscultation bilaterally, no ronchi, rales or wheezes appreciated  Abdomen: soft, nontender, nondistended  MSK: No joint swelling appreciated  Psych: Mildly flat mood and affect     Relevant Results               Assessment/Plan                  Principal Problem:    Delirium  Active Problems:    Atrial fibrillation (CMS/HCC)    Chronic pain    GERD (gastroesophageal reflux disease)    Hypothyroidism, postsurgical    Complicated UTI (urinary tract infection)    Depression    Diabetic ketoacidosis without coma associated with type 2 diabetes mellitus (CMS/HCC)    Sacral wound    Sepsis without acute organ dysfunction (CMS/HCC)    Hypomagnesemia    Diabetic ketoacidosis without coma associated with type 2 diabetes mellitus (CMS/HCC)  Assessment & Plan   on arrival with open anion gap  Started on insulin drip in ED, will continue   Recheck BMP shows AG still open, will continue drip until AG closes  Serial labs every 4 hours while on drip and until gap closes  NPO for now, will add IVF   Patient reports compliance with regimen, outpatient notes indicate she stopped metformin last week d/t diarrhea  Last A1c 15.4%  Crit care following, plan to discontinue drip and switch to insulin subcutaneous when bicarb >15 and AG<15  3/26: DKA resolved and now on subcu  insulin.  Awaiting endocrinology evaluation.  Likely triggered by sepsis.     Sepsis without acute organ dysfunction (CMS/HCC)  Assessment & Plan  Patient meets sepsis criteria on arrival with high fever, leukocytosis, tachycardia, and suspected source of infection  S/p IVF, vancomycin and zosyn in ED  Will continue zosyn for now and fluids  Blood and urine cultures pending  Uptrending lactate, will continue IVF and monitor  3/26: Lactic trending down.  Likely related to group A strep bacteremia.  Also appears likely endocarditis.  3/28: No evidence for endocarditis.  Leg wound culture growing group A strep as well, likely source     Complicated UTI (urinary tract infection)  Assessment & Plan  UA shows moderate LE  Suspect trigger for DKA with AMS  S/p vancomycin and zosyn in ED. Will continue both vancomycin and zosyn for now  Blood and urine cultures pending, plan to de-escalate antibiotics  3/26: Urine culture growing multiple organisms.     * Delirium  Assessment & Plan  Per EMS patient had a left facial droop and a right arm drift. ED notes left facial droop with slurred speech which EMS states is new.   Stroke alert was activated in ED, but she is not a candidate for TNK given she is on Eliquis. There is no large vessel seen that needs to be intervened.   NIH score 3 for dysarthria, facial droop and answering wrong time, repeat 2.  Suspect AMS secondary to DKA and UTI  Will treat underlying conditions and monitor for improvement.   Also concern for polypharmacy as patient is on narcotics, gabapentin, SSRIs, thyroid meds. If not improving then will evaluate for medication overuse/noncompliance  3/26: Appears significantly improved cognitive status likely related to sepsis.     Hypomagnesemia  Assessment & Plan  Mg 1.41, 1.36  Repleting IV, will monitor. Goal Mg>2.0     Sacral wound  Assessment & Plan  Hx of infected wounds and OM  Wound care nurse consult  Concern for patient self-care at home       Depression  Assessment & Plan  Continue home Bupropion, Desvenlafaxine, Trazodone     Hypothyroidism, postsurgical  Assessment & Plan  Continue home levothyroxine  Unclear if patient taking 175 or 225mcg  TSH pending, but may be skewed by sepsis and acute illness     GERD (gastroesophageal reflux disease)  Assessment & Plan  Hold home famotidine  Home omeprazole substituted with pantoprazole as per formulary     Chronic pain  Assessment & Plan  Continue home percocet, gabapentin. OARRS reviewed and appropriate.     Atrial fibrillation (CMS/[facility])  Assessment & Plan  Continue home amiodarone, apixaban, metoprolol    Endocarditis, group A strep bacteremia, right lower extremity cellulitis        At this point continues on Rocephin.  Awaiting ELISEO.        3/28: RLE wound culture growing group A strep as well.  Likely source.  ELISEO reveals no evidence for vegetations.  Continue Rocephin for 2 days and then Augmentin through 4/14.                       Abner Bello MD

## 2024-03-28 NOTE — PROGRESS NOTES
[facility] INFECTIOUS DISEASE PROGRESS NOTE    Patient Name: Teresa Matthews  MRN: [id number]    INTERVAL HISTORY:   No new complaints    ASSESSMENT:   Sepsis   Strep pyogenes bacteremia   Right leg cellulitis   H/o left TKA c/b e.coli and MRSA   DM  Afib  CHF  COPD  CKD        Suggest:  Repeat blood cx  TTE with mitral valve vegetation however ELISEO is negative  RLE cellulitis is improving   C/w unasyn   Tissue cx also with strep pyogens  Trend wbc and temps      Discharge planning: ELISEO is negative. Recommend 1-2 more days of IV antibiotics then switch to PO augmentin 875 q12 until 24    MEDICATIONS: reviewed.      PHYSICAL EXAM:  Vital signs: /73 (BP Location: Left arm, Patient Position: Sitting)   Pulse 76   Temp 36.1 °C (97 °F) (Temporal)   Resp 18   Ht 1.829 m (6')   Wt 113 kg (250 lb)   SpO2 96%   BMI 33.91 kg/m²   Temp (24hrs), Av.6 °C (97.8 °F), Min:36 °C (96.8 °F), Max:37.3 °C (99.2 °F)    General: alert, oriented, NAD  Lungs: bilaterally clear to auscultation  Heart: regular rate and rhythm  Abdomen: soft, non tender, non distended, BS+  Extremities: no edema  No rashes  No joint inflammation  Neck supple  Lines ok  No CVAT    Labs:  reviewed    Microbiology data: reviewed    Imaging data: reviewed      Kristine Koch   Pager:[phone]  Date of service: 3/28/2024  Time of service: 2:24 PM

## 2024-03-28 NOTE — POST-PROCEDURE NOTE
Physician Transition of Care Summary  Invasive Cardiovascular Lab    Procedure Date: 3/28/2024  Attending: Prosper Concepcion MD        Indications:   Bacteremia, abnormal echo    Post-procedure diagnosis:   No intracardiac/valvular vegetation.  Normal LV systolic function.  Mild aortic regurgitation, mild mitral regurgitation  See full report for details    Procedure(s):   ELISEO    Procedure Findings:   As above        Complications:   None          Estimated Blood Loss:   None    Anesthesia: Moderate sedation anesthesia Staff: Prosper Concepcion MD      Any Specimen(s) Removed:   None  Disposition:   Recovery area      Electronically signed by: Prosper Concepcion MD, 3/28/2024 12:04 PM

## 2024-03-29 LAB
ANION GAP SERPL CALC-SCNC: 10 MMOL/L (ref 10–20)
BUN SERPL-MCNC: 21 MG/DL (ref 6–23)
CALCIUM SERPL-MCNC: 8.6 MG/DL (ref 8.6–10.3)
CHLORIDE SERPL-SCNC: 100 MMOL/L (ref 98–107)
CO2 SERPL-SCNC: 24 MMOL/L (ref 21–32)
CREAT SERPL-MCNC: 1.16 MG/DL (ref 0.5–1.05)
EGFRCR SERPLBLD CKD-EPI 2021: 50 ML/MIN/1.73M*2
ERYTHROCYTE [DISTWIDTH] IN BLOOD BY AUTOMATED COUNT: 14.9 % (ref 11.5–14.5)
GLUCOSE BLD MANUAL STRIP-MCNC: 134 MG/DL (ref 74–99)
GLUCOSE BLD MANUAL STRIP-MCNC: 148 MG/DL (ref 74–99)
GLUCOSE BLD MANUAL STRIP-MCNC: 155 MG/DL (ref 74–99)
GLUCOSE BLD MANUAL STRIP-MCNC: 198 MG/DL (ref 74–99)
GLUCOSE SERPL-MCNC: 155 MG/DL (ref 74–99)
HCT VFR BLD AUTO: 30.4 % (ref 36–46)
HGB BLD-MCNC: 9.8 G/DL (ref 12–16)
MAGNESIUM SERPL-MCNC: 1.62 MG/DL (ref 1.6–2.4)
MCH RBC QN AUTO: 27.5 PG (ref 26–34)
MCHC RBC AUTO-ENTMCNC: 32.2 G/DL (ref 32–36)
MCV RBC AUTO: 85 FL (ref 80–100)
NRBC BLD-RTO: 0 /100 WBCS (ref 0–0)
PLATELET # BLD AUTO: 124 X10*3/UL (ref 150–450)
POTASSIUM SERPL-SCNC: 3.5 MMOL/L (ref 3.5–5.3)
RBC # BLD AUTO: 3.56 X10*6/UL (ref 4–5.2)
SODIUM SERPL-SCNC: 130 MMOL/L (ref 136–145)
WBC # BLD AUTO: 9 X10*3/UL (ref 4.4–11.3)

## 2024-03-29 PROCEDURE — 2500000004 HC RX 250 GENERAL PHARMACY W/ HCPCS (ALT 636 FOR OP/ED): Performed by: STUDENT IN AN ORGANIZED HEALTH CARE EDUCATION/TRAINING PROGRAM

## 2024-03-29 PROCEDURE — 97530 THERAPEUTIC ACTIVITIES: CPT | Mod: GP,CQ

## 2024-03-29 PROCEDURE — 2500000001 HC RX 250 WO HCPCS SELF ADMINISTERED DRUGS (ALT 637 FOR MEDICARE OP)

## 2024-03-29 PROCEDURE — 83735 ASSAY OF MAGNESIUM: CPT | Performed by: FAMILY MEDICINE

## 2024-03-29 PROCEDURE — 85027 COMPLETE CBC AUTOMATED: CPT | Performed by: FAMILY MEDICINE

## 2024-03-29 PROCEDURE — 82947 ASSAY GLUCOSE BLOOD QUANT: CPT

## 2024-03-29 PROCEDURE — 2500000002 HC RX 250 W HCPCS SELF ADMINISTERED DRUGS (ALT 637 FOR MEDICARE OP, ALT 636 FOR OP/ED)

## 2024-03-29 PROCEDURE — 80048 BASIC METABOLIC PNL TOTAL CA: CPT | Performed by: FAMILY MEDICINE

## 2024-03-29 PROCEDURE — 36415 COLL VENOUS BLD VENIPUNCTURE: CPT | Performed by: FAMILY MEDICINE

## 2024-03-29 PROCEDURE — 97110 THERAPEUTIC EXERCISES: CPT | Mod: GP,CQ

## 2024-03-29 PROCEDURE — 99232 SBSQ HOSP IP/OBS MODERATE 35: CPT | Performed by: INTERNAL MEDICINE

## 2024-03-29 PROCEDURE — 99233 SBSQ HOSP IP/OBS HIGH 50: CPT | Performed by: FAMILY MEDICINE

## 2024-03-29 PROCEDURE — 97530 THERAPEUTIC ACTIVITIES: CPT | Mod: GO,CO

## 2024-03-29 PROCEDURE — 97110 THERAPEUTIC EXERCISES: CPT | Mod: GO,CO

## 2024-03-29 PROCEDURE — 1200000002 HC GENERAL ROOM WITH TELEMETRY DAILY

## 2024-03-29 RX ADMIN — APIXABAN 5 MG: 5 TABLET, FILM COATED ORAL at 21:46

## 2024-03-29 RX ADMIN — AMPICILLIN SODIUM AND SULBACTAM SODIUM 3 G: 2; 1 INJECTION, POWDER, FOR SOLUTION INTRAMUSCULAR; INTRAVENOUS at 10:15

## 2024-03-29 RX ADMIN — INSULIN LISPRO 2 UNITS: 100 INJECTION, SOLUTION INTRAVENOUS; SUBCUTANEOUS at 13:14

## 2024-03-29 RX ADMIN — INSULIN LISPRO 2 UNITS: 100 INJECTION, SOLUTION INTRAVENOUS; SUBCUTANEOUS at 17:06

## 2024-03-29 RX ADMIN — AMPICILLIN SODIUM AND SULBACTAM SODIUM 3 G: 2; 1 INJECTION, POWDER, FOR SOLUTION INTRAMUSCULAR; INTRAVENOUS at 17:05

## 2024-03-29 RX ADMIN — BUPROPION HYDROCHLORIDE 150 MG: 150 TABLET, EXTENDED RELEASE ORAL at 09:58

## 2024-03-29 RX ADMIN — ASPIRIN 81 MG: 81 TABLET, COATED ORAL at 17:28

## 2024-03-29 RX ADMIN — LEVOTHYROXINE SODIUM 175 MCG: 125 TABLET ORAL at 09:58

## 2024-03-29 RX ADMIN — SPIRONOLACTONE 25 MG: 25 TABLET ORAL at 09:59

## 2024-03-29 RX ADMIN — DESVENLAFAXINE SUCCINATE 100 MG: 100 TABLET, FILM COATED, EXTENDED RELEASE ORAL at 09:58

## 2024-03-29 RX ADMIN — AMPICILLIN SODIUM AND SULBACTAM SODIUM 3 G: 2; 1 INJECTION, POWDER, FOR SOLUTION INTRAMUSCULAR; INTRAVENOUS at 05:27

## 2024-03-29 RX ADMIN — NYSTATIN 1 APPLICATION: 100000 POWDER TOPICAL at 10:00

## 2024-03-29 RX ADMIN — PANTOPRAZOLE SODIUM 40 MG: 40 TABLET, DELAYED RELEASE ORAL at 05:27

## 2024-03-29 RX ADMIN — NYSTATIN 1 APPLICATION: 100000 POWDER TOPICAL at 21:46

## 2024-03-29 RX ADMIN — OXYCODONE HYDROCHLORIDE AND ACETAMINOPHEN 2 TABLET: 5; 325 TABLET ORAL at 10:15

## 2024-03-29 RX ADMIN — HYDROPHOR: 42 OINTMENT TOPICAL at 10:00

## 2024-03-29 RX ADMIN — METOPROLOL TARTRATE 25 MG: 25 TABLET, FILM COATED ORAL at 21:46

## 2024-03-29 RX ADMIN — AMIODARONE HYDROCHLORIDE 200 MG: 200 TABLET ORAL at 09:59

## 2024-03-29 RX ADMIN — INSULIN HUMAN 20 UNITS: 100 INJECTION, SUSPENSION SUBCUTANEOUS at 21:48

## 2024-03-29 RX ADMIN — INSULIN HUMAN 20 UNITS: 100 INJECTION, SUSPENSION SUBCUTANEOUS at 10:15

## 2024-03-29 RX ADMIN — HYDROPHOR: 42 OINTMENT TOPICAL at 21:46

## 2024-03-29 RX ADMIN — METOPROLOL TARTRATE 25 MG: 25 TABLET, FILM COATED ORAL at 09:58

## 2024-03-29 RX ADMIN — LOSARTAN POTASSIUM 25 MG: 25 TABLET, FILM COATED ORAL at 09:58

## 2024-03-29 RX ADMIN — OXYCODONE HYDROCHLORIDE AND ACETAMINOPHEN 2 TABLET: 5; 325 TABLET ORAL at 18:18

## 2024-03-29 RX ADMIN — APIXABAN 5 MG: 5 TABLET, FILM COATED ORAL at 09:58

## 2024-03-29 ASSESSMENT — COGNITIVE AND FUNCTIONAL STATUS - GENERAL
WALKING IN HOSPITAL ROOM: TOTAL
MOVING FROM LYING ON BACK TO SITTING ON SIDE OF FLAT BED WITH BEDRAILS: A LOT
HELP NEEDED FOR BATHING: A LOT
TURNING FROM BACK TO SIDE WHILE IN FLAT BAD: A LITTLE
DRESSING REGULAR LOWER BODY CLOTHING: A LOT
EATING MEALS: A LOT
HELP NEEDED FOR BATHING: A LOT
WALKING IN HOSPITAL ROOM: TOTAL
CLIMB 3 TO 5 STEPS WITH RAILING: TOTAL
MOVING TO AND FROM BED TO CHAIR: A LOT
MOVING FROM LYING ON BACK TO SITTING ON SIDE OF FLAT BED WITH BEDRAILS: A LITTLE
CLIMB 3 TO 5 STEPS WITH RAILING: TOTAL
PERSONAL GROOMING: A LITTLE
DRESSING REGULAR UPPER BODY CLOTHING: A LOT
STANDING UP FROM CHAIR USING ARMS: A LOT
TURNING FROM BACK TO SIDE WHILE IN FLAT BAD: A LITTLE
MOVING FROM LYING ON BACK TO SITTING ON SIDE OF FLAT BED WITH BEDRAILS: A LITTLE
MOVING TO AND FROM BED TO CHAIR: A LOT
MOBILITY SCORE: 12
TURNING FROM BACK TO SIDE WHILE IN FLAT BAD: A LOT
DRESSING REGULAR UPPER BODY CLOTHING: A LOT
PERSONAL GROOMING: A LOT
DAILY ACTIVITIY SCORE: 12
EATING MEALS: A LITTLE
MOVING TO AND FROM BED TO CHAIR: A LOT
STANDING UP FROM CHAIR USING ARMS: A LOT
CLIMB 3 TO 5 STEPS WITH RAILING: TOTAL
TOILETING: A LOT
TOILETING: A LOT
DRESSING REGULAR UPPER BODY CLOTHING: A LOT
DAILY ACTIVITIY SCORE: 13
DAILY ACTIVITIY SCORE: 13
PERSONAL GROOMING: A LOT
HELP NEEDED FOR BATHING: A LOT
WALKING IN HOSPITAL ROOM: TOTAL
EATING MEALS: A LITTLE
TOILETING: TOTAL
DRESSING REGULAR LOWER BODY CLOTHING: A LOT
STANDING UP FROM CHAIR USING ARMS: A LOT
MOBILITY SCORE: 12
DRESSING REGULAR LOWER BODY CLOTHING: A LOT
MOBILITY SCORE: 10

## 2024-03-29 ASSESSMENT — PAIN SCALES - GENERAL
PAINLEVEL_OUTOF10: 4
PAINLEVEL_OUTOF10: 0 - NO PAIN
PAINLEVEL_OUTOF10: 7
PAINLEVEL_OUTOF10: 0 - NO PAIN
PAINLEVEL_OUTOF10: 8

## 2024-03-29 ASSESSMENT — PAIN - FUNCTIONAL ASSESSMENT
PAIN_FUNCTIONAL_ASSESSMENT: 0-10

## 2024-03-29 ASSESSMENT — PAIN DESCRIPTION - ORIENTATION
ORIENTATION: LEFT;LOWER
ORIENTATION: LEFT

## 2024-03-29 ASSESSMENT — PAIN DESCRIPTION - LOCATION
LOCATION: LEG
LOCATION: LEG

## 2024-03-29 NOTE — CARE PLAN
Problem: Pain  Goal: My pain/discomfort is manageable  Outcome: Progressing     Problem: Safety  Goal: I will remain free of falls  Outcome: Progressing     Problem: Daily Care  Goal: Daily care needs are met  Outcome: Progressing     Problem: Psychosocial Needs  Goal: Collaborate with me, my family, and caregiver to identify my specific goals  Outcome: Progressing     Problem: Skin  Goal: Decreased wound size/increased tissue granulation at next dressing change  Outcome: Progressing   The patient's goals for the shift include      The clinical goals for the shift include pt will tolerate NPO order until procedure today

## 2024-03-29 NOTE — PROGRESS NOTES
Nutrition Follow Up Assessment:   Nutrition Assessment         Medical history per chart:   type 2 diabetes poorly controlled on Humalog, A-fib on Eliquis, CHF, left total knee arthroplasty complicated by E. coli infection and MRSA infection, COPD, CKD stage III      HPI:  Patient presents with altered mental status, DKA, UTI     3/29:  Pt awake, on phone, and reports having a poor appetite.  Pt liked supplements, and encouraged to continue to consume.      3/26:  Patient seen in ICU, poor historian as patient was in and out of sleep and confused, history obtained from chart review, IDT rounds, and RN.  Patient lives alone, medication non-compliance PTA reported.  Multiple wounds noted, will monitor diet adequacy and provide Ensure max protein daily (low carbohydrate, high protein) and Brennan BID (wound healing supplement)    Nutrition History:  Food and Nutrient History: Unable to determine at this time, patient reports eating yogurt.       Current Diet: Adult diet Carb Controlled, Cardiac; 75 gram carb/meal, 45 gram Carb evening snack; 70 gm fat; 2 - 3 grams Sodium  Supplement(s): Yes: Ensure Max daily, and Brennan BID  Average meal Intake during admission: <50%   Average supplement intake during admission: <75%     Nutrition Related Findings:    Teeth: Missing teeth     BM: Last BM Date: 03/27/24  Wound Type: pressure injury:R buttocks, sacral, DM foot, venous ulcer  (nursing/wound notes provide further details)    Food allergies: NKFA. is allergic to cephalexin, metformin, other, statins-hmg-coa reductase inhibitors, and adhesive tape-silicones.  Meds/Labs reviewed.      Nutrition Significant Labs:    Results from last 7 days   Lab Units 03/29/24  0419 03/28/24  0420 03/27/24  0220 03/26/24  1009 03/26/24  0352   GLUCOSE mg/dL 155* 200* 291* 148* 165*   SODIUM mmol/L 130* 128* 128* 130* 130*   POTASSIUM mmol/L 3.5 3.6 3.5 3.3* 3.9   CHLORIDE mmol/L 100 99 98 101 97*   CO2 mmol/L 24 22 20* 22 23   BUN mg/dL 21 26*  27* 27* 28*   CREATININE mg/dL 1.16* 1.36* 1.56* 1.73* 1.69*   EGFR mL/min/1.73m*2 50* 41* 35* 31* 32*   CALCIUM mg/dL 8.6 8.4* 8.2* 8.2* 8.8   PHOSPHORUS mg/dL  --   --  3.1 2.1* 2.5   MAGNESIUM mg/dL 1.62 1.69 1.72 1.73 1.77     Lab Results   Component Value Date    HGBA1C 15.4 (H) 01/27/2024    HGBA1C 13.9 (A) 11/13/2023     Results from last 7 days   Lab Units 03/29/24  1204 03/29/24  0627 03/28/24  2110 03/28/24  1611 03/28/24  1127 03/28/24  0633 03/27/24  2112 03/27/24  1730   POCT GLUCOSE mg/dL 198* 148* 146* 166* 170* 219* 177* 173*       Anthropometrics:  Height: 182.9 cm (6')   Weight: 113 kg (250 lb)   BMI (Calculated): 33.9  IBW/kg (Dietitian Calculated): 72.7 kg          Weight History:   Wt Readings from Last 10 Encounters:   03/25/24 113 kg (250 lb)   02/13/24 107 kg (236 lb)   02/12/24 111 kg (244 lb 14.9 oz)   01/26/24 109 kg (240 lb 9.6 oz)   11/13/23 113 kg (250 lb)   05/12/23 111 kg (245 lb)   10/31/22 102 kg (224 lb 0.9 oz)   10/11/22 107 kg (236 lb)   08/12/22 107 kg (235 lb 7.2 oz)   07/19/22 102 kg (224 lb 13.9 oz)        Weight Change %:  Weight History / % Weight Change: Variable weights noted per history, no significant loss.  Significant Weight Loss: No          Nutrition Focused Physical Exam Findings:    Subcutaneous Fat Loss:   Orbital Fat Pads: Well nourished (slightly bulging fat pads)  Buccal Fat Pads: Well nourished (full, rounded cheeks)  Triceps: Well nourished (ample fat tissue)  Muscle Wasting:  Temporalis: Well nourished (well-defined muscle)  Pectoralis (Clavicular Region): Well nourished (clavicle not visible)  Deltoid/Trapezius: Well nourished (rounded appearance at arm, shoulder, neck)  Edema:     Physical Findings:  Skin: Positive (multiple wounds present)    Estimated Needs:   Total Energy Estimated Needs (kCal):  (9091-2187)  Method for Estimating Needs: 25-30, IBW  Total Protein Estimated Needs (g):  (85-90)  Method for Estimating Needs: 1.2, IBW (monitor renal  function)     Method for Estimating Needs: 1 mL, kcal or per physician        Nutrition Diagnosis   Nutrition Diagnosis:  Malnutrition Diagnosis  Patient has Malnutrition Diagnosis: No    Nutrition Diagnosis  Patient has Nutrition Diagnosis: Yes  Diagnosis Status (1): Ongoing  Nutrition Diagnosis 1: Increased nutrient needs  Related to (1): wound healing  As Evidenced by (1): multiple noted wounds       Nutrition Interventions/Recommendations   Nutrition Interventions and Recommendations:        Nutrition Prescription:  Individualized Nutrition Prescription Provided for : Supplements to promote healing        Nutrition Interventions:   Food and/or Nutrient Delivery Interventions  Meals and Snacks: Carbohydrate-modified diet  Medical Food Supplement: Commercial beverage  Goal: Krystal Ensure Max daily, and Brennan BID    Coordination of Nutrition Care by a Nutrition Professional  Collaboration and Referral of Nutrition Care: Other (Comment)  Goal: n/a    Nutrition Education:   Education Documentation  No documentation found.      Nutrition Counseling  Counseling Theoretical Approach: Nutrition counseling based on health belief model  Goal: Reviewed supplements, and meals       Nutrition Monitoring and Evaluation   Monitoring/Evaluation:   Food/Nutrient Related History Monitoring  Monitoring and Evaluation Plan: Amount of food  Amount of Food: Estimated amout of food, Medical food intake  Criteria: PO intake >75%    Body Composition/Growth/Weight History  Monitoring and Evaluation Plan: Weight  Weight: Measured weight  Criteria: stable wt    Biochemical Data, Medical Tests and Procedures  Monitoring and Evaluation Plan: Electrolyte/renal panel  Electrolyte and Renal Panel: BUN, Creatinine, Phosphorus, Potassium, Sodium  Criteria: WNL  Glucose/Endocrine Profile: Glucose, casual  Criteria: Improving labs    Nutrition Focused Physical Findings  Monitoring and Evaluation Plan: Skin  Skin: Impaired wound healing  Criteria:  Promote healing            Time Spent/Follow-up Reminder:   Follow Up  Time Spent (min): 35 minutes  Last Date of Nutrition Visit: 03/29/24  Nutrition Follow-Up Needed?: Dietitian to reassess per policy  Follow up Comment: 4/3-4/5

## 2024-03-29 NOTE — PROGRESS NOTES
Occupational Therapy    OT Treatment    Patient Name: Teresa Matthews  MRN: [id number]  Today's Date: 3/29/2024  Time Calculation  Start Time: 0945  Stop Time: 1009  Time Calculation (min): 24 min         Assessment:  OT Assessment: Pt continues to require max A x2 for functional transfers and bed mobility with max verbal cues for safety, sequencing and transfer technique.     Plan:  Treatment Interventions: ADL retraining, Functional transfer training, UE strengthening/ROM, Endurance training, Neuromuscular reeducation  OT Frequency: 3 times per week  OT Discharge Recommendations: Moderate intensity level of continued care  OT - OK to Discharge: Yes  Treatment Interventions: ADL retraining, Functional transfer training, UE strengthening/ROM, Endurance training, Neuromuscular reeducation    Subjective   Previous Visit Info:  OT Last Visit  OT Received On: 03/29/24  General:  General  Prior to Session Communication: Bedside nurse, PCT/NA/CTA  Patient Position Received: Up in chair, Alarm on  General Comment: Pt agreeable to therapy with encouragment.      Objective         Bed Mobility/Transfers: Bed Mobility  Bed Mobility: Yes  Bed Mobility 1  Bed Mobility 1: Sitting to supine  Level of Assistance 1: Maximum assistance (x2)    Transfers  Transfer: Yes  Transfer 1  Transfer From 1: Chair with arms to  Transfer to 1: Bed  Technique 1: Squat pivot  Transfer Device 1:  (arm in arm)  Transfer Level of Assistance 1: Maximum assistance, Maximum verbal cues, +2            Therapy/Activity: Therapeutic Exercise  Therapeutic Exercise Performed: Yes  Therapeutic Exercise Activity 1: Pt instructed on BUE ROM exercises completing 12 reps of AROM elbow flexion/extension, AAROM 12 reps of shoulder flexion and  horizontal abduction.           Outcome Measures:[facility] Daily Activity  Putting on and taking off regular lower body clothing: A lot  Bathing (including washing, rinsing, drying): A lot  Putting on and taking off regular  upper body clothing: A lot  Toileting, which includes using toilet, bedpan or urinal: A lot  Taking care of personal grooming such as brushing teeth: A lot  Eating Meals: A little  Daily Activity - Total Score: 13        Education Documentation  Body Mechanics, taught by TIMMY Dockery at 3/29/2024 10:33 AM.  Learner: Patient  Readiness: Acceptance  Method: Explanation  Response: Needs Reinforcement    Education Comments  No comments found.             Goals:  Encounter Problems       Encounter Problems (Active)       ADLs       Patient with complete upper body dressing with stand by assist level of assistance donning and doffing all UE clothes with PRN adaptive equipment while supported sitting (Progressing)       Start:  03/26/24    Expected End:  04/09/24            Patient will complete daily grooming tasks brushing teeth and washing face/hair with stand by assist level of assistance and PRN adaptive equipment while supported sitting. (Progressing)       Start:  03/26/24    Expected End:  04/09/24               EXERCISE/STRENGTHENING       Patient with increase BUE to >4/5 MMT strength. (Progressing)       Start:  03/26/24    Expected End:  04/09/24               TRANSFERS       Patient will complete functional transfers bed level to out of bed as able with least restrictive device with minimal assist  level of assistance. (Progressing)       Start:  03/26/24    Expected End:  04/09/24

## 2024-03-29 NOTE — PROGRESS NOTES
Pulmonology Teresa Matthews is a 73 y.o. female on day 4 of admission presenting with Delirium.      Subjective   73 y.o. female with a past medical history of type 2 diabetes poorly controlled on Humalog, A-fib on Eliquis, CHF, left total knee arthroplasty complicated by E. coli infection and MRSA infection, COPD, CKD stage III presented to ED today with confusion.  Patient is a poor historian, history supplemented by ED records.  EMS report the patient has slurred speech and left-sided facial droop and right-sided drift on exam.  No last known well.     ED course: Stroke alert was called on arrival.  NIH stroke score 3 for dysarthria and facial droop and answering wrong month.  CT head shows no acute abnormalities, patient is on Eliquis and therefore not a candidate for TNK.  Glucose 609, bicarb 15, pH 7.26 with gap of 24.  Potassium 4.6.  Patient suspected to be in DKA and started on insulin drip.  UA shows WBCs and positive leukocyte esterase with suspected UTI, started on vancomycin and Zosyn.  EKG shows sinus rhythm rate 92 with no ST changes.  Nurse found sacral and extremity wounds and dressed them.      3/26:                Today patient seen in the ICU in the presence of family member.  She is awake alert and interactive appropriately but appears weak and ill.  Voices no specific complaints and no acute events overnight.  Echocardiogram is suggesting mitral valve endocarditis.  Blood cultures 2 out of 2 sets growing group A strep.  Continues on Zosyn at this time.  Anion gap has closed and transition to basal and bolus insulin.  Significant temperature elevation of 104 at 7 PM last evening.  Has remained afebrile through the day today.  Otherwise denies interval new symptoms or complaints including chest pain palpitations pleuritic type pain worsening shortness of breath cough sputum nausea abdominal pain flank pain diarrhea fever or chills.    3/27:                Today patient remains in the ICU.  She is  somewhat somnolent today.  Voices no specific complaints and no acute events overnight.  Endocrinology describes hemoglobin A1c of 15.4.  Starting on NPH 20 twice daily as well as lispro 10 3 times daily and SSI.  Cardiology planning ELISEO in view of possible mitral valve vegetations on TTE.  Continues on Rocephin at this point in place of Zosyn in view of group A strep bacteremia and right leg cellulitis.  Otherwise denies interval new symptoms or complaints including chest pain palpitations pleuritic type pain worsening shortness of breath cough sputum nausea abdominal pain flank pain diarrhea fever or chills.    3/28:                Today patient is seen on medical floor after transfer from ICU.  She is seen following her ELISEO which describes no evidence of vegetations.  She states she continues to feel improved primarily less weak.  Not much appetite at this point.  Wound culture growing group A strep as well which likely represents source for her strep bacteremia.  At this point ID recommending 2 more days of IV antibiotics followed by Augmentin through 4/14.  Likely discharge at this point when ready to SNF with transition to LTC.  Creatinine trending down.  Sodium stable.  WBC normal.  Otherwise denies interval new symptoms or complaints including chest pain palpitations pleuritic type pain worsening shortness of breath cough sputum nausea abdominal pain flank pain diarrhea fever or chills.    3/29:                 Today the patient is seen in bed.  Appears to continue more comfortable and patient states she does continue to feel improved and is pleased today she is developing an appetite.  Voices no specific complaints and no acute events overnight.  She will be completing recommended course of IV Unasyn today and likely can be discharged tomorrow on Augmentin through 4/14.  Endocrinology continuing NPH 20 twice daily with lispro 10 3 times daily and SSI with recommendation for continuing this on discharge to  SNF but on discharge home recommending Humalog 75/25 35 twice daily.  Otherwise denies interval new symptoms or complaints including chest pain palpitations pleuritic type pain worsening shortness of breath cough sputum nausea abdominal pain flank pain diarrhea fever or chills.         Objective     Last Recorded Vitals  /73 (BP Location: Left arm, Patient Position: Lying)   Pulse 55   Temp 35.8 °C (96.5 °F) (Temporal)   Resp 16   Wt 113 kg (250 lb)   SpO2 93%   Intake/Output last 3 Shifts:    Intake/Output Summary (Last 24 hours) at 3/29/2024 1430  Last data filed at 3/29/2024 1340  Gross per 24 hour   Intake 2160 ml   Output 1675 ml   Net 485 ml         Admission Weight  Weight: 113 kg (250 lb) (03/25/24 1149)    Daily Weight  03/25/24 : 113 kg (250 lb)    Image Results  Transesophageal Echo ([facility])                [place] [address]       Phone [phone] Fax [phone]    TRANSESOPHAGEAL ECHOCARDIOGRAM REPORT       Patient Name:     REYES RAMESH Reading Physician:  [id number] Prosper Concepcion MD  Study Date:       3/28/2024            Ordering Provider:  [id number] ANATOLY LARA  MRN/PID:          [id number]             Fellow:  Accession#:       [id number]         Nurse:              Amber Christiansen RN  Date of           1950 / 73      Sonographer:        Bernadine Madera RDCS  Birth/Age:        years  Gender:           F                    Additional Staff:  Height:           182.88 cm            Admit Date:         3/25/2024  Weight:           113.40 kg            Admission Status:   Inpatient - Routine  BSA / BMI:        2.34 m2 / 33.91      [facility]                    kg[facility]                Location:           Lab  Blood Pressure: 124 /76 mmHg    Study Type:    TRANSESOPHAGEAL ECHO (ELISEO)  Diagnosis/ICD: Disorientation, unspecified-R41.0; Bacteremia-R78.81; Essential                  (primary) hypertension-I10; Weakness-R53.1  Indication:    bacteremia/abnormal echo  CPT Codes:     Moderate Sedation Services initial 15 minutes patient >5                 years-90725; Moderate Sedation Services 1st additional 15 minutes                 patient >5 years-34659; ELISEO Complete-54570; Doppler                 Limited-29230; Echocardiography, ELISEO add on 3D post                 processing-06330   Study Detail: The following Echo studies were performed: 2D. Patient's heart                rhythm is atrial fibrillation. A bubble study was not performed.       PHYSICIAN INTERPRETATION:  ELISEO Details: The ELISEO probe used was [id number]. Agitated saline contrast echo was performed to assess for the presence of a patent foramen ovale.  ELISEO Medication: The pharynx was anesthetized with lidocaine ointment and hurricaine spray. The patient was sedated using moderate sedation. Total intraservice time for moderate sedation was 28 minutes. Midazolam and Fentanyl was used to sedate the patient for this exam.  ELISEO Procedure: The probe was passed without difficulty. The following complication was encountered during the procedure: Patient tolerated the procedure well without any apparent complications.  Left Ventricle: Left ventricular systolic function is normal. The left ventricular cavity size is normal. Left ventricular diastolic filling was not assessed.  Left Atrium: The left atrium is enlarged. There is no definite left atrial thrombus present. The left atrial appendage is enlarged.  Right Ventricle: The right ventricle is normal in size. There is normal right ventricular global systolic function.  Right Atrium: The right atrium was not assessed.  Aortic Valve: The aortic valve is trileaflet. There is mild aortic valve cusp calcification. There is mild aortic valve regurgitation.  Mitral Valve: The mitral valve is mildly thickened. There is normal mitral valve leaflet mobility. There is moderate mitral  annular calcification. There is mild mitral valve regurgitation.  Tricuspid Valve: The tricuspid valve is structurally normal. There is trace tricuspid regurgitation.  Pulmonic Valve: The pulmonic valve is not well visualized. There is trace pulmonic valve regurgitation.  Pericardium: There is a small pericardial effusion.  Aorta: The aortic root is normal. The aortic root is at the upper limits of normal size. There is plaque visualized in the descending aorta.       CONCLUSIONS:   1. Left ventricular systolic function is normal.   2. No definite intracardiac thrombi or masses were visualized.   3. No definite valvular vegetations were visualized.   4. The left atrium is enlarged.   5. There is moderate mitral annular calcification.   6. Mild aortic valve regurgitation.   7. No left atrial thrombus.   8. There is plaque visualized in the descending aorta.    QUANTITATIVE DATA SUMMARY:     [id number] Prosper Concepcion MD  Electronically signed on 3/28/2024 at 6:01:37 PM       ** Final **  Electrocardiogram, 12-lead PRN ACS symptoms  Sinus rhythm with marked sinus arrhythmia /PACs  Otherwise normal ECG  When compared with ECG of 27-MAR-2024 16:54, (unconfirmed)  No significant change was found  Confirmed by Raffi Hendrickson ([id number]) on 3/28/2024 9:53:27 AM      Physical Exam  Gen: Obese elderly  female , no acute distress appears less ill, nontoxic  HEENT: Normocephalic, atraumatic, good dentition, no oral lesions appreciated.  Sclera nonicteric  Neck: No cervical lymphadenopathy appreciated, no thyroid enlargement appreciated  CV: Regular rate and rhythm, S1 and S2 present, no murmurs rubs or gallops appreciated  Resp: Lungs clear to auscultation bilaterally, no ronchi, rales or wheezes appreciated  Abdomen: soft, nontender, nondistended  MSK: No joint swelling appreciated  Psych: Mildly flat mood and affect     Relevant Results               Assessment/Plan                  Principal Problem:    Delirium  Active  Problems:    Atrial fibrillation (CMS/[facility])    Chronic pain    GERD (gastroesophageal reflux disease)    Hypothyroidism, postsurgical    Complicated UTI (urinary tract infection)    Depression    Diabetic ketoacidosis without coma associated with type 2 diabetes mellitus (CMS/HCC)    Sacral wound    Sepsis without acute organ dysfunction (CMS/[facility])    Hypomagnesemia    Diabetic ketoacidosis without coma associated with type 2 diabetes mellitus (CMS/[facility])  Assessment & Plan   on arrival with open anion gap  Started on insulin drip in ED, will continue   Recheck BMP shows AG still open, will continue drip until AG closes  Serial labs every 4 hours while on drip and until gap closes  NPO for now, will add IVF   Patient reports compliance with regimen, outpatient notes indicate she stopped metformin last week d/t diarrhea  Last A1c 15.4%  Crit care following, plan to discontinue drip and switch to insulin subcutaneous when bicarb >15 and AG<15  3/26: DKA resolved and now on subcu insulin.  Awaiting endocrinology evaluation.  Likely triggered by sepsis.  3/29: Will continue NPH 20 twice daily with lispro 10 3 times daily and SSI through discharge as well as at the SNF.  Recommendation for Humalog 75/25 35 twice daily on discharge home.  Blood sugars fairly good control ranging 1 50-1 70.     Sepsis without acute organ dysfunction (CMS/[facility])  Assessment & Plan  Patient meets sepsis criteria on arrival with high fever, leukocytosis, tachycardia, and suspected source of infection  S/p IVF, vancomycin and zosyn in ED  Will continue zosyn for now and fluids  Blood and urine cultures pending  Uptrending lactate, will continue IVF and monitor  3/26: Lactic trending down.  Likely related to group A strep bacteremia.  Also appears likely endocarditis.  3/28: No evidence for endocarditis.  Leg wound culture growing group A strep as well, likely source     Complicated UTI (urinary tract infection)  Assessment & Plan  UA shows  moderate LE  Suspect trigger for DKA with AMS  S/p vancomycin and zosyn in ED. Will continue both vancomycin and zosyn for now  Blood and urine cultures pending, plan to de-escalate antibiotics  3/26: Urine culture growing multiple organisms.     * Delirium  Assessment & Plan  Per EMS patient had a left facial droop and a right arm drift. ED notes left facial droop with slurred speech which EMS states is new.   Stroke alert was activated in ED, but she is not a candidate for TNK given she is on Eliquis. There is no large vessel seen that needs to be intervened.   NIH score 3 for dysarthria, facial droop and answering wrong time, repeat 2.  Suspect AMS secondary to DKA and UTI  Will treat underlying conditions and monitor for improvement.   Also concern for polypharmacy as patient is on narcotics, gabapentin, SSRIs, thyroid meds. If not improving then will evaluate for medication overuse/noncompliance  3/26: Appears significantly improved cognitive status likely related to sepsis.     Hypomagnesemia  Assessment & Plan  Mg 1.41, 1.36  Repleting IV, will monitor. Goal Mg>2.0     Sacral wound  Assessment & Plan  Hx of infected wounds and OM  Wound care nurse consult  Concern for patient self-care at home      Depression  Assessment & Plan  Continue home Bupropion, Desvenlafaxine, Trazodone     Hypothyroidism, postsurgical  Assessment & Plan  Continue home levothyroxine  Unclear if patient taking 175 or 225mcg  TSH pending, but may be skewed by sepsis and acute illness     GERD (gastroesophageal reflux disease)  Assessment & Plan  Hold home famotidine  Home omeprazole substituted with pantoprazole as per formulary     Chronic pain  Assessment & Plan  Continue home percocet, gabapentin. OARRS reviewed and appropriate.     Atrial fibrillation (CMS/[facility])  Assessment & Plan  Continue home amiodarone, apixaban, metoprolol    Endocarditis, group A strep bacteremia, right lower extremity cellulitis        At this point continues  on Rocephin.  Awaiting ELISEO.        3/28: RLE wound culture growing group A strep as well.  Likely source.  ELISEO reveals no evidence for vegetations.  Continue Unasyn for 2 days and then Augmentin through 4/14.                       Abner Bello MD

## 2024-03-29 NOTE — PROGRESS NOTES
Teresa Matthews is a 73 y.o. female on day 4 of admission presenting with Delirium.    Subjective   Patient has no complaints.  She only ate a small amount of her lunch.     I have reviewed histories, allergies and medications have been reviewed and there are no changes       Objective     Physical Exam  Vitals and nursing note reviewed.   Constitutional:       General: She is not in acute distress.     Appearance: Normal appearance. She is obese.   HENT:      Head: Normocephalic and atraumatic.      Nose: Nose normal.      Mouth/Throat:      Mouth: Mucous membranes are moist.   Eyes:      Extraocular Movements: Extraocular movements intact.   Pulmonary:      Effort: Pulmonary effort is normal.   Musculoskeletal:         General: Normal range of motion.   Neurological:      Mental Status: She is alert and oriented to person, place, and time.   Psychiatric:         Mood and Affect: Mood normal.         Last Recorded Vitals  Blood pressure 133/83, pulse 70, temperature 36.1 °C (97 °F), temperature source Temporal, resp. rate 17, height 1.829 m (6'), weight 113 kg (250 lb), SpO2 93 %.  Intake/Output last 3 Shifts:  I/O last 3 completed shifts:  In: 1560 (13.8 mL/kg) [P.O.:1310; IV Piggyback:250]  Out: 2100 (18.5 mL/kg) [Urine:2100 (0.5 mL/kg/hr)]  Weight: 113.4 kg     Relevant Results  Results from last 7 days   Lab Units 03/29/24  0627 03/29/24  0419 03/28/24  2110 03/28/24  1611 03/28/24  1127 03/28/24  0633 03/28/24  0420 03/27/24  0753 03/27/24  0220 03/26/24  1018 03/26/24  1009 03/26/24  0423 03/26/24  0352   POCT GLUCOSE mg/dL 148*  --  146* 166* 170* 219*  --    < >  --    < >  --    < >  --    GLUCOSE mg/dL  --  155*  --   --   --   --  200*  --  291*  --  148*  --  165*    < > = values in this interval not displayed.     Scheduled medications  amiodarone, 200 mg, oral, Daily  ampicillin-sulbactam, 3 g, intravenous, q6h  apixaban, 5 mg, oral, BID  aspirin, 81 mg, oral, Every Day  buPROPion XL, 150 mg,  oral, q AM  desvenlafaxine, 100 mg, oral, Daily  insulin lispro, 0-10 Units, subcutaneous, TID with meals  [MAR Hold] insulin lispro, 10 Units, subcutaneous, TID with meals  insulin NPH (Isophane), 20 Units, subcutaneous, BID  levothyroxine, 175 mcg, oral, Daily  losartan, 25 mg, oral, Daily  [MAR Hold] magnesium oxide, 400 mg, oral, Daily  metoprolol tartrate, 25 mg, oral, BID  nystatin, 1 Application, Topical, BID  pantoprazole, 40 mg, oral, Daily before breakfast  perflutren lipid microspheres, 0.5-10 mL of dilution, intravenous, Once in imaging  perflutren protein A microsphere, 0.5 mL, intravenous, Once in imaging  spironolactone, 25 mg, oral, Daily  sulfur hexafluoride microsphr, 2 mL, intravenous, Once in imaging  white petrolatum, , Topical, BID      Continuous medications     PRN medications  PRN medications: acetaminophen **OR** acetaminophen **OR** acetaminophen, dextrose, dextrose, glucagon, oxyCODONE-acetaminophen, oxygen, polyethylene glycol, [MAR Hold] traZODone    Results for orders placed or performed during the hospital encounter of 03/25/24 (from the past 24 hour(s))   POCT GLUCOSE   Result Value Ref Range    POCT Glucose 170 (H) 74 - 99 mg/dL   POCT GLUCOSE   Result Value Ref Range    POCT Glucose 166 (H) 74 - 99 mg/dL   POCT GLUCOSE   Result Value Ref Range    POCT Glucose 146 (H) 74 - 99 mg/dL   Magnesium   Result Value Ref Range    Magnesium 1.62 1.60 - 2.40 mg/dL   CBC   Result Value Ref Range    WBC 9.0 4.4 - 11.3 x10*3/uL    nRBC 0.0 0.0 - 0.0 /100 WBCs    RBC 3.56 (L) 4.00 - 5.20 x10*6/uL    Hemoglobin 9.8 (L) 12.0 - 16.0 g/dL    Hematocrit 30.4 (L) 36.0 - 46.0 %    MCV 85 80 - 100 fL    MCH 27.5 26.0 - 34.0 pg    MCHC 32.2 32.0 - 36.0 g/dL    RDW 14.9 (H) 11.5 - 14.5 %    Platelets 124 (L) 150 - 450 x10*3/uL   Basic Metabolic Panel   Result Value Ref Range    Glucose 155 (H) 74 - 99 mg/dL    Sodium 130 (L) 136 - 145 mmol/L    Potassium 3.5 3.5 - 5.3 mmol/L    Chloride 100 98 - 107 mmol/L     Bicarbonate 24 21 - 32 mmol/L    Anion Gap 10 10 - 20 mmol/L    Urea Nitrogen 21 6 - 23 mg/dL    Creatinine 1.16 (H) 0.50 - 1.05 mg/dL    eGFR 50 (L) >60 mL/min/1.73m*2    Calcium 8.6 8.6 - 10.3 mg/dL   POCT GLUCOSE   Result Value Ref Range    POCT Glucose 148 (H) 74 - 99 mg/dL      Transesophageal Echo (ELIESO)    Result Date: 3/28/2024              [place] [address]      Phone [phone] Fax [phone] TRANSESOPHAGEAL ECHOCARDIOGRAM REPORT  Patient Name:     REYES RAMESH Reading Physician:  [id number] Prosper Concepcion MD Study Date:       3/28/2024            Ordering Provider:  [id number] ANATOLY LARA MRN/PID:          [id number]             Fellow: Accession#:       [id number]         Nurse:              Amber Christiansen RN Date of           1950 / 73      Sonographer:        Bernadine Madera RDCS Birth/Age:        years Gender:           F                    Additional Staff: Height:           182.88 cm            Admit Date:         3/25/2024 Weight:           113.40 kg            Admission Status:   Inpatient - Routine BSA / BMI:        2.34 m2 / 33.91      Department          [facility][1] Echo                   kg/m2                Location:           Lab Blood Pressure: 124 /76 mmHg Study Type:    TRANSESOPHAGEAL ECHO (ELISEO) Diagnosis/ICD: Disorientation, unspecified-R41.0; Bacteremia-R78.81; Essential                (primary) hypertension-I10; Weakness-R53.1 Indication:    bacteremia/abnormal echo CPT Codes:     Moderate Sedation Services initial 15 minutes patient >5                years-87010; Moderate Sedation Services 1st additional 15 minutes                patient >5 years-54286; ELISEO Complete-45153; Doppler                Limited-46586; Echocardiography, ELISEO add on 3D post                processing-04356  Study Detail: The following Echo studies were performed: 2D. Patient's heart                rhythm is atrial fibrillation. A bubble study was not performed.  PHYSICIAN INTERPRETATION: ELISEO Details: The ELISEO probe used was [id number]. Agitated saline contrast echo was performed to assess for the presence of a patent foramen ovale. ELISEO Medication: The pharynx was anesthetized with lidocaine ointment and hurricaine spray. The patient was sedated using moderate sedation. Total intraservice time for moderate sedation was 28 minutes. Midazolam and Fentanyl was used to sedate the patient for this exam. ELISEO Procedure: The probe was passed without difficulty. The following complication was encountered during the procedure: Patient tolerated the procedure well without any apparent complications. Left Ventricle: Left ventricular systolic function is normal. The left ventricular cavity size is normal. Left ventricular diastolic filling was not assessed. Left Atrium: The left atrium is enlarged. There is no definite left atrial thrombus present. The left atrial appendage is enlarged. Right Ventricle: The right ventricle is normal in size. There is normal right ventricular global systolic function. Right Atrium: The right atrium was not assessed. Aortic Valve: The aortic valve is trileaflet. There is mild aortic valve cusp calcification. There is mild aortic valve regurgitation. Mitral Valve: The mitral valve is mildly thickened. There is normal mitral valve leaflet mobility. There is moderate mitral annular calcification. There is mild mitral valve regurgitation. Tricuspid Valve: The tricuspid valve is structurally normal. There is trace tricuspid regurgitation. Pulmonic Valve: The pulmonic valve is not well visualized. There is trace pulmonic valve regurgitation. Pericardium: There is a small pericardial effusion. Aorta: The aortic root is normal. The aortic root is at the upper limits of normal size. There is plaque visualized in the descending aorta.  CONCLUSIONS:  1. Left ventricular systolic function is normal.  2.  No definite intracardiac thrombi or masses were visualized.  3. No definite valvular vegetations were visualized.  4. The left atrium is enlarged.  5. There is moderate mitral annular calcification.  6. Mild aortic valve regurgitation.  7. No left atrial thrombus.  8. There is plaque visualized in the descending aorta. QUANTITATIVE DATA SUMMARY:  [id number] Prosper Concepcion MD Electronically signed on 3/28/2024 at 6:01:37 PM  ** Final **     Electrocardiogram, 12-lead PRN ACS symptoms    Result Date: 3/28/2024  Sinus rhythm with marked sinus arrhythmia /PACs Otherwise normal ECG When compared with ECG of 27-MAR-2024 16:54, (unconfirmed) No significant change was found Confirmed by Raffi Hendrickson ([id number]) on 3/28/2024 9:53:27 AM    Electrocardiogram, 12-lead PRN ACS symptoms    Result Date: 3/27/2024  Unusual P axis, possible ectopic atrial rhythm with undetermined rhythm irregularity Rightward axis Incomplete left bundle branch block Abnormal ECG When compared with ECG of 27-MAR-2024 09:27, (unconfirmed) No significant change was found Confirmed by Raffi Hendrickson ([id number]) on 3/27/2024 9:49:24 AM    ECG 12 lead    Result Date: 3/26/2024  Sinus rhythm Atrial premature complex Nonspecific intraventricular conduction delay Borderline low voltage, extremity leads Nonspecific repol abnormality, lateral leads Minimal ST elevation, anterior leads See ED provider note for full interpretation and clinical correlation Confirmed by Rachael Coreas ([id number]) on 3/26/2024 5:21:24 PM    Transthoracic Echo (TTE) Limited    Result Date: 3/26/2024              [place] [id number]      Phone [phone] Fax [phone] TRANSTHORACIC ECHOCARDIOGRAM REPORT  Patient Name:      REYES Soto Physician:    [id number] Hai RAMESH MD Study Date:        3/26/2024           Ordering Provider:    Shonda GONCALVES MRN/PID:           [id number]             Fellow: Accession#:        [id number]        Nurse: Date of Birth/Age: 1950 / 73     Sonographer:          Brittnee Morejon                    bruna                                     YUNG Gender:            F                   Additional Staff: Height:            182.88 cm           Admit Date:           3/25/2024 Weight:            113.40 kg           Admission Status:     Inpatient - Routine BSA / BMI:         2.34 m2 / 33.91     Department Location:  [facility]                    kg/m2 Blood Pressure: 114 /63 mmHg Study Type:    TRANSTHORACIC ECHO (TTE) LIMITED Diagnosis/ICD: Bacteremia-R78.81 Indication:    BACTEREMIA CPT Codes:     Echo Limited-07236 Patient History: Pertinent History: CHF, A-FIB, HTN. Study Detail: The following Echo studies were performed: 2D, Doppler and color               flow.  Critical Event Critical Event: Test was completed as per department protocol. Critical Finding: Possible veg on MV. Time Test was Completed: 2:35:00 PM Notified: Dr. Gerber. Attending notification time: 2:45:37 PM  PHYSICIAN INTERPRETATION: Left Ventricle: Left ventricular systolic function was not assessed. There are no regional wall motion abnormalities. The left ventricular cavity size was not assessed. Left ventricular diastolic filling was not assessed. Left Atrium: The left atrium was not assessed. Right Ventricle: The right ventricle was not assessed. Right ventricular systolic function not assessed. Right Atrium: The right atrium was not assessed. Aortic Valve: There is no visualized aortic valve vegetation. The aortic valve was not assessed. Aortic valve regurgitation was not assessed. Mitral Valve: The mitral valve was not assessed. Mitral valve regurgitation was not assessed. Possible veg on anterior leaflet of MV. Tricuspid Valve: No vegetation is seen on the tricuspid valve. The tricuspid valve was not assessed. Tricuspid regurgitation was not assessed. Pulmonic Valve: No pulmonic valve  vegetation visualized. The pulmonic valve was not assessed. The pulmonic valve regurgitation was not assessed. Pericardium: Pericardial effusion was not assessed. Aorta: The aortic root was not assessed.  CONCLUSIONS:  1. Left ventricular systolic function was not assessed.  2. Possible veg on anterior leaflet of MV.  3. No tricuspid valve vegetation.  4. No aortic valve vegetation visualized.  5. No pulmonic valve vegetation visualized. QUANTITATIVE DATA SUMMARY: 2D MEASUREMENTS:              Normal Ranges: LAs: 2.10 cm (2.7-4.0cm) TRICUSPID VALVE/RVSP:                             Normal Ranges: Peak TR Velocity: 2.51 m/s RV Syst Pressure: 28.2 mmHg (< 30mmHg)  [id number] Hai Gerber MD Electronically signed on 3/26/2024 at 4:16:23 PM  ** Final **     XR chest 1 view    Result Date: 3/25/2024  Interpreted By:  Jeevan Gomez, STUDY: XR CHEST 1 VIEW; 3/25/2024 12:55 pm   INDICATION: CLINICAL INFORMATION: Signs/Symptoms:AMS.   COMPARISON: 11/09/2022   ACCESSION NUMBER(S): [id number]   ORDERING CLINICIAN: KAILEY CASTRO   TECHNIQUE: Portable chest one view.   FINDINGS: The cardiac size is indeterminate in view of the AP projection.  The aorta is tortuous. Pulmonary arteries are somewhat prominent centrally suggesting pulmonary arterial hypertension. No infiltrates or effusions are identified.       No acute pathologic findings are identified. Possible pulmonary arterial hypertension. There is no interval change when compared to the previous examination.   MACRO: none   Signed by: Jeevan Gomez 3/25/2024 1:25 PM Dictation workstation:   [id number]    CT brain attack angio head and neck W and WO IV contrast    Result Date: 3/25/2024  Interpreted By:  Leandro Mcguire, STUDY: CT BRAIN ATTACK ANGIO HEAD AND NECK W AND WO IV CONTRAST; ; 3/25/2024 11:51 am   INDICATION: Signs/Symptoms:Stroke Evaluation with VAN Positive.   COMPARISON: None.   ACCESSION NUMBER(S): [id number]   ORDERING CLINICIAN: KAILEY CASTRO    TECHNIQUE: Thin cut axial CT images were generated over the upper thorax, neck, and head during the arterial passage of a full contrast bolus.  The bolus was generated with a power injector and followed with immediate saline flush. These image data were subtracted and then used for 3-D reconstructions. Maximum intensity projections and shaded surface displays were generated in multiple planes. In addition images were transferred into a 3-D processing program and additional projections and displays were reviewed  by the interpreting physician.   FINDINGS: The CT angiogram through the upper thorax demonstrates mild atherosclerotic calcifications along the aortic arch and origin of the left subclavian artery. No significant stenosis noted along the origins of right brachiocephalic artery, left common carotid artery, or left subclavian artery. No significant stenosis noted along the origin of the right vertebral artery. There is atherosclerotic calcification noted along the origin of the left vertebral artery contributing to mild-to-moderate segmental narrowing.   The CT angiogram of the neck demonstrates atherosclerotic calcifications noted along the distal common carotid arteries and origin of the internal carotid arteries contributing to mild non hemodynamically significant narrowing. No significant stenosis noted along the cervical segments of the vertebral arteries.   The CT angiogram of the head demonstrates mild atherosclerotic calcification along the distal left vertebral artery. No significant stenosis noted along the distal right vertebral artery, basilar artery, or distal internal carotid arteries. The right posterior cerebral artery is predominantly supplied via the right posterior communicating artery. The P1 segment of the right posterior cerebral artery is asymmetrically small in caliber which may be related to congenital hypoplasia or secondary narrowing. Otherwise, no large vessel branch cutoffs of the  anterior cerebral arteries, middle cerebral arteries, or posterior cerebral arteries are noted.   The source CT angiogram images of the head demonstrate moderate brain parenchymal volume loss. There are nonspecific white matter changes within the cerebral hemispheres bilaterally as well as vague hypodensity overlying the brainstem which while nonspecific, given the patient's age, may represent sequelae of small-vessel ischemic change. Additional small scattered hypodensities are identified within the subinsular regions, basal ganglia, and thalami bilaterally suggesting incidental mildly prominent perivascular spaces and/or small scattered lacunar infarctions. There is no midline shift.   The source CT angiogram images of the neck demonstrate multilevel cervical spondylosis.       The CT angiogram through the upper thorax demonstrates mild atherosclerotic calcifications along the aortic arch and origin of the left subclavian artery. No significant stenosis noted along the origins of right brachiocephalic artery, left common carotid artery, or left subclavian artery. No significant stenosis noted along the origin of the right vertebral artery. There is atherosclerotic calcification noted along the origin of the left vertebral artery contributing to mild-to-moderate segmental narrowing.   The CT angiogram of the neck demonstrates atherosclerotic calcifications noted along the distal common carotid arteries and origin of the internal carotid arteries contributing to mild non hemodynamically significant narrowing. No significant stenosis noted along the cervical segments of the vertebral arteries.   The CT angiogram of the head demonstrates mild atherosclerotic calcification along the distal left vertebral artery. No significant stenosis noted along the distal right vertebral artery, basilar artery, or distal internal carotid arteries. The right posterior cerebral artery is predominantly supplied via the right posterior  communicating artery. The P1 segment of the right posterior cerebral artery is asymmetrically small in caliber which may be related to congenital hypoplasia or secondary narrowing. Otherwise, no large vessel branch cutoffs of the anterior cerebral arteries, middle cerebral arteries, or posterior cerebral arteries are noted.   The source CT angiogram images of the head demonstrate moderate brain parenchymal volume loss. There are nonspecific white matter changes within the cerebral hemispheres bilaterally as well as vague hypodensity overlying the brainstem which while nonspecific, given the patient's age, may represent sequelae of small-vessel ischemic change. Additional small scattered hypodensities are identified within the subinsular regions, basal ganglia, and thalami bilaterally suggesting incidental mildly prominent perivascular spaces and/or small scattered lacunar infarctions. There is no midline shift.   The source CT angiogram images of the neck demonstrate multilevel cervical spondylosis.     MACRO: None   Signed by: Leandro Mcguire 3/25/2024 12:08 PM Dictation workstation:   [id number]    CT brain attack head wo IV contrast    Result Date: 3/25/2024  Interpreted By:  Krish Randolph, STUDY: CT BRAIN ATTACK HEAD WO IV CONTRAST;  3/25/2024 11:38 am   INDICATION: Signs/Symptoms:Stroke Evaluation.  Slurred speech.   COMPARISON: CT head 02/12/2024.   ACCESSION NUMBER(S): [id number]   ORDERING CLINICIAN: KAILEY CASTRO   TECHNIQUE: Noncontrast axial CT scan of head was performed.   FINDINGS: Parenchyma: There is no intracranial hemorrhage. The grey-white differentiation is intact. There is no mass effect or midline shift.   CSF Spaces: The ventricles, sulci and basal cisterns are within normal limits for age.   Extra-Axial Fluid: No extraaxial fluid collection.   Calvarium: No acute fracture.   Paranasal sinuses: Visualized paranasal sinuses are clear.   Mastoids: Clear.   Orbits: Normal.   Soft tissues:  Unremarkable.       No acute intracranial abnormality.   MACRO: Krish Randolph discussed the significance and urgency of this critical finding by Epic secure chat with  KAILEY CASTRO on 3/25/2024 at 11:44 am.  (**-RCF-**) Findings:  See findings.   Signed by: Krish Randolph 3/25/2024 11:46 AM Dictation workstation:   [id number]            Assessment/Plan   Principal Problem:    Delirium  Active Problems:    Atrial fibrillation (CMS/HCC)    Chronic pain    GERD (gastroesophageal reflux disease)    Hypothyroidism, postsurgical    Complicated UTI (urinary tract infection)    Depression    Diabetic ketoacidosis without coma associated with type 2 diabetes mellitus (CMS/HCC)    Sacral wound    Sepsis without acute organ dysfunction (CMS/HCC)    Hypomagnesemia    IMPRESSION  Poorly controlled type 2 diabetes mellitus  Long-term use of insulin  A1c of 15.4%  DKA - resolved      Recommendations:  To continue NPH 20 units subcutaneous twice daily  To conitnuye Insulin Lispro 10 units TID AC  To continue insulin correction scale TID AC   Diabetic diet as tolerated   Accu-Cheks ACHS    Hypoglycemic protocol   Patient can be discharged on th above when going to SNF  For Humalog 754/25 35 units subcutaneous twice daily upon discharge to home   Will continue to follow    There will be no endocrinology coverage until 4/1     Geovanna Pérez MD

## 2024-03-29 NOTE — PROGRESS NOTES
Physical Therapy    Physical Therapy Treatment    Patient Name: Teresa Matthews  MRN: [id number]  Today's Date: 3/29/2024  Time Calculation  Start Time: 0756  Stop Time: 0834  Time Calculation (min): 38 min       Assessment/Plan   PT Assessment  PT Assessment Results: Decreased strength, Decreased range of motion, Decreased endurance, Impaired balance, Decreased mobility, Decreased safety awareness  End of Session Communication: Bedside nurse, PCT/NA/CTA  Assessment Comment: patent able to progress to transfers, only to right side as left LE hurts and is weaker.patient increased ROM with exercises.. continue to increase strength and mobility.  End of Session Patient Position: Up in chair, Alarm on  PT Plan  Inpatient/Swing Bed or Outpatient: Inpatient  PT Plan  Treatment/Interventions: Bed mobility, Transfer training, Therapeutic exercise, Therapeutic activity  PT Plan: Skilled PT  PT Frequency: 3 times per week  PT Discharge Recommendations: Moderate intensity level of continued care  PT - OK to Discharge: Yes (when medically cleared)      General Visit Information:   PT  Visit  PT Received On: 03/29/24  Response to Previous Treatment: Patient reporting fatigue but able to participate.  General  Reason for Referral: Dx: slurred speech, DKA, sepsis, UTI  Prior to Session Communication: Bedside nurse, PCT/NA/CTA  Patient Position Received: Bed, 3 rail up, Alarm on  General Comment: patient agreeable to therapy    Subjective   Precautions:     Vital Signs:       Objective   Pain:  Pain Assessment  Pain Assessment: 0-10  Pain Score: 4  Pain Type: Chronic pain  Pain Location: Leg  Pain Orientation: Left, Lower  Cognition:  Cognition  Orientation Level: Disoriented to time  Postural Control:     Extremity/Trunk Assessments:    Activity Tolerance:  Activity Tolerance  Endurance: Tolerates 30 min exercise with multiple rests  Treatments:  Therapeutic Exercise  Therapeutic Exercise Performed: Yes  Therapeutic Exercise  Activity 1: ankle pumps, marches, SAQ, ankle pumps 15 reps each    Therapeutic Activity  Therapeutic Activity Performed: No    Bed Mobility  Bed Mobility: Yes  Bed Mobility 1  Bed Mobility 1: Supine to sitting  Level of Assistance 1: Maximum assistance (x1)    Ambulation/Gait Training  Ambulation/Gait Training Performed: No  Transfers  Transfer: Yes  Transfer 1  Transfer From 1: Bed to  Transfer to 1: Chair with arms  Technique 1: Stand pivot  Transfer Level of Assistance 1: Arm in arm assistance, Maximum assistance, +2    Outcome Measures:  [facility] Basic Mobility  Turning from your back to your side while in a flat bed without using bedrails: A lot  Moving from lying on your back to sitting on the side of a flat bed without using bedrails: A lot  Moving to and from bed to chair (including a wheelchair): A lot  Standing up from a chair using your arms (e.g. wheelchair or bedside chair): A lot  To walk in hospital room: Total  Climbing 3-5 steps with railing: Total  Basic Mobility - Total Score: 10    Education Documentation  Precautions, taught by Cathie Baltazar PTA at 3/29/2024  9:04 AM.  Learner: Patient  Readiness: Acceptance  Method: Explanation  Response: Verbalizes Understanding    Education Comments  No comments found.        OP EDUCATION:       Encounter Problems       Encounter Problems (Active)       PT Problem       transfers (Progressing)       Start:  03/26/24    Expected End:  04/09/24       Patient will perform all transfers with Min assist x 2          balance  (Progressing)       Start:  03/26/24    Expected End:  04/09/24       Patient will demonstrate >/= fair/fair+ static sitting balance to prepare for OOB activity and increase safety          strengthening  (Progressing)       Start:  03/26/24    Expected End:  04/09/24       Patient will perform 20+ reps of AROM/AAROM for EMELI LE's to improve safety and functional independence

## 2024-03-29 NOTE — PROGRESS NOTES
[facility] INFECTIOUS DISEASE PROGRESS NOTE    Patient Name: Teresa Matthews  MRN: [id number]    INTERVAL HISTORY:   No new complaints    ASSESSMENT:   Sepsis   Strep pyogenes bacteremia   Right leg cellulitis   H/o left TKA c/b e.coli and MRSA   DM  Afib  CHF  COPD  CKD        Suggest:  Repeat blood cx  TTE with mitral valve vegetation however ELISEO is negative  RLE cellulitis is improving   C/w unasyn   Tissue cx also with strep pyogens  Trend wbc and temps      Discharge planning: ELISEO is negative. Recommend 1-2 more days of IV antibiotics then switch to PO augmentin 875 q12 until 24    MEDICATIONS: reviewed.      PHYSICAL EXAM:  Vital signs: /83 (BP Location: Left arm, Patient Position: Sitting)   Pulse 70   Temp 36.1 °C (97 °F) (Temporal)   Resp 17   Ht 1.829 m (6')   Wt 113 kg (250 lb)   SpO2 93%   BMI 33.91 kg/m²   Temp (24hrs), Av.9 °C (96.7 °F), Min:35.6 °C (96.1 °F), Max:36.3 °C (97.3 °F)    General: alert, oriented, NAD  Lungs: bilaterally clear to auscultation  Heart: regular rate and rhythm  Abdomen: soft, non tender, non distended, BS+  Extremities: no edema  No rashes  No joint inflammation  Neck supple  Lines ok  No CVAT    Labs:  reviewed    Microbiology data: reviewed    Imaging data: reviewed      Kristine Koch   Pager:[phone]  Date of service: 3/29/2024  Time of service: 9:57 AM

## 2024-03-29 NOTE — PROGRESS NOTES
Patient needs to complete Unasyn x 2 days and will be able to discharge on PO atbx. Request to  [employer] [facility] to start insurance authorization.     1410: Insurance auth is back and approved for patient to admit to the [facility]. Updated Dr Bello and requested discharge orders if patient is able to go on oral ATBX today.     1415: ATBX will be completed later this evening. Dr Bello iis planning to discharge patient tomorrow morning. [facility] was updated with this information.

## 2024-03-30 VITALS
SYSTOLIC BLOOD PRESSURE: 113 MMHG | HEIGHT: 72 IN | OXYGEN SATURATION: 92 % | DIASTOLIC BLOOD PRESSURE: 65 MMHG | RESPIRATION RATE: 18 BRPM | BODY MASS INDEX: 33.86 KG/M2 | WEIGHT: 250 LBS | TEMPERATURE: 96.7 F | HEART RATE: 61 BPM

## 2024-03-30 PROBLEM — E83.42 HYPOMAGNESEMIA: Status: RESOLVED | Noted: 2024-03-25 | Resolved: 2024-03-30

## 2024-03-30 PROBLEM — E11.10 DIABETIC KETOACIDOSIS WITHOUT COMA ASSOCIATED WITH TYPE 2 DIABETES MELLITUS (MULTI): Status: RESOLVED | Noted: 2024-03-25 | Resolved: 2024-03-30

## 2024-03-30 PROBLEM — A41.9 SEPSIS WITHOUT ACUTE ORGAN DYSFUNCTION (MULTI): Status: RESOLVED | Noted: 2024-03-25 | Resolved: 2024-03-30

## 2024-03-30 PROBLEM — R41.0 DELIRIUM: Status: RESOLVED | Noted: 2024-03-25 | Resolved: 2024-03-30

## 2024-03-30 PROBLEM — N39.0 COMPLICATED UTI (URINARY TRACT INFECTION): Status: RESOLVED | Noted: 2023-04-10 | Resolved: 2024-03-30

## 2024-03-30 PROBLEM — F32.A DEPRESSION: Status: RESOLVED | Noted: 2024-02-05 | Resolved: 2024-03-30

## 2024-03-30 LAB
ANION GAP SERPL CALC-SCNC: 9 MMOL/L (ref 10–20)
BUN SERPL-MCNC: 21 MG/DL (ref 6–23)
CALCIUM SERPL-MCNC: 8.7 MG/DL (ref 8.6–10.3)
CHLORIDE SERPL-SCNC: 100 MMOL/L (ref 98–107)
CO2 SERPL-SCNC: 26 MMOL/L (ref 21–32)
CREAT SERPL-MCNC: 1.26 MG/DL (ref 0.5–1.05)
EGFRCR SERPLBLD CKD-EPI 2021: 45 ML/MIN/1.73M*2
ERYTHROCYTE [DISTWIDTH] IN BLOOD BY AUTOMATED COUNT: 15 % (ref 11.5–14.5)
GLUCOSE BLD MANUAL STRIP-MCNC: 102 MG/DL (ref 74–99)
GLUCOSE BLD MANUAL STRIP-MCNC: 147 MG/DL (ref 74–99)
GLUCOSE SERPL-MCNC: 98 MG/DL (ref 74–99)
HCT VFR BLD AUTO: 32.7 % (ref 36–46)
HGB BLD-MCNC: 10.3 G/DL (ref 12–16)
MAGNESIUM SERPL-MCNC: 1.64 MG/DL (ref 1.6–2.4)
MCH RBC QN AUTO: 27.4 PG (ref 26–34)
MCHC RBC AUTO-ENTMCNC: 31.5 G/DL (ref 32–36)
MCV RBC AUTO: 87 FL (ref 80–100)
NRBC BLD-RTO: 0 /100 WBCS (ref 0–0)
PLATELET # BLD AUTO: 169 X10*3/UL (ref 150–450)
POTASSIUM SERPL-SCNC: 3.3 MMOL/L (ref 3.5–5.3)
RBC # BLD AUTO: 3.76 X10*6/UL (ref 4–5.2)
SODIUM SERPL-SCNC: 132 MMOL/L (ref 136–145)
WBC # BLD AUTO: 8.1 X10*3/UL (ref 4.4–11.3)

## 2024-03-30 PROCEDURE — 2500000002 HC RX 250 W HCPCS SELF ADMINISTERED DRUGS (ALT 637 FOR MEDICARE OP, ALT 636 FOR OP/ED)

## 2024-03-30 PROCEDURE — 2500000001 HC RX 250 WO HCPCS SELF ADMINISTERED DRUGS (ALT 637 FOR MEDICARE OP)

## 2024-03-30 PROCEDURE — 83735 ASSAY OF MAGNESIUM: CPT | Performed by: FAMILY MEDICINE

## 2024-03-30 PROCEDURE — 2500000004 HC RX 250 GENERAL PHARMACY W/ HCPCS (ALT 636 FOR OP/ED): Performed by: FAMILY MEDICINE

## 2024-03-30 PROCEDURE — 2500000004 HC RX 250 GENERAL PHARMACY W/ HCPCS (ALT 636 FOR OP/ED): Performed by: STUDENT IN AN ORGANIZED HEALTH CARE EDUCATION/TRAINING PROGRAM

## 2024-03-30 PROCEDURE — 99239 HOSP IP/OBS DSCHRG MGMT >30: CPT | Performed by: FAMILY MEDICINE

## 2024-03-30 PROCEDURE — 36415 COLL VENOUS BLD VENIPUNCTURE: CPT | Performed by: FAMILY MEDICINE

## 2024-03-30 PROCEDURE — 2500000002 HC RX 250 W HCPCS SELF ADMINISTERED DRUGS (ALT 637 FOR MEDICARE OP, ALT 636 FOR OP/ED): Performed by: FAMILY MEDICINE

## 2024-03-30 PROCEDURE — 2500000001 HC RX 250 WO HCPCS SELF ADMINISTERED DRUGS (ALT 637 FOR MEDICARE OP): Performed by: FAMILY MEDICINE

## 2024-03-30 PROCEDURE — 82947 ASSAY GLUCOSE BLOOD QUANT: CPT

## 2024-03-30 PROCEDURE — 85027 COMPLETE CBC AUTOMATED: CPT | Performed by: FAMILY MEDICINE

## 2024-03-30 PROCEDURE — 82374 ASSAY BLOOD CARBON DIOXIDE: CPT | Performed by: FAMILY MEDICINE

## 2024-03-30 RX ORDER — AMOXICILLIN AND CLAVULANATE POTASSIUM 875; 125 MG/1; MG/1
1 TABLET, FILM COATED ORAL ONCE
Status: COMPLETED | OUTPATIENT
Start: 2024-03-30 | End: 2024-03-30

## 2024-03-30 RX ORDER — LANOLIN ALCOHOL/MO/W.PET/CERES
400 CREAM (GRAM) TOPICAL ONCE
Status: COMPLETED | OUTPATIENT
Start: 2024-03-30 | End: 2024-03-30

## 2024-03-30 RX ORDER — PETROLATUM 420 MG/G
1 OINTMENT TOPICAL 2 TIMES DAILY
Qty: 6 G | Refills: 0
Start: 2024-03-30 | End: 2024-04-29

## 2024-03-30 RX ORDER — AMOXICILLIN AND CLAVULANATE POTASSIUM 875; 125 MG/1; MG/1
1 TABLET, FILM COATED ORAL 2 TIMES DAILY
Qty: 28 TABLET | Refills: 0
Start: 2024-03-30 | End: 2024-04-13

## 2024-03-30 RX ORDER — INSULIN LISPRO 100 [IU]/ML
INJECTION, SUSPENSION SUBCUTANEOUS
Qty: 15 ML | Refills: 11 | Status: ON HOLD
Start: 2024-03-30 | End: 2025-03-30

## 2024-03-30 RX ORDER — POTASSIUM CHLORIDE 750 MG/1
40 TABLET, FILM COATED, EXTENDED RELEASE ORAL ONCE
Status: COMPLETED | OUTPATIENT
Start: 2024-03-30 | End: 2024-03-30

## 2024-03-30 RX ORDER — POTASSIUM CHLORIDE 20 MEQ/1
20 TABLET, EXTENDED RELEASE ORAL ONCE
Status: DISCONTINUED | OUTPATIENT
Start: 2024-03-30 | End: 2024-03-30 | Stop reason: HOSPADM

## 2024-03-30 RX ORDER — OXYCODONE AND ACETAMINOPHEN 5; 325 MG/1; MG/1
2 TABLET ORAL EVERY 4 HOURS PRN
Qty: 5 TABLET | Refills: 0 | Status: SHIPPED | OUTPATIENT
Start: 2024-03-30 | End: 2024-04-04 | Stop reason: SDUPTHER

## 2024-03-30 RX ORDER — NYSTATIN 100000 [USP'U]/G
1 POWDER TOPICAL 2 TIMES DAILY
Qty: 30 G | Refills: 0
Start: 2024-03-30 | End: 2024-04-29

## 2024-03-30 RX ORDER — AMOXICILLIN AND CLAVULANATE POTASSIUM 875; 125 MG/1; MG/1
1 TABLET, FILM COATED ORAL 2 TIMES DAILY
Qty: 28 TABLET | Refills: 0
Start: 2024-03-30 | End: 2024-03-30 | Stop reason: HOSPADM

## 2024-03-30 RX ADMIN — AMIODARONE HYDROCHLORIDE 200 MG: 200 TABLET ORAL at 09:57

## 2024-03-30 RX ADMIN — POTASSIUM CHLORIDE 40 MEQ: 750 TABLET, FILM COATED, EXTENDED RELEASE ORAL at 11:13

## 2024-03-30 RX ADMIN — APIXABAN 5 MG: 5 TABLET, FILM COATED ORAL at 09:57

## 2024-03-30 RX ADMIN — METOPROLOL TARTRATE 25 MG: 25 TABLET, FILM COATED ORAL at 09:57

## 2024-03-30 RX ADMIN — HYDROPHOR: 42 OINTMENT TOPICAL at 09:58

## 2024-03-30 RX ADMIN — AMPICILLIN SODIUM AND SULBACTAM SODIUM 3 G: 2; 1 INJECTION, POWDER, FOR SOLUTION INTRAMUSCULAR; INTRAVENOUS at 05:24

## 2024-03-30 RX ADMIN — AMOXICILLIN AND CLAVULANATE POTASSIUM 1 TABLET: 875; 125 TABLET, FILM COATED ORAL at 11:54

## 2024-03-30 RX ADMIN — PANTOPRAZOLE SODIUM 40 MG: 40 TABLET, DELAYED RELEASE ORAL at 05:24

## 2024-03-30 RX ADMIN — NYSTATIN 1 APPLICATION: 100000 POWDER TOPICAL at 09:58

## 2024-03-30 RX ADMIN — DESVENLAFAXINE SUCCINATE 100 MG: 100 TABLET, FILM COATED, EXTENDED RELEASE ORAL at 09:57

## 2024-03-30 RX ADMIN — AMPICILLIN SODIUM AND SULBACTAM SODIUM 3 G: 2; 1 INJECTION, POWDER, FOR SOLUTION INTRAMUSCULAR; INTRAVENOUS at 00:13

## 2024-03-30 RX ADMIN — LOSARTAN POTASSIUM 25 MG: 25 TABLET, FILM COATED ORAL at 09:57

## 2024-03-30 RX ADMIN — SPIRONOLACTONE 25 MG: 25 TABLET ORAL at 09:57

## 2024-03-30 RX ADMIN — AMPICILLIN SODIUM AND SULBACTAM SODIUM 3 G: 2; 1 INJECTION, POWDER, FOR SOLUTION INTRAMUSCULAR; INTRAVENOUS at 11:13

## 2024-03-30 RX ADMIN — BUPROPION HYDROCHLORIDE 150 MG: 150 TABLET, EXTENDED RELEASE ORAL at 09:57

## 2024-03-30 RX ADMIN — LEVOTHYROXINE SODIUM 175 MCG: 125 TABLET ORAL at 09:57

## 2024-03-30 RX ADMIN — INSULIN HUMAN 20 UNITS: 100 INJECTION, SUSPENSION SUBCUTANEOUS at 09:58

## 2024-03-30 RX ADMIN — Medication 400 MG: at 11:13

## 2024-03-30 ASSESSMENT — COGNITIVE AND FUNCTIONAL STATUS - GENERAL
MOVING FROM LYING ON BACK TO SITTING ON SIDE OF FLAT BED WITH BEDRAILS: A LOT
HELP NEEDED FOR BATHING: A LITTLE
STANDING UP FROM CHAIR USING ARMS: A LOT
PERSONAL GROOMING: A LOT
WALKING IN HOSPITAL ROOM: TOTAL
TOILETING: A LITTLE
TURNING FROM BACK TO SIDE WHILE IN FLAT BAD: A LOT
DAILY ACTIVITIY SCORE: 17
CLIMB 3 TO 5 STEPS WITH RAILING: TOTAL
DRESSING REGULAR UPPER BODY CLOTHING: A LITTLE
DRESSING REGULAR LOWER BODY CLOTHING: A LITTLE
MOVING TO AND FROM BED TO CHAIR: A LOT
MOBILITY SCORE: 10
EATING MEALS: A LITTLE

## 2024-03-30 ASSESSMENT — PAIN SCALES - GENERAL: PAINLEVEL_OUTOF10: 0 - NO PAIN

## 2024-03-30 NOTE — NURSING NOTE
Osmani from the [facility] took report on patient. They are aware of the 12:30  time by [facility]. Patient is to be sent with discharge papers including villeda aurora and medication summary. No paper scripts are to be sent with patient. Patient will also be sent with all belongings. IV removed. All questions answered.

## 2024-03-30 NOTE — CARE PLAN
The patient's goals for the shift include to remain safe     The clinical goals for the shift include Patient VS will remain stable, and patient will remain safe and free of falls this shift.        Problem: Pain  Goal: My pain/discomfort is manageable  Outcome: Progressing     Problem: Safety  Goal: Patient will be injury free during hospitalization  Outcome: Progressing  Goal: I will remain free of falls  Outcome: Progressing     Problem: Daily Care  Goal: Daily care needs are met  Outcome: Progressing     Problem: Psychosocial Needs  Goal: Demonstrates ability to cope with hospitalization/illness  Outcome: Progressing  Goal: Collaborate with me, my family, and caregiver to identify my specific goals  Outcome: Progressing     Problem: Discharge Barriers  Goal: My discharge needs are met  Outcome: Progressing     Problem: Fall/Injury  Goal: Not fall by end of shift  Outcome: Progressing  Goal: Be free from injury by end of the shift  Outcome: Progressing  Goal: Verbalize understanding of personal risk factors for fall in the hospital  Outcome: Progressing  Goal: Verbalize understanding of risk factor reduction measures to prevent injury from fall in the home  Outcome: Progressing  Goal: Use assistive devices by end of the shift  Outcome: Progressing  Goal: Pace activities to prevent fatigue by end of the shift  Outcome: Progressing     Problem: Skin  Goal: Decreased wound size/increased tissue granulation at next dressing change  Outcome: Progressing  Flowsheets (Taken 3/30/2024 0747)  Decreased wound size/increased tissue granulation at next dressing change:   Promote sleep for wound healing   Protective dressings over bony prominences  Goal: Participates in plan/prevention/treatment measures  Outcome: Progressing  Flowsheets (Taken 3/30/2024 0747)  Participates in plan/prevention/treatment measures:   Elevate heels   Increase activity/out of bed for meals  Goal: Prevent/manage excess moisture  Outcome:  Progressing  Flowsheets (Taken 3/30/2024 0747)  Prevent/manage excess moisture:   Cleanse incontinence/protect with barrier cream   Use wicking fabric (obtain order)   Moisturize dry skin  Goal: Prevent/minimize sheer/friction injuries  Outcome: Progressing  Flowsheets (Taken 3/30/2024 0747)  Prevent/minimize sheer/friction injuries:   Use pull sheet   Turn/reposition every 2 hours/use positioning/transfer devices   Increase activity/out of bed for meals  Goal: Promote/optimize nutrition  Outcome: Progressing  Flowsheets (Taken 3/30/2024 0747)  Promote/optimize nutrition:   Consume > 50% meals/supplements   Monitor/record intake including meals  Goal: Promote skin healing  Outcome: Progressing  Flowsheets (Taken 3/30/2024 0747)  Promote skin healing:   Turn/reposition every 2 hours/use positioning/transfer devices   Assess skin/pad under line(s)/device(s)   Ensure correct size (line/device) and apply per  instructions   Rotate device position/do not position patient on device   Protective dressings over bony prominences     Problem: Recent hospitalization or complication while living with DM  Goal: Patient will effectively self-manage their DM disease process  Outcome: Progressing     Problem: Lack of Diabetes disease process knowledge  Goal: Increase knowledge/understanding of diabetes and how to reduce risk of complications  Outcome: Progressing     Problem: Lack of glucose monitoring and target numbers for before and after meals knowledge  Goal: Increase knowledge/understanding of monitoring devices and interpret data to assist with lifestyle change  Outcome: Progressing     Problem: Lack of knowledge on diet for diabetes  Goal: Discover best plan for balanced nutrition to manage diabetes  Outcome: Progressing     Problem: Address barriers to lifestyle change  Goal: Find workable solutions to meet health goals  Outcome: Progressing     Problem: Lack of knowledge on benefits of activity for meeting  blood glucose targets and health goals  Goal: Discover best plan for being active and address any barriers  Outcome: Progressing     Problem: Lack of knowldge regarding diabetes medications  Goal: Increase knowledge via education  Outcome: Progressing     Problem: Lack of knowledge on where to find additional support for diabetes  Goal: Increase knowledge of where support can be found to best address patient's need  Outcome: Progressing     Problem: Safety - Medical Restraint  Goal: Remains free of injury from restraints (Restraint for Interference with Medical Device)  Outcome: Progressing  Goal: Free from restraint(s) (Restraint for Interference with Medical Device)  Outcome: Progressing     Problem: Nutrition  Goal: Oral intake greater than 50%  Outcome: Progressing  Goal: Consume prescribed supplement  Outcome: Progressing  Goal: BG  mg/dL  Outcome: Progressing  Goal: Lab values WNL  Outcome: Progressing  Goal: Promote healing  Outcome: Progressing

## 2024-03-30 NOTE — DISCHARGE SUMMARY
Discharge Diagnosis  Delirium    Issues Requiring Follow-Up  DKA/DM 2, complicated UTI, sacral wound    This discharge took greater than 35 minutes.    Test Results Pending At Discharge  Pending Labs       Order Current Status    Blood Culture Preliminary result    Blood Culture Preliminary result            Hospital Course   73 y.o. female with a past medical history of type 2 diabetes poorly controlled on Humalog, A-fib on Eliquis, CHF, left total knee arthroplasty complicated by E. coli infection and MRSA infection, COPD, CKD stage III presented to ED today with confusion.  Patient is a poor historian, history supplemented by ED records.  EMS report the patient has slurred speech and left-sided facial droop and right-sided drift on exam.  No last known well.     ED course: Stroke alert was called on arrival.  NIH stroke score 3 for dysarthria and facial droop and answering wrong month.  CT head shows no acute abnormalities, patient is on Eliquis and therefore not a candidate for TNK.  Glucose 609, bicarb 15, pH 7.26 with gap of 24.  Potassium 4.6.  Patient suspected to be in DKA and started on insulin drip.  UA shows WBCs and positive leukocyte esterase with suspected UTI, started on vancomycin and Zosyn.  EKG shows sinus rhythm rate 92 with no ST changes.  Nurse found sacral and extremity wounds and dressed them.        3/26:                Today patient seen in the ICU in the presence of family member.  She is awake alert and interactive appropriately but appears weak and ill.  Voices no specific complaints and no acute events overnight.  Echocardiogram is suggesting mitral valve endocarditis.  Blood cultures 2 out of 2 sets growing group A strep.  Continues on Zosyn at this time.  Anion gap has closed and transition to basal and bolus insulin.  Significant temperature elevation of 104 at 7 PM last evening.  Has remained afebrile through the day today.  Otherwise denies interval new symptoms or complaints  including chest pain palpitations pleuritic type pain worsening shortness of breath cough sputum nausea abdominal pain flank pain diarrhea fever or chills.     3/27:                Today patient remains in the ICU.  She is somewhat somnolent today.  Voices no specific complaints and no acute events overnight.  Endocrinology describes hemoglobin A1c of 15.4.  Starting on NPH 20 twice daily as well as lispro 10 3 times daily and SSI.  Cardiology planning ELISEO in view of possible mitral valve vegetations on TTE.  Continues on Rocephin at this point in place of Zosyn in view of group A strep bacteremia and right leg cellulitis.  Otherwise denies interval new symptoms or complaints including chest pain palpitations pleuritic type pain worsening shortness of breath cough sputum nausea abdominal pain flank pain diarrhea fever or chills.     3/28:                Today patient is seen on medical floor after transfer from ICU.  She is seen following her ELISEO which describes no evidence of vegetations.  She states she continues to feel improved primarily less weak.  Not much appetite at this point.  Wound culture growing group A strep as well which likely represents source for her strep bacteremia.  At this point ID recommending 2 more days of IV antibiotics followed by Augmentin through 4/14.  Likely discharge at this point when ready to SNF with transition to LTC.  Creatinine trending down.  Sodium stable.  WBC normal.  Otherwise denies interval new symptoms or complaints including chest pain palpitations pleuritic type pain worsening shortness of breath cough sputum nausea abdominal pain flank pain diarrhea fever or chills.     3/29:                 Today the patient is seen in bed.  Appears to continue more comfortable and patient states she does continue to feel improved and is pleased today she is developing an appetite.  Voices no specific complaints and no acute events overnight.  She will be completing recommended  course of IV Unasyn today and likely can be discharged tomorrow on Augmentin through 4/14.  Endocrinology continuing NPH 20 twice daily with lispro 10 3 times daily and SSI with recommendation for continuing this on discharge to SNF but on discharge home recommending Humalog 75/25 35 twice daily.  Otherwise denies interval new symptoms or complaints including chest pain palpitations pleuritic type pain worsening shortness of breath cough sputum nausea abdominal pain flank pain diarrhea fever or chills.    3/30:                Today patient remains awake alert and interactive appropriately in the presence of a friend visiting.  Voices no specific complaints and no acute events overnight.  At this time she will continue Augmentin through 4/14 to complete course of treatment.  Endocrinology has recommended Humalog 75/25 using 35 units twice daily.  Will need to monitor for control on this new regimen.  Creatinine appears reasonably stable.  Otherwise denies interval new symptoms or complaints including chest pain palpitations pleuritic type pain worsening shortness of breath cough sputum nausea abdominal pain flank pain diarrhea fever or chills.          Diabetic ketoacidosis without coma associated with type 2 diabetes mellitus (CMS/[facility])  Assessment & Plan   on arrival with open anion gap  Started on insulin drip in ED, will continue   Recheck BMP shows AG still open, will continue drip until AG closes  Serial labs every 4 hours while on drip and until gap closes  NPO for now, will add IVF   Patient reports compliance with regimen, outpatient notes indicate she stopped metformin last week d/t diarrhea  Last A1c 15.4%  Crit care following, plan to discontinue drip and switch to insulin subcutaneous when bicarb >15 and AG<15  3/26: DKA resolved and now on subcu insulin.  Awaiting endocrinology evaluation.  Likely triggered by sepsis.  3/29: Will continue NPH 20 twice daily with lispro 10 3 times daily and SSI  through discharge as well as at the SNF.  Recommendation for Humalog 75/25 35 twice daily on discharge home.  Blood sugars fairly good control ranging 1 50-1 70.     Sepsis without acute organ dysfunction (CMS/[facility])  Assessment & Plan  Patient meets sepsis criteria on arrival with high fever, leukocytosis, tachycardia, and suspected source of infection  S/p IVF, vancomycin and zosyn in ED  Will continue zosyn for now and fluids  Blood and urine cultures pending  Uptrending lactate, will continue IVF and monitor  3/26: Lactic trending down.  Likely related to group A strep bacteremia.  Also appears likely endocarditis.  3/28: No evidence for endocarditis.  Leg wound culture growing group A strep as well, likely source     Complicated UTI (urinary tract infection)  Assessment & Plan  UA shows moderate LE  Suspect trigger for DKA with AMS  S/p vancomycin and zosyn in ED. Will continue both vancomycin and zosyn for now  Blood and urine cultures pending, plan to de-escalate antibiotics  3/26: Urine culture growing multiple organisms.     * Delirium  Assessment & Plan  Per EMS patient had a left facial droop and a right arm drift. ED notes left facial droop with slurred speech which EMS states is new.   Stroke alert was activated in ED, but she is not a candidate for TNK given she is on Eliquis. There is no large vessel seen that needs to be intervened.   NIH score 3 for dysarthria, facial droop and answering wrong time, repeat 2.  Suspect AMS secondary to DKA and UTI  Will treat underlying conditions and monitor for improvement.   Also concern for polypharmacy as patient is on narcotics, gabapentin, SSRIs, thyroid meds. If not improving then will evaluate for medication overuse/noncompliance  3/26: Appears significantly improved cognitive status likely related to sepsis.  3/30: Appears back to baseline.     Hypomagnesemia  Assessment & Plan  Mg 1.41, 1.36  Repleting IV, will monitor.      Sacral wound  Assessment &  Plan  Hx of infected wounds and OM  Wound care nurse consult  Concern for patient self-care at home   3/30: Will be followed at the [place]     Depression  Assessment & Plan  Continue home Bupropion, Desvenlafaxine, Trazodone     Hypothyroidism, postsurgical  Assessment & Plan  Continue home levothyroxine  Unclear if patient taking 175 or 225mcg  TSH 2.13     GERD (gastroesophageal reflux disease)  Assessment & Plan  Hold home famotidine  Home omeprazole substituted with pantoprazole as per formulary     Chronic pain  Assessment & Plan  Continue home percocet, gabapentin. OARRS reviewed and appropriate.     Atrial fibrillation (CMS/HCC)  Assessment & Plan  Continue home amiodarone, apixaban, metoprolol     Endocarditis, group A strep bacteremia, right lower extremity cellulitis        At this point continues on Rocephin.  Awaiting ELISEO.        3/28: RLE wound culture growing group A strep as well.  Likely source.  ELISEO reveals no evidence for vegetations.  Continue Unasyn for 2 days and then Augmentin through 4/14.          Pertinent Physical Exam At Time of Discharge  Physical Exam  Gen: Obese elderly  female , no acute distress appears less ill, nontoxic  HEENT: Normocephalic, atraumatic, good dentition, no oral lesions appreciated.  Sclera nonicteric  Neck: No cervical lymphadenopathy appreciated, no thyroid enlargement appreciated  CV: Regular rate and rhythm, S1 and S2 present, no murmurs rubs or gallops appreciated  Resp: Lungs clear to auscultation bilaterally, no ronchi, rales or wheezes appreciated  Abdomen: soft, nontender, nondistended  MSK: No joint swelling appreciated  Psych: Much less flat mood and affect.  Pleasant and cooperative to exam     Home Medications     Medication List      START taking these medications     amoxicillin-pot clavulanate 875-125 mg tablet; Commonly known as:   Augmentin; Take 1 tablet by mouth 2 times a day for 14 days.   insulin lispro protamin-lispro 100 unit/mL  (75-25) injection; Commonly   known as: HumaLOG Mix 75-25 KwikPen; Inject 1-2 times per day as directed.    35 units subcutaneously   nystatin 100,000 unit/gram powder; Commonly known as: Mycostatin; Apply   1 Application topically 2 times a day.   oxyCODONE-acetaminophen 5-325 mg tablet; Commonly known as: Percocet;   Take 2 tablets by mouth every 4 hours if needed for severe pain (7 - 10)   for up to 3 days.; Replaces: oxyCODONE-acetaminophen  mg tablet   oxygen gas therapy; Commonly known as: O2; Inhale 1 each once every 24   hours.   white petrolatum 41 % ointment ointment; Commonly known as: Aquaphor;   Apply 1 Application topically 2 times a day.     CHANGE how you take these medications     levothyroxine 175 mcg tablet; Commonly known as: Synthroid, Levoxyl;   Take 1 tablet by mouth daily; What changed: Another medication with the   same name was removed. Continue taking this medication, and follow the   directions you see here.     CONTINUE taking these medications     amiodarone 200 mg tablet; Commonly known as: Pacerone; Take 1 tablet   (200 mg) by mouth once daily.   apixaban 5 mg tablet; Commonly known as: Eliquis; Take 1 tablet (5 mg)   by mouth 2 times a day.   ascorbic acid (vitamin C) 500 mg capsule   aspirin 81 mg EC tablet   buPROPion  mg 24 hr tablet; Commonly known as: Wellbutrin XL; Take   1 tablet (150 mg) by mouth once daily in the morning. Do not crush, chew,   or split.   calcium citrate-vitamin D3 200 mg-6.25 mcg (250 unit) tablet   desvenlafaxine 100 mg 24 hr tablet; Commonly known as: Pristiq; Take 1   tablet (100 mg) by mouth once daily. Do not crush, chew, or split. Instead   of venlafaxine.   famotidine 40 mg tablet; Commonly known as: Pepcid; Take 1 tablet (40   mg) by mouth once daily.   FreeStyle Bulmaro 2 Sensor kit; Generic drug: flash glucose sensor kit;   Inject 1 each under the skin every 14 (fourteen) days. Test blood sugar   1-4 times per day as needed for diabetes,  "replace every 14 days   glucagon 1 mg injection; Commonly known as: Glucagen; Inject 1 mg into   the shoulder, thigh, or buttocks 1 time if needed for low blood sugar -   see comments for up to 1 dose. USE AS DIRECTED.   losartan 25 mg tablet; Commonly known as: Cozaar; Take 1 tablet (25 mg)   by mouth once daily.   metFORMIN  mg 24 hr tablet; Commonly known as: Glucophage-XR   metoprolol succinate XL 50 mg 24 hr tablet; Commonly known as:   Toprol-XL; Take 1 tablet (50 mg) by mouth once daily.   naloxone 4 mg/0.1 mL nasal spray; Commonly known as: Narcan; Administer   1 spray (4 mg) into affected nostril(s) if needed for opioid reversal for   up to 1 day. May repeat every 2-3 minutes if needed, alternating nostrils,   until medical assistance becomes available.   omeprazole 40 mg DR capsule; Commonly known as: PriLOSEC   OneTouch Ultra Test strip; Generic drug: blood sugar diagnostic   pen needle 1/2\" 29G X 12mm needle; Commonly known as: Easy Touch; Inject   3 each under the skin 4 times a day as needed (as needed for sugars). Use   as instructed   potassium chloride CR 10 mEq ER tablet; Commonly known as: Klor-Con;   Take 1 tablet (10 mEq) by mouth in the morning and 1 tablet (10 mEq)   before bedtime. Do not crush, chew, or split..   spironolactone 25 mg tablet; Commonly known as: Aldactone   SUPER B-50 COMPLEX ORAL   traZODone 50 mg tablet; Commonly known as: Desyrel; Take 1 tablet (50   mg) by mouth once daily at bedtime.   underpads pad; Commonly known as: Bed Underpads; 1 each 2 times a day.   vibegron 75 mg tablet; Take 1 tablet by mouth once daily.   WOMEN'S MULTIVITAMIN ORAL     STOP taking these medications     gabapentin 600 mg tablet; Commonly known as: Neurontin   insulin lispro 100 unit/mL injection; Commonly known as: HumaLOG   insulin NPH (Isophane) 100 unit/mL injection; Commonly known as: HumuLIN   N,NovoLIN N   oxyCODONE-acetaminophen  mg tablet; Commonly known as: Percocet; "   Replaced by: oxyCODONE-acetaminophen 5-325 mg tablet       Outpatient Follow-Up  PCP    Abner Bello MD

## 2024-03-30 NOTE — PROGRESS NOTES
[facility] INFECTIOUS DISEASE PROGRESS NOTE    Patient Name: Teresa Matthews  MRN: [id number]    INTERVAL HISTORY:   No new complaints    ASSESSMENT:   Sepsis   Strep pyogenes bacteremia   Right leg cellulitis   H/o left TKA c/b e.coli and MRSA   DM  Afib  CHF  COPD  CKD        Suggest:  Repeat blood cx  TTE with mitral valve vegetation however ELISEO is negative  RLE cellulitis is improving   C/w unasyn   Tissue cx also with strep pyogens  Trend wbc and temps      Discharge planning: ELISEO is negative. Recommend 1-2 more days of IV antibiotics then switch to PO augmentin 875 q12 until 24    MEDICATIONS: reviewed.      PHYSICAL EXAM:  Vital signs: /65 (BP Location: Left arm, Patient Position: Lying)   Pulse 61   Temp 35.9 °C (96.7 °F) (Temporal)   Resp 18   Ht 1.829 m (6')   Wt 113 kg (250 lb)   SpO2 92% Comment: pt weaned to room air and was provided an incentive with instruction on appropriate use.  BMI 33.91 kg/m²   Temp (24hrs), Av.8 °C (96.4 °F), Min:35.7 °C (96.2 °F), Max:35.9 °C (96.7 °F)    General: alert, oriented, NAD  Lungs: bilaterally clear to auscultation  Heart: regular rate and rhythm  Abdomen: soft, non tender, non distended, BS+  Extremities: no edema  No rashes  No joint inflammation  Neck supple  Lines ok  No CVAT    Labs:  reviewed    Microbiology data: reviewed    Imaging data: reviewed      Kristine Koch   Pager:[phone]  Date of service: 3/30/2024  Time of service: 11:35 AM

## 2024-03-31 LAB
BACTERIA BLD CULT: NORMAL
BACTERIA BLD CULT: NORMAL

## 2024-04-01 ENCOUNTER — NURSING HOME VISIT (OUTPATIENT)
Dept: POST ACUTE CARE | Facility: EXTERNAL LOCATION | Age: 74
End: 2024-04-01
Payer: MEDICARE

## 2024-04-01 DIAGNOSIS — K21.9 GASTROESOPHAGEAL REFLUX DISEASE WITHOUT ESOPHAGITIS: ICD-10-CM

## 2024-04-01 DIAGNOSIS — Z79.4 TYPE 2 DIABETES MELLITUS WITH HYPERGLYCEMIA, WITH LONG-TERM CURRENT USE OF INSULIN (MULTI): ICD-10-CM

## 2024-04-01 DIAGNOSIS — I11.0 HYPERTENSIVE HEART DISEASE WITH CONGESTIVE HEART FAILURE, UNSPECIFIED HEART FAILURE TYPE (MULTI): ICD-10-CM

## 2024-04-01 DIAGNOSIS — I48.91 ATRIAL FIBRILLATION, UNSPECIFIED TYPE (MULTI): ICD-10-CM

## 2024-04-01 DIAGNOSIS — E89.0 HYPOTHYROIDISM, POSTSURGICAL: ICD-10-CM

## 2024-04-01 DIAGNOSIS — E11.65 TYPE 2 DIABETES MELLITUS WITH HYPERGLYCEMIA, WITH LONG-TERM CURRENT USE OF INSULIN (MULTI): ICD-10-CM

## 2024-04-01 DIAGNOSIS — J44.9 CHRONIC OBSTRUCTIVE PULMONARY DISEASE, UNSPECIFIED COPD TYPE (MULTI): ICD-10-CM

## 2024-04-01 DIAGNOSIS — F32.A DEPRESSION, UNSPECIFIED DEPRESSION TYPE: ICD-10-CM

## 2024-04-01 DIAGNOSIS — R41.0 DELIRIUM: Primary | ICD-10-CM

## 2024-04-01 DIAGNOSIS — N39.0 URINARY TRACT INFECTION WITHOUT HEMATURIA, SITE UNSPECIFIED: ICD-10-CM

## 2024-04-01 PROBLEM — E11.42 DIABETIC PERIPHERAL NEUROPATHY (MULTI): Status: RESOLVED | Noted: 2023-04-10 | Resolved: 2024-04-01

## 2024-04-01 PROBLEM — E11.319: Status: RESOLVED | Noted: 2023-04-10 | Resolved: 2024-04-01

## 2024-04-01 PROBLEM — I10 ESSENTIAL HYPERTENSION: Status: RESOLVED | Noted: 2017-09-01 | Resolved: 2024-04-01

## 2024-04-01 PROCEDURE — 99305 1ST NF CARE MODERATE MDM 35: CPT | Performed by: INTERNAL MEDICINE

## 2024-04-01 NOTE — LETTER
Patient: Teresa Matthews  : 1950    Encounter Date: 2024    HISTORY & PHYSICAL    Subjective  Chief complaint: Teresa Matthews is a 73 y.o. female who is being seen and evaluated for multiple medical problems.  Patient admitted to SNF for therapy due to weakness after recent hospitalization.    HPI:  HPI  Patient presented to ED with confusion, left-sided facial droop and right-sided drift, slurred speech.  Patient was admitted for further evaluation.  Stroke team was alerted.  Patient had CT of the head that showed no acute abnormalities, no large vessels that needed to be intervened on.  Patient on Eliquis and was not a candidate for TNK.  Patient was suspected be in DKA and was started on insulin drip.  Patient did have a UA suspected for UTI and was started on antibiotics.  Patient was followed by endocrinology throughout hospitalization with adjustments in diabetic medications.  PT OT evaluated patient recommending SNF.  Patient was also noted to have endocarditis and followed by cardiology and was treated with antibiotics, underwent ELISEO with no evidence of vegetations.  Patient was hemodynamically stable to discharge to SNF for continued medical management and therapy.  Patient was seen and examined at bedside.  Appears to be in no acute distress.  Afebrile.  Patient to continue outpatient antibiotic until complete, tolerating without adverse effects.  Past Medical History:   Diagnosis Date   • Abnormal weight gain 04/10/2023   • Acute kidney injury (CMS/HCC) 2024   • Acute upper respiratory infection 2024   • Allergic rhinitis 04/10/2023   • Arthritis due to other bacteria, unspecified knee (CMS/HCC) 2022   • Atrial fibrillation (CMS/[facility])    • Bilateral tinnitus 2020   • Body mass index (BMI) 31.0-31.9, adult 2020    Body mass index (BMI) of 31.0 to 31.9 in adult   • Body mass index (BMI) 32.0-32.9, adult 2021    Body mass index (BMI) of 32.0 to 32.9  in adult   • Body mass index (BMI) 33.0-33.9, adult 01/04/2021    Body mass index (BMI) of 33.0 to 33.9 in adult   • Body mass index (BMI) 35.0-35.9, adult 04/09/2020    Body mass index (BMI) of 35.0 to 35.9 in adult   • Body mass index (BMI)40.0-44.9, adult 11/14/2019    Body mass index (BMI) of 40.0 to 44.9 in adult   • Bronchitis due to COVID-19 virus 04/10/2023   • CHF (congestive heart failure) (CMS/[facility])    • Chronic insomnia 04/10/2023   • Depression, major, in remission (CMS/[facility]) 04/10/2023   • Diabetes mellitus (CMS/[facility])    • Distal radius fracture, left 05/19/2023   • Dry skin 01/17/2023   • Endocarditis    • Essential (primary) hypertension 09/06/2022    Hypertension   • H/O bariatric surgery 04/10/2023   • History of heart failure 03/25/2024   • History of type 2 diabetes mellitus 03/25/2024   • 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   • Hypoxia 04/10/2023   • Infective arthritis (CMS/[facility]) 03/25/2024   • Kidney stone on right side 04/10/2023   • Laceration of multiple sites 03/25/2024   • Left toe amputee (CMS/[facility]) 04/10/2023   • Malaise and fatigue 04/10/2023   • Nasal septum ulceration 04/10/2023   • Nontoxic multinodular goiter 07/02/2021    Multiple thyroid nodules   • Personal history of other diseases of the circulatory system     History of congestive heart failure   • Personal history of other diseases of the musculoskeletal system and connective tissue     History of arthritis   • Personal history of other diseases of the nervous system and sense organs 10/11/2022    History of sleep apnea   • Personal history of other diseases of the respiratory system     History of lung disease   • Personal history of other diseases of urinary system 02/19/2020    History of hematuria   • Personal history of other endocrine, nutritional and metabolic disease 07/02/2021    History of morbid obesity   • Personal history of other endocrine, nutritional and metabolic disease 04/24/2017    History of diabetic  neuropathy   • Prosthetic joint infection (CMS/[facility]) 04/10/2023   • Recurrent major depressive disorder, in remission (CMS/[facility]) 04/10/2023   • Ruptured aneurysm of thoracic aorta, unspecified part (CMS/[facility]) 04/10/2023   • Status post gastric bypass for obesity 04/10/2023   • Urinary tract infection    • Vertigo of central origin 04/10/2023       Past Surgical History:   Procedure Laterality Date   • HYSTERECTOMY  01/20/2016    Hysterectomy   • OTHER SURGICAL HISTORY  01/07/2022    Toe amputation   • OTHER SURGICAL HISTORY  05/28/2021    Gastric bypass surgery   • TONSILLECTOMY  01/20/2016    Tonsillectomy   • TOTAL KNEE ARTHROPLASTY  05/28/2021    Knee Replacement   • TUBAL LIGATION  04/24/2017    Tubal Ligation       Family History   Problem Relation Name Age of Onset   • Coronary artery disease Mother     • Liver disease Mother     • Other (non-hodgkins lymphoma) Mother     • Stroke Father     • Deafness Father     • Hypertension Father         Social History     Socioeconomic History   • Marital status: Single     Spouse name: Not on file   • Number of children: Not on file   • Years of education: Not on file   • Highest education level: Not on file   Occupational History   • Not on file   Tobacco Use   • Smoking status: Never   • Smokeless tobacco: Never   Vaping Use   • Vaping Use: Never used   Substance and Sexual Activity   • Alcohol use: Never   • Drug use: Never   • Sexual activity: Defer   Other Topics Concern   • Not on file   Social History Narrative   • Not on file     Social Determinants of Health     Financial Resource Strain: Patient Unable To Answer (3/25/2024)    Overall Financial Resource Strain (CARDIA)    • Difficulty of Paying Living Expenses: Patient unable to answer   Food Insecurity: Not on file   Transportation Needs: Patient Unable To Answer (3/25/2024)    PRAPARE - Transportation    • Lack of Transportation (Medical): Patient unable to answer    • Lack of Transportation (Non-Medical):  Patient unable to answer   Physical Activity: Not on file   Stress: Not on file   Social Connections: Not on file   Intimate Partner Violence: Not on file   Housing Stability: Patient Unable To Answer (3/25/2024)    Housing Stability Vital Sign    • Unable to Pay for Housing in the Last Year: Patient unable to answer    • Number of Places Lived in the Last Year: 1    • Unstable Housing in the Last Year: Patient unable to answer       Vital signs: 132/86, 98.0, 62, 18, 98%, blood sugar 180    Objective  Physical Exam  Constitutional:       General: She is not in acute distress.  Eyes:      Extraocular Movements: Extraocular movements intact.   Cardiovascular:      Rate and Rhythm: Normal rate and regular rhythm.   Pulmonary:      Effort: Pulmonary effort is normal.      Breath sounds: Normal breath sounds.   Abdominal:      General: Bowel sounds are normal.      Palpations: Abdomen is soft.   Musculoskeletal:      Cervical back: Neck supple.      Right lower leg: No edema.      Left lower leg: No edema.   Neurological:      Mental Status: She is alert.   Psychiatric:         Mood and Affect: Mood normal.         Behavior: Behavior is cooperative.         Assessment/Plan  Problem List Items Addressed This Visit       Type 2 diabetes mellitus with hyperglycemia, with long-term current use of insulin (CMS/[facility])     Glucoscans  Low concentrated sweets diet.  Insulin  Metformin  Follows endocrinology outpatient         Atrial fibrillation (CMS/[facility])     Amiodarone  Apixaban  Bleeding precautions  Monitor heart rate         GERD (gastroesophageal reflux disease)     Monitor for GI symptoms  Famotidine         Hypertensive heart disease with CHF (CMS/[facility])     CHF stable, no sob  Monitor blood pressure  Toprol-XL  Spironolactone  Losartan potassium         Hypothyroidism, postsurgical     Monitor TSH  Levothyroxine         Chronic obstructive pulmonary disease, unspecified COPD type (CMS/[facility])     Stable, no wheezing or  shortness of breath  On room air         Depression     Monitor mood and behaviors  Bupropion  Desvenlafaxine         Delirium - Primary     Likely due to DKA  Improved         UTI (urinary tract infection)     Continue antibiotic until completed 4/14/2024          Hospital records reviewed  Medications, treatments, and labs reviewed  Continue medications and treatments as listed in EMR  Discussed with nursing and therapy      Scribe Attestation  Lorna CHICAS Scribe   attest that this documentation has been prepared under the direction and in the presence of Pita Virgen MD    Provider Attestation - Scribe documentation  All medical record entries made by the Scribe were at my direction and personally dictated by me. I have reviewed the chart and agree that the record accurately reflects my personal performance of the history, physical exam, discussion and plan.   Pita Virgen MD      Electronically Signed By: Pita Virgen MD   4/1/24  3:06 PM

## 2024-04-01 NOTE — PROGRESS NOTES
HISTORY & PHYSICAL    Subjective   Chief complaint: Teresa Matthews is a 73 y.o. female who is being seen and evaluated for multiple medical problems.  Patient admitted to SNF for therapy due to weakness after recent hospitalization.    HPI:  HPI  Patient presented to ED with confusion, left-sided facial droop and right-sided drift, slurred speech.  Patient was admitted for further evaluation.  Stroke team was alerted.  Patient had CT of the head that showed no acute abnormalities, no large vessels that needed to be intervened on.  Patient on Eliquis and was not a candidate for TNK.  Patient was suspected be in DKA and was started on insulin drip.  Patient did have a UA suspected for UTI and was started on antibiotics.  Patient was followed by endocrinology throughout hospitalization with adjustments in diabetic medications.  PT OT evaluated patient recommending SNF.  Patient was also noted to have endocarditis and followed by cardiology and was treated with antibiotics, underwent ELISEO with no evidence of vegetations.  Patient was hemodynamically stable to discharge to SNF for continued medical management and therapy.  Patient was seen and examined at bedside.  Appears to be in no acute distress.  Afebrile.  Patient to continue outpatient antibiotic until complete, tolerating without adverse effects.  Past Medical History:   Diagnosis Date    Abnormal weight gain 04/10/2023    Acute kidney injury (CMS/HCC) 03/25/2024    Acute upper respiratory infection 03/25/2024    Allergic rhinitis 04/10/2023    Arthritis due to other bacteria, unspecified knee (CMS/[facility]) 03/03/2022    Atrial fibrillation (CMS/[facility])     Bilateral tinnitus 07/01/2020    Body mass index (BMI) 31.0-31.9, adult 06/30/2020    Body mass index (BMI) of 31.0 to 31.9 in adult    Body mass index (BMI) 32.0-32.9, adult 01/04/2021    Body mass index (BMI) of 32.0 to 32.9 in adult    Body mass index (BMI) 33.0-33.9, adult 01/04/2021    Body mass index (BMI)  of 33.0 to 33.9 in adult    Body mass index (BMI) 35.0-35.9, adult 04/09/2020    Body mass index (BMI) of 35.0 to 35.9 in adult    Body mass index (BMI)40.0-44.9, adult 11/14/2019    Body mass index (BMI) of 40.0 to 44.9 in adult    Bronchitis due to COVID-19 virus 04/10/2023    CHF (congestive heart failure) (CMS/[facility])     Chronic insomnia 04/10/2023    Depression, major, in remission (CMS/[facility]) 04/10/2023    Diabetes mellitus (CMS/[facility])     Distal radius fracture, left 05/19/2023    Dry skin 01/17/2023    Endocarditis     Essential (primary) hypertension 09/06/2022    Hypertension    H/O bariatric surgery 04/10/2023    History of heart failure 03/25/2024    History of type 2 diabetes mellitus 03/25/2024    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    Hypoxia 04/10/2023    Infective arthritis (CMS/[facility]) 03/25/2024    Kidney stone on right side 04/10/2023    Laceration of multiple sites 03/25/2024    Left toe amputee (CMS/[facility]) 04/10/2023    Malaise and fatigue 04/10/2023    Nasal septum ulceration 04/10/2023    Nontoxic multinodular goiter 07/02/2021    Multiple thyroid nodules    Personal history of other diseases of the circulatory system     History of congestive heart failure    Personal history of other diseases of the musculoskeletal system and connective tissue     History of arthritis    Personal history of other diseases of the nervous system and sense organs 10/11/2022    History of sleep apnea    Personal history of other diseases of the respiratory system     History of lung disease    Personal history of other diseases of urinary system 02/19/2020    History of hematuria    Personal history of other endocrine, nutritional and metabolic disease 07/02/2021    History of morbid obesity    Personal history of other endocrine, nutritional and metabolic disease 04/24/2017    History of diabetic neuropathy    Prosthetic joint infection (CMS/[facility]) 04/10/2023    Recurrent major depressive disorder, in remission  (CMS/[facility]) 04/10/2023    Ruptured aneurysm of thoracic aorta, unspecified part (CMS/[facility]) 04/10/2023    Status post gastric bypass for obesity 04/10/2023    Urinary tract infection     Vertigo of central origin 04/10/2023       Past Surgical History:   Procedure Laterality Date    HYSTERECTOMY  01/20/2016    Hysterectomy    OTHER SURGICAL HISTORY  01/07/2022    Toe amputation    OTHER SURGICAL HISTORY  05/28/2021    Gastric bypass surgery    TONSILLECTOMY  01/20/2016    Tonsillectomy    TOTAL KNEE ARTHROPLASTY  05/28/2021    Knee Replacement    TUBAL LIGATION  04/24/2017    Tubal Ligation       Family History   Problem Relation Name Age of Onset    Coronary artery disease Mother      Liver disease Mother      Other (non-hodgkins lymphoma) Mother      Stroke Father      Deafness Father      Hypertension Father         Social History     Socioeconomic History    Marital status: Single     Spouse name: Not on file    Number of children: Not on file    Years of education: Not on file    Highest education level: Not on file   Occupational History    Not on file   Tobacco Use    Smoking status: Never    Smokeless tobacco: Never   Vaping Use    Vaping Use: Never used   Substance and Sexual Activity    Alcohol use: Never    Drug use: Never    Sexual activity: Defer   Other Topics Concern    Not on file   Social History Narrative    Not on file     Social Determinants of Health     Financial Resource Strain: Patient Unable To Answer (3/25/2024)    Overall Financial Resource Strain (CARDIA)     Difficulty of Paying Living Expenses: Patient unable to answer   Food Insecurity: Not on file   Transportation Needs: Patient Unable To Answer (3/25/2024)    PRAPARE - Transportation     Lack of Transportation (Medical): Patient unable to answer     Lack of Transportation (Non-Medical): Patient unable to answer   Physical Activity: Not on file   Stress: Not on file   Social Connections: Not on file   Intimate Partner Violence: Not on  file   Housing Stability: Patient Unable To Answer (3/25/2024)    Housing Stability Vital Sign     Unable to Pay for Housing in the Last Year: Patient unable to answer     Number of Places Lived in the Last Year: 1     Unstable Housing in the Last Year: Patient unable to answer       Vital signs: 132/86, 98.0, 62, 18, 98%, blood sugar 180    Objective   Physical Exam  Constitutional:       General: She is not in acute distress.  Eyes:      Extraocular Movements: Extraocular movements intact.   Cardiovascular:      Rate and Rhythm: Normal rate and regular rhythm.   Pulmonary:      Effort: Pulmonary effort is normal.      Breath sounds: Normal breath sounds.   Abdominal:      General: Bowel sounds are normal.      Palpations: Abdomen is soft.   Musculoskeletal:      Cervical back: Neck supple.      Right lower leg: No edema.      Left lower leg: No edema.   Neurological:      Mental Status: She is alert.   Psychiatric:         Mood and Affect: Mood normal.         Behavior: Behavior is cooperative.         Assessment/Plan   Problem List Items Addressed This Visit       Type 2 diabetes mellitus with hyperglycemia, with long-term current use of insulin (CMS/[facility])     Glucoscans  Low concentrated sweets diet.  Insulin  Metformin  Follows endocrinology outpatient         Atrial fibrillation (CMS/HCC)     Amiodarone  Apixaban  Bleeding precautions  Monitor heart rate         GERD (gastroesophageal reflux disease)     Monitor for GI symptoms  Famotidine         Hypertensive heart disease with CHF (CMS/[facility])     CHF stable, no sob  Monitor blood pressure  Toprol-XL  Spironolactone  Losartan potassium         Hypothyroidism, postsurgical     Monitor TSH  Levothyroxine         Chronic obstructive pulmonary disease, unspecified COPD type (CMS/[facility])     Stable, no wheezing or shortness of breath  On room air         Depression     Monitor mood and behaviors  Bupropion  Desvenlafaxine         Delirium - Primary     Likely due to  DKA  Improved         UTI (urinary tract infection)     Continue antibiotic until completed 4/14/2024          Hospital records reviewed  Medications, treatments, and labs reviewed  Continue medications and treatments as listed in EMR  Discussed with nursing and therapy      Scribe Attestation  I, Kirill Page   attest that this documentation has been prepared under the direction and in the presence of Pita Virgen MD    Provider Attestation - Scribe documentation  All medical record entries made by the Scribe were at my direction and personally dictated by me. I have reviewed the chart and agree that the record accurately reflects my personal performance of the history, physical exam, discussion and plan.   Pita Virgen MD

## 2024-04-02 ENCOUNTER — NURSING HOME VISIT (OUTPATIENT)
Dept: POST ACUTE CARE | Facility: EXTERNAL LOCATION | Age: 74
End: 2024-04-02
Payer: MEDICARE

## 2024-04-02 DIAGNOSIS — K21.9 GASTROESOPHAGEAL REFLUX DISEASE WITHOUT ESOPHAGITIS: ICD-10-CM

## 2024-04-02 DIAGNOSIS — J44.9 CHRONIC OBSTRUCTIVE PULMONARY DISEASE, UNSPECIFIED COPD TYPE (MULTI): ICD-10-CM

## 2024-04-02 DIAGNOSIS — I11.0 HYPERTENSIVE HEART DISEASE WITH CONGESTIVE HEART FAILURE, UNSPECIFIED HEART FAILURE TYPE (MULTI): ICD-10-CM

## 2024-04-02 DIAGNOSIS — R53.1 WEAKNESS: Primary | ICD-10-CM

## 2024-04-02 DIAGNOSIS — I48.91 ATRIAL FIBRILLATION, UNSPECIFIED TYPE (MULTI): ICD-10-CM

## 2024-04-02 PROCEDURE — 99309 SBSQ NF CARE MODERATE MDM 30: CPT | Performed by: INTERNAL MEDICINE

## 2024-04-02 NOTE — LETTER
Patient: Teresa Matthews  : 1950    Encounter Date: 2024    PROGRESS NOTE    Subjective  Chief complaint: Teresa Matthews is a 73 y.o. female who is an acute skilled patient being seen and evaluated for weakness    HPI:  HPI  Patient did have recent labs obtained that resulted as sodium 138 potassium 4.0, H&H of 10.2 and 30.6.  BUN and creatinine 15 and 1.1.  Labs unremarkable, no new orders.  Continue to monitor routinely.  Patient is also working in therapy due to generalized weakness.  Patient is working on therapeutic exercises seated to increase ease of transfers.  Patient also working on core activities to assist and slide board transfers.  Patient did perform sit to stands in parallel bars requiring max assist x 2.  Patient seen and examined at bedside, appears to be in no acute distress.  Denies chest pain or shortness of breath.  Afebrile.    Objective  Vital signs: 141/82, 98.0, 59, 18, blood sugar 146, 98%    Physical Exam  Constitutional:       General: She is not in acute distress.  Eyes:      Extraocular Movements: Extraocular movements intact.   Cardiovascular:      Rate and Rhythm: Normal rate and regular rhythm.   Pulmonary:      Effort: Pulmonary effort is normal.      Breath sounds: Normal breath sounds.   Abdominal:      General: Bowel sounds are normal.      Palpations: Abdomen is soft.   Musculoskeletal:      Cervical back: Neck supple.      Right lower leg: No edema.      Left lower leg: No edema.   Neurological:      Mental Status: She is alert.      Motor: Weakness present.   Psychiatric:         Mood and Affect: Mood normal.         Behavior: Behavior is cooperative.         Assessment/Plan  Problem List Items Addressed This Visit       Atrial fibrillation (CMS/HCC)     Amiodarone  Apixaban  Bleeding precautions  Monitor heart rate         GERD (gastroesophageal reflux disease)     Monitor for GI symptoms  Famotidine         Hypertensive heart disease with CHF  (CMS/[facility])     CHF stable, no sob  Monitor blood pressure  Toprol-XL  Spironolactone  Losartan potassium         Chronic obstructive pulmonary disease, unspecified COPD type (CMS/[facility])     Stable, no wheezing or shortness of breath  On room air         Weakness - Primary     Continue in therapy working towards established goals          Medications, treatments, and labs reviewed  Continue medications and treatments as listed in EMR      Scribe Attestation  Lorna CHICAS Scribe   attest that this documentation has been prepared under the direction and in the presence of Pita Virgen MD    Provider Attestation - Scribe documentation  All medical record entries made by the Scribe were at my direction and personally dictated by me. I have reviewed the chart and agree that the record accurately reflects my personal performance of the history, physical exam, discussion and plan.   Pita Virgen MD        Electronically Signed By: Pita Virgen MD   4/3/24  3:19 PM

## 2024-04-03 ENCOUNTER — NURSING HOME VISIT (OUTPATIENT)
Dept: POST ACUTE CARE | Facility: EXTERNAL LOCATION | Age: 74
End: 2024-04-03
Payer: MEDICARE

## 2024-04-03 DIAGNOSIS — S01.81XD FOREHEAD LACERATION, SUBSEQUENT ENCOUNTER: ICD-10-CM

## 2024-04-03 DIAGNOSIS — R53.1 WEAKNESS: Primary | ICD-10-CM

## 2024-04-03 DIAGNOSIS — J44.9 CHRONIC OBSTRUCTIVE PULMONARY DISEASE, UNSPECIFIED COPD TYPE (MULTI): ICD-10-CM

## 2024-04-03 DIAGNOSIS — I50.32 HYPERTENSIVE HEART DISEASE WITH CHRONIC DIASTOLIC CONGESTIVE HEART FAILURE (MULTI): ICD-10-CM

## 2024-04-03 DIAGNOSIS — I11.0 HYPERTENSIVE HEART DISEASE WITH CHRONIC DIASTOLIC CONGESTIVE HEART FAILURE (MULTI): ICD-10-CM

## 2024-04-03 PROCEDURE — 15853 REMOVAL SUTR/STAPL XREQ ANES: CPT | Performed by: NURSE PRACTITIONER

## 2024-04-03 PROCEDURE — 99309 SBSQ NF CARE MODERATE MDM 30: CPT | Performed by: NURSE PRACTITIONER

## 2024-04-03 NOTE — PROGRESS NOTES
PROGRESS NOTE    Subjective   Chief complaint: Teresa Matthews is a 73 y.o. female who is an acute skilled patient being seen and evaluated for weakness    HPI:  HPI  Patient did have recent labs obtained that resulted as sodium 138 potassium 4.0, H&H of 10.2 and 30.6.  BUN and creatinine 15 and 1.1.  Labs unremarkable, no new orders.  Continue to monitor routinely.  Patient is also working in therapy due to generalized weakness.  Patient is working on therapeutic exercises seated to increase ease of transfers.  Patient also working on core activities to assist and slide board transfers.  Patient did perform sit to stands in parallel bars requiring max assist x 2.  Patient seen and examined at bedside, appears to be in no acute distress.  Denies chest pain or shortness of breath.  Afebrile.    Objective   Vital signs: 141/82, 98.0, 59, 18, blood sugar 146, 98%    Physical Exam  Constitutional:       General: She is not in acute distress.  Eyes:      Extraocular Movements: Extraocular movements intact.   Cardiovascular:      Rate and Rhythm: Normal rate and regular rhythm.   Pulmonary:      Effort: Pulmonary effort is normal.      Breath sounds: Normal breath sounds.   Abdominal:      General: Bowel sounds are normal.      Palpations: Abdomen is soft.   Musculoskeletal:      Cervical back: Neck supple.      Right lower leg: No edema.      Left lower leg: No edema.   Neurological:      Mental Status: She is alert.      Motor: Weakness present.   Psychiatric:         Mood and Affect: Mood normal.         Behavior: Behavior is cooperative.         Assessment/Plan   Problem List Items Addressed This Visit       Atrial fibrillation (CMS/HCC)     Amiodarone  Apixaban  Bleeding precautions  Monitor heart rate         GERD (gastroesophageal reflux disease)     Monitor for GI symptoms  Famotidine         Hypertensive heart disease with CHF (CMS/[facility])     CHF stable, no sob  Monitor blood  pressure  Toprol-XL  Spironolactone  Losartan potassium         Chronic obstructive pulmonary disease, unspecified COPD type (CMS/HCC)     Stable, no wheezing or shortness of breath  On room air         Weakness - Primary     Continue in therapy working towards established goals          Medications, treatments, and labs reviewed  Continue medications and treatments as listed in EMR      Scribe Attestation  Lorna CHICAS Scribe   attest that this documentation has been prepared under the direction and in the presence of Pita Virgen MD    Provider Attestation - Scribe documentation  All medical record entries made by the Scribe were at my direction and personally dictated by me. I have reviewed the chart and agree that the record accurately reflects my personal performance of the history, physical exam, discussion and plan.   Pita Virgen MD

## 2024-04-04 ENCOUNTER — NURSING HOME VISIT (OUTPATIENT)
Dept: POST ACUTE CARE | Facility: EXTERNAL LOCATION | Age: 74
End: 2024-04-04
Payer: MEDICARE

## 2024-04-04 DIAGNOSIS — E11.65 TYPE 2 DIABETES MELLITUS WITH HYPERGLYCEMIA, WITH LONG-TERM CURRENT USE OF INSULIN (MULTI): ICD-10-CM

## 2024-04-04 DIAGNOSIS — I11.0 HYPERTENSIVE HEART DISEASE WITH CONGESTIVE HEART FAILURE, UNSPECIFIED HEART FAILURE TYPE (MULTI): ICD-10-CM

## 2024-04-04 DIAGNOSIS — Z79.4 TYPE 2 DIABETES MELLITUS WITH HYPERGLYCEMIA, WITH LONG-TERM CURRENT USE OF INSULIN (MULTI): ICD-10-CM

## 2024-04-04 DIAGNOSIS — J44.9 CHRONIC OBSTRUCTIVE PULMONARY DISEASE, UNSPECIFIED COPD TYPE (MULTI): ICD-10-CM

## 2024-04-04 DIAGNOSIS — M48.02 DEGENERATIVE CERVICAL SPINAL STENOSIS: ICD-10-CM

## 2024-04-04 DIAGNOSIS — I48.91 ATRIAL FIBRILLATION, UNSPECIFIED TYPE (MULTI): ICD-10-CM

## 2024-04-04 DIAGNOSIS — R53.1 WEAKNESS: Primary | ICD-10-CM

## 2024-04-04 DIAGNOSIS — G89.4 CHRONIC PAIN SYNDROME: Primary | ICD-10-CM

## 2024-04-04 DIAGNOSIS — F32.A DEPRESSION, UNSPECIFIED DEPRESSION TYPE: ICD-10-CM

## 2024-04-04 PROCEDURE — 99308 SBSQ NF CARE LOW MDM 20: CPT | Performed by: INTERNAL MEDICINE

## 2024-04-04 RX ORDER — OXYCODONE AND ACETAMINOPHEN 5; 325 MG/1; MG/1
1 TABLET ORAL EVERY 6 HOURS PRN
Qty: 120 TABLET | Refills: 0 | Status: SHIPPED | OUTPATIENT
Start: 2024-04-12 | End: 2024-04-11 | Stop reason: WASHOUT

## 2024-04-04 RX ORDER — OXYCODONE AND ACETAMINOPHEN 5; 325 MG/1; MG/1
2 TABLET ORAL EVERY 6 HOURS PRN
Qty: 56 TABLET | Refills: 0 | Status: SHIPPED | OUTPATIENT
Start: 2024-04-04 | End: 2024-04-11 | Stop reason: WASHOUT

## 2024-04-04 NOTE — PROGRESS NOTES
PROGRESS NOTE    Subjective   Chief complaint: Teresa Matthews is a 73 y.o. female who is an acute skilled patient being seen and evaluated for weakness    HPI:  HPI  Therapy has evaluated patient and establish plan of care and goals.  Therapy is to work with patient on therapeutic exercise and activities, neuromuscular reeducation, gait training and group therapy.  Patient seen and examined at bedside, appears to be in no acute distress.  Staff voicing no new concerns at this time.  Patient denies chest pain or shortness of breath.  Afebrile.    Objective   Vital signs: 135/56, 97.4, 58, 18, blood sugar 134, 93%    Physical Exam  Constitutional:       General: She is not in acute distress.  Eyes:      Extraocular Movements: Extraocular movements intact.   Cardiovascular:      Rate and Rhythm: Normal rate and regular rhythm.   Pulmonary:      Effort: Pulmonary effort is normal.      Breath sounds: Normal breath sounds.   Abdominal:      General: Bowel sounds are normal.      Palpations: Abdomen is soft.   Musculoskeletal:      Cervical back: Neck supple.      Right lower leg: No edema.      Left lower leg: No edema.   Neurological:      Mental Status: She is alert.      Motor: Weakness present.   Psychiatric:         Mood and Affect: Mood normal.         Behavior: Behavior is cooperative.         Assessment/Plan   Problem List Items Addressed This Visit       Type 2 diabetes mellitus with hyperglycemia, with long-term current use of insulin (CMS/[facility])     Glucoscans  Low concentrated sweets diet.  Insulin  Metformin  Follows endocrinology outpatient         Atrial fibrillation (CMS/HCC)     Amiodarone  Apixaban  Bleeding precautions  Monitor heart rate         Hypertensive heart disease with CHF (CMS/[facility])     CHF stable, no sob  Monitor blood pressure  Toprol-XL  Spironolactone  Losartan potassium         Chronic obstructive pulmonary disease, unspecified COPD type (CMS/[facility])     Stable, no wheezing or shortness  of breath  On room air         Weakness - Primary     Continue working in therapy         Depression     Monitor mood and behaviors  Bupropion  Desvenlafaxine          Medications, treatments, and labs reviewed  Continue medications and treatments as listed in EMR      Scribe Attestation  I, Kirill Page   attest that this documentation has been prepared under the direction and in the presence of Pita Virgen MD    Provider Attestation - Scribe documentation  All medical record entries made by the Scribe were at my direction and personally dictated by me. I have reviewed the chart and agree that the record accurately reflects my personal performance of the history, physical exam, discussion and plan.   Pita Virgen MD

## 2024-04-04 NOTE — LETTER
Patient: Teresa Matthews  : 1950    Encounter Date: 2024    PROGRESS NOTE    Subjective  Chief complaint: Teresa Matthews is a 73 y.o. female who is an acute skilled patient being seen and evaluated for weakness    HPI:  HPI  Therapy has evaluated patient and establish plan of care and goals.  Therapy is to work with patient on therapeutic exercise and activities, neuromuscular reeducation, gait training and group therapy.  Patient seen and examined at bedside, appears to be in no acute distress.  Staff voicing no new concerns at this time.  Patient denies chest pain or shortness of breath.  Afebrile.    Objective  Vital signs: 135/56, 97.4, 58, 18, blood sugar 134, 93%    Physical Exam  Constitutional:       General: She is not in acute distress.  Eyes:      Extraocular Movements: Extraocular movements intact.   Cardiovascular:      Rate and Rhythm: Normal rate and regular rhythm.   Pulmonary:      Effort: Pulmonary effort is normal.      Breath sounds: Normal breath sounds.   Abdominal:      General: Bowel sounds are normal.      Palpations: Abdomen is soft.   Musculoskeletal:      Cervical back: Neck supple.      Right lower leg: No edema.      Left lower leg: No edema.   Neurological:      Mental Status: She is alert.      Motor: Weakness present.   Psychiatric:         Mood and Affect: Mood normal.         Behavior: Behavior is cooperative.         Assessment/Plan  Problem List Items Addressed This Visit       Type 2 diabetes mellitus with hyperglycemia, with long-term current use of insulin (CMS/[facility])     Glucoscans  Low concentrated sweets diet.  Insulin  Metformin  Follows endocrinology outpatient         Atrial fibrillation (CMS/HCC)     Amiodarone  Apixaban  Bleeding precautions  Monitor heart rate         Hypertensive heart disease with CHF (CMS/[facility])     CHF stable, no sob  Monitor blood pressure  Toprol-XL  Spironolactone  Losartan potassium         Chronic obstructive pulmonary  disease, unspecified COPD type (CMS/[facility])     Stable, no wheezing or shortness of breath  On room air         Weakness - Primary     Continue working in therapy         Depression     Monitor mood and behaviors  Bupropion  Desvenlafaxine          Medications, treatments, and labs reviewed  Continue medications and treatments as listed in EMR      Scribe Attestation  ILorna Scribe   attest that this documentation has been prepared under the direction and in the presence of Pita Virgen MD    Provider Attestation - Scribe documentation  All medical record entries made by the Scribe were at my direction and personally dictated by me. I have reviewed the chart and agree that the record accurately reflects my personal performance of the history, physical exam, discussion and plan.   Pita Virgen MD        Electronically Signed By: Pita Virgen MD   4/4/24  3:39 PM

## 2024-04-05 ENCOUNTER — NURSING HOME VISIT (OUTPATIENT)
Dept: POST ACUTE CARE | Facility: EXTERNAL LOCATION | Age: 74
End: 2024-04-05
Payer: MEDICARE

## 2024-04-05 DIAGNOSIS — Z79.4 TYPE 2 DIABETES MELLITUS WITH HYPERGLYCEMIA, WITH LONG-TERM CURRENT USE OF INSULIN (MULTI): ICD-10-CM

## 2024-04-05 DIAGNOSIS — K21.9 GASTROESOPHAGEAL REFLUX DISEASE WITHOUT ESOPHAGITIS: ICD-10-CM

## 2024-04-05 DIAGNOSIS — I48.91 ATRIAL FIBRILLATION, UNSPECIFIED TYPE (MULTI): ICD-10-CM

## 2024-04-05 DIAGNOSIS — J44.9 CHRONIC OBSTRUCTIVE PULMONARY DISEASE, UNSPECIFIED COPD TYPE (MULTI): ICD-10-CM

## 2024-04-05 DIAGNOSIS — E11.65 TYPE 2 DIABETES MELLITUS WITH HYPERGLYCEMIA, WITH LONG-TERM CURRENT USE OF INSULIN (MULTI): ICD-10-CM

## 2024-04-05 DIAGNOSIS — I11.0 HYPERTENSIVE HEART DISEASE WITH CHRONIC DIASTOLIC CONGESTIVE HEART FAILURE (MULTI): Primary | ICD-10-CM

## 2024-04-05 DIAGNOSIS — I50.32 HYPERTENSIVE HEART DISEASE WITH CHRONIC DIASTOLIC CONGESTIVE HEART FAILURE (MULTI): Primary | ICD-10-CM

## 2024-04-05 PROCEDURE — 99309 SBSQ NF CARE MODERATE MDM 30: CPT | Performed by: NURSE PRACTITIONER

## 2024-04-05 NOTE — LETTER
Patient: Teresa Matthews  : 1950    Encounter Date: 2024    PROGRESS NOTE    Subjective  Chief complaint: Teresa Matthews is a 73 y.o. female who is an acute skilled patient being seen and evaluated for weakness    HPI:  HPI  Therapy has been working with the patient to improve strength and endurance with ADLs, transfers, and mobility.  Patient continues to work toward goals.  Patient is stable and has no new complaints.  Nursing staff voices no new concerns today.  Patient denies constitutional symptoms.    Objective  Vital signs: 139/77, 97.458, 18, blood sugar 216, 96%    Physical Exam  Constitutional:       General: She is not in acute distress.  Eyes:      Extraocular Movements: Extraocular movements intact.   Cardiovascular:      Rate and Rhythm: Normal rate and regular rhythm.   Pulmonary:      Effort: Pulmonary effort is normal.      Breath sounds: Normal breath sounds.   Abdominal:      General: Bowel sounds are normal.      Palpations: Abdomen is soft.   Musculoskeletal:      Cervical back: Neck supple.      Right lower leg: No edema.      Left lower leg: No edema.   Neurological:      Mental Status: She is alert.      Motor: Weakness present.   Psychiatric:         Mood and Affect: Mood normal.         Behavior: Behavior is cooperative.         Assessment/Plan  Problem List Items Addressed This Visit       Atrial fibrillation ([facility])     Amiodarone  Apixaban  Bleeding precautions  Monitor heart rate         Chronic obstructive pulmonary disease, unspecified COPD type ([facility])     Stable, no wheezing or shortness of breath  On room air         GERD (gastroesophageal reflux disease)     Monitor for GI symptoms  Famotidine         Hypertensive heart disease with chronic diastolic congestive heart failure ([facility]) - Primary     BP at goal  CHF stable, no sob  Monitor blood pressure  Toprol-XL  Spironolactone  Losartan potassium         Type 2 diabetes mellitus with hyperglycemia, with  long-term current use of insulin ([facility])     Continue insulin  Glucoscan with sliding scale insulin coverage  Low concentrated sweets diet          Medications, treatments, and labs reviewed  Continue medications and treatments as listed in EMR      Scribe Attestation  ILorna Scribe   attest that this documentation has been prepared under the direction and in the presence of MARISELA Nelson    Provider Attestation - Scribe documentation  All medical record entries made by the Scribe were at my direction and personally dictated by me. I have reviewed the chart and agree that the record accurately reflects my personal performance of the history, physical exam, discussion and plan.   MARISELA Nelson        Electronically Signed By: MARISELA Nelson   4/20/24 11:24 AM

## 2024-04-08 ENCOUNTER — NURSING HOME VISIT (OUTPATIENT)
Dept: POST ACUTE CARE | Facility: EXTERNAL LOCATION | Age: 74
End: 2024-04-08
Payer: MEDICARE

## 2024-04-08 DIAGNOSIS — Z79.4 TYPE 2 DIABETES MELLITUS WITH HYPERGLYCEMIA, WITH LONG-TERM CURRENT USE OF INSULIN (MULTI): ICD-10-CM

## 2024-04-08 DIAGNOSIS — N39.0 URINARY TRACT INFECTION WITHOUT HEMATURIA, SITE UNSPECIFIED: ICD-10-CM

## 2024-04-08 DIAGNOSIS — I11.0 HYPERTENSIVE HEART DISEASE WITH CONGESTIVE HEART FAILURE, UNSPECIFIED HEART FAILURE TYPE (MULTI): ICD-10-CM

## 2024-04-08 DIAGNOSIS — E11.65 TYPE 2 DIABETES MELLITUS WITH HYPERGLYCEMIA, WITH LONG-TERM CURRENT USE OF INSULIN (MULTI): ICD-10-CM

## 2024-04-08 DIAGNOSIS — J44.9 CHRONIC OBSTRUCTIVE PULMONARY DISEASE, UNSPECIFIED COPD TYPE (MULTI): ICD-10-CM

## 2024-04-08 DIAGNOSIS — I48.91 ATRIAL FIBRILLATION, UNSPECIFIED TYPE (MULTI): ICD-10-CM

## 2024-04-08 PROCEDURE — 99309 SBSQ NF CARE MODERATE MDM 30: CPT | Performed by: INTERNAL MEDICINE

## 2024-04-08 NOTE — LETTER
Patient: Teresa Matthews  : 1950    Encounter Date: 2024    PROGRESS NOTE    Subjective  Chief complaint: Teresa Matthews is a 73 y.o. female who is an acute skilled patient being seen and evaluated for weakness    HPI:  Patient presents for f/u therapy and general medical care.  Patient seen and examined at beside.  Therapy has been working with the patient to improve strength, endurance, ADLs, and transfers d/t generalized weakness. She is totally dependent with ambulation and transfers as well as max assist with ADLs. Patient has no acute concerns today.      Objective  Vital signs: 122/71,54,96%,     Physical Exam  Constitutional:       General: She is not in acute distress.  Eyes:      Extraocular Movements: Extraocular movements intact.   Cardiovascular:      Rate and Rhythm: Normal rate and regular rhythm.   Pulmonary:      Effort: Pulmonary effort is normal.      Breath sounds: Normal breath sounds.   Abdominal:      General: Bowel sounds are normal.      Palpations: Abdomen is soft.   Musculoskeletal:      Cervical back: Neck supple.      Right lower leg: No edema.      Left lower leg: No edema.   Neurological:      Mental Status: She is alert.      Motor: Weakness present.   Psychiatric:         Mood and Affect: Mood normal.         Behavior: Behavior is cooperative.         Assessment/Plan  Problem List Items Addressed This Visit       Type 2 diabetes mellitus with hyperglycemia, with long-term current use of insulin (CMS/[facility])     Glucoscans  Low concentrated sweets diet.  Insulin  Metformin  Follows endocrinology outpatient         Atrial fibrillation (CMS/HCC)     Amiodarone  Apixaban  Bleeding precautions  Monitor heart rate         Hypertensive heart disease with CHF (CMS/[facility])     CHF stable, no sob  Monitor blood pressure  Toprol-XL  Spironolactone  Losartan potassium         Chronic obstructive pulmonary disease, unspecified COPD type (CMS/[facility])     Stable, no wheezing or  shortness of breath  On room air         UTI (urinary tract infection)     Continue antibiotic until completed 4/14/2024          Medications, treatments, and labs reviewed  Continue medications and treatments as listed in EMR    Scribe Attestation  Sharon CHICAS Scribe   attest that this documentation has been prepared under the direction and in the presence of Pita Virgen MD.     Provider Attestation - Scribe documentation  All medical record entries made by the Scribe were at my direction and personally dictated by me. I have reviewed the chart and agree that the record accurately reflects my personal performance of the history, physical exam, discussion and plan.   Pita Virgen MD      Electronically Signed By: Pita Virgen MD   4/8/24  7:31 PM

## 2024-04-08 NOTE — PROGRESS NOTES
PROGRESS NOTE    Subjective   Chief complaint: Teresa Matthews is a 73 y.o. female who is an acute skilled patient being seen and evaluated for weakness    HPI:  Patient presents for f/u therapy and general medical care.  Patient seen and examined at beside.  Therapy has been working with the patient to improve strength, endurance, ADLs, and transfers d/t generalized weakness. She is totally dependent with ambulation and transfers as well as max assist with ADLs. Patient has no acute concerns today.      Objective   Vital signs: 122/71,54,96%,     Physical Exam  Constitutional:       General: She is not in acute distress.  Eyes:      Extraocular Movements: Extraocular movements intact.   Cardiovascular:      Rate and Rhythm: Normal rate and regular rhythm.   Pulmonary:      Effort: Pulmonary effort is normal.      Breath sounds: Normal breath sounds.   Abdominal:      General: Bowel sounds are normal.      Palpations: Abdomen is soft.   Musculoskeletal:      Cervical back: Neck supple.      Right lower leg: No edema.      Left lower leg: No edema.   Neurological:      Mental Status: She is alert.      Motor: Weakness present.   Psychiatric:         Mood and Affect: Mood normal.         Behavior: Behavior is cooperative.         Assessment/Plan   Problem List Items Addressed This Visit       Type 2 diabetes mellitus with hyperglycemia, with long-term current use of insulin (CMS/[facility])     Glucoscans  Low concentrated sweets diet.  Insulin  Metformin  Follows endocrinology outpatient         Atrial fibrillation (CMS/HCC)     Amiodarone  Apixaban  Bleeding precautions  Monitor heart rate         Hypertensive heart disease with CHF (CMS/[facility])     CHF stable, no sob  Monitor blood pressure  Toprol-XL  Spironolactone  Losartan potassium         Chronic obstructive pulmonary disease, unspecified COPD type (CMS/[facility])     Stable, no wheezing or shortness of breath  On room air         UTI (urinary tract infection)      Continue antibiotic until completed 4/14/2024          Medications, treatments, and labs reviewed  Continue medications and treatments as listed in EMR    Scribe Attestation  I, Kirill Chapman   attest that this documentation has been prepared under the direction and in the presence of Pita Virgen MD.     Provider Attestation - Scribe documentation  All medical record entries made by the Scribe were at my direction and personally dictated by me. I have reviewed the chart and agree that the record accurately reflects my personal performance of the history, physical exam, discussion and plan.   Pita Virgen MD

## 2024-04-09 ENCOUNTER — NURSING HOME VISIT (OUTPATIENT)
Dept: POST ACUTE CARE | Facility: EXTERNAL LOCATION | Age: 74
End: 2024-04-09
Payer: MEDICARE

## 2024-04-09 ENCOUNTER — TELEPHONE (OUTPATIENT)
Dept: PRIMARY CARE | Facility: CLINIC | Age: 74
End: 2024-04-09
Payer: MEDICARE

## 2024-04-09 DIAGNOSIS — M06.9 RHEUMATOID ARTHRITIS, INVOLVING UNSPECIFIED SITE, UNSPECIFIED WHETHER RHEUMATOID FACTOR PRESENT (MULTI): ICD-10-CM

## 2024-04-09 DIAGNOSIS — I48.91 ATRIAL FIBRILLATION, UNSPECIFIED TYPE (MULTI): ICD-10-CM

## 2024-04-09 DIAGNOSIS — F32.A DEPRESSION, UNSPECIFIED DEPRESSION TYPE: ICD-10-CM

## 2024-04-09 DIAGNOSIS — M17.0 PRIMARY OSTEOARTHRITIS OF BOTH KNEES: Primary | ICD-10-CM

## 2024-04-09 DIAGNOSIS — M48.02 DEGENERATIVE CERVICAL SPINAL STENOSIS: ICD-10-CM

## 2024-04-09 DIAGNOSIS — I11.0 HYPERTENSIVE HEART DISEASE WITH CONGESTIVE HEART FAILURE, UNSPECIFIED HEART FAILURE TYPE (MULTI): ICD-10-CM

## 2024-04-09 DIAGNOSIS — E11.65 TYPE 2 DIABETES MELLITUS WITH HYPERGLYCEMIA, WITH LONG-TERM CURRENT USE OF INSULIN (MULTI): ICD-10-CM

## 2024-04-09 DIAGNOSIS — K21.9 GASTROESOPHAGEAL REFLUX DISEASE WITHOUT ESOPHAGITIS: ICD-10-CM

## 2024-04-09 DIAGNOSIS — Z79.4 TYPE 2 DIABETES MELLITUS WITH HYPERGLYCEMIA, WITH LONG-TERM CURRENT USE OF INSULIN (MULTI): ICD-10-CM

## 2024-04-09 PROCEDURE — 99309 SBSQ NF CARE MODERATE MDM 30: CPT | Performed by: INTERNAL MEDICINE

## 2024-04-09 NOTE — PROGRESS NOTES
PROGRESS NOTE    Subjective   Chief complaint: Teresa Matthews is a 73 y.o. female who is an acute skilled patient being seen and evaluated for weakness    HPI:  Patient presents for general medical care and f/u.  Patient seen and examined at bedside. Patient with diagnosis of depression, denies feeling down and thoughts of harming self or others. T2DM, Patient denies vision changes, excessive thirst, sweating, urinary frequency. CHF, no changes in weight or SOB. Afib, denies palpitation or chest pain. Patient has diagnosis of GERD, denies heartburn, regurgitation, epigastric discomfort, sour taste, and cough.  No issues per nursing.  Patient has no acute complaints      Objective   Vital signs: 137/74,62,96%,     Physical Exam  Constitutional:       General: She is not in acute distress.  Eyes:      Extraocular Movements: Extraocular movements intact.   Cardiovascular:      Rate and Rhythm: Normal rate and regular rhythm.   Pulmonary:      Effort: Pulmonary effort is normal.      Breath sounds: Normal breath sounds.   Abdominal:      General: Bowel sounds are normal.      Palpations: Abdomen is soft.   Musculoskeletal:      Cervical back: Neck supple.      Right lower leg: No edema.      Left lower leg: No edema.   Neurological:      Mental Status: She is alert.      Motor: Weakness present.   Psychiatric:         Mood and Affect: Mood normal.         Behavior: Behavior is cooperative.         Assessment/Plan   Problem List Items Addressed This Visit       Type 2 diabetes mellitus with hyperglycemia, with long-term current use of insulin (CMS/[facility])     Glucoscans  Low concentrated sweets diet.  Insulin  Metformin  Follows endocrinology outpatient         Atrial fibrillation (CMS/[facility])     Amiodarone  Apixaban  Bleeding precautions  Monitor heart rate         GERD (gastroesophageal reflux disease)     Monitor for GI symptoms  Famotidine         Hypertensive heart disease with CHF (CMS/[facility])     CHF stable,  no sob  Monitor blood pressure  Toprol-XL  Spironolactone  Losartan potassium         Depression     Monitor mood and behaviors  Bupropion  Desvenlafaxine          Medications, treatments, and labs reviewed  Continue medications and treatments as listed in EMR    Scribe Attestation  Sharon CHICAS Scribe   attest that this documentation has been prepared under the direction and in the presence of Pita Virgen MD.     Provider Attestation - Scribe documentation  All medical record entries made by the Scribe were at my direction and personally dictated by me. I have reviewed the chart and agree that the record accurately reflects my personal performance of the history, physical exam, discussion and plan.   Pita Virgen MD

## 2024-04-09 NOTE — TELEPHONE ENCOUNTER
She is requesting refill on Oxycodone-acetaminophen     To [facility]   (It looks like she is getting this from someone else) ?

## 2024-04-09 NOTE — LETTER
Patient: Teresa Matthews  : 1950    Encounter Date: 2024    PROGRESS NOTE    Subjective  Chief complaint: Teresa Matthews is a 73 y.o. female who is an acute skilled patient being seen and evaluated for weakness    HPI:  Patient presents for general medical care and f/u.  Patient seen and examined at bedside. Patient with diagnosis of depression, denies feeling down and thoughts of harming self or others. T2DM, Patient denies vision changes, excessive thirst, sweating, urinary frequency. CHF, no changes in weight or SOB. Afib, denies palpitation or chest pain. Patient has diagnosis of GERD, denies heartburn, regurgitation, epigastric discomfort, sour taste, and cough.  No issues per nursing.  Patient has no acute complaints      Objective  Vital signs: 137/74,62,96%,     Physical Exam  Constitutional:       General: She is not in acute distress.  Eyes:      Extraocular Movements: Extraocular movements intact.   Cardiovascular:      Rate and Rhythm: Normal rate and regular rhythm.   Pulmonary:      Effort: Pulmonary effort is normal.      Breath sounds: Normal breath sounds.   Abdominal:      General: Bowel sounds are normal.      Palpations: Abdomen is soft.   Musculoskeletal:      Cervical back: Neck supple.      Right lower leg: No edema.      Left lower leg: No edema.   Neurological:      Mental Status: She is alert.      Motor: Weakness present.   Psychiatric:         Mood and Affect: Mood normal.         Behavior: Behavior is cooperative.         Assessment/Plan  Problem List Items Addressed This Visit       Type 2 diabetes mellitus with hyperglycemia, with long-term current use of insulin (CMS/[facility])     Glucoscans  Low concentrated sweets diet.  Insulin  Metformin  Follows endocrinology outpatient         Atrial fibrillation (CMS/[facility])     Amiodarone  Apixaban  Bleeding precautions  Monitor heart rate         GERD (gastroesophageal reflux disease)     Monitor for GI  symptoms  Famotidine         Hypertensive heart disease with CHF (CMS/[facility])     CHF stable, no sob  Monitor blood pressure  Toprol-XL  Spironolactone  Losartan potassium         Depression     Monitor mood and behaviors  Bupropion  Desvenlafaxine          Medications, treatments, and labs reviewed  Continue medications and treatments as listed in EMR    Scribe Attestation  Sharon CHICAS Scribe   attest that this documentation has been prepared under the direction and in the presence of Pita Virgen MD.     Provider Attestation - Scribe documentation  All medical record entries made by the Scribe were at my direction and personally dictated by me. I have reviewed the chart and agree that the record accurately reflects my personal performance of the history, physical exam, discussion and plan.   Pita Virgen MD      Electronically Signed By: Pita Virgen MD   4/9/24  4:44 PM

## 2024-04-10 ENCOUNTER — NURSING HOME VISIT (OUTPATIENT)
Dept: POST ACUTE CARE | Facility: EXTERNAL LOCATION | Age: 74
End: 2024-04-10
Payer: MEDICARE

## 2024-04-10 DIAGNOSIS — R19.7 DIARRHEA, UNSPECIFIED TYPE: ICD-10-CM

## 2024-04-10 DIAGNOSIS — Z79.4 TYPE 2 DIABETES MELLITUS WITH HYPERGLYCEMIA, WITH LONG-TERM CURRENT USE OF INSULIN (MULTI): ICD-10-CM

## 2024-04-10 DIAGNOSIS — E11.65 TYPE 2 DIABETES MELLITUS WITH HYPERGLYCEMIA, WITH LONG-TERM CURRENT USE OF INSULIN (MULTI): ICD-10-CM

## 2024-04-10 DIAGNOSIS — I50.32 HYPERTENSIVE HEART DISEASE WITH CHRONIC DIASTOLIC CONGESTIVE HEART FAILURE (MULTI): ICD-10-CM

## 2024-04-10 DIAGNOSIS — I48.91 ATRIAL FIBRILLATION, UNSPECIFIED TYPE (MULTI): ICD-10-CM

## 2024-04-10 DIAGNOSIS — J98.4 RESTRICTIVE LUNG DISEASE: ICD-10-CM

## 2024-04-10 DIAGNOSIS — N39.0 URINARY TRACT INFECTION WITHOUT HEMATURIA, SITE UNSPECIFIED: ICD-10-CM

## 2024-04-10 DIAGNOSIS — I11.0 HYPERTENSIVE HEART DISEASE WITH CHRONIC DIASTOLIC CONGESTIVE HEART FAILURE (MULTI): ICD-10-CM

## 2024-04-10 DIAGNOSIS — R53.1 WEAKNESS: Primary | ICD-10-CM

## 2024-04-10 PROBLEM — E83.51 HYPOCALCEMIA: Status: RESOLVED | Noted: 2022-03-03 | Resolved: 2024-04-10

## 2024-04-10 PROBLEM — T14.8XXA INFECTED WOUND: Status: RESOLVED | Noted: 2024-03-25 | Resolved: 2024-04-10

## 2024-04-10 PROBLEM — R41.0 DELIRIUM: Status: RESOLVED | Noted: 2024-03-25 | Resolved: 2024-04-10

## 2024-04-10 PROBLEM — L08.9 INFECTED WOUND: Status: RESOLVED | Noted: 2024-03-25 | Resolved: 2024-04-10

## 2024-04-10 PROBLEM — S82.409A FIBULA FRACTURE: Status: RESOLVED | Noted: 2023-06-21 | Resolved: 2024-04-10

## 2024-04-10 PROBLEM — R32 INCONTINENCE: Status: RESOLVED | Noted: 2023-04-10 | Resolved: 2024-04-10

## 2024-04-10 PROBLEM — L03.116 CELLULITIS OF LEFT LEG: Status: RESOLVED | Noted: 2024-02-05 | Resolved: 2024-04-10
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
## 2024-04-10 PROBLEM — M62.81 MUSCLE WEAKNESS (GENERALIZED): Status: RESOLVED | Noted: 2022-03-04 | Resolved: 2024-04-10

## 2024-04-10 PROBLEM — S81.802A LEG WOUND, LEFT: Status: RESOLVED | Noted: 2023-04-10 | Resolved: 2024-04-10

## 2024-04-10 PROBLEM — E87.6 HYPOKALEMIA: Status: RESOLVED | Noted: 2024-03-25 | Resolved: 2024-04-10

## 2024-04-10 PROBLEM — K76.9 LIVER PROBLEM: Status: RESOLVED | Noted: 2023-04-10 | Resolved: 2024-04-10

## 2024-04-10 PROCEDURE — 99309 SBSQ NF CARE MODERATE MDM 30: CPT | Performed by: NURSE PRACTITIONER

## 2024-04-10 NOTE — PROGRESS NOTES
PROGRESS NOTE    Subjective   Chief complaint: Teresa Matthews is a 73 y.o. female who is an acute skilled patient being seen and evaluated for weakness and diarrhea    HPI:  HPI  Therapy is working with the patient.  She is total dependent for ambulation and transfers.  She requires max assist with ADLs.  Patient has complaints of diarrhea.  She has had multiple loose stools per day.  Stool for C. difficile is pending.  Denies abdominal pain nausea vomiting fever chills.    Objective   Vital signs: 129/67, 97.2, 53, 16, 96%, blood sugar 173    Physical Exam  Constitutional:       General: She is not in acute distress.  Eyes:      Extraocular Movements: Extraocular movements intact.   Cardiovascular:      Rate and Rhythm: Normal rate and regular rhythm.   Pulmonary:      Effort: Pulmonary effort is normal.      Breath sounds: Normal breath sounds.   Abdominal:      General: Bowel sounds are normal.      Palpations: Abdomen is soft.   Musculoskeletal:      Cervical back: Neck supple.      Right lower leg: No edema.      Left lower leg: No edema.   Neurological:      Mental Status: She is alert.      Motor: Weakness present.   Psychiatric:         Mood and Affect: Mood normal.         Behavior: Behavior is cooperative.         Assessment/Plan   Problem List Items Addressed This Visit       Atrial fibrillation (CMS/HCC)     Apixaban  Amiodarone  Metoprolol  Bleeding precautions  HR controlled         Diarrhea     Stool for cdiff pending  Start questran         Hypertensive heart disease with chronic diastolic congestive heart failure (CMS/HCC)     BP at goal  CHF stable, no sob  Monitor blood pressure and weight  Metoprolol  Aldactone  Losartan         Restrictive lung disease     Monitor          Type 2 diabetes mellitus with hyperglycemia, with long-term current use of insulin (CMS/HCC)     Continue insulin  Metformin  Glucoscan with sliding scale insulin coverage  Low concentrated sweets diet         UTI  (urinary tract infection)     Augmentin thru 4/14         Weakness - Primary     Continue therapy          Medications, treatments, and labs reviewed  Continue medications and treatments as listed in EMR    Yasemin Kelly, APRN-CNP

## 2024-04-10 NOTE — ASSESSMENT & PLAN NOTE
BP at goal  CHF stable, no sob  Monitor blood pressure and weight  Metoprolol  Aldactone  Losartan

## 2024-04-10 NOTE — ASSESSMENT & PLAN NOTE
Continue insulin  Metformin  Glucoscan with sliding scale insulin coverage  Low concentrated sweets diet

## 2024-04-10 NOTE — LETTER
Patient: Teresa Matthews  : 1950    Encounter Date: 04/10/2024    PROGRESS NOTE    Subjective  Chief complaint: Teresa Matthews is a 73 y.o. female who is an acute skilled patient being seen and evaluated for weakness and diarrhea    HPI:  HPI  Therapy is working with the patient.  She is total dependent for ambulation and transfers.  She requires max assist with ADLs.  Patient has complaints of diarrhea.  She has had multiple loose stools per day.  Stool for C. difficile is pending.  Denies abdominal pain nausea vomiting fever chills.    Objective  Vital signs: 129/67, 97.2, 53, 16, 96%, blood sugar 173    Physical Exam  Constitutional:       General: She is not in acute distress.  Eyes:      Extraocular Movements: Extraocular movements intact.   Cardiovascular:      Rate and Rhythm: Normal rate and regular rhythm.   Pulmonary:      Effort: Pulmonary effort is normal.      Breath sounds: Normal breath sounds.   Abdominal:      General: Bowel sounds are normal.      Palpations: Abdomen is soft.   Musculoskeletal:      Cervical back: Neck supple.      Right lower leg: No edema.      Left lower leg: No edema.   Neurological:      Mental Status: She is alert.      Motor: Weakness present.   Psychiatric:         Mood and Affect: Mood normal.         Behavior: Behavior is cooperative.         Assessment/Plan  Problem List Items Addressed This Visit       Atrial fibrillation (CMS/[facility])     Apixaban  Amiodarone  Metoprolol  Bleeding precautions  HR controlled         Diarrhea     Stool for cdiff pending  Start questran         Hypertensive heart disease with chronic diastolic congestive heart failure (CMS/[facility])     BP at goal  CHF stable, no sob  Monitor blood pressure and weight  Metoprolol  Aldactone  Losartan         Restrictive lung disease     Monitor          Type 2 diabetes mellitus with hyperglycemia, with long-term current use of insulin (CMS/[facility])     Continue insulin  Metformin  Glucoscan with  sliding scale insulin coverage  Low concentrated sweets diet         UTI (urinary tract infection)     Augmentin thru 4/14         Weakness - Primary     Continue therapy          Medications, treatments, and labs reviewed  Continue medications and treatments as listed in EMR    MARISELA Nelson      Electronically Signed By: MARISELA Nelson   4/10/24  2:42 PM

## 2024-04-11 ENCOUNTER — NURSING HOME VISIT (OUTPATIENT)
Dept: POST ACUTE CARE | Facility: EXTERNAL LOCATION | Age: 74
End: 2024-04-11
Payer: MEDICARE

## 2024-04-11 DIAGNOSIS — J44.9 CHRONIC OBSTRUCTIVE PULMONARY DISEASE, UNSPECIFIED COPD TYPE (MULTI): ICD-10-CM

## 2024-04-11 DIAGNOSIS — R53.1 WEAKNESS: Primary | ICD-10-CM

## 2024-04-11 DIAGNOSIS — I11.0 HYPERTENSIVE HEART DISEASE WITH CHRONIC DIASTOLIC CONGESTIVE HEART FAILURE (MULTI): ICD-10-CM

## 2024-04-11 DIAGNOSIS — R19.7 DIARRHEA, UNSPECIFIED TYPE: ICD-10-CM

## 2024-04-11 DIAGNOSIS — Z79.4 TYPE 2 DIABETES MELLITUS WITH HYPERGLYCEMIA, WITH LONG-TERM CURRENT USE OF INSULIN (MULTI): ICD-10-CM

## 2024-04-11 DIAGNOSIS — I48.91 ATRIAL FIBRILLATION, UNSPECIFIED TYPE (MULTI): ICD-10-CM

## 2024-04-11 DIAGNOSIS — E11.65 TYPE 2 DIABETES MELLITUS WITH HYPERGLYCEMIA, WITH LONG-TERM CURRENT USE OF INSULIN (MULTI): ICD-10-CM

## 2024-04-11 DIAGNOSIS — I50.32 HYPERTENSIVE HEART DISEASE WITH CHRONIC DIASTOLIC CONGESTIVE HEART FAILURE (MULTI): ICD-10-CM

## 2024-04-11 DIAGNOSIS — K21.9 GASTROESOPHAGEAL REFLUX DISEASE WITHOUT ESOPHAGITIS: ICD-10-CM

## 2024-04-11 DIAGNOSIS — F32.A DEPRESSION, UNSPECIFIED DEPRESSION TYPE: ICD-10-CM

## 2024-04-11 DIAGNOSIS — N39.0 URINARY TRACT INFECTION WITHOUT HEMATURIA, SITE UNSPECIFIED: ICD-10-CM

## 2024-04-11 PROCEDURE — 99309 SBSQ NF CARE MODERATE MDM 30: CPT | Performed by: INTERNAL MEDICINE

## 2024-04-11 RX ORDER — OXYCODONE AND ACETAMINOPHEN 10; 325 MG/1; MG/1
2 TABLET ORAL EVERY 4 HOURS PRN
Qty: 180 TABLET | Refills: 0 | Status: SHIPPED | OUTPATIENT
Start: 2024-04-13 | End: 2024-05-08 | Stop reason: SDUPTHER

## 2024-04-11 NOTE — PROGRESS NOTES
PROGRESS NOTE    Subjective   Chief complaint: Teresa Matthews is a 73 y.o. female who is an acute skilled patient being seen and evaluated for weakness    HPI:  HPI  Patient presents for general medical care and f/u.  Patient seen and examined at bedside.  No issues per nursing.  Patient has no acute complaints.  Patient is continuing to work in therapy, requiring total dependence for ambulation and transfers.  Patient is performing ADLs with max assist.  Patient does continue on antibiotic for UTI, tolerating without adverse effects.  Patient had stool for C. difficile obtained which was negative.  Due to diarrhea and patient started on Questran.  DM, denies polydipsia polyuria polyphagia.  HTN BP at goal.  Denies chest pain and headache.  CHF stable, denies sob, orthopnea, weight gain.  GERD controlled.  Denies heartburn, regurgitation, epigastric discomfort, sour taste, and cough.  Patient with diagnosis of depression.  Mood is stable.  Denies feeling down and thoughts of harming self or others.  COPD stable, denies sob, wheezing, cough.  AFIB stable, denies palpitations and chest pain.  Mentation at baseline, no acute distress    Objective   Vital signs: 159/84, 97.6, 61, 18, 96%, blood sugar, 96    Physical Exam  Constitutional:       General: She is not in acute distress.  Eyes:      Extraocular Movements: Extraocular movements intact.   Cardiovascular:      Rate and Rhythm: Normal rate and regular rhythm.   Pulmonary:      Effort: Pulmonary effort is normal.      Breath sounds: Normal breath sounds.   Abdominal:      General: Bowel sounds are normal.      Palpations: Abdomen is soft.   Musculoskeletal:      Cervical back: Neck supple.      Right lower leg: No edema.      Left lower leg: No edema.   Neurological:      Mental Status: She is alert.      Motor: Weakness present.   Psychiatric:         Mood and Affect: Mood normal.         Behavior: Behavior is cooperative.         Assessment/Plan   Problem  List Items Addressed This Visit       Type 2 diabetes mellitus with hyperglycemia, with long-term current use of insulin (CMS/[facility])     Glucoscans  Low concentrated sweets diet.  Insulin  Metformin  Follows endocrinology outpatient         Atrial fibrillation (CMS/[facility])     Amiodarone  Apixaban  Bleeding precautions  Monitor heart rate         GERD (gastroesophageal reflux disease)     Monitor for GI symptoms  Famotidine         Hypertensive heart disease with chronic diastolic congestive heart failure (CMS/[facility])     CHF stable, no sob  Monitor blood pressure  Toprol-XL  Spironolactone  Losartan potassium         Chronic obstructive pulmonary disease, unspecified COPD type (CMS/[facility])     Stable, no wheezing or shortness of breath  On room air         Weakness - Primary     Continue working in therapy         Depression     Monitor mood and behaviors  Bupropion  Desvenlafaxine         UTI (urinary tract infection)     Continue antibiotic until completed 4/14/2024         Diarrhea     Stool for C. difficile negative  Continue Questran          Medications, treatments, and labs reviewed  Continue medications and treatments as listed in EMR      Scribe Attestation  I, Donato Pageibe   attest that this documentation has been prepared under the direction and in the presence of Pita Virgen MD    Provider Attestation - Scribe documentation  All medical record entries made by the Scribe were at my direction and personally dictated by me. I have reviewed the chart and agree that the record accurately reflects my personal performance of the history, physical exam, discussion and plan.   Pita Virgen MD

## 2024-04-11 NOTE — TELEPHONE ENCOUNTER
Spoke to pt she is leaving the nursing home Saturday.  She will need her rx sent over so she can get it .

## 2024-04-11 NOTE — LETTER
Patient: Teresa Matthews  : 1950    Encounter Date: 2024    PROGRESS NOTE    Subjective  Chief complaint: Teresa Matthews is a 73 y.o. female who is an acute skilled patient being seen and evaluated for weakness    HPI:  HPI  Patient presents for general medical care and f/u.  Patient seen and examined at bedside.  No issues per nursing.  Patient has no acute complaints.  Patient is continuing to work in therapy, requiring total dependence for ambulation and transfers.  Patient is performing ADLs with max assist.  Patient does continue on antibiotic for UTI, tolerating without adverse effects.  Patient had stool for C. difficile obtained which was negative.  Due to diarrhea and patient started on Questran.  DM, denies polydipsia polyuria polyphagia.  HTN BP at goal.  Denies chest pain and headache.  CHF stable, denies sob, orthopnea, weight gain.  GERD controlled.  Denies heartburn, regurgitation, epigastric discomfort, sour taste, and cough.  Patient with diagnosis of depression.  Mood is stable.  Denies feeling down and thoughts of harming self or others.  COPD stable, denies sob, wheezing, cough.  AFIB stable, denies palpitations and chest pain.  Mentation at baseline, no acute distress    Objective  Vital signs: 159/84, 97.6, 61, 18, 96%, blood sugar, 96    Physical Exam  Constitutional:       General: She is not in acute distress.  Eyes:      Extraocular Movements: Extraocular movements intact.   Cardiovascular:      Rate and Rhythm: Normal rate and regular rhythm.   Pulmonary:      Effort: Pulmonary effort is normal.      Breath sounds: Normal breath sounds.   Abdominal:      General: Bowel sounds are normal.      Palpations: Abdomen is soft.   Musculoskeletal:      Cervical back: Neck supple.      Right lower leg: No edema.      Left lower leg: No edema.   Neurological:      Mental Status: She is alert.      Motor: Weakness present.   Psychiatric:         Mood and Affect: Mood normal.          Behavior: Behavior is cooperative.         Assessment/Plan  Problem List Items Addressed This Visit       Type 2 diabetes mellitus with hyperglycemia, with long-term current use of insulin (CMS/[facility])     Glucoscans  Low concentrated sweets diet.  Insulin  Metformin  Follows endocrinology outpatient         Atrial fibrillation (CMS/[facility])     Amiodarone  Apixaban  Bleeding precautions  Monitor heart rate         GERD (gastroesophageal reflux disease)     Monitor for GI symptoms  Famotidine         Hypertensive heart disease with chronic diastolic congestive heart failure (CMS/[facility])     CHF stable, no sob  Monitor blood pressure  Toprol-XL  Spironolactone  Losartan potassium         Chronic obstructive pulmonary disease, unspecified COPD type (CMS/[facility])     Stable, no wheezing or shortness of breath  On room air         Weakness - Primary     Continue working in therapy         Depression     Monitor mood and behaviors  Bupropion  Desvenlafaxine         UTI (urinary tract infection)     Continue antibiotic until completed 4/14/2024         Diarrhea     Stool for C. difficile negative  Continue Questran          Medications, treatments, and labs reviewed  Continue medications and treatments as listed in EMR      Scribe Attestation  I, Lorna Mcmanus Scribe   attest that this documentation has been prepared under the direction and in the presence of Pita Virgen MD    Provider Attestation - Scribe documentation  All medical record entries made by the Scribe were at my direction and personally dictated by me. I have reviewed the chart and agree that the record accurately reflects my personal performance of the history, physical exam, discussion and plan.   Pita Virgen MD        Electronically Signed By: Pita Virgen MD   4/11/24  2:42 PM

## 2024-04-12 ENCOUNTER — NURSING HOME VISIT (OUTPATIENT)
Dept: POST ACUTE CARE | Facility: EXTERNAL LOCATION | Age: 74
End: 2024-04-12
Payer: MEDICARE

## 2024-04-12 DIAGNOSIS — J44.9 CHRONIC OBSTRUCTIVE PULMONARY DISEASE, UNSPECIFIED COPD TYPE (MULTI): ICD-10-CM

## 2024-04-12 DIAGNOSIS — R19.7 DIARRHEA, UNSPECIFIED TYPE: ICD-10-CM

## 2024-04-12 DIAGNOSIS — Z79.4 TYPE 2 DIABETES MELLITUS WITH HYPERGLYCEMIA, WITH LONG-TERM CURRENT USE OF INSULIN (MULTI): ICD-10-CM

## 2024-04-12 DIAGNOSIS — E11.65 TYPE 2 DIABETES MELLITUS WITH HYPERGLYCEMIA, WITH LONG-TERM CURRENT USE OF INSULIN (MULTI): ICD-10-CM

## 2024-04-12 DIAGNOSIS — I11.0 HYPERTENSIVE HEART DISEASE WITH CHRONIC DIASTOLIC CONGESTIVE HEART FAILURE (MULTI): ICD-10-CM

## 2024-04-12 DIAGNOSIS — I50.32 HYPERTENSIVE HEART DISEASE WITH CHRONIC DIASTOLIC CONGESTIVE HEART FAILURE (MULTI): ICD-10-CM

## 2024-04-12 DIAGNOSIS — R53.1 WEAKNESS: Primary | ICD-10-CM

## 2024-04-12 PROCEDURE — 99309 SBSQ NF CARE MODERATE MDM 30: CPT | Performed by: NURSE PRACTITIONER

## 2024-04-12 NOTE — LETTER
Patient: Teresa Matthews  : 1950    Encounter Date: 2024    PROGRESS NOTE    Subjective  Chief complaint: Teresa Matthews is a 73 y.o. female who is an acute skilled patient being seen and evaluated for weakness    HPI:  HPI  Patient has been working with therapy due to generalized weakness.  She is working on slide board transfers and is total dependent for mobility.  Patient requires supervision to moderate assist for ADLs.  She states that she continues to have diarrhea despite starting Questran.  Stool for C. difficile was negative.  Patient reports previous symptoms of diarrhea with metformin.  She had been off it for some time however since it is restarted the diarrhea has returned.  Patient denies abdominal pain nausea vomiting fever chills.    Objective  Vital signs: 153/86, 97.6, 64, 18, 94%, blood sugar 218    Physical Exam  Constitutional:       General: She is not in acute distress.  Eyes:      Extraocular Movements: Extraocular movements intact.   Cardiovascular:      Rate and Rhythm: Normal rate and regular rhythm.   Pulmonary:      Effort: Pulmonary effort is normal.      Breath sounds: Normal breath sounds.   Abdominal:      General: Bowel sounds are normal.      Palpations: Abdomen is soft.      Tenderness: There is no abdominal tenderness.   Musculoskeletal:      Cervical back: Neck supple.      Right lower leg: No edema.      Left lower leg: No edema.   Neurological:      Mental Status: She is alert.      Motor: Weakness present.   Psychiatric:         Mood and Affect: Mood normal.         Behavior: Behavior is cooperative.         Assessment/Plan  Problem List Items Addressed This Visit       Chronic obstructive pulmonary disease, unspecified COPD type ([facility])     Stable, no wheezing or shortness of breath  On room air         Diarrhea     Negative  Continue questran  Will decrease metformin to 500 mg twice a day and increase NPH insulin due to possible side effects of  medication         Hypertensive heart disease with chronic diastolic congestive heart failure ([facility])     BP at goal  CHF stable, no sob  Monitor blood pressure and weight  Metoprolol  Aldactone  Losartan         Type 2 diabetes mellitus with hyperglycemia, with long-term current use of insulin ([facility])     Continue insulin  Metformin  Glucoscan with sliding scale insulin coverage  Low concentrated sweets diet         Weakness - Primary     Continue therapy          Medications, treatments, and labs reviewed  Continue medications and treatments as listed in EMR    MARISELA Nelson      Electronically Signed By: MARISELA Nelson   4/12/24  5:39 PM

## 2024-04-12 NOTE — PROGRESS NOTES
PROGRESS NOTE    Subjective   Chief complaint: Teresa Matthews is a 73 y.o. female who is an acute skilled patient being seen and evaluated for weakness    HPI:  HPI  Patient has been working with therapy due to generalized weakness.  She is working on slide board transfers and is total dependent for mobility.  Patient requires supervision to moderate assist for ADLs.  She states that she continues to have diarrhea despite starting Questran.  Stool for C. difficile was negative.  Patient reports previous symptoms of diarrhea with metformin.  She had been off it for some time however since it is restarted the diarrhea has returned.  Patient denies abdominal pain nausea vomiting fever chills.    Objective   Vital signs: 153/86, 97.6, 64, 18, 94%, blood sugar 218    Physical Exam  Constitutional:       General: She is not in acute distress.  Eyes:      Extraocular Movements: Extraocular movements intact.   Cardiovascular:      Rate and Rhythm: Normal rate and regular rhythm.   Pulmonary:      Effort: Pulmonary effort is normal.      Breath sounds: Normal breath sounds.   Abdominal:      General: Bowel sounds are normal.      Palpations: Abdomen is soft.      Tenderness: There is no abdominal tenderness.   Musculoskeletal:      Cervical back: Neck supple.      Right lower leg: No edema.      Left lower leg: No edema.   Neurological:      Mental Status: She is alert.      Motor: Weakness present.   Psychiatric:         Mood and Affect: Mood normal.         Behavior: Behavior is cooperative.         Assessment/Plan   Problem List Items Addressed This Visit       Chronic obstructive pulmonary disease, unspecified COPD type ([facility])     Stable, no wheezing or shortness of breath  On room air         Diarrhea     Negative  Continue questran  Will decrease metformin to 500 mg twice a day and increase NPH insulin due to possible side effects of medication         Hypertensive heart disease with chronic diastolic congestive  heart failure ([facility])     BP at goal  CHF stable, no sob  Monitor blood pressure and weight  Metoprolol  Aldactone  Losartan         Type 2 diabetes mellitus with hyperglycemia, with long-term current use of insulin ([facility])     Continue insulin  Metformin  Glucoscan with sliding scale insulin coverage  Low concentrated sweets diet         Weakness - Primary     Continue therapy          Medications, treatments, and labs reviewed  Continue medications and treatments as listed in EMR    Yasemin Kelly, APRN-CNP

## 2024-04-12 NOTE — ASSESSMENT & PLAN NOTE
Negative  Continue questran  Will decrease metformin to 500 mg twice a day and increase NPH insulin due to possible side effects of medication

## 2024-04-15 ENCOUNTER — NURSING HOME VISIT (OUTPATIENT)
Dept: POST ACUTE CARE | Facility: EXTERNAL LOCATION | Age: 74
End: 2024-04-15
Payer: MEDICARE

## 2024-04-15 DIAGNOSIS — I50.32 HYPERTENSIVE HEART DISEASE WITH CHRONIC DIASTOLIC CONGESTIVE HEART FAILURE (MULTI): ICD-10-CM

## 2024-04-15 DIAGNOSIS — E11.65 TYPE 2 DIABETES MELLITUS WITH HYPERGLYCEMIA, WITH LONG-TERM CURRENT USE OF INSULIN (MULTI): ICD-10-CM

## 2024-04-15 DIAGNOSIS — R19.7 DIARRHEA, UNSPECIFIED TYPE: ICD-10-CM

## 2024-04-15 DIAGNOSIS — J44.9 CHRONIC OBSTRUCTIVE PULMONARY DISEASE, UNSPECIFIED COPD TYPE (MULTI): ICD-10-CM

## 2024-04-15 DIAGNOSIS — Z79.4 TYPE 2 DIABETES MELLITUS WITH HYPERGLYCEMIA, WITH LONG-TERM CURRENT USE OF INSULIN (MULTI): ICD-10-CM

## 2024-04-15 DIAGNOSIS — I48.91 ATRIAL FIBRILLATION, UNSPECIFIED TYPE (MULTI): ICD-10-CM

## 2024-04-15 DIAGNOSIS — K21.9 GASTROESOPHAGEAL REFLUX DISEASE WITHOUT ESOPHAGITIS: ICD-10-CM

## 2024-04-15 DIAGNOSIS — I11.0 HYPERTENSIVE HEART DISEASE WITH CHRONIC DIASTOLIC CONGESTIVE HEART FAILURE (MULTI): ICD-10-CM

## 2024-04-15 PROCEDURE — 99309 SBSQ NF CARE MODERATE MDM 30: CPT | Performed by: INTERNAL MEDICINE

## 2024-04-15 NOTE — ASSESSMENT & PLAN NOTE
I removed 2 sutures from forehead using forceps and scissors.  Patient tolerated procedure with no discomfort throughout or after.

## 2024-04-15 NOTE — LETTER
Patient: Teresa Matthews  : 1950    Encounter Date: 04/15/2024    PROGRESS NOTE    Subjective  Chief complaint: Teresa Matthews is a 73 y.o. female who is an acute skilled patient being seen and evaluated for weakness    HPI:  Patient presents for follow up of recent medication changes. BS controlled with insulins and oral meds. Also diarrhea has resolved. Denies n,v,f,c,pain. Nursing has no new concerns.       Objective  Vital signs: 152/88,66,96%, BS 98    Physical Exam  Constitutional:       General: She is not in acute distress.  Eyes:      Extraocular Movements: Extraocular movements intact.   Cardiovascular:      Rate and Rhythm: Normal rate and regular rhythm.   Pulmonary:      Effort: Pulmonary effort is normal.      Breath sounds: Normal breath sounds.   Abdominal:      General: Bowel sounds are normal.      Palpations: Abdomen is soft.      Tenderness: There is no abdominal tenderness.   Musculoskeletal:      Cervical back: Neck supple.      Right lower leg: No edema.      Left lower leg: No edema.   Neurological:      Mental Status: She is alert.      Motor: Weakness present.   Psychiatric:         Mood and Affect: Mood normal.         Behavior: Behavior is cooperative.         Assessment/Plan  Problem List Items Addressed This Visit       Type 2 diabetes mellitus with hyperglycemia, with long-term current use of insulin ([facility])     Continue insulin  Metformin  Glucoscan with sliding scale insulin coverage  Low concentrated sweets diet         Atrial fibrillation ([facility])     Amiodarone  Apixaban  Bleeding precautions  Monitor heart rate; currently regular          GERD (gastroesophageal reflux disease)     Monitor for GI symptoms  Famotidine         Hypertensive heart disease with chronic diastolic congestive heart failure ([facility])     CHF stable, no sob  Monitor blood pressure  Toprol-XL  Spironolactone  Losartan potassium         Chronic obstructive pulmonary disease, unspecified COPD  type ([facility])     Stable, no wheezing or shortness of breath  On room air         Diarrhea     Continue questran  Monitor stools           Medications, treatments, and labs reviewed  Continue medications and treatments as listed in EMR    Scribe Attestation  JUAN FRANCISCO, Kirill Chapman   attest that this documentation has been prepared under the direction and in the presence of Pita Virgen MD.     Provider Attestation - Scribe documentation  All medical record entries made by the Scribe were at my direction and personally dictated by me. I have reviewed the chart and agree that the record accurately reflects my personal performance of the history, physical exam, discussion and plan.   Pita Virgen MD      Electronically Signed By: Pita Virgen MD   4/15/24  4:04 PM

## 2024-04-15 NOTE — PROGRESS NOTES
PROGRESS NOTE    Subjective   Chief complaint: Teresa Matthews is a 73 y.o. female who is an acute skilled patient being seen and evaluated for weakness    HPI:  Patient presents for follow up of recent medication changes. BS controlled with insulins and oral meds. Also diarrhea has resolved. Denies n,v,f,c,pain. Nursing has no new concerns.       Objective   Vital signs: 152/88,66,96%, BS 98    Physical Exam  Constitutional:       General: She is not in acute distress.  Eyes:      Extraocular Movements: Extraocular movements intact.   Cardiovascular:      Rate and Rhythm: Normal rate and regular rhythm.   Pulmonary:      Effort: Pulmonary effort is normal.      Breath sounds: Normal breath sounds.   Abdominal:      General: Bowel sounds are normal.      Palpations: Abdomen is soft.      Tenderness: There is no abdominal tenderness.   Musculoskeletal:      Cervical back: Neck supple.      Right lower leg: No edema.      Left lower leg: No edema.   Neurological:      Mental Status: She is alert.      Motor: Weakness present.   Psychiatric:         Mood and Affect: Mood normal.         Behavior: Behavior is cooperative.         Assessment/Plan   Problem List Items Addressed This Visit       Type 2 diabetes mellitus with hyperglycemia, with long-term current use of insulin ([facility])     Continue insulin  Metformin  Glucoscan with sliding scale insulin coverage  Low concentrated sweets diet         Atrial fibrillation ([facility])     Amiodarone  Apixaban  Bleeding precautions  Monitor heart rate; currently regular          GERD (gastroesophageal reflux disease)     Monitor for GI symptoms  Famotidine         Hypertensive heart disease with chronic diastolic congestive heart failure ([facility])     CHF stable, no sob  Monitor blood pressure  Toprol-XL  Spironolactone  Losartan potassium         Chronic obstructive pulmonary disease, unspecified COPD type ([facility])     Stable, no wheezing or shortness of breath  On room air          Diarrhea     Continue questran  Monitor stools           Medications, treatments, and labs reviewed  Continue medications and treatments as listed in EMR    Scribe Attestation  I, Kirill Chapman   attest that this documentation has been prepared under the direction and in the presence of Pita Virgen MD.     Provider Attestation - Scribe documentation  All medical record entries made by the Scribe were at my direction and personally dictated by me. I have reviewed the chart and agree that the record accurately reflects my personal performance of the history, physical exam, discussion and plan.   Pita Virgen MD

## 2024-04-15 NOTE — PROGRESS NOTES
PROGRESS NOTE    Subjective   Chief complaint: Teresa Matthews is a 73 y.o. female who is an acute skilled patient being seen and evaluated for weakness    HPI:  HPI  Therapy has evaluated patient and established plan of care. Patient working towards goals. Patient was seen and examined at the bedside. Denies constitutional symptoms.     Objective   Vital signs: 141/82, 98.0, 59, 18    Physical Exam  Constitutional:       General: She is not in acute distress.  Eyes:      Extraocular Movements: Extraocular movements intact.   Cardiovascular:      Rate and Rhythm: Normal rate and regular rhythm.   Pulmonary:      Effort: Pulmonary effort is normal.      Breath sounds: Normal breath sounds.   Abdominal:      General: Bowel sounds are normal.      Palpations: Abdomen is soft.   Musculoskeletal:      Cervical back: Neck supple.      Right lower leg: No edema.      Left lower leg: No edema.   Skin:     Comments: Laceration to FH with suture intact, epithelialized    Neurological:      Mental Status: She is alert.      Motor: Weakness present.   Psychiatric:         Mood and Affect: Mood normal.         Behavior: Behavior is cooperative.         Assessment/Plan   Problem List Items Addressed This Visit       Chronic obstructive pulmonary disease, unspecified COPD type ([facility])     Stable, no wheezing or shortness of breath  On room air         Forehead laceration, subsequent encounter     I removed 2 sutures from forehead using forceps and scissors.  Patient tolerated procedure with no discomfort throughout or after.         Hypertensive heart disease with chronic diastolic congestive heart failure ([facility])     CHF stable, no sob  Monitor blood pressure  Toprol-XL  Spironolactone  Losartan potassium         Weakness - Primary     Continue working in therapy          Medications, treatments, and labs reviewed  Continue medications and treatments as listed in EMR      Scribe Attestation  I, Kirill Page    attest that this documentation has been prepared under the direction and in the presence of MARISELA Nelson    Provider Attestation - Scribe documentation  All medical record entries made by the Scribe were at my direction and personally dictated by me. I have reviewed the chart and agree that the record accurately reflects my personal performance of the history, physical exam, discussion and plan.   MARISELA Nelson

## 2024-04-16 ENCOUNTER — NURSING HOME VISIT (OUTPATIENT)
Dept: POST ACUTE CARE | Facility: EXTERNAL LOCATION | Age: 74
End: 2024-04-16
Payer: MEDICARE

## 2024-04-16 DIAGNOSIS — I50.32 HYPERTENSIVE HEART DISEASE WITH CHRONIC DIASTOLIC CONGESTIVE HEART FAILURE (MULTI): ICD-10-CM

## 2024-04-16 DIAGNOSIS — R19.7 DIARRHEA, UNSPECIFIED TYPE: ICD-10-CM

## 2024-04-16 DIAGNOSIS — Z79.4 TYPE 2 DIABETES MELLITUS WITH HYPERGLYCEMIA, WITH LONG-TERM CURRENT USE OF INSULIN (MULTI): ICD-10-CM

## 2024-04-16 DIAGNOSIS — I11.0 HYPERTENSIVE HEART DISEASE WITH CHRONIC DIASTOLIC CONGESTIVE HEART FAILURE (MULTI): ICD-10-CM

## 2024-04-16 DIAGNOSIS — K21.9 GASTROESOPHAGEAL REFLUX DISEASE WITHOUT ESOPHAGITIS: ICD-10-CM

## 2024-04-16 DIAGNOSIS — I48.91 ATRIAL FIBRILLATION, UNSPECIFIED TYPE (MULTI): ICD-10-CM

## 2024-04-16 DIAGNOSIS — J44.9 CHRONIC OBSTRUCTIVE PULMONARY DISEASE, UNSPECIFIED COPD TYPE (MULTI): ICD-10-CM

## 2024-04-16 DIAGNOSIS — E11.65 TYPE 2 DIABETES MELLITUS WITH HYPERGLYCEMIA, WITH LONG-TERM CURRENT USE OF INSULIN (MULTI): ICD-10-CM

## 2024-04-16 PROCEDURE — 99308 SBSQ NF CARE LOW MDM 20: CPT | Performed by: INTERNAL MEDICINE

## 2024-04-16 NOTE — PROGRESS NOTES
PROGRESS NOTE    Subjective   Chief complaint: Teresa Matthews is a 73 y.o. female who is an acute skilled patient being seen and evaluated for weakness    HPI:  HPI  Therapy has been working with the patient to improve strength and endurance with ADLs, transfers, and mobility.  Patient continues to work toward goals.  Patient is stable and has no new complaints.  Nursing staff voices no new concerns today.  Patient denies constitutional symptoms.    Objective   Vital signs: 139/77, 97.458, 18, blood sugar 216, 96%    Physical Exam  Constitutional:       General: She is not in acute distress.  Eyes:      Extraocular Movements: Extraocular movements intact.   Cardiovascular:      Rate and Rhythm: Normal rate and regular rhythm.   Pulmonary:      Effort: Pulmonary effort is normal.      Breath sounds: Normal breath sounds.   Abdominal:      General: Bowel sounds are normal.      Palpations: Abdomen is soft.   Musculoskeletal:      Cervical back: Neck supple.      Right lower leg: No edema.      Left lower leg: No edema.   Neurological:      Mental Status: She is alert.      Motor: Weakness present.   Psychiatric:         Mood and Affect: Mood normal.         Behavior: Behavior is cooperative.         Assessment/Plan   Problem List Items Addressed This Visit       Atrial fibrillation (Multi)     Amiodarone  Apixaban  Bleeding precautions  Monitor heart rate         Chronic obstructive pulmonary disease, unspecified COPD type (Multi)     Stable, no wheezing or shortness of breath  On room air         GERD (gastroesophageal reflux disease)     Monitor for GI symptoms  Famotidine         Hypertensive heart disease with chronic diastolic congestive heart failure (Multi) - Primary     BP at goal  CHF stable, no sob  Monitor blood pressure  Toprol-XL  Spironolactone  Losartan potassium         Type 2 diabetes mellitus with hyperglycemia, with long-term current use of insulin (Multi)     Continue insulin  Glucoscan with  sliding scale insulin coverage  Low concentrated sweets diet          Medications, treatments, and labs reviewed  Continue medications and treatments as listed in EMR      Scribe Attestation  I, Kirill Page   attest that this documentation has been prepared under the direction and in the presence of MARISELA Nelson    Provider Attestation - Scribe documentation  All medical record entries made by the Scribe were at my direction and personally dictated by me. I have reviewed the chart and agree that the record accurately reflects my personal performance of the history, physical exam, discussion and plan.   MARISELA Nelson

## 2024-04-16 NOTE — LETTER
Patient: Teresa Matthews  : 1950    Encounter Date: 2024    PROGRESS NOTE    Subjective  Chief complaint: Teresa Matthews is a 73 y.o. female who is an acute skilled patient being seen and evaluated for weakness    HPI:  Patient has been working in therapy to improve strength, endurance, and ADLs.  Patient continues to work toward goals. She reports her diarrhea has improved with the decrease of metformin. She has new concerns of tingling\pain\numbness in both hands. Denies n/v/f/c.        Objective  Vital signs: 132/77,64,97%,     Physical Exam  Constitutional:       General: She is not in acute distress.  Eyes:      Extraocular Movements: Extraocular movements intact.   Cardiovascular:      Rate and Rhythm: Normal rate and regular rhythm.   Pulmonary:      Effort: Pulmonary effort is normal.      Breath sounds: Normal breath sounds.   Abdominal:      General: Bowel sounds are normal.      Palpations: Abdomen is soft.      Tenderness: There is no abdominal tenderness.   Musculoskeletal:      Cervical back: Neck supple.      Right lower leg: No edema.      Left lower leg: No edema.   Neurological:      Mental Status: She is alert.      Motor: Weakness present.   Psychiatric:         Mood and Affect: Mood normal.         Behavior: Behavior is cooperative.         Assessment/Plan  Problem List Items Addressed This Visit       Type 2 diabetes mellitus with hyperglycemia, with long-term current use of insulin (Multi)     Continue insulin  Metformin  Glucoscan with sliding scale insulin coverage  Low concentrated sweets diet  Gabapenting 100mg PO BID  Monitor pain to hands         Atrial fibrillation (Multi)     Amiodarone  Apixaban  Bleeding precautions  Monitor heart rate         GERD (gastroesophageal reflux disease)     Monitor for GI symptoms  Famotidine         Hypertensive heart disease with chronic diastolic congestive heart failure (Multi)     CHF stable, no sob  Monitor blood  pressure  Toprol-XL  Spironolactone  Losartan potassium         Chronic obstructive pulmonary disease, unspecified COPD type (Multi)     Stable, no wheezing or shortness of breath  On room air         Diarrhea     Continue questran  Monitor stools           Medications, treatments, and labs reviewed  Continue medications and treatments as listed in EMR    Scribe Attestation  Sharon CHICAS Scribe   attest that this documentation has been prepared under the direction and in the presence of Pita Virgen MD.     Provider Attestation - Scribe documentation  All medical record entries made by the Scribe were at my direction and personally dictated by me. I have reviewed the chart and agree that the record accurately reflects my personal performance of the history, physical exam, discussion and plan.   Pita Virgen MD      Electronically Signed By: Pita Virgen MD   4/16/24  4:19 PM

## 2024-04-16 NOTE — ASSESSMENT & PLAN NOTE
Continue insulin  Metformin  Glucoscan with sliding scale insulin coverage  Low concentrated sweets diet  Gabapenting 100mg PO BID  Monitor pain to hands

## 2024-04-16 NOTE — ASSESSMENT & PLAN NOTE
BP at goal  CHF stable, no sob  Monitor blood pressure  Toprol-XL  Spironolactone  Losartan potassium

## 2024-04-16 NOTE — PROGRESS NOTES
PROGRESS NOTE    Subjective   Chief complaint: Teresa Matthews is a 73 y.o. female who is an acute skilled patient being seen and evaluated for weakness    HPI:  Patient has been working in therapy to improve strength, endurance, and ADLs.  Patient continues to work toward goals. She reports her diarrhea has improved with the decrease of metformin. She has new concerns of tingling\pain\numbness in both hands. Denies n/v/f/c.        Objective   Vital signs: 132/77,64,97%,     Physical Exam  Constitutional:       General: She is not in acute distress.  Eyes:      Extraocular Movements: Extraocular movements intact.   Cardiovascular:      Rate and Rhythm: Normal rate and regular rhythm.   Pulmonary:      Effort: Pulmonary effort is normal.      Breath sounds: Normal breath sounds.   Abdominal:      General: Bowel sounds are normal.      Palpations: Abdomen is soft.      Tenderness: There is no abdominal tenderness.   Musculoskeletal:      Cervical back: Neck supple.      Right lower leg: No edema.      Left lower leg: No edema.   Neurological:      Mental Status: She is alert.      Motor: Weakness present.   Psychiatric:         Mood and Affect: Mood normal.         Behavior: Behavior is cooperative.         Assessment/Plan   Problem List Items Addressed This Visit       Type 2 diabetes mellitus with hyperglycemia, with long-term current use of insulin (Multi)     Continue insulin  Metformin  Glucoscan with sliding scale insulin coverage  Low concentrated sweets diet  Gabapenting 100mg PO BID  Monitor pain to hands         Atrial fibrillation (Multi)     Amiodarone  Apixaban  Bleeding precautions  Monitor heart rate         GERD (gastroesophageal reflux disease)     Monitor for GI symptoms  Famotidine         Hypertensive heart disease with chronic diastolic congestive heart failure (Multi)     CHF stable, no sob  Monitor blood pressure  Toprol-XL  Spironolactone  Losartan potassium         Chronic  obstructive pulmonary disease, unspecified COPD type (Multi)     Stable, no wheezing or shortness of breath  On room air         Diarrhea     Continue questran  Monitor stools           Medications, treatments, and labs reviewed  Continue medications and treatments as listed in EMR    Scribe Attestation  Sharon CHICAS Scribe   attest that this documentation has been prepared under the direction and in the presence of Pita Virgen MD.     Provider Attestation - Scribe documentation  All medical record entries made by the Scribe were at my direction and personally dictated by me. I have reviewed the chart and agree that the record accurately reflects my personal performance of the history, physical exam, discussion and plan.   Pita Virgen MD

## 2024-04-17 ENCOUNTER — NURSING HOME VISIT (OUTPATIENT)
Dept: POST ACUTE CARE | Facility: EXTERNAL LOCATION | Age: 74
End: 2024-04-17
Payer: MEDICARE

## 2024-04-17 DIAGNOSIS — E11.65 TYPE 2 DIABETES MELLITUS WITH HYPERGLYCEMIA, WITH LONG-TERM CURRENT USE OF INSULIN (MULTI): ICD-10-CM

## 2024-04-17 DIAGNOSIS — K21.9 GASTROESOPHAGEAL REFLUX DISEASE WITHOUT ESOPHAGITIS: ICD-10-CM

## 2024-04-17 DIAGNOSIS — I11.0 HYPERTENSIVE HEART DISEASE WITH CHRONIC DIASTOLIC CONGESTIVE HEART FAILURE (MULTI): ICD-10-CM

## 2024-04-17 DIAGNOSIS — I50.32 HYPERTENSIVE HEART DISEASE WITH CHRONIC DIASTOLIC CONGESTIVE HEART FAILURE (MULTI): ICD-10-CM

## 2024-04-17 DIAGNOSIS — R53.1 WEAKNESS: Primary | ICD-10-CM

## 2024-04-17 DIAGNOSIS — Z79.4 TYPE 2 DIABETES MELLITUS WITH HYPERGLYCEMIA, WITH LONG-TERM CURRENT USE OF INSULIN (MULTI): ICD-10-CM

## 2024-04-17 DIAGNOSIS — I48.91 ATRIAL FIBRILLATION, UNSPECIFIED TYPE (MULTI): ICD-10-CM

## 2024-04-17 DIAGNOSIS — J44.9 CHRONIC OBSTRUCTIVE PULMONARY DISEASE, UNSPECIFIED COPD TYPE (MULTI): ICD-10-CM

## 2024-04-17 PROCEDURE — 99309 SBSQ NF CARE MODERATE MDM 30: CPT | Performed by: NURSE PRACTITIONER

## 2024-04-17 NOTE — LETTER
Patient: Teresa Matthews  : 1950    Encounter Date: 2024    PROGRESS NOTE    Subjective  Chief complaint: Teresa Matthews is a 73 y.o. female who is an acute skilled patient being seen and evaluated for weakness    HPI:  HPI  Patient is continuing to work in therapy.  Patient remains total dependent for ambulation.  Patient is using slide board for transfers requiring CGA.  Patient continues to work on self-care and ADL tasks.  Patient was seen and examined at the bedside, appears to be in no acute distress.  Denies fever or chills.  Nursing staff voicing no new concerns at this time.    Objective  Vital signs: 134/75, 98.3, 62, 18, blood sugar 122, 94%    Physical Exam  Constitutional:       General: She is not in acute distress.  Eyes:      Extraocular Movements: Extraocular movements intact.   Cardiovascular:      Rate and Rhythm: Normal rate and regular rhythm.   Pulmonary:      Effort: Pulmonary effort is normal.      Breath sounds: Normal breath sounds.   Abdominal:      General: Bowel sounds are normal.      Palpations: Abdomen is soft.      Tenderness: There is no abdominal tenderness.   Musculoskeletal:      Cervical back: Neck supple.      Right lower leg: No edema.      Left lower leg: No edema.   Neurological:      Mental Status: She is alert.      Motor: Weakness present.   Psychiatric:         Mood and Affect: Mood normal.         Behavior: Behavior is cooperative.         Assessment/Plan  Problem List Items Addressed This Visit       Atrial fibrillation (Multi)     Amiodarone  Apixaban  Bleeding precautions  Monitor heart rate         Chronic obstructive pulmonary disease, unspecified COPD type (Multi)     Stable, no wheezing or shortness of breath  On room air         GERD (gastroesophageal reflux disease)     Monitor for GI symptoms  Famotidine         Hypertensive heart disease with chronic diastolic congestive heart failure (Multi)     CHF stable, no sob  Monitor blood  pressure  Toprol-XL  Spironolactone  Losartan potassium         Type 2 diabetes mellitus with hyperglycemia, with long-term current use of insulin (Multi)     Continue insulin  Glucoscan with sliding scale insulin coverage  Low concentrated sweets diet  FBG at goal         Weakness - Primary     Continue working in therapy towards goals          Medications, treatments, and labs reviewed  Continue medications and treatments as listed in EMR      Scribe Attestation  Lorna CHICAS Scribe   attest that this documentation has been prepared under the direction and in the presence of MARISELA Nelson    Provider Attestation - Scribe documentation  All medical record entries made by the Scribe were at my direction and personally dictated by me. I have reviewed the chart and agree that the record accurately reflects my personal performance of the history, physical exam, discussion and plan.   MARISELA Nelson        Electronically Signed By: MARISELA Nelson   4/24/24  8:25 AM

## 2024-04-18 ENCOUNTER — NURSING HOME VISIT (OUTPATIENT)
Dept: POST ACUTE CARE | Facility: EXTERNAL LOCATION | Age: 74
End: 2024-04-18
Payer: MEDICARE

## 2024-04-18 DIAGNOSIS — I48.91 ATRIAL FIBRILLATION, UNSPECIFIED TYPE (MULTI): ICD-10-CM

## 2024-04-18 DIAGNOSIS — Z79.4 TYPE 2 DIABETES MELLITUS WITH HYPERGLYCEMIA, WITH LONG-TERM CURRENT USE OF INSULIN (MULTI): ICD-10-CM

## 2024-04-18 DIAGNOSIS — I11.0 HYPERTENSIVE HEART DISEASE WITH CHRONIC DIASTOLIC CONGESTIVE HEART FAILURE (MULTI): ICD-10-CM

## 2024-04-18 DIAGNOSIS — R53.1 WEAKNESS: Primary | ICD-10-CM

## 2024-04-18 DIAGNOSIS — I50.32 HYPERTENSIVE HEART DISEASE WITH CHRONIC DIASTOLIC CONGESTIVE HEART FAILURE (MULTI): ICD-10-CM

## 2024-04-18 DIAGNOSIS — E11.65 TYPE 2 DIABETES MELLITUS WITH HYPERGLYCEMIA, WITH LONG-TERM CURRENT USE OF INSULIN (MULTI): ICD-10-CM

## 2024-04-18 DIAGNOSIS — J44.9 CHRONIC OBSTRUCTIVE PULMONARY DISEASE, UNSPECIFIED COPD TYPE (MULTI): ICD-10-CM

## 2024-04-18 PROCEDURE — 99308 SBSQ NF CARE LOW MDM 20: CPT | Performed by: INTERNAL MEDICINE

## 2024-04-18 NOTE — PROGRESS NOTES
PROGRESS NOTE    Subjective   Chief complaint: Teresa Matthews is a 73 y.o. female who is an acute skilled patient being seen and evaluated for weakness    HPI:  HPI  Patient does continue working in therapy due to generalized weakness.  Therapy is working with patient on therapeutic exercise and activities, neuromuscular reeducation, ADLs.  Patient was started on gabapentin recently due to neuropathy, tolerating well.  Patient seen and examined at bedside, appears to be in no acute distress.  Nursing staff voicing no new concerns    Objective   Vital signs: 144/81, 96.8, 69, 18, 94%    Physical Exam  Constitutional:       General: She is not in acute distress.  Eyes:      Extraocular Movements: Extraocular movements intact.   Cardiovascular:      Rate and Rhythm: Normal rate and regular rhythm.   Pulmonary:      Effort: Pulmonary effort is normal.      Breath sounds: Normal breath sounds.   Abdominal:      General: Bowel sounds are normal.      Palpations: Abdomen is soft.      Tenderness: There is no abdominal tenderness.   Musculoskeletal:      Cervical back: Neck supple.      Right lower leg: No edema.      Left lower leg: No edema.   Neurological:      Mental Status: She is alert.      Motor: Weakness present.   Psychiatric:         Mood and Affect: Mood normal.         Behavior: Behavior is cooperative.         Assessment/Plan   Problem List Items Addressed This Visit       Type 2 diabetes mellitus with hyperglycemia, with long-term current use of insulin (Multi)     Glucoscans  Low concentrated sweets diet.  Insulin  Gabapentin  Follows endocrinology outpatient         Atrial fibrillation (Multi)     Amiodarone  Apixaban  Bleeding precautions  Monitor heart rate         Hypertensive heart disease with chronic diastolic congestive heart failure (Multi)     CHF stable, no sob  Monitor blood pressure  Toprol-XL  Spironolactone  Losartan potassium         Chronic obstructive pulmonary disease, unspecified  COPD type (Multi)     Stable, no wheezing or shortness of breath  On room air         Weakness - Primary     Continue working towards goals in therapy          Medications, treatments, and labs reviewed  Continue medications and treatments as listed in EMR      Scribe Attestation  I, Kirill Page   attest that this documentation has been prepared under the direction and in the presence of Pita Virgen MD    Provider Attestation - Scribe documentation  All medical record entries made by the Scribe were at my direction and personally dictated by me. I have reviewed the chart and agree that the record accurately reflects my personal performance of the history, physical exam, discussion and plan.   Pita Virgen MD

## 2024-04-18 NOTE — LETTER
Patient: Teresa Matthews  : 1950    Encounter Date: 2024    PROGRESS NOTE    Subjective  Chief complaint: Teresa Matthews is a 73 y.o. female who is an acute skilled patient being seen and evaluated for weakness    HPI:  HPI  Patient does continue working in therapy due to generalized weakness.  Therapy is working with patient on therapeutic exercise and activities, neuromuscular reeducation, ADLs.  Patient was started on gabapentin recently due to neuropathy, tolerating well.  Patient seen and examined at bedside, appears to be in no acute distress.  Nursing staff voicing no new concerns    Objective  Vital signs: 144/81, 96.8, 69, 18, 94%    Physical Exam  Constitutional:       General: She is not in acute distress.  Eyes:      Extraocular Movements: Extraocular movements intact.   Cardiovascular:      Rate and Rhythm: Normal rate and regular rhythm.   Pulmonary:      Effort: Pulmonary effort is normal.      Breath sounds: Normal breath sounds.   Abdominal:      General: Bowel sounds are normal.      Palpations: Abdomen is soft.      Tenderness: There is no abdominal tenderness.   Musculoskeletal:      Cervical back: Neck supple.      Right lower leg: No edema.      Left lower leg: No edema.   Neurological:      Mental Status: She is alert.      Motor: Weakness present.   Psychiatric:         Mood and Affect: Mood normal.         Behavior: Behavior is cooperative.         Assessment/Plan  Problem List Items Addressed This Visit       Type 2 diabetes mellitus with hyperglycemia, with long-term current use of insulin (Multi)     Glucoscans  Low concentrated sweets diet.  Insulin  Gabapentin  Follows endocrinology outpatient         Atrial fibrillation (Multi)     Amiodarone  Apixaban  Bleeding precautions  Monitor heart rate         Hypertensive heart disease with chronic diastolic congestive heart failure (Multi)     CHF stable, no sob  Monitor blood  pressure  Toprol-XL  Spironolactone  Losartan potassium         Chronic obstructive pulmonary disease, unspecified COPD type (Multi)     Stable, no wheezing or shortness of breath  On room air         Weakness - Primary     Continue working towards goals in therapy          Medications, treatments, and labs reviewed  Continue medications and treatments as listed in EMR      Scribe Attestation  Loran CHICAS Scribe   attest that this documentation has been prepared under the direction and in the presence of Pita Virgen MD    Provider Attestation - Scribe documentation  All medical record entries made by the Scribe were at my direction and personally dictated by me. I have reviewed the chart and agree that the record accurately reflects my personal performance of the history, physical exam, discussion and plan.   Pita Virgen MD        Electronically Signed By: Pita Virgen MD   4/18/24  4:09 PM

## 2024-04-19 ENCOUNTER — NURSING HOME VISIT (OUTPATIENT)
Dept: POST ACUTE CARE | Facility: EXTERNAL LOCATION | Age: 74
End: 2024-04-19
Payer: MEDICARE

## 2024-04-19 DIAGNOSIS — I11.0 HYPERTENSIVE HEART DISEASE WITH CHRONIC DIASTOLIC CONGESTIVE HEART FAILURE (MULTI): ICD-10-CM

## 2024-04-19 DIAGNOSIS — J44.9 CHRONIC OBSTRUCTIVE PULMONARY DISEASE, UNSPECIFIED COPD TYPE (MULTI): ICD-10-CM

## 2024-04-19 DIAGNOSIS — Z79.4 TYPE 2 DIABETES MELLITUS WITH HYPERGLYCEMIA, WITH LONG-TERM CURRENT USE OF INSULIN (MULTI): ICD-10-CM

## 2024-04-19 DIAGNOSIS — R05.1 ACUTE COUGH: ICD-10-CM

## 2024-04-19 DIAGNOSIS — I50.32 HYPERTENSIVE HEART DISEASE WITH CHRONIC DIASTOLIC CONGESTIVE HEART FAILURE (MULTI): ICD-10-CM

## 2024-04-19 DIAGNOSIS — R53.1 WEAKNESS: Primary | ICD-10-CM

## 2024-04-19 DIAGNOSIS — E11.65 TYPE 2 DIABETES MELLITUS WITH HYPERGLYCEMIA, WITH LONG-TERM CURRENT USE OF INSULIN (MULTI): ICD-10-CM

## 2024-04-19 PROCEDURE — 99309 SBSQ NF CARE MODERATE MDM 30: CPT | Performed by: NURSE PRACTITIONER

## 2024-04-19 NOTE — LETTER
Patient: Teresa Matthews  : 1950    Encounter Date: 2024    PROGRESS NOTE    Subjective  Chief complaint: Teresa Matthews is a 73 y.o. female who is an acute skilled patient being seen and evaluated for weakness    HPI:  HPI  Patient does continue working in therapy.  Therapy is working with patient on transfers with slide board requiring CGA.  Patient is total dependent for ambulation.  Patient seen and examined at the bedside.  Patient complains of cough and tickle in throat.  Patient denies fever or chills.    Objective  Vital signs: 154/77, 96.8, 64, 18, 94%, blood sugar 193    Physical Exam  Constitutional:       General: She is not in acute distress.     Comments: Hoarse voice   Eyes:      Extraocular Movements: Extraocular movements intact.   Cardiovascular:      Rate and Rhythm: Normal rate and regular rhythm.   Pulmonary:      Effort: Pulmonary effort is normal.      Breath sounds: Normal breath sounds.      Comments: Nonproductive moist cough  Abdominal:      General: Bowel sounds are normal.      Palpations: Abdomen is soft.      Tenderness: There is no abdominal tenderness.   Musculoskeletal:      Cervical back: Neck supple.      Right lower leg: No edema.      Left lower leg: No edema.   Neurological:      Mental Status: She is alert.      Motor: Weakness present.   Psychiatric:         Mood and Affect: Mood normal.         Behavior: Behavior is cooperative.         Assessment/Plan  Problem List Items Addressed This Visit       Type 2 diabetes mellitus with hyperglycemia, with long-term current use of insulin (Multi)     Continue insulin  Glucoscan with sliding scale insulin coverage  Low concentrated sweets diet  Monitor fasting blood sugar         Cough     Start Flonase and Mucinex         Hypertensive heart disease with chronic diastolic congestive heart failure (Multi)     CHF stable, no sob  Monitor blood pressure  Toprol-XL  Spironolactone  Losartan potassium          Chronic obstructive pulmonary disease, unspecified COPD type (Multi)     Stable, no wheezing or shortness of breath  On room air         Weakness - Primary     Continue working in therapy towards goals          Medications, treatments, and labs reviewed  Continue medications and treatments as listed in EMR      Scribe Attestation  Lorna CHICAS Scribe   attest that this documentation has been prepared under the direction and in the presence of MARIESLA Nelson    Provider Attestation - Scribe documentation  All medical record entries made by the Scribe were at my direction and personally dictated by me. I have reviewed the chart and agree that the record accurately reflects my personal performance of the history, physical exam, discussion and plan.   MARISELA Nelson        Electronically Signed By: MARISELA Nelson   5/7/24  3:06 PM

## 2024-04-22 ENCOUNTER — NURSING HOME VISIT (OUTPATIENT)
Dept: POST ACUTE CARE | Facility: EXTERNAL LOCATION | Age: 74
End: 2024-04-22
Payer: MEDICARE

## 2024-04-22 DIAGNOSIS — R53.1 WEAKNESS: ICD-10-CM

## 2024-04-22 DIAGNOSIS — J44.9 CHRONIC OBSTRUCTIVE PULMONARY DISEASE, UNSPECIFIED COPD TYPE (MULTI): ICD-10-CM

## 2024-04-22 DIAGNOSIS — F32.A DEPRESSION, UNSPECIFIED DEPRESSION TYPE: ICD-10-CM

## 2024-04-22 DIAGNOSIS — K21.9 GASTROESOPHAGEAL REFLUX DISEASE WITHOUT ESOPHAGITIS: ICD-10-CM

## 2024-04-22 DIAGNOSIS — I11.0 HYPERTENSIVE HEART DISEASE WITH CHRONIC DIASTOLIC CONGESTIVE HEART FAILURE (MULTI): ICD-10-CM

## 2024-04-22 DIAGNOSIS — I50.32 HYPERTENSIVE HEART DISEASE WITH CHRONIC DIASTOLIC CONGESTIVE HEART FAILURE (MULTI): ICD-10-CM

## 2024-04-22 DIAGNOSIS — I48.91 ATRIAL FIBRILLATION, UNSPECIFIED TYPE (MULTI): ICD-10-CM

## 2024-04-22 DIAGNOSIS — R06.00 DYSPNEA, UNSPECIFIED TYPE: ICD-10-CM

## 2024-04-22 DIAGNOSIS — Z79.4 TYPE 2 DIABETES MELLITUS WITH HYPERGLYCEMIA, WITH LONG-TERM CURRENT USE OF INSULIN (MULTI): ICD-10-CM

## 2024-04-22 DIAGNOSIS — E11.65 TYPE 2 DIABETES MELLITUS WITH HYPERGLYCEMIA, WITH LONG-TERM CURRENT USE OF INSULIN (MULTI): ICD-10-CM

## 2024-04-22 PROCEDURE — 99309 SBSQ NF CARE MODERATE MDM 30: CPT | Performed by: INTERNAL MEDICINE

## 2024-04-22 NOTE — PROGRESS NOTES
PROGRESS NOTE    Subjective   Chief complaint: Teresa Matthews is a 73 y.o. female who is an acute skilled patient being seen and evaluated for weakness    HPI:  Patient presents for f/u therapy and general medical care.  Patient seen and examined at beside.  Therapy has been working with the patient to improve strength, endurance, ADLs, and transfers d/t generalized weakness. Follow up for c\o post nasal drip. She had c\o SOB with low pox. Improved once O2 was put into place.  Patient in no acute distress.      Objective   Vital signs: 156/88,62,95%,     Physical Exam  Constitutional:       General: She is not in acute distress.  Eyes:      Extraocular Movements: Extraocular movements intact.   Cardiovascular:      Rate and Rhythm: Normal rate and regular rhythm.   Pulmonary:      Effort: Pulmonary effort is normal.      Breath sounds: Normal breath sounds.   Abdominal:      General: Bowel sounds are normal.      Palpations: Abdomen is soft.      Tenderness: There is no abdominal tenderness.   Musculoskeletal:      Cervical back: Neck supple.      Right lower leg: No edema.      Left lower leg: No edema.   Neurological:      Mental Status: She is alert.      Motor: Weakness present.   Psychiatric:         Mood and Affect: Mood normal.         Behavior: Behavior is cooperative.         Assessment/Plan   Problem List Items Addressed This Visit       Type 2 diabetes mellitus with hyperglycemia, with long-term current use of insulin (Multi)     Continue insulin  Glucoscan with sliding scale insulin coverage  Low concentrated sweets diet         Atrial fibrillation (Multi)     Amiodarone  Apixaban  Bleeding precautions  Monitor heart rate         Dyspnea     CXR  Aerosol treatments- duoneb TID x 3 days   Then PRN  Monitor SOB  O2          GERD (gastroesophageal reflux disease)     Monitor for GI symptoms  Famotidine         Hypertensive heart disease with chronic diastolic congestive heart failure (Multi)      CHF stable, no sob  Monitor blood pressure  Toprol-XL  Spironolactone  Losartan potassium         Chronic obstructive pulmonary disease, unspecified COPD type (Multi)     Stable, no wheezing or shortness of breath  On room air         Weakness     Continue working towards goals in therapy         Depression     Monitor mood and behaviors  Bupropion  Desvenlafaxine          Medications, treatments, and labs reviewed  Continue medications and treatments as listed in EMR    Scribe Attestation  I, Kirill Chapman   attest that this documentation has been prepared under the direction and in the presence of Pita Virgen MD.     Provider Attestation - Scribe documentation  All medical record entries made by the Scribe were at my direction and personally dictated by me. I have reviewed the chart and agree that the record accurately reflects my personal performance of the history, physical exam, discussion and plan.   Pita Virgen MD

## 2024-04-22 NOTE — LETTER
Patient: Teresa Matthews  : 1950    Encounter Date: 2024    PROGRESS NOTE    Subjective  Chief complaint: Teresa Matthews is a 73 y.o. female who is an acute skilled patient being seen and evaluated for weakness    HPI:  Patient presents for f/u therapy and general medical care.  Patient seen and examined at beside.  Therapy has been working with the patient to improve strength, endurance, ADLs, and transfers d/t generalized weakness. Follow up for c\o post nasal drip. She had c\o SOB with low pox. Improved once O2 was put into place.  Patient in no acute distress.      Objective  Vital signs: 156/88,62,95%,     Physical Exam  Constitutional:       General: She is not in acute distress.  Eyes:      Extraocular Movements: Extraocular movements intact.   Cardiovascular:      Rate and Rhythm: Normal rate and regular rhythm.   Pulmonary:      Effort: Pulmonary effort is normal.      Breath sounds: Normal breath sounds.   Abdominal:      General: Bowel sounds are normal.      Palpations: Abdomen is soft.      Tenderness: There is no abdominal tenderness.   Musculoskeletal:      Cervical back: Neck supple.      Right lower leg: No edema.      Left lower leg: No edema.   Neurological:      Mental Status: She is alert.      Motor: Weakness present.   Psychiatric:         Mood and Affect: Mood normal.         Behavior: Behavior is cooperative.         Assessment/Plan  Problem List Items Addressed This Visit       Type 2 diabetes mellitus with hyperglycemia, with long-term current use of insulin (Multi)     Continue insulin  Glucoscan with sliding scale insulin coverage  Low concentrated sweets diet         Atrial fibrillation (Multi)     Amiodarone  Apixaban  Bleeding precautions  Monitor heart rate         Dyspnea     CXR  Aerosol treatments- duoneb TID x 3 days   Then PRN  Monitor SOB  O2          GERD (gastroesophageal reflux disease)     Monitor for GI symptoms  Famotidine          Hypertensive heart disease with chronic diastolic congestive heart failure (Multi)     CHF stable, no sob  Monitor blood pressure  Toprol-XL  Spironolactone  Losartan potassium         Chronic obstructive pulmonary disease, unspecified COPD type (Multi)     Stable, no wheezing or shortness of breath  On room air         Weakness     Continue working towards goals in therapy         Depression     Monitor mood and behaviors  Bupropion  Desvenlafaxine          Medications, treatments, and labs reviewed  Continue medications and treatments as listed in EMR    Scribe Attestation  ISharon Scribgénesis   attest that this documentation has been prepared under the direction and in the presence of Pita Virgen MD.     Provider Attestation - Scribe documentation  All medical record entries made by the Scribe were at my direction and personally dictated by me. I have reviewed the chart and agree that the record accurately reflects my personal performance of the history, physical exam, discussion and plan.   Pita Virgen MD      Electronically Signed By: Pita Virgen MD   4/22/24  4:42 PM

## 2024-04-23 ENCOUNTER — NURSING HOME VISIT (OUTPATIENT)
Dept: POST ACUTE CARE | Facility: EXTERNAL LOCATION | Age: 74
End: 2024-04-23
Payer: MEDICARE

## 2024-04-23 DIAGNOSIS — I48.91 ATRIAL FIBRILLATION, UNSPECIFIED TYPE (MULTI): ICD-10-CM

## 2024-04-23 DIAGNOSIS — R06.00 DYSPNEA, UNSPECIFIED TYPE: ICD-10-CM

## 2024-04-23 DIAGNOSIS — E11.65 TYPE 2 DIABETES MELLITUS WITH HYPERGLYCEMIA, WITH LONG-TERM CURRENT USE OF INSULIN (MULTI): ICD-10-CM

## 2024-04-23 DIAGNOSIS — I50.32 HYPERTENSIVE HEART DISEASE WITH CHRONIC DIASTOLIC CONGESTIVE HEART FAILURE (MULTI): ICD-10-CM

## 2024-04-23 DIAGNOSIS — J44.9 CHRONIC OBSTRUCTIVE PULMONARY DISEASE, UNSPECIFIED COPD TYPE (MULTI): ICD-10-CM

## 2024-04-23 DIAGNOSIS — Z79.4 TYPE 2 DIABETES MELLITUS WITH HYPERGLYCEMIA, WITH LONG-TERM CURRENT USE OF INSULIN (MULTI): ICD-10-CM

## 2024-04-23 DIAGNOSIS — I11.0 HYPERTENSIVE HEART DISEASE WITH CHRONIC DIASTOLIC CONGESTIVE HEART FAILURE (MULTI): ICD-10-CM

## 2024-04-23 PROCEDURE — 99308 SBSQ NF CARE LOW MDM 20: CPT | Performed by: INTERNAL MEDICINE

## 2024-04-23 NOTE — PROGRESS NOTES
PROGRESS NOTE    Subjective   Chief complaint: Teresa Matthews is a 73 y.o. female who is an acute skilled patient being seen and evaluated for weakness    HPI:  HPI  Patient is continuing to work in therapy.  Patient remains total dependent for ambulation.  Patient is using slide board for transfers requiring CGA.  Patient continues to work on self-care and ADL tasks.  Patient was seen and examined at the bedside, appears to be in no acute distress.  Denies fever or chills.  Nursing staff voicing no new concerns at this time.    Objective   Vital signs: 134/75, 98.3, 62, 18, blood sugar 122, 94%    Physical Exam  Constitutional:       General: She is not in acute distress.  Eyes:      Extraocular Movements: Extraocular movements intact.   Cardiovascular:      Rate and Rhythm: Normal rate and regular rhythm.   Pulmonary:      Effort: Pulmonary effort is normal.      Breath sounds: Normal breath sounds.   Abdominal:      General: Bowel sounds are normal.      Palpations: Abdomen is soft.      Tenderness: There is no abdominal tenderness.   Musculoskeletal:      Cervical back: Neck supple.      Right lower leg: No edema.      Left lower leg: No edema.   Neurological:      Mental Status: She is alert.      Motor: Weakness present.   Psychiatric:         Mood and Affect: Mood normal.         Behavior: Behavior is cooperative.         Assessment/Plan   Problem List Items Addressed This Visit       Atrial fibrillation (Multi)     Amiodarone  Apixaban  Bleeding precautions  Monitor heart rate         Chronic obstructive pulmonary disease, unspecified COPD type (Multi)     Stable, no wheezing or shortness of breath  On room air         GERD (gastroesophageal reflux disease)     Monitor for GI symptoms  Famotidine         Hypertensive heart disease with chronic diastolic congestive heart failure (Multi)     CHF stable, no sob  Monitor blood pressure  Toprol-XL  Spironolactone  Losartan potassium         Type 2 diabetes  mellitus with hyperglycemia, with long-term current use of insulin (Multi)     Continue insulin  Glucoscan with sliding scale insulin coverage  Low concentrated sweets diet  FBG at goal         Weakness - Primary     Continue working in therapy towards goals          Medications, treatments, and labs reviewed  Continue medications and treatments as listed in EMR      Scribe Attestation  Lorna CHICAS Scribe   attest that this documentation has been prepared under the direction and in the presence of MARISELA Nelson    Provider Attestation - Scribe documentation  All medical record entries made by the Scribe were at my direction and personally dictated by me. I have reviewed the chart and agree that the record accurately reflects my personal performance of the history, physical exam, discussion and plan.   MARISELA Nelson

## 2024-04-23 NOTE — PROGRESS NOTES
PROGRESS NOTE    Subjective   Chief complaint: Teresa Matthews is a 73 y.o. female who is an acute skilled patient being seen and evaluated for weakness    HPI:  Patient presents for follow up to recent SOB. Improving with treatment. Continues on O2 via [place]. No other concerns or complaints.       Objective   Vital signs: 138/76,62,94%,     Physical Exam  Constitutional:       General: She is not in acute distress.  Eyes:      Extraocular Movements: Extraocular movements intact.   Cardiovascular:      Rate and Rhythm: Normal rate and regular rhythm.   Pulmonary:      Effort: Pulmonary effort is normal.      Breath sounds: Normal breath sounds.   Abdominal:      General: Bowel sounds are normal.      Palpations: Abdomen is soft.      Tenderness: There is no abdominal tenderness.   Musculoskeletal:      Cervical back: Neck supple.      Right lower leg: No edema.      Left lower leg: No edema.   Neurological:      Mental Status: She is alert.      Motor: Weakness present.   Psychiatric:         Mood and Affect: Mood normal.         Behavior: Behavior is cooperative.         Assessment/Plan   Problem List Items Addressed This Visit       Type 2 diabetes mellitus with hyperglycemia, with long-term current use of insulin (Multi)     Continue insulin  Glucoscan with sliding scale insulin coverage  Low concentrated sweets diet         Atrial fibrillation (Multi)     Amiodarone  Apixaban  Bleeding precautions  Monitor heart rate         Dyspnea     Aerosol treatments- duoneb TID x 3 days  Then PRN  Monitor SOB  O2          Hypertensive heart disease with chronic diastolic congestive heart failure (Multi)     CHF stable, no sob  Monitor blood pressure  Toprol-XL  Spironolactone  Losartan potassium         Chronic obstructive pulmonary disease, unspecified COPD type (Multi)     Stable, no wheezing or shortness of breath  On room air          Medications, treatments, and labs reviewed  Continue medications and  treatments as listed in EMR    Scribe Attestation  ISharon Scribe   attest that this documentation has been prepared under the direction and in the presence of Pita Virgen MD.     Provider Attestation - Scribe documentation  All medical record entries made by the Scribe were at my direction and personally dictated by me. I have reviewed the chart and agree that the record accurately reflects my personal performance of the history, physical exam, discussion and plan.   Pita Virgen MD

## 2024-04-23 NOTE — LETTER
Patient: Teresa Matthews  : 1950    Encounter Date: 2024    PROGRESS NOTE    Subjective  Chief complaint: Teresa Matthews is a 73 y.o. female who is an acute skilled patient being seen and evaluated for weakness    HPI:  Patient presents for follow up to recent SOB. Improving with treatment. Continues on O2 via NC. No other concerns or complaints.       Objective  Vital signs: 138/76,62,94%,     Physical Exam  Constitutional:       General: She is not in acute distress.  Eyes:      Extraocular Movements: Extraocular movements intact.   Cardiovascular:      Rate and Rhythm: Normal rate and regular rhythm.   Pulmonary:      Effort: Pulmonary effort is normal.      Breath sounds: Normal breath sounds.   Abdominal:      General: Bowel sounds are normal.      Palpations: Abdomen is soft.      Tenderness: There is no abdominal tenderness.   Musculoskeletal:      Cervical back: Neck supple.      Right lower leg: No edema.      Left lower leg: No edema.   Neurological:      Mental Status: She is alert.      Motor: Weakness present.   Psychiatric:         Mood and Affect: Mood normal.         Behavior: Behavior is cooperative.         Assessment/Plan  Problem List Items Addressed This Visit       Type 2 diabetes mellitus with hyperglycemia, with long-term current use of insulin (Multi)     Continue insulin  Glucoscan with sliding scale insulin coverage  Low concentrated sweets diet         Atrial fibrillation (Multi)     Amiodarone  Apixaban  Bleeding precautions  Monitor heart rate         Dyspnea     Aerosol treatments- duoneb TID x 3 days  Then PRN  Monitor SOB  O2          Hypertensive heart disease with chronic diastolic congestive heart failure (Multi)     CHF stable, no sob  Monitor blood pressure  Toprol-XL  Spironolactone  Losartan potassium         Chronic obstructive pulmonary disease, unspecified COPD type (Multi)     Stable, no wheezing or shortness of breath  On room air           Medications, treatments, and labs reviewed  Continue medications and treatments as listed in EMR    Scribe Attestation  I, Kirill Chapman   attest that this documentation has been prepared under the direction and in the presence of Pita Virgen MD.     Provider Attestation - Scribe documentation  All medical record entries made by the Scribe were at my direction and personally dictated by me. I have reviewed the chart and agree that the record accurately reflects my personal performance of the history, physical exam, discussion and plan.   Pita Virgen MD      Electronically Signed By: Pita Virgen MD   4/23/24  3:10 PM   [] : Resident

## 2024-04-24 ENCOUNTER — NURSING HOME VISIT (OUTPATIENT)
Dept: POST ACUTE CARE | Facility: EXTERNAL LOCATION | Age: 74
End: 2024-04-24
Payer: MEDICARE

## 2024-04-24 DIAGNOSIS — J44.9 CHRONIC OBSTRUCTIVE PULMONARY DISEASE, UNSPECIFIED COPD TYPE (MULTI): ICD-10-CM

## 2024-04-24 DIAGNOSIS — E11.65 TYPE 2 DIABETES MELLITUS WITH HYPERGLYCEMIA, WITH LONG-TERM CURRENT USE OF INSULIN (MULTI): ICD-10-CM

## 2024-04-24 DIAGNOSIS — I50.32 HYPERTENSIVE HEART DISEASE WITH CHRONIC DIASTOLIC CONGESTIVE HEART FAILURE (MULTI): ICD-10-CM

## 2024-04-24 DIAGNOSIS — Z79.4 TYPE 2 DIABETES MELLITUS WITH HYPERGLYCEMIA, WITH LONG-TERM CURRENT USE OF INSULIN (MULTI): ICD-10-CM

## 2024-04-24 DIAGNOSIS — I48.91 ATRIAL FIBRILLATION, UNSPECIFIED TYPE (MULTI): ICD-10-CM

## 2024-04-24 DIAGNOSIS — I11.0 HYPERTENSIVE HEART DISEASE WITH CHRONIC DIASTOLIC CONGESTIVE HEART FAILURE (MULTI): ICD-10-CM

## 2024-04-24 DIAGNOSIS — R53.1 WEAKNESS: Primary | ICD-10-CM

## 2024-04-24 PROCEDURE — 99309 SBSQ NF CARE MODERATE MDM 30: CPT | Performed by: NURSE PRACTITIONER

## 2024-04-24 NOTE — LETTER
Patient: Teresa Matthews  : 1950    Encounter Date: 2024    PROGRESS NOTE    Subjective  Chief complaint: Teresa Matthews is a 73 y.o. female who is an acute skilled patient being seen and evaluated for weakness    HPI:  HPI  Patient does continue working in therapy.  Patient is requiring total dependence for ambulation.  Patient is working on slide board transfers with SBA.  Patient requires moderate independence for ADL tasks.  Patient seen and examined at the bedside, appears to be in no acute distress.    Objective  Vital signs: 128/71, 97.3, 88, 18, 99%, blood sugar 142    Physical Exam  Constitutional:       General: She is not in acute distress.  Eyes:      Extraocular Movements: Extraocular movements intact.   Cardiovascular:      Rate and Rhythm: Normal rate and regular rhythm.   Pulmonary:      Effort: Pulmonary effort is normal.      Breath sounds: Normal breath sounds.   Abdominal:      General: Bowel sounds are normal.      Palpations: Abdomen is soft.      Tenderness: There is no abdominal tenderness.   Musculoskeletal:      Cervical back: Neck supple.      Right lower leg: No edema.      Left lower leg: No edema.   Neurological:      Mental Status: She is alert.      Motor: Weakness present.   Psychiatric:         Mood and Affect: Mood normal.         Behavior: Behavior is cooperative.         Assessment/Plan  Problem List Items Addressed This Visit       Type 2 diabetes mellitus with hyperglycemia, with long-term current use of insulin (Multi)     Continue insulin  Glucoscan with sliding scale insulin coverage  Low concentrated sweets diet  FBG at goal         Atrial fibrillation (Multi)     Amiodarone  Apixaban  Bleeding precautions  Monitor heart rate         Hypertensive heart disease with chronic diastolic congestive heart failure (Multi)     CHF stable, no sob  Monitor blood pressure  Toprol-XL  Spironolactone  Losartan potassium         Chronic obstructive pulmonary  disease, unspecified COPD type (Multi)     Stable, no wheezing or shortness of breath  On room air         Weakness - Primary     Continue progressing towards goals in therapy          Medications, treatments, and labs reviewed  Continue medications and treatments as listed in EMR      Scribe Attestation  ILorna Scribe   attest that this documentation has been prepared under the direction and in the presence of MARISELA Nelson    Provider Attestation - Scribe documentation  All medical record entries made by the Scribe were at my direction and personally dictated by me. I have reviewed the chart and agree that the record accurately reflects my personal performance of the history, physical exam, discussion and plan.   MARISELA Nelson        Electronically Signed By: MARISELA Nelson   5/3/24  2:31 PM

## 2024-04-25 ENCOUNTER — NURSING HOME VISIT (OUTPATIENT)
Dept: POST ACUTE CARE | Facility: EXTERNAL LOCATION | Age: 74
End: 2024-04-25
Payer: MEDICARE

## 2024-04-25 DIAGNOSIS — Z79.4 TYPE 2 DIABETES MELLITUS WITH HYPERGLYCEMIA, WITH LONG-TERM CURRENT USE OF INSULIN (MULTI): ICD-10-CM

## 2024-04-25 DIAGNOSIS — I11.0 HYPERTENSIVE HEART DISEASE WITH CHRONIC DIASTOLIC CONGESTIVE HEART FAILURE (MULTI): ICD-10-CM

## 2024-04-25 DIAGNOSIS — I48.91 ATRIAL FIBRILLATION, UNSPECIFIED TYPE (MULTI): ICD-10-CM

## 2024-04-25 DIAGNOSIS — E11.65 TYPE 2 DIABETES MELLITUS WITH HYPERGLYCEMIA, WITH LONG-TERM CURRENT USE OF INSULIN (MULTI): ICD-10-CM

## 2024-04-25 DIAGNOSIS — R53.1 WEAKNESS: Primary | ICD-10-CM

## 2024-04-25 DIAGNOSIS — I50.32 HYPERTENSIVE HEART DISEASE WITH CHRONIC DIASTOLIC CONGESTIVE HEART FAILURE (MULTI): ICD-10-CM

## 2024-04-25 PROCEDURE — 99308 SBSQ NF CARE LOW MDM 20: CPT | Performed by: INTERNAL MEDICINE

## 2024-04-25 NOTE — PROGRESS NOTES
PROGRESS NOTE    Subjective   Chief complaint: Teresa Matthews is a 73 y.o. female who is an acute skilled patient being seen and evaluated for weakness    HPI:  HPI  Patient does continue working in therapy due to generalized weakness.  Patient is working towards goals working on ADLs.  Patient is seen in follow-up of shortness of breath over the weekend.  Patient had chest x-ray which was negative and was started on aerosol treatments.  Patient reporting postnasal has improved as well as cough.  Patient denies fever or chills.  Nursing staff voicing no new concerns.    Objective   Vital signs: 133/75, 97.4, 63, 18, 95%, blood sugar 175    Physical Exam  Constitutional:       General: She is not in acute distress.  Eyes:      Extraocular Movements: Extraocular movements intact.   Cardiovascular:      Rate and Rhythm: Normal rate and regular rhythm.   Pulmonary:      Effort: Pulmonary effort is normal.      Breath sounds: Normal breath sounds.   Abdominal:      General: Bowel sounds are normal.      Palpations: Abdomen is soft.      Tenderness: There is no abdominal tenderness.   Musculoskeletal:      Cervical back: Neck supple.      Right lower leg: No edema.      Left lower leg: No edema.   Neurological:      Mental Status: She is alert.      Motor: Weakness present.   Psychiatric:         Mood and Affect: Mood normal.         Behavior: Behavior is cooperative.         Assessment/Plan   Problem List Items Addressed This Visit       Type 2 diabetes mellitus with hyperglycemia, with long-term current use of insulin (Multi)     Continue insulin  Glucoscan with sliding scale insulin coverage  Low concentrated sweets diet  FBG at goal         Atrial fibrillation (Multi)     Amiodarone  Apixaban  Bleeding precautions  Monitor heart rate         Hypertensive heart disease with chronic diastolic congestive heart failure (Multi)     CHF stable, no sob  Monitor blood pressure  Toprol-XL  Spironolactone  Losartan  potassium         Weakness - Primary     Continue working in therapy          Medications, treatments, and labs reviewed  Continue medications and treatments as listed in EMR      Scribe Attestation  I, Kirill Page   attest that this documentation has been prepared under the direction and in the presence of Pita Virgen MD    Provider Attestation - Scribe documentation  All medical record entries made by the Scribe were at my direction and personally dictated by me. I have reviewed the chart and agree that the record accurately reflects my personal performance of the history, physical exam, discussion and plan.   Pita Virgen MD

## 2024-04-25 NOTE — LETTER
Patient: Teresa Matthews  : 1950    Encounter Date: 2024    PROGRESS NOTE    Subjective  Chief complaint: Teresa Matthews is a 73 y.o. female who is an acute skilled patient being seen and evaluated for weakness    HPI:  HPI  Patient does continue working in therapy due to generalized weakness.  Patient is working towards goals working on ADLs.  Patient is seen in follow-up of shortness of breath over the weekend.  Patient had chest x-ray which was negative and was started on aerosol treatments.  Patient reporting postnasal has improved as well as cough.  Patient denies fever or chills.  Nursing staff voicing no new concerns.    Objective  Vital signs: 133/75, 97.4, 63, 18, 95%, blood sugar 175    Physical Exam  Constitutional:       General: She is not in acute distress.  Eyes:      Extraocular Movements: Extraocular movements intact.   Cardiovascular:      Rate and Rhythm: Normal rate and regular rhythm.   Pulmonary:      Effort: Pulmonary effort is normal.      Breath sounds: Normal breath sounds.   Abdominal:      General: Bowel sounds are normal.      Palpations: Abdomen is soft.      Tenderness: There is no abdominal tenderness.   Musculoskeletal:      Cervical back: Neck supple.      Right lower leg: No edema.      Left lower leg: No edema.   Neurological:      Mental Status: She is alert.      Motor: Weakness present.   Psychiatric:         Mood and Affect: Mood normal.         Behavior: Behavior is cooperative.         Assessment/Plan  Problem List Items Addressed This Visit       Type 2 diabetes mellitus with hyperglycemia, with long-term current use of insulin (Multi)     Continue insulin  Glucoscan with sliding scale insulin coverage  Low concentrated sweets diet  FBG at goal         Atrial fibrillation (Multi)     Amiodarone  Apixaban  Bleeding precautions  Monitor heart rate         Hypertensive heart disease with chronic diastolic congestive heart failure (Multi)     CHF stable,  no sob  Monitor blood pressure  Toprol-XL  Spironolactone  Losartan potassium         Weakness - Primary     Continue working in therapy          Medications, treatments, and labs reviewed  Continue medications and treatments as listed in EMR      Scribe Attestation  I, Kirill Page   attest that this documentation has been prepared under the direction and in the presence of Pita Virgen MD    Provider Attestation - Scribe documentation  All medical record entries made by the Scribe were at my direction and personally dictated by me. I have reviewed the chart and agree that the record accurately reflects my personal performance of the history, physical exam, discussion and plan.   Pita Virgen MD        Electronically Signed By: Pita Virgen MD   4/25/24  2:10 PM

## 2024-04-26 ENCOUNTER — NURSING HOME VISIT (OUTPATIENT)
Dept: POST ACUTE CARE | Facility: EXTERNAL LOCATION | Age: 74
End: 2024-04-26
Payer: MEDICARE

## 2024-04-26 DIAGNOSIS — J40 BRONCHITIS: ICD-10-CM

## 2024-04-26 DIAGNOSIS — R53.1 WEAKNESS: ICD-10-CM

## 2024-04-26 DIAGNOSIS — Z79.4 TYPE 2 DIABETES MELLITUS WITH HYPERGLYCEMIA, WITH LONG-TERM CURRENT USE OF INSULIN (MULTI): ICD-10-CM

## 2024-04-26 DIAGNOSIS — I11.0 HYPERTENSIVE HEART DISEASE WITH CHRONIC DIASTOLIC CONGESTIVE HEART FAILURE (MULTI): Primary | ICD-10-CM

## 2024-04-26 DIAGNOSIS — E11.65 TYPE 2 DIABETES MELLITUS WITH HYPERGLYCEMIA, WITH LONG-TERM CURRENT USE OF INSULIN (MULTI): ICD-10-CM

## 2024-04-26 DIAGNOSIS — I50.32 HYPERTENSIVE HEART DISEASE WITH CHRONIC DIASTOLIC CONGESTIVE HEART FAILURE (MULTI): Primary | ICD-10-CM

## 2024-04-26 DIAGNOSIS — R09.81 NASAL CONGESTION: ICD-10-CM

## 2024-04-26 PROCEDURE — 99309 SBSQ NF CARE MODERATE MDM 30: CPT | Performed by: NURSE PRACTITIONER

## 2024-04-26 NOTE — LETTER
Patient: Teresa Matthews  : 1950    Encounter Date: 2024    PROGRESS NOTE    Subjective  Chief complaint: Teresa Matthews is a 73 y.o. female who is an acute skilled patient being seen and evaluated for weakness    HPI:Thing I want back  HPI   patient presents for f/u therapy and general medical care.  Patient seen and examined at bedside.  Therapy has been working with the patient to improve strength, endurance, ADLs, and transfers d/t generalized weakness. Patient reports she continues to have a non productive cough but is feeling a little bit better. Lung sounds with wheezing according to the nurse patient complains of nasal congestion. Denies sob fever and chills.     Objective  Vital signs: 138/86, 97.4, 24, 18, 98%, blood sugars 321    Physical Exam  Constitutional:       General: She is not in acute distress.  HENT:      Nose:      Comments: Nasal congestion  Eyes:      Extraocular Movements: Extraocular movements intact.   Pulmonary:      Effort: Pulmonary effort is normal.   Musculoskeletal:      Cervical back: Neck supple.   Neurological:      Mental Status: She is alert.      Motor: Weakness present.   Psychiatric:         Mood and Affect: Mood normal.         Behavior: Behavior is cooperative.     An interactive audio and/or video telecommunication system which permits real time communications between the patient (at the originating site) and provider (at a distant site) was utilized to provide this telehealth service after obtaining verbal consent.      Assessment/Plan  Problem List Items Addressed This Visit       Bronchitis     Start medrol dose pack and tessalon pearls          Hypertensive heart disease with chronic diastolic congestive heart failure (Multi) - Primary     BP at goal  CHF stable, no sob  Monitor blood pressure and weight  Metoprolol  Aldactone  Losartan         Nasal congestion     Flonase         Type 2 diabetes mellitus with hyperglycemia, with long-term  current use of insulin (Multi)     Continue insulin  Glucoscan with sliding scale insulin coverage  Low concentrated sweets diet         Weakness     Continue working in therapy          Medications, treatments, and labs reviewed  Continue medications and treatments as listed in EMR    MARISELA Nelson    Scribe Attestation  Luz Elena CHICAS Scribe   attest that this documentation has been prepared under the direction and in the presence of MARISELA Nelson    Provider Attestation - Scribe documentation  All medical record entries made by the Scribe were at my direction and personally dictated by me. I have reviewed the chart and agree that the record accurately reflects my personal performance of the history, physical exam, discussion and plan.      Electronically Signed By: MARISELA Nelosn   5/5/24 11:26 AM

## 2024-04-28 PROBLEM — R05.8 NON-PRODUCTIVE COUGH: Status: ACTIVE | Noted: 2024-04-28

## 2024-04-28 PROBLEM — R09.81 NASAL CONGESTION: Status: ACTIVE | Noted: 2024-04-28

## 2024-04-28 PROBLEM — R06.2 WHEEZING: Status: ACTIVE | Noted: 2024-04-28

## 2024-04-28 PROBLEM — J40 BRONCHITIS: Status: ACTIVE | Noted: 2024-04-28

## 2024-04-28 NOTE — PROGRESS NOTES
PROGRESS NOTE    Subjective   Chief complaint: Teresa Matthews is a 73 y.o. female who is an acute skilled patient being seen and evaluated for weakness    HPI:Thing I want back  HPI   patient presents for f/u therapy and general medical care.  Patient seen and examined at bedside.  Therapy has been working with the patient to improve strength, endurance, ADLs, and transfers d/t generalized weakness. Patient reports she continues to have a non productive cough but is feeling a little bit better. Lung sounds with wheezing according to the nurse patient complains of nasal congestion. Denies sob fever and chills.     Objective   Vital signs: 138/86, 97.4, 24, 18, 98%, blood sugars 321    Physical Exam  Constitutional:       General: She is not in acute distress.  HENT:      Nose:      Comments: Nasal congestion  Eyes:      Extraocular Movements: Extraocular movements intact.   Pulmonary:      Effort: Pulmonary effort is normal.   Musculoskeletal:      Cervical back: Neck supple.   Neurological:      Mental Status: She is alert.      Motor: Weakness present.   Psychiatric:         Mood and Affect: Mood normal.         Behavior: Behavior is cooperative.     An interactive audio and/or video telecommunication system which permits real time communications between the patient (at the originating site) and provider (at a distant site) was utilized to provide this telehealth service after obtaining verbal consent.      Assessment/Plan   Problem List Items Addressed This Visit       Bronchitis     Start medrol dose pack and tessalon pearls          Hypertensive heart disease with chronic diastolic congestive heart failure (Multi) - Primary     BP at goal  CHF stable, no sob  Monitor blood pressure and weight  Metoprolol  Aldactone  Losartan         Nasal congestion     Flonase         Type 2 diabetes mellitus with hyperglycemia, with long-term current use of insulin (Multi)     Continue insulin  Glucoscan with sliding  scale insulin coverage  Low concentrated sweets diet         Weakness     Continue working in therapy          Medications, treatments, and labs reviewed  Continue medications and treatments as listed in EMR    MARISELA Nelson    Scribe Attestation  ILuz Elena Scribe   attest that this documentation has been prepared under the direction and in the presence of MARISELA Nelson    Provider Attestation - Scribe documentation  All medical record entries made by the Scribe were at my direction and personally dictated by me. I have reviewed the chart and agree that the record accurately reflects my personal performance of the history, physical exam, discussion and plan.

## 2024-04-29 ENCOUNTER — NURSING HOME VISIT (OUTPATIENT)
Dept: POST ACUTE CARE | Facility: EXTERNAL LOCATION | Age: 74
End: 2024-04-29
Payer: MEDICARE

## 2024-04-29 DIAGNOSIS — I11.0 HYPERTENSIVE HEART DISEASE WITH CHRONIC DIASTOLIC CONGESTIVE HEART FAILURE (MULTI): ICD-10-CM

## 2024-04-29 DIAGNOSIS — Z79.4 TYPE 2 DIABETES MELLITUS WITH HYPERGLYCEMIA, WITH LONG-TERM CURRENT USE OF INSULIN (MULTI): ICD-10-CM

## 2024-04-29 DIAGNOSIS — I50.32 HYPERTENSIVE HEART DISEASE WITH CHRONIC DIASTOLIC CONGESTIVE HEART FAILURE (MULTI): ICD-10-CM

## 2024-04-29 DIAGNOSIS — J44.9 CHRONIC OBSTRUCTIVE PULMONARY DISEASE, UNSPECIFIED COPD TYPE (MULTI): ICD-10-CM

## 2024-04-29 DIAGNOSIS — R53.1 WEAKNESS: ICD-10-CM

## 2024-04-29 DIAGNOSIS — E11.65 TYPE 2 DIABETES MELLITUS WITH HYPERGLYCEMIA, WITH LONG-TERM CURRENT USE OF INSULIN (MULTI): ICD-10-CM

## 2024-04-29 PROCEDURE — 99308 SBSQ NF CARE LOW MDM 20: CPT | Performed by: INTERNAL MEDICINE

## 2024-04-29 NOTE — LETTER
Patient: Teresa Matthews  : 1950    Encounter Date: 2024    PROGRESS NOTE    Subjective  Chief complaint: Teresa Matthews is a 73 y.o. female who is an acute skilled patient being seen and evaluated for weakness    HPI:  Patient in therapy d/t generalized weakness.  Patient presents for f/u.  Continues to work toward goals in therapy. Patient BS elevated, 402, current she is on prednisone. Nursing reports she is also non compliant with her diet. No other concerns at this time.       Objective  Vital signs: 159/95,56,95%,     Physical Exam  Constitutional:       General: She is not in acute distress.  Eyes:      Extraocular Movements: Extraocular movements intact.   Cardiovascular:      Rate and Rhythm: Normal rate and regular rhythm.   Pulmonary:      Effort: Pulmonary effort is normal.      Breath sounds: Normal breath sounds.   Abdominal:      General: Bowel sounds are normal.      Palpations: Abdomen is soft.      Tenderness: There is no abdominal tenderness.   Musculoskeletal:      Cervical back: Neck supple.      Right lower leg: No edema.      Left lower leg: No edema.   Neurological:      Mental Status: She is alert.      Motor: Weakness present.   Psychiatric:         Mood and Affect: Mood normal.         Behavior: Behavior is cooperative.         Assessment/Plan  Problem List Items Addressed This Visit       Type 2 diabetes mellitus with hyperglycemia, with long-term current use of insulin (Multi)     Continue insulin  Glucoscan with sliding scale insulin coverage  Low concentrated sweets diet           Hypertensive heart disease with chronic diastolic congestive heart failure (Multi)     CHF stable, no sob  Monitor blood pressure  Toprol-XL  Spironolactone  Losartan potassium         Chronic obstructive pulmonary disease, unspecified COPD type (Multi)     Stable, no wheezing or shortness of breath  On room air         Weakness     Continue progressing towards goals in therapy           Medications, treatments, and labs reviewed  Continue medications and treatments as listed in EMR    Scribe Attestation  I, Kirill Chapman   attest that this documentation has been prepared under the direction and in the presence of Pita Virgen MD.     Provider Attestation - Scribe documentation  All medical record entries made by the Scribe were at my direction and personally dictated by me. I have reviewed the chart and agree that the record accurately reflects my personal performance of the history, physical exam, discussion and plan.   Pita Virgen MD      Electronically Signed By: Pita Virgen MD   4/29/24  5:00 PM

## 2024-04-29 NOTE — PROGRESS NOTES
PROGRESS NOTE    Subjective   Chief complaint: Teresa Matthews is a 73 y.o. female who is an acute skilled patient being seen and evaluated for weakness    HPI:  Patient in therapy d/t generalized weakness.  Patient presents for f/u.  Continues to work toward goals in therapy. Patient BS elevated, 402, current she is on prednisone. Nursing reports she is also non compliant with her diet. No other concerns at this time.       Objective   Vital signs: 159/95,56,95%,     Physical Exam  Constitutional:       General: She is not in acute distress.  Eyes:      Extraocular Movements: Extraocular movements intact.   Cardiovascular:      Rate and Rhythm: Normal rate and regular rhythm.   Pulmonary:      Effort: Pulmonary effort is normal.      Breath sounds: Normal breath sounds.   Abdominal:      General: Bowel sounds are normal.      Palpations: Abdomen is soft.      Tenderness: There is no abdominal tenderness.   Musculoskeletal:      Cervical back: Neck supple.      Right lower leg: No edema.      Left lower leg: No edema.   Neurological:      Mental Status: She is alert.      Motor: Weakness present.   Psychiatric:         Mood and Affect: Mood normal.         Behavior: Behavior is cooperative.         Assessment/Plan   Problem List Items Addressed This Visit       Type 2 diabetes mellitus with hyperglycemia, with long-term current use of insulin (Multi)     Continue insulin  Glucoscan with sliding scale insulin coverage  Low concentrated sweets diet           Hypertensive heart disease with chronic diastolic congestive heart failure (Multi)     CHF stable, no sob  Monitor blood pressure  Toprol-XL  Spironolactone  Losartan potassium         Chronic obstructive pulmonary disease, unspecified COPD type (Multi)     Stable, no wheezing or shortness of breath  On room air         Weakness     Continue progressing towards goals in therapy          Medications, treatments, and labs reviewed  Continue medications  and treatments as listed in EMR    Scribe Attestation  I, Kirill Chapman   attest that this documentation has been prepared under the direction and in the presence of Pita Virgen MD.     Provider Attestation - Scribe documentation  All medical record entries made by the Scribe were at my direction and personally dictated by me. I have reviewed the chart and agree that the record accurately reflects my personal performance of the history, physical exam, discussion and plan.   Pita Virgen MD

## 2024-04-29 NOTE — PROGRESS NOTES
PROGRESS NOTE    Subjective   Chief complaint: Teresa Matthews is a 73 y.o. female who is an acute skilled patient being seen and evaluated for weakness    HPI:  HPI  Patient does continue working in therapy.  Patient is requiring total dependence for ambulation.  Patient is working on slide board transfers with SBA.  Patient requires moderate independence for ADL tasks.  Patient seen and examined at the bedside, appears to be in no acute distress.    Objective   Vital signs: 128/71, 97.3, 88, 18, 99%, blood sugar 142    Physical Exam  Constitutional:       General: She is not in acute distress.  Eyes:      Extraocular Movements: Extraocular movements intact.   Cardiovascular:      Rate and Rhythm: Normal rate and regular rhythm.   Pulmonary:      Effort: Pulmonary effort is normal.      Breath sounds: Normal breath sounds.   Abdominal:      General: Bowel sounds are normal.      Palpations: Abdomen is soft.      Tenderness: There is no abdominal tenderness.   Musculoskeletal:      Cervical back: Neck supple.      Right lower leg: No edema.      Left lower leg: No edema.   Neurological:      Mental Status: She is alert.      Motor: Weakness present.   Psychiatric:         Mood and Affect: Mood normal.         Behavior: Behavior is cooperative.         Assessment/Plan   Problem List Items Addressed This Visit       Type 2 diabetes mellitus with hyperglycemia, with long-term current use of insulin (Multi)     Continue insulin  Glucoscan with sliding scale insulin coverage  Low concentrated sweets diet  FBG at goal         Atrial fibrillation (Multi)     Amiodarone  Apixaban  Bleeding precautions  Monitor heart rate         Hypertensive heart disease with chronic diastolic congestive heart failure (Multi)     CHF stable, no sob  Monitor blood pressure  Toprol-XL  Spironolactone  Losartan potassium         Chronic obstructive pulmonary disease, unspecified COPD type (Multi)     Stable, no wheezing or shortness of  breath  On room air         Weakness - Primary     Continue progressing towards goals in therapy          Medications, treatments, and labs reviewed  Continue medications and treatments as listed in EMR      Scribe Attestation  ILorna Scribe   attest that this documentation has been prepared under the direction and in the presence of MARISELA Nelson    Provider Attestation - Scribe documentation  All medical record entries made by the Scribe were at my direction and personally dictated by me. I have reviewed the chart and agree that the record accurately reflects my personal performance of the history, physical exam, discussion and plan.   MARISELA Nelson

## 2024-04-30 ENCOUNTER — NURSING HOME VISIT (OUTPATIENT)
Dept: POST ACUTE CARE | Facility: EXTERNAL LOCATION | Age: 74
End: 2024-04-30
Payer: MEDICARE

## 2024-04-30 DIAGNOSIS — I11.0 HYPERTENSIVE HEART DISEASE WITH CHRONIC DIASTOLIC CONGESTIVE HEART FAILURE (MULTI): ICD-10-CM

## 2024-04-30 DIAGNOSIS — I50.32 HYPERTENSIVE HEART DISEASE WITH CHRONIC DIASTOLIC CONGESTIVE HEART FAILURE (MULTI): ICD-10-CM

## 2024-04-30 DIAGNOSIS — R53.1 WEAKNESS: ICD-10-CM

## 2024-04-30 DIAGNOSIS — K21.9 GASTROESOPHAGEAL REFLUX DISEASE WITHOUT ESOPHAGITIS: ICD-10-CM

## 2024-04-30 DIAGNOSIS — E11.65 TYPE 2 DIABETES MELLITUS WITH HYPERGLYCEMIA, WITH LONG-TERM CURRENT USE OF INSULIN (MULTI): ICD-10-CM

## 2024-04-30 DIAGNOSIS — J44.9 CHRONIC OBSTRUCTIVE PULMONARY DISEASE, UNSPECIFIED COPD TYPE (MULTI): ICD-10-CM

## 2024-04-30 DIAGNOSIS — Z79.4 TYPE 2 DIABETES MELLITUS WITH HYPERGLYCEMIA, WITH LONG-TERM CURRENT USE OF INSULIN (MULTI): ICD-10-CM

## 2024-04-30 DIAGNOSIS — I48.91 ATRIAL FIBRILLATION, UNSPECIFIED TYPE (MULTI): ICD-10-CM

## 2024-04-30 PROCEDURE — 99308 SBSQ NF CARE LOW MDM 20: CPT | Performed by: INTERNAL MEDICINE

## 2024-04-30 NOTE — LETTER
Patient: Teresa Matthews  : 1950    Encounter Date: 2024    PROGRESS NOTE    Subjective  Chief complaint: Teresa Matthews is a 73 y.o. female who is an acute skilled patient being seen and evaluated for weakness    HPI:  Patient has been working in therapy to improve strength, endurance, and ADLs.  Patient continues to work toward goals. Patient is non ambulatory. She uses sliding board with transfers at A. Requires mod assist with ADLs. BS elevated d\t steroid.  No new concerns today.  Denies n/v/f/c pain.        Objective  Vital signs: 162*94,80,93%,     Physical Exam  Constitutional:       General: She is not in acute distress.  Eyes:      Extraocular Movements: Extraocular movements intact.   Cardiovascular:      Rate and Rhythm: Normal rate and regular rhythm.   Pulmonary:      Effort: Pulmonary effort is normal.      Breath sounds: Normal breath sounds.   Abdominal:      General: Bowel sounds are normal.      Palpations: Abdomen is soft.      Tenderness: There is no abdominal tenderness.   Musculoskeletal:      Cervical back: Neck supple.      Right lower leg: No edema.      Left lower leg: No edema.   Neurological:      Mental Status: She is alert.      Motor: Weakness present.   Psychiatric:         Mood and Affect: Mood normal.         Behavior: Behavior is cooperative.         Assessment/Plan  Problem List Items Addressed This Visit       Type 2 diabetes mellitus with hyperglycemia, with long-term current use of insulin (Multi)     Continue insulin  Glucoscan with sliding scale insulin coverage  Low concentrated sweets diet           Atrial fibrillation (Multi)     Amiodarone  Apixaban  Bleeding precautions  Monitor heart rate         GERD (gastroesophageal reflux disease)     Monitor for GI symptoms  Famotidine         Hypertensive heart disease with chronic diastolic congestive heart failure (Multi)     CHF stable, no sob  Monitor blood  pressure  Toprol-XL  Spironolactone  Losartan potassium         Chronic obstructive pulmonary disease, unspecified COPD type (Multi)     Stable, no wheezing or shortness of breath  On room air         Weakness     Continue progressing towards goals in therapy          Medications, treatments, and labs reviewed  Continue medications and treatments as listed in EMR    Scribe Attestation  Sharon CHICAS Scribe   attest that this documentation has been prepared under the direction and in the presence of Pita Virgen MD.     Provider Attestation - Scribe documentation  All medical record entries made by the Scribe were at my direction and personally dictated by me. I have reviewed the chart and agree that the record accurately reflects my personal performance of the history, physical exam, discussion and plan.   Pita Virgen MD      Electronically Signed By: Pita Virgen MD   4/30/24  5:00 PM

## 2024-04-30 NOTE — PROGRESS NOTES
PROGRESS NOTE    Subjective   Chief complaint: Teresa Matthews is a 73 y.o. female who is an acute skilled patient being seen and evaluated for weakness    HPI:  Patient has been working in therapy to improve strength, endurance, and ADLs.  Patient continues to work toward goals. Patient is non ambulatory. She uses sliding board with transfers at SBA. Requires mod assist with ADLs. BS elevated d\t steroid.  No new concerns today.  Denies n/v/f/c pain.        Objective   Vital signs: 162*94,80,93%,     Physical Exam  Constitutional:       General: She is not in acute distress.  Eyes:      Extraocular Movements: Extraocular movements intact.   Cardiovascular:      Rate and Rhythm: Normal rate and regular rhythm.   Pulmonary:      Effort: Pulmonary effort is normal.      Breath sounds: Normal breath sounds.   Abdominal:      General: Bowel sounds are normal.      Palpations: Abdomen is soft.      Tenderness: There is no abdominal tenderness.   Musculoskeletal:      Cervical back: Neck supple.      Right lower leg: No edema.      Left lower leg: No edema.   Neurological:      Mental Status: She is alert.      Motor: Weakness present.   Psychiatric:         Mood and Affect: Mood normal.         Behavior: Behavior is cooperative.         Assessment/Plan   Problem List Items Addressed This Visit       Type 2 diabetes mellitus with hyperglycemia, with long-term current use of insulin (Multi)     Continue insulin  Glucoscan with sliding scale insulin coverage  Low concentrated sweets diet           Atrial fibrillation (Multi)     Amiodarone  Apixaban  Bleeding precautions  Monitor heart rate         GERD (gastroesophageal reflux disease)     Monitor for GI symptoms  Famotidine         Hypertensive heart disease with chronic diastolic congestive heart failure (Multi)     CHF stable, no sob  Monitor blood pressure  Toprol-XL  Spironolactone  Losartan potassium         Chronic obstructive pulmonary disease,  unspecified COPD type (Multi)     Stable, no wheezing or shortness of breath  On room air         Weakness     Continue progressing towards goals in therapy          Medications, treatments, and labs reviewed  Continue medications and treatments as listed in EMR    Scribe Attestation  JUAN FRANCISCO, Kirill Chapman   attest that this documentation has been prepared under the direction and in the presence of Pita Virgen MD.     Provider Attestation - Scribe documentation  All medical record entries made by the Scribe were at my direction and personally dictated by me. I have reviewed the chart and agree that the record accurately reflects my personal performance of the history, physical exam, discussion and plan.   Pita Virgen MD

## 2024-05-01 ENCOUNTER — NURSING HOME VISIT (OUTPATIENT)
Dept: POST ACUTE CARE | Facility: EXTERNAL LOCATION | Age: 74
End: 2024-05-01
Payer: MEDICARE

## 2024-05-01 DIAGNOSIS — E11.65 TYPE 2 DIABETES MELLITUS WITH HYPERGLYCEMIA, WITH LONG-TERM CURRENT USE OF INSULIN (MULTI): ICD-10-CM

## 2024-05-01 DIAGNOSIS — I48.91 ATRIAL FIBRILLATION, UNSPECIFIED TYPE (MULTI): ICD-10-CM

## 2024-05-01 DIAGNOSIS — I50.32 HYPERTENSIVE HEART DISEASE WITH CHRONIC DIASTOLIC CONGESTIVE HEART FAILURE (MULTI): ICD-10-CM

## 2024-05-01 DIAGNOSIS — Z79.4 TYPE 2 DIABETES MELLITUS WITH HYPERGLYCEMIA, WITH LONG-TERM CURRENT USE OF INSULIN (MULTI): ICD-10-CM

## 2024-05-01 DIAGNOSIS — I11.0 HYPERTENSIVE HEART DISEASE WITH CHRONIC DIASTOLIC CONGESTIVE HEART FAILURE (MULTI): ICD-10-CM

## 2024-05-01 DIAGNOSIS — J44.9 CHRONIC OBSTRUCTIVE PULMONARY DISEASE, UNSPECIFIED COPD TYPE (MULTI): ICD-10-CM

## 2024-05-01 DIAGNOSIS — R53.1 WEAKNESS: Primary | ICD-10-CM

## 2024-05-01 PROCEDURE — 99309 SBSQ NF CARE MODERATE MDM 30: CPT | Performed by: NURSE PRACTITIONER

## 2024-05-01 NOTE — LETTER
Patient: Teresa Matthews  : 1950    Encounter Date: 2024    PROGRESS NOTE    Subjective  Chief complaint: Teresa Matthews is a 73 y.o. female who is an acute skilled patient being seen and evaluated for weakness    HPI:  HPI  Patient is nonambulatory.  Patient is working in therapy towards goals therapy is working with patient on slide board transfers and ADLs.  Patient able to complete ADLs with moderate independence.  Patient seen and examined at the bedside, appears to be in no acute distress.  Denies chest pain or shortness of breath.  Nursing staff voicing no new concerns.    Objective  Vital signs: 140/78, 96.8, 81, 18, 92%, blood sugar 160    Physical Exam  Constitutional:       General: She is not in acute distress.  Eyes:      Extraocular Movements: Extraocular movements intact.   Cardiovascular:      Rate and Rhythm: Normal rate and regular rhythm.   Pulmonary:      Effort: Pulmonary effort is normal.      Breath sounds: Normal breath sounds.   Abdominal:      General: Bowel sounds are normal.      Palpations: Abdomen is soft.      Tenderness: There is no abdominal tenderness.   Musculoskeletal:      Cervical back: Neck supple.      Right lower leg: No edema.      Left lower leg: No edema.   Neurological:      Mental Status: She is alert.      Motor: Weakness present.   Psychiatric:         Mood and Affect: Mood normal.         Behavior: Behavior is cooperative.         Assessment/Plan  Problem List Items Addressed This Visit       Atrial fibrillation (Multi)     Amiodarone  Apixaban  Bleeding precautions  Monitor heart rate         Chronic obstructive pulmonary disease, unspecified COPD type (Multi)     Stable, no wheezing or shortness of breath  On room air         Hypertensive heart disease with chronic diastolic congestive heart failure (Multi)     CHF stable, no sob  Monitor blood pressure  Toprol-XL  Spironolactone  Losartan potassium         Type 2 diabetes mellitus with  hyperglycemia, with long-term current use of insulin (Multi)     Fasting blood glucose at goal  Continue insulin  Glucoscan with sliding scale insulin coverage  Low concentrated sweets diet  Monitor fasting blood sugar         Weakness - Primary     Continue to work in therapy towards goals          Medications, treatments, and labs reviewed  Continue medications and treatments as listed in EMR      Scribe Attestation  Lorna CHICAS Scribe   attest that this documentation has been prepared under the direction and in the presence of MARISELA Nelson    Provider Attestation - Scribe documentation  All medical record entries made by the Scribe were at my direction and personally dictated by me. I have reviewed the chart and agree that the record accurately reflects my personal performance of the history, physical exam, discussion and plan.   MARISELA Nelson        Electronically Signed By: MARISELA Nelson   5/7/24  4:13 PM

## 2024-05-02 ENCOUNTER — NURSING HOME VISIT (OUTPATIENT)
Dept: POST ACUTE CARE | Facility: EXTERNAL LOCATION | Age: 74
End: 2024-05-02
Payer: MEDICARE

## 2024-05-02 DIAGNOSIS — E11.65 TYPE 2 DIABETES MELLITUS WITH HYPERGLYCEMIA, WITH LONG-TERM CURRENT USE OF INSULIN (MULTI): ICD-10-CM

## 2024-05-02 DIAGNOSIS — Z79.4 TYPE 2 DIABETES MELLITUS WITH HYPERGLYCEMIA, WITH LONG-TERM CURRENT USE OF INSULIN (MULTI): ICD-10-CM

## 2024-05-02 DIAGNOSIS — I50.32 HYPERTENSIVE HEART DISEASE WITH CHRONIC DIASTOLIC CONGESTIVE HEART FAILURE (MULTI): ICD-10-CM

## 2024-05-02 DIAGNOSIS — R53.1 WEAKNESS: Primary | ICD-10-CM

## 2024-05-02 DIAGNOSIS — J44.9 CHRONIC OBSTRUCTIVE PULMONARY DISEASE, UNSPECIFIED COPD TYPE (MULTI): ICD-10-CM

## 2024-05-02 DIAGNOSIS — I11.0 HYPERTENSIVE HEART DISEASE WITH CHRONIC DIASTOLIC CONGESTIVE HEART FAILURE (MULTI): ICD-10-CM

## 2024-05-02 DIAGNOSIS — I48.91 ATRIAL FIBRILLATION, UNSPECIFIED TYPE (MULTI): ICD-10-CM

## 2024-05-02 PROCEDURE — 99308 SBSQ NF CARE LOW MDM 20: CPT | Performed by: INTERNAL MEDICINE

## 2024-05-02 NOTE — ASSESSMENT & PLAN NOTE
Continue insulin  Glucoscan with sliding scale insulin coverage  Low concentrated sweets diet  Monitor fasting blood sugar

## 2024-05-02 NOTE — LETTER
Patient: Teresa Matthews  : 1950    Encounter Date: 2024    PROGRESS NOTE    Subjective  Chief complaint: Teresa Matthews is a 73 y.o. female who is an acute skilled patient being seen and evaluated for weakness    HPI:  HPI  Patient does continue working with therapy.  Patient is working on transfers and ADLs.  Patient is nonambulatory.  Patient is performing slide board transfers requiring SBA.  Patient was seen and examined at the bedside, appears to be in no acute distress.  Nursing staff voicing no new concerns.  Patient denies constitutional symptoms.    Objective  Vital signs: 140/78, 97.7, 62, 18, 92%, blood sugar 122    Physical Exam  Constitutional:       General: She is not in acute distress.  Eyes:      Extraocular Movements: Extraocular movements intact.   Cardiovascular:      Rate and Rhythm: Normal rate and regular rhythm.   Pulmonary:      Effort: Pulmonary effort is normal.      Breath sounds: Normal breath sounds.   Abdominal:      General: Bowel sounds are normal.      Palpations: Abdomen is soft.      Tenderness: There is no abdominal tenderness.   Musculoskeletal:      Cervical back: Neck supple.      Right lower leg: No edema.      Left lower leg: No edema.   Neurological:      Mental Status: She is alert.      Motor: Weakness present.   Psychiatric:         Mood and Affect: Mood normal.         Behavior: Behavior is cooperative.         Assessment/Plan  Problem List Items Addressed This Visit       Type 2 diabetes mellitus with hyperglycemia, with long-term current use of insulin (Multi)     Continue insulin  Glucoscan with sliding scale insulin coverage  Low concentrated sweets diet  Monitor fasting blood sugar         Atrial fibrillation (Multi)     Amiodarone  Apixaban  Bleeding precautions  Monitor heart rate         Hypertensive heart disease with chronic diastolic congestive heart failure (Multi)     CHF stable, no sob  Monitor blood  pressure  Toprol-XL  Spironolactone  Losartan potassium         Chronic obstructive pulmonary disease, unspecified COPD type (Multi)     Stable, no wheezing or shortness of breath  On room air         Weakness - Primary     Continue working in therapy, transfers and ADLs          Medications, treatments, and labs reviewed  Continue medications and treatments as listed in EMR      Scribe Attestation  Lorna CHICAS Scribe   attest that this documentation has been prepared under the direction and in the presence of Pita Virgen MD    Provider Attestation - Scribe documentation  All medical record entries made by the Scribe were at my direction and personally dictated by me. I have reviewed the chart and agree that the record accurately reflects my personal performance of the history, physical exam, discussion and plan.   Pita Virgen MD        Electronically Signed By: Pita Virgen MD   5/2/24  3:12 PM

## 2024-05-02 NOTE — PROGRESS NOTES
PROGRESS NOTE    Subjective   Chief complaint: Teresa Matthews is a 73 y.o. female who is an acute skilled patient being seen and evaluated for weakness    HPI:  HPI  Patient does continue working with therapy.  Patient is working on transfers and ADLs.  Patient is nonambulatory.  Patient is performing slide board transfers requiring SBA.  Patient was seen and examined at the bedside, appears to be in no acute distress.  Nursing staff voicing no new concerns.  Patient denies constitutional symptoms.    Objective   Vital signs: 140/78, 97.7, 62, 18, 92%, blood sugar 122    Physical Exam  Constitutional:       General: She is not in acute distress.  Eyes:      Extraocular Movements: Extraocular movements intact.   Cardiovascular:      Rate and Rhythm: Normal rate and regular rhythm.   Pulmonary:      Effort: Pulmonary effort is normal.      Breath sounds: Normal breath sounds.   Abdominal:      General: Bowel sounds are normal.      Palpations: Abdomen is soft.      Tenderness: There is no abdominal tenderness.   Musculoskeletal:      Cervical back: Neck supple.      Right lower leg: No edema.      Left lower leg: No edema.   Neurological:      Mental Status: She is alert.      Motor: Weakness present.   Psychiatric:         Mood and Affect: Mood normal.         Behavior: Behavior is cooperative.         Assessment/Plan   Problem List Items Addressed This Visit       Type 2 diabetes mellitus with hyperglycemia, with long-term current use of insulin (Multi)     Continue insulin  Glucoscan with sliding scale insulin coverage  Low concentrated sweets diet  Monitor fasting blood sugar         Atrial fibrillation (Multi)     Amiodarone  Apixaban  Bleeding precautions  Monitor heart rate         Hypertensive heart disease with chronic diastolic congestive heart failure (Multi)     CHF stable, no sob  Monitor blood pressure  Toprol-XL  Spironolactone  Losartan potassium         Chronic obstructive pulmonary disease,  unspecified COPD type (Multi)     Stable, no wheezing or shortness of breath  On room air         Weakness - Primary     Continue working in therapy, transfers and ADLs          Medications, treatments, and labs reviewed  Continue medications and treatments as listed in EMR      Scribe Attestation  Lorna CHICAS Scribe   attest that this documentation has been prepared under the direction and in the presence of Pita Virgen MD    Provider Attestation - Scribe documentation  All medical record entries made by the Scribe were at my direction and personally dictated by me. I have reviewed the chart and agree that the record accurately reflects my personal performance of the history, physical exam, discussion and plan.   Pita Virgen MD

## 2024-05-03 ENCOUNTER — NURSING HOME VISIT (OUTPATIENT)
Dept: POST ACUTE CARE | Facility: EXTERNAL LOCATION | Age: 74
End: 2024-05-03
Payer: MEDICARE

## 2024-05-03 DIAGNOSIS — Z79.4 TYPE 2 DIABETES MELLITUS WITH HYPERGLYCEMIA, WITH LONG-TERM CURRENT USE OF INSULIN (MULTI): ICD-10-CM

## 2024-05-03 DIAGNOSIS — R53.1 WEAKNESS: Primary | ICD-10-CM

## 2024-05-03 DIAGNOSIS — I11.0 HYPERTENSIVE HEART DISEASE WITH CHRONIC DIASTOLIC CONGESTIVE HEART FAILURE (MULTI): ICD-10-CM

## 2024-05-03 DIAGNOSIS — I50.32 HYPERTENSIVE HEART DISEASE WITH CHRONIC DIASTOLIC CONGESTIVE HEART FAILURE (MULTI): ICD-10-CM

## 2024-05-03 DIAGNOSIS — E11.65 TYPE 2 DIABETES MELLITUS WITH HYPERGLYCEMIA, WITH LONG-TERM CURRENT USE OF INSULIN (MULTI): ICD-10-CM

## 2024-05-03 DIAGNOSIS — I48.91 ATRIAL FIBRILLATION, UNSPECIFIED TYPE (MULTI): ICD-10-CM

## 2024-05-03 PROCEDURE — 99309 SBSQ NF CARE MODERATE MDM 30: CPT | Performed by: NURSE PRACTITIONER

## 2024-05-03 NOTE — LETTER
Patient: Teresa Matthews  : 1950    Encounter Date: 2024    PROGRESS NOTE    Subjective  Chief complaint: Teresa Matthews is a 73 y.o. female who is an acute skilled patient being seen and evaluated for weakness    HPI:  HPI  Patient is continuing to work on transfers and therapy.  Patient is nonambulatory.  Patient continues to require slide board for transfers.  Patient seen and examined at the bedside, appears to be in no acute distress.  Nursing staff voicing no new concerns.  Patient denies constitutional symptoms at this time.    Objective  Vital signs: 143/82, 97.4, 70, 18, blood sugar 168, 94%    Physical Exam  Constitutional:       General: She is not in acute distress.  Eyes:      Extraocular Movements: Extraocular movements intact.   Cardiovascular:      Rate and Rhythm: Normal rate and regular rhythm.   Pulmonary:      Effort: Pulmonary effort is normal.      Breath sounds: Normal breath sounds.   Abdominal:      General: Bowel sounds are normal.      Palpations: Abdomen is soft.      Tenderness: There is no abdominal tenderness.   Musculoskeletal:      Cervical back: Neck supple.      Right lower leg: No edema.      Left lower leg: No edema.   Neurological:      Mental Status: She is alert.      Motor: Weakness present.   Psychiatric:         Mood and Affect: Mood normal.         Behavior: Behavior is cooperative.         Assessment/Plan  Problem List Items Addressed This Visit       Type 2 diabetes mellitus with hyperglycemia, with long-term current use of insulin (Multi)     Fasting blood glucose at goal  Continue insulin  Glucoscan with sliding scale insulin coverage  Low concentrated sweets diet  Monitor fasting blood sugar         Atrial fibrillation (Multi)     Amiodarone  Apixaban  Bleeding precautions  Monitor heart rate         Hypertensive heart disease with chronic diastolic congestive heart failure (Multi)     CHF stable, no sob  Monitor blood  pressure  Toprol-XL  Spironolactone  Losartan potassium         Weakness - Primary     Continue working in therapy          Medications, treatments, and labs reviewed  Continue medications and treatments as listed in EMR      Scribe Attestation  ILorna Scribe   attest that this documentation has been prepared under the direction and in the presence of MARISELA Nelson    Provider Attestation - Scribe documentation  All medical record entries made by the Scribe were at my direction and personally dictated by me. I have reviewed the chart and agree that the record accurately reflects my personal performance of the history, physical exam, discussion and plan.   MARISELA Nelson        Electronically Signed By: MARISELA Nelson   5/15/24  2:50 PM

## 2024-05-05 PROBLEM — R06.2 WHEEZING: Status: RESOLVED | Noted: 2024-04-28 | Resolved: 2024-05-05

## 2024-05-05 PROBLEM — N39.0 UTI (URINARY TRACT INFECTION): Status: RESOLVED | Noted: 2024-04-01 | Resolved: 2024-05-05

## 2024-05-06 ENCOUNTER — NURSING HOME VISIT (OUTPATIENT)
Dept: POST ACUTE CARE | Facility: EXTERNAL LOCATION | Age: 74
End: 2024-05-06
Payer: MEDICARE

## 2024-05-06 DIAGNOSIS — E11.65 TYPE 2 DIABETES MELLITUS WITH HYPERGLYCEMIA, WITH LONG-TERM CURRENT USE OF INSULIN (MULTI): ICD-10-CM

## 2024-05-06 DIAGNOSIS — I11.0 HYPERTENSIVE HEART DISEASE WITH CHRONIC DIASTOLIC CONGESTIVE HEART FAILURE (MULTI): ICD-10-CM

## 2024-05-06 DIAGNOSIS — Z79.4 TYPE 2 DIABETES MELLITUS WITH HYPERGLYCEMIA, WITH LONG-TERM CURRENT USE OF INSULIN (MULTI): ICD-10-CM

## 2024-05-06 DIAGNOSIS — I48.91 ATRIAL FIBRILLATION, UNSPECIFIED TYPE (MULTI): ICD-10-CM

## 2024-05-06 DIAGNOSIS — I50.32 HYPERTENSIVE HEART DISEASE WITH CHRONIC DIASTOLIC CONGESTIVE HEART FAILURE (MULTI): ICD-10-CM

## 2024-05-06 DIAGNOSIS — R53.1 WEAKNESS: ICD-10-CM

## 2024-05-06 DIAGNOSIS — J44.9 CHRONIC OBSTRUCTIVE PULMONARY DISEASE, UNSPECIFIED COPD TYPE (MULTI): ICD-10-CM

## 2024-05-06 PROCEDURE — 99308 SBSQ NF CARE LOW MDM 20: CPT | Performed by: INTERNAL MEDICINE

## 2024-05-06 NOTE — LETTER
Patient: Teresa Matthews  : 1950    Encounter Date: 2024    PROGRESS NOTE    Subjective  Chief complaint: Teresa Matthews is a 73 y.o. female who is an acute skilled patient being seen and evaluated for weakness    HPI:  Therapy has been working with the patient to improve strength and endurance with ADLs, transfers, and mobility.  Patient continues to work toward goals.  Patient is stable and has no new complaints.  Nursing staff voices no new concerns today.      Objective  Vital signs: 133\75,62,96%,     Physical Exam  Constitutional:       General: She is not in acute distress.  Eyes:      Extraocular Movements: Extraocular movements intact.   Cardiovascular:      Rate and Rhythm: Normal rate and regular rhythm.   Pulmonary:      Effort: Pulmonary effort is normal.      Breath sounds: Normal breath sounds.   Abdominal:      General: Bowel sounds are normal.      Palpations: Abdomen is soft.      Tenderness: There is no abdominal tenderness.   Musculoskeletal:      Cervical back: Neck supple.      Right lower leg: No edema.      Left lower leg: No edema.   Neurological:      Mental Status: She is alert.      Motor: Weakness present.   Psychiatric:         Mood and Affect: Mood normal.         Behavior: Behavior is cooperative.         Assessment/Plan  Problem List Items Addressed This Visit       Type 2 diabetes mellitus with hyperglycemia, with long-term current use of insulin (Multi)     Continue insulin  Glucoscan with sliding scale insulin coverage  Low concentrated sweets diet  Monitor fasting blood sugar         Atrial fibrillation (Multi)     Amiodarone  Apixaban  Bleeding precautions  Monitor heart rate         Hypertensive heart disease with chronic diastolic congestive heart failure (Multi)     CHF stable, no sob  Monitor blood pressure  Toprol-XL  Spironolactone  Losartan potassium         Chronic obstructive pulmonary disease, unspecified COPD type (Multi)     Stable, no  wheezing or shortness of breath  On room air         Weakness     Continue to work in therapy towards goals          Medications, treatments, and labs reviewed  Continue medications and treatments as listed in EMR    Scribe Attestation  I, Kirill Chapman   attest that this documentation has been prepared under the direction and in the presence of Pita Virgen MD.     Provider Attestation - Scribe documentation  All medical record entries made by the Scribe were at my direction and personally dictated by me. I have reviewed the chart and agree that the record accurately reflects my personal performance of the history, physical exam, discussion and plan.   Pita Virgen MD      Electronically Signed By: Pita Virgen MD   5/6/24  6:37 PM

## 2024-05-06 NOTE — PROGRESS NOTES
PROGRESS NOTE    Subjective   Chief complaint: Teresa Matthews is a 73 y.o. female who is an acute skilled patient being seen and evaluated for weakness    HPI:  HPI  Patient does continue working in therapy.  Therapy is working with patient on transfers with slide board requiring CGA.  Patient is total dependent for ambulation.  Patient seen and examined at the bedside.  Patient complains of cough and tickle in throat.  Patient denies fever or chills.    Objective   Vital signs: 154/77, 96.8, 64, 18, 94%, blood sugar 193    Physical Exam  Constitutional:       General: She is not in acute distress.     Comments: Hoarse voice   Eyes:      Extraocular Movements: Extraocular movements intact.   Cardiovascular:      Rate and Rhythm: Normal rate and regular rhythm.   Pulmonary:      Effort: Pulmonary effort is normal.      Breath sounds: Normal breath sounds.      Comments: Nonproductive moist cough  Abdominal:      General: Bowel sounds are normal.      Palpations: Abdomen is soft.      Tenderness: There is no abdominal tenderness.   Musculoskeletal:      Cervical back: Neck supple.      Right lower leg: No edema.      Left lower leg: No edema.   Neurological:      Mental Status: She is alert.      Motor: Weakness present.   Psychiatric:         Mood and Affect: Mood normal.         Behavior: Behavior is cooperative.         Assessment/Plan   Problem List Items Addressed This Visit       Type 2 diabetes mellitus with hyperglycemia, with long-term current use of insulin (Multi)     Continue insulin  Glucoscan with sliding scale insulin coverage  Low concentrated sweets diet  Monitor fasting blood sugar         Cough     Start Flonase and Mucinex         Hypertensive heart disease with chronic diastolic congestive heart failure (Multi)     CHF stable, no sob  Monitor blood pressure  Toprol-XL  Spironolactone  Losartan potassium         Chronic obstructive pulmonary disease, unspecified COPD type (Multi)     Stable,  no wheezing or shortness of breath  On room air         Weakness - Primary     Continue working in therapy towards goals          Medications, treatments, and labs reviewed  Continue medications and treatments as listed in EMR      Scribe Attestation  I, Kirill Page   attest that this documentation has been prepared under the direction and in the presence of MARISELA Nelson    Provider Attestation - Scribe documentation  All medical record entries made by the Scribe were at my direction and personally dictated by me. I have reviewed the chart and agree that the record accurately reflects my personal performance of the history, physical exam, discussion and plan.   MARISELA Nelson

## 2024-05-06 NOTE — PROGRESS NOTES
PROGRESS NOTE    Subjective   Chief complaint: Teresa Matthews is a 73 y.o. female who is an acute skilled patient being seen and evaluated for weakness    HPI:  HPI  Patient is nonambulatory.  Patient is working in therapy towards goals therapy is working with patient on slide board transfers and ADLs.  Patient able to complete ADLs with moderate independence.  Patient seen and examined at the bedside, appears to be in no acute distress.  Denies chest pain or shortness of breath.  Nursing staff voicing no new concerns.    Objective   Vital signs: 140/78, 96.8, 81, 18, 92%, blood sugar 160    Physical Exam  Constitutional:       General: She is not in acute distress.  Eyes:      Extraocular Movements: Extraocular movements intact.   Cardiovascular:      Rate and Rhythm: Normal rate and regular rhythm.   Pulmonary:      Effort: Pulmonary effort is normal.      Breath sounds: Normal breath sounds.   Abdominal:      General: Bowel sounds are normal.      Palpations: Abdomen is soft.      Tenderness: There is no abdominal tenderness.   Musculoskeletal:      Cervical back: Neck supple.      Right lower leg: No edema.      Left lower leg: No edema.   Neurological:      Mental Status: She is alert.      Motor: Weakness present.   Psychiatric:         Mood and Affect: Mood normal.         Behavior: Behavior is cooperative.         Assessment/Plan   Problem List Items Addressed This Visit       Atrial fibrillation (Multi)     Amiodarone  Apixaban  Bleeding precautions  Monitor heart rate         Chronic obstructive pulmonary disease, unspecified COPD type (Multi)     Stable, no wheezing or shortness of breath  On room air         Hypertensive heart disease with chronic diastolic congestive heart failure (Multi)     CHF stable, no sob  Monitor blood pressure  Toprol-XL  Spironolactone  Losartan potassium         Type 2 diabetes mellitus with hyperglycemia, with long-term current use of insulin (Multi)     Fasting blood  glucose at goal  Continue insulin  Glucoscan with sliding scale insulin coverage  Low concentrated sweets diet  Monitor fasting blood sugar         Weakness - Primary     Continue to work in therapy towards goals          Medications, treatments, and labs reviewed  Continue medications and treatments as listed in EMR      Scribe Attestation  ILorna Scribe   attest that this documentation has been prepared under the direction and in the presence of MARISELA Nelson    Provider Attestation - Scribe documentation  All medical record entries made by the Scribe were at my direction and personally dictated by me. I have reviewed the chart and agree that the record accurately reflects my personal performance of the history, physical exam, discussion and plan.   MARISELA Nelson

## 2024-05-06 NOTE — PROGRESS NOTES
PROGRESS NOTE    Subjective   Chief complaint: Teresa Matthews is a 73 y.o. female who is an acute skilled patient being seen and evaluated for weakness    HPI:  Therapy has been working with the patient to improve strength and endurance with ADLs, transfers, and mobility.  Patient continues to work toward goals.  Patient is stable and has no new complaints.  Nursing staff voices no new concerns today.      Objective   Vital signs: 133\75,62,96%,     Physical Exam  Constitutional:       General: She is not in acute distress.  Eyes:      Extraocular Movements: Extraocular movements intact.   Cardiovascular:      Rate and Rhythm: Normal rate and regular rhythm.   Pulmonary:      Effort: Pulmonary effort is normal.      Breath sounds: Normal breath sounds.   Abdominal:      General: Bowel sounds are normal.      Palpations: Abdomen is soft.      Tenderness: There is no abdominal tenderness.   Musculoskeletal:      Cervical back: Neck supple.      Right lower leg: No edema.      Left lower leg: No edema.   Neurological:      Mental Status: She is alert.      Motor: Weakness present.   Psychiatric:         Mood and Affect: Mood normal.         Behavior: Behavior is cooperative.         Assessment/Plan   Problem List Items Addressed This Visit       Type 2 diabetes mellitus with hyperglycemia, with long-term current use of insulin (Multi)     Continue insulin  Glucoscan with sliding scale insulin coverage  Low concentrated sweets diet  Monitor fasting blood sugar         Atrial fibrillation (Multi)     Amiodarone  Apixaban  Bleeding precautions  Monitor heart rate         Hypertensive heart disease with chronic diastolic congestive heart failure (Multi)     CHF stable, no sob  Monitor blood pressure  Toprol-XL  Spironolactone  Losartan potassium         Chronic obstructive pulmonary disease, unspecified COPD type (Multi)     Stable, no wheezing or shortness of breath  On room air         Weakness     Continue  to work in therapy towards goals          Medications, treatments, and labs reviewed  Continue medications and treatments as listed in EMR    Scribe Attestation  I, Kirill Chapman   attest that this documentation has been prepared under the direction and in the presence of Pita Virgen MD.     Provider Attestation - Scribe documentation  All medical record entries made by the Scribe were at my direction and personally dictated by me. I have reviewed the chart and agree that the record accurately reflects my personal performance of the history, physical exam, discussion and plan.   Pita Virgen MD

## 2024-05-06 NOTE — ASSESSMENT & PLAN NOTE
Fasting blood glucose at goal  Continue insulin  Glucoscan with sliding scale insulin coverage  Low concentrated sweets diet  Monitor fasting blood sugar

## 2024-05-07 ENCOUNTER — NURSING HOME VISIT (OUTPATIENT)
Dept: POST ACUTE CARE | Facility: EXTERNAL LOCATION | Age: 74
End: 2024-05-07
Payer: MEDICARE

## 2024-05-07 DIAGNOSIS — I50.32 HYPERTENSIVE HEART DISEASE WITH CHRONIC DIASTOLIC CONGESTIVE HEART FAILURE (MULTI): ICD-10-CM

## 2024-05-07 DIAGNOSIS — R53.1 WEAKNESS: ICD-10-CM

## 2024-05-07 DIAGNOSIS — I11.0 HYPERTENSIVE HEART DISEASE WITH CHRONIC DIASTOLIC CONGESTIVE HEART FAILURE (MULTI): ICD-10-CM

## 2024-05-07 DIAGNOSIS — J44.9 CHRONIC OBSTRUCTIVE PULMONARY DISEASE, UNSPECIFIED COPD TYPE (MULTI): ICD-10-CM

## 2024-05-07 DIAGNOSIS — I48.91 ATRIAL FIBRILLATION, UNSPECIFIED TYPE (MULTI): ICD-10-CM

## 2024-05-07 DIAGNOSIS — E11.65 TYPE 2 DIABETES MELLITUS WITH HYPERGLYCEMIA, WITH LONG-TERM CURRENT USE OF INSULIN (MULTI): ICD-10-CM

## 2024-05-07 DIAGNOSIS — Z79.4 TYPE 2 DIABETES MELLITUS WITH HYPERGLYCEMIA, WITH LONG-TERM CURRENT USE OF INSULIN (MULTI): ICD-10-CM

## 2024-05-07 PROCEDURE — 99308 SBSQ NF CARE LOW MDM 20: CPT | Performed by: INTERNAL MEDICINE

## 2024-05-07 NOTE — LETTER
Patient: Teresa Matthews  : 1950    Encounter Date: 2024    PROGRESS NOTE    Subjective  Chief complaint: Teresa Matthews is a 73 y.o. female who is an acute skilled patient being seen and evaluated for weakness    HPI:  Patient has been working in therapy to improve strength, endurance, and ADLs.  Patient continues to work toward goals. Patient actively participates in skilled interventions.  No new concerns today.  Denies n/v/f/c pain.        Objective  Vital signs: 117/72,70,96%,     Physical Exam  Constitutional:       General: She is not in acute distress.  Eyes:      Extraocular Movements: Extraocular movements intact.   Cardiovascular:      Rate and Rhythm: Normal rate and regular rhythm.   Pulmonary:      Effort: Pulmonary effort is normal.      Breath sounds: Normal breath sounds.   Abdominal:      General: Bowel sounds are normal.      Palpations: Abdomen is soft.      Tenderness: There is no abdominal tenderness.   Musculoskeletal:      Cervical back: Neck supple.      Right lower leg: No edema.      Left lower leg: No edema.   Neurological:      Mental Status: She is alert.      Motor: Weakness present.   Psychiatric:         Mood and Affect: Mood normal.         Behavior: Behavior is cooperative.         Assessment/Plan  Problem List Items Addressed This Visit       Type 2 diabetes mellitus with hyperglycemia, with long-term current use of insulin (Multi)     Continue insulin  Glucoscan with sliding scale insulin coverage  Low concentrated sweets diet  Monitor fasting blood sugar         Atrial fibrillation (Multi)     Amiodarone  Apixaban  Bleeding precautions  Monitor heart rate         Hypertensive heart disease with chronic diastolic congestive heart failure (Multi)     CHF stable, no sob  Monitor blood pressure  Toprol-XL  Spironolactone  Losartan potassium         Chronic obstructive pulmonary disease, unspecified COPD type (Multi)     Stable, no wheezing or shortness  of breath  On room air         Weakness     Continue to work in therapy towards goals          Medications, treatments, and labs reviewed  Continue medications and treatments as listed in EMR    Scribe Attestation  I, Kirill Chapman   attest that this documentation has been prepared under the direction and in the presence of Pita Virgen MD.     Provider Attestation - Scribe documentation  All medical record entries made by the Scribe were at my direction and personally dictated by me. I have reviewed the chart and agree that the record accurately reflects my personal performance of the history, physical exam, discussion and plan.   Pita Virgen MD      Electronically Signed By: Pita Virgen MD   5/7/24  4:18 PM

## 2024-05-07 NOTE — PROGRESS NOTES
PROGRESS NOTE    Subjective   Chief complaint: Teresa Matthews is a 73 y.o. female who is an acute skilled patient being seen and evaluated for weakness    HPI:  Patient has been working in therapy to improve strength, endurance, and ADLs.  Patient continues to work toward goals. Patient actively participates in skilled interventions.  No new concerns today.  Denies n/v/f/c pain.        Objective   Vital signs: 117/72,70,96%,     Physical Exam  Constitutional:       General: She is not in acute distress.  Eyes:      Extraocular Movements: Extraocular movements intact.   Cardiovascular:      Rate and Rhythm: Normal rate and regular rhythm.   Pulmonary:      Effort: Pulmonary effort is normal.      Breath sounds: Normal breath sounds.   Abdominal:      General: Bowel sounds are normal.      Palpations: Abdomen is soft.      Tenderness: There is no abdominal tenderness.   Musculoskeletal:      Cervical back: Neck supple.      Right lower leg: No edema.      Left lower leg: No edema.   Neurological:      Mental Status: She is alert.      Motor: Weakness present.   Psychiatric:         Mood and Affect: Mood normal.         Behavior: Behavior is cooperative.         Assessment/Plan   Problem List Items Addressed This Visit       Type 2 diabetes mellitus with hyperglycemia, with long-term current use of insulin (Multi)     Continue insulin  Glucoscan with sliding scale insulin coverage  Low concentrated sweets diet  Monitor fasting blood sugar         Atrial fibrillation (Multi)     Amiodarone  Apixaban  Bleeding precautions  Monitor heart rate         Hypertensive heart disease with chronic diastolic congestive heart failure (Multi)     CHF stable, no sob  Monitor blood pressure  Toprol-XL  Spironolactone  Losartan potassium         Chronic obstructive pulmonary disease, unspecified COPD type (Multi)     Stable, no wheezing or shortness of breath  On room air         Weakness     Continue to work in therapy  towards goals          Medications, treatments, and labs reviewed  Continue medications and treatments as listed in EMR    Scribe Attestation  I, Kirill Chapman   attest that this documentation has been prepared under the direction and in the presence of Pita Virgen MD.     Provider Attestation - Scribe documentation  All medical record entries made by the Scribe were at my direction and personally dictated by me. I have reviewed the chart and agree that the record accurately reflects my personal performance of the history, physical exam, discussion and plan.   Pita Virgen MD

## 2024-05-08 ENCOUNTER — TELEPHONE (OUTPATIENT)
Dept: PRIMARY CARE | Facility: CLINIC | Age: 74
End: 2024-05-08
Payer: MEDICARE

## 2024-05-08 ENCOUNTER — NURSING HOME VISIT (OUTPATIENT)
Dept: POST ACUTE CARE | Facility: EXTERNAL LOCATION | Age: 74
End: 2024-05-08
Payer: MEDICARE

## 2024-05-08 DIAGNOSIS — M06.9 RHEUMATOID ARTHRITIS, INVOLVING UNSPECIFIED SITE, UNSPECIFIED WHETHER RHEUMATOID FACTOR PRESENT (MULTI): ICD-10-CM

## 2024-05-08 DIAGNOSIS — I11.0 HYPERTENSIVE HEART DISEASE WITH CHRONIC DIASTOLIC CONGESTIVE HEART FAILURE (MULTI): ICD-10-CM

## 2024-05-08 DIAGNOSIS — R53.1 WEAKNESS: Primary | ICD-10-CM

## 2024-05-08 DIAGNOSIS — J44.9 CHRONIC OBSTRUCTIVE PULMONARY DISEASE, UNSPECIFIED COPD TYPE (MULTI): ICD-10-CM

## 2024-05-08 DIAGNOSIS — E11.65 TYPE 2 DIABETES MELLITUS WITH HYPERGLYCEMIA, WITH LONG-TERM CURRENT USE OF INSULIN (MULTI): ICD-10-CM

## 2024-05-08 DIAGNOSIS — M48.02 DEGENERATIVE CERVICAL SPINAL STENOSIS: ICD-10-CM

## 2024-05-08 DIAGNOSIS — Z79.4 TYPE 2 DIABETES MELLITUS WITH HYPERGLYCEMIA, WITH LONG-TERM CURRENT USE OF INSULIN (MULTI): ICD-10-CM

## 2024-05-08 DIAGNOSIS — M17.0 PRIMARY OSTEOARTHRITIS OF BOTH KNEES: ICD-10-CM

## 2024-05-08 DIAGNOSIS — I50.32 HYPERTENSIVE HEART DISEASE WITH CHRONIC DIASTOLIC CONGESTIVE HEART FAILURE (MULTI): ICD-10-CM

## 2024-05-08 PROCEDURE — 99309 SBSQ NF CARE MODERATE MDM 30: CPT | Performed by: NURSE PRACTITIONER

## 2024-05-08 RX ORDER — OXYCODONE AND ACETAMINOPHEN 10; 325 MG/1; MG/1
2 TABLET ORAL EVERY 4 HOURS PRN
Qty: 180 TABLET | Refills: 0 | Status: SHIPPED | OUTPATIENT
Start: 2024-05-11 | End: 2024-05-14 | Stop reason: SDUPTHER

## 2024-05-08 NOTE — LETTER
Patient: Teresa Matthews  : 1950    Encounter Date: 2024    PROGRESS NOTE    Subjective  Chief complaint: Teresa Matthews is a 73 y.o. female who is an acute skilled patient being seen and evaluated for weakness    HPI:  HPI  Therapy has been working with the patient to improve strength and endurance with ADLs, transfers, and mobility.  Patient continues to work toward goals.  Patient was seen and examined at the bedside.  Patient is stable and has no new complaints.  Nursing staff voices no new concerns today.    Objective  Vital signs: 137/71, 96.9, 78, 18, 96%, blood sugar 80    Physical Exam  Constitutional:       General: She is not in acute distress.  Eyes:      Extraocular Movements: Extraocular movements intact.   Cardiovascular:      Rate and Rhythm: Normal rate and regular rhythm.   Pulmonary:      Effort: Pulmonary effort is normal.      Breath sounds: Normal breath sounds.   Abdominal:      General: Bowel sounds are normal.      Palpations: Abdomen is soft.      Tenderness: There is no abdominal tenderness.   Musculoskeletal:      Cervical back: Neck supple.      Right lower leg: No edema.      Left lower leg: No edema.   Neurological:      Mental Status: She is alert.      Motor: Weakness present.   Psychiatric:         Mood and Affect: Mood normal.         Behavior: Behavior is cooperative.         Assessment/Plan  Problem List Items Addressed This Visit       Type 2 diabetes mellitus with hyperglycemia, with long-term current use of insulin (Multi)     Fasting blood glucose at goal  Continue insulin  Glucoscan with sliding scale insulin coverage  Low concentrated sweets diet  Monitor fasting blood sugar         Hypertensive heart disease with chronic diastolic congestive heart failure (Multi)     CHF stable, no sob  Monitor blood pressure  Toprol-XL  Spironolactone  Losartan potassium         Chronic obstructive pulmonary disease, unspecified COPD type (Multi)     Stable, no  wheezing or shortness of breath  On room air         Weakness - Primary     Continue therapy, work towards goals          Medications, treatments, and labs reviewed  Continue medications and treatments as listed in EMR      Scribe Attestation  ILorna Scribe   attest that this documentation has been prepared under the direction and in the presence of MARISELA Nelson    Provider Attestation - Scribe documentation  All medical record entries made by the Scribe were at my direction and personally dictated by me. I have reviewed the chart and agree that the record accurately reflects my personal performance of the history, physical exam, discussion and plan.   MARISELA Nelson        Electronically Signed By: MARISELA Nelson   5/15/24  3:50 PM

## 2024-05-08 NOTE — TELEPHONE ENCOUNTER
Medication name: Oxycodone-     Strength:     Directions: Take 2 tablets by mouth every 4 hours if needed for severe pain (7 - 10).    Pharmacy: [facility]     Last Office Visit: 11/13/23    Next Office Visit: 5/14/24

## 2024-05-09 ENCOUNTER — NURSING HOME VISIT (OUTPATIENT)
Dept: POST ACUTE CARE | Facility: EXTERNAL LOCATION | Age: 74
End: 2024-05-09
Payer: MEDICARE

## 2024-05-09 DIAGNOSIS — I11.0 HYPERTENSIVE HEART DISEASE WITH CHRONIC DIASTOLIC CONGESTIVE HEART FAILURE (MULTI): ICD-10-CM

## 2024-05-09 DIAGNOSIS — Z79.4 TYPE 2 DIABETES MELLITUS WITH HYPERGLYCEMIA, WITH LONG-TERM CURRENT USE OF INSULIN (MULTI): ICD-10-CM

## 2024-05-09 DIAGNOSIS — I48.91 ATRIAL FIBRILLATION, UNSPECIFIED TYPE (MULTI): ICD-10-CM

## 2024-05-09 DIAGNOSIS — R53.1 WEAKNESS: Primary | ICD-10-CM

## 2024-05-09 DIAGNOSIS — J44.9 CHRONIC OBSTRUCTIVE PULMONARY DISEASE, UNSPECIFIED COPD TYPE (MULTI): ICD-10-CM

## 2024-05-09 DIAGNOSIS — I50.32 HYPERTENSIVE HEART DISEASE WITH CHRONIC DIASTOLIC CONGESTIVE HEART FAILURE (MULTI): ICD-10-CM

## 2024-05-09 DIAGNOSIS — E11.65 TYPE 2 DIABETES MELLITUS WITH HYPERGLYCEMIA, WITH LONG-TERM CURRENT USE OF INSULIN (MULTI): ICD-10-CM

## 2024-05-09 PROCEDURE — 99308 SBSQ NF CARE LOW MDM 20: CPT | Performed by: INTERNAL MEDICINE

## 2024-05-09 NOTE — ASSESSMENT & PLAN NOTE
Stable, no wheezing or shortness of breath  On room air   That inpatient services furnished since the previous certification or recertification were, and continue to be required: for treatment that could reasonably be expected to improve the patient's condition; or, for diagnostic study;    That the hospital records show that the services furnished were: intensive treatment services; admission and related services necessary for diagnostic study or equivalent services;    That the patient continues to need, on a daily basis active inpatient treatment furnished directly by or requiring the supervision of inpatient psychiatric facility personnel.

## 2024-05-09 NOTE — PROGRESS NOTES
PROGRESS NOTE    Subjective   Chief complaint: Teresa Matthews is a 73 y.o. female who is an acute skilled patient being seen and evaluated for weakness    HPI:  HPI  Patient does continue working in therapy due to generalized weakness.  Patient working on transfers with the slide board.  Patient is also working on ADLs with therapy, requiring moderate independence to be completed.  Patient was seen and examined at the bedside, appears to be in no acute distress.  Nursing staff voicing no new concerns.    Objective   Vital signs: 147/79, 96.9, 60, 18, 95%, blood sugar 153    Physical Exam  Constitutional:       General: She is not in acute distress.  Eyes:      Extraocular Movements: Extraocular movements intact.   Cardiovascular:      Rate and Rhythm: Normal rate and regular rhythm.   Pulmonary:      Effort: Pulmonary effort is normal.      Breath sounds: Normal breath sounds.   Abdominal:      General: Bowel sounds are normal.      Palpations: Abdomen is soft.      Tenderness: There is no abdominal tenderness.   Musculoskeletal:      Cervical back: Neck supple.      Right lower leg: No edema.      Left lower leg: No edema.   Neurological:      Mental Status: She is alert.      Motor: Weakness present.   Psychiatric:         Mood and Affect: Mood normal.         Behavior: Behavior is cooperative.         Assessment/Plan   Problem List Items Addressed This Visit       Type 2 diabetes mellitus with hyperglycemia, with long-term current use of insulin (Multi)     Fasting blood glucose at goal  Continue insulin  Glucoscan with sliding scale insulin coverage  Low concentrated sweets diet  Monitor fasting blood sugar         Atrial fibrillation (Multi)     Amiodarone  Apixaban  Bleeding precautions  Monitor heart rate         Hypertensive heart disease with chronic diastolic congestive heart failure (Multi)     CHF stable, no sob  Monitor blood pressure  Toprol-XL  Spironolactone  Losartan potassium         Chronic  obstructive pulmonary disease, unspecified COPD type (Multi)     Stable, no wheezing or shortness of breath  On room air         Weakness - Primary     Continue therapy          Medications, treatments, and labs reviewed  Continue medications and treatments as listed in EMR      Scribe Attestation  I, Kirill Page   attest that this documentation has been prepared under the direction and in the presence of Pita Virgen MD    Provider Attestation - Scribe documentation  All medical record entries made by the Scribe were at my direction and personally dictated by me. I have reviewed the chart and agree that the record accurately reflects my personal performance of the history, physical exam, discussion and plan.   Pita Virgen MD

## 2024-05-09 NOTE — LETTER
Patient: Teresa Matthews  : 1950    Encounter Date: 2024    PROGRESS NOTE    Subjective  Chief complaint: Teresa Matthews is a 73 y.o. female who is an acute skilled patient being seen and evaluated for weakness    HPI:  HPI  Patient does continue working in therapy due to generalized weakness.  Patient working on transfers with the slide board.  Patient is also working on ADLs with therapy, requiring moderate independence to be completed.  Patient was seen and examined at the bedside, appears to be in no acute distress.  Nursing staff voicing no new concerns.    Objective  Vital signs: 147/79, 96.9, 60, 18, 95%, blood sugar 153    Physical Exam  Constitutional:       General: She is not in acute distress.  Eyes:      Extraocular Movements: Extraocular movements intact.   Cardiovascular:      Rate and Rhythm: Normal rate and regular rhythm.   Pulmonary:      Effort: Pulmonary effort is normal.      Breath sounds: Normal breath sounds.   Abdominal:      General: Bowel sounds are normal.      Palpations: Abdomen is soft.      Tenderness: There is no abdominal tenderness.   Musculoskeletal:      Cervical back: Neck supple.      Right lower leg: No edema.      Left lower leg: No edema.   Neurological:      Mental Status: She is alert.      Motor: Weakness present.   Psychiatric:         Mood and Affect: Mood normal.         Behavior: Behavior is cooperative.         Assessment/Plan  Problem List Items Addressed This Visit       Type 2 diabetes mellitus with hyperglycemia, with long-term current use of insulin (Multi)     Fasting blood glucose at goal  Continue insulin  Glucoscan with sliding scale insulin coverage  Low concentrated sweets diet  Monitor fasting blood sugar         Atrial fibrillation (Multi)     Amiodarone  Apixaban  Bleeding precautions  Monitor heart rate         Hypertensive heart disease with chronic diastolic congestive heart failure (Multi)     CHF stable, no sob  Monitor  blood pressure  Toprol-XL  Spironolactone  Losartan potassium         Chronic obstructive pulmonary disease, unspecified COPD type (Multi)     Stable, no wheezing or shortness of breath  On room air         Weakness - Primary     Continue therapy          Medications, treatments, and labs reviewed  Continue medications and treatments as listed in EMR      Scribe Attestation  Lorna CHICAS Scribe   attest that this documentation has been prepared under the direction and in the presence of Pita Virgen MD    Provider Attestation - Scribe documentation  All medical record entries made by the Scribe were at my direction and personally dictated by me. I have reviewed the chart and agree that the record accurately reflects my personal performance of the history, physical exam, discussion and plan.   Pita Virgen MD        Electronically Signed By: Pita Virgen MD   5/9/24  4:49 PM

## 2024-05-10 ENCOUNTER — NURSING HOME VISIT (OUTPATIENT)
Dept: POST ACUTE CARE | Facility: EXTERNAL LOCATION | Age: 74
End: 2024-05-10
Payer: MEDICARE

## 2024-05-10 DIAGNOSIS — E11.65 TYPE 2 DIABETES MELLITUS WITH HYPERGLYCEMIA, WITH LONG-TERM CURRENT USE OF INSULIN (MULTI): ICD-10-CM

## 2024-05-10 DIAGNOSIS — J44.9 CHRONIC OBSTRUCTIVE PULMONARY DISEASE, UNSPECIFIED COPD TYPE (MULTI): ICD-10-CM

## 2024-05-10 DIAGNOSIS — I48.91 ATRIAL FIBRILLATION, UNSPECIFIED TYPE (MULTI): ICD-10-CM

## 2024-05-10 DIAGNOSIS — I50.32 HYPERTENSIVE HEART DISEASE WITH CHRONIC DIASTOLIC CONGESTIVE HEART FAILURE (MULTI): ICD-10-CM

## 2024-05-10 DIAGNOSIS — Z79.4 TYPE 2 DIABETES MELLITUS WITH HYPERGLYCEMIA, WITH LONG-TERM CURRENT USE OF INSULIN (MULTI): ICD-10-CM

## 2024-05-10 DIAGNOSIS — I11.0 HYPERTENSIVE HEART DISEASE WITH CHRONIC DIASTOLIC CONGESTIVE HEART FAILURE (MULTI): ICD-10-CM

## 2024-05-10 DIAGNOSIS — R53.1 WEAKNESS: Primary | ICD-10-CM

## 2024-05-10 PROCEDURE — 99309 SBSQ NF CARE MODERATE MDM 30: CPT | Performed by: NURSE PRACTITIONER

## 2024-05-10 NOTE — LETTER
Patient: Teresa Matthews  : 1950    Encounter Date: 05/10/2024    PROGRESS NOTE    Subjective  Chief complaint: Teresa Matthews is a 73 y.o. female who is an acute skilled patient being seen and evaluated for weakness    HPI:  HPI  Patient does continue to work in therapy.  Patient does continue to require slide board for transfers and SBA.  Therapy is working with patient on ADL retraining, able to complete with moderate independence.  Patient seen and examined at the bedside, appears to be in no acute distress.  Nursing staff voicing no new concerns.    Objective  Vital signs: 136/74, 97.6, 68, 18, 96%, blood sugar 82    Physical Exam  Constitutional:       General: She is not in acute distress.  Eyes:      Extraocular Movements: Extraocular movements intact.   Cardiovascular:      Rate and Rhythm: Normal rate and regular rhythm.   Pulmonary:      Effort: Pulmonary effort is normal.      Breath sounds: Normal breath sounds.   Musculoskeletal:      Cervical back: Neck supple.      Right lower leg: No edema.      Left lower leg: No edema.   Neurological:      Mental Status: She is alert.      Motor: Weakness present.   Psychiatric:         Mood and Affect: Mood normal.         Behavior: Behavior is cooperative.         Assessment/Plan  Problem List Items Addressed This Visit       Type 2 diabetes mellitus with hyperglycemia, with long-term current use of insulin (Multi)     Fasting blood glucose at goal  Continue insulin  Glucoscan with sliding scale insulin coverage  Low concentrated sweets diet  Monitor fasting blood sugar         Atrial fibrillation (Multi)     Amiodarone  Apixaban  Bleeding precautions  Monitor heart rate         Hypertensive heart disease with chronic diastolic congestive heart failure (Multi)     CHF stable, no sob  Monitor blood pressure  Toprol-XL  Spironolactone  Losartan potassium         Chronic obstructive pulmonary disease, unspecified COPD type (Multi)     Stable, no  wheezing or shortness of breath  On room air         Weakness - Primary     Continue working towards goals          Medications, treatments, and labs reviewed  Continue medications and treatments as listed in EMR      Scribe Attestation  I, Kirill Page   attest that this documentation has been prepared under the direction and in the presence of MARISELA Nelson    Provider Attestation - Scribe documentation  All medical record entries made by the Scribe were at my direction and personally dictated by me. I have reviewed the chart and agree that the record accurately reflects my personal performance of the history, physical exam, discussion and plan.   MARISELA Nelson        Electronically Signed By: MARISELA Nelson   5/21/24 12:47 PM

## 2024-05-13 ENCOUNTER — NURSING HOME VISIT (OUTPATIENT)
Dept: POST ACUTE CARE | Facility: EXTERNAL LOCATION | Age: 74
End: 2024-05-13
Payer: MEDICARE

## 2024-05-13 DIAGNOSIS — I50.32 HYPERTENSIVE HEART DISEASE WITH CHRONIC DIASTOLIC CONGESTIVE HEART FAILURE (MULTI): ICD-10-CM

## 2024-05-13 DIAGNOSIS — N39.46 MIXED STRESS AND URGE URINARY INCONTINENCE: ICD-10-CM

## 2024-05-13 DIAGNOSIS — I11.0 HYPERTENSIVE HEART DISEASE WITH CHRONIC DIASTOLIC CONGESTIVE HEART FAILURE (MULTI): ICD-10-CM

## 2024-05-13 DIAGNOSIS — I50.42 CHRONIC COMBINED SYSTOLIC AND DIASTOLIC HEART FAILURE (MULTI): ICD-10-CM

## 2024-05-13 DIAGNOSIS — Z79.4 TYPE 2 DIABETES MELLITUS WITH HYPERGLYCEMIA, WITH LONG-TERM CURRENT USE OF INSULIN (MULTI): ICD-10-CM

## 2024-05-13 DIAGNOSIS — K21.9 GASTROESOPHAGEAL REFLUX DISEASE WITHOUT ESOPHAGITIS: ICD-10-CM

## 2024-05-13 DIAGNOSIS — R53.1 WEAKNESS: ICD-10-CM

## 2024-05-13 DIAGNOSIS — J44.9 CHRONIC OBSTRUCTIVE PULMONARY DISEASE, UNSPECIFIED COPD TYPE (MULTI): ICD-10-CM

## 2024-05-13 DIAGNOSIS — E11.65 TYPE 2 DIABETES MELLITUS WITH HYPERGLYCEMIA, WITH LONG-TERM CURRENT USE OF INSULIN (MULTI): ICD-10-CM

## 2024-05-13 DIAGNOSIS — I10 PRIMARY HYPERTENSION: ICD-10-CM

## 2024-05-13 PROCEDURE — 99308 SBSQ NF CARE LOW MDM 20: CPT | Performed by: INTERNAL MEDICINE

## 2024-05-13 NOTE — PROGRESS NOTES
PROGRESS NOTE    Subjective   Chief complaint: Teresa Matthews is a 73 y.o. female who is an acute skilled patient being seen and evaluated for weakness    HPI:  [facility] continues at SNF for therapy. Nursing reported that patient was attempting to receive order from outside MD to increase her perc to 10/325 Q4H PRN. This dose at this frequency is not appropriate at this time. Will continue current order and monitor patient pain. Nursing has no other concerns at this time.       Objective   Vital signs: 142/72,57,96%,     Physical Exam  Constitutional:       General: She is not in acute distress.  Eyes:      Extraocular Movements: Extraocular movements intact.   Cardiovascular:      Rate and Rhythm: Normal rate and regular rhythm.   Pulmonary:      Effort: Pulmonary effort is normal.      Breath sounds: Normal breath sounds.   Abdominal:      General: Bowel sounds are normal.      Palpations: Abdomen is soft.      Tenderness: There is no abdominal tenderness.   Musculoskeletal:      Cervical back: Neck supple.      Right lower leg: No edema.      Left lower leg: No edema.   Neurological:      Mental Status: She is alert.      Motor: Weakness present.   Psychiatric:         Mood and Affect: Mood normal.         Behavior: Behavior is cooperative.         Assessment/Plan   Problem List Items Addressed This Visit       Type 2 diabetes mellitus with hyperglycemia, with long-term current use of insulin (Multi)     Fasting blood glucose at goal  Continue insulin  Glucoscan with sliding scale insulin coverage  Low concentrated sweets diet  Monitor fasting blood sugar         Hypertensive heart disease with chronic diastolic congestive heart failure (Multi)     CHF stable, no sob  Monitor blood pressure  Toprol-XL  Spironolactone  Losartan potassium         Chronic obstructive pulmonary disease, unspecified COPD type (Multi)     Stable, no wheezing or shortness of breath  On room air         Weakness     Continue  working in therapy          Medications, treatments, and labs reviewed  Continue medications and treatments as listed in EMR    Scribe Attestation  I, Kirill Chapman   attest that this documentation has been prepared under the direction and in the presence of Pita Virgen MD.     Provider Attestation - Scribe documentation  All medical record entries made by the Scribe were at my direction and personally dictated by me. I have reviewed the chart and agree that the record accurately reflects my personal performance of the history, physical exam, discussion and plan.   Pita Virgen MD

## 2024-05-13 NOTE — LETTER
Patient: Teresa Matthews  : 1950    Encounter Date: 2024    PROGRESS NOTE    Subjective  Chief complaint: Teresa Matthews is a 73 y.o. female who is an acute skilled patient being seen and evaluated for weakness    HPI:  Patient continues at SNF for therapy. Nursing reported that patient was attempting to receive order from outside MD to increase her perc to 10/325 Q4H PRN. This dose at this frequency is not appropriate at this time. Will continue current order and monitor patient pain. Nursing has no other concerns at this time.       Objective  Vital signs: 142/72,57,96%,     Physical Exam  Constitutional:       General: She is not in acute distress.  Eyes:      Extraocular Movements: Extraocular movements intact.   Cardiovascular:      Rate and Rhythm: Normal rate and regular rhythm.   Pulmonary:      Effort: Pulmonary effort is normal.      Breath sounds: Normal breath sounds.   Abdominal:      General: Bowel sounds are normal.      Palpations: Abdomen is soft.      Tenderness: There is no abdominal tenderness.   Musculoskeletal:      Cervical back: Neck supple.      Right lower leg: No edema.      Left lower leg: No edema.   Neurological:      Mental Status: She is alert.      Motor: Weakness present.   Psychiatric:         Mood and Affect: Mood normal.         Behavior: Behavior is cooperative.         Assessment/Plan  Problem List Items Addressed This Visit       Type 2 diabetes mellitus with hyperglycemia, with long-term current use of insulin (Multi)     Fasting blood glucose at goal  Continue insulin  Glucoscan with sliding scale insulin coverage  Low concentrated sweets diet  Monitor fasting blood sugar         Hypertensive heart disease with chronic diastolic congestive heart failure (Multi)     CHF stable, no sob  Monitor blood pressure  Toprol-XL  Spironolactone  Losartan potassium         Chronic obstructive pulmonary disease, unspecified COPD type (Multi)     Stable, no  wheezing or shortness of breath  On room air         Weakness     Continue working in therapy          Medications, treatments, and labs reviewed  Continue medications and treatments as listed in EMR    Scribe Attestation  I, Kirill Chapman   attest that this documentation has been prepared under the direction and in the presence of Pita Virgen MD.     Provider Attestation - Scribe documentation  All medical record entries made by the Scribe were at my direction and personally dictated by me. I have reviewed the chart and agree that the record accurately reflects my personal performance of the history, physical exam, discussion and plan.   Pita Virgen MD      Electronically Signed By: Pita Virgen MD   5/13/24  5:08 PM

## 2024-05-13 NOTE — PROGRESS NOTES
PROGRESS NOTE    Subjective   Chief complaint: Teresa Matthews is a 73 y.o. female who is an acute skilled patient being seen and evaluated for weakness    HPI:  HPI  Patient is continuing to work on transfers and therapy.  Patient is nonambulatory.  Patient continues to require slide board for transfers.  Patient seen and examined at the bedside, appears to be in no acute distress.  Nursing staff voicing no new concerns.  Patient denies constitutional symptoms at this time.    Objective   Vital signs: 143/82, 97.4, 70, 18, blood sugar 168, 94%    Physical Exam  Constitutional:       General: She is not in acute distress.  Eyes:      Extraocular Movements: Extraocular movements intact.   Cardiovascular:      Rate and Rhythm: Normal rate and regular rhythm.   Pulmonary:      Effort: Pulmonary effort is normal.      Breath sounds: Normal breath sounds.   Abdominal:      General: Bowel sounds are normal.      Palpations: Abdomen is soft.      Tenderness: There is no abdominal tenderness.   Musculoskeletal:      Cervical back: Neck supple.      Right lower leg: No edema.      Left lower leg: No edema.   Neurological:      Mental Status: She is alert.      Motor: Weakness present.   Psychiatric:         Mood and Affect: Mood normal.         Behavior: Behavior is cooperative.         Assessment/Plan   Problem List Items Addressed This Visit       Type 2 diabetes mellitus with hyperglycemia, with long-term current use of insulin (Multi)     Fasting blood glucose at goal  Continue insulin  Glucoscan with sliding scale insulin coverage  Low concentrated sweets diet  Monitor fasting blood sugar         Atrial fibrillation (Multi)     Amiodarone  Apixaban  Bleeding precautions  Monitor heart rate         Hypertensive heart disease with chronic diastolic congestive heart failure (Multi)     CHF stable, no sob  Monitor blood pressure  Toprol-XL  Spironolactone  Losartan potassium         Weakness - Primary     Continue  working in therapy          Medications, treatments, and labs reviewed  Continue medications and treatments as listed in EMR      Scribe Attestation  I, Kirill Page   attest that this documentation has been prepared under the direction and in the presence of MARISELA Nelson    Provider Attestation - Scribe documentation  All medical record entries made by the Scribe were at my direction and personally dictated by me. I have reviewed the chart and agree that the record accurately reflects my personal performance of the history, physical exam, discussion and plan.   MARISELA Nelson

## 2024-05-14 ENCOUNTER — NURSING HOME VISIT (OUTPATIENT)
Dept: POST ACUTE CARE | Facility: EXTERNAL LOCATION | Age: 74
End: 2024-05-14

## 2024-05-14 ENCOUNTER — TELEMEDICINE (OUTPATIENT)
Dept: PRIMARY CARE | Facility: CLINIC | Age: 74
End: 2024-05-14
Payer: MEDICARE

## 2024-05-14 DIAGNOSIS — Z79.4 TYPE 2 DIABETES MELLITUS WITH HYPERGLYCEMIA, WITH LONG-TERM CURRENT USE OF INSULIN (MULTI): ICD-10-CM

## 2024-05-14 DIAGNOSIS — K21.9 GASTROESOPHAGEAL REFLUX DISEASE WITHOUT ESOPHAGITIS: ICD-10-CM

## 2024-05-14 DIAGNOSIS — M06.9 RHEUMATOID ARTHRITIS, INVOLVING UNSPECIFIED SITE, UNSPECIFIED WHETHER RHEUMATOID FACTOR PRESENT (MULTI): ICD-10-CM

## 2024-05-14 DIAGNOSIS — E66.9 OBESITY (BMI 30-39.9): ICD-10-CM

## 2024-05-14 DIAGNOSIS — E78.1 HYPERTRIGLYCERIDEMIA: ICD-10-CM

## 2024-05-14 DIAGNOSIS — M17.0 PRIMARY OSTEOARTHRITIS OF BOTH KNEES: ICD-10-CM

## 2024-05-14 DIAGNOSIS — J44.9 CHRONIC OBSTRUCTIVE PULMONARY DISEASE, UNSPECIFIED COPD TYPE (MULTI): ICD-10-CM

## 2024-05-14 DIAGNOSIS — E11.65 TYPE 2 DIABETES MELLITUS WITH HYPERGLYCEMIA, WITH LONG-TERM CURRENT USE OF INSULIN (MULTI): ICD-10-CM

## 2024-05-14 DIAGNOSIS — N18.31 STAGE 3A CHRONIC KIDNEY DISEASE (MULTI): ICD-10-CM

## 2024-05-14 DIAGNOSIS — M06.00 RHEUMATOID ARTHRITIS WITH NEGATIVE RHEUMATOID FACTOR, INVOLVING UNSPECIFIED SITE (MULTI): ICD-10-CM

## 2024-05-14 DIAGNOSIS — N18.31 CHRONIC RENAL FAILURE, STAGE 3A (MULTI): ICD-10-CM

## 2024-05-14 DIAGNOSIS — R53.1 WEAKNESS: ICD-10-CM

## 2024-05-14 DIAGNOSIS — I50.32 HYPERTENSIVE HEART DISEASE WITH CHRONIC DIASTOLIC CONGESTIVE HEART FAILURE (MULTI): ICD-10-CM

## 2024-05-14 DIAGNOSIS — I11.0 HYPERTENSIVE HEART DISEASE WITH CHRONIC DIASTOLIC CONGESTIVE HEART FAILURE (MULTI): ICD-10-CM

## 2024-05-14 DIAGNOSIS — F32.A DEPRESSION, UNSPECIFIED DEPRESSION TYPE: ICD-10-CM

## 2024-05-14 DIAGNOSIS — M48.02 DEGENERATIVE CERVICAL SPINAL STENOSIS: ICD-10-CM

## 2024-05-14 DIAGNOSIS — I48.91 ATRIAL FIBRILLATION, UNSPECIFIED TYPE (MULTI): ICD-10-CM

## 2024-05-14 PROCEDURE — 1123F ACP DISCUSS/DSCN MKR DOCD: CPT | Performed by: FAMILY MEDICINE

## 2024-05-14 PROCEDURE — 1159F MED LIST DOCD IN RCRD: CPT | Performed by: FAMILY MEDICINE

## 2024-05-14 PROCEDURE — 1036F TOBACCO NON-USER: CPT | Performed by: FAMILY MEDICINE

## 2024-05-14 PROCEDURE — 99214 OFFICE O/P EST MOD 30 MIN: CPT | Performed by: FAMILY MEDICINE

## 2024-05-14 PROCEDURE — 3046F HEMOGLOBIN A1C LEVEL >9.0%: CPT | Performed by: FAMILY MEDICINE

## 2024-05-14 PROCEDURE — 1157F ADVNC CARE PLAN IN RCRD: CPT | Performed by: FAMILY MEDICINE

## 2024-05-14 PROCEDURE — 99308 SBSQ NF CARE LOW MDM 20: CPT | Performed by: INTERNAL MEDICINE

## 2024-05-14 PROCEDURE — 3008F BODY MASS INDEX DOCD: CPT | Performed by: FAMILY MEDICINE

## 2024-05-14 PROCEDURE — 4010F ACE/ARB THERAPY RXD/TAKEN: CPT | Performed by: FAMILY MEDICINE

## 2024-05-14 RX ORDER — FAMOTIDINE 40 MG/1
40 TABLET, FILM COATED ORAL DAILY
Qty: 90 TABLET | Refills: 3 | Status: ON HOLD | OUTPATIENT
Start: 2024-05-14 | End: 2025-05-14

## 2024-05-14 RX ORDER — OXYCODONE AND ACETAMINOPHEN 10; 325 MG/1; MG/1
1 TABLET ORAL EVERY 4 HOURS PRN
Qty: 180 TABLET | Refills: 0 | Status: ON HOLD | OUTPATIENT
Start: 2024-07-13 | End: 2024-08-12

## 2024-05-14 RX ORDER — METOPROLOL SUCCINATE 50 MG/1
50 TABLET, EXTENDED RELEASE ORAL DAILY
Qty: 90 TABLET | Refills: 3 | Status: ON HOLD | OUTPATIENT
Start: 2024-05-14 | End: 2025-05-14

## 2024-05-14 RX ORDER — OXYCODONE AND ACETAMINOPHEN 10; 325 MG/1; MG/1
1 TABLET ORAL EVERY 4 HOURS PRN
Qty: 180 TABLET | Refills: 0 | Status: ON HOLD | OUTPATIENT
Start: 2024-06-13 | End: 2024-07-13

## 2024-05-14 RX ORDER — AMIODARONE HYDROCHLORIDE 200 MG/1
200 TABLET ORAL DAILY
Qty: 90 TABLET | Refills: 3 | Status: ON HOLD | OUTPATIENT
Start: 2024-05-14 | End: 2025-05-14

## 2024-05-14 RX ORDER — OXYCODONE AND ACETAMINOPHEN 10; 325 MG/1; MG/1
1 TABLET ORAL EVERY 4 HOURS PRN
Qty: 180 TABLET | Refills: 0 | Status: ON HOLD | OUTPATIENT
Start: 2024-05-14 | End: 2024-06-13

## 2024-05-14 RX ORDER — FLASH GLUCOSE SCANNING READER
EACH MISCELLANEOUS
Qty: 1 EACH | Refills: 0 | Status: ON HOLD | OUTPATIENT
Start: 2024-05-14

## 2024-05-14 ASSESSMENT — ENCOUNTER SYMPTOMS
BACK PAIN: 1
DYSPHORIC MOOD: 1
ARTHRALGIAS: 1

## 2024-05-14 NOTE — PROGRESS NOTES
PROGRESS NOTE    Subjective   Chief complaint: Teresa Matthews is a 73 y.o. female who is an acute skilled patient being seen and evaluated for weakness    HPI:  HPI  Therapy has been working with the patient to improve strength and endurance with ADLs, transfers, and mobility.  Patient continues to work toward goals.  Patient was seen and examined at the bedside.  Patient is stable and has no new complaints.  Nursing staff voices no new concerns today.    Objective   Vital signs: 137/71, 96.9, 78, 18, 96%, blood sugar 80    Physical Exam  Constitutional:       General: She is not in acute distress.  Eyes:      Extraocular Movements: Extraocular movements intact.   Cardiovascular:      Rate and Rhythm: Normal rate and regular rhythm.   Pulmonary:      Effort: Pulmonary effort is normal.      Breath sounds: Normal breath sounds.   Abdominal:      General: Bowel sounds are normal.      Palpations: Abdomen is soft.      Tenderness: There is no abdominal tenderness.   Musculoskeletal:      Cervical back: Neck supple.      Right lower leg: No edema.      Left lower leg: No edema.   Neurological:      Mental Status: She is alert.      Motor: Weakness present.   Psychiatric:         Mood and Affect: Mood normal.         Behavior: Behavior is cooperative.         Assessment/Plan   Problem List Items Addressed This Visit       Type 2 diabetes mellitus with hyperglycemia, with long-term current use of insulin (Multi)     Fasting blood glucose at goal  Continue insulin  Glucoscan with sliding scale insulin coverage  Low concentrated sweets diet  Monitor fasting blood sugar         Hypertensive heart disease with chronic diastolic congestive heart failure (Multi)     CHF stable, no sob  Monitor blood pressure  Toprol-XL  Spironolactone  Losartan potassium         Chronic obstructive pulmonary disease, unspecified COPD type (Multi)     Stable, no wheezing or shortness of breath  On room air         Weakness - Primary      Continue therapy, work towards goals          Medications, treatments, and labs reviewed  Continue medications and treatments as listed in EMR      Scribe Attestation  I, Kirill Page   attest that this documentation has been prepared under the direction and in the presence of MARISELA Nelson    Provider Attestation - Scribe documentation  All medical record entries made by the Scribe were at my direction and personally dictated by me. I have reviewed the chart and agree that the record accurately reflects my personal performance of the history, physical exam, discussion and plan.   MARISELA Nelson

## 2024-05-14 NOTE — PROGRESS NOTES
Subjective   Patient ID: Teresa Matthews is a 73 y.o. female.  An interactive audio and video telecommunication system which permits real time communications between the patient (at the originating site) and provider (at a distant site) was utilized to provider this telehealth service.   HPI  Is in rehab awaiting completion of skilled nursing at which time she will return home. Most recent admission 3/25/2024-3/30/2024 for diabetic ketoacidosis and she had wounds that required dressing changes, She currently cannot weight bear with walker, she is in a wheelchair, and can transfer to bed, toilet, etc, cannot use walker.   OARRS:  Monica Macario MD on 5/14/2024  4:58 PM  I have personally reviewed the OARRS report for Teresa Matthews. I have considered the risks of abuse, dependence, addiction and diversion and I believe that it is clinically appropriate for Teresa Matthews to be prescribed this medication    Is the patient prescribed a combination of a benzodiazepine and opioid?  No    Last Urine Drug Screen / ordered today: Yes  Recent Results (from the past 8760 hour(s))   Confirmation Opiate/Opioid/Benzo Prescription Compliance    Collection Time: 11/13/23 11:36 AM   Result Value Ref Range    Clonazepam <25 <25 ng/mL    7-Aminoclonazepam <25 <25 ng/mL    Alprazolam <25 <25 ng/mL    Alpha-Hydroxyalprazolam <25 <25 ng/mL    Midazolam <25 <25 ng/mL    Alpha-Hydroxymidazolam <25 <25 ng/mL    Chlordiazepoxide <25 <25 ng/mL    Diazepam <25 <25 ng/mL    Nordiazepam <25 <25 ng/mL    Temazepam <25 <25 ng/mL    Oxazepam <25 <25 ng/mL    Lorazepam <25 <25 ng/mL    Methadone <25 <25 ng/mL    EDDP <25 <25 ng/mL    6-Acetylmorphine <25 <25 ng/mL    Codeine <50 <50 ng/mL    Hydrocodone <25 <25 ng/mL    Hydromorphone <25 <25 ng/mL    Morphine  <50 <50 ng/mL    Norhydrocodone <25 <25 ng/mL    Noroxycodone <25 <25 ng/mL    Oxycodone <25 <25 ng/mL    Oxymorphone <25 <25 ng/mL    Fentanyl <2.5 <2.5 ng/mL     Norfentanyl <2.5 <2.5 ng/mL    Tramadol <50 <50 ng/mL    O-Desmethyltramadol <50 <50 ng/mL    Zolpidem <25 <25 ng/mL    Zolpidem Metabolite (ZCA) <25 <25 ng/mL   Screen Opiate/Opioid/Benzo Prescription Compliance    Collection Time: 11/13/23 11:36 AM   Result Value Ref Range    Creatinine, Urine Random 48.5 20.0 - 320.0 mg/dL    Amphetamine Screen, Urine Presumptive Negative Presumptive Negative    Barbiturate Screen, Urine Presumptive Negative Presumptive Negative    Cannabinoid Screen, Urine Presumptive Negative Presumptive Negative    Cocaine Metabolite Screen, Urine Presumptive Negative Presumptive Negative    PCP Screen, Urine Presumptive Negative Presumptive Negative     Results are as expected.         Controlled Substance Agreement:  Date of the Last Agreement: 11/13/2023  Reviewed Controlled Substance Agreement including but not limited to the benefits, risks, and alternatives to treatment with a Controlled Substance medication(s).    Opioids:  What is the patient's goal of therapy? Improved pain  Is this being achieved with current treatment? yes    I have calculated the patient's Morphine Dose Equivalent (MED):   I have considered referral to Pain Management and/or a specialist, and do not feel it is necessary at this time. She is awaiting knee surgery, but her back and many other arthritis conditions are non surgical. Has been to pain management and had injections in back, Joint replacements as well.      I feel that it is clinically indicated to continue this current medication regimen after consideration of alternative therapies, and other non-opioid treatment.    Opioid Risk Screening:  No data recorded    Pain Assessment:  7/10- patient currently is in wheelchair, has difficulty with pain bearing weight on leg without knee joint. She has had amputations of toes, and is hoping to have knee revision at some point when she is stable.   Review of Systems   Musculoskeletal:  Positive for arthralgias, back  pain and gait problem.   Psychiatric/Behavioral:  Positive for dysphoric mood.        Objective   Physical Exam  Virtual visit  Assessment/Plan   Diagnoses and all orders for this visit:  Stage 3a chronic kidney disease (Multi)  -     Follow Up In [facility] - PCP - Established  Type 2 diabetes mellitus with hyperglycemia, with long-term current use of insulin (Multi)  -     Follow Up In [facility] - PCP - Established  Atrial fibrillation, unspecified type (Multi)  -     Follow Up [facility] - PCP - Established  Hypertriglyceridemia  -     Follow Up [facility] - PCP - Established  Obesity (BMI 30-39.9)  -     Follow Up [facility] - PCP - Established  Gastroesophageal reflux disease without esophagitis  -     Follow Up [facility] - PCP - Established  Chronic renal failure, stage 3a (Multi)  -     Follow Up [facility] - PCP - Established  Rheumatoid arthritis with negative rheumatoid factor, involving unspecified site (Multi)  Primary osteoarthritis of both knees  -     oxyCODONE-acetaminophen (Percocet)  mg tablet; Take 1 tablet by mouth every 4 hours if needed for severe pain (7 - 10).  -     oxyCODONE-acetaminophen (Percocet)  mg tablet; Take 1 tablet by mouth every 4 hours if needed for severe pain (7 - 10). Do not fill before June 13, 2024.  -     oxyCODONE-acetaminophen (Percocet)  mg tablet; Take 1 tablet by mouth every 4 hours if needed for severe pain (7 - 10). Do not fill before July 13, 2024.  Degenerative cervical spinal stenosis  -     oxyCODONE-acetaminophen (Percocet)  mg tablet; Take 1 tablet by mouth every 4 hours if needed for severe pain (7 - 10).  -     oxyCODONE-acetaminophen (Percocet)  mg tablet; Take 1 tablet by mouth every 4 hours if needed for severe pain (7 - 10). Do not fill before June 13, 2024.  -     oxyCODONE-acetaminophen (Percocet)  mg tablet; Take 1 tablet  by mouth every 4 hours if needed for severe pain (7 - 10). Do not fill before July 13, 2024.  Rheumatoid arthritis, involving unspecified site, unspecified whether rheumatoid factor present (Multi)  -     oxyCODONE-acetaminophen (Percocet)  mg tablet; Take 1 tablet by mouth every 4 hours if needed for severe pain (7 - 10).  -     oxyCODONE-acetaminophen (Percocet)  mg tablet; Take 1 tablet by mouth every 4 hours if needed for severe pain (7 - 10). Do not fill before June 13, 2024.  -     oxyCODONE-acetaminophen (Percocet)  mg tablet; Take 1 tablet by mouth every 4 hours if needed for severe pain (7 - 10). Do not fill before July 13, 2024.    Patient was identified as a fall risk. Risk prevention instructions provided.    A virtual visit was performed today due to the current COVID-19 pandemic. All questions were answered, and medications were reviewed, and renewed where necessary.   A total of  16  minutes was spent on this encounter.

## 2024-05-14 NOTE — LETTER
Patient: Teresa Matthews  : 1950    Encounter Date: 2024    PROGRESS NOTE    Subjective  Chief complaint: Teresa Matthews is a 73 y.o. female who is an acute skilled patient being seen and evaluated for weakness    HPI:  Patient has been working in therapy to improve strength, endurance, and ADLs.  Patient continues to work toward goals. Using PRN meds to manage pain. No new concerns today.  Denies n/v/f/c pain.        Objective  Vital signs: 137/71,78,96%,     Physical Exam  Constitutional:       General: She is not in acute distress.  Eyes:      Extraocular Movements: Extraocular movements intact.   Cardiovascular:      Rate and Rhythm: Normal rate and regular rhythm.   Pulmonary:      Effort: Pulmonary effort is normal.      Breath sounds: Normal breath sounds.   Abdominal:      General: Bowel sounds are normal.      Palpations: Abdomen is soft.      Tenderness: There is no abdominal tenderness.   Musculoskeletal:      Cervical back: Neck supple.      Right lower leg: No edema.      Left lower leg: No edema.   Neurological:      Mental Status: She is alert.      Motor: Weakness present.   Psychiatric:         Mood and Affect: Mood normal.         Behavior: Behavior is cooperative.         Assessment/Plan  Problem List Items Addressed This Visit       Type 2 diabetes mellitus with hyperglycemia, with long-term current use of insulin (Multi)     Fasting blood glucose at goal  Continue insulin  Glucoscan with sliding scale insulin coverage  Low concentrated sweets diet  Monitor fasting blood sugar         Hypertensive heart disease with chronic diastolic congestive heart failure (Multi)     CHF stable, no sob  Monitor blood pressure  Toprol-XL  Spironolactone  Losartan potassium         Chronic obstructive pulmonary disease, unspecified COPD type (Multi)     Stable, no wheezing or shortness of breath  On room air         Weakness     Continue therapy, work towards goals          Depression     Monitor mood and behaviors  Bupropion  Desvenlafaxine          Medications, treatments, and labs reviewed  Continue medications and treatments as listed in EMR    Scribe Attestation  I, Kirill Chapman   attest that this documentation has been prepared under the direction and in the presence of Pita Virgen MD.     Provider Attestation - Scribe documentation  All medical record entries made by the Scribe were at my direction and personally dictated by me. I have reviewed the chart and agree that the record accurately reflects my personal performance of the history, physical exam, discussion and plan.   Pita Virgen MD      Electronically Signed By: Pita Virgen MD   5/14/24  3:50 PM

## 2024-05-14 NOTE — PROGRESS NOTES
PROGRESS NOTE    Subjective   Chief complaint: Teresa Matthews is a 73 y.o. female who is an acute skilled patient being seen and evaluated for weakness    HPI:  Patient has been working in therapy to improve strength, endurance, and ADLs.  Patient continues to work toward goals. Using PRN meds to manage pain. No new concerns today.  Denies n/v/f/c pain.        Objective   Vital signs: 137/71,78,96%,     Physical Exam  Constitutional:       General: She is not in acute distress.  Eyes:      Extraocular Movements: Extraocular movements intact.   Cardiovascular:      Rate and Rhythm: Normal rate and regular rhythm.   Pulmonary:      Effort: Pulmonary effort is normal.      Breath sounds: Normal breath sounds.   Abdominal:      General: Bowel sounds are normal.      Palpations: Abdomen is soft.      Tenderness: There is no abdominal tenderness.   Musculoskeletal:      Cervical back: Neck supple.      Right lower leg: No edema.      Left lower leg: No edema.   Neurological:      Mental Status: She is alert.      Motor: Weakness present.   Psychiatric:         Mood and Affect: Mood normal.         Behavior: Behavior is cooperative.         Assessment/Plan   Problem List Items Addressed This Visit       Type 2 diabetes mellitus with hyperglycemia, with long-term current use of insulin (Multi)     Fasting blood glucose at goal  Continue insulin  Glucoscan with sliding scale insulin coverage  Low concentrated sweets diet  Monitor fasting blood sugar         Hypertensive heart disease with chronic diastolic congestive heart failure (Multi)     CHF stable, no sob  Monitor blood pressure  Toprol-XL  Spironolactone  Losartan potassium         Chronic obstructive pulmonary disease, unspecified COPD type (Multi)     Stable, no wheezing or shortness of breath  On room air         Weakness     Continue therapy, work towards goals         Depression     Monitor mood and behaviors  Bupropion  Desvenlafaxine           Medications, treatments, and labs reviewed  Continue medications and treatments as listed in EMR    Scribe Attestation  I, Kirill Chapman   attest that this documentation has been prepared under the direction and in the presence of Pita Virgen MD.     Provider Attestation - Scribe documentation  All medical record entries made by the Scribe were at my direction and personally dictated by me. I have reviewed the chart and agree that the record accurately reflects my personal performance of the history, physical exam, discussion and plan.   Pita Virgen MD

## 2024-05-20 NOTE — PROGRESS NOTES
PROGRESS NOTE    Subjective   Chief complaint: Teresa Matthews is a 73 y.o. female who is an acute skilled patient being seen and evaluated for weakness    HPI:  HPI  Patient does continue to work in therapy.  Patient does continue to require slide board for transfers and SBA.  Therapy is working with patient on ADL retraining, able to complete with moderate independence.  Patient seen and examined at the bedside, appears to be in no acute distress.  Nursing staff voicing no new concerns.    Objective   Vital signs: 136/74, 97.6, 68, 18, 96%, blood sugar 82    Physical Exam  Constitutional:       General: She is not in acute distress.  Eyes:      Extraocular Movements: Extraocular movements intact.   Cardiovascular:      Rate and Rhythm: Normal rate and regular rhythm.   Pulmonary:      Effort: Pulmonary effort is normal.      Breath sounds: Normal breath sounds.   Musculoskeletal:      Cervical back: Neck supple.      Right lower leg: No edema.      Left lower leg: No edema.   Neurological:      Mental Status: She is alert.      Motor: Weakness present.   Psychiatric:         Mood and Affect: Mood normal.         Behavior: Behavior is cooperative.         Assessment/Plan   Problem List Items Addressed This Visit       Type 2 diabetes mellitus with hyperglycemia, with long-term current use of insulin (Multi)     Fasting blood glucose at goal  Continue insulin  Glucoscan with sliding scale insulin coverage  Low concentrated sweets diet  Monitor fasting blood sugar         Atrial fibrillation (Multi)     Amiodarone  Apixaban  Bleeding precautions  Monitor heart rate         Hypertensive heart disease with chronic diastolic congestive heart failure (Multi)     CHF stable, no sob  Monitor blood pressure  Toprol-XL  Spironolactone  Losartan potassium         Chronic obstructive pulmonary disease, unspecified COPD type (Multi)     Stable, no wheezing or shortness of breath  On room air         Weakness - Primary      Continue working towards goals          Medications, treatments, and labs reviewed  Continue medications and treatments as listed in EMR      Scribe Attestation  I, Kirill Page   attest that this documentation has been prepared under the direction and in the presence of MARISELA Nelson    Provider Attestation - Scribe documentation  All medical record entries made by the Scribe were at my direction and personally dictated by me. I have reviewed the chart and agree that the record accurately reflects my personal performance of the history, physical exam, discussion and plan.   MARISELA Nelson

## 2024-05-29 ENCOUNTER — NURSING HOME VISIT (OUTPATIENT)
Dept: POST ACUTE CARE | Facility: EXTERNAL LOCATION | Age: 74
End: 2024-05-29
Payer: MEDICARE

## 2024-05-29 DIAGNOSIS — I48.91 ATRIAL FIBRILLATION, UNSPECIFIED TYPE (MULTI): ICD-10-CM

## 2024-05-29 DIAGNOSIS — L03.116 CELLULITIS OF LEFT LOWER EXTREMITY: Primary | ICD-10-CM

## 2024-05-29 DIAGNOSIS — I50.32 HYPERTENSIVE HEART DISEASE WITH CHRONIC DIASTOLIC CONGESTIVE HEART FAILURE (MULTI): ICD-10-CM

## 2024-05-29 DIAGNOSIS — I11.0 HYPERTENSIVE HEART DISEASE WITH CHRONIC DIASTOLIC CONGESTIVE HEART FAILURE (MULTI): ICD-10-CM

## 2024-05-29 PROCEDURE — 99309 SBSQ NF CARE MODERATE MDM 30: CPT | Performed by: NURSE PRACTITIONER

## 2024-05-29 NOTE — LETTER
Patient: Teresa Matthews  : 1950    Encounter Date: 2024    PROGRESS NOTE    Subjective  Chief complaint: Teresa Matthews is a 73 y.o. female who is a long term care patient being seen and evaluated for cellulitis.    HPI:  HPI  Nursing called yesterday reported that patient's left knee was red and swollen.  Patient was started on doxycycline, tolerating without adverse effects.  Patient denies fever or chills.  Denies nausea or vomiting.  Patient was seen and examined at the bedside, appears to be in no acute distress.    Objective  Vital signs: 130/70, 98.3, 61, 18, blood sugar 220, 96%    Physical Exam  Constitutional:       General: She is not in acute distress.  Eyes:      Extraocular Movements: Extraocular movements intact.   Cardiovascular:      Rate and Rhythm: Normal rate and regular rhythm.   Pulmonary:      Effort: Pulmonary effort is normal.      Breath sounds: Normal breath sounds.   Abdominal:      General: Bowel sounds are normal.      Palpations: Abdomen is soft.      Tenderness: There is no abdominal tenderness.   Musculoskeletal:      Cervical back: Neck supple.      Right lower leg: No edema.      Left lower leg: No edema.   Skin:     Comments: Left knee reddened, nontender   Neurological:      Mental Status: She is alert.   Psychiatric:         Mood and Affect: Mood normal.         Behavior: Behavior is cooperative.         Assessment/Plan  Problem List Items Addressed This Visit       Atrial fibrillation (Multi)     Amiodarone  Apixaban  Bleeding precautions  Monitor heart rate         Hypertensive heart disease with chronic diastolic congestive heart failure (Multi)     CHF stable, no sob  Monitor blood pressure  Toprol-XL  Spironolactone  Losartan potassium         Cellulitis - Primary     Start doxycycline          Medications, treatments, and labs reviewed  Continue medications and treatments as listed in EMR    Scribe Attestation  JUAN FRANCISCO, Kirill Page   attest  that this documentation has been prepared under the direction and in the presence of MARISELA Nelson    Provider Attestation - Scribe documentation  All medical record entries made by the Scribe were at my direction and personally dictated by me. I have reviewed the chart and agree that the record accurately reflects my personal performance of the history, physical exam, discussion and plan.   MARISELA Nelson            Electronically Signed By: MARISELA Nelson   6/6/24  7:59 PM

## 2024-05-30 ENCOUNTER — NURSING HOME VISIT (OUTPATIENT)
Dept: POST ACUTE CARE | Facility: EXTERNAL LOCATION | Age: 74
End: 2024-05-30
Payer: MEDICARE

## 2024-05-30 DIAGNOSIS — J44.9 CHRONIC OBSTRUCTIVE PULMONARY DISEASE, UNSPECIFIED COPD TYPE (MULTI): ICD-10-CM

## 2024-05-30 DIAGNOSIS — I11.0 HYPERTENSIVE HEART DISEASE WITH CHRONIC DIASTOLIC CONGESTIVE HEART FAILURE (MULTI): ICD-10-CM

## 2024-05-30 DIAGNOSIS — I50.32 HYPERTENSIVE HEART DISEASE WITH CHRONIC DIASTOLIC CONGESTIVE HEART FAILURE (MULTI): ICD-10-CM

## 2024-05-30 DIAGNOSIS — L03.116 CELLULITIS OF LEFT LOWER EXTREMITY: Primary | ICD-10-CM

## 2024-05-30 PROBLEM — L03.90 CELLULITIS: Status: ACTIVE | Noted: 2024-05-30

## 2024-05-30 PROCEDURE — 99308 SBSQ NF CARE LOW MDM 20: CPT | Performed by: INTERNAL MEDICINE

## 2024-05-30 NOTE — PROGRESS NOTES
PROGRESS NOTE    Subjective   Chief complaint: Teresa Matthews is a 73 y.o. female who is a long term care patient being seen and evaluated for cellulitis.    HPI:  HPI  Patient seen in follow-up of left knee pain, red and swollen.  Patient was started on doxycycline for possible cellulitis.  Patient seen and examined at bedside, appears to be in no acute distress.  Patient is tolerating antibiotic without adverse effects.  Patient denies fever or chills.    Objective   Vital signs: 129/73, 98.1, 62, 16, 96%, blood sugar, 90    Physical Exam  Constitutional:       General: She is not in acute distress.  Eyes:      Extraocular Movements: Extraocular movements intact.   Cardiovascular:      Rate and Rhythm: Normal rate and regular rhythm.   Pulmonary:      Effort: Pulmonary effort is normal.      Breath sounds: Normal breath sounds.   Abdominal:      General: Bowel sounds are normal.      Palpations: Abdomen is soft.      Tenderness: There is no abdominal tenderness.   Musculoskeletal:      Cervical back: Neck supple.      Right lower leg: No edema.      Left lower leg: No edema.   Neurological:      Mental Status: She is alert.      Motor: Weakness present.   Psychiatric:         Mood and Affect: Mood normal.         Behavior: Behavior is cooperative.         Assessment/Plan   Problem List Items Addressed This Visit       Hypertensive heart disease with chronic diastolic congestive heart failure (Multi)     CHF stable, no sob  Monitor blood pressure  Toprol-XL  Spironolactone  Losartan potassium         Chronic obstructive pulmonary disease, unspecified COPD type (Multi)     Stable, no wheezing or shortness of breath  On room air         Cellulitis - Primary     Knee, left  Doxycycline until complete.  6/4/2024          Medications, treatments, and labs reviewed  Continue medications and treatments as listed in EMR    Scribe Attestation  I, Lorna Mcmanus, Scribe   attest that this documentation has been  prepared under the direction and in the presence of Pita Virgen MD    Provider Attestation - Scribe documentation  All medical record entries made by the Scribe were at my direction and personally dictated by me. I have reviewed the chart and agree that the record accurately reflects my personal performance of the history, physical exam, discussion and plan.   Pita Virgen MD

## 2024-05-30 NOTE — LETTER
Patient: Teresa Matthews  : 1950    Encounter Date: 2024    PROGRESS NOTE    Subjective  Chief complaint: Teresa Matthews is a 73 y.o. female who is a long term care patient being seen and evaluated for cellulitis.    HPI:  HPI  Patient seen in follow-up of left knee pain, red and swollen.  Patient was started on doxycycline for possible cellulitis.  Patient seen and examined at bedside, appears to be in no acute distress.  Patient is tolerating antibiotic without adverse effects.  Patient denies fever or chills.    Objective  Vital signs: 129/73, 98.1, 62, 16, 96%, blood sugar, 90    Physical Exam  Constitutional:       General: She is not in acute distress.  Eyes:      Extraocular Movements: Extraocular movements intact.   Cardiovascular:      Rate and Rhythm: Normal rate and regular rhythm.   Pulmonary:      Effort: Pulmonary effort is normal.      Breath sounds: Normal breath sounds.   Abdominal:      General: Bowel sounds are normal.      Palpations: Abdomen is soft.      Tenderness: There is no abdominal tenderness.   Musculoskeletal:      Cervical back: Neck supple.      Right lower leg: No edema.      Left lower leg: No edema.   Neurological:      Mental Status: She is alert.      Motor: Weakness present.   Psychiatric:         Mood and Affect: Mood normal.         Behavior: Behavior is cooperative.         Assessment/Plan  Problem List Items Addressed This Visit       Hypertensive heart disease with chronic diastolic congestive heart failure (Multi)     CHF stable, no sob  Monitor blood pressure  Toprol-XL  Spironolactone  Losartan potassium         Chronic obstructive pulmonary disease, unspecified COPD type (Multi)     Stable, no wheezing or shortness of breath  On room air         Cellulitis - Primary     Knee, left  Doxycycline until complete.  2024          Medications, treatments, and labs reviewed  Continue medications and treatments as listed in EMR    Scribe  Attestation  I, Kirill Page   attest that this documentation has been prepared under the direction and in the presence of Pita Virgen MD    Provider Attestation - Scribe documentation  All medical record entries made by the Scribe were at my direction and personally dictated by me. I have reviewed the chart and agree that the record accurately reflects my personal performance of the history, physical exam, discussion and plan.   Pita Virgen MD            Electronically Signed By: Pita Virgen MD   5/30/24  4:33 PM

## 2024-06-04 ENCOUNTER — NURSING HOME VISIT (OUTPATIENT)
Dept: POST ACUTE CARE | Facility: EXTERNAL LOCATION | Age: 74
End: 2024-06-04
Payer: MEDICARE

## 2024-06-04 ENCOUNTER — HOSPITAL ENCOUNTER (INPATIENT)
Facility: HOSPITAL | Age: 74
LOS: 6 days | Discharge: ANOTHER HOSPITAL | End: 2024-06-10
Attending: FAMILY MEDICINE | Admitting: HOSPITALIST
Payer: MEDICARE

## 2024-06-04 DIAGNOSIS — E11.65 TYPE 2 DIABETES MELLITUS WITH HYPERGLYCEMIA, WITH LONG-TERM CURRENT USE OF INSULIN (MULTI): ICD-10-CM

## 2024-06-04 DIAGNOSIS — L03.90 CELLULITIS: Primary | ICD-10-CM

## 2024-06-04 DIAGNOSIS — K59.1 FUNCTIONAL DIARRHEA: ICD-10-CM

## 2024-06-04 DIAGNOSIS — I50.32 HYPERTENSIVE HEART DISEASE WITH CHRONIC DIASTOLIC CONGESTIVE HEART FAILURE (MULTI): ICD-10-CM

## 2024-06-04 DIAGNOSIS — I48.91 ATRIAL FIBRILLATION, UNSPECIFIED TYPE (MULTI): ICD-10-CM

## 2024-06-04 DIAGNOSIS — L03.116 CELLULITIS OF LEFT LOWER EXTREMITY: ICD-10-CM

## 2024-06-04 DIAGNOSIS — I11.0 HYPERTENSIVE HEART DISEASE WITH CHRONIC DIASTOLIC CONGESTIVE HEART FAILURE (MULTI): ICD-10-CM

## 2024-06-04 DIAGNOSIS — R09.81 NASAL CONGESTION: ICD-10-CM

## 2024-06-04 DIAGNOSIS — M00.862 ARTHRITIS OF LEFT KNEE DUE TO OTHER BACTERIA (MULTI): ICD-10-CM

## 2024-06-04 DIAGNOSIS — J44.9 CHRONIC OBSTRUCTIVE PULMONARY DISEASE, UNSPECIFIED COPD TYPE (MULTI): ICD-10-CM

## 2024-06-04 DIAGNOSIS — Z79.4 TYPE 2 DIABETES MELLITUS WITH HYPERGLYCEMIA, WITH LONG-TERM CURRENT USE OF INSULIN (MULTI): ICD-10-CM

## 2024-06-04 LAB — GLUCOSE BLD MANUAL STRIP-MCNC: 183 MG/DL (ref 74–99)

## 2024-06-04 PROCEDURE — 99309 SBSQ NF CARE MODERATE MDM 30: CPT | Performed by: INTERNAL MEDICINE

## 2024-06-04 PROCEDURE — 1100000001 HC PRIVATE ROOM DAILY

## 2024-06-04 PROCEDURE — 82947 ASSAY GLUCOSE BLOOD QUANT: CPT

## 2024-06-04 PROCEDURE — 2500000001 HC RX 250 WO HCPCS SELF ADMINISTERED DRUGS (ALT 637 FOR MEDICARE OP): Performed by: HOSPITALIST

## 2024-06-04 RX ORDER — MEROPENEM 1 G/1
1 INJECTION, POWDER, FOR SOLUTION INTRAVENOUS EVERY 12 HOURS
Status: DISCONTINUED | OUTPATIENT
Start: 2024-06-04 | End: 2024-06-04

## 2024-06-04 RX ORDER — FAMOTIDINE 20 MG/1
40 TABLET, FILM COATED ORAL DAILY
Status: DISCONTINUED | OUTPATIENT
Start: 2024-06-05 | End: 2024-06-10 | Stop reason: HOSPADM

## 2024-06-04 RX ORDER — INSULIN LISPRO 100 [IU]/ML
35 INJECTION, SUSPENSION SUBCUTANEOUS DAILY
Status: DISCONTINUED | OUTPATIENT
Start: 2024-06-05 | End: 2024-06-10 | Stop reason: HOSPADM

## 2024-06-04 RX ORDER — CALCIUM CARBONATE 200(500)MG
500 TABLET,CHEWABLE ORAL 4 TIMES DAILY PRN
Status: DISCONTINUED | OUTPATIENT
Start: 2024-06-04 | End: 2024-06-10 | Stop reason: HOSPADM

## 2024-06-04 RX ORDER — ONDANSETRON HYDROCHLORIDE 2 MG/ML
4 INJECTION, SOLUTION INTRAVENOUS EVERY 4 HOURS PRN
Status: DISCONTINUED | OUTPATIENT
Start: 2024-06-04 | End: 2024-06-10 | Stop reason: HOSPADM

## 2024-06-04 RX ORDER — TRAZODONE HYDROCHLORIDE 50 MG/1
50 TABLET ORAL NIGHTLY PRN
Status: DISCONTINUED | OUTPATIENT
Start: 2024-06-04 | End: 2024-06-10 | Stop reason: HOSPADM

## 2024-06-04 RX ORDER — DEXTROSE 50 % IN WATER (D50W) INTRAVENOUS SYRINGE
25
Status: DISCONTINUED | OUTPATIENT
Start: 2024-06-04 | End: 2024-06-10 | Stop reason: HOSPADM

## 2024-06-04 RX ORDER — LOSARTAN POTASSIUM 25 MG/1
25 TABLET ORAL DAILY
Status: DISCONTINUED | OUTPATIENT
Start: 2024-06-05 | End: 2024-06-10 | Stop reason: HOSPADM

## 2024-06-04 RX ORDER — ACETAMINOPHEN 500 MG
10 TABLET ORAL DAILY PRN
Status: DISCONTINUED | OUTPATIENT
Start: 2024-06-04 | End: 2024-06-10 | Stop reason: HOSPADM

## 2024-06-04 RX ORDER — ASPIRIN 81 MG/1
81 TABLET ORAL
Status: DISCONTINUED | OUTPATIENT
Start: 2024-06-04 | End: 2024-06-10 | Stop reason: HOSPADM

## 2024-06-04 RX ORDER — SPIRONOLACTONE 25 MG/1
25 TABLET ORAL DAILY
Status: DISCONTINUED | OUTPATIENT
Start: 2024-06-05 | End: 2024-06-10 | Stop reason: HOSPADM

## 2024-06-04 RX ORDER — AMIODARONE HYDROCHLORIDE 200 MG/1
200 TABLET ORAL DAILY
Status: DISCONTINUED | OUTPATIENT
Start: 2024-06-05 | End: 2024-06-10 | Stop reason: HOSPADM

## 2024-06-04 RX ORDER — METOPROLOL SUCCINATE 50 MG/1
50 TABLET, EXTENDED RELEASE ORAL DAILY
Status: DISCONTINUED | OUTPATIENT
Start: 2024-06-05 | End: 2024-06-10 | Stop reason: HOSPADM

## 2024-06-04 RX ORDER — VANCOMYCIN HYDROCHLORIDE 1 G/20ML
INJECTION, POWDER, LYOPHILIZED, FOR SOLUTION INTRAVENOUS DAILY PRN
Status: DISCONTINUED | OUTPATIENT
Start: 2024-06-04 | End: 2024-06-05

## 2024-06-04 RX ORDER — DESVENLAFAXINE 50 MG/1
100 TABLET, EXTENDED RELEASE ORAL DAILY
Status: DISCONTINUED | OUTPATIENT
Start: 2024-06-05 | End: 2024-06-10 | Stop reason: HOSPADM

## 2024-06-04 RX ORDER — ACETAMINOPHEN 325 MG/1
650 TABLET ORAL EVERY 4 HOURS PRN
Status: DISCONTINUED | OUTPATIENT
Start: 2024-06-04 | End: 2024-06-10 | Stop reason: HOSPADM

## 2024-06-04 RX ORDER — LEVOTHYROXINE SODIUM 175 UG/1
175 TABLET ORAL DAILY
Status: DISCONTINUED | OUTPATIENT
Start: 2024-06-05 | End: 2024-06-10 | Stop reason: HOSPADM

## 2024-06-04 RX ORDER — OXYCODONE AND ACETAMINOPHEN 10; 325 MG/1; MG/1
1 TABLET ORAL EVERY 4 HOURS PRN
Status: DISCONTINUED | OUTPATIENT
Start: 2024-06-04 | End: 2024-06-10 | Stop reason: HOSPADM

## 2024-06-04 RX ORDER — VANCOMYCIN 1.5 G/300ML
1500 INJECTION, SOLUTION INTRAVENOUS DAILY
Status: DISCONTINUED | OUTPATIENT
Start: 2024-06-05 | End: 2024-06-05

## 2024-06-04 RX ORDER — POTASSIUM CHLORIDE 750 MG/1
10 TABLET, FILM COATED, EXTENDED RELEASE ORAL DAILY
Status: DISCONTINUED | OUTPATIENT
Start: 2024-06-05 | End: 2024-06-10 | Stop reason: HOSPADM

## 2024-06-04 RX ORDER — INSULIN LISPRO 100 [IU]/ML
0-10 INJECTION, SOLUTION INTRAVENOUS; SUBCUTANEOUS
Status: DISCONTINUED | OUTPATIENT
Start: 2024-06-05 | End: 2024-06-10 | Stop reason: HOSPADM

## 2024-06-04 RX ORDER — METFORMIN HYDROCHLORIDE 500 MG/1
1000 TABLET ORAL 2 TIMES DAILY
Status: DISCONTINUED | OUTPATIENT
Start: 2024-06-04 | End: 2024-06-06

## 2024-06-04 RX ORDER — LOPERAMIDE HYDROCHLORIDE 2 MG/1
2 CAPSULE ORAL 4 TIMES DAILY PRN
Status: DISCONTINUED | OUTPATIENT
Start: 2024-06-04 | End: 2024-06-10 | Stop reason: HOSPADM

## 2024-06-04 RX ADMIN — PIPERACILLIN AND TAZOBACTAM 3.38 G: 3; .375 INJECTION, POWDER, FOR SOLUTION INTRAVENOUS at 23:56

## 2024-06-04 RX ADMIN — APIXABAN 5 MG: 5 TABLET, FILM COATED ORAL at 21:23

## 2024-06-04 RX ADMIN — TRAZODONE HYDROCHLORIDE 50 MG: 50 TABLET ORAL at 21:23

## 2024-06-04 RX ADMIN — ASPIRIN 81 MG: 81 TABLET, COATED ORAL at 21:23

## 2024-06-04 SDOH — SOCIAL STABILITY: SOCIAL INSECURITY: ABUSE: ADULT

## 2024-06-04 SDOH — SOCIAL STABILITY: SOCIAL INSECURITY: WERE YOU ABLE TO COMPLETE ALL THE BEHAVIORAL HEALTH SCREENINGS?: YES

## 2024-06-04 SDOH — SOCIAL STABILITY: SOCIAL INSECURITY: ARE THERE ANY APPARENT SIGNS OF INJURIES/BEHAVIORS THAT COULD BE RELATED TO ABUSE/NEGLECT?: NO

## 2024-06-04 SDOH — SOCIAL STABILITY: SOCIAL INSECURITY: HAS ANYONE EVER THREATENED TO HURT YOUR FAMILY OR YOUR PETS?: NO

## 2024-06-04 SDOH — SOCIAL STABILITY: SOCIAL INSECURITY: HAVE YOU HAD THOUGHTS OF HARMING ANYONE ELSE?: NO

## 2024-06-04 SDOH — SOCIAL STABILITY: SOCIAL INSECURITY: DO YOU FEEL UNSAFE GOING BACK TO THE PLACE WHERE YOU ARE LIVING?: NO

## 2024-06-04 SDOH — SOCIAL STABILITY: SOCIAL INSECURITY: DO YOU FEEL ANYONE HAS EXPLOITED OR TAKEN ADVANTAGE OF YOU FINANCIALLY OR OF YOUR PERSONAL PROPERTY?: NO

## 2024-06-04 SDOH — SOCIAL STABILITY: SOCIAL INSECURITY: DOES ANYONE TRY TO KEEP YOU FROM HAVING/CONTACTING OTHER FRIENDS OR DOING THINGS OUTSIDE YOUR HOME?: NO

## 2024-06-04 SDOH — SOCIAL STABILITY: SOCIAL INSECURITY: HAVE YOU HAD ANY THOUGHTS OF HARMING ANYONE ELSE?: NO

## 2024-06-04 SDOH — SOCIAL STABILITY: SOCIAL INSECURITY: ARE YOU OR HAVE YOU BEEN THREATENED OR ABUSED PHYSICALLY, EMOTIONALLY, OR SEXUALLY BY ANYONE?: NO

## 2024-06-04 ASSESSMENT — COGNITIVE AND FUNCTIONAL STATUS - GENERAL
HELP NEEDED FOR BATHING: A LOT
DRESSING REGULAR LOWER BODY CLOTHING: TOTAL
TURNING FROM BACK TO SIDE WHILE IN FLAT BAD: A LITTLE
MOVING TO AND FROM BED TO CHAIR: TOTAL
MOVING FROM LYING ON BACK TO SITTING ON SIDE OF FLAT BED WITH BEDRAILS: A LITTLE
MOBILITY SCORE: 10
DAILY ACTIVITIY SCORE: 13
TOILETING: TOTAL
STANDING UP FROM CHAIR USING ARMS: TOTAL
DRESSING REGULAR UPPER BODY CLOTHING: A LITTLE
PATIENT BASELINE BEDBOUND: YES
CLIMB 3 TO 5 STEPS WITH RAILING: TOTAL
WALKING IN HOSPITAL ROOM: TOTAL
PERSONAL GROOMING: A LOT

## 2024-06-04 ASSESSMENT — LIFESTYLE VARIABLES
AUDIT-C TOTAL SCORE: 0
HOW OFTEN DO YOU HAVE 6 OR MORE DRINKS ON ONE OCCASION: NEVER
SUBSTANCE_ABUSE_PAST_12_MONTHS: NO
SKIP TO QUESTIONS 9-10: 1
AUDIT-C TOTAL SCORE: 0
HOW OFTEN DO YOU HAVE A DRINK CONTAINING ALCOHOL: NEVER
HOW MANY STANDARD DRINKS CONTAINING ALCOHOL DO YOU HAVE ON A TYPICAL DAY: PATIENT DOES NOT DRINK
PRESCIPTION_ABUSE_PAST_12_MONTHS: NO

## 2024-06-04 ASSESSMENT — ACTIVITIES OF DAILY LIVING (ADL)
ADEQUATE_TO_COMPLETE_ADL: YES
TOILETING: DEPENDENT
JUDGMENT_ADEQUATE_SAFELY_COMPLETE_DAILY_ACTIVITIES: NO
HEARING - LEFT EAR: FUNCTIONAL
LACK_OF_TRANSPORTATION: NO
PATIENT'S MEMORY ADEQUATE TO SAFELY COMPLETE DAILY ACTIVITIES?: NO
DRESSING YOURSELF: DEPENDENT
BATHING: DEPENDENT
WALKS IN HOME: DEPENDENT
FEEDING YOURSELF: NEEDS ASSISTANCE
HEARING - RIGHT EAR: FUNCTIONAL
GROOMING: DEPENDENT

## 2024-06-04 ASSESSMENT — PAIN SCALES - GENERAL: PAINLEVEL_OUTOF10: 0 - NO PAIN

## 2024-06-04 ASSESSMENT — PATIENT HEALTH QUESTIONNAIRE - PHQ9
1. LITTLE INTEREST OR PLEASURE IN DOING THINGS: NOT AT ALL
2. FEELING DOWN, DEPRESSED OR HOPELESS: NOT AT ALL
SUM OF ALL RESPONSES TO PHQ9 QUESTIONS 1 & 2: 0

## 2024-06-04 ASSESSMENT — PAIN - FUNCTIONAL ASSESSMENT: PAIN_FUNCTIONAL_ASSESSMENT: 0-10

## 2024-06-04 NOTE — PROGRESS NOTES
PROGRESS NOTE    Subjective   Chief complaint: Teresa Matthews is a 73 y.o. female who is a long term care patient being seen and evaluated for cellulitis.    HPI:  HPI  Nursing called yesterday reported that patient's left knee was red and swollen.  Patient was started on doxycycline, tolerating without adverse effects.  Patient denies fever or chills.  Denies nausea or vomiting.  Patient was seen and examined at the bedside, appears to be in no acute distress.    Objective   Vital signs: 130/70, 98.3, 61, 18, blood sugar 220, 96%    Physical Exam  Constitutional:       General: She is not in acute distress.  Eyes:      Extraocular Movements: Extraocular movements intact.   Cardiovascular:      Rate and Rhythm: Normal rate and regular rhythm.   Pulmonary:      Effort: Pulmonary effort is normal.      Breath sounds: Normal breath sounds.   Abdominal:      General: Bowel sounds are normal.      Palpations: Abdomen is soft.      Tenderness: There is no abdominal tenderness.   Musculoskeletal:      Cervical back: Neck supple.      Right lower leg: No edema.      Left lower leg: No edema.   Skin:     Comments: Left knee reddened, nontender   Neurological:      Mental Status: She is alert.   Psychiatric:         Mood and Affect: Mood normal.         Behavior: Behavior is cooperative.         Assessment/Plan   Problem List Items Addressed This Visit       Atrial fibrillation (Multi)     Amiodarone  Apixaban  Bleeding precautions  Monitor heart rate         Hypertensive heart disease with chronic diastolic congestive heart failure (Multi)     CHF stable, no sob  Monitor blood pressure  Toprol-XL  Spironolactone  Losartan potassium         Cellulitis - Primary     Start doxycycline          Medications, treatments, and labs reviewed  Continue medications and treatments as listed in EMR    Scribe Attestation  I, Kirill Page   attest that this documentation has been prepared under the direction and in the  presence of MARISELA Nelson    Provider Attestation - Scribe documentation  All medical record entries made by the Scribe were at my direction and personally dictated by me. I have reviewed the chart and agree that the record accurately reflects my personal performance of the history, physical exam, discussion and plan.   MARISELA Nelson

## 2024-06-04 NOTE — PROGRESS NOTES
PROGRESS NOTE    Subjective   Chief complaint: Teresa Matthews is a 73 y.o. female who is a long term care patient being seen and evaluated for knee.     HPI:  Patient presents for follow up of left knee infection. She has been on ATB therapy with no improvement. Knee is still swollen\red and painful. Will send to hospital for possible septic knee. She is afebrile at this time.       Objective   Vital signs: 142/82,63,96%,     Physical Exam  Constitutional:       General: She is not in acute distress.  Eyes:      Extraocular Movements: Extraocular movements intact.   Cardiovascular:      Rate and Rhythm: Normal rate and regular rhythm.   Pulmonary:      Effort: Pulmonary effort is normal.      Breath sounds: Normal breath sounds.   Abdominal:      General: Bowel sounds are normal.      Palpations: Abdomen is soft.      Tenderness: There is no abdominal tenderness.   Musculoskeletal:      Cervical back: Neck supple.      Right lower leg: No edema.      Left lower leg: No edema.   Neurological:      Mental Status: She is alert.      Motor: Weakness present.   Psychiatric:         Mood and Affect: Mood normal.         Behavior: Behavior is cooperative.         Assessment/Plan   Problem List Items Addressed This Visit       Type 2 diabetes mellitus with hyperglycemia, with long-term current use of insulin (Multi)     Fasting blood glucose at goal  Continue insulin  Glucoscan with sliding scale insulin coverage  Low concentrated sweets diet  Monitor fasting blood sugar         Atrial fibrillation (Multi)     Amiodarone  Apixaban  Bleeding precautions  Monitor heart rate         Hypertensive heart disease with chronic diastolic congestive heart failure (Multi)     CHF stable, no sob  Monitor blood pressure  Toprol-XL  Spironolactone  Losartan potassium         Chronic obstructive pulmonary disease, unspecified COPD type (Multi)     Stable, no wheezing or shortness of breath  On room air         Cellulitis      Send to ED for possible septic knee          Medications, treatments, and labs reviewed  Continue medications and treatments as listed in EMR      Scribe Attestation  I, Kirill Chapman   attest that this documentation has been prepared under the direction and in the presence of Pita Virgen MD.     Provider Attestation - Scribe documentation  All medical record entries made by the Scribe were at my direction and personally dictated by me. I have reviewed the chart and agree that the record accurately reflects my personal performance of the history, physical exam, discussion and plan.   Pita Virgen MD

## 2024-06-04 NOTE — LETTER
Patient: Teresa Matthews  : 1950    Encounter Date: 2024    PROGRESS NOTE    Subjective  Chief complaint: Teresa Matthews is a 73 y.o. female who is a long term care patient being seen and evaluated for knee.     HPI:  Patient presents for follow up of left knee infection. She has been on ATB therapy with no improvement. Knee is still swollen\red and painful. Will send to hospital for possible septic knee. She is afebrile at this time.       Objective  Vital signs: 142/82,63,96%,     Physical Exam  Constitutional:       General: She is not in acute distress.  Eyes:      Extraocular Movements: Extraocular movements intact.   Cardiovascular:      Rate and Rhythm: Normal rate and regular rhythm.   Pulmonary:      Effort: Pulmonary effort is normal.      Breath sounds: Normal breath sounds.   Abdominal:      General: Bowel sounds are normal.      Palpations: Abdomen is soft.      Tenderness: There is no abdominal tenderness.   Musculoskeletal:      Cervical back: Neck supple.      Right lower leg: No edema.      Left lower leg: No edema.   Neurological:      Mental Status: She is alert.      Motor: Weakness present.   Psychiatric:         Mood and Affect: Mood normal.         Behavior: Behavior is cooperative.         Assessment/Plan  Problem List Items Addressed This Visit       Type 2 diabetes mellitus with hyperglycemia, with long-term current use of insulin (Multi)     Fasting blood glucose at goal  Continue insulin  Glucoscan with sliding scale insulin coverage  Low concentrated sweets diet  Monitor fasting blood sugar         Atrial fibrillation (Multi)     Amiodarone  Apixaban  Bleeding precautions  Monitor heart rate         Hypertensive heart disease with chronic diastolic congestive heart failure (Multi)     CHF stable, no sob  Monitor blood pressure  Toprol-XL  Spironolactone  Losartan potassium         Chronic obstructive pulmonary disease, unspecified COPD type (Multi)      Stable, no wheezing or shortness of breath  On room air         Cellulitis     Send to ED for possible septic knee          Medications, treatments, and labs reviewed  Continue medications and treatments as listed in EMR      Scribe Attestation  JUAN FRANCISCO, Kirill Chapman   attest that this documentation has been prepared under the direction and in the presence of Pita Virgen MD.     Provider Attestation - Scribe documentation  All medical record entries made by the Scribe were at my direction and personally dictated by me. I have reviewed the chart and agree that the record accurately reflects my personal performance of the history, physical exam, discussion and plan.   Pita Virgen MD      Electronically Signed By: Pita Virgen MD   6/4/24  5:15 PM

## 2024-06-05 LAB
ALBUMIN SERPL BCP-MCNC: 2.9 G/DL (ref 3.4–5)
ALP SERPL-CCNC: 70 U/L (ref 33–136)
ALT SERPL W P-5'-P-CCNC: 7 U/L (ref 7–45)
ANION GAP SERPL CALC-SCNC: 10 MMOL/L (ref 10–20)
AST SERPL W P-5'-P-CCNC: 9 U/L (ref 9–39)
BASOPHILS # BLD AUTO: 0.07 X10*3/UL (ref 0–0.1)
BASOPHILS NFR BLD AUTO: 1.1 %
BILIRUB SERPL-MCNC: 0.3 MG/DL (ref 0–1.2)
BUN SERPL-MCNC: 20 MG/DL (ref 6–23)
CALCIUM SERPL-MCNC: 9.4 MG/DL (ref 8.6–10.3)
CHLORIDE SERPL-SCNC: 103 MMOL/L (ref 98–107)
CO2 SERPL-SCNC: 28 MMOL/L (ref 21–32)
CREAT SERPL-MCNC: 1.28 MG/DL (ref 0.5–1.05)
EGFRCR SERPLBLD CKD-EPI 2021: 45 ML/MIN/1.73M*2
EOSINOPHIL # BLD AUTO: 0.11 X10*3/UL (ref 0–0.4)
EOSINOPHIL NFR BLD AUTO: 1.7 %
ERYTHROCYTE [DISTWIDTH] IN BLOOD BY AUTOMATED COUNT: 16.5 % (ref 11.5–14.5)
GLUCOSE BLD MANUAL STRIP-MCNC: 115 MG/DL (ref 74–99)
GLUCOSE BLD MANUAL STRIP-MCNC: 134 MG/DL (ref 74–99)
GLUCOSE BLD MANUAL STRIP-MCNC: 139 MG/DL (ref 74–99)
GLUCOSE BLD MANUAL STRIP-MCNC: 154 MG/DL (ref 74–99)
GLUCOSE BLD MANUAL STRIP-MCNC: 61 MG/DL (ref 74–99)
GLUCOSE BLD MANUAL STRIP-MCNC: 67 MG/DL (ref 74–99)
GLUCOSE BLD MANUAL STRIP-MCNC: 91 MG/DL (ref 74–99)
GLUCOSE BLD MANUAL STRIP-MCNC: 97 MG/DL (ref 74–99)
GLUCOSE SERPL-MCNC: 160 MG/DL (ref 74–99)
HCT VFR BLD AUTO: 33.2 % (ref 36–46)
HGB BLD-MCNC: 10.1 G/DL (ref 12–16)
IMM GRANULOCYTES # BLD AUTO: 0.01 X10*3/UL (ref 0–0.5)
IMM GRANULOCYTES NFR BLD AUTO: 0.2 % (ref 0–0.9)
LYMPHOCYTES # BLD AUTO: 1.55 X10*3/UL (ref 0.8–3)
LYMPHOCYTES NFR BLD AUTO: 24.3 %
MAGNESIUM SERPL-MCNC: 1.64 MG/DL (ref 1.6–2.4)
MCH RBC QN AUTO: 25.4 PG (ref 26–34)
MCHC RBC AUTO-ENTMCNC: 30.4 G/DL (ref 32–36)
MCV RBC AUTO: 84 FL (ref 80–100)
MONOCYTES # BLD AUTO: 0.61 X10*3/UL (ref 0.05–0.8)
MONOCYTES NFR BLD AUTO: 9.5 %
NEUTROPHILS # BLD AUTO: 4.04 X10*3/UL (ref 1.6–5.5)
NEUTROPHILS NFR BLD AUTO: 63.2 %
NRBC BLD-RTO: ABNORMAL /100{WBCS}
PLATELET # BLD AUTO: 193 X10*3/UL (ref 150–450)
POTASSIUM SERPL-SCNC: 4.3 MMOL/L (ref 3.5–5.3)
PROT SERPL-MCNC: 7.1 G/DL (ref 6.4–8.2)
RBC # BLD AUTO: 3.97 X10*6/UL (ref 4–5.2)
SODIUM SERPL-SCNC: 137 MMOL/L (ref 136–145)
WBC # BLD AUTO: 6.4 X10*3/UL (ref 4.4–11.3)

## 2024-06-05 PROCEDURE — 87040 BLOOD CULTURE FOR BACTERIA: CPT | Mod: BEALAB | Performed by: INTERNAL MEDICINE

## 2024-06-05 PROCEDURE — 85025 COMPLETE CBC W/AUTO DIFF WBC: CPT | Performed by: HOSPITALIST

## 2024-06-05 PROCEDURE — 2500000001 HC RX 250 WO HCPCS SELF ADMINISTERED DRUGS (ALT 637 FOR MEDICARE OP): Performed by: PHYSICIAN ASSISTANT

## 2024-06-05 PROCEDURE — 97530 THERAPEUTIC ACTIVITIES: CPT | Mod: GP

## 2024-06-05 PROCEDURE — 83735 ASSAY OF MAGNESIUM: CPT | Performed by: HOSPITALIST

## 2024-06-05 PROCEDURE — 2500000002 HC RX 250 W HCPCS SELF ADMINISTERED DRUGS (ALT 637 FOR MEDICARE OP, ALT 636 FOR OP/ED): Performed by: FAMILY MEDICINE

## 2024-06-05 PROCEDURE — 36415 COLL VENOUS BLD VENIPUNCTURE: CPT | Performed by: INTERNAL MEDICINE

## 2024-06-05 PROCEDURE — 82947 ASSAY GLUCOSE BLOOD QUANT: CPT | Mod: 91

## 2024-06-05 PROCEDURE — 2500000004 HC RX 250 GENERAL PHARMACY W/ HCPCS (ALT 636 FOR OP/ED): Mod: JZ

## 2024-06-05 PROCEDURE — 1100000001 HC PRIVATE ROOM DAILY

## 2024-06-05 PROCEDURE — 36415 COLL VENOUS BLD VENIPUNCTURE: CPT | Performed by: HOSPITALIST

## 2024-06-05 PROCEDURE — 80053 COMPREHEN METABOLIC PANEL: CPT | Performed by: HOSPITALIST

## 2024-06-05 PROCEDURE — 2500000002 HC RX 250 W HCPCS SELF ADMINISTERED DRUGS (ALT 637 FOR MEDICARE OP, ALT 636 FOR OP/ED): Performed by: HOSPITALIST

## 2024-06-05 PROCEDURE — 2500000001 HC RX 250 WO HCPCS SELF ADMINISTERED DRUGS (ALT 637 FOR MEDICARE OP): Performed by: HOSPITALIST

## 2024-06-05 PROCEDURE — 2500000004 HC RX 250 GENERAL PHARMACY W/ HCPCS (ALT 636 FOR OP/ED): Performed by: INTERNAL MEDICINE

## 2024-06-05 RX ORDER — GABAPENTIN 100 MG/1
100 CAPSULE ORAL 2 TIMES DAILY
Status: DISCONTINUED | OUTPATIENT
Start: 2024-06-05 | End: 2024-06-10 | Stop reason: HOSPADM

## 2024-06-05 RX ORDER — DAPAGLIFLOZIN 5 MG/1
10 TABLET, FILM COATED ORAL DAILY
Status: DISCONTINUED | OUTPATIENT
Start: 2024-06-05 | End: 2024-06-05

## 2024-06-05 RX ORDER — OXYCODONE HYDROCHLORIDE 5 MG/1
5 TABLET ORAL EVERY 4 HOURS PRN
Status: DISCONTINUED | OUTPATIENT
Start: 2024-06-05 | End: 2024-06-10 | Stop reason: HOSPADM

## 2024-06-05 RX ORDER — GABAPENTIN 100 MG/1
100 CAPSULE ORAL 2 TIMES DAILY
COMMUNITY
End: 2024-06-14 | Stop reason: HOSPADM

## 2024-06-05 RX ORDER — ALBUTEROL SULFATE 0.83 MG/ML
2.5 SOLUTION RESPIRATORY (INHALATION) EVERY 4 HOURS PRN
Status: ACTIVE | OUTPATIENT
Start: 2024-06-05 | End: 2024-06-08

## 2024-06-05 RX ORDER — TRAMADOL HYDROCHLORIDE 50 MG/1
50 TABLET ORAL EVERY 8 HOURS PRN
Status: DISCONTINUED | OUTPATIENT
Start: 2024-06-05 | End: 2024-06-10 | Stop reason: HOSPADM

## 2024-06-05 RX ORDER — CEFTRIAXONE 2 G/50ML
2 INJECTION, SOLUTION INTRAVENOUS EVERY 24 HOURS
Status: DISCONTINUED | OUTPATIENT
Start: 2024-06-05 | End: 2024-06-10 | Stop reason: HOSPADM

## 2024-06-05 RX ADMIN — OXYCODONE HYDROCHLORIDE 5 MG: 5 TABLET ORAL at 11:31

## 2024-06-05 RX ADMIN — INSULIN LISPRO 35 UNITS: 100 INJECTION, SUSPENSION SUBCUTANEOUS at 09:00

## 2024-06-05 RX ADMIN — PIPERACILLIN AND TAZOBACTAM 3.38 G: 3; .375 INJECTION, POWDER, FOR SOLUTION INTRAVENOUS at 06:16

## 2024-06-05 RX ADMIN — DESVENLAFAXINE 100 MG: 50 TABLET, FILM COATED, EXTENDED RELEASE ORAL at 08:42

## 2024-06-05 RX ADMIN — LOSARTAN POTASSIUM 25 MG: 25 TABLET, FILM COATED ORAL at 08:41

## 2024-06-05 RX ADMIN — GABAPENTIN 100 MG: 100 CAPSULE ORAL at 02:02

## 2024-06-05 RX ADMIN — FAMOTIDINE 40 MG: 20 TABLET, FILM COATED ORAL at 08:40

## 2024-06-05 RX ADMIN — AMIODARONE HYDROCHLORIDE 200 MG: 200 TABLET ORAL at 08:41

## 2024-06-05 RX ADMIN — VANCOMYCIN 1.5 G: 1.5 INJECTION, SOLUTION INTRAVENOUS at 08:42

## 2024-06-05 RX ADMIN — TRAMADOL HYDROCHLORIDE 50 MG: 50 TABLET ORAL at 18:29

## 2024-06-05 RX ADMIN — OXYCODONE AND ACETAMINOPHEN 1 TABLET: 10; 325 TABLET ORAL at 07:40

## 2024-06-05 RX ADMIN — GABAPENTIN 100 MG: 100 CAPSULE ORAL at 08:38

## 2024-06-05 RX ADMIN — GABAPENTIN 100 MG: 100 CAPSULE ORAL at 20:48

## 2024-06-05 RX ADMIN — APIXABAN 5 MG: 5 TABLET, FILM COATED ORAL at 08:39

## 2024-06-05 RX ADMIN — POTASSIUM CHLORIDE 10 MEQ: 750 TABLET, FILM COATED, EXTENDED RELEASE ORAL at 08:39

## 2024-06-05 RX ADMIN — METOPROLOL SUCCINATE 50 MG: 50 TABLET, EXTENDED RELEASE ORAL at 08:41

## 2024-06-05 RX ADMIN — CEFTRIAXONE 2 G: 2 INJECTION, SOLUTION INTRAVENOUS at 17:42

## 2024-06-05 RX ADMIN — SPIRONOLACTONE 25 MG: 25 TABLET ORAL at 08:41

## 2024-06-05 RX ADMIN — LEVOTHYROXINE SODIUM 175 MCG: 0.17 TABLET ORAL at 06:39

## 2024-06-05 RX ADMIN — PIPERACILLIN AND TAZOBACTAM 3.38 G: 3; .375 INJECTION, POWDER, FOR SOLUTION INTRAVENOUS at 11:07

## 2024-06-05 RX ADMIN — SITAGLIPTIN 50 MG: 50 TABLET, FILM COATED ORAL at 12:12

## 2024-06-05 RX ADMIN — ASPIRIN 81 MG: 81 TABLET, COATED ORAL at 20:47

## 2024-06-05 RX ADMIN — APIXABAN 5 MG: 5 TABLET, FILM COATED ORAL at 20:46

## 2024-06-05 RX ADMIN — OXYCODONE HYDROCHLORIDE 5 MG: 5 TABLET ORAL at 15:18

## 2024-06-05 SDOH — ECONOMIC STABILITY: TRANSPORTATION INSECURITY
IN THE PAST 12 MONTHS, HAS THE LACK OF TRANSPORTATION KEPT YOU FROM MEDICAL APPOINTMENTS OR FROM GETTING MEDICATIONS?: NO

## 2024-06-05 SDOH — HEALTH STABILITY: MENTAL HEALTH
STRESS IS WHEN SOMEONE FEELS TENSE, NERVOUS, ANXIOUS, OR CAN'T SLEEP AT NIGHT BECAUSE THEIR MIND IS TROUBLED. HOW STRESSED ARE YOU?: TO SOME EXTENT

## 2024-06-05 SDOH — ECONOMIC STABILITY: FOOD INSECURITY: WITHIN THE PAST 12 MONTHS, YOU WORRIED THAT YOUR FOOD WOULD RUN OUT BEFORE YOU GOT MONEY TO BUY MORE.: NEVER TRUE

## 2024-06-05 SDOH — HEALTH STABILITY: MENTAL HEALTH: HOW OFTEN DO YOU HAVE 6 OR MORE DRINKS ON ONE OCCASION?: NEVER

## 2024-06-05 SDOH — ECONOMIC STABILITY: TRANSPORTATION INSECURITY
IN THE PAST 12 MONTHS, HAS LACK OF TRANSPORTATION KEPT YOU FROM MEETINGS, WORK, OR FROM GETTING THINGS NEEDED FOR DAILY LIVING?: NO

## 2024-06-05 SDOH — SOCIAL STABILITY: SOCIAL INSECURITY
WITHIN THE LAST YEAR, HAVE TO BEEN RAPED OR FORCED TO HAVE ANY KIND OF SEXUAL ACTIVITY BY YOUR PARTNER OR EX-PARTNER?: NO

## 2024-06-05 SDOH — SOCIAL STABILITY: SOCIAL NETWORK: HOW OFTEN DO YOU ATTEND CHURCH OR RELIGIOUS SERVICES?: NEVER

## 2024-06-05 SDOH — HEALTH STABILITY: MENTAL HEALTH
HOW OFTEN DO YOU NEED TO HAVE SOMEONE HELP YOU WHEN YOU READ INSTRUCTIONS, PAMPHLETS, OR OTHER WRITTEN MATERIAL FROM YOUR DOCTOR OR PHARMACY?: NEVER

## 2024-06-05 SDOH — ECONOMIC STABILITY: HOUSING INSECURITY
IN THE LAST 12 MONTHS, WAS THERE A TIME WHEN YOU DID NOT HAVE A STEADY PLACE TO SLEEP OR SLEPT IN A SHELTER (INCLUDING NOW)?: NO

## 2024-06-05 SDOH — SOCIAL STABILITY: SOCIAL NETWORK
DO YOU BELONG TO ANY CLUBS OR ORGANIZATIONS SUCH AS CHURCH GROUPS UNIONS, FRATERNAL OR ATHLETIC GROUPS, OR SCHOOL GROUPS?: YES

## 2024-06-05 SDOH — HEALTH STABILITY: MENTAL HEALTH: HOW OFTEN DO YOU HAVE A DRINK CONTAINING ALCOHOL?: NEVER

## 2024-06-05 SDOH — SOCIAL STABILITY: SOCIAL NETWORK
IN A TYPICAL WEEK, HOW MANY TIMES DO YOU TALK ON THE PHONE WITH FAMILY, FRIENDS, OR NEIGHBORS?: MORE THAN THREE TIMES A WEEK

## 2024-06-05 SDOH — ECONOMIC STABILITY: FOOD INSECURITY: WITHIN THE PAST 12 MONTHS, THE FOOD YOU BOUGHT JUST DIDN'T LAST AND YOU DIDN'T HAVE MONEY TO GET MORE.: NEVER TRUE

## 2024-06-05 SDOH — SOCIAL STABILITY: SOCIAL INSECURITY: WITHIN THE LAST YEAR, HAVE YOU BEEN HUMILIATED OR EMOTIONALLY ABUSED IN OTHER WAYS BY YOUR PARTNER OR EX-PARTNER?: NO

## 2024-06-05 SDOH — SOCIAL STABILITY: SOCIAL NETWORK: HOW OFTEN DO YOU GET TOGETHER WITH FRIENDS OR RELATIVES?: NEVER

## 2024-06-05 SDOH — ECONOMIC STABILITY: INCOME INSECURITY: IN THE LAST 12 MONTHS, WAS THERE A TIME WHEN YOU WERE NOT ABLE TO PAY THE MORTGAGE OR RENT ON TIME?: NO

## 2024-06-05 SDOH — ECONOMIC STABILITY: INCOME INSECURITY: IN THE PAST 12 MONTHS, HAS THE ELECTRIC, GAS, OIL, OR WATER COMPANY THREATENED TO SHUT OFF SERVICE IN YOUR HOME?: NO

## 2024-06-05 SDOH — SOCIAL STABILITY: SOCIAL INSECURITY
WITHIN THE LAST YEAR, HAVE YOU BEEN KICKED, HIT, SLAPPED, OR OTHERWISE PHYSICALLY HURT BY YOUR PARTNER OR EX-PARTNER?: NO

## 2024-06-05 SDOH — HEALTH STABILITY: MENTAL HEALTH: HOW MANY STANDARD DRINKS CONTAINING ALCOHOL DO YOU HAVE ON A TYPICAL DAY?: PATIENT DOES NOT DRINK

## 2024-06-05 SDOH — SOCIAL STABILITY: SOCIAL INSECURITY: WITHIN THE LAST YEAR, HAVE YOU BEEN AFRAID OF YOUR PARTNER OR EX-PARTNER?: NO

## 2024-06-05 SDOH — ECONOMIC STABILITY: HOUSING INSECURITY: IN THE LAST 12 MONTHS, HOW MANY PLACES HAVE YOU LIVED?: 2

## 2024-06-05 ASSESSMENT — COGNITIVE AND FUNCTIONAL STATUS - GENERAL
PERSONAL GROOMING: A LOT
TURNING FROM BACK TO SIDE WHILE IN FLAT BAD: A LITTLE
MOBILITY SCORE: 10
MOVING TO AND FROM BED TO CHAIR: TOTAL
MOVING TO AND FROM BED TO CHAIR: TOTAL
WALKING IN HOSPITAL ROOM: TOTAL
HELP NEEDED FOR BATHING: A LOT
DAILY ACTIVITIY SCORE: 13
DRESSING REGULAR UPPER BODY CLOTHING: A LITTLE
STANDING UP FROM CHAIR USING ARMS: TOTAL
MOVING FROM LYING ON BACK TO SITTING ON SIDE OF FLAT BED WITH BEDRAILS: A LITTLE
MOBILITY SCORE: 10
TURNING FROM BACK TO SIDE WHILE IN FLAT BAD: A LITTLE
DAILY ACTIVITIY SCORE: 13
CLIMB 3 TO 5 STEPS WITH RAILING: TOTAL
DRESSING REGULAR LOWER BODY CLOTHING: TOTAL
WALKING IN HOSPITAL ROOM: TOTAL
TOILETING: TOTAL
PERSONAL GROOMING: A LOT
DRESSING REGULAR UPPER BODY CLOTHING: A LITTLE
CLIMB 3 TO 5 STEPS WITH RAILING: TOTAL
MOVING FROM LYING ON BACK TO SITTING ON SIDE OF FLAT BED WITH BEDRAILS: A LITTLE
HELP NEEDED FOR BATHING: A LOT
TOILETING: TOTAL
DRESSING REGULAR LOWER BODY CLOTHING: TOTAL
STANDING UP FROM CHAIR USING ARMS: TOTAL

## 2024-06-05 ASSESSMENT — PAIN - FUNCTIONAL ASSESSMENT
PAIN_FUNCTIONAL_ASSESSMENT: 0-10
PAIN_FUNCTIONAL_ASSESSMENT: 0-10
PAIN_FUNCTIONAL_ASSESSMENT: FLACC (FACE, LEGS, ACTIVITY, CRY, CONSOLABILITY)
PAIN_FUNCTIONAL_ASSESSMENT: 0-10

## 2024-06-05 ASSESSMENT — PAIN SCALES - GENERAL
PAINLEVEL_OUTOF10: 7
PAINLEVEL_OUTOF10: 0 - NO PAIN
PAINLEVEL_OUTOF10: 6
PAINLEVEL_OUTOF10: 3
PAINLEVEL_OUTOF10: 6
PAINLEVEL_OUTOF10: 0 - NO PAIN

## 2024-06-05 ASSESSMENT — ACTIVITIES OF DAILY LIVING (ADL): LACK_OF_TRANSPORTATION: NO

## 2024-06-05 ASSESSMENT — PAIN DESCRIPTION - DESCRIPTORS
DESCRIPTORS: ACHING

## 2024-06-05 ASSESSMENT — LIFESTYLE VARIABLES
SKIP TO QUESTIONS 9-10: 1
AUDIT-C TOTAL SCORE: 0

## 2024-06-05 NOTE — CARE PLAN
The patient's goals for the shift include      The clinical goals for the shift include get better

## 2024-06-05 NOTE — NURSING NOTE
Medicated patient with Oxycodone for c/o left knee pain.   Contracture noted of left leg.  Right leg

## 2024-06-05 NOTE — NURSING NOTE
Assumed care of patient this am. Patient sleeping but easily arousable. She voices c/o left knee pain which she rates at 7/10. Left knee reddened/swollen and warm to touch.  Dressing intact to left leg.  Call light within reach, continue to monitor.

## 2024-06-05 NOTE — NURSING NOTE
Blood sugar rechecked and was 67. Patient remains asymptomatic. Patient given another cranberry juic and cookies per protocol.  Patient is ordering dinner at this time.

## 2024-06-05 NOTE — PROGRESS NOTES
[facility]   06/05/24 1103   Housing Stability   In the last 12 months, was there a time when you were not able to pay the mortgage or rent on time? N   In the last 12 months, how many places have you lived? 2   In the last 12 months, was there a time when you did not have a steady place to sleep or slept in a shelter (including now)? N   Transportation Needs   In the past 12 months, has lack of transportation kept you from medical appointments or from getting medications? no   In the past 12 months, has lack of transportation kept you from meetings, work, or from getting things needed for daily living? No   Food Insecurity   Within the past 12 months, you worried that your food would run out before you got the money to buy more. Never true   Within the past 12 months, the food you bought just didn't last and you didn't have money to get more. Never true   Stress   Do you feel stress - tense, restless, nervous, or anxious, or unable to sleep at night because your mind is troubled all the time - these days? To some exte  (Takes Prestigue daily)   Social Connections   In a typical week, how many times do you talk on the phone with family, friends, or neighbors? More than 3   How often do you get together with friends or relatives? Never   How often do you attend Denominational or Christianity services? Never   Do you belong to any clubs or organizations such as Denominational groups, unions, fraternal or athletic groups, or school groups? Yes  (Jainism)   Intimate Partner Violence   Within the last year, have you been afraid of your partner or ex-partner? No   Within the last year, have you been humiliated or emotionally abused in other ways by your partner or ex-partner? No   Within the last year, have you been kicked, hit, slapped, or otherwise physically hurt by your partner or ex-partner? No   Within the last year, have you been raped or forced to have any kind of sexual activity by your partner or ex-partner? No   Alcohol Use   Q1:  How often do you have a drink containing alcohol? Never   Q2: How many drinks containing alcohol do you have on a typical day when you are drinking? None   Q3: How often do you have six or more drinks on one occasion? Never   Utilities   In the past 12 months has the electric, gas, oil, or water company threatened to shut off services in your home? No   Health Literacy   How often do you need to have someone help you when you read instructions, pamphlets, or other written material from your doctor or pharmacy? Never

## 2024-06-05 NOTE — NURSING NOTE
Patient ambulated with P.T to the Physical Therapy room and back to her room 224. He then sat on the couch and then ambulated to the bed with Physical therapist assistance.   Continue to monitor.

## 2024-06-05 NOTE — NURSING NOTE
Blood sugar checked and was 61. Patient asymptomatic  and talking on the phone. She  was given cranberry juice to  drink at this time.   Will recheck in 15 mins per protocol.

## 2024-06-05 NOTE — PROGRESS NOTES
Progress Note:    Teresa Matthews is a 73 y.o. female on day 2 of admission presenting with Cellulitis.    Subjective   Mrs. Matthews reports adequate pain control, although she does report pressure about the left knee.  She feels there is basically no change since admission.  She is seen sitting up as she eats breakfast.      ROS  Constitutional:  Alert, oriented  HEENT: Denies headache, vision changes, hearing change, loss of taste or smell. Does complain of sore throat (post op)   Neck: Denies swallowing difficulty, swelling, new stiffness/pain  CV: Denies CP, Palpitations, chest wall pain  Resp: No cough, wheeze, dyspnea  Abdomen: Denies abdominal pain, cramping, constipation, diarrhea  : No dysuria, hematuria, discharge  Musculoskeletal: Aches over all joints (not new). Generalized weakness 2/2 pain  Extremities: Swelling of RLE  Neuro: No focal deficits other than mild [place]    Scheduled medications  amiodarone, 200 mg, oral, Daily  apixaban, 5 mg, oral, BID  aspirin, 81 mg, oral, Every Day  desvenlafaxine, 100 mg, oral, Daily  famotidine, 40 mg, oral, Daily  gabapentin, 100 mg, oral, BID  insulin lispro, 0-10 Units, subcutaneous, TID  insulin lispro protamin-lispro, 35 Units, subcutaneous, Daily  levothyroxine, 175 mcg, oral, Daily  losartan, 25 mg, oral, Daily  metFORMIN, 1,000 mg, oral, BID  metoprolol succinate XL, 50 mg, oral, Daily  piperacillin-tazobactam, 3.375 g, intravenous, q6h ANAYELI  potassium chloride CR, 10 mEq, oral, Daily  spironolactone, 25 mg, oral, Daily  vancomycin, 1,500 mg, intravenous, Daily      Continuous medications     PRN medications  PRN medications: acetaminophen, albuterol, benzocaine-menthol, calcium carbonate, dextrose, glucagon, loperamide, melatonin, ondansetron, oxyCODONE-acetaminophen, traZODone, vancomycin      Physical Exam  General: No acute distress, alert & oriented  Cardiac: RRR, NL S1 and S2, no murmurs, rubs or gallops  Pulmonary: Lungs clear to auscultation  bilaterally, no wheezes, rhales or rhonchi  Abdomen: Soft, non-tender, non-distended  Extremities: No clubbing , cyanosis or edema.     Last Recorded Vitals  Blood pressure 142/73, pulse 57, temperature 36.3 °C (97.3 °F), temperature source Temporal, resp. rate 16, weight 99.2 kg (218 lb 11.1 oz), SpO2 95%.    Scheduled medications   Medication Dose Route Frequency    amiodarone  200 mg oral Daily    apixaban  5 mg oral BID    aspirin  81 mg oral Every Day    desvenlafaxine  100 mg oral Daily    famotidine  40 mg oral Daily    gabapentin  100 mg oral BID    insulin lispro  0-10 Units subcutaneous TID    insulin lispro protamin-lispro  35 Units subcutaneous Daily    levothyroxine  175 mcg oral Daily    losartan  25 mg oral Daily    metFORMIN  1,000 mg oral BID    metoprolol succinate XL  50 mg oral Daily    piperacillin-tazobactam  3.375 g intravenous q6h ANAYELI    potassium chloride CR  10 mEq oral Daily    spironolactone  25 mg oral Daily    vancomycin  1,500 mg intravenous Daily       Relevant Results  Results from last 7 days   Lab Units 06/05/24  0344   WBC AUTO x10*3/uL 6.4   HEMOGLOBIN g/dL 10.1*   HEMATOCRIT % 33.2*   PLATELETS AUTO x10*3/uL 193      Results from last 7 days   Lab Units 06/05/24  0344   SODIUM mmol/L 137   POTASSIUM mmol/L 4.3   CHLORIDE mmol/L 103   CO2 mmol/L 28   BUN mg/dL 20   CREATININE mg/dL 1.28*   GLUCOSE mg/dL 160*   CALCIUM mg/dL 9.4      Results for orders placed or performed during the hospital encounter of 06/04/24 (from the past 24 hour(s))   POCT GLUCOSE   Result Value Ref Range    POCT Glucose 183 (H) 74 - 99 mg/dL   CBC and Auto Differential   Result Value Ref Range    WBC 6.4 4.4 - 11.3 x10*3/uL    nRBC      RBC 3.97 (L) 4.00 - 5.20 x10*6/uL    Hemoglobin 10.1 (L) 12.0 - 16.0 g/dL    Hematocrit 33.2 (L) 36.0 - 46.0 %    MCV 84 80 - 100 fL    MCH 25.4 (L) 26.0 - 34.0 pg    MCHC 30.4 (L) 32.0 - 36.0 g/dL    RDW 16.5 (H) 11.5 - 14.5 %    Platelets 193 150 - 450 x10*3/uL     Neutrophils % 63.2 40.0 - 80.0 %    Immature Granulocytes %, Automated 0.2 0.0 - 0.9 %    Lymphocytes % 24.3 13.0 - 44.0 %    Monocytes % 9.5 2.0 - 10.0 %    Eosinophils % 1.7 0.0 - 6.0 %    Basophils % 1.1 0.0 - 2.0 %    Neutrophils Absolute 4.04 1.60 - 5.50 x10*3/uL    Immature Granulocytes Absolute, Automated 0.01 0.00 - 0.50 x10*3/uL    Lymphocytes Absolute 1.55 0.80 - 3.00 x10*3/uL    Monocytes Absolute 0.61 0.05 - 0.80 x10*3/uL    Eosinophils Absolute 0.11 0.00 - 0.40 x10*3/uL    Basophils Absolute 0.07 0.00 - 0.10 x10*3/uL   Comprehensive Metabolic Panel   Result Value Ref Range    Glucose 160 (H) 74 - 99 mg/dL    Sodium 137 136 - 145 mmol/L    Potassium 4.3 3.5 - 5.3 mmol/L    Chloride 103 98 - 107 mmol/L    Bicarbonate 28 21 - 32 mmol/L    Anion Gap 10 10 - 20 mmol/L    Urea Nitrogen 20 6 - 23 mg/dL    Creatinine 1.28 (H) 0.50 - 1.05 mg/dL    eGFR 45 (L) >60 mL/min/1.73m*2    Calcium 9.4 8.6 - 10.3 mg/dL    Albumin 2.9 (L) 3.4 - 5.0 g/dL    Alkaline Phosphatase 70 33 - 136 U/L    Total Protein 7.1 6.4 - 8.2 g/dL    AST 9 9 - 39 U/L    Bilirubin, Total 0.3 0.0 - 1.2 mg/dL    ALT 7 7 - 45 U/L   Magnesium   Result Value Ref Range    Magnesium 1.64 1.60 - 2.40 mg/dL   POCT GLUCOSE   Result Value Ref Range    POCT Glucose 134 (H) 74 - 99 mg/dL      Assessment/Plan   1) Cellulitis Left Knee:   Her surgeon Rojelio Machado MD was consulted from ED yesterday and does not   believe the infection is related to the placement of the spacer placed previously   Rx: Continue Zosyn 3.375 mg IV q 6 hr   Rx: Continue Vancomycin 1.5 g IV q 24 hr. Dosing by Pharmacy   Leg elevation   ID consulted     2) Atrial Fibrillation:   Rate controlled at this time'   Rx: Continue amiodarone 200 mg daily   Rx: Continue Metoprolol S 50 mg daily      3) DVT Prophylaxis:   Rx: Continue Apixaban 5 mg q 12 hr     4) Diabetes:    Very Poorly controlled. Last A1c 15.4 (9.9, 12,2, 12.1). Serum glucose today  160   Rx: Continue Humalog 75/25-35  units daily   Rx: Stop Metformin 2/2 renal failure   Rx; Start Januvia 50 mg daily  Rx: Continue SSI      5) Hypertension;   Adequately controlled at this time   Rx: Continue Losartan 25 mg daily   Rx: Continue Metoprolol S 50 mg daily    Rx: Continue Spironolactone 25 mg daily     6) Hypothyroid:   Last TSH 2.13 (March 25, 2024)  Rx: Continue Levothyrxine 175 mcg daily           I spent 35 minutes in the professional and overall care of this patient.      Jethro Angel MD

## 2024-06-05 NOTE — NURSING NOTE
Patient rating left leg pain at 0/10 at this time. No other c/o pain/discomfort voiced at this time.

## 2024-06-05 NOTE — NURSING NOTE
Nurse to nurse report received from ARACELI Loredo.  Assumed care of patient. Medications, labs and orders reviewed. Patient in bed, eyes closed upon entering room.  Patient awakened easily and is alert and oriented x 4.  Patient denies any needs at this time.  LLE dressing clean, dry, and intact. Right foot boot on and foot elevated. Call light within reach.  Bed alarm on.  Purewick collecting clear, yellow urine in canister.

## 2024-06-05 NOTE — NURSING NOTE
Small amount of urine leaked around pure wick, bed pad changed and se care give.  Mepilex foam sacral dressing applied and barrier cream applied to se area, buttocks and groin folds.  Turned onto left side with pillow support.  Left lateral calf wound cleansed with normal saline, vaseline gauze, gauze pad, and kerlex applied and secured with tape.  Left leg elevated on pillow.

## 2024-06-05 NOTE — NURSING NOTE
Spoke with Dr. Kemp updated on fingerstick blood sugar of 183 and refusing metformin because patient states it causes severe diarrhea.  No new orders at this time.

## 2024-06-05 NOTE — CARE PLAN
The patient's goals for the shift include      The clinical goals for the shift include Get better    Over the shift, the patient did make progress toward the following goals.   Patients ' pain controlled at times  Problem: Pain  Goal: Takes deep breaths with improved pain control throughout the shift  6/5/2024 1916 by Gertrude Ramirez RN  Outcome: Progressing  6/5/2024 1250 by Gertrude Ramirez RN  Outcome: Progressing     Problem: Pain  Goal: Turns in bed with improved pain control throughout the shift  6/5/2024 1916 by Gertrude Ramirez RN  Outcome: Progressing  6/5/2024 1250 by Gertrude Ramirez RN  Outcome: Progressing     Problem: Pain  Goal: Performs ADL's with improved pain control throughout shift  Outcome: Progressing     Problem: Pain  Goal: Free from opioid side effects throughout the shift  6/5/2024 1916 by Gertrude Ramirez RN  Outcome: Progressing  6/5/2024 1250 by Gertrude Ramirez RN  Outcome: Progressing     Problem: Pain  Goal: Free from acute confusion related to pain meds throughout the shift  6/5/2024 1916 by Gertrude Ramirez RN  Outcome: Progressing  6/5/2024 1250 by Gertrude Ramirez RN  Outcome: Progressing     Problem: Fall/Injury  Goal: Verbalize understanding of personal risk factors for fall in the hospital  Outcome: Progressing     Problem: Fall/Injury  Goal: Verbalize understanding of risk factor reduction measures to prevent injury from fall in the home  Outcome: Met

## 2024-06-05 NOTE — PROGRESS NOTES
TCC met with patient at the bedside to complete assessment.  73 yr old female admitted inpatient for cellulitis of LLE. Treatment includes IV antibiotics, wound care, Infectious Disease consult.  Pt resides at [facility]. States she has been there for 3 months now, and this will be her permanent resident. She states that she is wheelchair bound and is total care.  Her HCPOA is Sandra Salcido (sister) [phone].  Plan is to return back to LTC facility when medically clear.   TCC will follow and arrange transition back to [facility] when medically clear.   06/05/24 1109   Discharge Planning   Living Arrangements Other (Comment)  (LTC Facility)   Support Systems Other (Comment)   Assistance Needed ADL's, Mobility, Transfers   Type of Residence Nursing home/residential care   Do you have animals or pets at home? No   Home or Post Acute Services None   Patient expects to be discharged to: LT-RETURN -[facility]   Does the patient need discharge transport arranged? Yes   RoundTrip coordination needed? Yes   Has discharge transport been arranged? No   What day is the transport expected? 06/07/24   What time is the transport expected?   (TBD)   Financial Resource Strain   How hard is it for you to pay for the very basics like food, housing, medical care, and heating? Not hard   Housing Stability   In the last 12 months, was there a time when you were not able to pay the mortgage or rent on time? N   In the last 12 months, how many places have you lived? 2   In the last 12 months, was there a time when you did not have a steady place to sleep or slept in a shelter (including now)? N   Transportation Needs   In the past 12 months, has lack of transportation kept you from medical appointments or from getting medications? no   In the past 12 months, has lack of transportation kept you from meetings, work, or from getting things needed for daily living? No

## 2024-06-05 NOTE — NURSING NOTE
Physical Therapy in with patient at this time.  Per P.T, patient declined to participate in therapeutic activities.

## 2024-06-05 NOTE — H&P
History Of Present Illness  Teresa Matthews is a 73 y.o. female who resides at a nursing home, was first diagnosed with left lower extremity knee cellulitis on May 30 and was treated with oral antibiotics with doxycycline x 7 days.  Today she was evaluated by her primary care physician and noted there was no improvement in the left knee, and patient was transferred to the [facility] emergency room for concern of septic left knee.  Patient has history of left total knee replacement 2009 which required to have hardware replaced due to infection approximately 4 to 5 years ago.  She now has a knee spacer.    On arrival to the emergency room, temperature 36.5, pulse 58, respiratory 18, blood pressure 134/63, saturation 95% lab work was notable for creatinine 1.3, white blood cell count 7.65, and CRP 1.5..  X-ray imaging the left knee which showed Lake prosthetic lucencies.    In the emergency room she was given oxycodone 5/325 x 2, vancomycin 1.5 g IV.    Patient was seen examined at [facility].  Her pain was well-controlled.  Patient states presentation of her left knee is similar in appearance and symptoms to when she had the knee infection in the past.  Patient has been getting wound care for small diabetic ulcer to the left lower leg.  She states that they have been healing and improving over the last several months.     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   History of sacral wound, 3/2024  Chronic debility    She has a past medical history of Abnormal weight gain (04/10/2023), Acute kidney injury (CMS-HCC) (03/25/2024), Acute upper respiratory infection (03/25/2024), Allergic rhinitis (04/10/2023), Arthritis due to  other bacteria, unspecified knee (Multi) (03/03/2022), Atrial fibrillation (Multi), Bilateral tinnitus (07/01/2020), Body mass index (BMI) 31.0-31.9, adult (06/30/2020), Body mass index (BMI) 32.0-32.9, adult (01/04/2021), Body mass index (BMI) 33.0-33.9, adult (01/04/2021), Body mass index (BMI) 35.0-35.9, adult (04/09/2020), Body mass index (BMI)40.0-44.9, adult (11/14/2019), Bronchitis due to COVID-19 virus (04/10/2023), CHF (congestive heart failure) (Multi), Chronic insomnia (04/10/2023), Depression, major, in remission (CMS-[facility]) (04/10/2023), Diabetes mellitus (Multi), Distal radius fracture, left (05/19/2023), Dry skin (01/17/2023), Endocarditis, Essential (primary) hypertension (09/06/2022), Fibula fracture (06/21/2023), H/O bariatric surgery (04/10/2023), History of heart failure (03/25/2024), History of type 2 diabetes mellitus (03/25/2024), 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), Hypoxia (04/10/2023), Infective arthritis (Multi) (03/25/2024), Kidney stone on right side (04/10/2023), Laceration of multiple sites (03/25/2024), Left toe amputee (Multi) (04/10/2023), Malaise and fatigue (04/10/2023), Nasal septum ulceration (04/10/2023), Nontoxic multinodular goiter (07/02/2021), Personal history of other diseases of the circulatory system, Personal history of other diseases of the musculoskeletal system and connective tissue, Personal history of other diseases of the nervous system and sense organs (10/11/2022), Personal history of other diseases of the respiratory system, Personal history of other diseases of urinary system (02/19/2020), Personal history of other endocrine, nutritional and metabolic disease (07/02/2021), Personal history of other endocrine, nutritional and metabolic disease (04/24/2017), Prosthetic joint infection (CMS-HCC) (04/10/2023), Recurrent major depressive disorder, in remission (CMS-HCC) (04/10/2023), Ruptured aneurysm of thoracic aorta, unspecified part (Multi)  (04/10/2023), Status post gastric bypass for obesity (04/10/2023), Urinary tract infection, and 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    Surgical History  She has a past surgical history that includes Hysterectomy (01/20/2016); Tonsillectomy (01/20/2016); Total knee arthroplasty (05/28/2021); Other surgical history (01/07/2022); Other surgical history (05/28/2021); and Tubal ligation (04/24/2017).  Left total knee replacement, 2009  Removal of hardware left total knee with spacer placement secondary to E. coli infection  Toe amputation  History of DC cardioversion     Social History  She reports that she has never smoked. She has never used smokeless tobacco. She reports that she does not drink alcohol and does not use drugs.    Family History  Family History   Problem Relation Name Age of Onset    Coronary artery disease Mother      Liver disease Mother      Other (non-hodgkins lymphoma) Mother      Stroke Father      Deafness Father      Hypertension Father          Allergies  Cephalexin, Metformin, Other, Statins-hmg-coa reductase inhibitors, and Adhesive tape-silicones    Review of Systems  10 point organ system reviewed, pertinent positives mentioned HPI, others negative      Physical Exam  Gen.: Alert and oriented ×3, no acute distress  Head: Normocephalic atraumatic  Eyes:  Pupils equal reactive to light, extraocular muscle intact  Neck: No cervical lymphadenopathy thyromegaly, trachea midline   Heart: Regular rate and rhythm, no murmurs rubs or gallops  Lungs: Clear to auscultation bilaterally, no wheezes, rales, or rhonchi  Abdomen: Soft nontender positive bowel sounds, no rebound or guarding  Extremities: Left foot first 2 toes amputation, localized edema anterior left knee  Skin: Venous stasis otitis bilateral lower extremities, increased erythema and redness anterior left knee.  Small diabetic ulcers on left lateral leg  Neuro: Cranial nerves II 2 through 12 grossly intact, no focal deficits  Psych:  Insight and judgment intact    Last Recorded Vitals  /75 (BP Location: Right arm, Patient Position: Lying)   Pulse 59   Temp 36.3 °C (97.3 °F) (Temporal)   Resp 16   SpO2 96%     Relevant Results  CRP 1.5  WBC 7.65, hemoglobin 10.7, hematocrit 34.7, platelets 208  Sodium 139, potassium 4.9, chloride 105, bicarb 28, BUN 19, creatinine 1.3, glucose 107    X-ray left knee:  Extensive postsurgical changes and deformity of the left knee with loss of previously seen prosthetic joint space consistent with joint collapse destruction.  Periprosthetic lucencies suggesting infection and/or loosening.  Additional bony changes which may represent combination remote posttraumatic and chronic infectious changes     Assessment/Plan   Left lower knee cellulitis  Acute on chronic left knee pain  History of left knee static joint replacement  Chronic left leg diabetic ulcer  Paroxysmal atrial fibrillation  Chronic anticoagulation with Eliquis  Hypertension  Chronic diastolic heart failure  COPD  Hypothyroidism  CKD stage III  Diabetes type 2  Diabetic neuropathy  Chronic pain syndrome  Debility  DVT prophylaxis    PLAN  Admit to the regular nursing floor, inpatient status.  Consultation for infectious disease.  Continue with IV antibiotics with vancomycin and Zosyn.  Further management antibiotic therapy per infectious disease.  Continue with patient's prehospital medications.  Patient replaced on insulin sliding scale with diabetic diet.  Monitor lab work daily.  Patient replaced on fall was precautions     Abimael Kemp DO

## 2024-06-05 NOTE — PROGRESS NOTES
"Physical Therapy                 Therapy Communication Note    Patient Name: Teresa Matthews  MRN: [id number]  Today's Date: 6/5/2024   Start Time: 1030  Stop Time: 1006    Discipline: Physical Therapy    Comment: PT eval orders received, chart reviewed, PT spoke with RN. Pt was admitted from the [facility] for L knee cellulitis. PT obtained PLOF from patient. Patient stated she requires max-dependent assistance from sit to stand machine to transfer from bed to/from wheelchair. Pt states she can self propel her manual wheelchair. Pt states no desire/want/need to participate in PT here or upon return to the LTC facility. PT spent time educating patient on importance of PT to improve functional mobility and patient stated more than one time \"what's the point?\" PT educated patient on resuming PT services upon discharge from here and return to LTC facility and patient declined interest at this time. PT offered to assist patient OOB to bedside chair and patient politely declined. PT educated patient that if she changes her mind and wants to participate in PT, then to notify her RN or the doctor and we can complete a PT evaluation. Pt verbalized understanding.   PT to follow from a distance for possible evaluation, but at this time, pt declined.    Alyssa Norman, PT, DPT    "

## 2024-06-05 NOTE — PROGRESS NOTES
Vancomycin Dosing by Pharmacy- Cessation of Therapy    Consult to pharmacy for vancomycin dosing has been discontinued by the prescriber, pharmacy will sign off at this time.    Please call pharmacy if there are further questions or re-enter a consult if vancomycin is resumed.     Carmencita Rodriguez, AmyD

## 2024-06-05 NOTE — NURSING NOTE
Received patient from [facility] ED at this time via ambulance.  Patient is alert and oriented X4.  Oriented to room and instructed on use of call light.  Patient was drenched in urine all linens were soaked.  All linens  changed and se care given.  Pure wick placed d/t incontinence and patient is bed bound.

## 2024-06-05 NOTE — NURSING NOTE
Medicated patient for c/o left leg pain which she rated at 5/10. New order obtained and patient medicated with Oxycodone 5mg, will reassess pain.

## 2024-06-05 NOTE — CONSULTS
INFECTIOUS DISEASE INPATIENT INITIAL CONSULTATION    Referred By: Abimael Kemp    Reason For Consult: Left knee cellulitis, history of prosthetic joint infection     HPI:  This is a 73 y.o. female with PMH of COPD, A. 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    She had a recent RLE cellulitis and wound infection c/b bacteremia due to Group A Strep in 3-4/2024. TTE/ELISEO negative for endocarditis. Has been dealing with worsening left knee pain since then. Treated with PO Doxycycline as outpatient without improvement and sent in for further evaluation. She denies fevers/chills. Is on IV Vanc/Zosyn now.     Allergies:  Cephalexin, Metformin, Other, Statins-hmg-coa reductase inhibitors, and Adhesive tape-silicones     Vitals (Last 24 Hours):  Heart Rate:  [57-65]   Temp:  [36 °C (96.8 °F)-36.3 °C (97.3 °F)]   Resp:  [16-18]   BP: (123-144)/(60-81)   Weight:  [99.2 kg (218 lb 11.1 oz)]   SpO2:  [93 %-96 %]      PHYSICAL EXAM:  Gen - NAD  Heart - RRR, no murmurs  Lungs - clear bilaterally, no wheezing  Abd - soft, no ttp, BS present  Left Knee - swollen/hot/tender with some fading redness, ROM difficult to assess as already limited since only has a spacer and not an articulating joint  Skin - no rash    MEDS:    Current Facility-Administered Medications:     acetaminophen (Tylenol) tablet 650 mg, 650 mg, oral, q4h PRN, Abimael Kemp DO    albuterol 2.5 mg /3 mL (0.083 %) nebulizer solution 2.5 mg, 2.5 mg, nebulization, q4h PRN, Abimael Kemp DO    amiodarone (Pacerone) tablet 200 mg, 200 mg, oral, Daily, Abimael Kemp DO, 200 mg at 06/05/24 0841    apixaban (Eliquis) tablet 5 mg, 5 mg, oral, BID, Abimael Kemp DO, 5 mg at 06/05/24 0839    aspirin EC tablet 81 mg, 81 mg, oral, Every Day, Abimael Kemp DO, 81 mg at 06/04/24 2123    benzocaine-menthol (Cepastat Sore Throat)  lozenge 1 lozenge, 1 lozenge, Mouth/Throat, q2h PRN, Abimael Kemp DO    calcium carbonate (Tums) chewable tablet 500 mg, 500 mg, oral, 4x daily PRN, Abimael Kemp DO    desvenlafaxine (Pristiq) 24 hr tablet 100 mg, 100 mg, oral, Daily, Abimael Kemp DO, 100 mg at 06/05/24 0842    dextrose 50 % injection 25 g, 25 g, intravenous, q15 min PRN, Abimael Kemp DO    famotidine (Pepcid) tablet 40 mg, 40 mg, oral, Daily, Abimael Kemp DO, 40 mg at 06/05/24 0840    gabapentin (Neurontin) capsule 100 mg, 100 mg, oral, BID, Abimael Kemp DO, 100 mg at 06/05/24 0838    glucagon (Glucagen) injection 1 mg, 1 mg, intramuscular, q15 min PRN, Abimael Kemp DO    insulin lispro (HumaLOG) injection 0-10 Units, 0-10 Units, subcutaneous, TID, Abimael Kemp DO    insulin lispro protamin-lispro (HumaLOG Mix 75-25) 100 unit/mL (75-25) injection 35 Units, 35 Units, subcutaneous, Daily, Abimael Kemp DO, 35 Units at 06/05/24 0900    levothyroxine (Synthroid, Levoxyl) tablet 175 mcg, 175 mcg, oral, Daily, Abimael Kemp DO, 175 mcg at 06/05/24 0639    loperamide (Imodium) capsule 2 mg, 2 mg, oral, 4x daily PRN, Abimael Kemp DO    losartan (Cozaar) tablet 25 mg, 25 mg, oral, Daily, Abimael Kemp DO, 25 mg at 06/05/24 0841    melatonin tablet 10 mg, 10 mg, oral, Daily PRN, Abimael Kemp DO    metFORMIN (Glucophage) tablet 1,000 mg, 1,000 mg, oral, BID, Abimael Kemp DO    metoprolol succinate XL (Toprol-XL) 24 hr tablet 50 mg, 50 mg, oral, Daily, Abimael Kemp DO, 50 mg at 06/05/24 0841    ondansetron (Zofran) injection 4 mg, 4 mg, intravenous, q4h PRN, Abimael Nasheri, DO    oxyCODONE (Roxicodone) immediate release tablet 5 mg, 5 mg, oral, q4h PRN, Gabi Hurt PA-C, 5 mg at 06/05/24 1518    oxyCODONE-acetaminophen (Percocet)  mg per tablet 1 tablet, 1 tablet, oral, q4h PRN, Abimael Kemp DO, 1 tablet at 06/05/24 0740    piperacillin-tazobactam (Zosyn) in sodium chloride 0.9 % 50 mL IV 3.375 g, 3.375  g, intravenous, q6h ANAYELI, Ziyad Bejarano, PharmD, 3.375 g at 06/05/24 1107    potassium chloride CR (Klor-Con) ER tablet 10 mEq, 10 mEq, oral, Daily, Abimael Kemp DO, 10 mEq at 06/05/24 0839    SITagliptin phosphate (Januvia) tablet 50 mg, 50 mg, oral, Daily, Jethro Angel MD, 50 mg at 06/05/24 1212    spironolactone (Aldactone) tablet 25 mg, 25 mg, oral, Daily, Abimael Kemp DO, 25 mg at 06/05/24 0841    traMADol (Ultram) tablet 50 mg, 50 mg, oral, q8h PRN, Gabi Hurt PA-C    traZODone (Desyrel) tablet 50 mg, 50 mg, oral, Nightly PRN, Abimael Kemp DO, 50 mg at 06/04/24 2123    vancomycin (Vancocin) pharmacy to dose - pharmacy monitoring, , miscellaneous, Daily PRN, Abimael Kemp DO    vancomycin 1.5 g in 300 mL (Xellia) IVPB 1.5 g, 1,500 mg, intravenous, Daily, Ziyad Bejarano, PharmD, Stopped at 06/05/24 1012     LABS:  Lab Results   Component Value Date    WBC 6.4 06/05/2024    HGB 10.1 (L) 06/05/2024    HCT 33.2 (L) 06/05/2024    MCV 84 06/05/2024     06/05/2024      Results from last 72 hours   Lab Units 06/05/24  0344   SODIUM mmol/L 137   POTASSIUM mmol/L 4.3   CHLORIDE mmol/L 103   CO2 mmol/L 28   BUN mg/dL 20   CREATININE mg/dL 1.28*   GLUCOSE mg/dL 160*   CALCIUM mg/dL 9.4   ANION GAP mmol/L 10   EGFR mL/min/1.73m*2 45*     Results from last 72 hours   Lab Units 06/05/24  0344   ALK PHOS U/L 70   BILIRUBIN TOTAL mg/dL 0.3   PROTEIN TOTAL g/dL 7.1   ALT U/L 7   AST U/L 9   ALBUMIN g/dL 2.9*     Estimated Creatinine Clearance: 52.4 mL/min (A) (by C-G formula based on SCr of 1.28 mg/dL (H)).      IMAGING:  X-Ray Left Knee 6/4  IMPRESSION:   Extensive postsurgical changes and deformity of the left knee with loss   of the previously seen prosthetic joint space consistent with joint   collapse/destruction.   Periprosthetic lucencies suggesting infection and/or loosening.   Additional bony changes which may represent combination of remote   posttraumatic and chronic infectious changes.      ASSESSMENT/PLAN:    Suspected Left Knee Septic Arthritis    She had a recent Group A Strep bacteremia and now has worsening/progressive left knee pain concerning for joint infection. I will change to IV CTX 2g Q8H. Blood cx x2, ESR, CRP ordered.    She needs ortho consultation and knee tap.    Will follow. Thanks!    Efraín Mallory MD  [facility]  Office #[phone]

## 2024-06-05 NOTE — NURSING NOTE
Message left with answering service for Dr. Martinez regarding consult for left knee cellulitis, awaiting return call.

## 2024-06-05 NOTE — NURSING NOTE
Patient turned and repositioned every 2 hours with pillows placed for positioning. Left leg elveated on a pillow and right leg with a Prafo boot. Dressing clean/dry/intact left lower leg. Mepilex dressing intact to buttocks area.  Continue to monitor.

## 2024-06-05 NOTE — CONSULTS
Vancomycin Dosing by Pharmacy- INITIAL    Teresa Matthews is a 73 y.o. year old female who Pharmacy has been consulted for vancomycin dosing for cellulitis, skin and soft tissue. Based on the patient's indication and renal status this patient will be dosed based on a goal AUC of 400-600.   Day 1  Consult request by Dr. Abimael Kemp, thank you for the consult.   Renal function is currently stable, history of CKD Stage 3, creatinine at [facility] on 24 was 1.30.    Visit Vitals  /75 (BP Location: Right arm, Patient Position: Lying)   Pulse 59   Temp 36.3 °C (97.3 °F) (Temporal)   Resp 16        Lab Results   Component Value Date    CREATININE 1.26 (H) 2024    CREATININE 1.16 (H) 2024    CREATININE 1.36 (H) 2024    CREATININE 1.56 (H) 2024        Patient weight is as follows: There were no vitals filed for this visit.    Cultures:  No results found for the encounter in last 14 days.        No intake/output data recorded.  I/O during current shift:  No intake/output data recorded.    Temp (24hrs), Av.4 °C (97.5 °F), Min:36.3 °C (97.3 °F), Max:36.5 °C (97.7 °F)         Assessment/Plan     Patient has already been given a loading dose of 1500 mg at [facility] on 24 at 0920.  Will initiate vancomycin maintenance,  1500 mg every 24 hours starting at 0900 24.     This dosing regimen is predicted by [employer] to result in the following pharmacokinetic parameters:  Loading dose: N/A  Regimen: 1500 mg IV every 24 hours.  Start time: 09:00 on 2024  Exposure target: AUC24 (range)400-600 mg/L.hr   AUC24,ss: 538 mg/L.hr  Probability of AUC24 > 400: 81 %  Ctrough,ss: 16.8 mg/L  Probability of Ctrough,ss > 20: 34 %  Probability of nephrotoxicity (Lodise SONNY ): 12 %      Follow-up level will be ordered on 24 at 0500 unless clinically indicated sooner.  Will continue to monitor renal function daily while on vancomycin and order serum  creatinine at least every 48 hours if not already ordered.  Follow for continued vancomycin needs, clinical response, and signs/symptoms of toxicity.       Ziyad Bejarano, AmyD

## 2024-06-06 LAB
CRP SERPL-MCNC: 1.52 MG/DL
ERYTHROCYTE [SEDIMENTATION RATE] IN BLOOD BY WESTERGREN METHOD: 58 MM/H (ref 0–30)
EST. AVERAGE GLUCOSE BLD GHB EST-MCNC: 192 MG/DL
GLUCOSE BLD MANUAL STRIP-MCNC: 135 MG/DL (ref 74–99)
GLUCOSE BLD MANUAL STRIP-MCNC: 138 MG/DL (ref 74–99)
GLUCOSE BLD MANUAL STRIP-MCNC: 139 MG/DL (ref 74–99)
GLUCOSE BLD MANUAL STRIP-MCNC: 160 MG/DL (ref 74–99)
GLUCOSE BLD MANUAL STRIP-MCNC: 60 MG/DL (ref 74–99)
GLUCOSE BLD MANUAL STRIP-MCNC: 70 MG/DL (ref 74–99)
GLUCOSE BLD MANUAL STRIP-MCNC: 94 MG/DL (ref 74–99)
HBA1C MFR BLD: 8.3 %

## 2024-06-06 PROCEDURE — 82947 ASSAY GLUCOSE BLOOD QUANT: CPT | Mod: 91

## 2024-06-06 PROCEDURE — 2500000002 HC RX 250 W HCPCS SELF ADMINISTERED DRUGS (ALT 637 FOR MEDICARE OP, ALT 636 FOR OP/ED): Performed by: HOSPITALIST

## 2024-06-06 PROCEDURE — 2500000001 HC RX 250 WO HCPCS SELF ADMINISTERED DRUGS (ALT 637 FOR MEDICARE OP): Performed by: HOSPITALIST

## 2024-06-06 PROCEDURE — 85652 RBC SED RATE AUTOMATED: CPT | Performed by: INTERNAL MEDICINE

## 2024-06-06 PROCEDURE — 2500000004 HC RX 250 GENERAL PHARMACY W/ HCPCS (ALT 636 FOR OP/ED): Performed by: INTERNAL MEDICINE

## 2024-06-06 PROCEDURE — 86140 C-REACTIVE PROTEIN: CPT | Performed by: INTERNAL MEDICINE

## 2024-06-06 PROCEDURE — 83036 HEMOGLOBIN GLYCOSYLATED A1C: CPT | Performed by: PHYSICIAN ASSISTANT

## 2024-06-06 PROCEDURE — 1100000001 HC PRIVATE ROOM DAILY

## 2024-06-06 PROCEDURE — 36415 COLL VENOUS BLD VENIPUNCTURE: CPT | Performed by: INTERNAL MEDICINE

## 2024-06-06 RX ADMIN — FAMOTIDINE 40 MG: 20 TABLET, FILM COATED ORAL at 11:27

## 2024-06-06 RX ADMIN — OXYCODONE AND ACETAMINOPHEN 1 TABLET: 10; 325 TABLET ORAL at 08:10

## 2024-06-06 RX ADMIN — OXYCODONE AND ACETAMINOPHEN 1 TABLET: 10; 325 TABLET ORAL at 19:09

## 2024-06-06 RX ADMIN — AMIODARONE HYDROCHLORIDE 200 MG: 200 TABLET ORAL at 09:33

## 2024-06-06 RX ADMIN — TRAZODONE HYDROCHLORIDE 50 MG: 50 TABLET ORAL at 21:56

## 2024-06-06 RX ADMIN — ASPIRIN 81 MG: 81 TABLET, COATED ORAL at 21:56

## 2024-06-06 RX ADMIN — SPIRONOLACTONE 25 MG: 25 TABLET ORAL at 09:36

## 2024-06-06 RX ADMIN — GABAPENTIN 100 MG: 100 CAPSULE ORAL at 09:33

## 2024-06-06 RX ADMIN — APIXABAN 5 MG: 5 TABLET, FILM COATED ORAL at 21:56

## 2024-06-06 RX ADMIN — APIXABAN 5 MG: 5 TABLET, FILM COATED ORAL at 09:33

## 2024-06-06 RX ADMIN — POTASSIUM CHLORIDE 10 MEQ: 750 TABLET, FILM COATED, EXTENDED RELEASE ORAL at 09:33

## 2024-06-06 RX ADMIN — GABAPENTIN 100 MG: 100 CAPSULE ORAL at 21:56

## 2024-06-06 RX ADMIN — DESVENLAFAXINE 100 MG: 50 TABLET, FILM COATED, EXTENDED RELEASE ORAL at 09:33

## 2024-06-06 RX ADMIN — METOPROLOL SUCCINATE 50 MG: 50 TABLET, EXTENDED RELEASE ORAL at 09:36

## 2024-06-06 RX ADMIN — LOSARTAN POTASSIUM 25 MG: 25 TABLET, FILM COATED ORAL at 09:35

## 2024-06-06 RX ADMIN — LEVOTHYROXINE SODIUM 175 MCG: 0.17 TABLET ORAL at 06:14

## 2024-06-06 RX ADMIN — CEFTRIAXONE 2 G: 2 INJECTION, SOLUTION INTRAVENOUS at 18:03

## 2024-06-06 ASSESSMENT — PAIN SCALES - GENERAL
PAINLEVEL_OUTOF10: 0 - NO PAIN
PAINLEVEL_OUTOF10: 5 - MODERATE PAIN
PAINLEVEL_OUTOF10: 0 - NO PAIN
PAINLEVEL_OUTOF10: 2
PAINLEVEL_OUTOF10: 7
PAINLEVEL_OUTOF10: 8
PAINLEVEL_OUTOF10: 0 - NO PAIN
PAINLEVEL_OUTOF10: 0 - NO PAIN
PAINLEVEL_OUTOF10: 2

## 2024-06-06 ASSESSMENT — PAIN DESCRIPTION - DESCRIPTORS
DESCRIPTORS: ACHING

## 2024-06-06 ASSESSMENT — COGNITIVE AND FUNCTIONAL STATUS - GENERAL
HELP NEEDED FOR BATHING: A LOT
WALKING IN HOSPITAL ROOM: TOTAL
MOVING TO AND FROM BED TO CHAIR: TOTAL
PERSONAL GROOMING: A LOT
DRESSING REGULAR LOWER BODY CLOTHING: TOTAL
TOILETING: TOTAL
MOVING FROM LYING ON BACK TO SITTING ON SIDE OF FLAT BED WITH BEDRAILS: A LITTLE
STANDING UP FROM CHAIR USING ARMS: A LOT
DAILY ACTIVITIY SCORE: 13
TURNING FROM BACK TO SIDE WHILE IN FLAT BAD: A LITTLE
MOBILITY SCORE: 11
CLIMB 3 TO 5 STEPS WITH RAILING: TOTAL
DRESSING REGULAR UPPER BODY CLOTHING: A LITTLE

## 2024-06-06 ASSESSMENT — PAIN DESCRIPTION - ORIENTATION: ORIENTATION: LEFT

## 2024-06-06 ASSESSMENT — PAIN DESCRIPTION - LOCATION: LOCATION: KNEE

## 2024-06-06 ASSESSMENT — PAIN - FUNCTIONAL ASSESSMENT
PAIN_FUNCTIONAL_ASSESSMENT: 0-10

## 2024-06-06 NOTE — PROGRESS NOTES
Progress Note:    Teresa Matthews is a 73 y.o. female on day 2 of admission presenting with Cellulitis.    Subjective   No complaints. Elevate CRP and ESR. Ortho consulted. Blood cx pending.     Physical Exam  Gen - NAD  Heart - RRR, no murmurs  Lungs - clear bilaterally, no wheezing  Abd - soft, nontender, nondistended  Left Knee - joint deformity, swollen, and warm. No angry redness. Mild pink flaring contained within marked boundaries. Extensive hemosiderin deposits       Last Recorded Vitals  Blood pressure 124/79, pulse 66, temperature 36.1 °C (97 °F), temperature source Temporal, resp. rate 16, weight 99.2 kg (218 lb 11.1 oz), SpO2 97%.    Relevant Results  Results from last 7 days   Lab Units 06/05/24  0344   WBC AUTO x10*3/uL 6.4   HEMOGLOBIN g/dL 10.1*   HEMATOCRIT % 33.2*   PLATELETS AUTO x10*3/uL 193      Results from last 7 days   Lab Units 06/05/24  0344   SODIUM mmol/L 137   POTASSIUM mmol/L 4.3   CHLORIDE mmol/L 103   CO2 mmol/L 28   BUN mg/dL 20   CREATININE mg/dL 1.28*   GLUCOSE mg/dL 160*   CALCIUM mg/dL 9.4     Results from last 7 days   Lab Units 06/06/24  0434   CRP mg/dL 1.52*     Results from last 7 days   Lab Units 06/06/24  0434   SED RATE mm/h 58*     Estimated Creatinine Clearance: 52.4 mL/min (A) (by C-G formula based on SCr of 1.28 mg/dL (H)).    Assessment/Plan   1) Cellulitis Left Knee:              Treatment guided by ID which recommends discontinuing zosyn and vanco. Start ceftriaxone 2g q 8 hours.   Ortho consult (Dr Thomas) for possible arthrocentesis and possible MRI to clarify extent of infection              Blood cultures pending   Elevated CRP/ESR. Normal WBCs    2) Atrial Fibrillation:              Rate controlled at this time'              Rx: Continue amiodarone 200 mg daily              Rx: Continue Metoprolol S 50 mg daily               Rx: Continue Apixaban 5 mg q 12 hr    3) DVT Prophylaxis:              Rx: Continue Apixaban 5 mg q 12 hr                4) Poorly  controlled Diabetes: (POCT glucoses controlled while inpatient)              Last A1c 15.4 (1/2024);  Repeat Hemoglobin A1c              Rx: Continue Humalog 75/25-35 units daily              Rx: Start Januvia 50 mg daily  Rx: Continue SSI                 5) Hypertension;              Adequately controlled at this time              Rx: Continue Losartan 25 mg daily              Rx: Continue Metoprolol S 50 mg daily               Rx: Continue Spironolactone 25 mg daily      6) Hypothyroid:              Last TSH 2.13 (March 25, 2024)  Rx: Continue Levothyrxine 175 mcg daily     7) CKD 3b; baseline sCr (1.16-1.73)              Gentle fluid resuscitation   Refrain from nephrotoxic agents (like NSAIDS)          I spent 35 minutes in the professional and overall care of this patient.      Gabi Hurt PA-C

## 2024-06-06 NOTE — CARE PLAN
TCC met with patient at the bedside to discuss care/discharge plan.  Patient admitted 2 days ago with LLE cellulitis.  Still on IV antibiotics. Changed to ceftriaxone.  Infectious disease continuing to  follow.  Ortho consulted awaiting input.  Plan is back to the [facility] when medically clear.

## 2024-06-06 NOTE — CARE PLAN
Continue on IV CTX. ESR/CRP are elevated. Blood cx pending. Ortho consultation pending. I think she needs arthrocentesis of the left knee. If not perhaps MRI can be done to further clarify extent of infection.    Efraín Mallory MD  [facility]  Office #[phone]

## 2024-06-06 NOTE — NURSING NOTE
Assumed care of patient this am. Patient c/o left knee pain  which she rates at 3/10. She describes it as sore.  Dressing clean/dry/intact to right knee. Hemovac drain and ice intact..  Call light within reach, continue to monitor.

## 2024-06-06 NOTE — CARE PLAN
The patient's goals for the shift include  comfort and sleep.     The clinical goals for the shift include safety and pain control.      Problem: Pain - Adult  Goal: Verbalizes/displays adequate comfort level or baseline comfort level  Outcome: Progressing     Problem: Safety - Adult  Goal: Free from fall injury  Outcome: Progressing     Problem: Chronic Conditions and Co-morbidities  Goal: Patient's chronic conditions and co-morbidity symptoms are monitored and maintained or improved  Outcome: Progressing     Problem: Skin  Goal: Participates in plan/prevention/treatment measures  Outcome: Progressing     Problem: Skin  Goal: Prevent/manage excess moisture  Outcome: Progressing

## 2024-06-06 NOTE — NURSING NOTE
Turned and repositioned patient every 2 hours with pillows placed for positioning.  Continue to monitor.

## 2024-06-06 NOTE — NURSING NOTE
Patient turned and repositioned at this time. Mepilex dressing intact to buttocks.   Prafo boot intact right foot.  Left foot/lower leg turned inward.   Patient with discoloration noted liseth lower extremities.   Dressing intact to left lower extremity.  Call light within reach, continue to monitor.

## 2024-06-06 NOTE — NURSING NOTE
Left lateral calf wound approximately 4.5.cm x 1.0 cm, 0.1 cm cleansed with normal saline, dressed with petroleum gauze, gauze pads, and kerlex.  Secured with tape.  Foot elevated on pillow.  Patient tolerated well and voiced no com,plaints or concerns.

## 2024-06-06 NOTE — NURSING NOTE
Pt. Vital signs obtained.  Pt. Blood sugar was 60. ARACELI Loredo notified  Encouraged pt. To order breakfast.  Call light within reach  Bed alarm on.

## 2024-06-06 NOTE — NURSING NOTE
At this time, patients' blood sugar was 60, patient asymptomatic. After treatment, patients' blood sugar was 139.

## 2024-06-06 NOTE — NURSING NOTE
Rounded on patient. Vitals stable.  Patient sleeping.  Patient repositioned to right side with pillow support.  Purewick in place and secure collection clear, yellow urine in canister. Bed pad/incontinence check. Bed in lowest position.  Call light and possessions within reach.  Boot in place on right foot. Feet elevated off bed. Patient denies any needs at this time.

## 2024-06-06 NOTE — NURSING NOTE
Rounded on patient.  Patient repositioned to right side with pillow support.  Incontinence check.  Patient is dry and Purewick is collecting clear, yellow urine.  Labs drawn and sent.  Call light and possessions within reach.  Bed in lowest position and bed alarm on.

## 2024-06-07 ENCOUNTER — APPOINTMENT (OUTPATIENT)
Dept: RADIOLOGY | Facility: HOSPITAL | Age: 74
DRG: 549 | End: 2024-06-07
Payer: MEDICARE

## 2024-06-07 LAB
ANION GAP SERPL CALC-SCNC: 9 MMOL/L (ref 10–20)
BUN SERPL-MCNC: 22 MG/DL (ref 6–23)
CALCIUM SERPL-MCNC: 9.1 MG/DL (ref 8.6–10.3)
CHLORIDE SERPL-SCNC: 104 MMOL/L (ref 98–107)
CO2 SERPL-SCNC: 26 MMOL/L (ref 21–32)
CREAT SERPL-MCNC: 1.19 MG/DL (ref 0.5–1.05)
EGFRCR SERPLBLD CKD-EPI 2021: 49 ML/MIN/1.73M*2
GLUCOSE BLD MANUAL STRIP-MCNC: 108 MG/DL (ref 74–99)
GLUCOSE BLD MANUAL STRIP-MCNC: 93 MG/DL (ref 74–99)
GLUCOSE BLD MANUAL STRIP-MCNC: 97 MG/DL (ref 74–99)
GLUCOSE SERPL-MCNC: 99 MG/DL (ref 74–99)
POTASSIUM SERPL-SCNC: 4 MMOL/L (ref 3.5–5.3)
SODIUM SERPL-SCNC: 135 MMOL/L (ref 136–145)

## 2024-06-07 PROCEDURE — 97162 PT EVAL MOD COMPLEX 30 MIN: CPT | Mod: GP

## 2024-06-07 PROCEDURE — 82374 ASSAY BLOOD CARBON DIOXIDE: CPT | Performed by: PHYSICIAN ASSISTANT

## 2024-06-07 PROCEDURE — 73723 MRI JOINT LWR EXTR W/O&W/DYE: CPT | Mod: LT

## 2024-06-07 PROCEDURE — 2500000004 HC RX 250 GENERAL PHARMACY W/ HCPCS (ALT 636 FOR OP/ED): Performed by: INTERNAL MEDICINE

## 2024-06-07 PROCEDURE — 97110 THERAPEUTIC EXERCISES: CPT | Mod: GP

## 2024-06-07 PROCEDURE — 73723 MRI JOINT LWR EXTR W/O&W/DYE: CPT | Mod: LEFT SIDE | Performed by: RADIOLOGY

## 2024-06-07 PROCEDURE — 99252 IP/OBS CONSLTJ NEW/EST SF 35: CPT | Performed by: ORTHOPAEDIC SURGERY

## 2024-06-07 PROCEDURE — 2550000001 HC RX 255 CONTRASTS: Performed by: INTERNAL MEDICINE

## 2024-06-07 PROCEDURE — 82947 ASSAY GLUCOSE BLOOD QUANT: CPT | Mod: 91

## 2024-06-07 PROCEDURE — A9575 INJ GADOTERATE MEGLUMI 0.1ML: HCPCS | Performed by: INTERNAL MEDICINE

## 2024-06-07 PROCEDURE — 1100000001 HC PRIVATE ROOM DAILY

## 2024-06-07 PROCEDURE — 2500000002 HC RX 250 W HCPCS SELF ADMINISTERED DRUGS (ALT 637 FOR MEDICARE OP, ALT 636 FOR OP/ED): Performed by: HOSPITALIST

## 2024-06-07 PROCEDURE — 36415 COLL VENOUS BLD VENIPUNCTURE: CPT | Performed by: PHYSICIAN ASSISTANT

## 2024-06-07 PROCEDURE — 2500000001 HC RX 250 WO HCPCS SELF ADMINISTERED DRUGS (ALT 637 FOR MEDICARE OP): Performed by: HOSPITALIST

## 2024-06-07 PROCEDURE — 2500000002 HC RX 250 W HCPCS SELF ADMINISTERED DRUGS (ALT 637 FOR MEDICARE OP, ALT 636 FOR OP/ED): Performed by: FAMILY MEDICINE

## 2024-06-07 RX ORDER — GADOTERATE MEGLUMINE 376.9 MG/ML
20 INJECTION INTRAVENOUS
Status: COMPLETED | OUTPATIENT
Start: 2024-06-07 | End: 2024-06-07

## 2024-06-07 RX ADMIN — OXYCODONE AND ACETAMINOPHEN 1 TABLET: 10; 325 TABLET ORAL at 17:59

## 2024-06-07 RX ADMIN — APIXABAN 5 MG: 5 TABLET, FILM COATED ORAL at 22:25

## 2024-06-07 RX ADMIN — ASPIRIN 81 MG: 81 TABLET, COATED ORAL at 22:25

## 2024-06-07 RX ADMIN — ACETAMINOPHEN 650 MG: 325 TABLET ORAL at 19:56

## 2024-06-07 RX ADMIN — LOSARTAN POTASSIUM 25 MG: 25 TABLET, FILM COATED ORAL at 08:30

## 2024-06-07 RX ADMIN — SPIRONOLACTONE 25 MG: 25 TABLET ORAL at 08:30

## 2024-06-07 RX ADMIN — OXYCODONE AND ACETAMINOPHEN 1 TABLET: 10; 325 TABLET ORAL at 09:23

## 2024-06-07 RX ADMIN — TRAZODONE HYDROCHLORIDE 50 MG: 50 TABLET ORAL at 22:26

## 2024-06-07 RX ADMIN — LEVOTHYROXINE SODIUM 175 MCG: 0.17 TABLET ORAL at 06:31

## 2024-06-07 RX ADMIN — METOPROLOL SUCCINATE 50 MG: 50 TABLET, EXTENDED RELEASE ORAL at 08:30

## 2024-06-07 RX ADMIN — GABAPENTIN 100 MG: 100 CAPSULE ORAL at 22:25

## 2024-06-07 RX ADMIN — SITAGLIPTIN 50 MG: 50 TABLET, FILM COATED ORAL at 08:30

## 2024-06-07 RX ADMIN — OXYCODONE AND ACETAMINOPHEN 1 TABLET: 10; 325 TABLET ORAL at 22:25

## 2024-06-07 RX ADMIN — CEFTRIAXONE 2 G: 2 INJECTION, SOLUTION INTRAVENOUS at 18:55

## 2024-06-07 RX ADMIN — FAMOTIDINE 40 MG: 20 TABLET, FILM COATED ORAL at 08:30

## 2024-06-07 RX ADMIN — APIXABAN 5 MG: 5 TABLET, FILM COATED ORAL at 08:30

## 2024-06-07 RX ADMIN — DESVENLAFAXINE 100 MG: 50 TABLET, FILM COATED, EXTENDED RELEASE ORAL at 08:30

## 2024-06-07 RX ADMIN — AMIODARONE HYDROCHLORIDE 200 MG: 200 TABLET ORAL at 08:30

## 2024-06-07 RX ADMIN — GABAPENTIN 100 MG: 100 CAPSULE ORAL at 08:30

## 2024-06-07 RX ADMIN — GADOTERATE MEGLUMINE 20 ML: 376.9 INJECTION INTRAVENOUS at 17:21

## 2024-06-07 RX ADMIN — POTASSIUM CHLORIDE 10 MEQ: 750 TABLET, FILM COATED, EXTENDED RELEASE ORAL at 08:30

## 2024-06-07 RX ADMIN — INSULIN LISPRO 35 UNITS: 100 INJECTION, SUSPENSION SUBCUTANEOUS at 09:30

## 2024-06-07 ASSESSMENT — COGNITIVE AND FUNCTIONAL STATUS - GENERAL
MOVING TO AND FROM BED TO CHAIR: TOTAL
MOVING TO AND FROM BED TO CHAIR: TOTAL
TOILETING: TOTAL
MOBILITY SCORE: 10
TURNING FROM BACK TO SIDE WHILE IN FLAT BAD: A LITTLE
MOBILITY SCORE: 10
STANDING UP FROM CHAIR USING ARMS: TOTAL
CLIMB 3 TO 5 STEPS WITH RAILING: TOTAL
CLIMB 3 TO 5 STEPS WITH RAILING: TOTAL
DAILY ACTIVITIY SCORE: 13
WALKING IN HOSPITAL ROOM: TOTAL
MOVING FROM LYING ON BACK TO SITTING ON SIDE OF FLAT BED WITH BEDRAILS: A LITTLE
DRESSING REGULAR LOWER BODY CLOTHING: TOTAL
PERSONAL GROOMING: A LOT
HELP NEEDED FOR BATHING: A LOT
STANDING UP FROM CHAIR USING ARMS: TOTAL
WALKING IN HOSPITAL ROOM: TOTAL
MOVING FROM LYING ON BACK TO SITTING ON SIDE OF FLAT BED WITH BEDRAILS: A LITTLE
DRESSING REGULAR UPPER BODY CLOTHING: A LITTLE
TURNING FROM BACK TO SIDE WHILE IN FLAT BAD: A LITTLE

## 2024-06-07 ASSESSMENT — PAIN DESCRIPTION - ORIENTATION
ORIENTATION: LEFT
ORIENTATION: LEFT

## 2024-06-07 ASSESSMENT — PAIN DESCRIPTION - DESCRIPTORS
DESCRIPTORS: ACHING

## 2024-06-07 ASSESSMENT — PAIN - FUNCTIONAL ASSESSMENT
PAIN_FUNCTIONAL_ASSESSMENT: 0-10

## 2024-06-07 ASSESSMENT — PAIN SCALES - GENERAL
PAINLEVEL_OUTOF10: 5 - MODERATE PAIN
PAINLEVEL_OUTOF10: 3
PAINLEVEL_OUTOF10: 5 - MODERATE PAIN
PAINLEVEL_OUTOF10: 7
PAINLEVEL_OUTOF10: 5 - MODERATE PAIN
PAINLEVEL_OUTOF10: 7
PAINLEVEL_OUTOF10: 6
PAINLEVEL_OUTOF10: 5 - MODERATE PAIN
PAINLEVEL_OUTOF10: 0 - NO PAIN
PAINLEVEL_OUTOF10: 9

## 2024-06-07 ASSESSMENT — ACTIVITIES OF DAILY LIVING (ADL): ADL_ASSISTANCE: NEEDS ASSISTANCE

## 2024-06-07 ASSESSMENT — PAIN DESCRIPTION - LOCATION
LOCATION: KNEE
LOCATION: KNEE

## 2024-06-07 NOTE — NURSING NOTE
Pt. is resting in bed. Her pain rating remains at a 5/10. She declines any intervention at this time. Leatha care provided, moisture barrier cream applied, and external catheter was changed. Foam drsg to buttocks remains dry and intact. Pt. was also repositioned onto her L side. LLE is propped up onto a pillow, L foot is noted to be contracted and turned outward, and L big toe is amputated. Wound to LLE is wrapped in kerlix and is dry and intact. Raised, reddened area is noted to L lower thigh (already outlined). RLE is supported in the Prafo boot. Assessment as charted, VSS. Any questions/concerns were addressed. Blood glucose reading is 160. MD notified, no new orders placed at this time. Pt. reports no further needs. Call light and personal belongings are within reach. Bed alarm activated.

## 2024-06-07 NOTE — CARE PLAN
Problem: Pain  Goal: Takes deep breaths with improved pain control throughout the shift  Outcome: Progressing  Goal: Turns in bed with improved pain control throughout the shift  Outcome: Progressing  Goal: Performs ADL's with improved pain control throughout shift  Outcome: Progressing  Goal: Participates in PT with improved pain control throughout the shift  Outcome: Progressing  Goal: Free from opioid side effects throughout the shift  Outcome: Progressing  Goal: Free from acute confusion related to pain meds throughout the shift  Outcome: Progressing     Problem: Fall/Injury  Goal: Verbalize understanding of personal risk factors for fall in the hospital  Outcome: Progressing  Goal: Verbalize understanding of risk factor reduction measures to prevent injury from fall in the home  Outcome: Progressing  Goal: Use assistive devices by end of the shift  Outcome: Progressing  Goal: Pace activities to prevent fatigue by end of the shift  Outcome: Progressing     Problem: Deep Vein Thrombosis  Goal: I will remain free from complications of deep vein thrombosis and maintain current level of mobility  Outcome: Progressing     Problem: Pain - Adult  Goal: Verbalizes/displays adequate comfort level or baseline comfort level  Outcome: Progressing     Problem: Safety - Adult  Goal: Free from fall injury  Outcome: Progressing     Problem: Discharge Planning  Goal: Discharge to home or other facility with appropriate resources  Outcome: Progressing     Problem: Chronic Conditions and Co-morbidities  Goal: Patient's chronic conditions and co-morbidity symptoms are monitored and maintained or improved  Outcome: Progressing     Problem: Skin  Goal: Decreased wound size/increased tissue granulation at next dressing change  Outcome: Progressing  Goal: Participates in plan/prevention/treatment measures  Outcome: Progressing  Goal: Prevent/manage excess moisture  Outcome: Progressing  Goal: Prevent/minimize sheer/friction  injuries  Outcome: Progressing  Goal: Promote/optimize nutrition  Outcome: Progressing  Goal: Promote skin healing  Outcome: Progressing     Problem: Diabetes  Goal: Increase stability of blood glucose readings by end of shift  Outcome: Progressing  Goal: Maintain electrolyte levels within acceptable range throughout shift  Outcome: Progressing  Goal: Maintain glucose levels >70mg/dl to <250mg/dl throughout shift  Outcome: Progressing  Goal: No changes in neurological exam by end of shift  Outcome: Progressing  Goal: Learn about and adhere to nutrition recommendations by end of shift  Outcome: Progressing  Goal: Vital signs within normal range for age by end of shift  Outcome: Progressing  Goal: Increase self care and/or family involovement by end of shift  Outcome: Progressing  Goal: Receive DSME education by end of shift  Outcome: Progressing     Problem: Pain  Goal: My pain/discomfort is manageable  Outcome: Progressing     Problem: Safety  Goal: Patient will be injury free during hospitalization  Outcome: Progressing  Goal: I will remain free of falls  Outcome: Progressing     Problem: Daily Care  Goal: Daily care needs are met  Outcome: Progressing     Problem: Psychosocial Needs  Goal: Demonstrates ability to cope with hospitalization/illness  Outcome: Progressing  Goal: Collaborate with me, my family, and caregiver to identify my specific goals  Outcome: Progressing     Problem: Discharge Barriers  Goal: My discharge needs are met  Outcome: Progressing     Problem: Infection related to problem list condition  Goal: Infection will resolve through treatment  Outcome: Progressing   The patient's goals for the shift include      The clinical goals for the shift include Pain control

## 2024-06-07 NOTE — PROGRESS NOTES
06/07/24 0903   Discharge Planning   Assistance Needed DC to [facility]   Patient expects to be discharged to: [facility] return     DC to LTC when medically cleared.

## 2024-06-07 NOTE — NURSING NOTE
Pt returned to unit from MRI at this time. Pt assisted into bed using sliding board x4 assist. Leatha care performed once returned to bed and purewick applied. Barrier cream applied. Bed alarm activated and call light within reach in reach. No stated needs.

## 2024-06-07 NOTE — PROGRESS NOTES
Physical Therapy Evaluation & Treatment    Patient Name: Teresa Matthews  MRN: [id number]  Today's Date: 6/7/2024   Time Calculation  Start Time: 1058  Stop Time: 1126  Time Calculation (min): 28 min    Assessment/Plan   PT Assessment  PT Assessment Results: Decreased strength, Impaired balance, Decreased range of motion, Decreased endurance, Decreased mobility, Decreased coordination, Impaired tone, Obesity, Decreased skin integrity, Pain  Rehab Prognosis: Fair  Evaluation/Treatment Tolerance: Patient tolerated treatment well  End of Session Communication: Bedside nurse  Assessment Comment: Pt presents with impaired functional mobility, including impaired balance, decreased strength/ROM, and decreased tolerance to activity, although patient is likely not far from her baseline mobility. Pt expresses desire to participate in PT at this time and would like to continue and would benefit from skilled PT services. Recommend discharge to SNF as next level of care. Pt would also benefit from OT evaluation at next level of care to address impaired ADL status.  End of Session Patient Position: Up in chair, BLE elevated, RLE PRAFO boot on, call light in reach, needs met, RN aware.  PT Plan  Treatment/Interventions: Bed mobility, Transfer training, Balance training, Strengthening, Endurance training, Range of motion, Therapeutic exercise, Therapeutic activity, Home exercise program, Positioning, Postural re-education, Wheelchair management  PT Plan: Skilled PT  PT Frequency: 3 times per week  PT Discharge Recommendations: Moderate intensity level of continued care  Equipment Recommended upon Discharge: Wheelchair, Lift  PT Recommended Transfer Status: Total assist (freida lift for transfers)  PT - OK to Discharge: Yes (to SNF/LTC)      Subjective     General Visit Information:  General  Reason for Referral: impaied mobility; Pt had previously declined PT services, however this AM patient reported to her nurse that she would  now like to participate in PT. Hospitalist placed new order  Referred By: Dr. Angel  Past Medical History Relevant to Rehab: DM, A-fib, HTN, CHF, COPD, L TKR, hypothyroidism, CKD, neuropathy, sacral wound, depression, insomnia, endocarditis, AAA, kidney stone, bariatric surgery, vertigo, UTI, L knee antibiotic spacer - chronically, toe amputation, cardioversion  Family/Caregiver Present: No  Prior to Session Communication: Bedside nurse  Patient Position Received: Up in chair  General Comment: see nursing documentation for wounds  Home Living:  Home Living  Type of Home: Long Term Care facility  Home Adaptive Equipment: Wheelchair-manual  Prior Level of Function:  Prior Function Per Pt/Caregiver Report  Level of [place]: Needs assistance with ADLs, Needs assistance with functional transfers  Receives Help From:  (Care staff)  ADL Assistance: Needs assistance (care staff at [facility] assists pt)  Homemaking Assistance: Needs assistance (care staff at [facility] assists pt)  Ambulatory Assistance: Needs assistance  Transfers: Total, Assistive device (sit to stand lift)  Gait:  (pt has been non-ambulatory for some time now)  Prior Function Comments: Pt reports no recent falls at LTC facility  Precautions:  Precautions  LE Weight Bearing Status: Weight Bearing as Tolerated  Medical Precautions: Fall precautions    Objective   Pain:  Pain Assessment  Pain Assessment: 0-10  Pain Score: 5 - Moderate pain  Pain Type: Chronic pain  Pain Location: Knee  Pain Orientation: Left  Pain Interventions: Repositioned (RN admin pain meds prior to PT arrival)  Cognition:  Cognition  Overall Cognitive Status: Within Functional Limits  Orientation Level: Oriented X4  Attention: Within Functional Limits  Memory: Within Funtional Limits  Problem Solving: Within Functional Limits  Numeric Reasoning: Within Functional Limits  Abstract Reasoning: Within Functional Limits  Safety/Judgement: Within Functional Limits  Insight: Within function  limits    General Assessments:  Activity Tolerance  Endurance: Tolerates less than 10 min exercise, no significant change in vital signs (unable to stand to further assess endurance due to weakness and L knee pain)    Coordination  Movements are Fluid and Coordinated: No  Upper Body Coordination: RUE deficits due to likely RTC tear  Lower Body Coordination: BLE chronic deficits L worse than R    Postural Control  Postural Control: Impaired (seated WFL; unable to assess in standing)    Static Sitting Balance  Static Sitting-Balance Support: Feet supported  Static Sitting-Level of Assistance: Close supervision  Dynamic Sitting Balance  Dynamic Sitting-Balance Support: Feet supported  Dynamic Sitting-Comments: Min A    Static Standing Balance  Static Standing-Balance Support: Bilateral upper extremity supported  Static Standing-Level of Assistance: Dependent  Dynamic Standing Balance  Dynamic Standing-Balance Support:  (Unable)  Functional Assessments:  Transfers  Transfer: Yes  Transfer 1  Transfer From 1: Chair with arms to  Transfer to 1: Chair with arms  Technique 1: Sit to stand, Stand to sit (attempted but unable due to weakness)  Transfer Device 1: Gait belt  Transfer Level of Assistance 1: Dependent, Moderate verbal cues (cues for hand placement and BLE extension)    Ambulation/Gait Training  Ambulation/Gait Training Performed: No    Stairs  Stairs: No  Extremity/Trunk Assessments:  RUE   RUE : Exceptions to WFL, chronic shoulder deficits.   LUE   LUE: Within Functional Limits  RLE   RLE : Exceptions to WFL, limited strength assessment due to overall weakness.   LLE   LLE : Exceptions to WFL, chronic knee deficits.    Outcome Measures:  [facility] Basic Mobility  Turning from your back to your side while in a flat bed without using bedrails: A little  Moving from lying on your back to sitting on the side of a flat bed without using bedrails: A little  Moving to and from bed to chair (including a wheelchair):  Total  Standing up from a chair using your arms (e.g. wheelchair or bedside chair): Total  To walk in hospital room: Total  Climbing 3-5 steps with railing: Total  Basic Mobility - Total Score: 10    Encounter Problems       Encounter Problems (Active)       Balance       LTG - Patient will demonstrate Intervention to enhance balance for safe completion of daily activities (Progressing)       Start:  06/07/24            LTG - Patient will maintain balance to allow for safe mobility (Progressing)       Start:  06/07/24            LTG - Maintains static standing balance with upper extremity support (Progressing)       Start:  06/07/24                   Compromised Skin Integrity       LTG - Patient will maintain/improve skin integrity through proper skin care techniques (Progressing)       Start:  06/07/24               Mobility       LTG - Patient will propel wheelchair household/community distances (Progressing)       Start:  06/07/24               PT Transfers       LTG - Patient will perform bed mobility (Progressing)       Start:  06/07/24               Safety       LTG - Patient will demonstrate safety requirements appropriate to situation/environment (Progressing)       Start:  06/07/24            STG - Patient locks brakes on wheelchair (Progressing)       Start:  06/07/24                 Alyssa Norman, PT, DPT

## 2024-06-07 NOTE — CARE PLAN
Problem: Balance  Goal: LTG - Patient will demonstrate Intervention to enhance balance for safe completion of daily activities  Outcome: Progressing  Goal: LTG - Patient will maintain balance to allow for safe mobility  Outcome: Progressing  Goal: STG - Maintains static standing balance with upper extremity support  Description: INTERVENTIONS:  1. Practice standing with minimal support.  2. Educate patient about standing tolerance.  3. Educate patient about independence with gait, transfers, and ADL's.  4. Educate patient about use of assistive device.  5. Educate patient about self-directed care.  Outcome: Progressing     Problem: Mobility  Goal: LTG - Patient will propel wheelchair household/community distances  Outcome: Progressing     Problem: Safety  Goal: LTG - Patient will demonstrate safety requirements appropriate to situation/environment  Outcome: Progressing  Goal: STG - Patient locks brakes on wheelchair  Outcome: Progressing     Problem: PT Transfers  Goal: STG - Patient will perform bed mobility  Outcome: Progressing     Problem: Pain  Goal: LTG - Patient will manage pain with the appropriate technique/Intervention  Outcome: Progressing     Problem: Compromised Skin Integrity  Goal: LTG - Patient will maintain/improve skin integrity through proper skin care techniques  Outcome: Progressing

## 2024-06-07 NOTE — CARE PLAN
The patient's goals for the shift include  pain control and infection control     The clinical goals for the shift include infection control, safety

## 2024-06-07 NOTE — PROGRESS NOTES
"  INFECTIOUS DISEASE DAILY PROGRESS NOTE    SUBJECTIVE:    No overnight events. No new complaints. Afebrile. No rash/itching/diarrhea. She tells me ortho did see her today and did not recommend an arthrocentesis.    OBJECTIVE:  VITALS (Last 24 Hours)  /66 (BP Location: Right arm, Patient Position: Lying)   Pulse 62   Temp 36.2 °C (97.2 °F) (Temporal)   Resp 16   Ht 1.829 m (6' 0.01\")   Wt 99.2 kg (218 lb 11.1 oz)   SpO2 95%   BMI 29.65 kg/m²     PHYSICAL EXAM:  Gen - NAD  Left Knee - swollen/hot/tender still, the area of demarcated erythema is better  Skin - no rash    ABX: IV CTX    LABS:  Lab Results   Component Value Date    WBC 6.4 06/05/2024    HGB 10.1 (L) 06/05/2024    HCT 33.2 (L) 06/05/2024    MCV 84 06/05/2024     06/05/2024     Lab Results   Component Value Date    GLUCOSE 99 06/07/2024    CALCIUM 9.1 06/07/2024     (L) 06/07/2024    K 4.0 06/07/2024    CO2 26 06/07/2024     06/07/2024    BUN 22 06/07/2024    CREATININE 1.19 (H) 06/07/2024     Results from last 72 hours   Lab Units 06/05/24  0344   ALK PHOS U/L 70   BILIRUBIN TOTAL mg/dL 0.3   PROTEIN TOTAL g/dL 7.1   ALT U/L 7   AST U/L 9   ALBUMIN g/dL 2.9*     Estimated Creatinine Clearance: 56.3 mL/min (A) (by C-G formula based on SCr of 1.19 mg/dL (H)).    C-Reactive Protein   Date/Time Value Ref Range Status   06/06/2024 04:34 AM 1.52 (H) <1.00 mg/dL Final       ASSESSMENT/PLAN:     Suspected Left Knee Septic Arthritis     She had a recent Group A Strep bacteremia and now has worsening/progressive left knee pain concerning for joint infection. ESR/CRP with mild elevation. Blood cx are NGTD (but after abx).    Continue on IV CTX 2g Q24H. If no tap to be done that I will order an MRI with contrast of the left knee to further assess. If this is abnormal will need to engage ortho again.     Will follow peripherally over the weekend. Please call Dr. Martinez with questions. Thanks!    Efraín Mallory MD  [facility] " [facility]  Office #[phone]

## 2024-06-07 NOTE — NURSING NOTE
Pt assisted to recliner at this time. [place] and two person assist was used during transfer. Pt tolerated well. Pt now sitting in recliner with feet elevated. Call light and bedside table in reach. All needs addressed.

## 2024-06-07 NOTE — NURSING NOTE
Assumed care of pt from night shift, RN. Pt resting in bed with call light in reach. 2+ pulses and cap refill brisk. Lung sounds clear bilaterally. Preventative dressing to sacrum in place. Dressing to LLE wrapped in kerlix, dry and intact. Redness to LLE noted and has not spread beyond marked boarder. Marked area warm to touch. Pt states pain is 4/10 and denies any intervention at this time. No stated needs.

## 2024-06-07 NOTE — CONSULTS
Reason For Consult  Left lower extremity cellulitis, history of prosthetic joint infection    History Of Present Illness  Teresa Matthews is a 73 y.o. female presenting with left leg cellulitis.  Patient is a complex medical history regarding her left knee.  At her previous knee replacement.  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 Rojelio Machado.  She had an explantation of her implant with placement of antibiotic spacer.  Patient had 2 falls prior to this and sustained femur fractures and tibia fractures as treated nonoperatively.  She additionally has uncontrolled diabetes and given her high risk state due to her poor bone quality and medical history they elected to proceed with her articulating spacer as her treatment plan at this time and potential second stage surgery in the future.    Patient is currently in a nursing home.  She states she is been there for the past 3 months due to deconditioning and inability to take care of herself.  She states the last time she walked was a year ago.  She has previous partial toe amputations of her lower extremity from uncontrolled diabetes and reports peripheral neuropathy.  She denies any current fevers or chills.  States that she had redness has been treated by her nursing home physician for cellulitis and given that she was responding to the ER.  Given that she was found of cellulitis she was admitted for IV antibiotics.  She currently does not report any increasing pain to the left lower extremity and feels that her swelling is relatively at baseline.  She has had essentially no workup since her spacer and has not followed up with Dr. Machado for at least a couple years.  She is nonambulatory at this time and is a [place] lift for transfers.     Past Medical History  She has a past medical history of Abnormal weight gain (04/10/2023), Acute kidney injury (CMS-HCC) (03/25/2024), Acute upper respiratory infection  (03/25/2024), Allergic rhinitis (04/10/2023), Arthritis due to other bacteria, unspecified knee (Multi) (03/03/2022), Atrial fibrillation (Multi), Bilateral tinnitus (07/01/2020), Body mass index (BMI) 31.0-31.9, adult (06/30/2020), Body mass index (BMI) 32.0-32.9, adult (01/04/2021), Body mass index (BMI) 33.0-33.9, adult (01/04/2021), Body mass index (BMI) 35.0-35.9, adult (04/09/2020), Body mass index (BMI)40.0-44.9, adult (11/14/2019), Bronchitis due to COVID-19 virus (04/10/2023), CHF (congestive heart failure) (Multi), Chronic insomnia (04/10/2023), Depression, major, in remission (CMS-[facility]) (04/10/2023), Diabetes mellitus (Multi), Distal radius fracture, left (05/19/2023), Dry skin (01/17/2023), Endocarditis, Essential (primary) hypertension (09/06/2022), Fibula fracture (06/21/2023), H/O bariatric surgery (04/10/2023), History of heart failure (03/25/2024), History of type 2 diabetes mellitus (03/25/2024), 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), Hypoxia (04/10/2023), Infective arthritis (Multi) (03/25/2024), Kidney stone on right side (04/10/2023), Laceration of multiple sites (03/25/2024), Left toe amputee (Multi) (04/10/2023), Malaise and fatigue (04/10/2023), Nasal septum ulceration (04/10/2023), Nontoxic multinodular goiter (07/02/2021), Personal history of other diseases of the circulatory system, Personal history of other diseases of the musculoskeletal system and connective tissue, Personal history of other diseases of the nervous system and sense organs (10/11/2022), Personal history of other diseases of the respiratory system, Personal history of other diseases of urinary system (02/19/2020), Personal history of other endocrine, nutritional and metabolic disease (07/02/2021), Personal history of other endocrine, nutritional and metabolic disease (04/24/2017), Prosthetic joint infection (CMS-[facility]) (04/10/2023), Recurrent major depressive disorder, in remission (CMS-HCC) (04/10/2023), Ruptured  aneurysm of thoracic aorta, unspecified part (Multi) (04/10/2023), Status post gastric bypass for obesity (04/10/2023), Urinary tract infection, and 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    Surgical History  She has a past surgical history that includes Hysterectomy (01/20/2016); Tonsillectomy (01/20/2016); Total knee arthroplasty (05/28/2021); Other surgical history (01/07/2022); Other surgical history (05/28/2021); and Tubal ligation (04/24/2017).     Social History  She reports that she has never smoked. She has never used smokeless tobacco. She reports that she does not drink alcohol and does not use drugs.    Family History  Family History   Problem Relation Name Age of Onset    Coronary artery disease Mother      Liver disease Mother      Other (non-hodgkins lymphoma) Mother      Stroke Father      Deafness Father      Hypertension Father          Allergies  Metformin, Cephalexin, Other, Statins-hmg-coa reductase inhibitors, and Adhesive tape-silicones    Review of Systems  Negative unless noted in HPI     Physical Exam  GENERAL: A/Ox3, NAD. Appears healthy, well nourished  PSYCHIATRIC: Mood stable, appropriate memory recall  EYES: EOM intact, no scleral icterus  CARDIAC: regular rate  LUNGS: Breathing non-labored  SKIN: no erythema, rashes, or ecchymoses     MUSCULOSKELETAL:  Laterality: left Knee Exam  -Overall examination left lower extremity demonstrates skin changes secondary to venous stasis.  Has a Curlex wrap around her calf from a superficial wound on the posterior aspect of the calf.  She does have some overlying erythema at the tibia that appears to be secondary to venous insufficiency.  She has an area that is demarcated from the superior aspect of the patella with blanchable erythema that appears to have some redness that is receded from the previous marks.  Her previous midline incision is well-healed.  She is unable to straight leg raise due to overall weakness.  She has no pain with passive  arcs of motion but her range of motion is only from about 10 to 35 degrees.  She is overall stable to varus and valgus stress.  Her knee is not overtly warm to touch when compared to the contralateral side.  She has a 2+ joint effusion.  Her alignment is relatively neutral.  She is relatively nontender to the left knee as well.      Imaging: Previous x-rays left knee reviewed which demonstrates articulate spacer with previous healing distal femur and proximal tibia fractures. X-rays were personally reviewed by me.  Radiology reports were reviewed by me as well, if available at the time.      Impression:  1.  History of left knee antibiotic Spacer    Plan: Discussed with patient the current findings.  She currently has no signs or symptoms of sepsis.  She may potentially have recurrent left knee joint infection but at this time given that she is relatively asymptomatic from this would recommend follow-up outpatient with Dr. Machado for further evaluation and recommendations.  Continue to treat her cellulitis with IV antibiotics.  Do not see any need for acute intervention from her left knee for an aspiration given that her infection be considered chronic at this point.  She is essentially a poor candidate to proceed with a revision operation due to her significant comorbidities and being bedbound and reliant on a [place] lift for transfers.  Do not recommend any acute intervention at this time.  Will follow peripherally.

## 2024-06-07 NOTE — NURSING NOTE
Pt wheeled down to MRI at this time via cart. Pt assisted onto MRI table using sliding board and x4 assist. Handed off report to [employer] tech .

## 2024-06-07 NOTE — PROGRESS NOTES
Progress Note:    Teresa Matthews is a 73 y.o. female on day 4 of admission presenting with Cellulitis.    Subjective   Mrs. Matthews feels like her left knee might be less swollen today.  She has not been moving it much.  Awaiting Ortho input on possibly draining the knee.     ROS  Constitutional:  Alert, oriented  HEENT: Denies headache, vision changes, hearing change, loss of taste or smell. Does complain of sore throat (post op)   Neck: Denies swallowing difficulty, swelling, new stiffness/pain  CV: Denies CP, Palpitations, chest wall pain  Resp: No cough, wheeze, dyspnea  Abdomen: Denies abdominal pain, cramping, constipation, diarrhea  : No dysuria, hematuria, discharge  Musculoskeletal: Aches over all joints (not new). Generalized weakness 2/2 pain  Extremities: Swelling of RLE  Neuro: No focal deficits other than mild [place]    Scheduled medications  amiodarone, 200 mg, oral, Daily  apixaban, 5 mg, oral, BID  aspirin, 81 mg, oral, Every Day  cefTRIAXone, 2 g, intravenous, q24h  desvenlafaxine, 100 mg, oral, Daily  famotidine, 40 mg, oral, Daily  gabapentin, 100 mg, oral, BID  insulin lispro, 0-10 Units, subcutaneous, TID  insulin lispro protamin-lispro, 35 Units, subcutaneous, Daily  levothyroxine, 175 mcg, oral, Daily  losartan, 25 mg, oral, Daily  metoprolol succinate XL, 50 mg, oral, Daily  potassium chloride CR, 10 mEq, oral, Daily  SITagliptin phosphate, 50 mg, oral, Daily  spironolactone, 25 mg, oral, Daily      Continuous medications     PRN medications  PRN medications: acetaminophen, albuterol, benzocaine-menthol, calcium carbonate, dextrose, glucagon, loperamide, melatonin, ondansetron, oxyCODONE, oxyCODONE-acetaminophen, traMADol, traZODone      Physical Exam  General: No acute distress, alert & oriented  Cardiac: RRR, NL S1 and S2, no murmurs, rubs or gallops  Pulmonary: Lungs clear to auscultation bilaterally, no wheezes, rhales or rhonchi  Abdomen: Soft, non-tender,  non-distended  Extremities: No clubbing , cyanosis or edema.     Last Recorded Vitals  Blood pressure 135/66, pulse 62, temperature 36.2 °C (97.2 °F), temperature source Temporal, resp. rate 16, weight 99.2 kg (218 lb 11.1 oz), SpO2 95%.    Scheduled medications   Medication Dose Route Frequency    amiodarone  200 mg oral Daily    apixaban  5 mg oral BID    aspirin  81 mg oral Every Day    cefTRIAXone  2 g intravenous q24h    desvenlafaxine  100 mg oral Daily    famotidine  40 mg oral Daily    gabapentin  100 mg oral BID    insulin lispro  0-10 Units subcutaneous TID    insulin lispro protamin-lispro  35 Units subcutaneous Daily    levothyroxine  175 mcg oral Daily    losartan  25 mg oral Daily    metoprolol succinate XL  50 mg oral Daily    potassium chloride CR  10 mEq oral Daily    SITagliptin phosphate  50 mg oral Daily    spironolactone  25 mg oral Daily       Relevant Results  Results from last 7 days   Lab Units 06/05/24  0344   WBC AUTO x10*3/uL 6.4   HEMOGLOBIN g/dL 10.1*   HEMATOCRIT % 33.2*   PLATELETS AUTO x10*3/uL 193      Results from last 7 days   Lab Units 06/07/24  0632 06/05/24  0344   SODIUM mmol/L 135* 137   POTASSIUM mmol/L 4.0 4.3   CHLORIDE mmol/L 104 103   CO2 mmol/L 26 28   BUN mg/dL 22 20   CREATININE mg/dL 1.19* 1.28*   GLUCOSE mg/dL 99 160*   CALCIUM mg/dL 9.1 9.4      Results for orders placed or performed during the hospital encounter of 06/04/24 (from the past 24 hour(s))   POCT GLUCOSE   Result Value Ref Range    POCT Glucose 139 (H) 74 - 99 mg/dL   POCT GLUCOSE   Result Value Ref Range    POCT Glucose 70 (L) 74 - 99 mg/dL   POCT GLUCOSE   Result Value Ref Range    POCT Glucose 135 (H) 74 - 99 mg/dL   POCT GLUCOSE   Result Value Ref Range    POCT Glucose 138 (H) 74 - 99 mg/dL   POCT GLUCOSE   Result Value Ref Range    POCT Glucose 160 (H) 74 - 99 mg/dL   Basic Metabolic Panel   Result Value Ref Range    Glucose 99 74 - 99 mg/dL    Sodium 135 (L) 136 - 145 mmol/L    Potassium 4.0 3.5 -  5.3 mmol/L    Chloride 104 98 - 107 mmol/L    Bicarbonate 26 21 - 32 mmol/L    Anion Gap 9 (L) 10 - 20 mmol/L    Urea Nitrogen 22 6 - 23 mg/dL    Creatinine 1.19 (H) 0.50 - 1.05 mg/dL    eGFR 49 (L) >60 mL/min/1.73m*2    Calcium 9.1 8.6 - 10.3 mg/dL   POCT GLUCOSE   Result Value Ref Range    POCT Glucose 93 74 - 99 mg/dL      Assessment/Plan   1) Left Knee Cellulitis:    Overall appears to be slowly improving. Await Ortho assessment for possible tap   Rx: Continue Ceftriaxone 2 g IV q 24 hr -ID following    Leg elevation   PT consult      2) Atrial Fibrillation:    Rate controlled at this time              Rx: Continue amiodarone 200 mg daily              Rx: Continue Metoprolol S 50 mg daily               Rx: Continue Apixaban 5 mg q 12 hr     3) DVT Prophylaxis:              Rx: Continue Apixaban 5 mg q 12 hr                4) Poorly controlled Diabetes: (POCT glucoses controlled while inpatient)              Last A1c 15.4 (1/2024);  Repeat Hemoglobin A1c              Rx: Continue Humalog 75/25-35 units daily              Rx: Start Januvia 50 mg daily  Rx: Continue SSI                 5) Hypertension;              Adequately controlled at this time              Rx: Continue Losartan 25 mg daily              Rx: Continue Metoprolol S 50 mg daily               Rx: Continue Spironolactone 25 mg daily      6) Hypothyroid:              Last TSH 2.13 (March 25, 2024)  Rx: Continue Levothyrxine 175 mcg daily      7) CKD 3b; baseline sCr (1.16-1.73)              Gentle fluid resuscitation              Refrain from nephrotoxic agents (like NSAIDS)            I spent 35 minutes in the professional and overall care of this patient.      Jethro Angel MD

## 2024-06-08 LAB
ANION GAP SERPL CALC-SCNC: 10 MMOL/L (ref 10–20)
BUN SERPL-MCNC: 23 MG/DL (ref 6–23)
CALCIUM SERPL-MCNC: 9.2 MG/DL (ref 8.6–10.3)
CHLORIDE SERPL-SCNC: 104 MMOL/L (ref 98–107)
CO2 SERPL-SCNC: 27 MMOL/L (ref 21–32)
CREAT SERPL-MCNC: 1.24 MG/DL (ref 0.5–1.05)
EGFRCR SERPLBLD CKD-EPI 2021: 46 ML/MIN/1.73M*2
GLUCOSE BLD MANUAL STRIP-MCNC: 100 MG/DL (ref 74–99)
GLUCOSE BLD MANUAL STRIP-MCNC: 128 MG/DL (ref 74–99)
GLUCOSE BLD MANUAL STRIP-MCNC: 150 MG/DL (ref 74–99)
GLUCOSE BLD MANUAL STRIP-MCNC: 85 MG/DL (ref 74–99)
GLUCOSE BLD MANUAL STRIP-MCNC: 97 MG/DL (ref 74–99)
GLUCOSE SERPL-MCNC: 89 MG/DL (ref 74–99)
POTASSIUM SERPL-SCNC: 4.1 MMOL/L (ref 3.5–5.3)
SODIUM SERPL-SCNC: 137 MMOL/L (ref 136–145)

## 2024-06-08 PROCEDURE — 2500000002 HC RX 250 W HCPCS SELF ADMINISTERED DRUGS (ALT 637 FOR MEDICARE OP, ALT 636 FOR OP/ED): Performed by: FAMILY MEDICINE

## 2024-06-08 PROCEDURE — 1100000001 HC PRIVATE ROOM DAILY

## 2024-06-08 PROCEDURE — 2500000002 HC RX 250 W HCPCS SELF ADMINISTERED DRUGS (ALT 637 FOR MEDICARE OP, ALT 636 FOR OP/ED): Performed by: HOSPITALIST

## 2024-06-08 PROCEDURE — 2500000001 HC RX 250 WO HCPCS SELF ADMINISTERED DRUGS (ALT 637 FOR MEDICARE OP): Performed by: HOSPITALIST

## 2024-06-08 PROCEDURE — 80048 BASIC METABOLIC PNL TOTAL CA: CPT | Performed by: PHYSICIAN ASSISTANT

## 2024-06-08 PROCEDURE — 2500000004 HC RX 250 GENERAL PHARMACY W/ HCPCS (ALT 636 FOR OP/ED): Performed by: INTERNAL MEDICINE

## 2024-06-08 PROCEDURE — 36415 COLL VENOUS BLD VENIPUNCTURE: CPT | Performed by: PHYSICIAN ASSISTANT

## 2024-06-08 PROCEDURE — 82947 ASSAY GLUCOSE BLOOD QUANT: CPT | Mod: 91

## 2024-06-08 RX ADMIN — GABAPENTIN 100 MG: 100 CAPSULE ORAL at 09:07

## 2024-06-08 RX ADMIN — OXYCODONE AND ACETAMINOPHEN 1 TABLET: 10; 325 TABLET ORAL at 09:57

## 2024-06-08 RX ADMIN — INSULIN LISPRO 35 UNITS: 100 INJECTION, SUSPENSION SUBCUTANEOUS at 09:05

## 2024-06-08 RX ADMIN — SPIRONOLACTONE 25 MG: 25 TABLET ORAL at 09:07

## 2024-06-08 RX ADMIN — OXYCODONE AND ACETAMINOPHEN 1 TABLET: 10; 325 TABLET ORAL at 14:25

## 2024-06-08 RX ADMIN — LEVOTHYROXINE SODIUM 175 MCG: 0.17 TABLET ORAL at 05:59

## 2024-06-08 RX ADMIN — OXYCODONE AND ACETAMINOPHEN 1 TABLET: 10; 325 TABLET ORAL at 20:00

## 2024-06-08 RX ADMIN — GABAPENTIN 100 MG: 100 CAPSULE ORAL at 21:30

## 2024-06-08 RX ADMIN — POTASSIUM CHLORIDE 10 MEQ: 750 TABLET, FILM COATED, EXTENDED RELEASE ORAL at 09:07

## 2024-06-08 RX ADMIN — CEFTRIAXONE 2 G: 2 INJECTION, SOLUTION INTRAVENOUS at 17:40

## 2024-06-08 RX ADMIN — AMIODARONE HYDROCHLORIDE 200 MG: 200 TABLET ORAL at 09:06

## 2024-06-08 RX ADMIN — DESVENLAFAXINE 100 MG: 50 TABLET, FILM COATED, EXTENDED RELEASE ORAL at 09:06

## 2024-06-08 RX ADMIN — APIXABAN 5 MG: 5 TABLET, FILM COATED ORAL at 21:29

## 2024-06-08 RX ADMIN — SITAGLIPTIN 50 MG: 50 TABLET, FILM COATED ORAL at 09:07

## 2024-06-08 RX ADMIN — ASPIRIN 81 MG: 81 TABLET, COATED ORAL at 21:29

## 2024-06-08 RX ADMIN — APIXABAN 5 MG: 5 TABLET, FILM COATED ORAL at 09:07

## 2024-06-08 RX ADMIN — LOSARTAN POTASSIUM 25 MG: 25 TABLET, FILM COATED ORAL at 09:07

## 2024-06-08 RX ADMIN — METOPROLOL SUCCINATE 50 MG: 50 TABLET, EXTENDED RELEASE ORAL at 09:07

## 2024-06-08 RX ADMIN — FAMOTIDINE 40 MG: 20 TABLET, FILM COATED ORAL at 09:07

## 2024-06-08 ASSESSMENT — COGNITIVE AND FUNCTIONAL STATUS - GENERAL
DRESSING REGULAR UPPER BODY CLOTHING: A LITTLE
CLIMB 3 TO 5 STEPS WITH RAILING: TOTAL
MOVING TO AND FROM BED TO CHAIR: TOTAL
PERSONAL GROOMING: A LOT
TOILETING: TOTAL
TURNING FROM BACK TO SIDE WHILE IN FLAT BAD: A LITTLE
DRESSING REGULAR LOWER BODY CLOTHING: TOTAL
DAILY ACTIVITIY SCORE: 13
MOVING TO AND FROM BED TO CHAIR: TOTAL
MOBILITY SCORE: 10
CLIMB 3 TO 5 STEPS WITH RAILING: TOTAL
STANDING UP FROM CHAIR USING ARMS: TOTAL
WALKING IN HOSPITAL ROOM: TOTAL
HELP NEEDED FOR BATHING: A LOT
MOVING FROM LYING ON BACK TO SITTING ON SIDE OF FLAT BED WITH BEDRAILS: A LITTLE
WALKING IN HOSPITAL ROOM: TOTAL
STANDING UP FROM CHAIR USING ARMS: TOTAL
MOBILITY SCORE: 10
HELP NEEDED FOR BATHING: A LOT
PERSONAL GROOMING: A LOT
DRESSING REGULAR LOWER BODY CLOTHING: TOTAL
TURNING FROM BACK TO SIDE WHILE IN FLAT BAD: A LITTLE
DRESSING REGULAR UPPER BODY CLOTHING: A LITTLE
TOILETING: TOTAL
DAILY ACTIVITIY SCORE: 13
MOVING FROM LYING ON BACK TO SITTING ON SIDE OF FLAT BED WITH BEDRAILS: A LITTLE

## 2024-06-08 ASSESSMENT — PAIN SCALES - GENERAL
PAINLEVEL_OUTOF10: 4
PAINLEVEL_OUTOF10: 8
PAINLEVEL_OUTOF10: 7
PAINLEVEL_OUTOF10: 6
PAINLEVEL_OUTOF10: 6
PAINLEVEL_OUTOF10: 7

## 2024-06-08 ASSESSMENT — PAIN - FUNCTIONAL ASSESSMENT
PAIN_FUNCTIONAL_ASSESSMENT: 0-10

## 2024-06-08 ASSESSMENT — PAIN DESCRIPTION - DESCRIPTORS
DESCRIPTORS: ACHING
DESCRIPTORS: STABBING

## 2024-06-08 NOTE — CARE PLAN
Problem: Pain  Goal: Takes deep breaths with improved pain control throughout the shift  Outcome: Progressing  Goal: Turns in bed with improved pain control throughout the shift  Outcome: Progressing  Goal: Performs ADL's with improved pain control throughout shift  Outcome: Progressing  Goal: Participates in PT with improved pain control throughout the shift  Outcome: Progressing  Goal: Free from opioid side effects throughout the shift  Outcome: Progressing  Goal: Free from acute confusion related to pain meds throughout the shift  Outcome: Progressing     Problem: Fall/Injury  Goal: Verbalize understanding of personal risk factors for fall in the hospital  Outcome: Progressing  Goal: Verbalize understanding of risk factor reduction measures to prevent injury from fall in the home  Outcome: Progressing  Goal: Use assistive devices by end of the shift  Outcome: Progressing  Goal: Pace activities to prevent fatigue by end of the shift  Outcome: Progressing     Problem: Deep Vein Thrombosis  Goal: I will remain free from complications of deep vein thrombosis and maintain current level of mobility  Outcome: Progressing     Problem: Pain - Adult  Goal: Verbalizes/displays adequate comfort level or baseline comfort level  Outcome: Progressing     Problem: Safety - Adult  Goal: Free from fall injury  Outcome: Progressing     Problem: Discharge Planning  Goal: Discharge to home or other facility with appropriate resources  Outcome: Progressing     Problem: Chronic Conditions and Co-morbidities  Goal: Patient's chronic conditions and co-morbidity symptoms are monitored and maintained or improved  Outcome: Progressing     Problem: Skin  Goal: Decreased wound size/increased tissue granulation at next dressing change  Outcome: Progressing  Goal: Participates in plan/prevention/treatment measures  Outcome: Progressing  Goal: Prevent/manage excess moisture  Outcome: Progressing  Goal: Prevent/minimize sheer/friction  injuries  Outcome: Progressing  Goal: Promote/optimize nutrition  Outcome: Progressing  Goal: Promote skin healing  Outcome: Progressing     Problem: Diabetes  Goal: Increase stability of blood glucose readings by end of shift  Outcome: Progressing  Goal: Maintain electrolyte levels within acceptable range throughout shift  Outcome: Progressing  Goal: Maintain glucose levels >70mg/dl to <250mg/dl throughout shift  Outcome: Progressing  Goal: No changes in neurological exam by end of shift  Outcome: Progressing  Goal: Learn about and adhere to nutrition recommendations by end of shift  Outcome: Progressing  Goal: Vital signs within normal range for age by end of shift  Outcome: Progressing  Goal: Increase self care and/or family involovement by end of shift  Outcome: Progressing  Goal: Receive DSME education by end of shift  Outcome: Progressing     Problem: Pain  Goal: My pain/discomfort is manageable  Outcome: Progressing     Problem: Safety  Goal: Patient will be injury free during hospitalization  Outcome: Progressing  Goal: I will remain free of falls  Outcome: Progressing     Problem: Daily Care  Goal: Daily care needs are met  Outcome: Progressing     Problem: Psychosocial Needs  Goal: Demonstrates ability to cope with hospitalization/illness  Outcome: Progressing  Goal: Collaborate with me, my family, and caregiver to identify my specific goals  Outcome: Progressing     Problem: Discharge Barriers  Goal: My discharge needs are met  Outcome: Progressing     Problem: Infection related to problem list condition  Goal: Infection will resolve through treatment  Outcome: Progressing     Problem: Pain  Goal: LTG - Patient will manage pain with the appropriate technique/Intervention  Outcome: Progressing   The patient's goals for the shift include      The clinical goals for the shift include pain control and infection control

## 2024-06-08 NOTE — NURSING NOTE
Assumed care of pt from night shift, RN. Pt awake with call light in reach. Lung sounds clear bilaterally. Dressings dry and intact. Podus boot to RLE in place. Redness noted to left knee but has not spread beyond marked area. No stated needs at this time.

## 2024-06-08 NOTE — NURSING NOTE
Vitals done   Blood sugar was recorded   RN notified   Breakfast was encouraged   Call light within reach

## 2024-06-08 NOTE — CARE PLAN
The patient's goals for the shift include  Q2 hour turns    The clinical goals for the shift include pain cotnrol and safety

## 2024-06-08 NOTE — NURSING NOTE
"Assumed care of pt. She's resting in bed at this time. She reports her pain rating at a 5/10 to her L knee. She was offered the ordered PRN tramadol, but declined, stating that it \"makes me sleepy\". Tylenol 650mg given @ 1956. L knee remains reddened and mildly swollen, but has not spread outside of what has been outlined. Wound to L lower leg remains wrapped in kerlix and is dry and intact. External catheter is in place, secured by brief. Assessment as charted, VSS. She has no further needs at this time. Call light and personal belongings are within reach. Bed alarm activated.   "

## 2024-06-08 NOTE — NURSING NOTE
Rounded on pt at this time. Pain reassessed after PRN medication. Pt states pain is 6/10 and tolerable. Denies further intervention. Purewick disconnected due to movement in bed. Large amount of urine on brief underpad. Leatha care performed and barrier cream applied. Protective dressing to sacrum dry and intact. Purewick reapplied. No stated needs. Call light in reach.

## 2024-06-08 NOTE — NURSING NOTE
Pt. had a medium-sized BM. Leatha care provided and she was repositioned. Kerlix wrap to L lower leg wound was noted to be loose and was removed. No drainage or odor noted. Petroleum gauze and 6x6 foam drsg applied. One tablet of PCT 10/325 was given @ 2225 for a pain rating of 7/10 to her L knee. Pt. reports that she has no further needs at this time. Call light and personal belongings are within reach. Bed alarm activated.

## 2024-06-08 NOTE — CARE PLAN
The patient's goals for the shift include  pain control and safety     The clinical goals for the shift include infection & pain control, safety

## 2024-06-08 NOTE — NURSING NOTE
Assumed care of patient at 1930. Report received from ARACELI Coker. Patient lying in bed. Vital signs stable, no acute distress. Patient rates her pain an 8/10 in her left knee and was medicated for pain per doctor's orders. Left knee remains reddened and mildly swollen but has not moved beyond outlined area. Pure Wick in place and secured with brief. No stated needs at this time. Bed is in lowest locked position with bed alarm activated and call light within reach. Will continue to monitor.

## 2024-06-08 NOTE — PROGRESS NOTES
Progress Note:    Teresa Matthews is a 73 y.o. female on day 5 of admission presenting with Cellulitis.    Subjective   Mrs. Matthews reports no change.       ROS  Constitutional:  Alert, oriented  HEENT: Denies headache, vision changes, hearing change, loss of taste or smell. Does complain of sore throat (post op)   Neck: Denies swallowing difficulty, swelling, new stiffness/pain  CV: Denies CP, Palpitations, chest wall pain  Resp: No cough, wheeze, dyspnea  Abdomen: Denies abdominal pain, cramping, constipation, diarrhea  : No dysuria, hematuria, discharge  Musculoskeletal: Aches over all joints (not new). Generalized weakness 2/2 pain  Extremities: Swelling of RLE  Neuro: No focal deficits other than mild [place]    Scheduled medications  amiodarone, 200 mg, oral, Daily  apixaban, 5 mg, oral, BID  aspirin, 81 mg, oral, Every Day  cefTRIAXone, 2 g, intravenous, q24h  desvenlafaxine, 100 mg, oral, Daily  famotidine, 40 mg, oral, Daily  gabapentin, 100 mg, oral, BID  insulin lispro, 0-10 Units, subcutaneous, TID  insulin lispro protamin-lispro, 35 Units, subcutaneous, Daily  levothyroxine, 175 mcg, oral, Daily  losartan, 25 mg, oral, Daily  metoprolol succinate XL, 50 mg, oral, Daily  potassium chloride CR, 10 mEq, oral, Daily  SITagliptin phosphate, 50 mg, oral, Daily  spironolactone, 25 mg, oral, Daily      Continuous medications     PRN medications  PRN medications: acetaminophen, benzocaine-menthol, calcium carbonate, dextrose, glucagon, loperamide, melatonin, ondansetron, oxyCODONE, oxyCODONE-acetaminophen, traMADol, traZODone      Physical Exam  General: No acute distress, alert & oriented  Cardiac: RRR, NL S1 and S2, no murmurs, rubs or gallops  Pulmonary: Lungs clear to auscultation bilaterally, no wheezes, rhales or rhonchi  Abdomen: Soft, non-tender, non-distended  Extremities: No clubbing , cyanosis or edema.     Last Recorded Vitals  Blood pressure 119/71, pulse 55, temperature 36.1 °C (97 °F),  "temperature source Temporal, resp. rate 16, height 1.829 m (6' 0.01\"), weight 99.2 kg (218 lb 11.1 oz), SpO2 97%.    Scheduled medications   Medication Dose Route Frequency    amiodarone  200 mg oral Daily    apixaban  5 mg oral BID    aspirin  81 mg oral Every Day    cefTRIAXone  2 g intravenous q24h    desvenlafaxine  100 mg oral Daily    famotidine  40 mg oral Daily    gabapentin  100 mg oral BID    insulin lispro  0-10 Units subcutaneous TID    insulin lispro protamin-lispro  35 Units subcutaneous Daily    levothyroxine  175 mcg oral Daily    losartan  25 mg oral Daily    metoprolol succinate XL  50 mg oral Daily    potassium chloride CR  10 mEq oral Daily    SITagliptin phosphate  50 mg oral Daily    spironolactone  25 mg oral Daily       Relevant Results  Results from last 7 days   Lab Units 06/05/24  0344   WBC AUTO x10*3/uL 6.4   HEMOGLOBIN g/dL 10.1*   HEMATOCRIT % 33.2*   PLATELETS AUTO x10*3/uL 193      Results from last 7 days   Lab Units 06/08/24  0600 06/07/24  0632 06/05/24  0344   SODIUM mmol/L 137 135* 137   POTASSIUM mmol/L 4.1 4.0 4.3   CHLORIDE mmol/L 104 104 103   CO2 mmol/L 27 26 28   BUN mg/dL 23 22 20   CREATININE mg/dL 1.24* 1.19* 1.28*   GLUCOSE mg/dL 89 99 160*   CALCIUM mg/dL 9.2 9.1 9.4      Results for orders placed or performed during the hospital encounter of 06/04/24 (from the past 24 hour(s))   POCT GLUCOSE   Result Value Ref Range    POCT Glucose 108 (H) 74 - 99 mg/dL   POCT GLUCOSE   Result Value Ref Range    POCT Glucose 97 74 - 99 mg/dL   POCT GLUCOSE   Result Value Ref Range    POCT Glucose 100 (H) 74 - 99 mg/dL   Basic Metabolic Panel   Result Value Ref Range    Glucose 89 74 - 99 mg/dL    Sodium 137 136 - 145 mmol/L    Potassium 4.1 3.5 - 5.3 mmol/L    Chloride 104 98 - 107 mmol/L    Bicarbonate 27 21 - 32 mmol/L    Anion Gap 10 10 - 20 mmol/L    Urea Nitrogen 23 6 - 23 mg/dL    Creatinine 1.24 (H) 0.50 - 1.05 mg/dL    eGFR 46 (L) >60 mL/min/1.73m*2    Calcium 9.2 8.6 - 10.3 " mg/dL   POCT GLUCOSE   Result Value Ref Range    POCT Glucose 85 74 - 99 mg/dL      Assessment/Plan   1) Left Knee Cellulitis:               Overall appears to be slowly improving. Ortho did not tap yesterday   Infectious Disease ordered MRI-result pending.               Rx: Continue Ceftriaxone 2 g IV q 24 hr               Leg elevation              PT consult                 2) Atrial Fibrillation:               Rate controlled at this time              Rx: Continue amiodarone 200 mg daily              Rx: Continue Metoprolol S 50 mg daily               Rx: Continue Apixaban 5 mg q 12 hr     3) DVT Prophylaxis:              Rx: Continue Apixaban 5 mg q 12 hr                4) Poorly controlled Diabetes: (POCT glucoses controlled while inpatient)              Last A1c 15.4 (1/2024);  Repeat Hemoglobin A1c              Rx: Continue Humalog 75/25-35 units daily              Rx: Start Januvia 50 mg daily  Rx: Continue SSI                 5) Hypertension;              Adequately controlled at this time              Rx: Continue Losartan 25 mg daily              Rx: Continue Metoprolol S 50 mg daily               Rx: Continue Spironolactone 25 mg daily      6) Hypothyroid:              Last TSH 2.13 (March 25, 2024)  Rx: Continue Levothyrxine 175 mcg daily      7) CKD 3b; baseline sCr (1.16-1.73)              Gentle fluid resuscitation              Refrain from nephrotoxic agents (like NSAIDS)    8) Disposition:   Await result of MRI. If fluid, will need tap.              I spent 35 minutes in the professional and overall care of this patient.      Jethro Angel MD         27-Mar-2023 02:19

## 2024-06-09 LAB
ANION GAP SERPL CALC-SCNC: 9 MMOL/L (ref 10–20)
BUN SERPL-MCNC: 25 MG/DL (ref 6–23)
CALCIUM SERPL-MCNC: 9.3 MG/DL (ref 8.6–10.3)
CHLORIDE SERPL-SCNC: 103 MMOL/L (ref 98–107)
CO2 SERPL-SCNC: 28 MMOL/L (ref 21–32)
CREAT SERPL-MCNC: 1.37 MG/DL (ref 0.5–1.05)
EGFRCR SERPLBLD CKD-EPI 2021: 41 ML/MIN/1.73M*2
GLUCOSE BLD MANUAL STRIP-MCNC: 112 MG/DL (ref 74–99)
GLUCOSE BLD MANUAL STRIP-MCNC: 141 MG/DL (ref 74–99)
GLUCOSE BLD MANUAL STRIP-MCNC: 145 MG/DL (ref 74–99)
GLUCOSE BLD MANUAL STRIP-MCNC: 86 MG/DL (ref 74–99)
GLUCOSE SERPL-MCNC: 110 MG/DL (ref 74–99)
POTASSIUM SERPL-SCNC: 4.3 MMOL/L (ref 3.5–5.3)
SODIUM SERPL-SCNC: 136 MMOL/L (ref 136–145)

## 2024-06-09 PROCEDURE — 82947 ASSAY GLUCOSE BLOOD QUANT: CPT | Mod: 91

## 2024-06-09 PROCEDURE — 1100000001 HC PRIVATE ROOM DAILY

## 2024-06-09 PROCEDURE — 36415 COLL VENOUS BLD VENIPUNCTURE: CPT | Performed by: PHYSICIAN ASSISTANT

## 2024-06-09 PROCEDURE — 2500000001 HC RX 250 WO HCPCS SELF ADMINISTERED DRUGS (ALT 637 FOR MEDICARE OP): Performed by: PHYSICIAN ASSISTANT

## 2024-06-09 PROCEDURE — 2500000002 HC RX 250 W HCPCS SELF ADMINISTERED DRUGS (ALT 637 FOR MEDICARE OP, ALT 636 FOR OP/ED): Performed by: HOSPITALIST

## 2024-06-09 PROCEDURE — 2500000001 HC RX 250 WO HCPCS SELF ADMINISTERED DRUGS (ALT 637 FOR MEDICARE OP): Performed by: HOSPITALIST

## 2024-06-09 PROCEDURE — 2500000002 HC RX 250 W HCPCS SELF ADMINISTERED DRUGS (ALT 637 FOR MEDICARE OP, ALT 636 FOR OP/ED): Performed by: FAMILY MEDICINE

## 2024-06-09 PROCEDURE — 80048 BASIC METABOLIC PNL TOTAL CA: CPT | Performed by: PHYSICIAN ASSISTANT

## 2024-06-09 PROCEDURE — 2500000004 HC RX 250 GENERAL PHARMACY W/ HCPCS (ALT 636 FOR OP/ED): Performed by: INTERNAL MEDICINE

## 2024-06-09 PROCEDURE — 2500000004 HC RX 250 GENERAL PHARMACY W/ HCPCS (ALT 636 FOR OP/ED): Performed by: HOSPITALIST

## 2024-06-09 RX ORDER — POLYETHYLENE GLYCOL 3350 17 G/17G
17 POWDER, FOR SOLUTION ORAL 2 TIMES DAILY PRN
Status: DISCONTINUED | OUTPATIENT
Start: 2024-06-09 | End: 2024-06-10 | Stop reason: HOSPADM

## 2024-06-09 RX ADMIN — POLYETHYLENE GLYCOL 3350 17 G: 17 POWDER, FOR SOLUTION ORAL at 06:59

## 2024-06-09 RX ADMIN — POTASSIUM CHLORIDE 10 MEQ: 750 TABLET, FILM COATED, EXTENDED RELEASE ORAL at 09:26

## 2024-06-09 RX ADMIN — GABAPENTIN 100 MG: 100 CAPSULE ORAL at 21:40

## 2024-06-09 RX ADMIN — OXYCODONE AND ACETAMINOPHEN 1 TABLET: 10; 325 TABLET ORAL at 03:42

## 2024-06-09 RX ADMIN — OXYCODONE HYDROCHLORIDE 5 MG: 5 TABLET ORAL at 16:39

## 2024-06-09 RX ADMIN — CEFTRIAXONE 2 G: 2 INJECTION, SOLUTION INTRAVENOUS at 18:21

## 2024-06-09 RX ADMIN — LEVOTHYROXINE SODIUM 175 MCG: 0.17 TABLET ORAL at 05:50

## 2024-06-09 RX ADMIN — APIXABAN 5 MG: 5 TABLET, FILM COATED ORAL at 21:40

## 2024-06-09 RX ADMIN — INSULIN LISPRO 35 UNITS: 100 INJECTION, SUSPENSION SUBCUTANEOUS at 09:22

## 2024-06-09 RX ADMIN — OXYCODONE AND ACETAMINOPHEN 1 TABLET: 10; 325 TABLET ORAL at 21:41

## 2024-06-09 RX ADMIN — GABAPENTIN 100 MG: 100 CAPSULE ORAL at 09:27

## 2024-06-09 RX ADMIN — LOSARTAN POTASSIUM 25 MG: 25 TABLET, FILM COATED ORAL at 09:27

## 2024-06-09 RX ADMIN — METOPROLOL SUCCINATE 50 MG: 50 TABLET, EXTENDED RELEASE ORAL at 09:26

## 2024-06-09 RX ADMIN — SITAGLIPTIN 50 MG: 50 TABLET, FILM COATED ORAL at 09:26

## 2024-06-09 RX ADMIN — AMIODARONE HYDROCHLORIDE 200 MG: 200 TABLET ORAL at 09:27

## 2024-06-09 RX ADMIN — DESVENLAFAXINE 100 MG: 50 TABLET, FILM COATED, EXTENDED RELEASE ORAL at 09:26

## 2024-06-09 RX ADMIN — APIXABAN 5 MG: 5 TABLET, FILM COATED ORAL at 09:27

## 2024-06-09 RX ADMIN — FAMOTIDINE 40 MG: 20 TABLET, FILM COATED ORAL at 09:26

## 2024-06-09 RX ADMIN — SPIRONOLACTONE 25 MG: 25 TABLET ORAL at 09:27

## 2024-06-09 RX ADMIN — ASPIRIN 81 MG: 81 TABLET, COATED ORAL at 21:40

## 2024-06-09 ASSESSMENT — COGNITIVE AND FUNCTIONAL STATUS - GENERAL
MOVING TO AND FROM BED TO CHAIR: TOTAL
TURNING FROM BACK TO SIDE WHILE IN FLAT BAD: A LITTLE
MOBILITY SCORE: 10
PERSONAL GROOMING: A LOT
MOVING FROM LYING ON BACK TO SITTING ON SIDE OF FLAT BED WITH BEDRAILS: A LITTLE
DRESSING REGULAR UPPER BODY CLOTHING: A LITTLE
DRESSING REGULAR LOWER BODY CLOTHING: TOTAL
STANDING UP FROM CHAIR USING ARMS: TOTAL
DRESSING REGULAR LOWER BODY CLOTHING: TOTAL
TOILETING: TOTAL
WALKING IN HOSPITAL ROOM: TOTAL
WALKING IN HOSPITAL ROOM: TOTAL
MOVING FROM LYING ON BACK TO SITTING ON SIDE OF FLAT BED WITH BEDRAILS: A LITTLE
PERSONAL GROOMING: A LOT
DAILY ACTIVITIY SCORE: 13
HELP NEEDED FOR BATHING: A LOT
DAILY ACTIVITIY SCORE: 13
CLIMB 3 TO 5 STEPS WITH RAILING: TOTAL
MOVING TO AND FROM BED TO CHAIR: TOTAL
HELP NEEDED FOR BATHING: A LOT
TURNING FROM BACK TO SIDE WHILE IN FLAT BAD: A LITTLE
TOILETING: TOTAL
DRESSING REGULAR UPPER BODY CLOTHING: A LITTLE
CLIMB 3 TO 5 STEPS WITH RAILING: TOTAL
STANDING UP FROM CHAIR USING ARMS: TOTAL
MOBILITY SCORE: 10

## 2024-06-09 ASSESSMENT — PAIN DESCRIPTION - DESCRIPTORS
DESCRIPTORS: ACHING

## 2024-06-09 ASSESSMENT — PAIN SCALES - GENERAL
PAINLEVEL_OUTOF10: 4
PAINLEVEL_OUTOF10: 9
PAINLEVEL_OUTOF10: 4
PAINLEVEL_OUTOF10: 6
PAINLEVEL_OUTOF10: 5 - MODERATE PAIN
PAINLEVEL_OUTOF10: 5 - MODERATE PAIN
PAINLEVEL_OUTOF10: 4
PAINLEVEL_OUTOF10: 7
PAINLEVEL_OUTOF10: 3
PAINLEVEL_OUTOF10: 0 - NO PAIN

## 2024-06-09 ASSESSMENT — PAIN - FUNCTIONAL ASSESSMENT
PAIN_FUNCTIONAL_ASSESSMENT: 0-10

## 2024-06-09 ASSESSMENT — PAIN DESCRIPTION - LOCATION
LOCATION: KNEE
LOCATION: KNEE

## 2024-06-09 ASSESSMENT — PAIN DESCRIPTION - ORIENTATION
ORIENTATION: LEFT
ORIENTATION: LEFT

## 2024-06-09 NOTE — NURSING NOTE
Rounded on pt at this time. Pt sitting up to recliner at this time with belongings and call light in reach. Pt denies any needs.

## 2024-06-09 NOTE — CARE PLAN
The patient's goals for the shift include  pain control    The clinical goals for the shift include pain cotnrol and safety

## 2024-06-09 NOTE — PROGRESS NOTES
DC IMM acknowledged.   Plan is for pt to dc to [facility] ([facility] [place]) when medically cleared.

## 2024-06-09 NOTE — NURSING NOTE
Assumed care of patient. Report received from ARACELI Aguiar. Patient resting in bed. Vital signs stable, no acute distress. Left knee remains swollen with some redness. Patient denies pain at this time. No stated needs. Call light is within reach and bed alarm is active.

## 2024-06-09 NOTE — NURSING NOTE
Patient resting in bed. Patient was repositioned /turned in bed to prevent pressure injury. Patient denies any pain at this time. No stated needs. Call button is within reach and bed alarm is activated.

## 2024-06-09 NOTE — PROGRESS NOTES
Teresa Matthews is a 73 y.o. female on day 5 of admission presenting with Cellulitis.      Subjective   The patient reports left knee pain is controlled.  There is no improvement in the left knee swelling.       Objective     Last Recorded Vitals  /61   Pulse 67   Temp 36.8 °C (98.2 °F) (Temporal)   Resp 16   Wt 99.2 kg (218 lb 11.1 oz)   SpO2 94%   Intake/Output last 3 Shifts:    Intake/Output Summary (Last 24 hours) at 6/9/2024 1335  Last data filed at 6/9/2024 0900  Gross per 24 hour   Intake 420 ml   Output 550 ml   Net -130 ml       Admission Weight  Weight: 99.2 kg (218 lb 11.1 oz) (06/05/24 0656)    Daily Weight  06/05/24 : 99.2 kg (218 lb 11.1 oz)    Image Results  MR knee left w and wo IV contrast  Narrative: Interpreted By:  Rios Yang,   STUDY:  MRI of the  left knee without IV contrast;  6/7/2024 5:00 pm      INDICATION:  Signs/Symptoms:assess for septic arthritis and/or OM of the left knee.      COMPARISON:  Radiographs from 02/12/2024      ACCESSION NUMBER(S):  [id number]      ORDERING CLINICIAN:  EM KEITH      TECHNIQUE:  MR imaging of the  left knee before and after intravenous  administration of 20 mL of Dotarem gadolinium contrast.      FINDINGS:  The patient is status post total knee arthroplasty with explantation  of hardware and placement of a cement spacer.      These extensive abnormal marrow signal in the distal femur with  significant marrow edema with enhancement and loss of the T1 signal.  In addition there is a mildly displaced and mildly impacted fracture  through the distal femur the supracondylar location with medial and  posterior displacement of the distal fracture fragment. There is mild  impaction as well. There is moderate marrow edema at the bone metal  interface in the tibia as well without a discrete fracture.      There is a large suprapatellar joint effusion which appears complex  with synovitis and significant enhancement after  contrast  administration.      There is partial-thickness tearing of the distal quadriceps tendon  superimposed on tendinopathy.      Moderate to severe subpatellar soft tissue edema about the knee  especially cynthia medially.      There is moderate muscle edema in the quadriceps muscle      Impression: 1.  Mildly displaced and mildly impacted distal femoral fracture.  2. Severe abnormal marrow edema in the distal femur about the cement  spacer with underlying osteomyelitis in the differential along with  changes related to the fracture. Moderate marrow edema about the  tibial component without fracture line also may be reactive or  represent evidence of infectious process  3. Large suprapatellar joint effusion with synovitis and enhancement.  Findings are concerning for septic arthritis.  4. Indistinctness of the distal quadriceps tendon concerning for  partial tear.          I personally reviewed the images/study and I agree with the findings  as stated. This study was interpreted at [place], [place].      MACRO:  Critical Finding:  See findings. Notification was initiated on  6/8/2024 at 5:49 pm by  Rios Yang.  (**-[facility]-**)      Signed by: Rios Yang 6/8/2024 5:49 PM  Dictation workstation:   [id number]      Physical Exam  Constitutional:       Appearance: Normal appearance.   HENT:      Head: Normocephalic and atraumatic.      Nose: Nose normal.      Mouth/Throat:      Mouth: Mucous membranes are moist.      Pharynx: Oropharynx is clear.   Eyes:      Extraocular Movements: Extraocular movements intact.      Conjunctiva/sclera: Conjunctivae normal.      Pupils: Pupils are equal, round, and reactive to light.   Cardiovascular:      Rate and Rhythm: Normal rate and regular rhythm.      Pulses: Normal pulses.      Heart sounds: Normal heart sounds.   Pulmonary:      Effort: Pulmonary effort is normal.      Breath sounds: Normal breath sounds.   Abdominal:       General: Abdomen is flat. Bowel sounds are normal.      Palpations: Abdomen is soft.      Tenderness: There is no abdominal tenderness.   Musculoskeletal:         General: Normal range of motion.      Cervical back: Normal range of motion and neck supple.      Comments: Left knee suprapatellar effusion, swelling and tenderness to palpation.   Skin:     General: Skin is warm and dry.   Neurological:      General: No focal deficit present.      Mental Status: She is alert and oriented to person, place, and time.   Psychiatric:         Mood and Affect: Mood normal.         Behavior: Behavior normal.         Thought Content: Thought content normal.         Relevant Results               Assessment/Plan                  Principal Problem:    Cellulitis  MRI of left knee shows changes compatible with distal left femur osteomyelitis and septic arthritis.  The present antibiotics are continued.  Antibiotics adjusted as indicated by infectious disease.Plan for further evaluation and treatment including arthrocentesis per orthopedic surgery.     Uncontrolled diabetes mellitus type 2-secondary to acute infection and noncompliance with medication.  Blood sugar control is improved with present treatment.  Continue basal and bolus insulin with sliding scale coverage as ordered.  Benign essential hypertension-this is adequately controlled.  Continue present treatment.  Vital signs were monitored and medications adjusted as indicated.  Chronic atrial fibrillation-heart rate adequately controlled.  Continue therapeutic anticoagulation.  Chronic kidney disease stage III-renal function at normal baseline.  Hypothyroidism-TSH level normal.  Continue present treatment.       Rajinder Castro MD

## 2024-06-09 NOTE — NURSING NOTE
Rounded on pt at this time. Pt resting in recliner with call light in reach. Pt states pain is 3/10 and denies intervention. No stated needs.

## 2024-06-09 NOTE — PROGRESS NOTES
"Teresa Matthews is a 73 y.o. female on day 5 of admission presenting with Cellulitis.    Infectious Diseases service following patient for septic arthritis, probable PJI    Subjective   Interval History:   No acute events overnight  Patient thinks erythema left knee improved  Swelling and pain about the same  MRI suggests septic arthritis    Objective   Range of Vitals (last 24 hours)  /73 (BP Location: Left arm, Patient Position: Lying)   Pulse 64   Temp 36.8 °C (98.2 °F) (Temporal)   Resp 16   Ht 1.829 m (6' 0.01\")   Wt 99.2 kg (218 lb 11.1 oz)   SpO2 93%   BMI 29.65 kg/m²   Daily Weight  06/05/24 : 99.2 kg (218 lb 11.1 oz)    Body mass index is 29.65 kg/m².  General: awake, alert, no distress  Cardio: RRR, no murmur  Respiratory: Lungs clear to auscultation bilaterally, normal respiratory effort  Abd: soft, nontender, nondistended  Ext: left knee edematous, with large effusion present      Current Facility-Administered Medications:     acetaminophen (Tylenol) tablet 650 mg, 650 mg, oral, q4h PRN, Abimael Kemp DO, 650 mg at 06/07/24 1956    amiodarone (Pacerone) tablet 200 mg, 200 mg, oral, Daily, Abimael Kemp DO, 200 mg at 06/08/24 0906    apixaban (Eliquis) tablet 5 mg, 5 mg, oral, BID, Abimael Kemp DO, 5 mg at 06/08/24 2129    aspirin EC tablet 81 mg, 81 mg, oral, Every Day, Abimael Kemp DO, 81 mg at 06/08/24 2129    benzocaine-menthol (Cepastat Sore Throat) lozenge 1 lozenge, 1 lozenge, Mouth/Throat, q2h PRN, Abimael Kemp DO    calcium carbonate (Tums) chewable tablet 500 mg, 500 mg, oral, 4x daily PRN, Abimael Kemp DO    cefTRIAXone (Rocephin) 2 g in dextrose 5% in water (D5W) 50 mL, 2 g, intravenous, q24h, Efraín Mallory MD, Stopped at 06/08/24 1900    desvenlafaxine (Pristiq) 24 hr tablet 100 mg, 100 mg, oral, Daily, Abimael Kemp DO, 100 mg at 06/08/24 0906    dextrose 50 % injection 25 g, 25 g, intravenous, q15 min PRN, Abimael Kemp DO    famotidine (Pepcid) tablet " 40 mg, 40 mg, oral, Daily, Abimael Kemp DO, 40 mg at 06/08/24 0907    gabapentin (Neurontin) capsule 100 mg, 100 mg, oral, BID, Abimael Kemp DO, 100 mg at 06/08/24 2130    glucagon (Glucagen) injection 1 mg, 1 mg, intramuscular, q15 min PRN, Abimael Kemp DO    insulin lispro (HumaLOG) injection 0-10 Units, 0-10 Units, subcutaneous, TID, Abimael Kemp DO    insulin lispro protamin-lispro (HumaLOG Mix 75-25) 100 unit/mL (75-25) injection 35 Units, 35 Units, subcutaneous, Daily, Abimael Kemp DO, 35 Units at 06/08/24 0905    levothyroxine (Synthroid, Levoxyl) tablet 175 mcg, 175 mcg, oral, Daily, Abimael Kemp DO, 175 mcg at 06/09/24 0550    loperamide (Imodium) capsule 2 mg, 2 mg, oral, 4x daily PRN, Abimael Kemp DO    losartan (Cozaar) tablet 25 mg, 25 mg, oral, Daily, Abimael Kemp DO, 25 mg at 06/08/24 0907    melatonin tablet 10 mg, 10 mg, oral, Daily PRN, Abimael Kemp DO    metoprolol succinate XL (Toprol-XL) 24 hr tablet 50 mg, 50 mg, oral, Daily, Abimael Kemp DO, 50 mg at 06/08/24 0907    ondansetron (Zofran) injection 4 mg, 4 mg, intravenous, q4h PRN, Abimael Kemp DO    oxyCODONE (Roxicodone) immediate release tablet 5 mg, 5 mg, oral, q4h PRN, Gabi Hurt PA-C, 5 mg at 06/05/24 1518    oxyCODONE-acetaminophen (Percocet)  mg per tablet 1 tablet, 1 tablet, oral, q4h PRN, Abimael Kemp DO, 1 tablet at 06/09/24 0342    polyethylene glycol (Glycolax, Miralax) packet 17 g, 17 g, oral, BID PRN, Abimael Kemp DO, 17 g at 06/09/24 0659    potassium chloride CR (Klor-Con) ER tablet 10 mEq, 10 mEq, oral, Daily, Abimael Kemp DO, 10 mEq at 06/08/24 0907    SITagliptin phosphate (Januvia) tablet 50 mg, 50 mg, oral, Daily, Jethro Angel MD, 50 mg at 06/08/24 0907    spironolactone (Aldactone) tablet 25 mg, 25 mg, oral, Daily, Abimael Kemp DO, 25 mg at 06/08/24 0907    traMADol (Ultram) tablet 50 mg, 50 mg, oral, q8h PRN, Gabi Hurt PA-C, 50 mg at 06/05/24 2476     traZODone (Desyrel) tablet 50 mg, 50 mg, oral, Nightly PRN, Abimael Kemp, DO, 50 mg at 24 2226    Relevant Results  Labs:  Lab Results   Component Value Date    WBC 6.4 2024    HGB 10.1 (L) 2024    HCT 33.2 (L) 2024    MCV 84 2024     2024     Lab Results   Component Value Date    GLUCOSE 110 (H) 2024    CALCIUM 9.3 2024     2024    K 4.3 2024    CO2 28 2024     2024    BUN 25 (H) 2024    CREATININE 1.37 (H) 2024           Micro:   blood cultures x 2 negative to date    Imagin/7 MR left knee:  1.  Mildly displaced and mildly impacted distal femoral fracture.  2. Severe abnormal marrow edema in the distal femur about the cement  spacer with underlying osteomyelitis in the differential along with  changes related to the fracture. Moderate marrow edema about the  tibial component without fracture line also may be reactive or  represent evidence of infectious process  3. Large suprapatellar joint effusion with synovitis and enhancement.  Findings are concerning for septic arthritis.  4. Indistinctness of the distal quadriceps tendon concerning for  partial tear    Assessment/Plan   Assessment:  Left knee septic arthritis with likely infected antibiotic spacer and chronic osteomyelitis     Plan/Recommendations:  Continue IV ceftriaxone for now - monitor for rash, diarrhea  Patient absolutely needs arthrocentesis - please transfer to higher level of care if unable to do this here    Ilsa Martinez MD  [facility] [place]  Phone: [phone]  Fax: [phone]

## 2024-06-09 NOTE — CARE PLAN
The patient's goals for the shift include  pain control and safety     The clinical goals for the shift include pain control and safety

## 2024-06-09 NOTE — NURSING NOTE
Patient resting in bed. Morning labs drawn and sent to the lab. Vital signs stable. Call button is within reach and bed alarm is activated.

## 2024-06-09 NOTE — NURSING NOTE
Assumed care of pt from nightshift, RN. Pt resting in bed with call light in reach. 2+ pulses and cap refill brisk. Lung sounds clear bilaterally. Dressings dry and intact. No stated needs.

## 2024-06-10 ENCOUNTER — APPOINTMENT (OUTPATIENT)
Dept: RADIOLOGY | Facility: HOSPITAL | Age: 74
End: 2024-06-10
Payer: MEDICARE

## 2024-06-10 ENCOUNTER — HOSPITAL ENCOUNTER (INPATIENT)
Facility: HOSPITAL | Age: 74
LOS: 4 days | Discharge: NURSING, REHAB OR LONG-TERM CARE | End: 2024-06-14
Attending: FAMILY MEDICINE | Admitting: INTERNAL MEDICINE
Payer: MEDICARE

## 2024-06-10 VITALS
TEMPERATURE: 98.2 F | BODY MASS INDEX: 29.62 KG/M2 | DIASTOLIC BLOOD PRESSURE: 83 MMHG | HEART RATE: 61 BPM | OXYGEN SATURATION: 93 % | RESPIRATION RATE: 16 BRPM | SYSTOLIC BLOOD PRESSURE: 151 MMHG | WEIGHT: 218.7 LBS | HEIGHT: 72 IN

## 2024-06-10 DIAGNOSIS — M06.9 RHEUMATOID ARTHRITIS, INVOLVING UNSPECIFIED SITE, UNSPECIFIED WHETHER RHEUMATOID FACTOR PRESENT (MULTI): ICD-10-CM

## 2024-06-10 DIAGNOSIS — Z92.89 HOSPITALIZATION WITHIN LAST 30 DAYS: ICD-10-CM

## 2024-06-10 DIAGNOSIS — E11.618: Primary | ICD-10-CM

## 2024-06-10 DIAGNOSIS — G47.00 INSOMNIA, UNSPECIFIED TYPE: ICD-10-CM

## 2024-06-10 DIAGNOSIS — Z86.39 H/O DIABETIC NEUROPATHY: ICD-10-CM

## 2024-06-10 DIAGNOSIS — M48.02 DEGENERATIVE CERVICAL SPINAL STENOSIS: ICD-10-CM

## 2024-06-10 DIAGNOSIS — L97.422: ICD-10-CM

## 2024-06-10 DIAGNOSIS — M17.0 PRIMARY OSTEOARTHRITIS OF BOTH KNEES: ICD-10-CM

## 2024-06-10 DIAGNOSIS — R60.0 EDEMA OF LEFT LOWER EXTREMITY: ICD-10-CM

## 2024-06-10 DIAGNOSIS — M86.10 ACUTE OSTEOMYELITIS (MULTI): ICD-10-CM

## 2024-06-10 DIAGNOSIS — M86.60 CHRONIC OSTEOMYELITIS (MULTI): ICD-10-CM

## 2024-06-10 LAB
ALBUMIN SERPL BCP-MCNC: 2.9 G/DL (ref 3.4–5)
ALP SERPL-CCNC: 73 U/L (ref 33–136)
ALT SERPL W P-5'-P-CCNC: 18 U/L (ref 7–45)
ANION GAP SERPL CALC-SCNC: 10 MMOL/L (ref 10–20)
AST SERPL W P-5'-P-CCNC: 26 U/L (ref 9–39)
BACTERIA BLD CULT: NORMAL
BACTERIA BLD CULT: NORMAL
BASOPHILS # BLD AUTO: 0.06 X10*3/UL (ref 0–0.1)
BASOPHILS NFR BLD AUTO: 0.9 %
BILIRUB SERPL-MCNC: 0.3 MG/DL (ref 0–1.2)
BUN SERPL-MCNC: 23 MG/DL (ref 6–23)
CALCIUM SERPL-MCNC: 9.4 MG/DL (ref 8.6–10.3)
CHLORIDE SERPL-SCNC: 99 MMOL/L (ref 98–107)
CO2 SERPL-SCNC: 29 MMOL/L (ref 21–32)
CREAT SERPL-MCNC: 1.32 MG/DL (ref 0.5–1.05)
CRP SERPL-MCNC: 0.86 MG/DL
EGFRCR SERPLBLD CKD-EPI 2021: 43 ML/MIN/1.73M*2
EOSINOPHIL # BLD AUTO: 0.09 X10*3/UL (ref 0–0.4)
EOSINOPHIL NFR BLD AUTO: 1.4 %
ERYTHROCYTE [DISTWIDTH] IN BLOOD BY AUTOMATED COUNT: 16.5 % (ref 11.5–14.5)
ERYTHROCYTE [SEDIMENTATION RATE] IN BLOOD BY WESTERGREN METHOD: 59 MM/H (ref 0–30)
GLUCOSE BLD MANUAL STRIP-MCNC: 107 MG/DL (ref 74–99)
GLUCOSE BLD MANUAL STRIP-MCNC: 135 MG/DL (ref 74–99)
GLUCOSE BLD MANUAL STRIP-MCNC: 77 MG/DL (ref 74–99)
GLUCOSE BLD MANUAL STRIP-MCNC: 83 MG/DL (ref 74–99)
GLUCOSE BLD MANUAL STRIP-MCNC: 84 MG/DL (ref 74–99)
GLUCOSE SERPL-MCNC: 133 MG/DL (ref 74–99)
HCT VFR BLD AUTO: 33.9 % (ref 36–46)
HGB BLD-MCNC: 10.4 G/DL (ref 12–16)
HOLD SPECIMEN: NORMAL
IMM GRANULOCYTES # BLD AUTO: 0.02 X10*3/UL (ref 0–0.5)
IMM GRANULOCYTES NFR BLD AUTO: 0.3 % (ref 0–0.9)
LACTATE SERPL-SCNC: 0.9 MMOL/L (ref 0.4–2)
LYMPHOCYTES # BLD AUTO: 1 X10*3/UL (ref 0.8–3)
LYMPHOCYTES NFR BLD AUTO: 15.7 %
MAGNESIUM SERPL-MCNC: 1.63 MG/DL (ref 1.6–2.4)
MCH RBC QN AUTO: 25.9 PG (ref 26–34)
MCHC RBC AUTO-ENTMCNC: 30.7 G/DL (ref 32–36)
MCV RBC AUTO: 85 FL (ref 80–100)
MONOCYTES # BLD AUTO: 0.59 X10*3/UL (ref 0.05–0.8)
MONOCYTES NFR BLD AUTO: 9.3 %
NEUTROPHILS # BLD AUTO: 4.6 X10*3/UL (ref 1.6–5.5)
NEUTROPHILS NFR BLD AUTO: 72.4 %
NRBC BLD-RTO: 0 /100 WBCS (ref 0–0)
PLATELET # BLD AUTO: 174 X10*3/UL (ref 150–450)
POTASSIUM SERPL-SCNC: 4.2 MMOL/L (ref 3.5–5.3)
PROT SERPL-MCNC: 7.5 G/DL (ref 6.4–8.2)
RBC # BLD AUTO: 4.01 X10*6/UL (ref 4–5.2)
SODIUM SERPL-SCNC: 134 MMOL/L (ref 136–145)
WBC # BLD AUTO: 6.4 X10*3/UL (ref 4.4–11.3)

## 2024-06-10 PROCEDURE — 36415 COLL VENOUS BLD VENIPUNCTURE: CPT

## 2024-06-10 PROCEDURE — 85652 RBC SED RATE AUTOMATED: CPT

## 2024-06-10 PROCEDURE — 85025 COMPLETE CBC W/AUTO DIFF WBC: CPT

## 2024-06-10 PROCEDURE — 84075 ASSAY ALKALINE PHOSPHATASE: CPT

## 2024-06-10 PROCEDURE — 1100000001 HC PRIVATE ROOM DAILY

## 2024-06-10 PROCEDURE — 2500000004 HC RX 250 GENERAL PHARMACY W/ HCPCS (ALT 636 FOR OP/ED)

## 2024-06-10 PROCEDURE — 2500000001 HC RX 250 WO HCPCS SELF ADMINISTERED DRUGS (ALT 637 FOR MEDICARE OP): Performed by: HOSPITALIST

## 2024-06-10 PROCEDURE — 2500000001 HC RX 250 WO HCPCS SELF ADMINISTERED DRUGS (ALT 637 FOR MEDICARE OP)

## 2024-06-10 PROCEDURE — A4217 STERILE WATER/SALINE, 500 ML: HCPCS

## 2024-06-10 PROCEDURE — 83605 ASSAY OF LACTIC ACID: CPT

## 2024-06-10 PROCEDURE — 2500000002 HC RX 250 W HCPCS SELF ADMINISTERED DRUGS (ALT 637 FOR MEDICARE OP, ALT 636 FOR OP/ED)

## 2024-06-10 PROCEDURE — 82947 ASSAY GLUCOSE BLOOD QUANT: CPT | Mod: 91

## 2024-06-10 PROCEDURE — 99223 1ST HOSP IP/OBS HIGH 75: CPT | Performed by: INTERNAL MEDICINE

## 2024-06-10 PROCEDURE — 93971 EXTREMITY STUDY: CPT

## 2024-06-10 PROCEDURE — 2500000002 HC RX 250 W HCPCS SELF ADMINISTERED DRUGS (ALT 637 FOR MEDICARE OP, ALT 636 FOR OP/ED): Performed by: HOSPITALIST

## 2024-06-10 PROCEDURE — 83735 ASSAY OF MAGNESIUM: CPT

## 2024-06-10 PROCEDURE — 86140 C-REACTIVE PROTEIN: CPT

## 2024-06-10 PROCEDURE — 87040 BLOOD CULTURE FOR BACTERIA: CPT | Mod: GEALAB

## 2024-06-10 PROCEDURE — 2500000002 HC RX 250 W HCPCS SELF ADMINISTERED DRUGS (ALT 637 FOR MEDICARE OP, ALT 636 FOR OP/ED): Performed by: FAMILY MEDICINE

## 2024-06-10 PROCEDURE — 93971 EXTREMITY STUDY: CPT | Performed by: RADIOLOGY

## 2024-06-10 RX ORDER — LOSARTAN POTASSIUM 50 MG/1
25 TABLET ORAL DAILY
Status: DISCONTINUED | OUTPATIENT
Start: 2024-06-10 | End: 2024-06-14 | Stop reason: HOSPADM

## 2024-06-10 RX ORDER — LEVOTHYROXINE SODIUM 175 UG/1
175 TABLET ORAL DAILY
Status: DISCONTINUED | OUTPATIENT
Start: 2024-06-10 | End: 2024-06-14 | Stop reason: HOSPADM

## 2024-06-10 RX ORDER — ENOXAPARIN SODIUM 100 MG/ML
1 INJECTION SUBCUTANEOUS 2 TIMES DAILY
Status: DISCONTINUED | OUTPATIENT
Start: 2024-06-10 | End: 2024-06-12

## 2024-06-10 RX ORDER — ASPIRIN 81 MG/1
81 TABLET ORAL
Status: DISCONTINUED | OUTPATIENT
Start: 2024-06-10 | End: 2024-06-14 | Stop reason: HOSPADM

## 2024-06-10 RX ORDER — DEXTROSE 50 % IN WATER (D50W) INTRAVENOUS SYRINGE
25
Status: DISCONTINUED | OUTPATIENT
Start: 2024-06-10 | End: 2024-06-14 | Stop reason: HOSPADM

## 2024-06-10 RX ORDER — AMIODARONE HYDROCHLORIDE 200 MG/1
200 TABLET ORAL DAILY
Status: DISCONTINUED | OUTPATIENT
Start: 2024-06-10 | End: 2024-06-14 | Stop reason: HOSPADM

## 2024-06-10 RX ORDER — POTASSIUM CHLORIDE 20 MEQ/1
10 TABLET, EXTENDED RELEASE ORAL 2 TIMES DAILY
Status: DISCONTINUED | OUTPATIENT
Start: 2024-06-10 | End: 2024-06-14 | Stop reason: HOSPADM

## 2024-06-10 RX ORDER — ACETAMINOPHEN 325 MG/1
650 TABLET ORAL EVERY 4 HOURS PRN
Status: DISCONTINUED | OUTPATIENT
Start: 2024-06-10 | End: 2024-06-14 | Stop reason: HOSPADM

## 2024-06-10 RX ORDER — ASCORBIC ACID 500 MG
250 TABLET ORAL DAILY
Status: DISCONTINUED | OUTPATIENT
Start: 2024-06-10 | End: 2024-06-14 | Stop reason: HOSPADM

## 2024-06-10 RX ORDER — ACETAMINOPHEN 650 MG/1
650 SUPPOSITORY RECTAL EVERY 4 HOURS PRN
Status: DISCONTINUED | OUTPATIENT
Start: 2024-06-10 | End: 2024-06-14 | Stop reason: HOSPADM

## 2024-06-10 RX ORDER — METOPROLOL SUCCINATE 50 MG/1
50 TABLET, EXTENDED RELEASE ORAL DAILY
Status: DISCONTINUED | OUTPATIENT
Start: 2024-06-10 | End: 2024-06-14 | Stop reason: HOSPADM

## 2024-06-10 RX ORDER — SPIRONOLACTONE 25 MG/1
25 TABLET ORAL DAILY
Status: DISCONTINUED | OUTPATIENT
Start: 2024-06-10 | End: 2024-06-14 | Stop reason: HOSPADM

## 2024-06-10 RX ORDER — GABAPENTIN 100 MG/1
100 CAPSULE ORAL 2 TIMES DAILY
Status: DISCONTINUED | OUTPATIENT
Start: 2024-06-10 | End: 2024-06-11

## 2024-06-10 RX ORDER — ACETAMINOPHEN 160 MG/5ML
650 SOLUTION ORAL EVERY 4 HOURS PRN
Status: DISCONTINUED | OUTPATIENT
Start: 2024-06-10 | End: 2024-06-14 | Stop reason: HOSPADM

## 2024-06-10 RX ORDER — TRAZODONE HYDROCHLORIDE 50 MG/1
50 TABLET ORAL NIGHTLY
Status: DISCONTINUED | OUTPATIENT
Start: 2024-06-10 | End: 2024-06-14 | Stop reason: HOSPADM

## 2024-06-10 RX ORDER — INSULIN LISPRO 100 [IU]/ML
0-15 INJECTION, SOLUTION INTRAVENOUS; SUBCUTANEOUS
Status: DISCONTINUED | OUTPATIENT
Start: 2024-06-10 | End: 2024-06-14 | Stop reason: HOSPADM

## 2024-06-10 RX ORDER — OXYCODONE AND ACETAMINOPHEN 5; 325 MG/1; MG/1
2 TABLET ORAL EVERY 4 HOURS PRN
Status: DISCONTINUED | OUTPATIENT
Start: 2024-06-10 | End: 2024-06-14 | Stop reason: HOSPADM

## 2024-06-10 RX ORDER — LOPERAMIDE HYDROCHLORIDE 2 MG/1
2 CAPSULE ORAL 4 TIMES DAILY PRN
Qty: 30 CAPSULE | Refills: 0 | Status: SHIPPED | OUTPATIENT
Start: 2024-06-10

## 2024-06-10 RX ORDER — ONDANSETRON 4 MG/1
4 TABLET, FILM COATED ORAL EVERY 8 HOURS PRN
Status: DISCONTINUED | OUTPATIENT
Start: 2024-06-10 | End: 2024-06-14 | Stop reason: HOSPADM

## 2024-06-10 RX ORDER — ONDANSETRON HYDROCHLORIDE 2 MG/ML
4 INJECTION, SOLUTION INTRAVENOUS EVERY 8 HOURS PRN
Status: DISCONTINUED | OUTPATIENT
Start: 2024-06-10 | End: 2024-06-14 | Stop reason: HOSPADM

## 2024-06-10 RX ORDER — DEXTROSE 50 % IN WATER (D50W) INTRAVENOUS SYRINGE
12.5
Status: DISCONTINUED | OUTPATIENT
Start: 2024-06-10 | End: 2024-06-14 | Stop reason: HOSPADM

## 2024-06-10 RX ORDER — CEFTRIAXONE 2 G/50ML
2 INJECTION, SOLUTION INTRAVENOUS EVERY 24 HOURS
Qty: 250 ML | Refills: 0 | Status: SHIPPED | OUTPATIENT
Start: 2024-06-10 | End: 2024-06-14 | Stop reason: HOSPADM

## 2024-06-10 RX ORDER — FAMOTIDINE 20 MG/1
40 TABLET, FILM COATED ORAL DAILY
Status: DISCONTINUED | OUTPATIENT
Start: 2024-06-10 | End: 2024-06-14 | Stop reason: HOSPADM

## 2024-06-10 RX ORDER — DESVENLAFAXINE 100 MG/1
100 TABLET, EXTENDED RELEASE ORAL DAILY
Status: DISCONTINUED | OUTPATIENT
Start: 2024-06-10 | End: 2024-06-14 | Stop reason: HOSPADM

## 2024-06-10 RX ORDER — ACETAMINOPHEN 500 MG
5 TABLET ORAL NIGHTLY
Status: DISCONTINUED | OUTPATIENT
Start: 2024-06-10 | End: 2024-06-14 | Stop reason: HOSPADM

## 2024-06-10 RX ORDER — VANCOMYCIN HYDROCHLORIDE 1 G/20ML
INJECTION, POWDER, LYOPHILIZED, FOR SOLUTION INTRAVENOUS DAILY PRN
Status: DISCONTINUED | OUTPATIENT
Start: 2024-06-10 | End: 2024-06-12

## 2024-06-10 RX ORDER — POLYETHYLENE GLYCOL 3350 17 G/17G
17 POWDER, FOR SOLUTION ORAL DAILY
Status: DISCONTINUED | OUTPATIENT
Start: 2024-06-10 | End: 2024-06-14 | Stop reason: HOSPADM

## 2024-06-10 RX ADMIN — AMIODARONE HYDROCHLORIDE 200 MG: 200 TABLET ORAL at 09:25

## 2024-06-10 RX ADMIN — ENOXAPARIN SODIUM 100 MG: 100 INJECTION SUBCUTANEOUS at 22:30

## 2024-06-10 RX ADMIN — DESVENLAFAXINE 100 MG: 50 TABLET, FILM COATED, EXTENDED RELEASE ORAL at 09:26

## 2024-06-10 RX ADMIN — INSULIN LISPRO 35 UNITS: 100 INJECTION, SUSPENSION SUBCUTANEOUS at 09:29

## 2024-06-10 RX ADMIN — GABAPENTIN 100 MG: 100 CAPSULE ORAL at 09:26

## 2024-06-10 RX ADMIN — GABAPENTIN 100 MG: 100 CAPSULE ORAL at 22:30

## 2024-06-10 RX ADMIN — METOPROLOL SUCCINATE 50 MG: 50 TABLET, EXTENDED RELEASE ORAL at 09:28

## 2024-06-10 RX ADMIN — SPIRONOLACTONE 25 MG: 25 TABLET ORAL at 09:27

## 2024-06-10 RX ADMIN — LOSARTAN POTASSIUM 25 MG: 25 TABLET, FILM COATED ORAL at 09:26

## 2024-06-10 RX ADMIN — LEVOTHYROXINE SODIUM 175 MCG: 0.17 TABLET ORAL at 05:31

## 2024-06-10 RX ADMIN — SITAGLIPTIN 50 MG: 50 TABLET, FILM COATED ORAL at 09:27

## 2024-06-10 RX ADMIN — OXYCODONE HYDROCHLORIDE AND ACETAMINOPHEN 2 TABLET: 5; 325 TABLET ORAL at 18:52

## 2024-06-10 RX ADMIN — VANCOMYCIN HYDROCHLORIDE 1500 MG: 10 INJECTION, POWDER, LYOPHILIZED, FOR SOLUTION INTRAVENOUS at 21:11

## 2024-06-10 RX ADMIN — POTASSIUM CHLORIDE 10 MEQ: 1500 TABLET, EXTENDED RELEASE ORAL at 22:30

## 2024-06-10 RX ADMIN — TRAZODONE HYDROCHLORIDE 50 MG: 50 TABLET ORAL at 22:30

## 2024-06-10 RX ADMIN — POTASSIUM CHLORIDE 10 MEQ: 750 TABLET, FILM COATED, EXTENDED RELEASE ORAL at 09:27

## 2024-06-10 RX ADMIN — FAMOTIDINE 40 MG: 20 TABLET, FILM COATED ORAL at 09:26

## 2024-06-10 RX ADMIN — Medication 5 MG: at 22:30

## 2024-06-10 RX ADMIN — PIPERACILLIN SODIUM AND TAZOBACTAM SODIUM 3.38 G: 3; .375 INJECTION, SOLUTION INTRAVENOUS at 20:13

## 2024-06-10 RX ADMIN — APIXABAN 5 MG: 5 TABLET, FILM COATED ORAL at 09:26

## 2024-06-10 SDOH — SOCIAL STABILITY: SOCIAL INSECURITY: ABUSE: ADULT

## 2024-06-10 SDOH — SOCIAL STABILITY: SOCIAL INSECURITY: DO YOU FEEL UNSAFE GOING BACK TO THE PLACE WHERE YOU ARE LIVING?: NO

## 2024-06-10 SDOH — SOCIAL STABILITY: SOCIAL INSECURITY: ARE THERE ANY APPARENT SIGNS OF INJURIES/BEHAVIORS THAT COULD BE RELATED TO ABUSE/NEGLECT?: NO

## 2024-06-10 SDOH — SOCIAL STABILITY: SOCIAL INSECURITY: HAVE YOU HAD ANY THOUGHTS OF HARMING ANYONE ELSE?: NO

## 2024-06-10 SDOH — SOCIAL STABILITY: SOCIAL INSECURITY: DO YOU FEEL ANYONE HAS EXPLOITED OR TAKEN ADVANTAGE OF YOU FINANCIALLY OR OF YOUR PERSONAL PROPERTY?: NO

## 2024-06-10 SDOH — SOCIAL STABILITY: SOCIAL INSECURITY: WERE YOU ABLE TO COMPLETE ALL THE BEHAVIORAL HEALTH SCREENINGS?: YES

## 2024-06-10 SDOH — SOCIAL STABILITY: SOCIAL INSECURITY: HAVE YOU HAD THOUGHTS OF HARMING ANYONE ELSE?: NO

## 2024-06-10 SDOH — SOCIAL STABILITY: SOCIAL INSECURITY: ARE YOU OR HAVE YOU BEEN THREATENED OR ABUSED PHYSICALLY, EMOTIONALLY, OR SEXUALLY BY ANYONE?: NO

## 2024-06-10 SDOH — SOCIAL STABILITY: SOCIAL INSECURITY: HAS ANYONE EVER THREATENED TO HURT YOUR FAMILY OR YOUR PETS?: NO

## 2024-06-10 SDOH — SOCIAL STABILITY: SOCIAL INSECURITY: DOES ANYONE TRY TO KEEP YOU FROM HAVING/CONTACTING OTHER FRIENDS OR DOING THINGS OUTSIDE YOUR HOME?: NO

## 2024-06-10 ASSESSMENT — ACTIVITIES OF DAILY LIVING (ADL)
GROOMING: DEPENDENT
HEARING - LEFT EAR: FUNCTIONAL
WALKS IN HOME: DEPENDENT
ADEQUATE_TO_COMPLETE_ADL: YES
JUDGMENT_ADEQUATE_SAFELY_COMPLETE_DAILY_ACTIVITIES: NO
HEARING - RIGHT EAR: FUNCTIONAL
LACK_OF_TRANSPORTATION: NO
DRESSING YOURSELF: DEPENDENT
TOILETING: DEPENDENT
PATIENT'S MEMORY ADEQUATE TO SAFELY COMPLETE DAILY ACTIVITIES?: NO
BATHING: DEPENDENT
FEEDING YOURSELF: NEEDS ASSISTANCE

## 2024-06-10 ASSESSMENT — COGNITIVE AND FUNCTIONAL STATUS - GENERAL
TOILETING: TOTAL
MOBILITY SCORE: 10
TURNING FROM BACK TO SIDE WHILE IN FLAT BAD: A LITTLE
STANDING UP FROM CHAIR USING ARMS: TOTAL
DRESSING REGULAR LOWER BODY CLOTHING: TOTAL
PERSONAL GROOMING: A LOT
DRESSING REGULAR UPPER BODY CLOTHING: A LITTLE
DRESSING REGULAR LOWER BODY CLOTHING: TOTAL
HELP NEEDED FOR BATHING: A LOT
CLIMB 3 TO 5 STEPS WITH RAILING: TOTAL
MOVING FROM LYING ON BACK TO SITTING ON SIDE OF FLAT BED WITH BEDRAILS: A LITTLE
DAILY ACTIVITIY SCORE: 13
WALKING IN HOSPITAL ROOM: TOTAL
TURNING FROM BACK TO SIDE WHILE IN FLAT BAD: A LITTLE
DRESSING REGULAR UPPER BODY CLOTHING: A LITTLE
MOVING TO AND FROM BED TO CHAIR: TOTAL
WALKING IN HOSPITAL ROOM: TOTAL
MOVING FROM LYING ON BACK TO SITTING ON SIDE OF FLAT BED WITH BEDRAILS: A LITTLE
STANDING UP FROM CHAIR USING ARMS: TOTAL
CLIMB 3 TO 5 STEPS WITH RAILING: TOTAL
MOVING FROM LYING ON BACK TO SITTING ON SIDE OF FLAT BED WITH BEDRAILS: A LOT
DRESSING REGULAR LOWER BODY CLOTHING: TOTAL
DAILY ACTIVITIY SCORE: 13
PATIENT BASELINE BEDBOUND: YES
MOVING TO AND FROM BED TO CHAIR: TOTAL
STANDING UP FROM CHAIR USING ARMS: TOTAL
DRESSING REGULAR UPPER BODY CLOTHING: A LITTLE
WALKING IN HOSPITAL ROOM: TOTAL
MOBILITY SCORE: 10
MOVING TO AND FROM BED TO CHAIR: TOTAL
PERSONAL GROOMING: A LOT
TURNING FROM BACK TO SIDE WHILE IN FLAT BAD: A LOT
HELP NEEDED FOR BATHING: A LOT
CLIMB 3 TO 5 STEPS WITH RAILING: TOTAL
DAILY ACTIVITIY SCORE: 13
HELP NEEDED FOR BATHING: A LOT
TOILETING: TOTAL
TOILETING: TOTAL
MOBILITY SCORE: 8
PERSONAL GROOMING: A LOT

## 2024-06-10 ASSESSMENT — PAIN SCALES - WONG BAKER: WONGBAKER_NUMERICALRESPONSE: NO HURT

## 2024-06-10 ASSESSMENT — COLUMBIA-SUICIDE SEVERITY RATING SCALE - C-SSRS
2. HAVE YOU ACTUALLY HAD ANY THOUGHTS OF KILLING YOURSELF?: NO
6. HAVE YOU EVER DONE ANYTHING, STARTED TO DO ANYTHING, OR PREPARED TO DO ANYTHING TO END YOUR LIFE?: NO
1. IN THE PAST MONTH, HAVE YOU WISHED YOU WERE DEAD OR WISHED YOU COULD GO TO SLEEP AND NOT WAKE UP?: NO

## 2024-06-10 ASSESSMENT — PAIN SCALES - GENERAL
PAINLEVEL_OUTOF10: 0 - NO PAIN
PAINLEVEL_OUTOF10: 8
PAINLEVEL_OUTOF10: 0 - NO PAIN

## 2024-06-10 ASSESSMENT — LIFESTYLE VARIABLES
HOW MANY STANDARD DRINKS CONTAINING ALCOHOL DO YOU HAVE ON A TYPICAL DAY: PATIENT DOES NOT DRINK
AUDIT-C TOTAL SCORE: 0
HOW OFTEN DO YOU HAVE A DRINK CONTAINING ALCOHOL: NEVER
SKIP TO QUESTIONS 9-10: 1
AUDIT-C TOTAL SCORE: 0
HOW OFTEN DO YOU HAVE 6 OR MORE DRINKS ON ONE OCCASION: NEVER

## 2024-06-10 ASSESSMENT — PAIN - FUNCTIONAL ASSESSMENT
PAIN_FUNCTIONAL_ASSESSMENT: 0-10

## 2024-06-10 NOTE — NURSING NOTE
Assumed care of patient.  Patient awake in bed during bedside report. Patient is awaiting further decisions to be made regarding the plan for a procedure.  Morning care rounds will take place with hopefully more insight to a plan.

## 2024-06-10 NOTE — NURSING NOTE
Patient resting in bed. Patient was turned onto her right side to prevent pressure injury. Patient denies pain at this time. No stated needs. Call button is within reach and bed alarm is activated.

## 2024-06-10 NOTE — NURSING NOTE
Patient resting in bed. Vital signs stable. Leatha care provided. New Pure Wick and barrier cream applied. Patient was turned onto her left side to reduce risk of pressure injury. Patient denies any pain at this time. No stated needs. Call light is within reach and bed alarm is activated.

## 2024-06-10 NOTE — CONSULTS
"Vancomycin Dosing by Pharmacy- INITIAL    Teresa Matthews is a 73 y.o. year old female who Pharmacy has been consulted for vancomycin dosing for other prosthetic device infection . Based on the patient's indication and renal status this patient will be dosed based on a goal AUC of 400-600.     Renal function is currently stable., with a SCR 1.32, pt has CKD III, CRCL 50.8.     Visit Vitals  /85   Pulse 62   Temp 35.7 °C (96.3 °F) (Temporal)   Resp 16        Lab Results   Component Value Date    CREATININE 1.32 (H) 06/10/2024    CREATININE 1.37 (H) 2024    CREATININE 1.24 (H) 2024    CREATININE 1.19 (H) 2024        Patient weight is as follows:   Vitals:    06/10/24 1535   Weight: 99.2 kg (218 lb 11.1 oz)       Cultures:  No results found for the encounter in last 14 days.        No intake/output data recorded.  I/O during current shift:  No intake/output data recorded.    Temp (24hrs), Av.3 °C (97.3 °F), Min:35.7 °C (96.3 °F), Max:36.8 °C (98.2 °F)         Assessment/Plan     Patient will not be given a loading dose.  Will initiate vancomycin maintenance,  1500 mg every 24 hours.    This dosing regimen is predicted by [employer] to result in the following pharmacokinetic parameters:    \"Loading dose: N/A  Regimen: 1500 mg IV every 24 hours.  Start time: 08:42 on 2024  Exposure target: AUC24 (range)400-600 mg/L.hr   AUC24,ss: 554 mg/L.hr  Probability of AUC24 > 400: 83 %  Ctrough,ss: 17.3 mg/L  Probability of Ctrough,ss > 20: 37 %  Probability of nephrotoxicity (Lodise SONNY ): 13 %\"    Follow-up level will be ordered on  at 0500hrs unless clinically indicated sooner.  Will continue to monitor renal function daily while on vancomycin and order serum creatinine at least every 48 hours if not already ordered.  Follow for continued vancomycin needs, clinical response, and signs/symptoms of toxicity.       Juvenal Siegel, PharmD       "

## 2024-06-10 NOTE — DISCHARGE SUMMARY
Discharge Diagnosis  Cellulitis    Issues Requiring Follow-Up  Will transfer to [facility] concerns for septic arthritis left knee    Discharge Meds     Your medication list        START taking these medications        Instructions Last Dose Given Next Dose Due   cefTRIAXone 2 gram/50 mL IV  Commonly known as: Rocephin      Infuse 50 mL (2 g) at 100 mL/hr over 30 minutes into a venous catheter once every 24 hours for 5 days.              CONTINUE taking these medications        Instructions Last Dose Given Next Dose Due   famotidine 40 mg tablet  Commonly known as: Pepcid      Take 1 tablet (40 mg) by mouth once daily.       FreeStyle Bulmaro 2 [place] misc  Generic drug: flash glucose scanning reader      Use as instructed, reader is missing in rehab, needs replacement.       FreeStyle Bulmaro 2 Sensor kit  Generic drug: flash glucose sensor kit      Inject 1 each under the skin every 14 (fourteen) days. Test blood sugar 1-4 times per day as needed for diabetes, replace every 14 days       gabapentin 100 mg capsule  Commonly known as: Neurontin           glucagon 1 mg injection  Commonly known as: Glucagen      Inject 1 mg into the shoulder, thigh, or buttocks 1 time if needed for low blood sugar - see comments for up to 1 dose. USE AS DIRECTED.       insulin lispro protamin-lispro 100 unit/mL (75-25) injection  Commonly known as: HumaLOG Mix 75-25 KwikPen      Inject 1-2 times per day as directed.  35 units subcutaneously       levothyroxine 175 mcg tablet  Commonly known as: Synthroid, Levoxyl      Take 1 tablet by mouth daily       losartan 25 mg tablet  Commonly known as: Cozaar      Take 1 tablet (25 mg) by mouth once daily.       metoprolol succinate XL 50 mg 24 hr tablet  Commonly known as: Toprol-XL      Take 1 tablet (50 mg) by mouth once daily.       naloxone 4 mg/0.1 mL nasal spray  Commonly known as: Narcan      Administer 1 spray (4 mg) into affected nostril(s) if needed for opioid reversal for up to 1  day. May repeat every 2-3 minutes if needed, alternating nostrils, until medical assistance becomes available.       omeprazole 40 mg DR capsule  Commonly known as: PriLOSEC      TAKE 1 CAPSULE BY MOUTH DAILY OPEN CAPSULE AND SPRINKLE CONTENTS ON SUGAR FREE APPLESAUCE OR PUDDING.       OneTouch Ultra Test strip  Generic drug: blood sugar diagnostic           oxyCODONE-acetaminophen  mg tablet  Commonly known as: Percocet      Take 1 tablet by mouth every 4 hours if needed for severe pain (7 - 10).       oxyCODONE-acetaminophen  mg tablet  Commonly known as: Percocet  Start taking on: June 13, 2024      Take 1 tablet by mouth every 4 hours if needed for severe pain (7 - 10). Do not fill before June 13, 2024.       oxyCODONE-acetaminophen  mg tablet  Commonly known as: Percocet  Start taking on: July 13, 2024      Take 1 tablet by mouth every 4 hours if needed for severe pain (7 - 10). Do not fill before July 13, 2024.       oxygen gas therapy  Commonly known as: O2      Inhale 1 each once every 24 hours.       potassium chloride CR 10 mEq ER tablet  Commonly known as: Klor-Con      Take 1 tablet (10 mEq) by mouth in the morning and 1 tablet (10 mEq) before bedtime. Do not crush, chew, or split..       spironolactone 25 mg tablet  Commonly known as: Aldactone      Take 1 tablet by mouth daily       SUPER B-50 COMPLEX ORAL           traZODone 50 mg tablet  Commonly known as: Desyrel      Take 1 tablet (50 mg) by mouth once daily at bedtime.       underpads pad  Commonly known as: Bed Underpads      1 each 2 times a day.       vibegron 75 mg tablet      Take 1 tablet (75 mg) by mouth once daily.       WOMEN'S MULTIVITAMIN ORAL                  ASK your doctor about these medications        Instructions Last Dose Given Next Dose Due   amiodarone 200 mg tablet  Commonly known as: Pacerone      Take 1 tablet (200 mg) by mouth once daily.       apixaban 5 mg tablet  Commonly known as: Eliquis      Take 1  tablet (5 mg) by mouth 2 times a day.       ascorbic acid (vitamin C) 500 mg capsule           aspirin 81 mg EC tablet           buPROPion  mg 24 hr tablet  Commonly known as: Wellbutrin XL      Take 1 tablet (150 mg) by mouth once daily in the morning. Do not crush, chew, or split.       calcium citrate-vitamin D3 200 mg-6.25 mcg (250 unit) tablet           desvenlafaxine 100 mg 24 hr tablet  Commonly known as: Pristiq      Take 1 tablet by mouth once daily. Do not crush, chew, or split. Instead of venlafaxine.                 Where to Get Your Medications        You can get these medications from any pharmacy    Bring a paper prescription for each of these medications  cefTRIAXone 2 gram/50 mL IV         Test Results Pending At Discharge  Pending Labs       Order Current Status    Blood Culture Preliminary result    Blood Culture Preliminary result            Hospital Course   73-year-old female admitted June 4 with painful left knee and swelling; she has had this ongoing problem but this recent event started approximately 4 weeks ago she is currently in [facility] and she did not realize that she needed to call her orthopedic surgeon/  Admitted because of concern for septic arthritis  Spoke to Dr. Machado today reviewed the MRI we will transfer the patient to [facility] for possible washout versus definitive treatment of amputation    Pertinent Physical Exam At Time of Discharge  Physical Exam  Patient is alert in no acute distress  Lungs are clear  Cardiac is regular rate and rhythm  Abdomen is benign  Extremities:  Left knee is swollen tender and warm; pulses are intact  Outpatient Follow-Up  Future Appointments   Date Time Provider Department Center   8/14/2024 12:00 PM Monica Macario MD [id number] [facility]         Alejandrina Ahumada MD

## 2024-06-10 NOTE — NURSING NOTE
Patient was discharged and transferred to [facility], nurse to nurse report was given to nurse Rogerio CHARLES.  Patient was transported by [facility] ambulance.

## 2024-06-10 NOTE — CARE PLAN
The patient's goals for the shift include pain     The clinical goals for the shift include pain

## 2024-06-10 NOTE — CARE PLAN
The patient's goals for the shift include      The clinical goals for the shift include        Problem: Pain  Goal: My pain/discomfort is manageable  Outcome: Progressing     Problem: Safety  Goal: Patient will be injury free during hospitalization  Outcome: Progressing     Problem: Daily Care  Goal: Daily care needs are met  Outcome: Progressing     Problem: Psychosocial Needs  Goal: Collaborate with me, my family, and caregiver to identify my specific goals  Recent Flowsheet Documentation  Taken 6/10/2024 1506 by Veena Pleitez RN  Cultural Requests During Hospitalization: None  Spiritual Requests During Hospitalization: None

## 2024-06-10 NOTE — H&P
Patient: Teresa Matthews   Age: 73 y.o.   Gender: female   Room/Bed: 145/145-A     Attending: Genia Guerra DO  Code Status:  DNR and No Intubation    Chief Complaint:  Patient: Teresa Matthews is a 73 y.o. female who presented to the hospital for L knee OM, septic arthritis.     History of Presenting Illness  This is a 73 y.o. female with PMH of COPD, A. 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    According to the patient she had had knee replacement surgery on 2009 and she has been a resident of a nursing facility since around 4 months ago as her health continues to decline, 4 weeks ago she started to experience severe left lower extremity pain she was not able to participate in any weightbearing activity and now she is wheelchair-bound, and noticed her left leg is swollen almost 3 times her right leg, She had a recent RLE cellulitis and wound infection c/b bacteremia due to Group A Strep in 3-4/2024. TTE/ELISEO negative for endocarditis. Has been dealing with worsening left knee pain since then. Treated with PO Doxycycline as outpatient without improvement and sent in for further evaluation. She denies fevers/chills. Is on IV Vanc/Zosyn.     She denies any generalized symptoms, fever, history of blood clots, or any other concerning but stated she has multiple sacral and lower extremity wound that required care    Past Medical History   Past Medical History:   Diagnosis Date    Atrial fibrillation (Multi)     s/p cardioversion 7/5/22    Bacteremia     March 2024: group A strep bacteremia, right lower extremity cellulitis, ELISEO ruled out vegetative endocarditis    Charcot's joint of left foot     CHF (congestive heart failure) (Multi)     Chronic insomnia 04/10/2023    Cirrhosis of  liver (Multi)     Liver bx 11/7/2019; cirrhotic nodules and mild macrosteatosis    Complication of internal knee prosthesis (CMS-HCC)     B/L TKR have been revised and patella resurfaced due to chronic pain. L knee has been complicated by infection, prox tib/fib fracture, distal femur fx, L TKR explanted and has ABX spacer ([id number])    Depression, major, in remission (CMS-HCC) 04/10/2023    Diabetes mellitus (Multi)     Insulin-dependent    Endocarditis     Heart valve problem     Moderate mitral annular calcification, Mild aortic valve regurgitation; per 3/28/24 ELISEO    HLD (hyperlipidemia)     HTN (hypertension)     Hx of osteomyelitis     s/p L Hallux amputation x2    Obesity     s/p RnY 11/7/2019    ELIZABETH (obstructive sleep apnea)     Papillary microcarcinoma of thyroid (Multi)     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/2023    E Coli of Left knee prosthetic joint    Wheelchair dependent     And uses [place] lift (explanted L TKR w/ ABX spacer w/ chronic subacute L femur/tib/fib fractures)      Past Surgical History:   Past Surgical History:   Procedure Laterality Date    CARDIOVERSION  07/05/2022    A FIB    COLONOSCOPY      Internal hemorrhoids    CYSTOSCOPY W/ LASER LITHOTRIPSY Right 05/19/2020    With ureter stent    ESOPHAGOSCOPY / EGD      Small hiatal hernia    GASTRIC BYPASS  11/07/2019    Bruno-en-Y with adhesion lysis    HYSTERECTOMY      KNEE SURGERY Bilateral 2009    B/L TKR revisions: R patella resurfacing (9/2006), R TKR revision (3/2009), L TKR revision w/ patellar resurfacing (10/14/2009)    LIVER BIOPSY  11/07/2019    Pathology; cirrhotic nodules and mild macro steatosis    REMOVAL PROSTHESIS TOTAL KNEE W/ OR W/O INSERTION SPACER Left 02/28/2022    Left knee explantation with antibiotic spacer    TOE SURGERY Left 08/09/2020    Left Hallux amputation 2/2 osteomyelitis    TOE SURGERY Left 11/11/2021    Amputation left  Hallux, hemiphalangectomy proximal phalanx, resection tibial fibular sesamoid, excisional debridement    TONSILLECTOMY      TOTAL KNEE ARTHROPLASTY Bilateral 2004    Left 3/2004, Right 4/2004    TOTAL THYROIDECTOMY  06/22/2021    Pathology: Papillary micro carcinoma, chronic lymphocytic thyroiditis, oncocytic follicular neoplasm    TUBAL LIGATION      WOUND DEBRIDEMENT  09/14/2022    Excisional debridement of Sacral wound     Family History:   Family History   Problem Relation Name Age of Onset    Coronary artery disease Mother      Liver disease Mother      Other (non-hodgkins lymphoma) Mother      Stroke Father      Deafness Father      Hypertension Father       Social History:   Tobacco Use: Low Risk  (5/14/2024)    Patient History     Smoking Tobacco Use: Never     Smokeless Tobacco Use: Never     Passive Exposure: Not on file      Social History     Substance and Sexual Activity   Alcohol Use Never       Outpatient Medications:  Current Outpatient Medications   Medication Instructions    amiodarone (PACERONE) 200 mg, oral, Daily    apixaban (ELIQUIS) 5 mg, oral, 2 times daily    ascorbic acid, vitamin C, 500 mg capsule Take 1 capsule by mouth once daily.    aspirin 81 mg EC tablet 1 tablet, oral, Every Day    buPROPion XL (WELLBUTRIN XL) 150 mg, oral, Every morning, Do not crush, chew, or split.    calcium citrate-vitamin D3 200 mg-6.25 mcg (250 unit) tablet Take 1 tablet by mouth once daily.    cefTRIAXone (Rocephin) 2 gram/50 mL IV 2 g, intravenous, Every 24 hours    desvenlafaxine 100 mg 24 hr tablet Take 1 tablet by mouth once daily. Do not crush, chew, or split. Instead of venlafaxine.    famotidine (PEPCID) 40 mg, oral, Daily    FreeStyle Bulmaro 2 [place] misc Use as instructed, reader is missing in rehab, needs replacement.    FreeStyle Bulmaro 2 Sensor kit 1 each, subcutaneous, Every 14 days, Test blood sugar 1-4 times per day as needed for diabetes, replace every 14 days    gabapentin (NEURONTIN) 100 mg,  oral, 2 times daily    glucagon (GLUCAGEN) 1 mg, intramuscular, Once as needed, USE AS DIRECTED.    insulin lispro protamin-lispro (HumaLOG Mix 75-25 KwikPen) 100 unit/mL (75-25) injection Inject 1-2 times per day as directed.  35 units subcutaneously    levothyroxine (SYNTHROID, LEVOXYL) 175 mcg, oral, Daily    loperamide (IMODIUM) 2 mg, oral, 4 times daily PRN    losartan (COZAAR) 25 mg, oral, Daily    metoprolol succinate XL (TOPROL-XL) 50 mg, oral, Daily    mv-min/iron/folic/calcium/vitK (WOMEN'S MULTIVITAMIN ORAL) 1 tablet, oral, Daily    naloxone (NARCAN) 4 mg, nasal, As needed, May repeat every 2-3 minutes if needed, alternating nostrils, until medical assistance becomes available.    omeprazole (PriLOSEC) 40 mg DR capsule TAKE 1 CAPSULE BY MOUTH DAILY OPEN CAPSULE AND SPRINKLE CONTENTS ON SUGAR FREE APPLESAUCE OR PUDDING.    OneTouch Ultra Test strip TO TEST BLOOD SUGAR 4 TIMES A DAY FOR DIABETES    oxyCODONE-acetaminophen (Percocet)  mg tablet 1 tablet, oral, Every 4 hours PRN    [START ON 6/13/2024] oxyCODONE-acetaminophen (Percocet)  mg tablet 1 tablet, oral, Every 4 hours PRN    [START ON 7/13/2024] oxyCODONE-acetaminophen (Percocet)  mg tablet 1 tablet, oral, Every 4 hours PRN    oxygen (O2) gas therapy 1 each, inhalation, Every 24 hours    potassium chloride CR 10 mEq ER tablet Take 1 tablet (10 mEq) by mouth in the morning and 1 tablet (10 mEq) before bedtime. Do not crush, chew, or split..    spironolactone (ALDACTONE) 25 mg, oral, Daily    traZODone (DESYREL) 50 mg, oral, Nightly    underpads (Bed Underpads) pad 1 each, miscellaneous, 2 times daily    vibegron 75 mg, oral, Daily    vitamin B complex (SUPER B-50 COMPLEX ORAL) Take 1 capsule by mouth once daily.        ED Course   Vitals - /85   Pulse 62   Temp 35.7 °C (96.3 °F) (Temporal)   Resp 16   Wt 99.2 kg (218 lb 11.1 oz)   SpO2 95%     Labs:   CBC:  Recent Labs     06/10/24  1604 06/05/24  0344 03/30/24  0552   WBC  6.4 6.4 8.1   HGB 10.4* 10.1* 10.3*   HCT 33.9* 33.2* 32.7*    193 169   MCV 85 84 87     CMP:  Recent Labs     06/10/24  1604 06/09/24  0345 06/08/24  0600   * 136 137   K 4.2 4.3 4.1   CL 99 103 104   CO2 29 28 27   ANIONGAP 10 9* 10   BUN 23 25* 23   CREATININE 1.32* 1.37* 1.24*   EGFR 43* 41* 46*   GLUCOSE 133* 110* 89     Recent Labs     06/10/24  1604 06/05/24  0344 03/28/24  0420   ALBUMIN 2.9* 2.9* 2.7*   ALKPHOS 73 70 68   ALT 18 7 12   AST 26 9 13   BILITOT 0.3 0.3 0.4     Calcium/Phos:   Lab Results   Component Value Date    CALCIUM 9.4 06/10/2024    PHOS 3.1 03/27/2024      COAG:   Recent Labs     09/17/22  0538 09/15/22  0601 07/02/22  1338   INR 1.3* 1.2* 1.4*     CRP:   Lab Results   Component Value Date    CRP 0.86 06/10/2024      [unfilled]   ENDO:  Recent Labs     06/06/24  0434 03/25/24  1728 01/27/24  0522 01/26/24  1558 11/13/23  1029 07/26/23  0658 06/19/23  0601 06/18/23  0714 06/09/23  1056   TSH  --  2.13  --  40.63*  --   --  15.18*  --  22.30*   HGBA1C 8.3*  --  15.4*  --  13.9* 9.9*  --    < > 12.1*    < > = values in this interval not displayed.      CARDIAC:   Recent Labs     03/25/24  1145 01/26/24  1558 08/12/22  1221 07/02/22  1338 12/21/20  0346   TROPHS 13 6 5   < >  --    BNP  --  294* 100*  --  84    < > = values in this interval not displayed.     Recent Labs     06/09/23  1056 09/30/21  1211 05/08/21  1039   CHOL 171 128 147   LDLF 88 58 73   HDL 41.3 45.8 44.4   TRIG 210* 123 147     No data recorded    Micro/ID:   Susceptibility data from last 90 days.  Collected Specimen Info Organism Clindamycin Erythromycin Penicillin Tetracycline   03/26/24 Tissue/Biopsy from Wound/Tissue Streptococcus pyogenes (Group A Streptococcus)       03/25/24 Blood culture from Peripheral Venipuncture Streptococcus pyogenes (Group A Streptococcus) S S S I   03/25/24 Blood culture from Peripheral Venipuncture Streptococcus pyogenes (Group A Streptococcus)                        No  "lab exists for component: \"AGALPCRNB\"   .ID  Lab Results   Component Value Date    URINECULTURE (A) 03/25/2024     Multiple organisms present, probable contamination. Repeat culture if clinically indicated.    BLOODCULT No growth at 4 days -  FINAL REPORT 06/05/2024       Imaging:   No orders to display     Encounter Date: 03/25/24   Electrocardiogram, 12-lead PRN ACS symptoms   Result Value    Ventricular Rate 68    Atrial Rate 68    SD Interval 146    QRS Duration 106    QT Interval 438    QTC Calculation(Bazett) 465    R Axis 88    T Axis 34    QRS Count 11    Q Onset 215    P Onset 142    P Offset 193    T Offset 434    QTC Fredericia 456    Narrative    Sinus rhythm with marked sinus arrhythmia /PACs  Otherwise normal ECG  When compared with ECG of 27-MAR-2024 16:54, (unconfirmed)  No significant change was found  Confirmed by Anatoly Lara ([id number]) on 3/28/2024 9:53:27 AM     Transesophageal Echo (ELISEO)    Result Date: 3/28/2024              [place] [address]      Phone [phone] Fax [phone] TRANSESOPHAGEAL ECHOCARDIOGRAM REPORT  Patient Name:     REYES SENIOR DOMITILA Reading Physician:  [id number] Prosper Concepcion MD Study Date:       3/28/2024            Ordering Provider:  [id number] ANATOLY LARA MRN/PID:          [id number]             Fellow: Accession#:       [id number]         Nurse:              Amber Christiansen RN Date of           1950 / 73      Sonographer:        Bernadine Madera RDCS Birth/Age:        years Gender:           F                    Additional Staff: Height:           182.88 cm            Admit Date:         3/25/2024 Weight:           113.40 kg            Admission Status:   Inpatient - Routine BSA / BMI:        2.34 m2 / 33.91      [facility]                   kg[facility]                Location:           Lab Blood Pressure: 124 /76 mmHg Study Type:    " TRANSESOPHAGEAL ECHO (ELISEO) Diagnosis/ICD: Disorientation, unspecified-R41.0; Bacteremia-R78.81; Essential                (primary) hypertension-I10; Weakness-R53.1 Indication:    bacteremia/abnormal echo CPT Codes:     Moderate Sedation Services initial 15 minutes patient >5                years-68950; Moderate Sedation Services 1st additional 15 minutes                patient >5 years-15367; ELISEO Complete-97269; Doppler                Limited-99800; Echocardiography, ELISEO add on 3D post                processing-85188  Study Detail: The following Echo studies were performed: 2D. Patient's heart               rhythm is atrial fibrillation. A bubble study was not performed.  PHYSICIAN INTERPRETATION: ELISEO Details: The ELISEO probe used was [id number]. Agitated saline contrast echo was performed to assess for the presence of a patent foramen ovale. ELISEO Medication: The pharynx was anesthetized with lidocaine ointment and hurricaine spray. The patient was sedated using moderate sedation. Total intraservice time for moderate sedation was 28 minutes. Midazolam and Fentanyl was used to sedate the patient for this exam. ELISEO Procedure: The probe was passed without difficulty. The following complication was encountered during the procedure: Patient tolerated the procedure well without any apparent complications. Left Ventricle: Left ventricular systolic function is normal. The left ventricular cavity size is normal. Left ventricular diastolic filling was not assessed. Left Atrium: The left atrium is enlarged. There is no definite left atrial thrombus present. The left atrial appendage is enlarged. Right Ventricle: The right ventricle is normal in size. There is normal right ventricular global systolic function. Right Atrium: The right atrium was not assessed. Aortic Valve: The aortic valve is trileaflet. There is mild aortic valve cusp calcification. There is mild aortic valve regurgitation. Mitral Valve: The mitral valve is mildly  thickened. There is normal mitral valve leaflet mobility. There is moderate mitral annular calcification. There is mild mitral valve regurgitation. Tricuspid Valve: The tricuspid valve is structurally normal. There is trace tricuspid regurgitation. Pulmonic Valve: The pulmonic valve is not well visualized. There is trace pulmonic valve regurgitation. Pericardium: There is a small pericardial effusion. Aorta: The aortic root is normal. The aortic root is at the upper limits of normal size. There is plaque visualized in the descending aorta.  CONCLUSIONS:  1. Left ventricular systolic function is normal.  2. No definite intracardiac thrombi or masses were visualized.  3. No definite valvular vegetations were visualized.  4. The left atrium is enlarged.  5. There is moderate mitral annular calcification.  6. Mild aortic valve regurgitation.  7. No left atrial thrombus.  8. There is plaque visualized in the descending aorta. QUANTITATIVE DATA SUMMARY:  [id number] Prosper Concepcion MD Electronically signed on 3/28/2024 at 6:01:37 PM  ** Final **     Transthoracic Echo (TTE) Limited    Result Date: 3/26/2024              [place] [address]      Phone [phone] Fax [phone] TRANSTHORACIC ECHOCARDIOGRAM REPORT  Patient Name:      REYES SENIOR           Brittany Physician:    [id number] Hai RAMESH MD Study Date:        3/26/2024           Ordering Provider:    [id number] GONCALVES MRN/PID:           [id number]            Fellow: Accession#:        [id number]        Nurse: Date of Birth/Age: 1950 / 73     Sonographer:          Brittnee mcfarland RDCS Gender:            F                   Additional Staff: Height:            182.88 cm           Admit Date:           3/25/2024 Weight:            113.40 kg           Admission Status:     Inpatient - Routine BSA / BMI:          2.34 m2 / 33.91     Department Location:  [facility]                    kg/m2 Blood Pressure: 114 /63 mmHg Study Type:    TRANSTHORACIC ECHO (TTE) LIMITED Diagnosis/ICD: Bacteremia-R78.81 Indication:    BACTEREMIA CPT Codes:     Echo Limited-13224 Patient History: Pertinent History: CHF, A-FIB, HTN. Study Detail: The following Echo studies were performed: 2D, Doppler and color               flow.  Critical Event Critical Event: Test was completed as per department protocol. Critical Finding: Possible veg on MV. Time Test was Completed: 2:35:00 PM Notified: Dr. Gerber. Attending notification time: 2:45:37 PM  PHYSICIAN INTERPRETATION: Left Ventricle: Left ventricular systolic function was not assessed. There are no regional wall motion abnormalities. The left ventricular cavity size was not assessed. Left ventricular diastolic filling was not assessed. Left Atrium: The left atrium was not assessed. Right Ventricle: The right ventricle was not assessed. Right ventricular systolic function not assessed. Right Atrium: The right atrium was not assessed. Aortic Valve: There is no visualized aortic valve vegetation. The aortic valve was not assessed. Aortic valve regurgitation was not assessed. Mitral Valve: The mitral valve was not assessed. Mitral valve regurgitation was not assessed. Possible veg on anterior leaflet of MV. Tricuspid Valve: No vegetation is seen on the tricuspid valve. The tricuspid valve was not assessed. Tricuspid regurgitation was not assessed. Pulmonic Valve: No pulmonic valve vegetation visualized. The pulmonic valve was not assessed. The pulmonic valve regurgitation was not assessed. Pericardium: Pericardial effusion was not assessed. Aorta: The aortic root was not assessed.  CONCLUSIONS:  1. Left ventricular systolic function was not assessed.  2. Possible veg on anterior leaflet of MV.  3. No tricuspid valve vegetation.  4. No aortic valve vegetation visualized.  5. No pulmonic valve  vegetation visualized. QUANTITATIVE DATA SUMMARY: 2D MEASUREMENTS:              Normal Ranges: LAs: 2.10 cm (2.7-4.0cm) TRICUSPID VALVE/RVSP:                             Normal Ranges: Peak TR Velocity: 2.51 m/s RV Syst Pressure: 28.2 mmHg (< 30mmHg)  [id number] Hai Gerber MD Electronically signed on 3/26/2024 at 4:16:23 PM  ** Final **    No results found.    Interventions:  Medications   acetaminophen (Tylenol) tablet 650 mg (has no administration in time range)     Or   acetaminophen (Tylenol) oral liquid 650 mg (has no administration in time range)     Or   acetaminophen (Tylenol) suppository 650 mg (has no administration in time range)   ondansetron (Zofran) tablet 4 mg (has no administration in time range)     Or   ondansetron (Zofran) injection 4 mg (has no administration in time range)   melatonin tablet 5 mg (has no administration in time range)   polyethylene glycol (Glycolax, Miralax) packet 17 g (has no administration in time range)   glucagon (Glucagen) injection 1 mg (has no administration in time range)   dextrose 50 % injection 25 g (has no administration in time range)   glucagon (Glucagen) injection 1 mg (has no administration in time range)   dextrose 50 % injection 12.5 g (has no administration in time range)   insulin lispro (HumaLOG) injection 0-15 Units (has no administration in time range)   amiodarone (Pacerone) tablet 200 mg (200 mg oral Not Given 6/10/24 1715)   ascorbic acid (Vitamin C) tablet 100 mg (has no administration in time range)   aspirin EC tablet 81 mg (has no administration in time range)   desvenlafaxine (Pristiq) 24 hr tablet 100 mg (has no administration in time range)   famotidine (Pepcid) tablet 40 mg (40 mg oral Not Given 6/10/24 1715)   gabapentin (Neurontin) capsule 100 mg (has no administration in time range)   insulin lispro protamin-lispro (HumaLOG Mix 75-25) 100 unit/mL (75-25) injection 35 Units (has no administration in time range)   levothyroxine (Synthroid,  Levoxyl) tablet 175 mcg (175 mcg oral Not Given 6/10/24 1715)   losartan (Cozaar) tablet 25 mg (25 mg oral Not Given 6/10/24 1715)   metoprolol succinate XL (Toprol-XL) 24 hr tablet 50 mg (has no administration in time range)   oxyCODONE-acetaminophen (Percocet)  mg per tablet 1 tablet (has no administration in time range)   potassium chloride CR (Klor-Con M20) ER tablet 10 mEq (has no administration in time range)   spironolactone (Aldactone) tablet 25 mg (has no administration in time range)   traZODone (Desyrel) tablet 50 mg (has no administration in time range)   apixaban (Eliquis) tablet 5 mg ( oral Dose Auto Held 6/14/24 2100)   enoxaparin (Lovenox) syringe 100 mg (has no administration in time range)       Objective Data  Physical Exam  Vitals and nursing note reviewed.   Constitutional:       Appearance: Normal appearance. She is normal weight.   HENT:      Head: Normocephalic and atraumatic.      Right Ear: Tympanic membrane normal.      Nose: Nose normal.      Mouth/Throat:      Mouth: Mucous membranes are moist.   Eyes:      Extraocular Movements: Extraocular movements intact.   Cardiovascular:      Rate and Rhythm: Normal rate.      Pulses: Normal pulses.      Heart sounds: Normal heart sounds.   Pulmonary:      Effort: Pulmonary effort is normal.      Breath sounds: Normal breath sounds.   Abdominal:      General: Abdomen is flat. Bowel sounds are normal.      Palpations: Abdomen is soft.   Musculoskeletal:         General: Swelling, tenderness and signs of injury present. Normal range of motion.      Right lower leg: Edema present.      Left lower leg: Edema present.   Skin:     General: Skin is warm and dry.      Capillary Refill: Capillary refill takes less than 2 seconds.   Neurological:      General: No focal deficit present.      Mental Status: She is alert and oriented to person, place, and time.   Psychiatric:         Mood and Affect: Mood normal.         Behavior: Behavior normal.          Thought Content: Thought content normal.         Judgment: Judgment normal.                Assessment and Plan    Teresa Matthews is a 73 y.o. female presenting for L Knee septic arthritis and Chronic OM evaluation, remains to be HDS, with no leukocytosis and mildly elevated inflammatory markers, pending evaluation by ortho and ID    Acute Medical Issues:  #Left knee septic arthritis with likely infected antibiotic spacer and chronic osteomyelitis   Repeated Labs and Bcx   Last Bcx on 06/5 NTD   ID was following in [facility], she received vanc/Zosyn initially after which she was placed on Ceftriaxone 2 gm daily   Left LUE duplex   Resume zosyn/Vanc pending ID consult   Ortho Dr Segal was contacted: planing for sugery soon but the day is undetermined  Switch home Eliquis to Therapeutic Lovenox   PT/OT after surgery   Pain control     #Chronic sacral and LUE wounds   Wound care     Chronic Medical Issues:   #AFIB: cont home amiodarone 200mg daily, therapeutic Lovenox, metop 50mg daily  #Diabetic vascular ds: Aspirin 81mg daily   #Depression: cont home Prisitiq   #Diabetic neuropathy: gabapentin 100mg BID   #DMII: ISS, ADA, Insulin lispro 35untis units at bedtime   #Hypothyroidism: 175mg daily   #HTN: losartan 25mg, Aldactone 25mg daily   #Insomnia: Trazodone 50mg daily        Fluids: PO   Electrolytes: replete as needed  Nutrition: ADA  GI Prophylaxis:none   DVT Prophylaxis: Lovenox     Access: PIV    Antibiotics: Vanc/Zosyn   Oxygenation: RA    Dispo:patient admitted for L knee septic arthritis and chronic OM. Currently receiving IV Abx, pending ID and Ortho evaluation, expected LOS> 48 hrs.

## 2024-06-10 NOTE — CARE PLAN
The patient's goals for the shift include  pain control     The clinical goals for the shift include pain control and safety

## 2024-06-10 NOTE — PROGRESS NOTES
06/10/24 1506   Discharge Planning   Living Arrangements Other (Comment)  ([facility])   Support Systems Family members  (Sister)   Assistance Needed Spoke with patient at bedside who states she is from [facility] and has lived there approximately 3 months. Per patient she requires assist for dressing and bathing, feds self, requires assist x 1 for dressing and bathing and transfers from the bed to  via freida lift.   Type of Residence Nursing home/residential care   Do you have animals or pets at home? No   Who is requesting discharge planning? Provider   Home or Post Acute Services Post acute facilities (Rehab/SNF/etc)   Type of Post Acute Facility Services Long term care   Patient expects to be discharged to: Patient is unsure she would want to return to [facility] because she does not care for the head nurse there.   Does the patient need discharge transport arranged? Yes   RoundTrip coordination needed? Yes   Has discharge transport been arranged? No

## 2024-06-10 NOTE — NURSING NOTE
Pt. Vital signs obtained.  Pt. Blood sugar was 84  Encouraged pt. To order breakfast.  Call light within reach.  Bed alarm on.

## 2024-06-10 NOTE — CONSULTS
Consults  Referred by CATARINA Guerra MD: Monica Macario MD    Reason For Consult  Lt knee infection    History Of Present Illness  Teresa Matthews is a 73 y.o. female, hx of obesity, hx of DM, hx of AF, hx of CHF, hx of CKD, hx of GERD, hx of hypothyroidism, hx of legs stasis with recurrent cellulitis, hx of Lt knee prosthesis infection sp explantation  Feb 2022 and antibiotics ( E. Coli ), hx of MRSA colonization, she was treated for gp A strep sepsis / Lt leg cellulitis in March, she has developed increasing Lt knee pain since then, she failed oral Doxycycline, she was admitted to [facility] for Lt knee cellulitis and transferred to [facility] for ortho evaluation, no fever, no drainage    Past Medical History  She has a past medical history of Atrial fibrillation (Multi), Bacteremia, Charcot's joint of left foot, CHF (congestive heart failure) (Multi), Chronic insomnia (04/10/2023), Cirrhosis of liver (Multi), Complication of internal knee prosthesis (CMS-HCC), Depression, major, in remission (CMS-HCC) (04/10/2023), Diabetes mellitus (Multi), Endocarditis, Heart valve problem, HLD (hyperlipidemia), HTN (hypertension), osteomyelitis, Obesity, ELIZABETH (obstructive sleep apnea), Papillary microcarcinoma of thyroid (Multi), Prosthetic joint infection (CMS-HCC) (04/10/2023), and Wheelchair dependent.    Surgical History  She has a past surgical history that includes Hysterectomy; Tonsillectomy; Tubal ligation; Toe Surgery (Left, 08/09/2020); Wound debridement (09/14/2022); Cardioversion (07/05/2022); Removal prosthesis total knee w/ or w/o insertion spacer (Left, 02/28/2022); Toe Surgery (Left, 11/11/2021); Total thyroidectomy (06/22/2021); Colonoscopy; Esophagoscopy / EGD; Cystoscopy w/ laser lithotripsy (Right, 05/19/2020); Gastric bypass (11/07/2019); Total knee arthroplasty (Bilateral, 2004); Knee surgery (Bilateral, 2009); and Liver biopsy (11/07/2019).     Social History     Occupational History    Not on  file   Tobacco Use    Smoking status: Never    Smokeless tobacco: Never   Vaping Use    Vaping status: Never Used   Substance and Sexual Activity    Alcohol use: Never    Drug use: Never    Sexual activity: Defer     Travel History   Travel since 05/10/24    No documented travel since 05/10/24          Family History  Family History   Problem Relation Name Age of Onset    Coronary artery disease Mother      Liver disease Mother      Other (non-hodgkins lymphoma) Mother      Stroke Father      Deafness Father      Hypertension Father       Allergies  Metformin, Cephalexin, Other, Statins-hmg-coa reductase inhibitors, and Adhesive tape-silicones     Immunization History   Administered Date(s) Administered    Flu vaccine (IIV4), preservative free *Check age/dose* 11/04/2016, 10/18/2017, 10/29/2019, 09/28/2020    Flu vaccine, quadrivalent, high-dose, preservative free, age 65y+ (FLUZONE) 11/13/2023    Influenza, Unspecified 09/30/2022    Influenza, injectable, quadrivalent 10/02/2019, 10/01/2021    Influenza, seasonal, injectable 09/19/2011, 10/01/2021    Influenza, seasonal, injectable, preservative free 09/17/2015    [employer] SARS-CoV-2 Vaccination 02/13/2021, 03/06/2021, 10/25/2021    Pfizer COVID-19 vaccine, Fall 2023, 12 years and older, (30mcg/0.3mL) 04/15/2024    Pfizer Purple Cap SARS-CoV-2 10/25/2021    Pneumococcal conjugate vaccine, 13-valent (PREVNAR 13) 09/17/2015, 05/31/2018    Pneumococcal conjugate vaccine, 20-valent (PREVNAR 20) 05/12/2023    Pneumococcal polysaccharide vaccine, 23-valent, age 2 years and older (PNEUMOVAX 23) 11/04/2016, 05/31/2018    Tdap vaccine, age 7 year and older (BOOSTRIX, ADACEL) 07/17/2018    Zoster vaccine, recombinant, adult (SHINGRIX) 04/25/2019, 08/02/2019, 10/29/2019   Pneumonia and influenza vaccines are up to date  Henson fall risk 70, preventive protocol was implemented  Depression screen is negative    Medications  Home medications:  Medications Prior to Admission    Medication Sig Dispense Refill Last Dose    amiodarone (Pacerone) 200 mg tablet Take 1 tablet (200 mg) by mouth once daily. 90 tablet 3     apixaban (Eliquis) 5 mg tablet Take 1 tablet (5 mg) by mouth 2 times a day. 180 tablet 3     ascorbic acid, vitamin C, 500 mg capsule Take 1 capsule by mouth once daily.       aspirin 81 mg EC tablet Take 1 tablet (81 mg) by mouth once every day.       buPROPion XL (Wellbutrin XL) 150 mg 24 hr tablet Take 1 tablet (150 mg) by mouth once daily in the morning. Do not crush, chew, or split. 30 tablet 0     calcium citrate-vitamin D3 200 mg-6.25 mcg (250 unit) tablet Take 1 tablet by mouth once daily.       cefTRIAXone (Rocephin) 2 gram/50 mL IV Infuse 50 mL (2 g) at 100 mL/hr over 30 minutes into a venous catheter once every 24 hours for 5 days. 250 mL 0     desvenlafaxine 100 mg 24 hr tablet Take 1 tablet by mouth once daily. Do not crush, chew, or split. Instead of venlafaxine. 90 tablet 0     famotidine (Pepcid) 40 mg tablet Take 1 tablet (40 mg) by mouth once daily. 90 tablet 3     FreeStyle Bulmaro 2 Blakely misc Use as instructed, reader is missing in rehab, needs replacement. 1 each 0     FreeStyle Bulmaro 2 Sensor kit Inject 1 each under the skin every 14 (fourteen) days. Test blood sugar 1-4 times per day as needed for diabetes, replace every 14 days 6 each 3     gabapentin (Neurontin) 100 mg capsule Take 1 capsule (100 mg) by mouth 2 times a day.       glucagon 1 mg injection Inject 1 mg into the shoulder, thigh, or buttocks 1 time if needed for low blood sugar - see comments for up to 1 dose. USE AS DIRECTED. 1 each 5     insulin lispro protamin-lispro (HumaLOG Mix 75-25 KwikPen) 100 unit/mL (75-25) injection Inject 1-2 times per day as directed.  35 units subcutaneously 15 mL 11     levothyroxine (Synthroid, Levoxyl) 175 mcg tablet Take 1 tablet by mouth daily 90 tablet 3     loperamide (Imodium) 2 mg capsule Take 1 capsule (2 mg) by mouth 4 times a day as needed for  diarrhea. 30 capsule 0     losartan (Cozaar) 25 mg tablet Take 1 tablet (25 mg) by mouth once daily. 90 tablet 3     metoprolol succinate XL (Toprol-XL) 50 mg 24 hr tablet Take 1 tablet (50 mg) by mouth once daily. 90 tablet 3     mv-min/iron/folic/calcium/vitK (WOMEN'S MULTIVITAMIN ORAL) Take 1 tablet by mouth once daily.       naloxone (Narcan) 4 mg/0.1 mL nasal spray Administer 1 spray (4 mg) into affected nostril(s) if needed for opioid reversal for up to 1 day. May repeat every 2-3 minutes if needed, alternating nostrils, until medical assistance becomes available. 1 each 0     omeprazole (PriLOSEC) 40 mg DR capsule TAKE 1 CAPSULE BY MOUTH DAILY OPEN CAPSULE AND SPRINKLE CONTENTS ON SUGAR FREE APPLESAUCE OR PUDDING. 90 capsule 0     OneTouch Ultra Test strip TO TEST BLOOD SUGAR 4 TIMES A DAY FOR DIABETES       oxyCODONE-acetaminophen (Percocet)  mg tablet Take 1 tablet by mouth every 4 hours if needed for severe pain (7 - 10). 180 tablet 0     [START ON 6/13/2024] oxyCODONE-acetaminophen (Percocet)  mg tablet Take 1 tablet by mouth every 4 hours if needed for severe pain (7 - 10). Do not fill before June 13, 2024. 180 tablet 0     [START ON 7/13/2024] oxyCODONE-acetaminophen (Percocet)  mg tablet Take 1 tablet by mouth every 4 hours if needed for severe pain (7 - 10). Do not fill before July 13, 2024. 180 tablet 0     oxygen (O2) gas therapy Inhale 1 each once every 24 hours.       potassium chloride CR 10 mEq ER tablet Take 1 tablet (10 mEq) by mouth in the morning and 1 tablet (10 mEq) before bedtime. Do not crush, chew, or split.. 180 tablet 0     spironolactone (Aldactone) 25 mg tablet Take 1 tablet by mouth daily 90 tablet 0     traZODone (Desyrel) 50 mg tablet Take 1 tablet (50 mg) by mouth once daily at bedtime. 90 tablet 3     underpads (Bed Underpads) pad 1 each 2 times a day. 60 each 11     vibegron 75 mg tablet Take 1 tablet (75 mg) by mouth once daily. 90 tablet 3     vitamin B  "complex (SUPER B-50 COMPLEX ORAL) Take 1 capsule by mouth once daily.        Current medications:  Scheduled medications  amiodarone, 200 mg, oral, Daily  [Held by provider] apixaban, 5 mg, oral, BID  ascorbic acid, 250 mg, oral, Daily  aspirin, 81 mg, oral, Every Day  desvenlafaxine, 100 mg, oral, Daily  enoxaparin, 1 mg/kg, subcutaneous, BID  famotidine, 40 mg, oral, Daily  gabapentin, 100 mg, oral, BID  insulin lispro, 0-15 Units, subcutaneous, TID  insulin lispro protamin-lispro, 35 Units, subcutaneous, Nightly  levothyroxine, 175 mcg, oral, Daily  losartan, 25 mg, oral, Daily  melatonin, 5 mg, oral, Nightly  metoprolol succinate XL, 50 mg, oral, Daily  piperacillin-tazobactam, 3.375 g, intravenous, q6h  polyethylene glycol, 17 g, oral, Daily  potassium chloride CR, 10 mEq, oral, BID  spironolactone, 25 mg, oral, Daily  traZODone, 50 mg, oral, Nightly  vancomycin, 1,500 mg, intravenous, q24h      Continuous medications     PRN medications  PRN medications: acetaminophen **OR** acetaminophen **OR** acetaminophen, dextrose, dextrose, glucagon, glucagon, ondansetron **OR** ondansetron, oxyCODONE-acetaminophen, vancomycin    Review of Systems   All other systems reviewed and are negative.       Objective  Range of Vitals (last 24 hours)  Heart Rate:  [57-68]   Temp:  [35.7 °C (96.3 °F)-36.8 °C (98.2 °F)]   Resp:  [16-18]   BP: (134-158)/(63-85)   Height:  [182.9 cm (6' 0.01\")]   Weight:  [99.2 kg (218 lb 11.1 oz)]   SpO2:  [93 %-98 %]   Daily Weight  06/10/24 : 99.2 kg (218 lb 11.1 oz)    Body mass index is 29.65 kg/m².   Nutritional consult    Physical Exam  Constitutional:       Appearance: Normal appearance.   HENT:      Head: Normocephalic and atraumatic.      Mouth/Throat:      Mouth: Mucous membranes are moist.      Pharynx: Oropharynx is clear.   Eyes:      Pupils: Pupils are equal, round, and reactive to light.   Cardiovascular:      Rate and Rhythm: Normal rate and regular rhythm.      Heart sounds: Normal " "heart sounds.   Pulmonary:      Effort: Pulmonary effort is normal.      Breath sounds: Normal breath sounds.   Abdominal:      General: Abdomen is flat. Bowel sounds are normal.      Palpations: Abdomen is soft.   Musculoskeletal:      Cervical back: Normal range of motion.      Comments: Stasis of both legs   Lt knee redness, swelling with Lt lower thigh redness   Neurological:      Mental Status: She is alert.          Relevant Results  Outside Hospital Results  reviewed  Labs  Results from last 72 hours   Lab Units 06/10/24  1604   WBC AUTO x10*3/uL 6.4   HEMOGLOBIN g/dL 10.4*   HEMATOCRIT % 33.9*   PLATELETS AUTO x10*3/uL 174   NEUTROS PCT AUTO % 72.4   LYMPHS PCT AUTO % 15.7   MONOS PCT AUTO % 9.3   EOS PCT AUTO % 1.4     Results from last 72 hours   Lab Units 06/10/24  1604 06/09/24  0345 06/08/24  0600   SODIUM mmol/L 134* 136 137   POTASSIUM mmol/L 4.2 4.3 4.1   CHLORIDE mmol/L 99 103 104   CO2 mmol/L 29 28 27   BUN mg/dL 23 25* 23   CREATININE mg/dL 1.32* 1.37* 1.24*   GLUCOSE mg/dL 133* 110* 89   CALCIUM mg/dL 9.4 9.3 9.2   ANION GAP mmol/L 10 9* 10   EGFR mL/min/1.73m*2 43* 41* 46*     Results from last 72 hours   Lab Units 06/10/24  1604   ALK PHOS U/L 73   BILIRUBIN TOTAL mg/dL 0.3   PROTEIN TOTAL g/dL 7.5   ALT U/L 18   AST U/L 26   ALBUMIN g/dL 2.9*     Estimated Creatinine Clearance: 50.8 mL/min (A) (by C-G formula based on SCr of 1.32 mg/dL (H)).  C-Reactive Protein   Date Value Ref Range Status   06/10/2024 0.86 <1.00 mg/dL Final   06/06/2024 1.52 (H) <1.00 mg/dL Final   01/26/2024 1.91 (H) <1.00 mg/dL Final     Sedimentation Rate   Date Value Ref Range Status   06/10/2024 59 (H) 0 - 30 mm/h Final   06/06/2024 58 (H) 0 - 30 mm/h Final   01/26/2024 45 (H) 0 - 30 mm/h Final     No results found for: \"HIV1X2\", \"HIVCONF\", \"[id number]\"  Hepatitis C Ab   Date Value Ref Range Status   05/16/2019 NON-REACTIVE NONREACTIVE Final     Comment:      Patients receiving more than 5 mg/day of biotin may have " interference   in test results.  A sample should be taken no sooner than eight hours   after  previous dose. Contact the testing laboratory for additional    information.        Microbiology  Reviewed  Imaging  Reviewed       Assessment/Plan   Left knee knee pain /  cellulitis, likely spacer infection  Legs stasis    Recommendations :  Can resume Rocephin  Will need an I&D  Keep the legs elevated  Wound care    I spent minutes in the professional and overall care of this patient.      Lady Butterfield MD

## 2024-06-11 ENCOUNTER — APPOINTMENT (OUTPATIENT)
Dept: RADIOLOGY | Facility: HOSPITAL | Age: 74
End: 2024-06-11
Payer: MEDICARE

## 2024-06-11 LAB
ALBUMIN SERPL BCP-MCNC: 2.8 G/DL (ref 3.4–5)
ANION GAP SERPL CALC-SCNC: 11 MMOL/L (ref 10–20)
BASOPHILS # BLD AUTO: 0.06 X10*3/UL (ref 0–0.1)
BASOPHILS NFR BLD AUTO: 1.1 %
BUN SERPL-MCNC: 22 MG/DL (ref 6–23)
CALCIUM SERPL-MCNC: 9.2 MG/DL (ref 8.6–10.3)
CHLORIDE SERPL-SCNC: 102 MMOL/L (ref 98–107)
CLARITY FLD: ABNORMAL
CO2 SERPL-SCNC: 27 MMOL/L (ref 21–32)
COLOR FLD: ABNORMAL
CREAT SERPL-MCNC: 1.21 MG/DL (ref 0.5–1.05)
EGFRCR SERPLBLD CKD-EPI 2021: 48 ML/MIN/1.73M*2
EOSINOPHIL # BLD AUTO: 0.12 X10*3/UL (ref 0–0.4)
EOSINOPHIL NFR BLD AUTO: 2.2 %
ERYTHROCYTE [DISTWIDTH] IN BLOOD BY AUTOMATED COUNT: 16.4 % (ref 11.5–14.5)
GLUCOSE BLD MANUAL STRIP-MCNC: 111 MG/DL (ref 74–99)
GLUCOSE BLD MANUAL STRIP-MCNC: 199 MG/DL (ref 74–99)
GLUCOSE BLD MANUAL STRIP-MCNC: 226 MG/DL (ref 74–99)
GLUCOSE BLD MANUAL STRIP-MCNC: 90 MG/DL (ref 74–99)
GLUCOSE SERPL-MCNC: 90 MG/DL (ref 74–99)
HCT VFR BLD AUTO: 32.3 % (ref 36–46)
HGB BLD-MCNC: 9.8 G/DL (ref 12–16)
HOLD SPECIMEN: NORMAL
IMM GRANULOCYTES # BLD AUTO: 0.02 X10*3/UL (ref 0–0.5)
IMM GRANULOCYTES NFR BLD AUTO: 0.4 % (ref 0–0.9)
LYMPHOCYTES # BLD AUTO: 1.36 X10*3/UL (ref 0.8–3)
LYMPHOCYTES NFR BLD AUTO: 24.5 %
MAGNESIUM SERPL-MCNC: 1.67 MG/DL (ref 1.6–2.4)
MCH RBC QN AUTO: 25.7 PG (ref 26–34)
MCHC RBC AUTO-ENTMCNC: 30.3 G/DL (ref 32–36)
MCV RBC AUTO: 85 FL (ref 80–100)
MONOCYTES # BLD AUTO: 0.71 X10*3/UL (ref 0.05–0.8)
MONOCYTES NFR BLD AUTO: 12.8 %
NEUTROPHILS # BLD AUTO: 3.29 X10*3/UL (ref 1.6–5.5)
NEUTROPHILS NFR BLD AUTO: 59 %
NRBC BLD-RTO: 0 /100 WBCS (ref 0–0)
PHOSPHATE SERPL-MCNC: 4 MG/DL (ref 2.5–4.9)
PLATELET # BLD AUTO: 181 X10*3/UL (ref 150–450)
POTASSIUM SERPL-SCNC: 4 MMOL/L (ref 3.5–5.3)
RBC # BLD AUTO: 3.81 X10*6/UL (ref 4–5.2)
RBC # FLD AUTO: ABNORMAL /UL
SODIUM SERPL-SCNC: 136 MMOL/L (ref 136–145)
VANCOMYCIN SERPL-MCNC: 13.6 UG/ML (ref 5–20)
WBC # BLD AUTO: 5.6 X10*3/UL (ref 4.4–11.3)
WBC # FLD AUTO: ABNORMAL /UL

## 2024-06-11 PROCEDURE — 82947 ASSAY GLUCOSE BLOOD QUANT: CPT | Mod: 91

## 2024-06-11 PROCEDURE — 2500000001 HC RX 250 WO HCPCS SELF ADMINISTERED DRUGS (ALT 637 FOR MEDICARE OP)

## 2024-06-11 PROCEDURE — 99231 SBSQ HOSP IP/OBS SF/LOW 25: CPT

## 2024-06-11 PROCEDURE — 87205 SMEAR GRAM STAIN: CPT | Mod: GEALAB | Performed by: NURSE PRACTITIONER

## 2024-06-11 PROCEDURE — 2500000002 HC RX 250 W HCPCS SELF ADMINISTERED DRUGS (ALT 637 FOR MEDICARE OP, ALT 636 FOR OP/ED)

## 2024-06-11 PROCEDURE — 73560 X-RAY EXAM OF KNEE 1 OR 2: CPT | Mod: LT

## 2024-06-11 PROCEDURE — 36415 COLL VENOUS BLD VENIPUNCTURE: CPT

## 2024-06-11 PROCEDURE — 20610 DRAIN/INJ JOINT/BURSA W/O US: CPT | Performed by: ORTHOPAEDIC SURGERY

## 2024-06-11 PROCEDURE — 0S9D3ZX DRAINAGE OF LEFT KNEE JOINT, PERCUTANEOUS APPROACH, DIAGNOSTIC: ICD-10-PCS | Performed by: ORTHOPAEDIC SURGERY

## 2024-06-11 PROCEDURE — 73560 X-RAY EXAM OF KNEE 1 OR 2: CPT | Mod: LEFT SIDE | Performed by: RADIOLOGY

## 2024-06-11 PROCEDURE — 83735 ASSAY OF MAGNESIUM: CPT

## 2024-06-11 PROCEDURE — 85025 COMPLETE CBC W/AUTO DIFF WBC: CPT

## 2024-06-11 PROCEDURE — 99223 1ST HOSP IP/OBS HIGH 75: CPT | Performed by: ORTHOPAEDIC SURGERY

## 2024-06-11 PROCEDURE — A4217 STERILE WATER/SALINE, 500 ML: HCPCS

## 2024-06-11 PROCEDURE — 2500000002 HC RX 250 W HCPCS SELF ADMINISTERED DRUGS (ALT 637 FOR MEDICARE OP, ALT 636 FOR OP/ED): Mod: MUE

## 2024-06-11 PROCEDURE — 1100000001 HC PRIVATE ROOM DAILY

## 2024-06-11 PROCEDURE — 89050 BODY FLUID CELL COUNT: CPT | Performed by: NURSE PRACTITIONER

## 2024-06-11 PROCEDURE — 2500000004 HC RX 250 GENERAL PHARMACY W/ HCPCS (ALT 636 FOR OP/ED)

## 2024-06-11 PROCEDURE — 80202 ASSAY OF VANCOMYCIN: CPT

## 2024-06-11 PROCEDURE — 80069 RENAL FUNCTION PANEL: CPT

## 2024-06-11 RX ORDER — EAR PLUGS
1 EACH OTIC (EAR) 3 TIMES DAILY
Status: DISCONTINUED | OUTPATIENT
Start: 2024-06-11 | End: 2024-06-14 | Stop reason: HOSPADM

## 2024-06-11 RX ORDER — GABAPENTIN 100 MG/1
100 CAPSULE ORAL 3 TIMES DAILY
Status: DISCONTINUED | OUTPATIENT
Start: 2024-06-11 | End: 2024-06-14 | Stop reason: HOSPADM

## 2024-06-11 RX ADMIN — DESVENLAFAXINE SUCCINATE 100 MG: 100 TABLET, FILM COATED, EXTENDED RELEASE ORAL at 09:17

## 2024-06-11 RX ADMIN — OXYCODONE HYDROCHLORIDE AND ACETAMINOPHEN 2 TABLET: 5; 325 TABLET ORAL at 18:06

## 2024-06-11 RX ADMIN — AMIODARONE HYDROCHLORIDE 200 MG: 200 TABLET ORAL at 09:17

## 2024-06-11 RX ADMIN — POLYETHYLENE GLYCOL 3350 17 G: 17 POWDER, FOR SOLUTION ORAL at 09:20

## 2024-06-11 RX ADMIN — GABAPENTIN 100 MG: 100 CAPSULE ORAL at 14:19

## 2024-06-11 RX ADMIN — ASPIRIN 81 MG: 81 TABLET, COATED ORAL at 14:19

## 2024-06-11 RX ADMIN — SPIRONOLACTONE 25 MG: 25 TABLET ORAL at 09:17

## 2024-06-11 RX ADMIN — LEVOTHYROXINE SODIUM 175 MCG: 0.17 TABLET ORAL at 09:17

## 2024-06-11 RX ADMIN — POTASSIUM CHLORIDE 10 MEQ: 1500 TABLET, EXTENDED RELEASE ORAL at 09:18

## 2024-06-11 RX ADMIN — INSULIN LISPRO 3 UNITS: 100 INJECTION, SOLUTION INTRAVENOUS; SUBCUTANEOUS at 11:57

## 2024-06-11 RX ADMIN — OXYCODONE HYDROCHLORIDE AND ACETAMINOPHEN 2 TABLET: 5; 325 TABLET ORAL at 11:56

## 2024-06-11 RX ADMIN — PIPERACILLIN SODIUM AND TAZOBACTAM SODIUM 3.38 G: 3; .375 INJECTION, SOLUTION INTRAVENOUS at 02:58

## 2024-06-11 RX ADMIN — PIPERACILLIN SODIUM AND TAZOBACTAM SODIUM 3.38 G: 3; .375 INJECTION, SOLUTION INTRAVENOUS at 09:20

## 2024-06-11 RX ADMIN — OXYCODONE HYDROCHLORIDE AND ACETAMINOPHEN 2 TABLET: 5; 325 TABLET ORAL at 04:30

## 2024-06-11 RX ADMIN — TRAZODONE HYDROCHLORIDE 50 MG: 50 TABLET ORAL at 21:36

## 2024-06-11 RX ADMIN — GABAPENTIN 100 MG: 100 CAPSULE ORAL at 21:37

## 2024-06-11 RX ADMIN — Medication 1 APPLICATION: at 21:37

## 2024-06-11 RX ADMIN — PIPERACILLIN SODIUM AND TAZOBACTAM SODIUM 3.38 G: 3; .375 INJECTION, SOLUTION INTRAVENOUS at 14:19

## 2024-06-11 RX ADMIN — GABAPENTIN 100 MG: 100 CAPSULE ORAL at 09:17

## 2024-06-11 RX ADMIN — PIPERACILLIN SODIUM AND TAZOBACTAM SODIUM 3.38 G: 3; .375 INJECTION, SOLUTION INTRAVENOUS at 20:15

## 2024-06-11 RX ADMIN — LOSARTAN POTASSIUM 25 MG: 50 TABLET, FILM COATED ORAL at 09:18

## 2024-06-11 RX ADMIN — METOPROLOL SUCCINATE 50 MG: 50 TABLET, EXTENDED RELEASE ORAL at 09:17

## 2024-06-11 RX ADMIN — OXYCODONE HYDROCHLORIDE AND ACETAMINOPHEN 250 MG: 500 TABLET ORAL at 09:18

## 2024-06-11 RX ADMIN — FAMOTIDINE 40 MG: 20 TABLET, FILM COATED ORAL at 09:17

## 2024-06-11 RX ADMIN — ENOXAPARIN SODIUM 100 MG: 100 INJECTION SUBCUTANEOUS at 09:20

## 2024-06-11 RX ADMIN — ENOXAPARIN SODIUM 100 MG: 100 INJECTION SUBCUTANEOUS at 21:36

## 2024-06-11 RX ADMIN — Medication 5 MG: at 21:37

## 2024-06-11 RX ADMIN — VANCOMYCIN HYDROCHLORIDE 1500 MG: 10 INJECTION, POWDER, LYOPHILIZED, FOR SOLUTION INTRAVENOUS at 18:06

## 2024-06-11 RX ADMIN — INSULIN LISPRO 35 UNITS: 100 INJECTION, SUSPENSION SUBCUTANEOUS at 21:37

## 2024-06-11 RX ADMIN — POTASSIUM CHLORIDE 10 MEQ: 1500 TABLET, EXTENDED RELEASE ORAL at 21:37

## 2024-06-11 ASSESSMENT — COGNITIVE AND FUNCTIONAL STATUS - GENERAL
CLIMB 3 TO 5 STEPS WITH RAILING: TOTAL
MOBILITY SCORE: 8
DRESSING REGULAR LOWER BODY CLOTHING: TOTAL
STANDING UP FROM CHAIR USING ARMS: TOTAL
DRESSING REGULAR UPPER BODY CLOTHING: A LITTLE
DAILY ACTIVITIY SCORE: 13
STANDING UP FROM CHAIR USING ARMS: TOTAL
CLIMB 3 TO 5 STEPS WITH RAILING: TOTAL
PERSONAL GROOMING: A LOT
TOILETING: TOTAL
HELP NEEDED FOR BATHING: A LOT
WALKING IN HOSPITAL ROOM: TOTAL
DRESSING REGULAR UPPER BODY CLOTHING: A LITTLE
HELP NEEDED FOR BATHING: A LOT
MOBILITY SCORE: 8
WALKING IN HOSPITAL ROOM: TOTAL
TURNING FROM BACK TO SIDE WHILE IN FLAT BAD: A LOT
MOVING TO AND FROM BED TO CHAIR: TOTAL
PERSONAL GROOMING: A LOT
MOVING FROM LYING ON BACK TO SITTING ON SIDE OF FLAT BED WITH BEDRAILS: A LOT
DRESSING REGULAR LOWER BODY CLOTHING: TOTAL
TOILETING: TOTAL
MOVING TO AND FROM BED TO CHAIR: TOTAL
DAILY ACTIVITIY SCORE: 13
MOVING FROM LYING ON BACK TO SITTING ON SIDE OF FLAT BED WITH BEDRAILS: A LOT
TURNING FROM BACK TO SIDE WHILE IN FLAT BAD: A LOT

## 2024-06-11 ASSESSMENT — PAIN DESCRIPTION - LOCATION
LOCATION: KNEE
LOCATION: KNEE

## 2024-06-11 ASSESSMENT — PAIN SCALES - GENERAL
PAINLEVEL_OUTOF10: 4
PAINLEVEL_OUTOF10: 0 - NO PAIN
PAINLEVEL_OUTOF10: 5 - MODERATE PAIN
PAINLEVEL_OUTOF10: 7
PAINLEVEL_OUTOF10: 7
PAINLEVEL_OUTOF10: 0 - NO PAIN
PAINLEVEL_OUTOF10: 7

## 2024-06-11 ASSESSMENT — PAIN DESCRIPTION - ORIENTATION
ORIENTATION: LEFT
ORIENTATION: LEFT

## 2024-06-11 ASSESSMENT — PAIN - FUNCTIONAL ASSESSMENT
PAIN_FUNCTIONAL_ASSESSMENT: 0-10

## 2024-06-11 ASSESSMENT — PAIN DESCRIPTION - DESCRIPTORS: DESCRIPTORS: ACHING

## 2024-06-11 NOTE — PROGRESS NOTES
Patient: Tersea Matthews Age: 73 y.o.   Gender: female Room/bed: 145/145-A     Attending: Abner Fortune DO  Code Status:  DNR and No Intubation    Overnight Events  No acute events     Subjective  She denied any fever, chills, uncontrolled pain, Ortho team aspirated her Left knee and she tolerated the procedure well, she was told by ortho she may need amputation vs spacer re placement and she is still thinking about making her decision     Objective:  Physical Exam  Vitals and nursing note reviewed.   Constitutional:       Appearance: Normal appearance.   HENT:      Head: Normocephalic and atraumatic.      Right Ear: Tympanic membrane normal.      Nose: Nose normal.      Mouth/Throat:      Mouth: Mucous membranes are moist.   Eyes:      Extraocular Movements: Extraocular movements intact.   Cardiovascular:      Rate and Rhythm: Normal rate.      Pulses: Normal pulses.      Heart sounds: Normal heart sounds.   Pulmonary:      Effort: Pulmonary effort is normal.      Breath sounds: Normal breath sounds.   Abdominal:      General: Abdomen is flat. Bowel sounds are normal.      Palpations: Abdomen is soft.   Musculoskeletal:         General: Swelling, tenderness, deformity and signs of injury present. Normal range of motion.      Left lower leg: Edema present.   Skin:     General: Skin is warm and dry.      Capillary Refill: Capillary refill takes less than 2 seconds.      Findings: Lesion present.   Neurological:      General: No focal deficit present.      Mental Status: She is alert and oriented to person, place, and time.   Psychiatric:         Mood and Affect: Mood normal.         Behavior: Behavior normal.         Thought Content: Thought content normal.         Judgment: Judgment normal.          Temp:  [35.7 °C (96.3 °F)-36.8 °C (98.2 °F)] 36.1 °C (97 °F)  Heart Rate:  [52-62] 52  Resp:  [16] 16  BP: (120-158)/(73-87) 120/73      Intake/Output Summary (Last 24 hours) at 6/11/2024 4054  Last data filed  at 6/11/2024 0300  Gross per 24 hour   Intake --   Output 800 ml   Net -800 ml       Vitals:    06/10/24 1535   Weight: 99.2 kg (218 lb 11.1 oz)             I/Os    Intake/Output Summary (Last 24 hours) at 6/11/2024 0947  Last data filed at 6/11/2024 0300  Gross per 24 hour   Intake --   Output 800 ml   Net -800 ml       Labs:   CBC:  Recent Labs     06/11/24  0453 06/10/24  1604 06/05/24  0344   WBC 5.6 6.4 6.4   HGB 9.8* 10.4* 10.1*   HCT 32.3* 33.9* 33.2*    174 193   MCV 85 85 84     CMP:  Recent Labs     06/11/24  0453 06/10/24  1604 06/09/24  0345    134* 136   K 4.0 4.2 4.3    99 103   CO2 27 29 28   ANIONGAP 11 10 9*   BUN 22 23 25*   CREATININE 1.21* 1.32* 1.37*   EGFR 48* 43* 41*   GLUCOSE 90 133* 110*     Recent Labs     06/11/24  0453 06/10/24  1604 06/05/24  0344 03/28/24  0420   ALBUMIN 2.8* 2.9* 2.9* 2.7*   ALKPHOS  --  73 70 68   ALT  --  18 7 12   AST  --  26 9 13   BILITOT  --  0.3 0.3 0.4     Calcium/Phos:   Lab Results   Component Value Date    CALCIUM 9.2 06/11/2024    PHOS 4.0 06/11/2024      COAG:   Recent Labs     09/17/22  0538 09/15/22  0601 07/02/22  1338   INR 1.3* 1.2* 1.4*     CRP:   Lab Results   Component Value Date    CRP 0.86 06/10/2024      [unfilled]   ENDO:  Recent Labs     06/06/24  0434 03/25/24  1728 01/27/24  0522 01/26/24  1558 11/13/23  1029 07/26/23  0658 06/19/23  0601 06/18/23  0714 06/09/23  1056   TSH  --  2.13  --  40.63*  --   --  15.18*  --  22.30*   HGBA1C 8.3*  --  15.4*  --  13.9* 9.9*  --    < > 12.1*    < > = values in this interval not displayed.      CARDIAC:   Recent Labs     03/25/24  1145 01/26/24  1558 08/12/22  1221 07/02/22  1338 12/21/20  0346   TROPHS 13 6 5   < >  --    BNP  --  294* 100*  --  84    < > = values in this interval not displayed.     Recent Labs     06/09/23  1056 09/30/21  1211 05/08/21  1039   CHOL 171 128 147   LDLF 88 58 73   HDL 41.3 45.8 44.4   TRIG 210* 123 147     No data recorded    Micro/ID:  "  Susceptibility data from last 90 days.  Collected Specimen Info Organism Clindamycin Erythromycin Penicillin Tetracycline   03/26/24 Tissue/Biopsy from Wound/Tissue Streptococcus pyogenes (Group A Streptococcus)       03/25/24 Blood culture from Peripheral Venipuncture Streptococcus pyogenes (Group A Streptococcus) S S S I   03/25/24 Blood culture from Peripheral Venipuncture Streptococcus pyogenes (Group A Streptococcus)                        No lab exists for component: \"AGALPCRNB\"   .ID  Lab Results   Component Value Date    URINECULTURE (A) 03/25/2024     Multiple organisms present, probable contamination. Repeat culture if clinically indicated.    BLOODCULT Loaded on Instrument - Culture in progress 06/10/2024       Images:  Lower extremity venous duplex left  Narrative: Interpreted By:  Jethro Alan,   STUDY:  [facility] LOWER EXTREMITY VENOUS DUPLEX LEFT; 6/10/2024 8:46 pm      INDICATION:  Signs/Symptoms:Left lower ext swelling.      COMPARISON:  01/26/2024      ACCESSION NUMBER(S):  [id number]      ORDERING CLINICIAN:  DEO PADILLA      TECHNIQUE:  Vascular ultrasound of the left lower extremity was performed. Real  time compression views as well as Gray scale, color Doppler and  spectral Doppler waveform analysis was performed.      FINDINGS:  Evaluation of the visualized portions of the left common femoral  vein, proximal, mid, and distal femoral vein, and popliteal vein were  performed.    In addition, evaluation of the contralateral common  femoral vein was performed.      Limitations: The left-sided calf veins were not visualized.      The evaluated veins demonstrate normal compressibility. There is  intact venous flow demonstrating normal respiratory variability and  normal augmentation of flow with calf compression. Therefore, there  is no ultrasonographic evidence for deep vein thrombosis within the  evaluated veins. No evidence of thrombus is seen within the  contralateral common femoral vein.    "   Impression: No sonographic evidence for deep vein thrombosis within the evaluated  veins of the left lower extremity. The calf veins were not visualized.      MACRO:  None      Signed by: Jethro Alan 6/11/2024 5:56 AM  Dictation workstation:   [id number]       Medications:  Scheduled medications  amiodarone, 200 mg, oral, Daily  [Held by provider] apixaban, 5 mg, oral, BID  ascorbic acid, 250 mg, oral, Daily  aspirin, 81 mg, oral, Every Day  desvenlafaxine, 100 mg, oral, Daily  enoxaparin, 1 mg/kg, subcutaneous, BID  famotidine, 40 mg, oral, Daily  gabapentin, 100 mg, oral, BID  insulin lispro, 0-15 Units, subcutaneous, TID  insulin lispro protamin-lispro, 35 Units, subcutaneous, Nightly  levothyroxine, 175 mcg, oral, Daily  losartan, 25 mg, oral, Daily  melatonin, 5 mg, oral, Nightly  metoprolol succinate XL, 50 mg, oral, Daily  piperacillin-tazobactam, 3.375 g, intravenous, q6h  polyethylene glycol, 17 g, oral, Daily  potassium chloride CR, 10 mEq, oral, BID  spironolactone, 25 mg, oral, Daily  traZODone, 50 mg, oral, Nightly  vancomycin, 1,500 mg, intravenous, q24h      Continuous medications     PRN medications  PRN medications: acetaminophen **OR** acetaminophen **OR** acetaminophen, dextrose, dextrose, glucagon, glucagon, ondansetron **OR** ondansetron, oxyCODONE-acetaminophen, vancomycin     Assessment and Plan:  Teresa Matthews is a 73 y.o. female presenting for L Knee septic arthritis and Chronic OM evaluation, remains to be HDS, with no leukocytosis and mildly elevated inflammatory markers, evaluated by ortho and ID, knee join tap was done and further plans are pending the final decision from the patient regarding Amputation vs retrial of spacer insertion.         Acute Medical Issues:  #Left knee septic arthritis with likely infected antibiotic spacer and chronic osteomyelitis   #s/p L knee joint Aspiration    Bcx on 06/5 NTD, repeat on 06/11 pending    ID were following in [facility],  she received vanc/Zosyn initially after which she was placed on Ceftriaxone 2 gm daily   Left LUE duplex   Resume zosyn/Vanc pending final ID recs   Ortho Dr Segal was contacted: Switch home Eliquis to Therapeutic Lovenox   PT/OT after surgery   Pain control      #Chronic sacral and LUE wounds   Wound care      Chronic Medical Issues:   #AFIB: cont home amiodarone 200mg daily, therapeutic Lovenox, metop 50mg daily  #Diabetic vascular ds: Aspirin 81mg daily   #Depression: cont home Prisitiq   #Diabetic neuropathy: gabapentin 100mg BID, will increase joy gabapentin dose to help controlling the pain.   #DMII: ISS, ADA, Insulin lispro 35untis units at bedtime   #Hypothyroidism: 175mg daily   #HTN: losartan 25mg, Aldactone 25mg daily   #Insomnia: Trazodone 50mg daily         Fluids: PO   Electrolytes: replete as needed  Nutrition: ADA  GI Prophylaxis:none   DVT Prophylaxis: Lovenox      Access: PIV    Antibiotics: Vanc/Zosyn   Oxygenation: RA     Dispo:patient admitted for L knee septic arthritis and chronic OM. Currently receiving IV Abx, s/p knee join tap was done and further plans are pending the final decision from the patient regarding Amputation vs retrial of spacer insertion.

## 2024-06-11 NOTE — CONSULTS
Reason For Consult  Left knee infection    History Of Present Illness  Teresa Matthews is a 73 y.o. female presenting with with an infection of her left knee.  Patient is a long complex history, more than 2 years ago I operated on her left knee and explant of a infected total knee.  Unfortunate she was unable to be reimplanted due to multiple issues with her soft tissue in the left lower extremity and uncontrolled diabetes.  Over time she suffered periprosthetic fractures adjacent to the spacer including femoral and tibial fractures.  In the past several weeks she has had increasing pain and presented to another hospital.  She has been unable to ambulate on this leg for nearly a year.  This is a severe exacerbation of pre-existing problem for her.      Past Medical History  She has a past medical history of Atrial fibrillation (Multi), Bacteremia, Charcot's joint of left foot, CHF (congestive heart failure) (Multi), Chronic insomnia (04/10/2023), Cirrhosis of liver (Multi), Complication of internal knee prosthesis (CMS-HCC), Depression, major, in remission (CMS-HCC) (04/10/2023), Diabetes mellitus (Multi), Endocarditis, Heart valve problem, HLD (hyperlipidemia), HTN (hypertension), osteomyelitis, Obesity, ELIZABETH (obstructive sleep apnea), Papillary microcarcinoma of thyroid (Multi), Prosthetic joint infection (CMS-HCC) (04/10/2023), and Wheelchair dependent.    Surgical History  She has a past surgical history that includes Hysterectomy; Tonsillectomy; Tubal ligation; Toe Surgery (Left, 08/09/2020); Wound debridement (09/14/2022); Cardioversion (07/05/2022); Removal prosthesis total knee w/ or w/o insertion spacer (Left, 02/28/2022); Toe Surgery (Left, 11/11/2021); Total thyroidectomy (06/22/2021); Colonoscopy; Esophagoscopy / EGD; Cystoscopy w/ laser lithotripsy (Right, 05/19/2020); Gastric bypass (11/07/2019); Total knee arthroplasty (Bilateral, 2004); Knee surgery (Bilateral, 2009); and Liver biopsy  "(11/07/2019).     Social History  She reports that she has never smoked. She has never used smokeless tobacco. She reports that she does not drink alcohol and does not use drugs.    Family History  Family History   Problem Relation Name Age of Onset    Coronary artery disease Mother      Liver disease Mother      Other (non-hodgkins lymphoma) Mother      Stroke Father      Deafness Father      Hypertension Father          Allergies  Metformin, Cephalexin, Other, Statins-hmg-coa reductase inhibitors, and Adhesive tape-silicones    Review of Systems  Negative except as above     Physical Exam  Left lower extremity semination: She has some pain with range of motion of her left knee, 2+ effusion.  Wound over mid tibia.  No drainage from her incision.     Last Recorded Vitals  Blood pressure 120/73, pulse 52, temperature 36.1 °C (97 °F), temperature source Temporal, resp. rate 16, height 1.829 m (6' 0.01\"), weight 99.2 kg (218 lb 11.1 oz), SpO2 95%.    Relevant Results: Images left knee including MRI reviewed independently interpreted by me today, they show large effusion and extensive signal into the distal femur consistent with osteomyelitis      Scheduled medications  amiodarone, 200 mg, oral, Daily  [Held by provider] apixaban, 5 mg, oral, BID  ascorbic acid, 250 mg, oral, Daily  aspirin, 81 mg, oral, Every Day  desvenlafaxine, 100 mg, oral, Daily  enoxaparin, 1 mg/kg, subcutaneous, BID  famotidine, 40 mg, oral, Daily  gabapentin, 100 mg, oral, BID  insulin lispro, 0-15 Units, subcutaneous, TID  insulin lispro protamin-lispro, 35 Units, subcutaneous, Nightly  levothyroxine, 175 mcg, oral, Daily  losartan, 25 mg, oral, Daily  melatonin, 5 mg, oral, Nightly  metoprolol succinate XL, 50 mg, oral, Daily  piperacillin-tazobactam, 3.375 g, intravenous, q6h  polyethylene glycol, 17 g, oral, Daily  potassium chloride CR, 10 mEq, oral, BID  spironolactone, 25 mg, oral, Daily  traZODone, 50 mg, oral, Nightly  vancomycin, 1,500 " mg, intravenous, q24h      Continuous medications     PRN medications  PRN medications: acetaminophen **OR** acetaminophen **OR** acetaminophen, dextrose, dextrose, glucagon, glucagon, ondansetron **OR** ondansetron, oxyCODONE-acetaminophen, vancomycin  Results for orders placed or performed during the hospital encounter of 06/10/24 (from the past 24 hour(s))   POCT GLUCOSE   Result Value Ref Range    POCT Glucose 77 74 - 99 mg/dL   Lavender Top   Result Value Ref Range    Extra Tube Hold for add-ons.    Comprehensive metabolic panel   Result Value Ref Range    Glucose 133 (H) 74 - 99 mg/dL    Sodium 134 (L) 136 - 145 mmol/L    Potassium 4.2 3.5 - 5.3 mmol/L    Chloride 99 98 - 107 mmol/L    Bicarbonate 29 21 - 32 mmol/L    Anion Gap 10 10 - 20 mmol/L    Urea Nitrogen 23 6 - 23 mg/dL    Creatinine 1.32 (H) 0.50 - 1.05 mg/dL    eGFR 43 (L) >60 mL/min/1.73m*2    Calcium 9.4 8.6 - 10.3 mg/dL    Albumin 2.9 (L) 3.4 - 5.0 g/dL    Alkaline Phosphatase 73 33 - 136 U/L    Total Protein 7.5 6.4 - 8.2 g/dL    AST 26 9 - 39 U/L    Bilirubin, Total 0.3 0.0 - 1.2 mg/dL    ALT 18 7 - 45 U/L   CBC and Auto Differential   Result Value Ref Range    WBC 6.4 4.4 - 11.3 x10*3/uL    nRBC 0.0 0.0 - 0.0 /100 WBCs    RBC 4.01 4.00 - 5.20 x10*6/uL    Hemoglobin 10.4 (L) 12.0 - 16.0 g/dL    Hematocrit 33.9 (L) 36.0 - 46.0 %    MCV 85 80 - 100 fL    MCH 25.9 (L) 26.0 - 34.0 pg    MCHC 30.7 (L) 32.0 - 36.0 g/dL    RDW 16.5 (H) 11.5 - 14.5 %    Platelets 174 150 - 450 x10*3/uL    Neutrophils % 72.4 40.0 - 80.0 %    Immature Granulocytes %, Automated 0.3 0.0 - 0.9 %    Lymphocytes % 15.7 13.0 - 44.0 %    Monocytes % 9.3 2.0 - 10.0 %    Eosinophils % 1.4 0.0 - 6.0 %    Basophils % 0.9 0.0 - 2.0 %    Neutrophils Absolute 4.60 1.60 - 5.50 x10*3/uL    Immature Granulocytes Absolute, Automated 0.02 0.00 - 0.50 x10*3/uL    Lymphocytes Absolute 1.00 0.80 - 3.00 x10*3/uL    Monocytes Absolute 0.59 0.05 - 0.80 x10*3/uL    Eosinophils Absolute 0.09 0.00 -  0.40 x10*3/uL    Basophils Absolute 0.06 0.00 - 0.10 x10*3/uL   C-Reactive Protein   Result Value Ref Range    C-Reactive Protein 0.86 <1.00 mg/dL   Blood Culture    Specimen: Peripheral Venipuncture; Blood culture   Result Value Ref Range    Blood Culture Loaded on Instrument - Culture in progress    Magnesium   Result Value Ref Range    Magnesium 1.63 1.60 - 2.40 mg/dL   Sedimentation rate, automated   Result Value Ref Range    Sedimentation Rate 59 (H) 0 - 30 mm/h   Blood Culture    Specimen: Peripheral Arterial Puncture; Blood culture   Result Value Ref Range    Blood Culture Loaded on Instrument - Culture in progress    Lactate   Result Value Ref Range    Lactate 0.9 0.4 - 2.0 mmol/L   POCT GLUCOSE   Result Value Ref Range    POCT Glucose 107 (H) 74 - 99 mg/dL   POCT GLUCOSE   Result Value Ref Range    POCT Glucose 135 (H) 74 - 99 mg/dL   CBC and Auto Differential   Result Value Ref Range    WBC 5.6 4.4 - 11.3 x10*3/uL    nRBC 0.0 0.0 - 0.0 /100 WBCs    RBC 3.81 (L) 4.00 - 5.20 x10*6/uL    Hemoglobin 9.8 (L) 12.0 - 16.0 g/dL    Hematocrit 32.3 (L) 36.0 - 46.0 %    MCV 85 80 - 100 fL    MCH 25.7 (L) 26.0 - 34.0 pg    MCHC 30.3 (L) 32.0 - 36.0 g/dL    RDW 16.4 (H) 11.5 - 14.5 %    Platelets 181 150 - 450 x10*3/uL    Neutrophils % 59.0 40.0 - 80.0 %    Immature Granulocytes %, Automated 0.4 0.0 - 0.9 %    Lymphocytes % 24.5 13.0 - 44.0 %    Monocytes % 12.8 2.0 - 10.0 %    Eosinophils % 2.2 0.0 - 6.0 %    Basophils % 1.1 0.0 - 2.0 %    Neutrophils Absolute 3.29 1.60 - 5.50 x10*3/uL    Immature Granulocytes Absolute, Automated 0.02 0.00 - 0.50 x10*3/uL    Lymphocytes Absolute 1.36 0.80 - 3.00 x10*3/uL    Monocytes Absolute 0.71 0.05 - 0.80 x10*3/uL    Eosinophils Absolute 0.12 0.00 - 0.40 x10*3/uL    Basophils Absolute 0.06 0.00 - 0.10 x10*3/uL   Renal function panel   Result Value Ref Range    Glucose 90 74 - 99 mg/dL    Sodium 136 136 - 145 mmol/L    Potassium 4.0 3.5 - 5.3 mmol/L    Chloride 102 98 - 107 mmol/L     Bicarbonate 27 21 - 32 mmol/L    Anion Gap 11 10 - 20 mmol/L    Urea Nitrogen 22 6 - 23 mg/dL    Creatinine 1.21 (H) 0.50 - 1.05 mg/dL    eGFR 48 (L) >60 mL/min/1.73m*2    Calcium 9.2 8.6 - 10.3 mg/dL    Phosphorus 4.0 2.5 - 4.9 mg/dL    Albumin 2.8 (L) 3.4 - 5.0 g/dL   Magnesium   Result Value Ref Range    Magnesium 1.67 1.60 - 2.40 mg/dL   Vancomycin   Result Value Ref Range    Vancomycin 13.6 5.0 - 20.0 ug/mL   POCT GLUCOSE   Result Value Ref Range    POCT Glucose 90 74 - 99 mg/dL        Assessment/Plan     73-year-old female with recurrent infection around the left knee spacer and extensive bony involvement adjacent to the spacer.  She has osteomyelitis of the distal femur.  We discussed her situation in depth including the risks benefits and alternatives of various treatment options.  Unfortunately she is in a very difficult situation and has very limited options surgically.  I discussed with her that the best definitive treatment for this would be amputation of her left lower extremity above the knee.  This would allow complete clearance of the infection.  The functional change for her will be minimal given that she has not been ambulatory and not been using this limb.  We also discussed the possibility of debridement of the knee joint with replacement of her spacer.  The benefit of this would be the avoidance of amputation, however I advised her that her ability to get entirely clear the infection with this surgical method would be limited.  There would be significant risk of recurrence or persistence of infection.  There would be significant risk of neurovascular damage which would necessitate future amputation.  The full list of risks as listed below however they are high.  Advised her that I could offer the possibility of debridement with spacer replacement as a salvage attempt to avoid amputation.  We would likely be able to do this early next week.  The patient would like to think about the situation.   If she decides to go with amputation, I do not perform this procedure and would need consultation with a vascular specialist.  Given that she is not currently septic, she could remain on IV antibiotics and above-knee amputation could be potentially scheduled electively.  Knee was aspirated today in order to determine whether an organism can be isolated.  We discussed in depth the risks benefits and alternatives of revision of her spacer with extensive bony debridement and placement of a new static antibiotic spacer.    Rojelio Machado MD

## 2024-06-11 NOTE — CONSULTS
Wound Care Consult     Visit Date: 6/11/2024      Patient Name: Teresa Matthews         MRN: [id number]           YOB: 1950     Reason for Consult: wound        Wound History: Patient transferred from [place] for suspected osteo and septic arthritis.     Pertinent Labs:   Albumin   Date Value Ref Range Status   06/11/2024 2.8 (L) 3.4 - 5.0 g/dL Final     ALBUMIN (MG/L) IN URINE   Date Value Ref Range Status   05/12/2023 131.3 Not Established mg/L Final     Albumin, Urine Random   Date Value Ref Range Status   11/13/2023 101.2 Not established mg/L Final       Wound Assessment:  Wound 06/04/24 Other (comment) Dorsal foot;Right (Active)   Wound Image   06/05/24 1546   Site Assessment Other (Comment) 06/11/24 0948   Drainage Description None 06/11/24 0948   Drainage Amount None 06/11/24 0948   Dressing Open to air 06/11/24 0948       Wound 06/04/24 Other (comment) Left;Dorsal foot (Active)   Wound Image   06/05/24 1546   Drainage Description None 06/11/24 0948   Drainage Amount None 06/11/24 0948   Dressing Open to air 06/11/24 0948   Dressing Status Clean;Dry 06/10/24 1515       Wound 06/04/24 Pressure Injury Tibial Dorsal;Left (Active)   Wound Image   06/05/24 1538   Site Assessment Sloughing;Red 06/04/24 2137   Drainage Description None 06/11/24 0948   Drainage Amount None 06/11/24 0948   Dressing Foam 06/11/24 0948   Dressing Changed Changed 06/07/24 2228   Dressing Status Clean;Dry 06/11/24 0948       Wound 06/04/24 Pressure Injury Sacrum (Active)   Wound Image   06/05/24 1547   Site Assessment Non-blanchable erythema 06/10/24 1515   Drainage Amount None 06/11/24 0948   Dressing Foam 06/11/24 0948   Dressing Changed Changed 06/07/24 2228   Dressing Status Clean;Dry 06/11/24 0948       Wound 06/04/24 Other (comment) Knee Left;Anterior (Active)   Wound Image   06/05/24 1546   Site Assessment Edema;Red 06/10/24 1515   Drainage Description None 06/11/24 0948   Drainage Amount None 06/11/24 0948    Dressing Open to air 06/11/24 0948       Wound 06/04/24 Pressure Injury Buttocks Left (Active)   Wound Image   06/05/24 1547   Site Assessment Purple 06/04/24 2137   Drainage Description None 06/11/24 0948   Drainage Amount None 06/11/24 0948   Dressing Foam 06/08/24 1946   Dressing Changed Changed 06/07/24 2228   Dressing Status Clean;Dry 06/11/24 0948       Wound Team Summary Assessment:   Patient seen in bed. Right offloading boot on, left foot elevated on pillow.   BLE toes- Left foot- small scabs, great toe amputated. Right foot - small scabs.  Left calf- category 2 skin tear-  8 x 7 cm  Coccyx- 0.2 x 0.4 cm stage 2 , pink wound bed, small epidermal erosion dry scale in gluteal cleft.    Wound Team Plan: Patient awaitng discharge and plan to have readmit for further intervention per Veena CHARLES.    Currently, orders obtained for moist wound healing and promote local blood flow.    Left lateral calf:  Cleanse with NS, apply Adaptic, Xeroform, cover with Island dressing.   Zinc 40% to coccyx with mepilex to off load.  Q 2 hour reposition. Waffle boots while in bed.  Message with questions.    Steffanie Starr RN  6/11/2024  4:10 PM

## 2024-06-11 NOTE — PROGRESS NOTES
Vancomycin Dosing by Pharmacy- FOLLOW UP    Teresa Matthews is a 73 y.o. year old female who Pharmacy has been consulted for vancomycin dosing for bone and joint infection. Based on the patient's indication and renal status this patient is being dosed based on a goal AUC of 400-600.     Renal function is currently stable.    Current vancomycin dose: 1250 mg given every 24 hours    Estimated vancomycin AUC on current dose: 552 mg/L.hr     Visit Vitals  /73 (BP Location: Left arm, Patient Position: Lying)   Pulse 52   Temp 36.1 °C (97 °F) (Temporal)   Resp 16        Lab Results   Component Value Date    CREATININE 1.21 (H) 2024    CREATININE 1.32 (H) 06/10/2024    CREATININE 1.37 (H) 2024    CREATININE 1.24 (H) 2024        Patient weight is as follows:   Vitals:    06/10/24 1535   Weight: 99.2 kg (218 lb 11.1 oz)       Cultures:  No results found for the encounter in last 14 days.       I/O last 3 completed shifts:  In: - (0 mL/kg)   Out: 800 (8.1 mL/kg) [Urine:800 (0.2 mL/kg/hr)]  Weight: 99.2 kg   I/O during current shift:  No intake/output data recorded.    Temp (24hrs), Av.1 °C (96.9 °F), Min:35.7 °C (96.3 °F), Max:36.8 °C (98.2 °F)      Assessment/Plan    Within goal AUC range. Continue current vancomycin regimen.    This dosing regimen is predicted by [employer] to result in the following pharmacokinetic parameters:  Regimen: 1500 mg IV every 24 hours.  Start time: 21:11 on 2024  Exposure target: AUC24 (range)400-600 mg/L.hr   AUC24,ss: 552 mg/L.hr  Probability of AUC24 > 400: 94 %  Ctrough,ss: 16.6 mg/L  Probability of Ctrough,ss > 20: 28 %  Probability of nephrotoxicity (Lodise SONNY ): 12 %    The next level will be obtained on  at 6am. May be obtained sooner if clinically indicated.   Will continue to monitor renal function daily while on vancomycin and order serum creatinine at least every 48 hours if not already ordered.  Follow for continued vancomycin needs,  clinical response, and signs/symptoms of toxicity.       Julisa Flynn, [facility]

## 2024-06-11 NOTE — PROGRESS NOTES
06/11/24 1400   Discharge Planning   Home or Post Acute Services Post acute facilities (Rehab/SNF/etc)   Type of Post Acute Facility Services Long term care   Patient expects to be discharged to: Patient is LTC at [facility]. Spoke with patient about returning there and she is agreeable. Plan is for patient to return to [facility] LT once medically ready for DC and if patient wishes to transfer to new LTC facility [facility] will assist her with finding a new facility. Explained to patient she will likely be medically ready to return to [facility] before a new LTC facility could be obtained.   Does the patient need discharge transport arranged? Yes   RoundTrip coordination needed? Yes   Has discharge transport been arranged? No

## 2024-06-11 NOTE — HOSPITAL COURSE
This is a 73 y.o. female with PMH of COPD, A. 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     According to the patient she had had knee replacement surgery on 2009 and she has been a resident of a nursing facility since around 4 months ago as her health continues to decline, 4 weeks ago she started to experience severe left lower extremity pain she was not able to participate in any weightbearing activity and now she is wheelchair-bound, and noticed her left leg is swollen almost 3 times her right leg, She had a recent RLE cellulitis and wound infection c/b bacteremia due to Group A Strep in 3-4/2024. TTE/ELISEO negative for endocarditis. Has been dealing with worsening left knee pain since then. Treated with PO Doxycycline as outpatient without improvement and sent in for further evaluation. On arrival she was afebrile HDS, with no leukocytosis and mildly elevated inflammatory markers, evaluated by ortho and ID, knee join ASPIRATION was done the patient was given the choice of  Amputation vs replacement of antibiotics spacer, final decision was made by the patient and she opted for amputation which will be done in OP setting by Dr Carter, an appointment was made to follow up with Dr Carter/vascular surgery on 6/24 2:15pm here in the [facility] office currently HDS on IV abx, pending final CX from L knee aspirate, to determine the choice of PO abx.

## 2024-06-11 NOTE — CARE PLAN
Problem: Pain  Goal: My pain/discomfort is manageable  Outcome: Progressing     Problem: Safety  Goal: Patient will be injury free during hospitalization  Outcome: Progressing     Problem: Safety  Goal: I will remain free of falls  Outcome: Progressing   The patient's goals for the shift include      The clinical goals for the shift include Pain control

## 2024-06-11 NOTE — PROCEDURES
Joint Aspiration/Injection    Date/Time: 6/11/2024 9:24 AM    Performed by: Rojelio Machado MD  Authorized by: Rojelio Machado MD    Consent:     Consent obtained:  Verbal    Consent given by:  Patient    Risks, benefits, and alternatives were discussed: yes      Risks discussed:  Bleeding, infection and pain    Alternatives discussed:  No treatment, delayed treatment and alternative treatment  Universal protocol:     Procedure explained and questions answered to patient or proxy's satisfaction: yes      Site/side marked: yes      Immediately prior to procedure, a time out was called: yes      Patient identity confirmed:  Verbally with patient  Location:     Location:  Knee    Knee:  L knee  Anesthesia:     Anesthesia method:  None  Procedure details:     Preparation: Patient was prepped and draped in usual sterile fashion      Needle gauge:  18 G    Ultrasound guidance: no      Approach:  Lateral    Aspirate amount:  20cc    Aspirate characteristics:  Blood-tinged and cloudy    Steroid injected: no      Specimen collected: yes    Post-procedure details:     Dressing:  Adhesive bandage    Procedure completion:  Tolerated

## 2024-06-12 ENCOUNTER — ANESTHESIA EVENT (OUTPATIENT)
Dept: OPERATING ROOM | Facility: HOSPITAL | Age: 74
End: 2024-06-12

## 2024-06-12 LAB
ALBUMIN SERPL BCP-MCNC: 3 G/DL (ref 3.4–5)
ANION GAP SERPL CALC-SCNC: 9 MMOL/L (ref 10–20)
BASOPHILS # BLD AUTO: 0.06 X10*3/UL (ref 0–0.1)
BASOPHILS NFR BLD AUTO: 0.8 %
BUN SERPL-MCNC: 24 MG/DL (ref 6–23)
CALCIUM SERPL-MCNC: 9.3 MG/DL (ref 8.6–10.3)
CHLORIDE SERPL-SCNC: 102 MMOL/L (ref 98–107)
CO2 SERPL-SCNC: 29 MMOL/L (ref 21–32)
CREAT SERPL-MCNC: 1.37 MG/DL (ref 0.5–1.05)
EGFRCR SERPLBLD CKD-EPI 2021: 41 ML/MIN/1.73M*2
EOSINOPHIL # BLD AUTO: 0.15 X10*3/UL (ref 0–0.4)
EOSINOPHIL NFR BLD AUTO: 2.1 %
ERYTHROCYTE [DISTWIDTH] IN BLOOD BY AUTOMATED COUNT: 16.8 % (ref 11.5–14.5)
GLUCOSE BLD MANUAL STRIP-MCNC: 160 MG/DL (ref 74–99)
GLUCOSE BLD MANUAL STRIP-MCNC: 169 MG/DL (ref 74–99)
GLUCOSE BLD MANUAL STRIP-MCNC: 195 MG/DL (ref 74–99)
GLUCOSE BLD MANUAL STRIP-MCNC: 89 MG/DL (ref 74–99)
GLUCOSE SERPL-MCNC: 78 MG/DL (ref 74–99)
HCT VFR BLD AUTO: 33.8 % (ref 36–46)
HGB BLD-MCNC: 9.9 G/DL (ref 12–16)
IMM GRANULOCYTES # BLD AUTO: 0.04 X10*3/UL (ref 0–0.5)
IMM GRANULOCYTES NFR BLD AUTO: 0.6 % (ref 0–0.9)
LYMPHOCYTES # BLD AUTO: 1.15 X10*3/UL (ref 0.8–3)
LYMPHOCYTES NFR BLD AUTO: 16 %
MAGNESIUM SERPL-MCNC: 1.72 MG/DL (ref 1.6–2.4)
MCH RBC QN AUTO: 25.4 PG (ref 26–34)
MCHC RBC AUTO-ENTMCNC: 29.3 G/DL (ref 32–36)
MCV RBC AUTO: 87 FL (ref 80–100)
MONOCYTES # BLD AUTO: 0.91 X10*3/UL (ref 0.05–0.8)
MONOCYTES NFR BLD AUTO: 12.6 %
NEUTROPHILS # BLD AUTO: 4.9 X10*3/UL (ref 1.6–5.5)
NEUTROPHILS NFR BLD AUTO: 67.9 %
NRBC BLD-RTO: 0 /100 WBCS (ref 0–0)
PHOSPHATE SERPL-MCNC: 4.3 MG/DL (ref 2.5–4.9)
PLATELET # BLD AUTO: 175 X10*3/UL (ref 150–450)
POTASSIUM SERPL-SCNC: 4.3 MMOL/L (ref 3.5–5.3)
RBC # BLD AUTO: 3.89 X10*6/UL (ref 4–5.2)
SODIUM SERPL-SCNC: 136 MMOL/L (ref 136–145)
WBC # BLD AUTO: 7.2 X10*3/UL (ref 4.4–11.3)

## 2024-06-12 PROCEDURE — 2500000004 HC RX 250 GENERAL PHARMACY W/ HCPCS (ALT 636 FOR OP/ED): Performed by: INTERNAL MEDICINE

## 2024-06-12 PROCEDURE — 2500000001 HC RX 250 WO HCPCS SELF ADMINISTERED DRUGS (ALT 637 FOR MEDICARE OP)

## 2024-06-12 PROCEDURE — 2500000004 HC RX 250 GENERAL PHARMACY W/ HCPCS (ALT 636 FOR OP/ED)

## 2024-06-12 PROCEDURE — 80069 RENAL FUNCTION PANEL: CPT

## 2024-06-12 PROCEDURE — 1100000001 HC PRIVATE ROOM DAILY

## 2024-06-12 PROCEDURE — 99231 SBSQ HOSP IP/OBS SF/LOW 25: CPT | Performed by: INTERNAL MEDICINE

## 2024-06-12 PROCEDURE — 85025 COMPLETE CBC W/AUTO DIFF WBC: CPT

## 2024-06-12 PROCEDURE — 2500000002 HC RX 250 W HCPCS SELF ADMINISTERED DRUGS (ALT 637 FOR MEDICARE OP, ALT 636 FOR OP/ED): Mod: MUE

## 2024-06-12 PROCEDURE — 83735 ASSAY OF MAGNESIUM: CPT

## 2024-06-12 PROCEDURE — 82947 ASSAY GLUCOSE BLOOD QUANT: CPT | Mod: 91

## 2024-06-12 PROCEDURE — 36415 COLL VENOUS BLD VENIPUNCTURE: CPT

## 2024-06-12 RX ORDER — CEFTRIAXONE 2 G/50ML
2 INJECTION, SOLUTION INTRAVENOUS EVERY 24 HOURS
Status: DISCONTINUED | OUTPATIENT
Start: 2024-06-12 | End: 2024-06-14 | Stop reason: HOSPADM

## 2024-06-12 RX ADMIN — Medication 1 APPLICATION: at 14:46

## 2024-06-12 RX ADMIN — FAMOTIDINE 40 MG: 20 TABLET, FILM COATED ORAL at 08:40

## 2024-06-12 RX ADMIN — Medication 5 MG: at 20:40

## 2024-06-12 RX ADMIN — ENOXAPARIN SODIUM 100 MG: 100 INJECTION SUBCUTANEOUS at 08:42

## 2024-06-12 RX ADMIN — INSULIN LISPRO 3 UNITS: 100 INJECTION, SOLUTION INTRAVENOUS; SUBCUTANEOUS at 11:16

## 2024-06-12 RX ADMIN — OXYCODONE HYDROCHLORIDE AND ACETAMINOPHEN 2 TABLET: 5; 325 TABLET ORAL at 05:31

## 2024-06-12 RX ADMIN — APIXABAN 5 MG: 5 TABLET, FILM COATED ORAL at 20:40

## 2024-06-12 RX ADMIN — Medication 1 APPLICATION: at 20:41

## 2024-06-12 RX ADMIN — PIPERACILLIN SODIUM AND TAZOBACTAM SODIUM 3.38 G: 3; .375 INJECTION, SOLUTION INTRAVENOUS at 02:05

## 2024-06-12 RX ADMIN — GABAPENTIN 100 MG: 100 CAPSULE ORAL at 14:46

## 2024-06-12 RX ADMIN — INSULIN LISPRO 3 UNITS: 100 INJECTION, SOLUTION INTRAVENOUS; SUBCUTANEOUS at 16:35

## 2024-06-12 RX ADMIN — Medication 1 APPLICATION: at 08:42

## 2024-06-12 RX ADMIN — LEVOTHYROXINE SODIUM 175 MCG: 0.17 TABLET ORAL at 05:19

## 2024-06-12 RX ADMIN — TRAZODONE HYDROCHLORIDE 50 MG: 50 TABLET ORAL at 20:40

## 2024-06-12 RX ADMIN — OXYCODONE HYDROCHLORIDE AND ACETAMINOPHEN 2 TABLET: 5; 325 TABLET ORAL at 12:15

## 2024-06-12 RX ADMIN — INSULIN LISPRO 35 UNITS: 100 INJECTION, SUSPENSION SUBCUTANEOUS at 20:48

## 2024-06-12 RX ADMIN — GABAPENTIN 100 MG: 100 CAPSULE ORAL at 08:40

## 2024-06-12 RX ADMIN — PIPERACILLIN SODIUM AND TAZOBACTAM SODIUM 3.38 G: 3; .375 INJECTION, SOLUTION INTRAVENOUS at 14:46

## 2024-06-12 RX ADMIN — OXYCODONE HYDROCHLORIDE AND ACETAMINOPHEN 2 TABLET: 5; 325 TABLET ORAL at 16:18

## 2024-06-12 RX ADMIN — CEFTRIAXONE SODIUM 2 G: 2 INJECTION, SOLUTION INTRAVENOUS at 19:02

## 2024-06-12 RX ADMIN — SPIRONOLACTONE 25 MG: 25 TABLET ORAL at 08:40

## 2024-06-12 RX ADMIN — ASPIRIN 81 MG: 81 TABLET, COATED ORAL at 16:39

## 2024-06-12 RX ADMIN — OXYCODONE HYDROCHLORIDE AND ACETAMINOPHEN 250 MG: 500 TABLET ORAL at 08:40

## 2024-06-12 RX ADMIN — POTASSIUM CHLORIDE 10 MEQ: 1500 TABLET, EXTENDED RELEASE ORAL at 20:40

## 2024-06-12 RX ADMIN — DESVENLAFAXINE SUCCINATE 100 MG: 100 TABLET, FILM COATED, EXTENDED RELEASE ORAL at 08:43

## 2024-06-12 RX ADMIN — PIPERACILLIN SODIUM AND TAZOBACTAM SODIUM 3.38 G: 3; .375 INJECTION, SOLUTION INTRAVENOUS at 08:36

## 2024-06-12 RX ADMIN — GABAPENTIN 100 MG: 100 CAPSULE ORAL at 20:40

## 2024-06-12 RX ADMIN — POTASSIUM CHLORIDE 10 MEQ: 1500 TABLET, EXTENDED RELEASE ORAL at 08:39

## 2024-06-12 RX ADMIN — POLYETHYLENE GLYCOL 3350 17 G: 17 POWDER, FOR SOLUTION ORAL at 08:42

## 2024-06-12 ASSESSMENT — PAIN - FUNCTIONAL ASSESSMENT
PAIN_FUNCTIONAL_ASSESSMENT: 0-10

## 2024-06-12 ASSESSMENT — PAIN SCALES - GENERAL
PAINLEVEL_OUTOF10: 7
PAINLEVEL_OUTOF10: 3
PAINLEVEL_OUTOF10: 5 - MODERATE PAIN
PAINLEVEL_OUTOF10: 5 - MODERATE PAIN
PAINLEVEL_OUTOF10: 0 - NO PAIN

## 2024-06-12 ASSESSMENT — COGNITIVE AND FUNCTIONAL STATUS - GENERAL
PERSONAL GROOMING: A LOT
MOBILITY SCORE: 8
DRESSING REGULAR UPPER BODY CLOTHING: A LITTLE
TOILETING: TOTAL
STANDING UP FROM CHAIR USING ARMS: TOTAL
MOVING TO AND FROM BED TO CHAIR: TOTAL
TOILETING: TOTAL
DAILY ACTIVITIY SCORE: 13
DRESSING REGULAR LOWER BODY CLOTHING: TOTAL
MOVING FROM LYING ON BACK TO SITTING ON SIDE OF FLAT BED WITH BEDRAILS: A LOT
CLIMB 3 TO 5 STEPS WITH RAILING: TOTAL
MOVING FROM LYING ON BACK TO SITTING ON SIDE OF FLAT BED WITH BEDRAILS: A LOT
PERSONAL GROOMING: A LOT
HELP NEEDED FOR BATHING: A LOT
TURNING FROM BACK TO SIDE WHILE IN FLAT BAD: A LOT
DRESSING REGULAR UPPER BODY CLOTHING: A LITTLE
MOBILITY SCORE: 8
WALKING IN HOSPITAL ROOM: TOTAL
HELP NEEDED FOR BATHING: A LOT
MOVING TO AND FROM BED TO CHAIR: TOTAL
TURNING FROM BACK TO SIDE WHILE IN FLAT BAD: A LOT
STANDING UP FROM CHAIR USING ARMS: TOTAL
CLIMB 3 TO 5 STEPS WITH RAILING: TOTAL
WALKING IN HOSPITAL ROOM: TOTAL
DRESSING REGULAR LOWER BODY CLOTHING: TOTAL
DAILY ACTIVITIY SCORE: 13

## 2024-06-12 ASSESSMENT — PAIN DESCRIPTION - ORIENTATION: ORIENTATION: LEFT

## 2024-06-12 ASSESSMENT — PAIN DESCRIPTION - LOCATION: LOCATION: KNEE

## 2024-06-12 NOTE — PROGRESS NOTES
Patient: Teresa Matthews Age: 73 y.o.   Gender: female Room/bed: 145/145-A     Attending: Abner Fortune DO  Code Status:  DNR and No Intubation    Overnight Events  None   Subjective  Denies fever, chills, or further concerns.     Objective:  Physical Exam   Vitals and nursing note reviewed.   Constitutional:       Appearance: Normal appearance.   HENT:      Head: Normocephalic and atraumatic.      Right Ear: Tympanic membrane normal.      Nose: Nose normal.      Mouth/Throat:      Mouth: Mucous membranes are moist.   Eyes:      Extraocular Movements: Extraocular movements intact.   Cardiovascular:      Rate and Rhythm: Normal rate.      Pulses: Normal pulses.      Heart sounds: Normal heart sounds.   Pulmonary:      Effort: Pulmonary effort is normal.      Breath sounds: Normal breath sounds.   Abdominal:      General: Abdomen is flat. Bowel sounds are normal.      Palpations: Abdomen is soft.   Musculoskeletal:         General: Swelling, tenderness, deformity and signs of injury present. Normal range of motion.      Left lower leg: Edema present.   Skin:     General: Skin is warm and dry.      Capillary Refill: Capillary refill takes less than 2 seconds.      Findings: Lesion present.   Neurological:      General: No focal deficit present.      Mental Status: She is alert and oriented to person, place, and time.   Psychiatric:         Mood and Affect: Mood normal.         Behavior: Behavior normal.         Thought Content: Thought content normal.         Judgment: Judgment normal.         Temp:  [35.8 °C (96.4 °F)-36.6 °C (97.9 °F)] 35.8 °C (96.4 °F)  Heart Rate:  [52-69] 56  Resp:  [16] 16  BP: ()/(59-78) 113/70      Intake/Output Summary (Last 24 hours) at 6/12/2024 0918  Last data filed at 6/12/2024 0613  Gross per 24 hour   Intake 1160 ml   Output 900 ml   Net 260 ml       Vitals:    06/10/24 1535   Weight: 99.2 kg (218 lb 11.1 oz)             I/Os    Intake/Output Summary (Last 24 hours) at  6/12/2024 0918  Last data filed at 6/12/2024 0613  Gross per 24 hour   Intake 1160 ml   Output 900 ml   Net 260 ml       Labs:   CBC:  Recent Labs     06/12/24  0552 06/11/24  0453 06/10/24  1604   WBC 7.2 5.6 6.4   HGB 9.9* 9.8* 10.4*   HCT 33.8* 32.3* 33.9*    181 174   MCV 87 85 85     CMP:  Recent Labs     06/12/24  0552 06/11/24  0453 06/10/24  1604    136 134*   K 4.3 4.0 4.2    102 99   CO2 29 27 29   ANIONGAP 9* 11 10   BUN 24* 22 23   CREATININE 1.37* 1.21* 1.32*   EGFR 41* 48* 43*   GLUCOSE 78 90 133*     Recent Labs     06/12/24  0552 06/11/24  0453 06/10/24  1604 06/05/24  0344 03/28/24  0420   ALBUMIN 3.0* 2.8* 2.9* 2.9* 2.7*   ALKPHOS  --   --  73 70 68   ALT  --   --  18 7 12   AST  --   --  26 9 13   BILITOT  --   --  0.3 0.3 0.4     Calcium/Phos:   Lab Results   Component Value Date    CALCIUM 9.3 06/12/2024    PHOS 4.3 06/12/2024      COAG:   Recent Labs     09/17/22  0538 09/15/22  0601 07/02/22  1338   INR 1.3* 1.2* 1.4*     CRP:   Lab Results   Component Value Date    CRP 0.86 06/10/2024      [unfilled]   ENDO:  Recent Labs     06/06/24  0434 03/25/24  1728 01/27/24  0522 01/26/24  1558 11/13/23  1029 07/26/23  0658 06/19/23  0601 06/18/23  0714 06/09/23  1056   TSH  --  2.13  --  40.63*  --   --  15.18*  --  22.30*   HGBA1C 8.3*  --  15.4*  --  13.9* 9.9*  --    < > 12.1*    < > = values in this interval not displayed.      CARDIAC:   Recent Labs     03/25/24  1145 01/26/24  1558 08/12/22  1221 07/02/22  1338 12/21/20  0346   TROPHS 13 6 5   < >  --    BNP  --  294* 100*  --  84    < > = values in this interval not displayed.     Recent Labs     06/09/23  1056 09/30/21  1211 05/08/21  1039   CHOL 171 128 147   LDLF 88 58 73   HDL 41.3 45.8 44.4   TRIG 210* 123 147     No data recorded    Micro/ID:   Susceptibility data from last 90 days.  Collected Specimen Info Organism Clindamycin Erythromycin Penicillin Tetracycline   03/26/24 Tissue/Biopsy from Wound/Tissue  "Streptococcus pyogenes (Group A Streptococcus)       03/25/24 Blood culture from Peripheral Venipuncture Streptococcus pyogenes (Group A Streptococcus) S S S I   03/25/24 Blood culture from Peripheral Venipuncture Streptococcus pyogenes (Group A Streptococcus)                        No lab exists for component: \"AGALPCRNB\"   .ID  Lab Results   Component Value Date    URINECULTURE (A) 03/25/2024     Multiple organisms present, probable contamination. Repeat culture if clinically indicated.    BLOODCULT No growth at 1 day 06/10/2024       Images:  XR knee left 1-2 views  Narrative: Interpreted By:  Khadar Vargas,   STUDY:  XR KNEE LEFT 1-2 VIEWS; ;  6/11/2024 12:57 pm      INDICATION:  Signs/Symptoms:knee spacer.      COMPARISON:  March 22, 2022 and February 12, 2024 radiographs. June 7, 2024 MRI      ACCESSION NUMBER(S):  [id number]      ORDERING CLINICIAN:  RODGER MCGRAW      FINDINGS:  Status post explantation of knee arthroplasty hardware with cement  spacer. Lucency along the cement bone interface femoral and tibial  components relatively more conspicuous than 2022 examination which  could be related to loosening or infection. There is evident  fragmentation of the posterior portion of the femoral cemented  component newly seen since February 12, 2024.      Redemonstrated impacted distal femoral fracture with posterior  position of the inferior portion with sclerosis and new bone  formation which appears similar to February 12, 2024. There is  chronic deformity involving the tibia and fibula which appears  similar. There is marked underlying osseous demineralization. There  are findings compatible with large joint effusion increased since  February 12, 2024 with lobulated retropatellar mineralization.      Impression: Progressive joint effusion with increased intra-articular  mineralization.      Compared with February 12, 2024 interval fragmentation of the  posterior aspect of the femoral cement spacer.   "    Similar chronic impaction deformity distal femur.      Lucency along the cement interface may be due to loosening or  infection.          MACRO:  None      Signed by: Khadar Vargas 6/11/2024 2:27 PM  Dictation workstation:   [id number]  Lower extremity venous duplex left  Narrative: Interpreted By:  Jethro Alan,   STUDY:  [facility] LOWER EXTREMITY VENOUS DUPLEX LEFT; 6/10/2024 8:46 pm      INDICATION:  Signs/Symptoms:Left lower ext swelling.      COMPARISON:  01/26/2024      ACCESSION NUMBER(S):  [id number]      ORDERING CLINICIAN:  DEO PADILLA      TECHNIQUE:  Vascular ultrasound of the left lower extremity was performed. Real  time compression views as well as Gray scale, color Doppler and  spectral Doppler waveform analysis was performed.      FINDINGS:  Evaluation of the visualized portions of the left common femoral  vein, proximal, mid, and distal femoral vein, and popliteal vein were  performed.    In addition, evaluation of the contralateral common  femoral vein was performed.      Limitations: The left-sided calf veins were not visualized.      The evaluated veins demonstrate normal compressibility. There is  intact venous flow demonstrating normal respiratory variability and  normal augmentation of flow with calf compression. Therefore, there  is no ultrasonographic evidence for deep vein thrombosis within the  evaluated veins. No evidence of thrombus is seen within the  contralateral common femoral vein.      Impression: No sonographic evidence for deep vein thrombosis within the evaluated  veins of the left lower extremity. The calf veins were not visualized.      MACRO:  None      Signed by: Jethro Alan 6/11/2024 5:56 AM  Dictation workstation:   [id number]       Medications:  Scheduled medications  amiodarone, 200 mg, oral, Daily  [Held by provider] apixaban, 5 mg, oral, BID  ascorbic acid, 250 mg, oral, Daily  aspirin, 81 mg, oral, Every Day  desvenlafaxine, 100 mg, oral,  Daily  enoxaparin, 1 mg/kg, subcutaneous, BID  famotidine, 40 mg, oral, Daily  gabapentin, 100 mg, oral, TID  insulin lispro, 0-15 Units, subcutaneous, TID  insulin lispro protamin-lispro, 35 Units, subcutaneous, Nightly  levothyroxine, 175 mcg, oral, Daily  losartan, 25 mg, oral, Daily  melatonin, 5 mg, oral, Nightly  metoprolol succinate XL, 50 mg, oral, Daily  piperacillin-tazobactam, 3.375 g, intravenous, q6h  polyethylene glycol, 17 g, oral, Daily  potassium chloride CR, 10 mEq, oral, BID  spironolactone, 25 mg, oral, Daily  traZODone, 50 mg, oral, Nightly  vancomycin, 1,500 mg, intravenous, q24h  zinc oxide, 1 Application, Topical, TID      Continuous medications     PRN medications  PRN medications: acetaminophen **OR** acetaminophen **OR** acetaminophen, dextrose, dextrose, glucagon, glucagon, ondansetron **OR** ondansetron, oxyCODONE-acetaminophen, vancomycin     Assessment and Plan:  Teresa Matthews is a 73 y.o. female presenting for L Knee septic arthritis and Chronic OM evaluation, remains to be HDS, with no leukocytosis and mildly elevated inflammatory markers, evaluated by ortho and ID, knee join tap was done, final decision was made by the patient and she opted for amputation which will be done in OP setting by Dr Carter, currently HDS on IV abx, pending final CX from L knee aspirate, to determine the choice of PO abx.         Acute Medical Issues:  #Left knee septic arthritis with likely infected antibiotic spacer and chronic osteomyelitis   #s/p L knee joint Aspiration    ID were following in [facility], she received vanc/Zosyn initially after which she was placed on Ceftriaxone 2 gm daily  Left LUE duplex negative for DVT   On zosyn/Vanc pending final ID recs regarding PO abx on discharged based on aspiration culture   Ortho Dr Segal Onboard patient was given the choice between amputation vs. Replacement of Abx spacer, she opted for amputation, given her stable VS, absence of  leukocytosis and controled pain, it was advised to perform her AKA amputation in outpatient setting, referral to DR Carter/ Vascular surgeon was made to arrange for an appointment.   Switch back to her home Eliquis  Pain control      #Chronic sacral and LUE wounds   Wound care      Chronic Medical Issues:   #AFIB: cont home amiodarone 200mg daily, therapeutic Lovenox, metop 50mg daily  #Diabetic vascular ds: Aspirin 81mg daily   #Depression: cont home Prisitiq   #Diabetic neuropathy: gabapentin 100mg BID, will increase joy gabapentin dose to help controlling the pain.   #DMII: ISS, ADA, Insulin lispro 35untis units at bedtime   #Hypothyroidism: 175mg daily   #HTN: losartan 25mg, Aldactone 25mg daily   #Insomnia: Trazodone 50mg daily         Fluids: PO   Electrolytes: replete as needed  Nutrition: ADA  GI Prophylaxis:none   DVT Prophylaxis: Lovenox      Access: PIV    Antibiotics: Vanc/Zosyn   Oxygenation: RA     Dispo:patient admitted for L knee septic arthritis and chronic OM. Currently receiving IV Abx, s/p knee join tap was done and the decision was made by the patient and she opted for amputation which will be done in OP setting by Dr Carter, currently HDS on IV abx, pending final CX from L knee aspirate, to determine the choice of PO abx.

## 2024-06-12 NOTE — PROGRESS NOTES
Teresa Matthews is a 73 y.o. female on day 2 of admission presenting with Arthritis associated with diabetes (Multi).    Subjective   Interval History: no fever, no new complaints        Review of Systems    Objective   Range of Vitals (last 24 hours)  Heart Rate:  [52-66]   Temp:  [35.8 °C (96.4 °F)-36.5 °C (97.7 °F)]   Resp:  [16]   BP: ()/(55-78)   SpO2:  [91 %-93 %]   Daily Weight  06/10/24 : 99.2 kg (218 lb 11.1 oz)    Body mass index is 29.65 kg/m².    Physical Exam  Constitutional:       Appearance: Normal appearance.   HENT:      Head: Normocephalic and atraumatic.      Mouth/Throat:      Mouth: Mucous membranes are moist.      Pharynx: Oropharynx is clear.   Eyes:      Pupils: Pupils are equal, round, and reactive to light.   Cardiovascular:      Rate and Rhythm: Normal rate and regular rhythm.      Heart sounds: Normal heart sounds.   Pulmonary:      Effort: Pulmonary effort is normal.      Breath sounds: Normal breath sounds.   Abdominal:      General: Abdomen is flat. Bowel sounds are normal.      Palpations: Abdomen is soft.   Musculoskeletal:      Cervical back: Normal range of motion.      Comments: Lt effusion / redness   Neurological:      Mental Status: She is alert.         Antibiotics  acetaminophen (Tylenol) tablet 650 mg  acetaminophen (Tylenol) oral liquid 650 mg  acetaminophen (Tylenol) suppository 650 mg  ondansetron (Zofran) tablet 4 mg  ondansetron (Zofran) injection 4 mg  melatonin tablet 5 mg  polyethylene glycol (Glycolax, Miralax) packet 17 g  glucagon (Glucagen) injection 1 mg  dextrose 50 % injection 25 g  glucagon (Glucagen) injection 1 mg  dextrose 50 % injection 12.5 g  insulin lispro (HumaLOG) injection 0-15 Units  amiodarone (Pacerone) tablet 200 mg  ascorbic acid (Vitamin C) tablet 250 mg  aspirin EC tablet 81 mg  desvenlafaxine (Pristiq) 24 hr tablet 100 mg  famotidine (Pepcid) tablet 40 mg  gabapentin (Neurontin) capsule 100 mg  insulin lispro protamin-lispro  (HumaLOG Mix 75-25) 100 unit/mL (75-25) injection 35 Units  levothyroxine (Synthroid, Levoxyl) tablet 175 mcg  losartan (Cozaar) tablet 25 mg  metoprolol succinate XL (Toprol-XL) 24 hr tablet 50 mg  oxyCODONE-acetaminophen (Percocet) 5-325 mg per tablet 2 tablet  potassium chloride CR (Klor-Con M20) ER tablet 10 mEq  spironolactone (Aldactone) tablet 25 mg  traZODone (Desyrel) tablet 50 mg  apixaban (Eliquis) tablet 5 mg  enoxaparin (Lovenox) syringe 100 mg  vancomycin (Vancocin) pharmacy to dose - pharmacy monitoring  piperacillin-tazobactam-dextrose (Zosyn) IV 3.375 g  vancomycin (Vancocin) in dextrose 5 % water (D5W) 500 mL IV 1,500 mg  gabapentin (Neurontin) capsule 100 mg  zinc oxide 40 % ointment 1 Application      Relevant Results  Labs  Results from last 72 hours   Lab Units 06/12/24  0552 06/11/24  0453 06/10/24  1604   WBC AUTO x10*3/uL 7.2 5.6 6.4   HEMOGLOBIN g/dL 9.9* 9.8* 10.4*   HEMATOCRIT % 33.8* 32.3* 33.9*   PLATELETS AUTO x10*3/uL 175 181 174   NEUTROS PCT AUTO % 67.9 59.0 72.4   LYMPHS PCT AUTO % 16.0 24.5 15.7   MONOS PCT AUTO % 12.6 12.8 9.3   EOS PCT AUTO % 2.1 2.2 1.4     Results from last 72 hours   Lab Units 06/12/24  0552 06/11/24  0453 06/10/24  1604   SODIUM mmol/L 136 136 134*   POTASSIUM mmol/L 4.3 4.0 4.2   CHLORIDE mmol/L 102 102 99   CO2 mmol/L 29 27 29   BUN mg/dL 24* 22 23   CREATININE mg/dL 1.37* 1.21* 1.32*   GLUCOSE mg/dL 78 90 133*   CALCIUM mg/dL 9.3 9.2 9.4   ANION GAP mmol/L 9* 11 10   EGFR mL/min/1.73m*2 41* 48* 43*   PHOSPHORUS mg/dL 4.3 4.0  --      Results from last 72 hours   Lab Units 06/12/24  0552 06/11/24  0453 06/10/24  1604   ALK PHOS U/L  --   --  73   BILIRUBIN TOTAL mg/dL  --   --  0.3   PROTEIN TOTAL g/dL  --   --  7.5   ALT U/L  --   --  18   AST U/L  --   --  26   ALBUMIN g/dL 3.0* 2.8* 2.9*     Estimated Creatinine Clearance: 48.9 mL/min (A) (by C-G formula based on SCr of 1.37 mg/dL (H)).  C-Reactive Protein   Date Value Ref Range Status   06/10/2024 0.86  <1.00 mg/dL Final   06/06/2024 1.52 (H) <1.00 mg/dL Final   01/26/2024 1.91 (H) <1.00 mg/dL Final     Microbiology  reviewed  Imaging  reviewed        Assessment/Plan   Left knee knee pain /  cellulitis, likely spacer infection, the culture is pending  Legs stasis     Recommendations :  Can resume Rocephin  Discussed with the medical team     I spent minutes in the professional and overall care of this patient.      Lady Butterfield MD

## 2024-06-12 NOTE — PROGRESS NOTES
06/12/24 0811   Discharge Planning   Living Arrangements Other (Comment)  ([facility])   Support Systems Family members  (Sister)   Assistance Needed Patient is from [facility] and has lived there approximately 3 months. Per patient she requires assist for dressing and bathing, feds self, requires assist x 1 for dressing and bathing and transfers from the bed to  via freida lift.   Type of Residence Nursing home/residential care   Do you have animals or pets at home? No   Who is requesting discharge planning? Provider   Home or Post Acute Services Post acute facilities (Rehab/SNF/etc)   Type of Post Acute Facility Services Long term care   Patient expects to be discharged to: Per ortho: her AKA does not need to be an urgent or emergent transfer for surgery. She will remain on IV ABX and see outpatient vascular surgeon for an AKA. Patient is LTC at [facility]. Spoke with patient about returning there and she is agreeable. Plan is for patient to return to [facility] once medically ready for DC   Does the patient need discharge transport arranged? Yes   RoundTrip coordination needed? Yes   Has discharge transport been arranged? No

## 2024-06-12 NOTE — CARE PLAN
Problem: Pain  Goal: My pain/discomfort is manageable  Outcome: Progressing     Problem: Safety  Goal: Patient will be injury free during hospitalization  Outcome: Progressing  Goal: I will remain free of falls  Outcome: Progressing     Problem: Daily Care  Goal: Daily care needs are met  Outcome: Progressing     Problem: Psychosocial Needs  Goal: Demonstrates ability to cope with hospitalization/illness  Outcome: Progressing  Goal: Collaborate with me, my family, and caregiver to identify my specific goals  Outcome: Progressing     Problem: Discharge Barriers  Goal: My discharge needs are met  Outcome: Progressing     Problem: Pain - Adult  Goal: Verbalizes/displays adequate comfort level or baseline comfort level  Outcome: Progressing     Problem: Safety - Adult  Goal: Free from fall injury  Outcome: Progressing     Problem: Discharge Planning  Goal: Discharge to home or other facility with appropriate resources  Outcome: Progressing     Problem: Chronic Conditions and Co-morbidities  Goal: Patient's chronic conditions and co-morbidity symptoms are monitored and maintained or improved  Outcome: Progressing     Problem: Skin  Goal: Decreased wound size/increased tissue granulation at next dressing change  Outcome: Progressing  Flowsheets (Taken 6/12/2024 0556)  Decreased wound size/increased tissue granulation at next dressing change:   Promote sleep for wound healing   Protective dressings over bony prominences  Goal: Participates in plan/prevention/treatment measures  Outcome: Progressing  Flowsheets (Taken 6/12/2024 0556)  Participates in plan/prevention/treatment measures: Elevate heels  Goal: Prevent/manage excess moisture  Outcome: Progressing  Flowsheets (Taken 6/12/2024 0556)  Prevent/manage excess moisture: Cleanse incontinence/protect with barrier cream  Goal: Prevent/minimize sheer/friction injuries  Outcome: Progressing  Flowsheets (Taken 6/12/2024 0556)  Prevent/minimize sheer/friction injuries:    HOB 30 degrees or less   Turn/reposition every 2 hours/use positioning/transfer devices   Use pull sheet  Goal: Promote/optimize nutrition  Outcome: Progressing  Flowsheets (Taken 6/12/2024 0556)  Promote/optimize nutrition: Offer water/supplements/favorite foods  Goal: Promote skin healing  Outcome: Progressing  Flowsheets (Taken 6/12/2024 0556)  Promote skin healing:   Assess skin/pad under line(s)/device(s)   Protective dressings over bony prominences   Rotate device position/do not position patient on device   Turn/reposition every 2 hours/use positioning/transfer devices     Problem: Diabetes  Goal: Achieve decreasing blood glucose levels by end of shift  Outcome: Progressing  Goal: Increase stability of blood glucose readings by end of shift  Outcome: Progressing  Goal: Decrease in ketones present in urine by end of shift  Outcome: Progressing  Goal: Maintain electrolyte levels within acceptable range throughout shift  Outcome: Progressing  Goal: Maintain glucose levels >70mg/dl to <250mg/dl throughout shift  Outcome: Progressing  Goal: No changes in neurological exam by end of shift  Outcome: Progressing  Goal: Learn about and adhere to nutrition recommendations by end of shift  Outcome: Progressing  Goal: Vital signs within normal range for age by end of shift  Outcome: Progressing  Goal: Increase self care and/or family involovement by end of shift  Outcome: Progressing  Goal: Receive DSME education by end of shift  Outcome: Progressing     Problem: Pain  Goal: Takes deep breaths with improved pain control throughout the shift  Outcome: Progressing  Goal: Turns in bed with improved pain control throughout the shift  Outcome: Progressing  Goal: Walks with improved pain control throughout the shift  Outcome: Progressing  Goal: Performs ADL's with improved pain control throughout shift  Outcome: Progressing  Goal: Participates in PT with improved pain control throughout the shift  Outcome: Progressing  Goal:  Free from opioid side effects throughout the shift  Outcome: Progressing  Goal: Free from acute confusion related to pain meds throughout the shift  Outcome: Progressing   The patient's goals for the shift include  sleep and pain control    The clinical goals for the shift include pain control    Over the shift, the patient did not make progress toward the following goals. Barriers to progression include pain. Recommendations to address these barriers include pain meds, repositioning, and rest.

## 2024-06-12 NOTE — CARE PLAN
The patient's goals for the shift include      The clinical goals for the shift include Minimize painful stimuli; coordinate care and adjust environment      Problem: Pain  Goal: My pain/discomfort is manageable  Outcome: Progressing     Problem: Safety  Goal: Patient will be injury free during hospitalization  Outcome: Progressing  Goal: I will remain free of falls  Outcome: Progressing     Problem: Daily Care  Goal: Daily care needs are met  Outcome: Progressing     Problem: Psychosocial Needs  Goal: Demonstrates ability to cope with hospitalization/illness  Outcome: Progressing  Goal: Collaborate with me, my family, and caregiver to identify my specific goals  Outcome: Progressing     Problem: Discharge Barriers  Goal: My discharge needs are met  Outcome: Progressing     Problem: Pain - Adult  Goal: Verbalizes/displays adequate comfort level or baseline comfort level  Outcome: Progressing     Problem: Safety - Adult  Goal: Free from fall injury  Outcome: Progressing     Problem: Discharge Planning  Goal: Discharge to home or other facility with appropriate resources  Outcome: Progressing     Problem: Chronic Conditions and Co-morbidities  Goal: Patient's chronic conditions and co-morbidity symptoms are monitored and maintained or improved  Outcome: Progressing     Problem: Skin  Goal: Decreased wound size/increased tissue granulation at next dressing change  Outcome: Progressing  Goal: Participates in plan/prevention/treatment measures  Outcome: Progressing  Goal: Prevent/manage excess moisture  Outcome: Progressing  Goal: Prevent/minimize sheer/friction injuries  Outcome: Progressing  Flowsheets (Taken 6/12/2024 1782)  Prevent/minimize sheer/friction injuries: Increase activity/out of bed for meals  Goal: Promote/optimize nutrition  Outcome: Progressing  Goal: Promote skin healing  Outcome: Progressing     Problem: Diabetes  Goal: Achieve decreasing blood glucose levels by end of shift  Outcome:  Progressing  Goal: Increase stability of blood glucose readings by end of shift  Outcome: Progressing  Goal: Decrease in ketones present in urine by end of shift  Outcome: Progressing  Goal: Maintain electrolyte levels within acceptable range throughout shift  Outcome: Progressing  Goal: Maintain glucose levels >70mg/dl to <250mg/dl throughout shift  Outcome: Progressing  Goal: No changes in neurological exam by end of shift  Outcome: Progressing  Goal: Learn about and adhere to nutrition recommendations by end of shift  Outcome: Progressing  Goal: Vital signs within normal range for age by end of shift  Outcome: Progressing  Goal: Increase self care and/or family involovement by end of shift  Outcome: Progressing  Goal: Receive DSME education by end of shift  Outcome: Progressing     Problem: Pain  Goal: Takes deep breaths with improved pain control throughout the shift  Outcome: Progressing  Goal: Turns in bed with improved pain control throughout the shift  Outcome: Progressing  Goal: Walks with improved pain control throughout the shift  Outcome: Progressing  Goal: Performs ADL's with improved pain control throughout shift  Outcome: Progressing  Goal: Participates in PT with improved pain control throughout the shift  Outcome: Progressing  Goal: Free from opioid side effects throughout the shift  Outcome: Progressing  Goal: Free from acute confusion related to pain meds throughout the shift  Outcome: Progressing

## 2024-06-13 LAB
ALBUMIN SERPL BCP-MCNC: 2.7 G/DL (ref 3.4–5)
ANION GAP SERPL CALC-SCNC: 10 MMOL/L (ref 10–20)
BASOPHILS # BLD AUTO: 0.04 X10*3/UL (ref 0–0.1)
BASOPHILS NFR BLD AUTO: 0.5 %
BUN SERPL-MCNC: 24 MG/DL (ref 6–23)
CALCIUM SERPL-MCNC: 8.9 MG/DL (ref 8.6–10.3)
CHLORIDE SERPL-SCNC: 102 MMOL/L (ref 98–107)
CO2 SERPL-SCNC: 26 MMOL/L (ref 21–32)
CREAT SERPL-MCNC: 1.44 MG/DL (ref 0.5–1.05)
EGFRCR SERPLBLD CKD-EPI 2021: 39 ML/MIN/1.73M*2
EOSINOPHIL # BLD AUTO: 0.07 X10*3/UL (ref 0–0.4)
EOSINOPHIL NFR BLD AUTO: 0.9 %
ERYTHROCYTE [DISTWIDTH] IN BLOOD BY AUTOMATED COUNT: 16.9 % (ref 11.5–14.5)
GLUCOSE BLD MANUAL STRIP-MCNC: 143 MG/DL (ref 74–99)
GLUCOSE BLD MANUAL STRIP-MCNC: 147 MG/DL (ref 74–99)
GLUCOSE BLD MANUAL STRIP-MCNC: 197 MG/DL (ref 74–99)
GLUCOSE BLD MANUAL STRIP-MCNC: 80 MG/DL (ref 74–99)
GLUCOSE SERPL-MCNC: 76 MG/DL (ref 74–99)
HCT VFR BLD AUTO: 29.4 % (ref 36–46)
HGB BLD-MCNC: 8.7 G/DL (ref 12–16)
IMM GRANULOCYTES # BLD AUTO: 0.05 X10*3/UL (ref 0–0.5)
IMM GRANULOCYTES NFR BLD AUTO: 0.7 % (ref 0–0.9)
LYMPHOCYTES # BLD AUTO: 1.21 X10*3/UL (ref 0.8–3)
LYMPHOCYTES NFR BLD AUTO: 16.1 %
MAGNESIUM SERPL-MCNC: 1.7 MG/DL (ref 1.6–2.4)
MCH RBC QN AUTO: 25.6 PG (ref 26–34)
MCHC RBC AUTO-ENTMCNC: 29.6 G/DL (ref 32–36)
MCV RBC AUTO: 87 FL (ref 80–100)
MONOCYTES # BLD AUTO: 1.07 X10*3/UL (ref 0.05–0.8)
MONOCYTES NFR BLD AUTO: 14.2 %
NEUTROPHILS # BLD AUTO: 5.08 X10*3/UL (ref 1.6–5.5)
NEUTROPHILS NFR BLD AUTO: 67.6 %
NRBC BLD-RTO: 0 /100 WBCS (ref 0–0)
PHOSPHATE SERPL-MCNC: 3.9 MG/DL (ref 2.5–4.9)
PLATELET # BLD AUTO: 157 X10*3/UL (ref 150–450)
POTASSIUM SERPL-SCNC: 4.3 MMOL/L (ref 3.5–5.3)
RBC # BLD AUTO: 3.4 X10*6/UL (ref 4–5.2)
SODIUM SERPL-SCNC: 134 MMOL/L (ref 136–145)
VANCOMYCIN SERPL-MCNC: 11.4 UG/ML (ref 5–20)
WBC # BLD AUTO: 7.5 X10*3/UL (ref 4.4–11.3)

## 2024-06-13 PROCEDURE — 2500000002 HC RX 250 W HCPCS SELF ADMINISTERED DRUGS (ALT 637 FOR MEDICARE OP, ALT 636 FOR OP/ED)

## 2024-06-13 PROCEDURE — 1100000001 HC PRIVATE ROOM DAILY

## 2024-06-13 PROCEDURE — 85025 COMPLETE CBC W/AUTO DIFF WBC: CPT

## 2024-06-13 PROCEDURE — 80202 ASSAY OF VANCOMYCIN: CPT

## 2024-06-13 PROCEDURE — 83735 ASSAY OF MAGNESIUM: CPT

## 2024-06-13 PROCEDURE — 2500000004 HC RX 250 GENERAL PHARMACY W/ HCPCS (ALT 636 FOR OP/ED): Performed by: INTERNAL MEDICINE

## 2024-06-13 PROCEDURE — 80069 RENAL FUNCTION PANEL: CPT

## 2024-06-13 PROCEDURE — 2500000001 HC RX 250 WO HCPCS SELF ADMINISTERED DRUGS (ALT 637 FOR MEDICARE OP)

## 2024-06-13 PROCEDURE — 82947 ASSAY GLUCOSE BLOOD QUANT: CPT | Mod: 91

## 2024-06-13 PROCEDURE — 36415 COLL VENOUS BLD VENIPUNCTURE: CPT

## 2024-06-13 PROCEDURE — 2500000004 HC RX 250 GENERAL PHARMACY W/ HCPCS (ALT 636 FOR OP/ED)

## 2024-06-13 PROCEDURE — 99232 SBSQ HOSP IP/OBS MODERATE 35: CPT | Performed by: INTERNAL MEDICINE

## 2024-06-13 RX ADMIN — METOPROLOL SUCCINATE 50 MG: 50 TABLET, EXTENDED RELEASE ORAL at 08:41

## 2024-06-13 RX ADMIN — OXYCODONE HYDROCHLORIDE AND ACETAMINOPHEN 250 MG: 500 TABLET ORAL at 08:41

## 2024-06-13 RX ADMIN — SPIRONOLACTONE 25 MG: 25 TABLET ORAL at 08:41

## 2024-06-13 RX ADMIN — POLYETHYLENE GLYCOL 3350 17 G: 17 POWDER, FOR SOLUTION ORAL at 08:42

## 2024-06-13 RX ADMIN — Medication 1 APPLICATION: at 15:08

## 2024-06-13 RX ADMIN — ASPIRIN 81 MG: 81 TABLET, COATED ORAL at 17:22

## 2024-06-13 RX ADMIN — TRAZODONE HYDROCHLORIDE 50 MG: 50 TABLET ORAL at 21:10

## 2024-06-13 RX ADMIN — POTASSIUM CHLORIDE 10 MEQ: 1500 TABLET, EXTENDED RELEASE ORAL at 21:10

## 2024-06-13 RX ADMIN — APIXABAN 5 MG: 5 TABLET, FILM COATED ORAL at 21:10

## 2024-06-13 RX ADMIN — Medication 1 APPLICATION: at 08:42

## 2024-06-13 RX ADMIN — GABAPENTIN 100 MG: 100 CAPSULE ORAL at 15:09

## 2024-06-13 RX ADMIN — OXYCODONE HYDROCHLORIDE AND ACETAMINOPHEN 2 TABLET: 5; 325 TABLET ORAL at 15:09

## 2024-06-13 RX ADMIN — GABAPENTIN 100 MG: 100 CAPSULE ORAL at 08:41

## 2024-06-13 RX ADMIN — GABAPENTIN 100 MG: 100 CAPSULE ORAL at 21:10

## 2024-06-13 RX ADMIN — AMIODARONE HYDROCHLORIDE 200 MG: 200 TABLET ORAL at 08:41

## 2024-06-13 RX ADMIN — Medication 1 APPLICATION: at 21:14

## 2024-06-13 RX ADMIN — OXYCODONE HYDROCHLORIDE AND ACETAMINOPHEN 2 TABLET: 5; 325 TABLET ORAL at 04:24

## 2024-06-13 RX ADMIN — FAMOTIDINE 40 MG: 20 TABLET, FILM COATED ORAL at 08:41

## 2024-06-13 RX ADMIN — POTASSIUM CHLORIDE 10 MEQ: 1500 TABLET, EXTENDED RELEASE ORAL at 08:41

## 2024-06-13 RX ADMIN — DESVENLAFAXINE SUCCINATE 100 MG: 100 TABLET, FILM COATED, EXTENDED RELEASE ORAL at 08:41

## 2024-06-13 RX ADMIN — LEVOTHYROXINE SODIUM 175 MCG: 0.17 TABLET ORAL at 05:52

## 2024-06-13 RX ADMIN — OXYCODONE HYDROCHLORIDE AND ACETAMINOPHEN 2 TABLET: 5; 325 TABLET ORAL at 08:42

## 2024-06-13 RX ADMIN — INSULIN LISPRO 35 UNITS: 100 INJECTION, SUSPENSION SUBCUTANEOUS at 23:07

## 2024-06-13 RX ADMIN — CEFTRIAXONE SODIUM 2 G: 2 INJECTION, SOLUTION INTRAVENOUS at 19:37

## 2024-06-13 RX ADMIN — APIXABAN 5 MG: 5 TABLET, FILM COATED ORAL at 08:41

## 2024-06-13 RX ADMIN — Medication 5 MG: at 21:10

## 2024-06-13 ASSESSMENT — PAIN - FUNCTIONAL ASSESSMENT
PAIN_FUNCTIONAL_ASSESSMENT: 0-10

## 2024-06-13 ASSESSMENT — COGNITIVE AND FUNCTIONAL STATUS - GENERAL
HELP NEEDED FOR BATHING: A LOT
MOBILITY SCORE: 8
DAILY ACTIVITIY SCORE: 14
STANDING UP FROM CHAIR USING ARMS: TOTAL
TURNING FROM BACK TO SIDE WHILE IN FLAT BAD: A LOT
MOVING FROM LYING ON BACK TO SITTING ON SIDE OF FLAT BED WITH BEDRAILS: A LOT
MOVING FROM LYING ON BACK TO SITTING ON SIDE OF FLAT BED WITH BEDRAILS: A LOT
DRESSING REGULAR UPPER BODY CLOTHING: A LITTLE
DRESSING REGULAR LOWER BODY CLOTHING: TOTAL
DAILY ACTIVITIY SCORE: 14
MOBILITY SCORE: 8
WALKING IN HOSPITAL ROOM: TOTAL
CLIMB 3 TO 5 STEPS WITH RAILING: TOTAL
TOILETING: TOTAL
HELP NEEDED FOR BATHING: A LOT
MOVING TO AND FROM BED TO CHAIR: TOTAL
PERSONAL GROOMING: A LITTLE
TOILETING: TOTAL
PERSONAL GROOMING: A LITTLE
TURNING FROM BACK TO SIDE WHILE IN FLAT BAD: A LOT
WALKING IN HOSPITAL ROOM: TOTAL
DRESSING REGULAR UPPER BODY CLOTHING: A LITTLE
DRESSING REGULAR LOWER BODY CLOTHING: TOTAL
MOVING TO AND FROM BED TO CHAIR: TOTAL
CLIMB 3 TO 5 STEPS WITH RAILING: TOTAL
STANDING UP FROM CHAIR USING ARMS: TOTAL

## 2024-06-13 ASSESSMENT — PAIN DESCRIPTION - ORIENTATION
ORIENTATION: LEFT
ORIENTATION: LEFT

## 2024-06-13 ASSESSMENT — PAIN SCALES - GENERAL
PAINLEVEL_OUTOF10: 4
PAINLEVEL_OUTOF10: 7
PAINLEVEL_OUTOF10: 8
PAINLEVEL_OUTOF10: 6
PAINLEVEL_OUTOF10: 7
PAINLEVEL_OUTOF10: 4
PAINLEVEL_OUTOF10: 4

## 2024-06-13 ASSESSMENT — PAIN DESCRIPTION - LOCATION
LOCATION: KNEE
LOCATION: LEG

## 2024-06-13 NOTE — PROGRESS NOTES
Occupational Therapy                 Therapy Communication Note    Patient Name: Teresa Matthews  MRN: [id number]  Today's Date: 6/13/2024     Discipline: Occupational Therapy    Missed Visit Reason: Missed Visit Reason: Cancel (Pt from [facility], dependent for ADLs, and is a [place] lift for transfers at baseline. Current ortho plan is for L AKA. No skilled therapy needs indicated at this time, RN informed and in agreement.)    Missed Time: Attempt    Comment: [id number]

## 2024-06-13 NOTE — PROGRESS NOTES
Physical Therapy                 Therapy Communication Note    Patient Name: Teresa Matthews  MRN: [id number]  Today's Date: 6/13/2024     Discipline: Physical Therapy    Missed Visit Reason: Missed Visit Reason: Cancel (per OT: Pt from [facility], dependent for ADLs, and is a [place] lift for transfers at baseline. Current ortho plan is for L AKA. No skilled therapy needs indicated at this time, RN informed and in agreement. Cancel consult)    Missed Time: Cancel

## 2024-06-13 NOTE — PROGRESS NOTES
Teresa Matthews is a 73 y.o. female on day 3 of admission presenting with Arthritis associated with diabetes (Multi).    Subjective   Interval History: no fever, no new complaints        Review of Systems    Objective   Range of Vitals (last 24 hours)  Heart Rate:  [64-69]   Temp:  [36.3 °C (97.3 °F)-36.6 °C (97.9 °F)]   Resp:  [16-18]   BP: ()/(55-67)   SpO2:  [91 %-93 %]   Daily Weight  06/10/24 : 99.2 kg (218 lb 11.1 oz)    Body mass index is 29.65 kg/m².    Physical Exam  Constitutional:       Appearance: Normal appearance.   HENT:      Head: Normocephalic and atraumatic.      Mouth/Throat:      Mouth: Mucous membranes are moist.      Pharynx: Oropharynx is clear.   Eyes:      Pupils: Pupils are equal, round, and reactive to light.   Cardiovascular:      Rate and Rhythm: Normal rate and regular rhythm.      Heart sounds: Normal heart sounds.   Pulmonary:      Effort: Pulmonary effort is normal.      Breath sounds: Normal breath sounds.   Abdominal:      General: Abdomen is flat. Bowel sounds are normal.      Palpations: Abdomen is soft.   Musculoskeletal:      Cervical back: Normal range of motion.      Comments: Lt effusion / redness   Neurological:      Mental Status: She is alert.         Antibiotics  acetaminophen (Tylenol) tablet 650 mg  acetaminophen (Tylenol) oral liquid 650 mg  acetaminophen (Tylenol) suppository 650 mg  ondansetron (Zofran) tablet 4 mg  ondansetron (Zofran) injection 4 mg  melatonin tablet 5 mg  polyethylene glycol (Glycolax, Miralax) packet 17 g  glucagon (Glucagen) injection 1 mg  dextrose 50 % injection 25 g  glucagon (Glucagen) injection 1 mg  dextrose 50 % injection 12.5 g  insulin lispro (HumaLOG) injection 0-15 Units  amiodarone (Pacerone) tablet 200 mg  ascorbic acid (Vitamin C) tablet 250 mg  aspirin EC tablet 81 mg  desvenlafaxine (Pristiq) 24 hr tablet 100 mg  famotidine (Pepcid) tablet 40 mg  gabapentin (Neurontin) capsule 100 mg  insulin lispro protamin-lispro  (HumaLOG Mix 75-25) 100 unit/mL (75-25) injection 35 Units  levothyroxine (Synthroid, Levoxyl) tablet 175 mcg  losartan (Cozaar) tablet 25 mg  metoprolol succinate XL (Toprol-XL) 24 hr tablet 50 mg  oxyCODONE-acetaminophen (Percocet) 5-325 mg per tablet 2 tablet  potassium chloride CR (Klor-Con M20) ER tablet 10 mEq  spironolactone (Aldactone) tablet 25 mg  traZODone (Desyrel) tablet 50 mg  apixaban (Eliquis) tablet 5 mg  enoxaparin (Lovenox) syringe 100 mg  vancomycin (Vancocin) pharmacy to dose - pharmacy monitoring  piperacillin-tazobactam-dextrose (Zosyn) IV 3.375 g  vancomycin (Vancocin) in dextrose 5 % water (D5W) 500 mL IV 1,500 mg  gabapentin (Neurontin) capsule 100 mg  zinc oxide 40 % ointment 1 Application      Relevant Results  Labs  Results from last 72 hours   Lab Units 06/13/24  0520 06/12/24  0552 06/11/24  0453   WBC AUTO x10*3/uL 7.5 7.2 5.6   HEMOGLOBIN g/dL 8.7* 9.9* 9.8*   HEMATOCRIT % 29.4* 33.8* 32.3*   PLATELETS AUTO x10*3/uL 157 175 181   NEUTROS PCT AUTO % 67.6 67.9 59.0   LYMPHS PCT AUTO % 16.1 16.0 24.5   MONOS PCT AUTO % 14.2 12.6 12.8   EOS PCT AUTO % 0.9 2.1 2.2     Results from last 72 hours   Lab Units 06/13/24  0520 06/12/24  0552 06/11/24  0453   SODIUM mmol/L 134* 136 136   POTASSIUM mmol/L 4.3 4.3 4.0   CHLORIDE mmol/L 102 102 102   CO2 mmol/L 26 29 27   BUN mg/dL 24* 24* 22   CREATININE mg/dL 1.44* 1.37* 1.21*   GLUCOSE mg/dL 76 78 90   CALCIUM mg/dL 8.9 9.3 9.2   ANION GAP mmol/L 10 9* 11   EGFR mL/min/1.73m*2 39* 41* 48*   PHOSPHORUS mg/dL 3.9 4.3 4.0     Results from last 72 hours   Lab Units 06/13/24  0520 06/12/24  0552 06/11/24  0453 06/10/24  1604   ALK PHOS U/L  --   --   --  73   BILIRUBIN TOTAL mg/dL  --   --   --  0.3   PROTEIN TOTAL g/dL  --   --   --  7.5   ALT U/L  --   --   --  18   AST U/L  --   --   --  26   ALBUMIN g/dL 2.7* 3.0* 2.8* 2.9*     Estimated Creatinine Clearance: 46.5 mL/min (A) (by C-G formula based on SCr of 1.44 mg/dL (H)).  C-Reactive Protein    Date Value Ref Range Status   06/10/2024 0.86 <1.00 mg/dL Final   06/06/2024 1.52 (H) <1.00 mg/dL Final   01/26/2024 1.91 (H) <1.00 mg/dL Final     Microbiology  reviewed  Imaging  reviewed        Assessment/Plan   Left knee knee pain /  cellulitis, likely spacer infection, the culture is pending  Legs stasis     Recommendations :  Continue Rocephin, plan on either oral Augmentin or Ceftin as suppressive treatment until her planned procedure, will revisit if the culture indicate the need for different antibiotics   Discussed with the medical team     I spent minutes in the professional and overall care of this patient.      Lady Butterfield MD

## 2024-06-13 NOTE — PROGRESS NOTES
Progress     Cosign Needed     Date of Service: 6/13/2024  9:40 AM     Cosign Needed       Expand All Collapse All    Patient: Teresa Matthews Age: 73 y.o.   Gender: female Room/bed: 145/145-A      Attending: Abner Fortune DO  Code Status:  DNR and No Intubation     Overnight Events  None   Subjective  Patient feels around the same today, denies any fever, cough, N/V, change in her BM or uncontrolled pain.      Objective:  Physical Exam   Vitals and nursing note reviewed.   Constitutional:       Appearance: Normal appearance.   HENT:      Head: Normocephalic and atraumatic.      Right Ear: Tympanic membrane normal.      Nose: Nose normal.      Mouth/Throat:      Mouth: Mucous membranes are moist.   Eyes:      Extraocular Movements: Extraocular movements intact.   Cardiovascular:      Rate and Rhythm: Normal rate.      Pulses: Normal pulses.      Heart sounds: Normal heart sounds.   Pulmonary:      Effort: Pulmonary effort is normal.      Breath sounds: Normal breath sounds.   Abdominal:      General: Abdomen is flat. Bowel sounds are normal.      Palpations: Abdomen is soft.   Musculoskeletal:         General: Swelling, tenderness, deformity and signs of injury present. Normal range of motion.      Left lower leg: Edema present.   Skin:     General: Skin is warm and dry.      Capillary Refill: Capillary refill takes less than 2 seconds.      Findings: Lesion present.   Neurological:      General: No focal deficit present.      Mental Status: She is alert and oriented to person, place, and time.   Psychiatric:         Mood and Affect: Mood normal.         Behavior: Behavior normal.         Thought Content: Thought content normal.         Judgment: Judgment normal.         Temp:  [36.2 °C (97.2 °F)-36.6 °C (97.9 °F)] 36.6 °C (97.9 °F)  Heart Rate:  [60-69] 69  Resp:  [16-18] 18  BP: ()/(55-67) 106/67        Intake/Output Summary (Last 24 hours) at 6/13/2024 0940  Last data filed at 6/13/2024 0600       Gross per 24 hour   Intake 390 ml   Output 1200 ml   Net -810 ml         Vitals       Vitals:     06/10/24 1535   Weight: 99.2 kg (218 lb 11.1 oz)                   I/Os     Intake/Output Summary (Last 24 hours) at 6/13/2024 0940  Last data filed at 6/13/2024 0600      Gross per 24 hour   Intake 390 ml   Output 1200 ml   Net -810 ml         Labs:   CBC:        Recent Labs     06/13/24  0520 06/12/24  0552 06/11/24  0453   WBC 7.5 7.2 5.6   HGB 8.7* 9.9* 9.8*   HCT 29.4* 33.8* 32.3*    175 181   MCV 87 87 85      CMP:        Recent Labs     06/13/24  0520 06/12/24  0552 06/11/24  0453   * 136 136   K 4.3 4.3 4.0    102 102   CO2 26 29 27   ANIONGAP 10 9* 11   BUN 24* 24* 22   CREATININE 1.44* 1.37* 1.21*   EGFR 39* 41* 48*   GLUCOSE 76 78 90               Recent Labs     06/13/24  0520 06/12/24  0552 06/11/24  0453 06/10/24  1604 06/05/24  0344 03/28/24  0420   ALBUMIN 2.7* 3.0* 2.8* 2.9* 2.9* 2.7*   ALKPHOS  --   --   --  73 70 68   ALT  --   --   --  18 7 12   AST  --   --   --  26 9 13   BILITOT  --   --   --  0.3 0.3 0.4      Calcium/Phos:         Lab Results   Component Value Date     CALCIUM 8.9 06/13/2024     PHOS 3.9 06/13/2024      COAG:         Recent Labs     09/17/22  0538 09/15/22  0601 07/02/22  1338   INR 1.3* 1.2* 1.4*      CRP:         Lab Results   Component Value Date     CRP 0.86 06/10/2024      [unfilled]   ENDO:              Recent Labs     06/06/24  0434 03/25/24  1728 01/27/24  0522 01/26/24  1558 11/13/23  1029 07/26/23  0658 06/19/23  0601 06/18/23  0714 06/09/23  1056   TSH  --  2.13  --  40.63*  --   --  15.18*  --  22.30*   HGBA1C 8.3*  --  15.4*  --  13.9* 9.9*  --    < > 12.1*    < > = values in this interval not displayed.      CARDIAC:           Recent Labs     03/25/24  1145 01/26/24  1558 08/12/22  1221 07/02/22  1338 12/21/20  0346   TROPHS 13 6 5   < >  --    BNP  --  294* 100*  --  84    < > = values in this interval not displayed.            Recent Labs  "    06/09/23  1056 09/30/21  1211 05/08/21  1039   CHOL 171 128 147   LDLF 88 58 73   HDL 41.3 45.8 44.4   TRIG 210* 123 147      No data recorded     Micro/ID:   Susceptibility data from last 90 days.  Collected Specimen Info Organism Clindamycin Erythromycin Penicillin Tetracycline   03/26/24 Tissue/Biopsy from Wound/Tissue Streptococcus pyogenes (Group A Streptococcus)           03/25/24 Blood culture from Peripheral Venipuncture Streptococcus pyogenes (Group A Streptococcus) S S S I   03/25/24 Blood culture from Peripheral Venipuncture Streptococcus pyogenes (Group A Streptococcus)                              No lab exists for component: \"AGALPCRNB\"   .ID         Lab Results   Component Value Date     URINECULTURE (A) 03/25/2024       Multiple organisms present, probable contamination. Repeat culture if clinically indicated.     BLOODCULT No growth at 2 days 06/10/2024         Images:  XR knee left 1-2 views  Narrative: Interpreted By:  Khadar Vargas,   STUDY:  XR KNEE LEFT 1-2 VIEWS; ;  6/11/2024 12:57 pm      INDICATION:  Signs/Symptoms:knee spacer.      COMPARISON:  March 22, 2022 and February 12, 2024 radiographs. June 7, 2024 MRI      ACCESSION NUMBER(S):  [id number]      ORDERING CLINICIAN:  RODGER MCGRAW      FINDINGS:  Status post explantation of knee arthroplasty hardware with cement  spacer. Lucency along the cement bone interface femoral and tibial  components relatively more conspicuous than 2022 examination which  could be related to loosening or infection. There is evident  fragmentation of the posterior portion of the femoral cemented  component newly seen since February 12, 2024.      Redemonstrated impacted distal femoral fracture with posterior  position of the inferior portion with sclerosis and new bone  formation which appears similar to February 12, 2024. There is  chronic deformity involving the tibia and fibula which appears  similar. There is marked underlying osseous " demineralization. There  are findings compatible with large joint effusion increased since  February 12, 2024 with lobulated retropatellar mineralization.      Impression: Progressive joint effusion with increased intra-articular  mineralization.      Compared with February 12, 2024 interval fragmentation of the  posterior aspect of the femoral cement spacer.      Similar chronic impaction deformity distal femur.      Lucency along the cement interface may be due to loosening or  infection.          MACRO:  None      Signed by: Khadar Vargas 6/11/2024 2:27 PM  Dictation workstation:   [id number]  Lower extremity venous duplex left  Narrative: Interpreted By:  Jethro Alan,   STUDY:  [facility] LOWER EXTREMITY VENOUS DUPLEX LEFT; 6/10/2024 8:46 pm      INDICATION:  Signs/Symptoms:Left lower ext swelling.      COMPARISON:  01/26/2024      ACCESSION NUMBER(S):  [id number]      ORDERING CLINICIAN:  DEO PADILLA      TECHNIQUE:  Vascular ultrasound of the left lower extremity was performed. Real  time compression views as well as Gray scale, color Doppler and  spectral Doppler waveform analysis was performed.      FINDINGS:  Evaluation of the visualized portions of the left common femoral  vein, proximal, mid, and distal femoral vein, and popliteal vein were  performed.    In addition, evaluation of the contralateral common  femoral vein was performed.      Limitations: The left-sided calf veins were not visualized.      The evaluated veins demonstrate normal compressibility. There is  intact venous flow demonstrating normal respiratory variability and  normal augmentation of flow with calf compression. Therefore, there  is no ultrasonographic evidence for deep vein thrombosis within the  evaluated veins. No evidence of thrombus is seen within the  contralateral common femoral vein.      Impression: No sonographic evidence for deep vein thrombosis within the evaluated  veins of the left lower extremity. The calf veins  were not visualized.      MACRO:  None      Signed by: Jethro Alan 6/11/2024 5:56 AM  Dictation workstation:   [id number]        Medications:  Scheduled medications  amiodarone, 200 mg, oral, Daily  apixaban, 5 mg, oral, BID  ascorbic acid, 250 mg, oral, Daily  aspirin, 81 mg, oral, Every Day  cefTRIAXone, 2 g, intravenous, q24h  desvenlafaxine, 100 mg, oral, Daily  famotidine, 40 mg, oral, Daily  gabapentin, 100 mg, oral, TID  insulin lispro, 0-15 Units, subcutaneous, TID  insulin lispro protamin-lispro, 35 Units, subcutaneous, Nightly  levothyroxine, 175 mcg, oral, Daily  losartan, 25 mg, oral, Daily  melatonin, 5 mg, oral, Nightly  metoprolol succinate XL, 50 mg, oral, Daily  polyethylene glycol, 17 g, oral, Daily  potassium chloride CR, 10 mEq, oral, BID  spironolactone, 25 mg, oral, Daily  traZODone, 50 mg, oral, Nightly  zinc oxide, 1 Application, Topical, TID        Continuous medications  PRN medications  PRN medications: acetaminophen **OR** acetaminophen **OR** acetaminophen, dextrose, dextrose, glucagon, glucagon, ondansetron **OR** ondansetron, oxyCODONE-acetaminophen      Assessment and Plan:  Teresa Matthews is a 73 y.o. female presenting for L Knee septic arthritis and Chronic OM evaluation, remains to be HDS, with no leukocytosis and mildly elevated inflammatory markers, evaluated by ortho and ID, knee join tap was done, final decision was made by the patient and she opted for amputation which will be done in OP setting by Dr Carter, currently HDS on IV abx, pending final CX from L knee aspirate, to determine the choice of PO abx.         Acute Medical Issues:  #Left knee septic arthritis with likely infected antibiotic spacer and chronic osteomyelitis   #s/p L knee joint Aspiration    ID were following in [facility], she received vanc/Zosyn initially after which she was placed on Ceftriaxone 2 gm daily  Left LUE duplex negative for DVT   On zosyn/Vanc pending final ID recs regarding  PO abx on discharged based on aspiration culture   Ortho Dr Segal Onboard patient was given the choice between amputation vs. Replacement of Abx spacer, she opted for amputation, given her stable VS, absence of leukocytosis and controled pain, it was advised to perform her AKA amputation in outpatient setting, appointment was made with Dr Carter/cali on 6/24 2:15pm here in the [facility] office   Switch back to her home Eliquis  Adequate Pain control      #Chronic sacral and LUE wounds   Wound care      Chronic Medical Issues:   #AFIB: cont home amiodarone 200mg daily, therapeutic Lovenox, metop 50mg daily  #Diabetic vascular ds: Aspirin 81mg daily   #Depression: cont home Prisitiq   #Diabetic neuropathy: gabapentin 100mg BID, will increase joy gabapentin dose to help controlling the pain.   #DMII: ISS, ADA, Insulin lispro 35untis units at bedtime   #Hypothyroidism: 175mg daily   #HTN: losartan 25mg, Aldactone 25mg daily   #Insomnia: Trazodone 50mg daily         Fluids: PO   Electrolytes: replete as needed  Nutrition: ADA  GI Prophylaxis:none   DVT Prophylaxis: Lovenox      Access: PIV    Antibiotics: Vanc/Zosyn   Oxygenation: RA     Dispo:patient admitted for L knee septic arthritis and chronic OM. Currently receiving IV Abx, s/p knee join tap was done and the decision was made by the patient and she opted for amputation which will be done in OP setting by Dr Carter, currently HDS on IV abx, pending final CX from L knee aspirate, to determine the choice of PO abx. Possible discharge tomorrow or today.

## 2024-06-13 NOTE — PROGRESS NOTES
06/13/24 0759   Discharge Planning   Patient expects to be discharged to: [facility]. awaiting final cxs to determine post hospital ab course     Teresa Matthews is a 73 y.o. female on day 3 of admission presenting with Arthritis associated with diabetes (Multi).    Kaitlin Arevalo RN

## 2024-06-13 NOTE — PROCEDURES
Patient: Teresa Matthews Age: 73 y.o.   Gender: female Room/bed: 145/145-A     Attending: Abner Fortune DO  Code Status:  DNR and No Intubation    Overnight Events  None   Subjective  Patient feels around the same today, denies any fever, cough, N/V, change in her BM or uncontrolled pain.     Objective:  Physical Exam   Vitals and nursing note reviewed.   Constitutional:       Appearance: Normal appearance.   HENT:      Head: Normocephalic and atraumatic.      Right Ear: Tympanic membrane normal.      Nose: Nose normal.      Mouth/Throat:      Mouth: Mucous membranes are moist.   Eyes:      Extraocular Movements: Extraocular movements intact.   Cardiovascular:      Rate and Rhythm: Normal rate.      Pulses: Normal pulses.      Heart sounds: Normal heart sounds.   Pulmonary:      Effort: Pulmonary effort is normal.      Breath sounds: Normal breath sounds.   Abdominal:      General: Abdomen is flat. Bowel sounds are normal.      Palpations: Abdomen is soft.   Musculoskeletal:         General: Swelling, tenderness, deformity and signs of injury present. Normal range of motion.      Left lower leg: Edema present.   Skin:     General: Skin is warm and dry.      Capillary Refill: Capillary refill takes less than 2 seconds.      Findings: Lesion present.   Neurological:      General: No focal deficit present.      Mental Status: She is alert and oriented to person, place, and time.   Psychiatric:         Mood and Affect: Mood normal.         Behavior: Behavior normal.         Thought Content: Thought content normal.         Judgment: Judgment normal.        Temp:  [36.2 °C (97.2 °F)-36.6 °C (97.9 °F)] 36.6 °C (97.9 °F)  Heart Rate:  [60-69] 69  Resp:  [16-18] 18  BP: ()/(55-67) 106/67      Intake/Output Summary (Last 24 hours) at 6/13/2024 0940  Last data filed at 6/13/2024 0600  Gross per 24 hour   Intake 390 ml   Output 1200 ml   Net -810 ml       Vitals:    06/10/24 1535   Weight: 99.2 kg (218 lb 11.1  oz)             I/Os    Intake/Output Summary (Last 24 hours) at 6/13/2024 0940  Last data filed at 6/13/2024 0600  Gross per 24 hour   Intake 390 ml   Output 1200 ml   Net -810 ml       Labs:   CBC:  Recent Labs     06/13/24  0520 06/12/24  0552 06/11/24  0453   WBC 7.5 7.2 5.6   HGB 8.7* 9.9* 9.8*   HCT 29.4* 33.8* 32.3*    175 181   MCV 87 87 85     CMP:  Recent Labs     06/13/24  0520 06/12/24  0552 06/11/24  0453   * 136 136   K 4.3 4.3 4.0    102 102   CO2 26 29 27   ANIONGAP 10 9* 11   BUN 24* 24* 22   CREATININE 1.44* 1.37* 1.21*   EGFR 39* 41* 48*   GLUCOSE 76 78 90     Recent Labs     06/13/24  0520 06/12/24  0552 06/11/24  0453 06/10/24  1604 06/05/24  0344 03/28/24  0420   ALBUMIN 2.7* 3.0* 2.8* 2.9* 2.9* 2.7*   ALKPHOS  --   --   --  73 70 68   ALT  --   --   --  18 7 12   AST  --   --   --  26 9 13   BILITOT  --   --   --  0.3 0.3 0.4     Calcium/Phos:   Lab Results   Component Value Date    CALCIUM 8.9 06/13/2024    PHOS 3.9 06/13/2024      COAG:   Recent Labs     09/17/22  0538 09/15/22  0601 07/02/22  1338   INR 1.3* 1.2* 1.4*     CRP:   Lab Results   Component Value Date    CRP 0.86 06/10/2024      [unfilled]   ENDO:  Recent Labs     06/06/24  0434 03/25/24  1728 01/27/24  0522 01/26/24  1558 11/13/23  1029 07/26/23  0658 06/19/23  0601 06/18/23  0714 06/09/23  1056   TSH  --  2.13  --  40.63*  --   --  15.18*  --  22.30*   HGBA1C 8.3*  --  15.4*  --  13.9* 9.9*  --    < > 12.1*    < > = values in this interval not displayed.      CARDIAC:   Recent Labs     03/25/24  1145 01/26/24  1558 08/12/22  1221 07/02/22  1338 12/21/20  0346   TROPHS 13 6 5   < >  --    BNP  --  294* 100*  --  84    < > = values in this interval not displayed.     Recent Labs     06/09/23  1056 09/30/21  1211 05/08/21  1039   CHOL 171 128 147   LDLF 88 58 73   HDL 41.3 45.8 44.4   TRIG 210* 123 147     No data recorded    Micro/ID:   Susceptibility data from last 90 days.  Collected Specimen Info  "Organism Clindamycin Erythromycin Penicillin Tetracycline   03/26/24 Tissue/Biopsy from Wound/Tissue Streptococcus pyogenes (Group A Streptococcus)       03/25/24 Blood culture from Peripheral Venipuncture Streptococcus pyogenes (Group A Streptococcus) S S S I   03/25/24 Blood culture from Peripheral Venipuncture Streptococcus pyogenes (Group A Streptococcus)                        No lab exists for component: \"AGALPCRNB\"   .ID  Lab Results   Component Value Date    URINECULTURE (A) 03/25/2024     Multiple organisms present, probable contamination. Repeat culture if clinically indicated.    BLOODCULT No growth at 2 days 06/10/2024       Images:  XR knee left 1-2 views  Narrative: Interpreted By:  Khadar Vargas,   STUDY:  XR KNEE LEFT 1-2 VIEWS; ;  6/11/2024 12:57 pm      INDICATION:  Signs/Symptoms:knee spacer.      COMPARISON:  March 22, 2022 and February 12, 2024 radiographs. June 7, 2024 MRI      ACCESSION NUMBER(S):  [id number]      ORDERING CLINICIAN:  RODGER MCGRAW      FINDINGS:  Status post explantation of knee arthroplasty hardware with cement  spacer. Lucency along the cement bone interface femoral and tibial  components relatively more conspicuous than 2022 examination which  could be related to loosening or infection. There is evident  fragmentation of the posterior portion of the femoral cemented  component newly seen since February 12, 2024.      Redemonstrated impacted distal femoral fracture with posterior  position of the inferior portion with sclerosis and new bone  formation which appears similar to February 12, 2024. There is  chronic deformity involving the tibia and fibula which appears  similar. There is marked underlying osseous demineralization. There  are findings compatible with large joint effusion increased since  February 12, 2024 with lobulated retropatellar mineralization.      Impression: Progressive joint effusion with increased intra-articular  mineralization.      Compared " with February 12, 2024 interval fragmentation of the  posterior aspect of the femoral cement spacer.      Similar chronic impaction deformity distal femur.      Lucency along the cement interface may be due to loosening or  infection.          MACRO:  None      Signed by: Khadar Vargas 6/11/2024 2:27 PM  Dictation workstation:   [id number]  Lower extremity venous duplex left  Narrative: Interpreted By:  Jethro Alan,   STUDY:  [facility] [place] US LOWER EXTREMITY VENOUS DUPLEX LEFT; 6/10/2024 8:46 pm      INDICATION:  Signs/Symptoms:Left lower ext swelling.      COMPARISON:  01/26/2024      ACCESSION NUMBER(S):  [id number]      ORDERING CLINICIAN:  DEO PADILLA      TECHNIQUE:  Vascular ultrasound of the left lower extremity was performed. Real  time compression views as well as Gray scale, color Doppler and  spectral Doppler waveform analysis was performed.      FINDINGS:  Evaluation of the visualized portions of the left common femoral  vein, proximal, mid, and distal femoral vein, and popliteal vein were  performed.    In addition, evaluation of the contralateral common  femoral vein was performed.      Limitations: The left-sided calf veins were not visualized.      The evaluated veins demonstrate normal compressibility. There is  intact venous flow demonstrating normal respiratory variability and  normal augmentation of flow with calf compression. Therefore, there  is no ultrasonographic evidence for deep vein thrombosis within the  evaluated veins. No evidence of thrombus is seen within the  contralateral common femoral vein.      Impression: No sonographic evidence for deep vein thrombosis within the evaluated  veins of the left lower extremity. The calf veins were not visualized.      MACRO:  None      Signed by: Jethro Alan 6/11/2024 5:56 AM  Dictation workstation:   [id number]       Medications:  Scheduled medications  amiodarone, 200 mg, oral, Daily  apixaban, 5 mg, oral, BID  ascorbic acid, 250 mg, oral,  Daily  aspirin, 81 mg, oral, Every Day  cefTRIAXone, 2 g, intravenous, q24h  desvenlafaxine, 100 mg, oral, Daily  famotidine, 40 mg, oral, Daily  gabapentin, 100 mg, oral, TID  insulin lispro, 0-15 Units, subcutaneous, TID  insulin lispro protamin-lispro, 35 Units, subcutaneous, Nightly  levothyroxine, 175 mcg, oral, Daily  losartan, 25 mg, oral, Daily  melatonin, 5 mg, oral, Nightly  metoprolol succinate XL, 50 mg, oral, Daily  polyethylene glycol, 17 g, oral, Daily  potassium chloride CR, 10 mEq, oral, BID  spironolactone, 25 mg, oral, Daily  traZODone, 50 mg, oral, Nightly  zinc oxide, 1 Application, Topical, TID      Continuous medications     PRN medications  PRN medications: acetaminophen **OR** acetaminophen **OR** acetaminophen, dextrose, dextrose, glucagon, glucagon, ondansetron **OR** ondansetron, oxyCODONE-acetaminophen     Assessment and Plan:  Teresa Matthews is a 73 y.o. female presenting for L Knee septic arthritis and Chronic OM evaluation, remains to be HDS, with no leukocytosis and mildly elevated inflammatory markers, evaluated by ortho and ID, knee join tap was done, final decision was made by the patient and she opted for amputation which will be done in OP setting by Dr Carter, currently HDS on IV abx, pending final CX from L knee aspirate, to determine the choice of PO abx.         Acute Medical Issues:  #Left knee septic arthritis with likely infected antibiotic spacer and chronic osteomyelitis   #s/p L knee joint Aspiration    ID were following in [facility], she received vanc/Zosyn initially after which she was placed on Ceftriaxone 2 gm daily  Left LUE duplex negative for DVT   On zosyn/Vanc pending final ID recs regarding PO abx on discharged based on aspiration culture   Ortho Dr Segal Onboard patient was given the choice between amputation vs. Replacement of Abx spacer, she opted for amputation, given her stable VS, absence of leukocytosis and controled pain, it was  advised to perform her AKA amputation in outpatient setting, appointment was made with Dr Carter/cali on 6/24 2:15pm here in the [facility] office   Switch back to her home Eliquis  Adequate Pain control      #Chronic sacral and LUE wounds   Wound care      Chronic Medical Issues:   #AFIB: cont home amiodarone 200mg daily, therapeutic Lovenox, metop 50mg daily  #Diabetic vascular ds: Aspirin 81mg daily   #Depression: cont home Prisitiq   #Diabetic neuropathy: gabapentin 100mg BID, will increase joy gabapentin dose to help controlling the pain.   #DMII: ISS, ADA, Insulin lispro 35untis units at bedtime   #Hypothyroidism: 175mg daily   #HTN: losartan 25mg, Aldactone 25mg daily   #Insomnia: Trazodone 50mg daily         Fluids: PO   Electrolytes: replete as needed  Nutrition: ADA  GI Prophylaxis:none   DVT Prophylaxis: Lovenox      Access: PIV    Antibiotics: Vanc/Zosyn   Oxygenation: RA     Dispo:patient admitted for L knee septic arthritis and chronic OM. Currently receiving IV Abx, s/p knee join tap was done and the decision was made by the patient and she opted for amputation which will be done in OP setting by Dr Carter, currently HDS on IV abx, pending final CX from L knee aspirate, to determine the choice of PO abx. Possible discharge tomorrow or today.

## 2024-06-13 NOTE — CARE PLAN
The patient's goals for the shift include  pain management    Problem: Pain  Goal: My pain/discomfort is manageable  Outcome: Progressing     Problem: Safety  Goal: Patient will be injury free during hospitalization  Outcome: Progressing     Problem: Daily Care  Goal: Daily care needs are met  Outcome: Progressing     Problem: Psychosocial Needs  Goal: Demonstrates ability to cope with hospitalization/illness  Outcome: Progressing     Problem: Discharge Barriers  Goal: My discharge needs are met  Outcome: Progressing     Problem: Skin  Goal: Prevent/manage excess moisture  Outcome: Progressing       The clinical goals for the shift include Minimize painful stimuli; coordinate care and adjust environment

## 2024-06-14 VITALS
WEIGHT: 218.7 LBS | HEART RATE: 73 BPM | DIASTOLIC BLOOD PRESSURE: 73 MMHG | SYSTOLIC BLOOD PRESSURE: 122 MMHG | OXYGEN SATURATION: 95 % | HEIGHT: 72 IN | RESPIRATION RATE: 18 BRPM | BODY MASS INDEX: 29.62 KG/M2 | TEMPERATURE: 96.8 F

## 2024-06-14 DIAGNOSIS — M06.9 RHEUMATOID ARTHRITIS, INVOLVING UNSPECIFIED SITE, UNSPECIFIED WHETHER RHEUMATOID FACTOR PRESENT (MULTI): ICD-10-CM

## 2024-06-14 DIAGNOSIS — M48.02 DEGENERATIVE CERVICAL SPINAL STENOSIS: ICD-10-CM

## 2024-06-14 DIAGNOSIS — M17.0 PRIMARY OSTEOARTHRITIS OF BOTH KNEES: ICD-10-CM

## 2024-06-14 LAB
ALBUMIN SERPL BCP-MCNC: 2.7 G/DL (ref 3.4–5)
ANION GAP SERPL CALC-SCNC: 9 MMOL/L (ref 10–20)
BACTERIA FLD CULT: NORMAL
BASOPHILS # BLD AUTO: 0.05 X10*3/UL (ref 0–0.1)
BASOPHILS NFR BLD AUTO: 0.9 %
BUN SERPL-MCNC: 24 MG/DL (ref 6–23)
CALCIUM SERPL-MCNC: 9.1 MG/DL (ref 8.6–10.3)
CHLORIDE SERPL-SCNC: 102 MMOL/L (ref 98–107)
CO2 SERPL-SCNC: 28 MMOL/L (ref 21–32)
CREAT SERPL-MCNC: 1.42 MG/DL (ref 0.5–1.05)
EGFRCR SERPLBLD CKD-EPI 2021: 39 ML/MIN/1.73M*2
EOSINOPHIL # BLD AUTO: 0.13 X10*3/UL (ref 0–0.4)
EOSINOPHIL NFR BLD AUTO: 2.3 %
ERYTHROCYTE [DISTWIDTH] IN BLOOD BY AUTOMATED COUNT: 16.8 % (ref 11.5–14.5)
GLUCOSE BLD MANUAL STRIP-MCNC: 110 MG/DL (ref 74–99)
GLUCOSE BLD MANUAL STRIP-MCNC: 76 MG/DL (ref 74–99)
GLUCOSE SERPL-MCNC: 84 MG/DL (ref 74–99)
GRAM STN SPEC: NORMAL
GRAM STN SPEC: NORMAL
HCT VFR BLD AUTO: 28.3 % (ref 36–46)
HGB BLD-MCNC: 8.5 G/DL (ref 12–16)
IMM GRANULOCYTES # BLD AUTO: 0.03 X10*3/UL (ref 0–0.5)
IMM GRANULOCYTES NFR BLD AUTO: 0.5 % (ref 0–0.9)
LYMPHOCYTES # BLD AUTO: 1.38 X10*3/UL (ref 0.8–3)
LYMPHOCYTES NFR BLD AUTO: 24 %
MAGNESIUM SERPL-MCNC: 1.78 MG/DL (ref 1.6–2.4)
MCH RBC QN AUTO: 26 PG (ref 26–34)
MCHC RBC AUTO-ENTMCNC: 30 G/DL (ref 32–36)
MCV RBC AUTO: 87 FL (ref 80–100)
MONOCYTES # BLD AUTO: 0.8 X10*3/UL (ref 0.05–0.8)
MONOCYTES NFR BLD AUTO: 13.9 %
NEUTROPHILS # BLD AUTO: 3.37 X10*3/UL (ref 1.6–5.5)
NEUTROPHILS NFR BLD AUTO: 58.4 %
NRBC BLD-RTO: 0 /100 WBCS (ref 0–0)
PHOSPHATE SERPL-MCNC: 3.5 MG/DL (ref 2.5–4.9)
PLATELET # BLD AUTO: 197 X10*3/UL (ref 150–450)
POTASSIUM SERPL-SCNC: 4.5 MMOL/L (ref 3.5–5.3)
RBC # BLD AUTO: 3.27 X10*6/UL (ref 4–5.2)
SODIUM SERPL-SCNC: 134 MMOL/L (ref 136–145)
WBC # BLD AUTO: 5.8 X10*3/UL (ref 4.4–11.3)

## 2024-06-14 PROCEDURE — 83735 ASSAY OF MAGNESIUM: CPT

## 2024-06-14 PROCEDURE — 82947 ASSAY GLUCOSE BLOOD QUANT: CPT | Mod: 91

## 2024-06-14 PROCEDURE — 2500000004 HC RX 250 GENERAL PHARMACY W/ HCPCS (ALT 636 FOR OP/ED)

## 2024-06-14 PROCEDURE — 2500000001 HC RX 250 WO HCPCS SELF ADMINISTERED DRUGS (ALT 637 FOR MEDICARE OP)

## 2024-06-14 PROCEDURE — 2500000002 HC RX 250 W HCPCS SELF ADMINISTERED DRUGS (ALT 637 FOR MEDICARE OP, ALT 636 FOR OP/ED): Mod: MUE

## 2024-06-14 PROCEDURE — 36415 COLL VENOUS BLD VENIPUNCTURE: CPT

## 2024-06-14 PROCEDURE — 80069 RENAL FUNCTION PANEL: CPT

## 2024-06-14 PROCEDURE — 85025 COMPLETE CBC W/AUTO DIFF WBC: CPT

## 2024-06-14 PROCEDURE — 99239 HOSP IP/OBS DSCHRG MGMT >30: CPT | Performed by: INTERNAL MEDICINE

## 2024-06-14 RX ORDER — SODIUM CHLORIDE, SODIUM LACTATE, POTASSIUM CHLORIDE, CALCIUM CHLORIDE 600; 310; 30; 20 MG/100ML; MG/100ML; MG/100ML; MG/100ML
100 INJECTION, SOLUTION INTRAVENOUS CONTINUOUS
OUTPATIENT
Start: 2024-06-14

## 2024-06-14 RX ORDER — GABAPENTIN 100 MG/1
100 CAPSULE ORAL 3 TIMES DAILY
Qty: 90 CAPSULE | Refills: 0 | Status: SHIPPED | OUTPATIENT
Start: 2024-06-14 | End: 2024-07-14

## 2024-06-14 RX ORDER — ONDANSETRON HYDROCHLORIDE 2 MG/ML
4 INJECTION, SOLUTION INTRAVENOUS ONCE AS NEEDED
OUTPATIENT
Start: 2024-06-14

## 2024-06-14 RX ORDER — DROPERIDOL 2.5 MG/ML
0.62 INJECTION, SOLUTION INTRAMUSCULAR; INTRAVENOUS ONCE AS NEEDED
OUTPATIENT
Start: 2024-06-14

## 2024-06-14 RX ORDER — OXYCODONE AND ACETAMINOPHEN 10; 325 MG/1; MG/1
1 TABLET ORAL EVERY 4 HOURS PRN
Qty: 180 TABLET | Refills: 0 | Status: SHIPPED | OUTPATIENT
Start: 2024-06-14 | End: 2024-06-15 | Stop reason: SDUPTHER

## 2024-06-14 RX ORDER — EAR PLUGS
1 EACH OTIC (EAR) 3 TIMES DAILY
Qty: 9 G | Refills: 0 | Status: SHIPPED | OUTPATIENT
Start: 2024-06-14 | End: 2024-07-14

## 2024-06-14 RX ORDER — ACETAMINOPHEN 500 MG
5 TABLET ORAL NIGHTLY
Qty: 30 TABLET | Refills: 0 | Status: SHIPPED | OUTPATIENT
Start: 2024-06-14 | End: 2024-07-14

## 2024-06-14 RX ORDER — AMOXICILLIN AND CLAVULANATE POTASSIUM 875; 125 MG/1; MG/1
1 TABLET, FILM COATED ORAL 2 TIMES DAILY
Qty: 60 TABLET | Refills: 0 | Status: SHIPPED | OUTPATIENT
Start: 2024-06-14 | End: 2024-07-14

## 2024-06-14 RX ORDER — OXYCODONE HYDROCHLORIDE 5 MG/1
5 TABLET ORAL EVERY 4 HOURS PRN
OUTPATIENT
Start: 2024-06-14

## 2024-06-14 RX ADMIN — GABAPENTIN 100 MG: 100 CAPSULE ORAL at 14:06

## 2024-06-14 RX ADMIN — OXYCODONE HYDROCHLORIDE AND ACETAMINOPHEN 2 TABLET: 5; 325 TABLET ORAL at 11:01

## 2024-06-14 RX ADMIN — FAMOTIDINE 40 MG: 20 TABLET, FILM COATED ORAL at 08:10

## 2024-06-14 RX ADMIN — GABAPENTIN 100 MG: 100 CAPSULE ORAL at 08:10

## 2024-06-14 RX ADMIN — POLYETHYLENE GLYCOL 3350 17 G: 17 POWDER, FOR SOLUTION ORAL at 08:10

## 2024-06-14 RX ADMIN — LEVOTHYROXINE SODIUM 175 MCG: 0.17 TABLET ORAL at 05:15

## 2024-06-14 RX ADMIN — Medication 1 APPLICATION: at 14:06

## 2024-06-14 RX ADMIN — OXYCODONE HYDROCHLORIDE AND ACETAMINOPHEN 2 TABLET: 5; 325 TABLET ORAL at 02:41

## 2024-06-14 RX ADMIN — POTASSIUM CHLORIDE 10 MEQ: 1500 TABLET, EXTENDED RELEASE ORAL at 08:10

## 2024-06-14 RX ADMIN — DESVENLAFAXINE SUCCINATE 100 MG: 100 TABLET, FILM COATED, EXTENDED RELEASE ORAL at 08:10

## 2024-06-14 RX ADMIN — Medication 1 APPLICATION: at 08:17

## 2024-06-14 RX ADMIN — APIXABAN 5 MG: 5 TABLET, FILM COATED ORAL at 08:10

## 2024-06-14 RX ADMIN — OXYCODONE HYDROCHLORIDE AND ACETAMINOPHEN 250 MG: 500 TABLET ORAL at 08:10

## 2024-06-14 ASSESSMENT — COGNITIVE AND FUNCTIONAL STATUS - GENERAL
HELP NEEDED FOR BATHING: A LOT
MOVING FROM LYING ON BACK TO SITTING ON SIDE OF FLAT BED WITH BEDRAILS: A LOT
STANDING UP FROM CHAIR USING ARMS: TOTAL
PERSONAL GROOMING: A LITTLE
TOILETING: TOTAL
DAILY ACTIVITIY SCORE: 14
WALKING IN HOSPITAL ROOM: TOTAL
DRESSING REGULAR UPPER BODY CLOTHING: A LITTLE
TURNING FROM BACK TO SIDE WHILE IN FLAT BAD: A LOT
MOVING TO AND FROM BED TO CHAIR: TOTAL
DRESSING REGULAR LOWER BODY CLOTHING: TOTAL
CLIMB 3 TO 5 STEPS WITH RAILING: TOTAL
MOBILITY SCORE: 8

## 2024-06-14 ASSESSMENT — PAIN - FUNCTIONAL ASSESSMENT
PAIN_FUNCTIONAL_ASSESSMENT: 0-10

## 2024-06-14 ASSESSMENT — PAIN SCALES - GENERAL
PAINLEVEL_OUTOF10: 7
PAINLEVEL_OUTOF10: 0 - NO PAIN
PAINLEVEL_OUTOF10: 3
PAINLEVEL_OUTOF10: 7
PAINLEVEL_OUTOF10: 3

## 2024-06-14 NOTE — PROGRESS NOTES
06/14/24 1043   Discharge Planning   Living Arrangements Other (Comment)  ([facility])   Support Systems Family members  (Sister)   Assistance Needed Patient is from [facility] and has lived there approximately 3 months. Per patient she requires assist for dressing and bathing, feds self, requires assist x 1 for dressing and bathing and transfers from the bed to  via freida lift.   Type of Residence Nursing home/residential care   Do you have animals or pets at home? No   Who is requesting discharge planning? Provider   Home or Post Acute Services Post acute facilities (Rehab/SNF/etc)   Type of Post Acute Facility Services Long term care   Patient expects to be discharged to: Patient will DC to [facility] today. Per physician she is medically ready. Spoke with patient at bedside to update on DC plan. DSC asked to send over needed forms and set up transport for patient. Message sent to facility in [facility] to inform of discharge.   Does the patient need discharge transport arranged? Yes   RoundTrip coordination needed? Yes   Has discharge transport been arranged? No   What day is the transport expected? 06/14/24

## 2024-06-14 NOTE — CARE PLAN
The patient's goals for the shift include      The clinical goals for the shift include Assess the characteristics of the wound including size, color, drainage, and odor      Problem: Pain  Goal: My pain/discomfort is manageable  Outcome: Progressing     Problem: Safety  Goal: Patient will be injury free during hospitalization  Outcome: Progressing  Goal: I will remain free of falls  Outcome: Progressing     Problem: Daily Care  Goal: Daily care needs are met  Outcome: Progressing     Problem: Psychosocial Needs  Goal: Demonstrates ability to cope with hospitalization/illness  Outcome: Progressing  Goal: Collaborate with me, my family, and caregiver to identify my specific goals  Outcome: Progressing     Problem: Discharge Barriers  Goal: My discharge needs are met  Outcome: Progressing     Problem: Pain - Adult  Goal: Verbalizes/displays adequate comfort level or baseline comfort level  Outcome: Progressing     Problem: Safety - Adult  Goal: Free from fall injury  Outcome: Progressing     Problem: Discharge Planning  Goal: Discharge to home or other facility with appropriate resources  Outcome: Progressing     Problem: Chronic Conditions and Co-morbidities  Goal: Patient's chronic conditions and co-morbidity symptoms are monitored and maintained or improved  Outcome: Progressing     Problem: Skin  Goal: Decreased wound size/increased tissue granulation at next dressing change  Outcome: Progressing  Goal: Participates in plan/prevention/treatment measures  Outcome: Progressing  Goal: Prevent/manage excess moisture  Outcome: Progressing  Goal: Prevent/minimize sheer/friction injuries  Outcome: Progressing  Flowsheets (Taken 6/14/2024 1031)  Prevent/minimize sheer/friction injuries: Complete micro-shifts as needed if patient unable. Adjust patient position to relieve pressure points, not a full turn  Goal: Promote/optimize nutrition  Outcome: Progressing  Goal: Promote skin healing  Outcome: Progressing     Problem:  Diabetes  Goal: Achieve decreasing blood glucose levels by end of shift  Outcome: Progressing  Goal: Increase stability of blood glucose readings by end of shift  Outcome: Progressing  Goal: Decrease in ketones present in urine by end of shift  Outcome: Progressing  Goal: Maintain electrolyte levels within acceptable range throughout shift  Outcome: Progressing  Goal: Maintain glucose levels >70mg/dl to <250mg/dl throughout shift  Outcome: Progressing  Goal: No changes in neurological exam by end of shift  Outcome: Progressing  Goal: Learn about and adhere to nutrition recommendations by end of shift  Outcome: Progressing  Goal: Vital signs within normal range for age by end of shift  Outcome: Progressing  Goal: Increase self care and/or family involovement by end of shift  Outcome: Progressing  Goal: Receive DSME education by end of shift  Outcome: Progressing     Problem: Pain  Goal: Takes deep breaths with improved pain control throughout the shift  Outcome: Progressing  Goal: Turns in bed with improved pain control throughout the shift  Outcome: Progressing  Goal: Walks with improved pain control throughout the shift  Outcome: Progressing  Goal: Performs ADL's with improved pain control throughout shift  Outcome: Progressing  Goal: Participates in PT with improved pain control throughout the shift  Outcome: Progressing  Goal: Free from opioid side effects throughout the shift  Outcome: Progressing  Goal: Free from acute confusion related to pain meds throughout the shift  Outcome: Progressing

## 2024-06-14 NOTE — PROGRESS NOTES
06/14/24 1200   Discharge Planning   Patient expects to be discharged to: Transport confirmed for 5 PM via [facility]. Patient notified. Bedside nurse updated on DC plan and was provided with number for facility to give nurse to nurse report.   Does the patient need discharge transport arranged? Yes   RoundTrip coordination needed? Yes   Has discharge transport been arranged? Yes   What day is the transport expected? 06/14/24   What time is the transport expected? 1600

## 2024-06-14 NOTE — PROGRESS NOTES
Teresa Matthews is a 73 y.o. female on day 4 of admission presenting with Arthritis associated with diabetes (Multi).    Subjective   Interval History: no fever, no new complaints        Review of Systems    Objective   Range of Vitals (last 24 hours)  Heart Rate:  [57-74]   Temp:  [36 °C (96.8 °F)-36.6 °C (97.9 °F)]   Resp:  [16-18]   BP: (106-125)/(64-72)   SpO2:  [90 %-93 %]   Daily Weight  06/10/24 : 99.2 kg (218 lb 11.1 oz)    Body mass index is 29.65 kg/m².    Physical Exam  Constitutional:       Appearance: Normal appearance.   HENT:      Head: Normocephalic and atraumatic.      Mouth/Throat:      Mouth: Mucous membranes are moist.      Pharynx: Oropharynx is clear.   Eyes:      Pupils: Pupils are equal, round, and reactive to light.   Cardiovascular:      Rate and Rhythm: Normal rate and regular rhythm.      Heart sounds: Normal heart sounds.   Pulmonary:      Effort: Pulmonary effort is normal.      Breath sounds: Normal breath sounds.   Abdominal:      General: Abdomen is flat. Bowel sounds are normal.      Palpations: Abdomen is soft.   Musculoskeletal:      Cervical back: Normal range of motion.      Comments: Lt effusion / redness   Neurological:      Mental Status: She is alert.         Antibiotics  acetaminophen (Tylenol) tablet 650 mg  acetaminophen (Tylenol) oral liquid 650 mg  acetaminophen (Tylenol) suppository 650 mg  ondansetron (Zofran) tablet 4 mg  ondansetron (Zofran) injection 4 mg  melatonin tablet 5 mg  polyethylene glycol (Glycolax, Miralax) packet 17 g  glucagon (Glucagen) injection 1 mg  dextrose 50 % injection 25 g  glucagon (Glucagen) injection 1 mg  dextrose 50 % injection 12.5 g  insulin lispro (HumaLOG) injection 0-15 Units  amiodarone (Pacerone) tablet 200 mg  ascorbic acid (Vitamin C) tablet 250 mg  aspirin EC tablet 81 mg  desvenlafaxine (Pristiq) 24 hr tablet 100 mg  famotidine (Pepcid) tablet 40 mg  gabapentin (Neurontin) capsule 100 mg  insulin lispro protamin-lispro  (HumaLOG Mix 75-25) 100 unit/mL (75-25) injection 35 Units  levothyroxine (Synthroid, Levoxyl) tablet 175 mcg  losartan (Cozaar) tablet 25 mg  metoprolol succinate XL (Toprol-XL) 24 hr tablet 50 mg  oxyCODONE-acetaminophen (Percocet) 5-325 mg per tablet 2 tablet  potassium chloride CR (Klor-Con M20) ER tablet 10 mEq  spironolactone (Aldactone) tablet 25 mg  traZODone (Desyrel) tablet 50 mg  apixaban (Eliquis) tablet 5 mg  enoxaparin (Lovenox) syringe 100 mg  vancomycin (Vancocin) pharmacy to dose - pharmacy monitoring  piperacillin-tazobactam-dextrose (Zosyn) IV 3.375 g  vancomycin (Vancocin) in dextrose 5 % water (D5W) 500 mL IV 1,500 mg  gabapentin (Neurontin) capsule 100 mg  zinc oxide 40 % ointment 1 Application      Relevant Results  Labs  Results from last 72 hours   Lab Units 06/14/24  0545 06/13/24  0520 06/12/24  0552   WBC AUTO x10*3/uL 5.8 7.5 7.2   HEMOGLOBIN g/dL 8.5* 8.7* 9.9*   HEMATOCRIT % 28.3* 29.4* 33.8*   PLATELETS AUTO x10*3/uL 197 157 175   NEUTROS PCT AUTO % 58.4 67.6 67.9   LYMPHS PCT AUTO % 24.0 16.1 16.0   MONOS PCT AUTO % 13.9 14.2 12.6   EOS PCT AUTO % 2.3 0.9 2.1     Results from last 72 hours   Lab Units 06/14/24  0545 06/13/24  0520 06/12/24  0552   SODIUM mmol/L 134* 134* 136   POTASSIUM mmol/L 4.5 4.3 4.3   CHLORIDE mmol/L 102 102 102   CO2 mmol/L 28 26 29   BUN mg/dL 24* 24* 24*   CREATININE mg/dL 1.42* 1.44* 1.37*   GLUCOSE mg/dL 84 76 78   CALCIUM mg/dL 9.1 8.9 9.3   ANION GAP mmol/L 9* 10 9*   EGFR mL/min/1.73m*2 39* 39* 41*   PHOSPHORUS mg/dL 3.5 3.9 4.3     Results from last 72 hours   Lab Units 06/14/24  0545 06/13/24  0520 06/12/24  0552   ALBUMIN g/dL 2.7* 2.7* 3.0*     Estimated Creatinine Clearance: 47.2 mL/min (A) (by C-G formula based on SCr of 1.42 mg/dL (H)).  C-Reactive Protein   Date Value Ref Range Status   06/10/2024 0.86 <1.00 mg/dL Final   06/06/2024 1.52 (H) <1.00 mg/dL Final   01/26/2024 1.91 (H) <1.00 mg/dL Final     Microbiology  reviewed  Imaging  reviewed         Assessment/Plan   Left knee knee pain /  cellulitis, likely spacer infection, the culture is negative  Legs stasis     Recommendations :  Plan on either oral Augmentin or Ceftin as suppressive treatment until her planned procedure, will revisit if the culture indicate the need for different antibiotics   Discussed with the medical team     I spent minutes in the professional and overall care of this patient.      Lady Butterfield MD

## 2024-06-14 NOTE — DISCHARGE SUMMARY
Discharge Diagnosis  Septic arthritis of left knee    Issues Requiring Follow-Up  Followup with vascular surgeon outpatient on 6/24 at 2:15pm at [facility] for discussion about amputation treatment for your recurrent knee infection  Followup with Dr. Machado otherwise      Discharge Meds     Your medication list        START taking these medications        Instructions Last Dose Given Next Dose Due   melatonin 5 mg tablet      Take 1 tablet (5 mg) by mouth once daily at bedtime.       zinc oxide 40 % ointment ointment      Apply 1 Application topically 3 times a day.              CHANGE how you take these medications        Instructions Last Dose Given Next Dose Due   gabapentin 100 mg capsule  Commonly known as: Neurontin  What changed: when to take this      Take 1 capsule (100 mg) by mouth 3 times a day.       oxyCODONE-acetaminophen  mg tablet  Commonly known as: Percocet  What changed: Another medication with the same name was removed. Continue taking this medication, and follow the directions you see here.      Take 1 tablet by mouth every 4 hours if needed for severe pain (7 - 10).              CONTINUE taking these medications        Instructions Last Dose Given Next Dose Due   amiodarone 200 mg tablet  Commonly known as: Pacerone      Take 1 tablet (200 mg) by mouth once daily.       apixaban 5 mg tablet  Commonly known as: Eliquis      Take 1 tablet (5 mg) by mouth 2 times a day.       ascorbic acid (vitamin C) 500 mg capsule           aspirin 81 mg EC tablet           buPROPion  mg 24 hr tablet  Commonly known as: Wellbutrin XL      Take 1 tablet (150 mg) by mouth once daily in the morning. Do not crush, chew, or split.       calcium citrate-vitamin D3 200 mg-6.25 mcg (250 unit) tablet           desvenlafaxine 100 mg 24 hr tablet  Commonly known as: Pristiq      Take 1 tablet by mouth once daily. Do not crush, chew, or split. Instead of venlafaxine.       famotidine 40 mg tablet  Commonly  known as: Pepcid      Take 1 tablet (40 mg) by mouth once daily.       FreeStyle Bulmaro 2 [place] misc  Generic drug: flash glucose scanning reader      Use as instructed, reader is missing in rehab, needs replacement.       FreeStyle Bulmaro 2 Sensor kit  Generic drug: flash glucose sensor kit      Inject 1 each under the skin every 14 (fourteen) days. Test blood sugar 1-4 times per day as needed for diabetes, replace every 14 days       glucagon 1 mg injection  Commonly known as: Glucagen      Inject 1 mg into the shoulder, thigh, or buttocks 1 time if needed for low blood sugar - see comments for up to 1 dose. USE AS DIRECTED.       insulin lispro protamin-lispro 100 unit/mL (75-25) injection  Commonly known as: HumaLOG Mix 75-25 KwikPen      Inject 1-2 times per day as directed.  35 units subcutaneously       levothyroxine 175 mcg tablet  Commonly known as: Synthroid, Levoxyl      Take 1 tablet by mouth daily       loperamide 2 mg capsule  Commonly known as: Imodium      Take 1 capsule (2 mg) by mouth 4 times a day as needed for diarrhea.       losartan 25 mg tablet  Commonly known as: Cozaar      Take 1 tablet (25 mg) by mouth once daily.       metoprolol succinate XL 50 mg 24 hr tablet  Commonly known as: Toprol-XL      Take 1 tablet (50 mg) by mouth once daily.       omeprazole 40 mg DR capsule  Commonly known as: PriLOSEC      TAKE 1 CAPSULE BY MOUTH DAILY OPEN CAPSULE AND SPRINKLE CONTENTS ON SUGAR FREE APPLESAUCE OR PUDDING.       OneTouch Ultra Test strip  Generic drug: blood sugar diagnostic           potassium chloride CR 10 mEq ER tablet  Commonly known as: Klor-Con      Take 1 tablet (10 mEq) by mouth in the morning and 1 tablet (10 mEq) before bedtime. Do not crush, chew, or split..       spironolactone 25 mg tablet  Commonly known as: Aldactone      Take 1 tablet by mouth daily       SUPER B-50 COMPLEX ORAL           traZODone 50 mg tablet  Commonly known as: Desyrel      Take 1 tablet (50 mg) by mouth  once daily at bedtime.       underpads pad  Commonly known as: Bed Underpads      1 each 2 times a day.       vibegron 75 mg tablet      Take 1 tablet (75 mg) by mouth once daily.       WOMEN'S MULTIVITAMIN ORAL                  STOP taking these medications      cefTRIAXone 2 gram/50 mL IV  Commonly known as: Rocephin        naloxone 4 mg/0.1 mL nasal spray  Commonly known as: Narcan        oxygen gas therapy  Commonly known as: O2                  Where to Get Your Medications        These medications were sent to [facility] [address]  [address] [facility] [address]      Phone: [phone]   gabapentin 100 mg capsule  melatonin 5 mg tablet  oxyCODONE-acetaminophen  mg tablet  zinc oxide 40 % ointment ointment         Test Results Pending At Discharge  Pending Labs       Order Current Status    Blood Culture Preliminary result    Blood Culture Preliminary result            Hospital Course  This is a 73 y.o. female with PMH of COPD, A. 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     According to the patient she had had knee replacement surgery on 2009 and she has been a resident of a nursing facility since around 4 months ago as her health continues to decline, 4 weeks ago she started to experience severe left lower extremity pain she was not able to participate in any weightbearing activity and now she is wheelchair-bound, and noticed her left leg is swollen almost 3 times her right leg, She had a recent RLE cellulitis and wound infection c/b bacteremia due to Group A Strep in 3-4/2024. TTE/ELISEO negative for endocarditis. Has been dealing with worsening left knee pain since then. Treated with PO Doxycycline as outpatient without  improvement and sent in for further evaluation. On arrival she was afebrile HDS, with no leukocytosis and mildly elevated inflammatory markers, evaluated by ortho and ID, knee join ASPIRATION was done the patient was given the choice of  Amputation vs replacement of antibiotics spacer, final decision was made by the patient and she opted for amputation which will be done in OP setting by Dr Carter, an appointment was made to follow up with Dr Carter/vascular surgery on 6/24 2:15pm here in the [facility] office currently HDS on IV abx, Cx from the Joint aspiration remains negative, after discussion with Dr Scout LILLY a decision was made to discharge the patient on PO Augmentin until the time of amputation with an advise to revisit Abx therapy after the procedure, patient understands the plan of care and In agreement with it, she deemed to be safe for discharge from medical stand point with the appropriate follow up .           Pertinent Physical Exam At Time of Discharge  Physical Exam  Vitals and nursing note reviewed.   Constitutional:       Appearance: Normal appearance.   HENT:      Head: Normocephalic and atraumatic.      Right Ear: Tympanic membrane normal.      Nose: Nose normal.      Mouth/Throat:      Mouth: Mucous membranes are moist.   Eyes:      Extraocular Movements: Extraocular movements intact.   Cardiovascular:      Rate and Rhythm: Normal rate.      Pulses: Normal pulses.      Heart sounds: Normal heart sounds.   Pulmonary:      Effort: Pulmonary effort is normal.      Breath sounds: Normal breath sounds.   Abdominal:      General: Abdomen is flat. Bowel sounds are normal.      Palpations: Abdomen is soft.   Musculoskeletal:         General: Swelling, tenderness, deformity and signs of injury present. Normal range of motion.      Left lower leg: Edema present.   Skin:     General: Skin is warm and dry.      Capillary Refill: Capillary refill takes less than 2 seconds.      Findings: Lesion present.    Neurological:      General: No focal deficit present.      Mental Status: She is alert and oriented to person, place, and time.   Psychiatric:         Mood and Affect: Mood normal.         Behavior: Behavior normal.         Thought Content: Thought content normal.         Judgment: Judgment normal.      Outpatient Follow-Up  Future Appointments   Date Time Provider Department [place]   6/24/2024  2:15 PM Manpreet Carter DO [facility]   8/14/2024 12:00 PM Monica Macario MD [id number] [facility]         Maddi Solomon MD

## 2024-06-14 NOTE — CARE PLAN
The patient's goals for the shift include pain management      Problem: Pain  Goal: My pain/discomfort is manageable  Outcome: Progressing     Problem: Safety  Goal: Patient will be injury free during hospitalization  Outcome: Progressing     Problem: Daily Care  Goal: Daily care needs are met  Outcome: Progressing     Problem: Psychosocial Needs  Goal: Demonstrates ability to cope with hospitalization/illness  Outcome: Progressing     Problem: Discharge Barriers  Goal: My discharge needs are met  Outcome: Progressing       The clinical goals for the shift include pain control

## 2024-06-15 LAB
BACTERIA BLD CULT: NORMAL
BACTERIA BLD CULT: NORMAL

## 2024-06-15 RX ORDER — OXYCODONE AND ACETAMINOPHEN 10; 325 MG/1; MG/1
1 TABLET ORAL EVERY 6 HOURS PRN
Qty: 180 TABLET | Refills: 0 | Status: SHIPPED | OUTPATIENT
Start: 2024-06-15

## 2024-06-15 NOTE — PROGRESS NOTES
Call received from nursing facility requesting oxycodone rx be sent to pharmacy.  Rx was not filled by me upon return from the hospital d/t rx sent to outside pharmacy.  Patient has hx of getting oxycodone rx from outside pharmacy while at nursing facility and also getting rx fill at nursing home at the same time.  This was discovered and discussed with outside provider patient and patient's family. Rx filled at nursing facility does not show on [facility] report so unfortunately the outside provider was unaware.  I spoke with nurse at nursing facility today who also discussed this with the pharmacist at the patient's pharmacy who assured her that he/she is not going to fill any narcotic rx for the patient.  I will send rx to long term care pharmacy at this time.

## 2024-06-17 ENCOUNTER — HOSPITAL ENCOUNTER (OUTPATIENT)
Facility: HOSPITAL | Age: 74
Setting detail: OUTPATIENT SURGERY
End: 2024-06-17
Attending: ORTHOPAEDIC SURGERY | Admitting: ORTHOPAEDIC SURGERY
Payer: MEDICARE

## 2024-06-17 ENCOUNTER — ANESTHESIA (OUTPATIENT)
Dept: OPERATING ROOM | Facility: HOSPITAL | Age: 74
End: 2024-06-17
Payer: MEDICARE

## 2024-06-18 ENCOUNTER — NURSING HOME VISIT (OUTPATIENT)
Dept: POST ACUTE CARE | Facility: EXTERNAL LOCATION | Age: 74
End: 2024-06-18
Payer: MEDICARE

## 2024-06-18 DIAGNOSIS — I11.0 HYPERTENSIVE HEART DISEASE WITH CHRONIC DIASTOLIC CONGESTIVE HEART FAILURE (MULTI): ICD-10-CM

## 2024-06-18 DIAGNOSIS — J44.9 CHRONIC OBSTRUCTIVE PULMONARY DISEASE, UNSPECIFIED COPD TYPE (MULTI): ICD-10-CM

## 2024-06-18 DIAGNOSIS — E11.65 TYPE 2 DIABETES MELLITUS WITH HYPERGLYCEMIA, WITH LONG-TERM CURRENT USE OF INSULIN (MULTI): ICD-10-CM

## 2024-06-18 DIAGNOSIS — Z79.4 TYPE 2 DIABETES MELLITUS WITH HYPERGLYCEMIA, WITH LONG-TERM CURRENT USE OF INSULIN (MULTI): ICD-10-CM

## 2024-06-18 DIAGNOSIS — K21.9 GASTROESOPHAGEAL REFLUX DISEASE WITHOUT ESOPHAGITIS: ICD-10-CM

## 2024-06-18 DIAGNOSIS — I50.32 HYPERTENSIVE HEART DISEASE WITH CHRONIC DIASTOLIC CONGESTIVE HEART FAILURE (MULTI): ICD-10-CM

## 2024-06-18 DIAGNOSIS — M00.9 PYOGENIC ARTHRITIS OF KNEE, DUE TO UNSPECIFIED ORGANISM, UNSPECIFIED LATERALITY (MULTI): Primary | ICD-10-CM

## 2024-06-18 DIAGNOSIS — F32.A DEPRESSION, UNSPECIFIED DEPRESSION TYPE: ICD-10-CM

## 2024-06-18 DIAGNOSIS — I48.91 ATRIAL FIBRILLATION, UNSPECIFIED TYPE (MULTI): ICD-10-CM

## 2024-06-18 PROCEDURE — 99309 SBSQ NF CARE MODERATE MDM 30: CPT | Performed by: INTERNAL MEDICINE

## 2024-06-18 NOTE — PROGRESS NOTES
PROGRESS NOTE    Subjective   Chief complaint: Teresa Matthews is a 73 y.o. female who is an acute skilled patient being seen and evaluated for weakness    HPI:  Patient presents for general medical care and f/u.  Patient seen and examined at bedside. T2DM, Patient denies vision changes, excessive thirst, sweating, urinary frequency.Afib, denies palpitation or chest pain. Patient has diagnosis of GERD, denies heartburn, regurgitation, epigastric discomfort, sour taste, and cough.  Patient has diagnosis of COPD, denies sob, wheezing, cough. HTN, denies fatigue, sob, and palpitations. Patient with diagnosis of depression, denies feeling down and thoughts of harming self or others. CHF, no changes in weight or SOB. Continues on ATB therapy for prophylactic prior to AKA. No adverse reactions noted.  No issues per nursing.  Patient has no acute complaints.      Objective   Vital signs: 126/70,68,98%,     Physical Exam  Constitutional:       General: She is not in acute distress.  Eyes:      Extraocular Movements: Extraocular movements intact.   Cardiovascular:      Rate and Rhythm: Normal rate and regular rhythm.   Pulmonary:      Effort: Pulmonary effort is normal.      Breath sounds: Normal breath sounds.   Abdominal:      General: Bowel sounds are normal.      Palpations: Abdomen is soft.      Tenderness: There is no abdominal tenderness.   Musculoskeletal:      Cervical back: Neck supple.      Right lower leg: No edema.      Left lower leg: No edema.   Neurological:      Mental Status: She is alert.      Motor: Weakness present.   Psychiatric:         Mood and Affect: Mood normal.         Behavior: Behavior is cooperative.         Assessment/Plan   Problem List Items Addressed This Visit       Type 2 diabetes mellitus with hyperglycemia, with long-term current use of insulin (Multi)     Continue insulin  Glucoscan with sliding scale insulin coverage  Low concentrated sweets diet  Monitor fasting blood  sugar         Atrial fibrillation (Multi)     Amiodarone  Apixaban  Bleeding precautions  Monitor heart rate         GERD (gastroesophageal reflux disease)     Monitor for GI symptoms  Famotidine         Hypertensive heart disease with chronic diastolic congestive heart failure (Multi)     CHF stable, no sob  Monitor blood pressure  Toprol-XL  Spironolactone  Losartan potassium         Chronic obstructive pulmonary disease, unspecified COPD type (Multi)     Stable, no wheezing or shortness of breath  On room air         Depression     Monitor mood and behaviors  Bupropion  Desvenlafaxine         Arthritis, septic, knee (Multi) - Primary     ATB for prophylactic until AKA completed           Medications, treatments, and labs reviewed  Continue medications and treatments as listed in EMR    Scribe Attestation  I, Donato Chapmanibe   attest that this documentation has been prepared under the direction and in the presence of Pita Virgen MD.     Provider Attestation - Scribe documentation  All medical record entries made by the Scribe were at my direction and personally dictated by me. I have reviewed the chart and agree that the record accurately reflects my personal performance of the history, physical exam, discussion and plan.   Pita Virgen MD

## 2024-06-18 NOTE — LETTER
Patient: Teresa Matthews  : 1950    Encounter Date: 2024    PROGRESS NOTE    Subjective  Chief complaint: Teresa Matthews is a 73 y.o. female who is an acute skilled patient being seen and evaluated for weakness    HPI:  Patient presents for general medical care and f/u.  Patient seen and examined at bedside. T2DM, Patient denies vision changes, excessive thirst, sweating, urinary frequency.Afib, denies palpitation or chest pain. Patient has diagnosis of GERD, denies heartburn, regurgitation, epigastric discomfort, sour taste, and cough.  Patient has diagnosis of COPD, denies sob, wheezing, cough. HTN, denies fatigue, sob, and palpitations. Patient with diagnosis of depression, denies feeling down and thoughts of harming self or others. CHF, no changes in weight or SOB. Continues on ATB therapy for prophylactic prior to AKA. No adverse reactions noted.  No issues per nursing.  Patient has no acute complaints.      Objective  Vital signs: 126/70,68,98%,     Physical Exam  Constitutional:       General: She is not in acute distress.  Eyes:      Extraocular Movements: Extraocular movements intact.   Cardiovascular:      Rate and Rhythm: Normal rate and regular rhythm.   Pulmonary:      Effort: Pulmonary effort is normal.      Breath sounds: Normal breath sounds.   Abdominal:      General: Bowel sounds are normal.      Palpations: Abdomen is soft.      Tenderness: There is no abdominal tenderness.   Musculoskeletal:      Cervical back: Neck supple.      Right lower leg: No edema.      Left lower leg: No edema.   Neurological:      Mental Status: She is alert.      Motor: Weakness present.   Psychiatric:         Mood and Affect: Mood normal.         Behavior: Behavior is cooperative.         Assessment/Plan  Problem List Items Addressed This Visit       Type 2 diabetes mellitus with hyperglycemia, with long-term current use of insulin (Multi)     Continue insulin  Glucoscan with sliding scale  insulin coverage  Low concentrated sweets diet  Monitor fasting blood sugar         Atrial fibrillation (Multi)     Amiodarone  Apixaban  Bleeding precautions  Monitor heart rate         GERD (gastroesophageal reflux disease)     Monitor for GI symptoms  Famotidine         Hypertensive heart disease with chronic diastolic congestive heart failure (Multi)     CHF stable, no sob  Monitor blood pressure  Toprol-XL  Spironolactone  Losartan potassium         Chronic obstructive pulmonary disease, unspecified COPD type (Multi)     Stable, no wheezing or shortness of breath  On room air         Depression     Monitor mood and behaviors  Bupropion  Desvenlafaxine         Arthritis, septic, knee (Multi) - Primary     ATB for prophylactic until AKA completed           Medications, treatments, and labs reviewed  Continue medications and treatments as listed in EMR    Scribe Attestation  I, Kirill Chapman   attest that this documentation has been prepared under the direction and in the presence of Pita Virgen MD.     Provider Attestation - Scribe documentation  All medical record entries made by the Scribe were at my direction and personally dictated by me. I have reviewed the chart and agree that the record accurately reflects my personal performance of the history, physical exam, discussion and plan.   Pita Virgen MD      Electronically Signed By: Pita Virgen MD   6/18/24  3:50 PM

## 2024-06-24 ENCOUNTER — APPOINTMENT (OUTPATIENT)
Dept: VASCULAR SURGERY | Facility: HOSPITAL | Age: 74
End: 2024-06-24
Payer: MEDICARE

## 2024-06-24 ENCOUNTER — NURSING HOME VISIT (OUTPATIENT)
Dept: POST ACUTE CARE | Facility: EXTERNAL LOCATION | Age: 74
End: 2024-06-24
Payer: MEDICARE

## 2024-06-24 DIAGNOSIS — J44.9 CHRONIC OBSTRUCTIVE PULMONARY DISEASE, UNSPECIFIED COPD TYPE (MULTI): ICD-10-CM

## 2024-06-24 DIAGNOSIS — Z79.4 TYPE 2 DIABETES MELLITUS WITH HYPERGLYCEMIA, WITH LONG-TERM CURRENT USE OF INSULIN (MULTI): ICD-10-CM

## 2024-06-24 DIAGNOSIS — I50.32 HYPERTENSIVE HEART DISEASE WITH CHRONIC DIASTOLIC CONGESTIVE HEART FAILURE (MULTI): ICD-10-CM

## 2024-06-24 DIAGNOSIS — N18.31 STAGE 3A CHRONIC KIDNEY DISEASE (MULTI): ICD-10-CM

## 2024-06-24 DIAGNOSIS — I48.91 ATRIAL FIBRILLATION, UNSPECIFIED TYPE (MULTI): ICD-10-CM

## 2024-06-24 DIAGNOSIS — I11.0 HYPERTENSIVE HEART DISEASE WITH CHRONIC DIASTOLIC CONGESTIVE HEART FAILURE (MULTI): ICD-10-CM

## 2024-06-24 DIAGNOSIS — E11.65 TYPE 2 DIABETES MELLITUS WITH HYPERGLYCEMIA, WITH LONG-TERM CURRENT USE OF INSULIN (MULTI): ICD-10-CM

## 2024-06-24 PROCEDURE — 99308 SBSQ NF CARE LOW MDM 20: CPT | Performed by: INTERNAL MEDICINE

## 2024-06-24 NOTE — PROGRESS NOTES
PROGRESS NOTE    Subjective   Chief complaint: Teresa Matthews is a 73 y.o. female who is a long term care patient being seen and evaluated for lab results.     HPI:  Nursing reports patient lab results as follows. ; K+ 5.0; Co2 105; BUN\CR 18\1.3; H&H 8.3&25.8; WBC 6.7; Plt 217. Anemia and CKD worse than previous results reflect. Denies n,v,f,c,pain.       Objective   Vital signs: 134/75,62,98%, BS 89    Physical Exam  Constitutional:       General: She is not in acute distress.  Eyes:      Extraocular Movements: Extraocular movements intact.   Cardiovascular:      Rate and Rhythm: Normal rate and regular rhythm.   Pulmonary:      Effort: Pulmonary effort is normal.      Breath sounds: Normal breath sounds.   Abdominal:      General: Bowel sounds are normal.      Palpations: Abdomen is soft.      Tenderness: There is no abdominal tenderness.   Musculoskeletal:      Cervical back: Neck supple.      Right lower leg: No edema.      Left lower leg: No edema.   Neurological:      Mental Status: She is alert.      Motor: Weakness present.   Psychiatric:         Mood and Affect: Mood normal.         Behavior: Behavior is cooperative.         Assessment/Plan   Problem List Items Addressed This Visit       Type 2 diabetes mellitus with hyperglycemia, with long-term current use of insulin (Multi)     Continue insulin  Glucoscan with sliding scale insulin coverage  Low concentrated sweets diet  Monitor fasting blood sugar         Atrial fibrillation (Multi)     Amiodarone  Apixaban  Bleeding precautions  Monitor heart rate         Hypertensive heart disease with chronic diastolic congestive heart failure (Multi)     CHF stable, no sob  Monitor blood pressure  Toprol-XL  Spironolactone  Losartan potassium         Chronic obstructive pulmonary disease, unspecified COPD type (Multi)     Stable, no wheezing or shortness of breath  On room air         Stage 3a chronic kidney disease (Multi)     Repeat BMP & CBC weds            Medications, treatments, and labs reviewed  Continue medications and treatments as listed in EMR      Scribe Attestation  I, Kirill Chapman   attest that this documentation has been prepared under the direction and in the presence of Pita Virgen MD.     Provider Attestation - Scribe documentation  All medical record entries made by the Scribe were at my direction and personally dictated by me. I have reviewed the chart and agree that the record accurately reflects my personal performance of the history, physical exam, discussion and plan.   Pita Virgen MD

## 2024-06-24 NOTE — LETTER
Patient: Teresa Matthews  : 1950    Encounter Date: 2024    PROGRESS NOTE    Subjective  Chief complaint: Teresa Matthews is a 73 y.o. female who is a long term care patient being seen and evaluated for lab results.     HPI:  Nursing reports patient lab results as follows. ; K+ 5.0; Co2 105; BUN\CR 18\1.3; H&H 8.3&25.8; WBC 6.7; Plt 217. Anemia and CKD worse than previous results reflect. Denies n,v,f,c,pain.       Objective  Vital signs: 134/75,62,98%, BS 89    Physical Exam  Constitutional:       General: She is not in acute distress.  Eyes:      Extraocular Movements: Extraocular movements intact.   Cardiovascular:      Rate and Rhythm: Normal rate and regular rhythm.   Pulmonary:      Effort: Pulmonary effort is normal.      Breath sounds: Normal breath sounds.   Abdominal:      General: Bowel sounds are normal.      Palpations: Abdomen is soft.      Tenderness: There is no abdominal tenderness.   Musculoskeletal:      Cervical back: Neck supple.      Right lower leg: No edema.      Left lower leg: No edema.   Neurological:      Mental Status: She is alert.      Motor: Weakness present.   Psychiatric:         Mood and Affect: Mood normal.         Behavior: Behavior is cooperative.         Assessment/Plan  Problem List Items Addressed This Visit       Type 2 diabetes mellitus with hyperglycemia, with long-term current use of insulin (Multi)     Continue insulin  Glucoscan with sliding scale insulin coverage  Low concentrated sweets diet  Monitor fasting blood sugar         Atrial fibrillation (Multi)     Amiodarone  Apixaban  Bleeding precautions  Monitor heart rate         Hypertensive heart disease with chronic diastolic congestive heart failure (Multi)     CHF stable, no sob  Monitor blood pressure  Toprol-XL  Spironolactone  Losartan potassium         Chronic obstructive pulmonary disease, unspecified COPD type (Multi)     Stable, no wheezing or shortness of breath  On room air          Stage 3a chronic kidney disease (Multi)     Repeat BMP & CBC weds           Medications, treatments, and labs reviewed  Continue medications and treatments as listed in EMR      Scribe Attestation  I, Kirill Chapman   attest that this documentation has been prepared under the direction and in the presence of Pita Virgen MD.     Provider Attestation - Scribe documentation  All medical record entries made by the Scribe were at my direction and personally dictated by me. I have reviewed the chart and agree that the record accurately reflects my personal performance of the history, physical exam, discussion and plan.   Pita Virgen MD      Electronically Signed By: Pita Virgen MD   6/24/24  3:18 PM

## 2024-06-26 ENCOUNTER — TELEMEDICINE (OUTPATIENT)
Dept: VASCULAR SURGERY | Facility: HOSPITAL | Age: 74
End: 2024-06-26
Payer: MEDICARE

## 2024-06-26 DIAGNOSIS — E11.618: ICD-10-CM

## 2024-06-26 PROCEDURE — 1036F TOBACCO NON-USER: CPT | Performed by: SURGERY

## 2024-06-26 PROCEDURE — 1157F ADVNC CARE PLAN IN RCRD: CPT | Performed by: SURGERY

## 2024-06-26 PROCEDURE — 1111F DSCHRG MED/CURRENT MED MERGE: CPT | Performed by: SURGERY

## 2024-06-26 PROCEDURE — 4010F ACE/ARB THERAPY RXD/TAKEN: CPT | Performed by: SURGERY

## 2024-06-26 PROCEDURE — 99441 PR PHYS/QHP TELEPHONE EVALUATION 5-10 MIN: CPT | Performed by: SURGERY

## 2024-06-26 PROCEDURE — 1159F MED LIST DOCD IN RCRD: CPT | Performed by: SURGERY

## 2024-06-26 PROCEDURE — 1123F ACP DISCUSS/DSCN MKR DOCD: CPT | Performed by: SURGERY

## 2024-06-26 PROCEDURE — 3052F HG A1C>EQUAL 8.0%<EQUAL 9.0%: CPT | Performed by: SURGERY

## 2024-06-26 PROCEDURE — 3008F BODY MASS INDEX DOCD: CPT | Performed by: SURGERY

## 2024-06-26 RX ORDER — BUSPIRONE HYDROCHLORIDE 5 MG/1
5 TABLET ORAL DAILY
COMMUNITY

## 2024-06-26 NOTE — PROGRESS NOTES
"Telephone visit  Discussion with regard to left Above knee amputation  Patient with distal femur osteomyelitis and currently on Augment for supressive therapy  Complications not limited to infection, bleeding heart attack stroke and death.  All questions were addressed and she understands and wishes to proceed  Will make arrangements  Time spent on phone 5 minutes 24\"      "

## 2024-06-27 ENCOUNTER — NURSING HOME VISIT (OUTPATIENT)
Dept: POST ACUTE CARE | Facility: EXTERNAL LOCATION | Age: 74
End: 2024-06-27
Payer: MEDICARE

## 2024-06-27 DIAGNOSIS — D64.9 ANEMIA, UNSPECIFIED TYPE: Primary | ICD-10-CM

## 2024-06-27 DIAGNOSIS — I50.32 HYPERTENSIVE HEART DISEASE WITH CHRONIC DIASTOLIC CONGESTIVE HEART FAILURE (MULTI): ICD-10-CM

## 2024-06-27 DIAGNOSIS — E11.65 TYPE 2 DIABETES MELLITUS WITH HYPERGLYCEMIA, WITH LONG-TERM CURRENT USE OF INSULIN (MULTI): ICD-10-CM

## 2024-06-27 DIAGNOSIS — Z79.4 TYPE 2 DIABETES MELLITUS WITH HYPERGLYCEMIA, WITH LONG-TERM CURRENT USE OF INSULIN (MULTI): ICD-10-CM

## 2024-06-27 DIAGNOSIS — N18.9 CHRONIC KIDNEY DISEASE, UNSPECIFIED CKD STAGE: ICD-10-CM

## 2024-06-27 DIAGNOSIS — I11.0 HYPERTENSIVE HEART DISEASE WITH CHRONIC DIASTOLIC CONGESTIVE HEART FAILURE (MULTI): ICD-10-CM

## 2024-06-27 PROCEDURE — 99308 SBSQ NF CARE LOW MDM 20: CPT | Performed by: INTERNAL MEDICINE

## 2024-06-27 NOTE — LETTER
Patient: Teresa Matthews  : 1950    Encounter Date: 2024    PROGRESS NOTE    Subjective  Chief complaint: Teresa Matthews is a 73 y.o. female who is a long term care patient being seen and evaluated for follow-up labs.    HPI:  HPI  Patient seen in follow-up of labs for worsening CKD and anemia.  Patient's labs obtained were significant for BUN and creatinine of 15 and 1.2, H&H of 8.4 and 25.9, sodium 139 potassium 4.5.  Stable at this time.  Continue to monitor.    Objective  Vital signs: 151/85, 96.8, 60, 18, 98%, blood sugar 113    Physical Exam  Constitutional:       General: She is not in acute distress.  Eyes:      Extraocular Movements: Extraocular movements intact.   Cardiovascular:      Rate and Rhythm: Normal rate and regular rhythm.   Pulmonary:      Effort: Pulmonary effort is normal.      Breath sounds: Normal breath sounds.   Abdominal:      General: Bowel sounds are normal.      Palpations: Abdomen is soft.      Tenderness: There is no abdominal tenderness.   Musculoskeletal:      Cervical back: Neck supple.      Right lower leg: No edema.      Left lower leg: No edema.   Neurological:      Mental Status: She is alert.      Motor: Weakness present.   Psychiatric:         Mood and Affect: Mood normal.         Behavior: Behavior is cooperative.         Assessment/Plan  Problem List Items Addressed This Visit       Type 2 diabetes mellitus with hyperglycemia, with long-term current use of insulin (Multi)     Fasting blood glucose at goal  Continue insulin  Glucoscan with sliding scale insulin coverage  Low concentrated sweets diet  Monitor fasting blood sugar         Hypertensive heart disease with chronic diastolic congestive heart failure (Multi)     CHF stable, no sob  Monitor blood pressure  Toprol-XL  Spironolactone  Losartan potassium         Anemia - Primary     Stable  Continue to monitor         CKD (chronic kidney disease)     Monitor labs routinely          Medications,  treatments, and labs reviewed  Continue medications and treatments as listed in EMR    Scribe Attestation  I, Kirill Page   attest that this documentation has been prepared under the direction and in the presence of Pita Virgen MD    Provider Attestation - Scribe documentation  All medical record entries made by the Scribe were at my direction and personally dictated by me. I have reviewed the chart and agree that the record accurately reflects my personal performance of the history, physical exam, discussion and plan.   Pita Virgen MD            Electronically Signed By: Pita Virgen MD   6/27/24  7:37 PM

## 2024-06-27 NOTE — PROGRESS NOTES
PROGRESS NOTE    Subjective   Chief complaint: Teresa Matthews is a 73 y.o. female who is a long term care patient being seen and evaluated for follow-up labs.    HPI:  HPI  Patient seen in follow-up of labs for worsening CKD and anemia.  Patient's labs obtained were significant for BUN and creatinine of 15 and 1.2, H&H of 8.4 and 25.9, sodium 139 potassium 4.5.  Stable at this time.  Continue to monitor.    Objective   Vital signs: 151/85, 96.8, 60, 18, 98%, blood sugar 113    Physical Exam  Constitutional:       General: She is not in acute distress.  Eyes:      Extraocular Movements: Extraocular movements intact.   Cardiovascular:      Rate and Rhythm: Normal rate and regular rhythm.   Pulmonary:      Effort: Pulmonary effort is normal.      Breath sounds: Normal breath sounds.   Abdominal:      General: Bowel sounds are normal.      Palpations: Abdomen is soft.      Tenderness: There is no abdominal tenderness.   Musculoskeletal:      Cervical back: Neck supple.      Right lower leg: No edema.      Left lower leg: No edema.   Neurological:      Mental Status: She is alert.      Motor: Weakness present.   Psychiatric:         Mood and Affect: Mood normal.         Behavior: Behavior is cooperative.         Assessment/Plan   Problem List Items Addressed This Visit       Type 2 diabetes mellitus with hyperglycemia, with long-term current use of insulin (Multi)     Fasting blood glucose at goal  Continue insulin  Glucoscan with sliding scale insulin coverage  Low concentrated sweets diet  Monitor fasting blood sugar         Hypertensive heart disease with chronic diastolic congestive heart failure (Multi)     CHF stable, no sob  Monitor blood pressure  Toprol-XL  Spironolactone  Losartan potassium         Anemia - Primary     Stable  Continue to monitor         CKD (chronic kidney disease)     Monitor labs routinely          Medications, treatments, and labs reviewed  Continue medications and treatments as  listed in EMR    Scribe Attestation  I, Kirill Page   attest that this documentation has been prepared under the direction and in the presence of Pita Virgen MD    Provider Attestation - Scribe documentation  All medical record entries made by the Scribe were at my direction and personally dictated by me. I have reviewed the chart and agree that the record accurately reflects my personal performance of the history, physical exam, discussion and plan.   Pita Virgen MD

## 2024-07-09 NOTE — DOCUMENTATION CLARIFICATION NOTE
"    PATIENT:               REYES RAMESH  ACCT #:                  [id number]  MRN:                       [id number]  :                       1950  ADMIT DATE:       6/10/2024 2:19 PM  DISCH DATE:        2024 3:30 PM  RESPONDING PROVIDER #:        [id number]          PROVIDER RESPONSE TEXT:    Patient does not have sepsis. Correct diagnosis would of left knee infection due to bacteria in my opinion    CDI QUERY TEXT:    Clarification          Instruction:    Based on your assessment of the patient and the clinical information, please provide the requested documentation by clicking on the appropriate radio button and enter any additional information if prompted.    Question: Please further clarify the most likely etiology of sepsis on admission after final work up    When answering this query, please exercise your independent professional judgment. The fact that a question is being asked, does not imply that any particular answer is desired or expected.    The patient's clinical indicators include:  Clinical Information:  73 y.o. female who presented with left knee swelling/pain/redness. Work-up revealed  L knee OM, septic arthritis.    Clinical Indicators:      H&P: \" Left knee septic arthritis with likely infected antibiotic spacer and chronic osteomyelitis  --admitted for L knee septic arthritis and chronic OM. Currently receiving IV Abx, pending ID and Ortho evaluation\"    6/10/24 ID Note -\"Left knee knee pain /  cellulitis, likely spacer infection\"    24 Ortho -\" Given that she is not currently septic, she could remain on IV antibiotics and above-knee amputation could be potentially scheduled electively.  Knee was aspirated today in order to determine whether an organism can be isolated.\"    24 - \"Joint Aspiration/Injection  --1. Left knee knee pain /  cellulitis, likely spacer infection, the culture is negative\"      Treatment: ID consult, joint aspiration,    Risk Factors:   joint " replacement and chronic osteomyelitis  Options provided:  -- Sepsis related to knee spacer  -- Sepsis related to osteomyelitis  -- Other - I will add my own diagnosis  -- Refer to Clinical Documentation Reviewer    Query created by: Alexa Weller on 6/28/2024 1:29 PM      Electronically signed by:  PAULINE SANCHEZ DO 7/9/2024 4:50 PM

## 2024-07-18 ENCOUNTER — NURSING HOME VISIT (OUTPATIENT)
Dept: POST ACUTE CARE | Facility: EXTERNAL LOCATION | Age: 74
End: 2024-07-18
Payer: MEDICARE

## 2024-07-18 DIAGNOSIS — E11.65 TYPE 2 DIABETES MELLITUS WITH HYPERGLYCEMIA, WITH LONG-TERM CURRENT USE OF INSULIN (MULTI): ICD-10-CM

## 2024-07-18 DIAGNOSIS — I48.91 ATRIAL FIBRILLATION, UNSPECIFIED TYPE (MULTI): ICD-10-CM

## 2024-07-18 DIAGNOSIS — J44.9 CHRONIC OBSTRUCTIVE PULMONARY DISEASE, UNSPECIFIED COPD TYPE (MULTI): ICD-10-CM

## 2024-07-18 DIAGNOSIS — I11.0 HYPERTENSIVE HEART DISEASE WITH CHRONIC DIASTOLIC CONGESTIVE HEART FAILURE (MULTI): ICD-10-CM

## 2024-07-18 DIAGNOSIS — I50.32 HYPERTENSIVE HEART DISEASE WITH CHRONIC DIASTOLIC CONGESTIVE HEART FAILURE (MULTI): ICD-10-CM

## 2024-07-18 DIAGNOSIS — K21.9 GASTROESOPHAGEAL REFLUX DISEASE WITHOUT ESOPHAGITIS: ICD-10-CM

## 2024-07-18 DIAGNOSIS — Z79.4 TYPE 2 DIABETES MELLITUS WITH HYPERGLYCEMIA, WITH LONG-TERM CURRENT USE OF INSULIN (MULTI): ICD-10-CM

## 2024-07-18 DIAGNOSIS — D64.9 ANEMIA, UNSPECIFIED TYPE: Primary | ICD-10-CM

## 2024-07-18 PROCEDURE — 99309 SBSQ NF CARE MODERATE MDM 30: CPT | Performed by: INTERNAL MEDICINE

## 2024-07-18 NOTE — LETTER
Patient: Teresa Matthews  : 1950    Encounter Date: 2024    PROGRESS NOTE    Subjective  Chief complaint: Teresa Matthews is a 73 y.o. female who is a long term care patient being seen and evaluated for monthly general medical care and follow-up    HPI:  HPI  Patient presents for general medical care and f/u.  Patient seen and examined at bedside.  No issues per nursing.  Patient has no acute complaints.  AFIB stable, denies palpitations and chest pain.  Anemia stable, denies fatigue, sob, and palpitations.  COPD stable, denies sob, wheezing, cough.  HTN Denies chest pain and headache.  CHF stable, denies sob, orthopnea, weight gain.  GERD controlled.  Denies heartburn, regurgitation, epigastric discomfort, sour taste, and cough.  DM, denies polydipsia polyuria polyphagia.  Mentation at baseline, no acute distress    Objective  Vital signs: 146/81, 98.1, 64, 16, 95%, blood sugar 79    Physical Exam  Constitutional:       General: She is not in acute distress.  Eyes:      Extraocular Movements: Extraocular movements intact.   Cardiovascular:      Rate and Rhythm: Normal rate and regular rhythm.   Pulmonary:      Effort: Pulmonary effort is normal.      Breath sounds: Normal breath sounds.   Abdominal:      General: Bowel sounds are normal.      Palpations: Abdomen is soft.      Tenderness: There is no abdominal tenderness.   Musculoskeletal:      Cervical back: Neck supple.      Right lower leg: No edema.      Left lower leg: No edema.   Neurological:      Mental Status: She is alert.   Psychiatric:         Mood and Affect: Mood normal.         Behavior: Behavior is cooperative.         Assessment/Plan  Problem List Items Addressed This Visit       Type 2 diabetes mellitus with hyperglycemia, with long-term current use of insulin (Multi)     Fasting blood glucose at goal  Continue insulin  Glucoscan with sliding scale insulin coverage  Low concentrated sweets diet  Monitor fasting blood sugar          Atrial fibrillation (Multi)     Amiodarone  Apixaban  Bleeding precautions  Monitor heart rate         GERD (gastroesophageal reflux disease)     Monitor for GI symptoms  Famotidine         Hypertensive heart disease with chronic diastolic congestive heart failure (Multi)     CHF stable, no sob  Monitor blood pressure  Toprol-XL  Spironolactone  Losartan potassium         Chronic obstructive pulmonary disease, unspecified COPD type (Multi)     Stable, no wheezing or shortness of breath  On room air         Anemia - Primary     Stable  Continue to monitor          Medications, treatments, and labs reviewed  Continue medications and treatments as listed in EMR    Scribe Attestation  I, Donato Pageibgénesis   attest that this documentation has been prepared under the direction and in the presence of Pita Virgen MD    Provider Attestation - Scribe documentation  All medical record entries made by the Scribe were at my direction and personally dictated by me. I have reviewed the chart and agree that the record accurately reflects my personal performance of the history, physical exam, discussion and plan.   Pita Virgen MD            Electronically Signed By: Pita Virgen MD   7/18/24  1:49 PM

## 2024-07-18 NOTE — PROGRESS NOTES
PROGRESS NOTE    Subjective   Chief complaint: Teresa Matthews is a 73 y.o. female who is a long term care patient being seen and evaluated for monthly general medical care and follow-up    HPI:  HPI  Patient presents for general medical care and f/u.  Patient seen and examined at bedside.  No issues per nursing.  Patient has no acute complaints.  AFIB stable, denies palpitations and chest pain.  Anemia stable, denies fatigue, sob, and palpitations.  COPD stable, denies sob, wheezing, cough.  HTN Denies chest pain and headache.  CHF stable, denies sob, orthopnea, weight gain.  GERD controlled.  Denies heartburn, regurgitation, epigastric discomfort, sour taste, and cough.  DM, denies polydipsia polyuria polyphagia.  Mentation at baseline, no acute distress    Objective   Vital signs: 146/81, 98.1, 64, 16, 95%, blood sugar 79    Physical Exam  Constitutional:       General: She is not in acute distress.  Eyes:      Extraocular Movements: Extraocular movements intact.   Cardiovascular:      Rate and Rhythm: Normal rate and regular rhythm.   Pulmonary:      Effort: Pulmonary effort is normal.      Breath sounds: Normal breath sounds.   Abdominal:      General: Bowel sounds are normal.      Palpations: Abdomen is soft.      Tenderness: There is no abdominal tenderness.   Musculoskeletal:      Cervical back: Neck supple.      Right lower leg: No edema.      Left lower leg: No edema.   Neurological:      Mental Status: She is alert.   Psychiatric:         Mood and Affect: Mood normal.         Behavior: Behavior is cooperative.         Assessment/Plan   Problem List Items Addressed This Visit       Type 2 diabetes mellitus with hyperglycemia, with long-term current use of insulin (Multi)     Fasting blood glucose at goal  Continue insulin  Glucoscan with sliding scale insulin coverage  Low concentrated sweets diet  Monitor fasting blood sugar         Atrial fibrillation (Multi)     Amiodarone  Apixaban  Bleeding  precautions  Monitor heart rate         GERD (gastroesophageal reflux disease)     Monitor for GI symptoms  Famotidine         Hypertensive heart disease with chronic diastolic congestive heart failure (Multi)     CHF stable, no sob  Monitor blood pressure  Toprol-XL  Spironolactone  Losartan potassium         Chronic obstructive pulmonary disease, unspecified COPD type (Multi)     Stable, no wheezing or shortness of breath  On room air         Anemia - Primary     Stable  Continue to monitor          Medications, treatments, and labs reviewed  Continue medications and treatments as listed in EMR    Scribe Attestation  I, Donato Pageibgénesis   attest that this documentation has been prepared under the direction and in the presence of Pita Virgen MD    Provider Attestation - Scribe documentation  All medical record entries made by the Scribe were at my direction and personally dictated by me. I have reviewed the chart and agree that the record accurately reflects my personal performance of the history, physical exam, discussion and plan.   Pita Virgen MD

## 2024-07-22 ENCOUNTER — NURSING HOME VISIT (OUTPATIENT)
Dept: POST ACUTE CARE | Facility: EXTERNAL LOCATION | Age: 74
End: 2024-07-22
Payer: MEDICARE

## 2024-07-22 DIAGNOSIS — K21.9 GASTROESOPHAGEAL REFLUX DISEASE WITHOUT ESOPHAGITIS: Primary | ICD-10-CM

## 2024-07-22 DIAGNOSIS — I11.0 HYPERTENSIVE HEART DISEASE WITH CHRONIC DIASTOLIC CONGESTIVE HEART FAILURE (MULTI): ICD-10-CM

## 2024-07-22 DIAGNOSIS — I48.91 ATRIAL FIBRILLATION, UNSPECIFIED TYPE (MULTI): ICD-10-CM

## 2024-07-22 DIAGNOSIS — Z79.4 TYPE 2 DIABETES MELLITUS WITH HYPERGLYCEMIA, WITH LONG-TERM CURRENT USE OF INSULIN (MULTI): ICD-10-CM

## 2024-07-22 DIAGNOSIS — J44.9 CHRONIC OBSTRUCTIVE PULMONARY DISEASE, UNSPECIFIED COPD TYPE (MULTI): ICD-10-CM

## 2024-07-22 DIAGNOSIS — I50.32 HYPERTENSIVE HEART DISEASE WITH CHRONIC DIASTOLIC CONGESTIVE HEART FAILURE (MULTI): ICD-10-CM

## 2024-07-22 DIAGNOSIS — E11.65 TYPE 2 DIABETES MELLITUS WITH HYPERGLYCEMIA, WITH LONG-TERM CURRENT USE OF INSULIN (MULTI): ICD-10-CM

## 2024-07-22 PROCEDURE — 99308 SBSQ NF CARE LOW MDM 20: CPT | Performed by: INTERNAL MEDICINE

## 2024-07-22 NOTE — LETTER
Patient: Teresa Matthews  : 1950    Encounter Date: 2024    PROGRESS NOTE    Subjective  Chief complaint: Teresa Matthews is a 73 y.o. female who is a long term care patient being seen and evaluated for heartburn.     HPI:  Patient presents with c\o heartburn and indigestion. She currently is on multiple medications to control symptoms. Denies any other concerns. Denies n,v,f,c,pain.       Objective  Vital signs: 123/73,91,95%,     Physical Exam  Constitutional:       General: She is not in acute distress.  Eyes:      Extraocular Movements: Extraocular movements intact.   Cardiovascular:      Rate and Rhythm: Normal rate and regular rhythm.   Pulmonary:      Effort: Pulmonary effort is normal.      Breath sounds: Normal breath sounds.   Abdominal:      General: Bowel sounds are normal.      Palpations: Abdomen is soft.      Tenderness: There is no abdominal tenderness.   Musculoskeletal:      Cervical back: Neck supple.      Right lower leg: No edema.      Left lower leg: No edema.   Neurological:      Mental Status: She is alert.   Psychiatric:         Mood and Affect: Mood normal.         Behavior: Behavior is cooperative.         Assessment/Plan  Problem List Items Addressed This Visit       Type 2 diabetes mellitus with hyperglycemia, with long-term current use of insulin (Multi)     Fasting blood glucose at goal  Continue insulin  Glucoscan with sliding scale insulin coverage  Low concentrated sweets diet  Monitor fasting blood sugar         Atrial fibrillation (Multi)     Amiodarone  Apixaban  Bleeding precautions  Monitor heart rate         GERD (gastroesophageal reflux disease) - Primary     Start carafate QID  Famotidine  Pantoprazole  Mylanta PRN  Monitor          Hypertensive heart disease with chronic diastolic congestive heart failure (Multi)     CHF stable, no sob  Monitor blood pressure  Toprol-XL  Spironolactone  Losartan potassium         Chronic obstructive pulmonary  disease, unspecified COPD type (Multi)     Stable, no wheezing or shortness of breath  On room air          Medications, treatments, and labs reviewed  Continue medications and treatments as listed in EMR      Scribe Attestation  JUAN FRANCISCO, Kirill Chapman   attest that this documentation has been prepared under the direction and in the presence of Pita Virgen MD.     Provider Attestation - Scribe documentation  All medical record entries made by the Scribe were at my direction and personally dictated by me. I have reviewed the chart and agree that the record accurately reflects my personal performance of the history, physical exam, discussion and plan.   Pita Virgen MD      Electronically Signed By: Pita Virgen MD   7/22/24  1:09 PM

## 2024-07-22 NOTE — H&P (VIEW-ONLY)
PROGRESS NOTE    Subjective   Chief complaint: Teresa Matthews is a 73 y.o. female who is a long term care patient being seen and evaluated for heartburn.     HPI:  Patient presents with c\o heartburn and indigestion. She currently is on multiple medications to control symptoms. Denies any other concerns. Denies n,v,f,c,pain.       Objective   Vital signs: 123/73,91,95%,     Physical Exam  Constitutional:       General: She is not in acute distress.  Eyes:      Extraocular Movements: Extraocular movements intact.   Cardiovascular:      Rate and Rhythm: Normal rate and regular rhythm.   Pulmonary:      Effort: Pulmonary effort is normal.      Breath sounds: Normal breath sounds.   Abdominal:      General: Bowel sounds are normal.      Palpations: Abdomen is soft.      Tenderness: There is no abdominal tenderness.   Musculoskeletal:      Cervical back: Neck supple.      Right lower leg: No edema.      Left lower leg: No edema.   Neurological:      Mental Status: She is alert.   Psychiatric:         Mood and Affect: Mood normal.         Behavior: Behavior is cooperative.         Assessment/Plan   Problem List Items Addressed This Visit       Type 2 diabetes mellitus with hyperglycemia, with long-term current use of insulin (Multi)     Fasting blood glucose at goal  Continue insulin  Glucoscan with sliding scale insulin coverage  Low concentrated sweets diet  Monitor fasting blood sugar         Atrial fibrillation (Multi)     Amiodarone  Apixaban  Bleeding precautions  Monitor heart rate         GERD (gastroesophageal reflux disease) - Primary     Start carafate QID  Famotidine  Pantoprazole  Mylanta PRN  Monitor          Hypertensive heart disease with chronic diastolic congestive heart failure (Multi)     CHF stable, no sob  Monitor blood pressure  Toprol-XL  Spironolactone  Losartan potassium         Chronic obstructive pulmonary disease, unspecified COPD type (Multi)     Stable, no wheezing or shortness  of breath  On room air          Medications, treatments, and labs reviewed  Continue medications and treatments as listed in EMR      Scribe Attestation  I, Kirill Chapman   attest that this documentation has been prepared under the direction and in the presence of Pita Virgen MD.     Provider Attestation - Scribe documentation  All medical record entries made by the Scribe were at my direction and personally dictated by me. I have reviewed the chart and agree that the record accurately reflects my personal performance of the history, physical exam, discussion and plan.   Pita Virgen MD

## 2024-07-29 ENCOUNTER — ANESTHESIA EVENT (OUTPATIENT)
Dept: OPERATING ROOM | Facility: HOSPITAL | Age: 74
End: 2024-07-29
Payer: MEDICARE

## 2024-07-30 ENCOUNTER — TELEPHONE (OUTPATIENT)
Dept: PRIMARY CARE | Facility: CLINIC | Age: 74
End: 2024-07-30

## 2024-07-30 ENCOUNTER — ANESTHESIA (OUTPATIENT)
Dept: OPERATING ROOM | Facility: HOSPITAL | Age: 74
End: 2024-07-30
Payer: MEDICARE

## 2024-07-30 ENCOUNTER — HOSPITAL ENCOUNTER (INPATIENT)
Facility: HOSPITAL | Age: 74
LOS: 3 days | Discharge: NURSING, REHAB OR LONG-TERM CARE | End: 2024-08-02
Attending: SURGERY | Admitting: INTERNAL MEDICINE
Payer: MEDICARE

## 2024-07-30 DIAGNOSIS — E78.1 HYPERTRIGLYCERIDEMIA: ICD-10-CM

## 2024-07-30 DIAGNOSIS — I48.91 ATRIAL FIBRILLATION, UNSPECIFIED TYPE (MULTI): ICD-10-CM

## 2024-07-30 DIAGNOSIS — M86.652: Primary | ICD-10-CM

## 2024-07-30 DIAGNOSIS — E11.65 TYPE 2 DIABETES MELLITUS WITH HYPERGLYCEMIA, WITH LONG-TERM CURRENT USE OF INSULIN (MULTI): ICD-10-CM

## 2024-07-30 DIAGNOSIS — Z79.4 TYPE 2 DIABETES MELLITUS WITH HYPERGLYCEMIA, WITH LONG-TERM CURRENT USE OF INSULIN (MULTI): ICD-10-CM

## 2024-07-30 DIAGNOSIS — N18.31 CHRONIC RENAL FAILURE, STAGE 3A (MULTI): ICD-10-CM

## 2024-07-30 DIAGNOSIS — E66.9 OBESITY (BMI 30-39.9): ICD-10-CM

## 2024-07-30 DIAGNOSIS — E13.10 DIABETIC KETOACIDOSIS WITHOUT COMA ASSOCIATED WITH OTHER SPECIFIED DIABETES MELLITUS (MULTI): ICD-10-CM

## 2024-07-30 DIAGNOSIS — K59.03 DRUG INDUCED CONSTIPATION: ICD-10-CM

## 2024-07-30 DIAGNOSIS — Z86.39 H/O DIABETIC NEUROPATHY: ICD-10-CM

## 2024-07-30 DIAGNOSIS — G89.18 POST-OP PAIN: ICD-10-CM

## 2024-07-30 DIAGNOSIS — K21.9 GASTROESOPHAGEAL REFLUX DISEASE WITHOUT ESOPHAGITIS: ICD-10-CM

## 2024-07-30 DIAGNOSIS — M86.9 OSTEOMYELITIS, UNSPECIFIED (MULTI): ICD-10-CM

## 2024-07-30 DIAGNOSIS — N18.31 STAGE 3A CHRONIC KIDNEY DISEASE (MULTI): ICD-10-CM

## 2024-07-30 LAB
ABO GROUP (TYPE) IN BLOOD: NORMAL
ANTIBODY SCREEN: NORMAL
BASOPHILS # BLD AUTO: 0.05 X10*3/UL (ref 0–0.1)
BASOPHILS NFR BLD AUTO: 0.4 %
EOSINOPHIL # BLD AUTO: 0.06 X10*3/UL (ref 0–0.4)
EOSINOPHIL NFR BLD AUTO: 0.5 %
ERYTHROCYTE [DISTWIDTH] IN BLOOD BY AUTOMATED COUNT: 16.6 % (ref 11.5–14.5)
GLUCOSE BLD MANUAL STRIP-MCNC: 107 MG/DL (ref 74–99)
GLUCOSE BLD MANUAL STRIP-MCNC: 118 MG/DL (ref 74–99)
GLUCOSE BLD MANUAL STRIP-MCNC: 123 MG/DL (ref 74–99)
GLUCOSE BLD MANUAL STRIP-MCNC: 134 MG/DL (ref 74–99)
GLUCOSE BLD MANUAL STRIP-MCNC: 162 MG/DL (ref 74–99)
GLUCOSE BLD MANUAL STRIP-MCNC: 42 MG/DL (ref 74–99)
GLUCOSE BLD MANUAL STRIP-MCNC: 73 MG/DL (ref 74–99)
HCT VFR BLD AUTO: 29.6 % (ref 36–46)
HGB BLD-MCNC: 8.6 G/DL (ref 12–16)
IMM GRANULOCYTES # BLD AUTO: 0.06 X10*3/UL (ref 0–0.5)
IMM GRANULOCYTES NFR BLD AUTO: 0.5 % (ref 0–0.9)
LYMPHOCYTES # BLD AUTO: 1.15 X10*3/UL (ref 0.8–3)
LYMPHOCYTES NFR BLD AUTO: 10.2 %
MCH RBC QN AUTO: 24.6 PG (ref 26–34)
MCHC RBC AUTO-ENTMCNC: 29.1 G/DL (ref 32–36)
MCV RBC AUTO: 85 FL (ref 80–100)
MONOCYTES # BLD AUTO: 1.31 X10*3/UL (ref 0.05–0.8)
MONOCYTES NFR BLD AUTO: 11.7 %
NEUTROPHILS # BLD AUTO: 8.61 X10*3/UL (ref 1.6–5.5)
NEUTROPHILS NFR BLD AUTO: 76.7 %
NRBC BLD-RTO: 0 /100 WBCS (ref 0–0)
PLATELET # BLD AUTO: 277 X10*3/UL (ref 150–450)
RBC # BLD AUTO: 3.5 X10*6/UL (ref 4–5.2)
RH FACTOR (ANTIGEN D): NORMAL
WBC # BLD AUTO: 11.2 X10*3/UL (ref 4.4–11.3)

## 2024-07-30 PROCEDURE — 3600000003 HC OR TIME - INITIAL BASE CHARGE - PROCEDURE LEVEL THREE: Performed by: SURGERY

## 2024-07-30 PROCEDURE — 27590 AMPUTATE LEG AT THIGH: CPT | Performed by: PHYSICIAN ASSISTANT

## 2024-07-30 PROCEDURE — 0Y6D0Z3 DETACHMENT AT LEFT UPPER LEG, LOW, OPEN APPROACH: ICD-10-PCS | Performed by: SURGERY

## 2024-07-30 PROCEDURE — 3700000001 HC GENERAL ANESTHESIA TIME - INITIAL BASE CHARGE: Performed by: SURGERY

## 2024-07-30 PROCEDURE — 86901 BLOOD TYPING SEROLOGIC RH(D): CPT | Performed by: ANESTHESIOLOGY

## 2024-07-30 PROCEDURE — 82947 ASSAY GLUCOSE BLOOD QUANT: CPT

## 2024-07-30 PROCEDURE — 2500000004 HC RX 250 GENERAL PHARMACY W/ HCPCS (ALT 636 FOR OP/ED): Performed by: NURSE ANESTHETIST, CERTIFIED REGISTERED

## 2024-07-30 PROCEDURE — 3600000008 HC OR TIME - EACH INCREMENTAL 1 MINUTE - PROCEDURE LEVEL THREE: Performed by: SURGERY

## 2024-07-30 PROCEDURE — A27590 PR AMPUTATE THIGH,THRU FEMUR: Performed by: NURSE ANESTHETIST, CERTIFIED REGISTERED

## 2024-07-30 PROCEDURE — 7100000001 HC RECOVERY ROOM TIME - INITIAL BASE CHARGE: Performed by: SURGERY

## 2024-07-30 PROCEDURE — 2500000005 HC RX 250 GENERAL PHARMACY W/O HCPCS: Performed by: NURSE ANESTHETIST, CERTIFIED REGISTERED

## 2024-07-30 PROCEDURE — 86920 COMPATIBILITY TEST SPIN: CPT

## 2024-07-30 PROCEDURE — 36430 TRANSFUSION BLD/BLD COMPNT: CPT | Performed by: NURSE ANESTHETIST, CERTIFIED REGISTERED

## 2024-07-30 PROCEDURE — 2500000004 HC RX 250 GENERAL PHARMACY W/ HCPCS (ALT 636 FOR OP/ED): Performed by: ANESTHESIOLOGY

## 2024-07-30 PROCEDURE — 2500000001 HC RX 250 WO HCPCS SELF ADMINISTERED DRUGS (ALT 637 FOR MEDICARE OP): Performed by: INTERNAL MEDICINE

## 2024-07-30 PROCEDURE — 27590 AMPUTATE LEG AT THIGH: CPT | Performed by: SURGERY

## 2024-07-30 PROCEDURE — 7100000002 HC RECOVERY ROOM TIME - EACH INCREMENTAL 1 MINUTE: Performed by: SURGERY

## 2024-07-30 PROCEDURE — A27590 PR AMPUTATE THIGH,THRU FEMUR: Performed by: ANESTHESIOLOGY

## 2024-07-30 PROCEDURE — 2500000001 HC RX 250 WO HCPCS SELF ADMINISTERED DRUGS (ALT 637 FOR MEDICARE OP): Performed by: PHYSICIAN ASSISTANT

## 2024-07-30 PROCEDURE — 99100 ANES PT EXTEME AGE<1 YR&>70: CPT | Performed by: ANESTHESIOLOGY

## 2024-07-30 PROCEDURE — 2500000004 HC RX 250 GENERAL PHARMACY W/ HCPCS (ALT 636 FOR OP/ED): Performed by: PHYSICIAN ASSISTANT

## 2024-07-30 PROCEDURE — P9016 RBC LEUKOCYTES REDUCED: HCPCS

## 2024-07-30 PROCEDURE — 2500000002 HC RX 250 W HCPCS SELF ADMINISTERED DRUGS (ALT 637 FOR MEDICARE OP, ALT 636 FOR OP/ED): Performed by: PHYSICIAN ASSISTANT

## 2024-07-30 PROCEDURE — 2720000007 HC OR 272 NO HCPCS: Performed by: SURGERY

## 2024-07-30 PROCEDURE — 3700000002 HC GENERAL ANESTHESIA TIME - EACH INCREMENTAL 1 MINUTE: Performed by: SURGERY

## 2024-07-30 PROCEDURE — 99223 1ST HOSP IP/OBS HIGH 75: CPT | Performed by: INTERNAL MEDICINE

## 2024-07-30 PROCEDURE — 2500000005 HC RX 250 GENERAL PHARMACY W/O HCPCS: Performed by: ANESTHESIOLOGY

## 2024-07-30 PROCEDURE — P9045 ALBUMIN (HUMAN), 5%, 250 ML: HCPCS | Mod: JZ | Performed by: NURSE ANESTHETIST, CERTIFIED REGISTERED

## 2024-07-30 PROCEDURE — 1200000002 HC GENERAL ROOM WITH TELEMETRY DAILY

## 2024-07-30 PROCEDURE — 36415 COLL VENOUS BLD VENIPUNCTURE: CPT | Performed by: SURGERY

## 2024-07-30 PROCEDURE — 85025 COMPLETE CBC W/AUTO DIFF WBC: CPT | Performed by: FAMILY MEDICINE

## 2024-07-30 RX ORDER — MIDAZOLAM HYDROCHLORIDE 1 MG/ML
INJECTION INTRAMUSCULAR; INTRAVENOUS AS NEEDED
Status: DISCONTINUED | OUTPATIENT
Start: 2024-07-30 | End: 2024-07-30

## 2024-07-30 RX ORDER — FAMOTIDINE 20 MG/1
40 TABLET, FILM COATED ORAL DAILY
Status: DISCONTINUED | OUTPATIENT
Start: 2024-07-30 | End: 2024-08-02 | Stop reason: HOSPADM

## 2024-07-30 RX ORDER — FENTANYL CITRATE 50 UG/ML
INJECTION, SOLUTION INTRAMUSCULAR; INTRAVENOUS AS NEEDED
Status: DISCONTINUED | OUTPATIENT
Start: 2024-07-30 | End: 2024-07-30

## 2024-07-30 RX ORDER — AMOXICILLIN 250 MG
2 CAPSULE ORAL 2 TIMES DAILY
Status: DISCONTINUED | OUTPATIENT
Start: 2024-07-30 | End: 2024-08-02 | Stop reason: HOSPADM

## 2024-07-30 RX ORDER — PANTOPRAZOLE SODIUM 40 MG/1
40 TABLET, DELAYED RELEASE ORAL
COMMUNITY

## 2024-07-30 RX ORDER — AMIODARONE HYDROCHLORIDE 200 MG/1
200 TABLET ORAL DAILY
Status: DISCONTINUED | OUTPATIENT
Start: 2024-07-30 | End: 2024-08-02 | Stop reason: HOSPADM

## 2024-07-30 RX ORDER — ONDANSETRON HYDROCHLORIDE 2 MG/ML
4 INJECTION, SOLUTION INTRAVENOUS EVERY 8 HOURS PRN
Status: DISCONTINUED | OUTPATIENT
Start: 2024-07-30 | End: 2024-08-02 | Stop reason: HOSPADM

## 2024-07-30 RX ORDER — PANTOPRAZOLE SODIUM 40 MG/1
40 TABLET, DELAYED RELEASE ORAL
Status: DISCONTINUED | OUTPATIENT
Start: 2024-07-31 | End: 2024-08-02 | Stop reason: HOSPADM

## 2024-07-30 RX ORDER — ACETAMINOPHEN 650 MG/1
650 SUPPOSITORY RECTAL EVERY 6 HOURS
Status: DISCONTINUED | OUTPATIENT
Start: 2024-07-30 | End: 2024-07-30

## 2024-07-30 RX ORDER — POLYETHYLENE GLYCOL 3350 17 G/17G
17 POWDER, FOR SOLUTION ORAL 2 TIMES DAILY
Status: DISCONTINUED | OUTPATIENT
Start: 2024-07-30 | End: 2024-08-02 | Stop reason: HOSPADM

## 2024-07-30 RX ORDER — ACETAMINOPHEN 160 MG/5ML
650 SOLUTION ORAL EVERY 6 HOURS
Status: DISCONTINUED | OUTPATIENT
Start: 2024-07-30 | End: 2024-07-30

## 2024-07-30 RX ORDER — DESVENLAFAXINE 100 MG/1
100 TABLET, EXTENDED RELEASE ORAL DAILY
Status: DISCONTINUED | OUTPATIENT
Start: 2024-07-30 | End: 2024-08-02 | Stop reason: HOSPADM

## 2024-07-30 RX ORDER — SUCCINYLCHOLINE CHLORIDE 20 MG/ML
INJECTION INTRAMUSCULAR; INTRAVENOUS AS NEEDED
Status: DISCONTINUED | OUTPATIENT
Start: 2024-07-30 | End: 2024-07-30

## 2024-07-30 RX ORDER — ONDANSETRON 4 MG/1
4 TABLET, ORALLY DISINTEGRATING ORAL EVERY 8 HOURS PRN
Status: DISCONTINUED | OUTPATIENT
Start: 2024-07-30 | End: 2024-08-02 | Stop reason: HOSPADM

## 2024-07-30 RX ORDER — CEFAZOLIN SODIUM 2 G/100ML
2 INJECTION, SOLUTION INTRAVENOUS EVERY 8 HOURS
Status: DISPENSED | OUTPATIENT
Start: 2024-07-30 | End: 2024-07-31

## 2024-07-30 RX ORDER — OXYCODONE HYDROCHLORIDE 10 MG/1
10 TABLET ORAL EVERY 4 HOURS PRN
Status: DISCONTINUED | OUTPATIENT
Start: 2024-07-30 | End: 2024-08-02 | Stop reason: HOSPADM

## 2024-07-30 RX ORDER — CEFAZOLIN 1 G/1
INJECTION, POWDER, FOR SOLUTION INTRAVENOUS AS NEEDED
Status: DISCONTINUED | OUTPATIENT
Start: 2024-07-30 | End: 2024-07-30

## 2024-07-30 RX ORDER — LOSARTAN POTASSIUM 50 MG/1
25 TABLET ORAL DAILY
Status: DISCONTINUED | OUTPATIENT
Start: 2024-07-31 | End: 2024-08-02 | Stop reason: HOSPADM

## 2024-07-30 RX ORDER — SODIUM CHLORIDE, SODIUM LACTATE, POTASSIUM CHLORIDE, CALCIUM CHLORIDE 600; 310; 30; 20 MG/100ML; MG/100ML; MG/100ML; MG/100ML
100 INJECTION, SOLUTION INTRAVENOUS CONTINUOUS
Status: DISCONTINUED | OUTPATIENT
Start: 2024-07-30 | End: 2024-07-30 | Stop reason: HOSPADM

## 2024-07-30 RX ORDER — TRAZODONE HYDROCHLORIDE 50 MG/1
50 TABLET ORAL NIGHTLY
Status: DISCONTINUED | OUTPATIENT
Start: 2024-07-30 | End: 2024-08-02 | Stop reason: HOSPADM

## 2024-07-30 RX ORDER — ONDANSETRON HYDROCHLORIDE 2 MG/ML
4 INJECTION, SOLUTION INTRAVENOUS ONCE AS NEEDED
Status: DISCONTINUED | OUTPATIENT
Start: 2024-07-30 | End: 2024-07-30 | Stop reason: HOSPADM

## 2024-07-30 RX ORDER — CLINDAMYCIN PHOSPHATE 600 MG/50ML
600 INJECTION, SOLUTION INTRAVENOUS ONCE
Status: DISCONTINUED | OUTPATIENT
Start: 2024-07-30 | End: 2024-07-30 | Stop reason: HOSPADM

## 2024-07-30 RX ORDER — DEXTROSE 50 % IN WATER (D50W) INTRAVENOUS SYRINGE
25
Status: DISCONTINUED | OUTPATIENT
Start: 2024-07-30 | End: 2024-08-02 | Stop reason: HOSPADM

## 2024-07-30 RX ORDER — DROPERIDOL 2.5 MG/ML
0.62 INJECTION, SOLUTION INTRAMUSCULAR; INTRAVENOUS ONCE AS NEEDED
Status: DISCONTINUED | OUTPATIENT
Start: 2024-07-30 | End: 2024-07-30 | Stop reason: HOSPADM

## 2024-07-30 RX ORDER — INSULIN LISPRO 100 [IU]/ML
0-10 INJECTION, SOLUTION INTRAVENOUS; SUBCUTANEOUS
Status: DISCONTINUED | OUTPATIENT
Start: 2024-07-30 | End: 2024-08-02 | Stop reason: HOSPADM

## 2024-07-30 RX ORDER — METOPROLOL SUCCINATE 50 MG/1
50 TABLET, EXTENDED RELEASE ORAL DAILY
Status: DISCONTINUED | OUTPATIENT
Start: 2024-07-30 | End: 2024-08-02 | Stop reason: HOSPADM

## 2024-07-30 RX ORDER — OXYCODONE HYDROCHLORIDE 10 MG/1
10 TABLET ORAL EVERY 4 HOURS PRN
Status: DISCONTINUED | OUTPATIENT
Start: 2024-07-30 | End: 2024-07-30

## 2024-07-30 RX ORDER — DEXTROSE 50 % IN WATER (D50W) INTRAVENOUS SYRINGE
25 ONCE
Status: COMPLETED | OUTPATIENT
Start: 2024-07-30 | End: 2024-07-30

## 2024-07-30 RX ORDER — SENNOSIDES 8.6 MG/1
2 TABLET ORAL 2 TIMES DAILY
Status: DISCONTINUED | OUTPATIENT
Start: 2024-07-30 | End: 2024-07-30

## 2024-07-30 RX ORDER — ALBUMIN HUMAN 50 G/1000ML
SOLUTION INTRAVENOUS AS NEEDED
Status: DISCONTINUED | OUTPATIENT
Start: 2024-07-30 | End: 2024-07-30

## 2024-07-30 RX ORDER — INSULIN LISPRO 100 [IU]/ML
30 INJECTION, SUSPENSION SUBCUTANEOUS
Status: DISCONTINUED | OUTPATIENT
Start: 2024-07-30 | End: 2024-08-02 | Stop reason: HOSPADM

## 2024-07-30 RX ORDER — BUPROPION HYDROCHLORIDE 150 MG/1
150 TABLET ORAL EVERY MORNING
Status: DISCONTINUED | OUTPATIENT
Start: 2024-07-30 | End: 2024-08-02 | Stop reason: HOSPADM

## 2024-07-30 RX ORDER — PROPOFOL 10 MG/ML
INJECTION, EMULSION INTRAVENOUS AS NEEDED
Status: DISCONTINUED | OUTPATIENT
Start: 2024-07-30 | End: 2024-07-30

## 2024-07-30 RX ORDER — GABAPENTIN 100 MG/1
100 CAPSULE ORAL 3 TIMES DAILY
Status: DISCONTINUED | OUTPATIENT
Start: 2024-07-30 | End: 2024-07-31

## 2024-07-30 RX ORDER — HEPARIN SODIUM 5000 [USP'U]/ML
5000 INJECTION, SOLUTION INTRAVENOUS; SUBCUTANEOUS EVERY 8 HOURS
Status: DISPENSED | OUTPATIENT
Start: 2024-07-30 | End: 2024-07-31

## 2024-07-30 RX ORDER — ONDANSETRON HYDROCHLORIDE 2 MG/ML
INJECTION, SOLUTION INTRAVENOUS AS NEEDED
Status: DISCONTINUED | OUTPATIENT
Start: 2024-07-30 | End: 2024-07-30

## 2024-07-30 RX ORDER — LIDOCAINE HYDROCHLORIDE 10 MG/ML
INJECTION, SOLUTION EPIDURAL; INFILTRATION; INTRACAUDAL; PERINEURAL AS NEEDED
Status: DISCONTINUED | OUTPATIENT
Start: 2024-07-30 | End: 2024-07-30

## 2024-07-30 RX ORDER — DEXTROSE 50 % IN WATER (D50W) INTRAVENOUS SYRINGE
12.5 ONCE
Status: COMPLETED | OUTPATIENT
Start: 2024-07-30 | End: 2024-07-30

## 2024-07-30 RX ORDER — ASPIRIN 81 MG/1
81 TABLET ORAL
Status: DISCONTINUED | OUTPATIENT
Start: 2024-07-31 | End: 2024-07-30

## 2024-07-30 RX ORDER — NALOXONE HYDROCHLORIDE 0.4 MG/ML
0.2 INJECTION, SOLUTION INTRAMUSCULAR; INTRAVENOUS; SUBCUTANEOUS EVERY 5 MIN PRN
Status: DISCONTINUED | OUTPATIENT
Start: 2024-07-30 | End: 2024-08-02 | Stop reason: HOSPADM

## 2024-07-30 RX ORDER — ROCURONIUM BROMIDE 10 MG/ML
INJECTION, SOLUTION INTRAVENOUS AS NEEDED
Status: DISCONTINUED | OUTPATIENT
Start: 2024-07-30 | End: 2024-07-30

## 2024-07-30 RX ORDER — BUSPIRONE HYDROCHLORIDE 5 MG/1
5 TABLET ORAL DAILY
Status: DISCONTINUED | OUTPATIENT
Start: 2024-07-30 | End: 2024-08-02 | Stop reason: HOSPADM

## 2024-07-30 RX ORDER — SODIUM CHLORIDE, SODIUM LACTATE, POTASSIUM CHLORIDE, CALCIUM CHLORIDE 600; 310; 30; 20 MG/100ML; MG/100ML; MG/100ML; MG/100ML
100 INJECTION, SOLUTION INTRAVENOUS CONTINUOUS
Status: DISCONTINUED | OUTPATIENT
Start: 2024-07-30 | End: 2024-07-30

## 2024-07-30 RX ORDER — ACETAMINOPHEN 325 MG/1
975 TABLET ORAL EVERY 8 HOURS
Status: DISCONTINUED | OUTPATIENT
Start: 2024-07-30 | End: 2024-08-02 | Stop reason: HOSPADM

## 2024-07-30 RX ORDER — OXYCODONE HYDROCHLORIDE 5 MG/1
5 TABLET ORAL EVERY 4 HOURS PRN
Status: DISCONTINUED | OUTPATIENT
Start: 2024-07-30 | End: 2024-07-30

## 2024-07-30 RX ORDER — SPIRONOLACTONE 25 MG/1
25 TABLET ORAL DAILY
Status: DISCONTINUED | OUTPATIENT
Start: 2024-07-31 | End: 2024-08-02 | Stop reason: HOSPADM

## 2024-07-30 RX ORDER — ASPIRIN 81 MG/1
81 TABLET ORAL DAILY
Status: DISCONTINUED | OUTPATIENT
Start: 2024-07-31 | End: 2024-08-02 | Stop reason: HOSPADM

## 2024-07-30 RX ORDER — ACETAMINOPHEN 325 MG/1
650 TABLET ORAL EVERY 6 HOURS
Status: DISCONTINUED | OUTPATIENT
Start: 2024-07-30 | End: 2024-07-30

## 2024-07-30 RX ORDER — OXYCODONE HYDROCHLORIDE 5 MG/1
5 TABLET ORAL EVERY 4 HOURS PRN
Status: DISCONTINUED | OUTPATIENT
Start: 2024-07-30 | End: 2024-07-30 | Stop reason: HOSPADM

## 2024-07-30 SDOH — SOCIAL STABILITY: SOCIAL INSECURITY: HAVE YOU HAD THOUGHTS OF HARMING ANYONE ELSE?: NO

## 2024-07-30 SDOH — SOCIAL STABILITY: SOCIAL INSECURITY: DO YOU FEEL ANYONE HAS EXPLOITED OR TAKEN ADVANTAGE OF YOU FINANCIALLY OR OF YOUR PERSONAL PROPERTY?: NO

## 2024-07-30 SDOH — SOCIAL STABILITY: SOCIAL INSECURITY: ARE THERE ANY APPARENT SIGNS OF INJURIES/BEHAVIORS THAT COULD BE RELATED TO ABUSE/NEGLECT?: NO

## 2024-07-30 SDOH — SOCIAL STABILITY: SOCIAL INSECURITY: ABUSE: ADULT

## 2024-07-30 SDOH — SOCIAL STABILITY: SOCIAL INSECURITY: WERE YOU ABLE TO COMPLETE ALL THE BEHAVIORAL HEALTH SCREENINGS?: YES

## 2024-07-30 SDOH — SOCIAL STABILITY: SOCIAL INSECURITY: DO YOU FEEL UNSAFE GOING BACK TO THE PLACE WHERE YOU ARE LIVING?: NO

## 2024-07-30 SDOH — SOCIAL STABILITY: SOCIAL INSECURITY: ARE YOU OR HAVE YOU BEEN THREATENED OR ABUSED PHYSICALLY, EMOTIONALLY, OR SEXUALLY BY ANYONE?: NO

## 2024-07-30 SDOH — SOCIAL STABILITY: SOCIAL INSECURITY: DOES ANYONE TRY TO KEEP YOU FROM HAVING/CONTACTING OTHER FRIENDS OR DOING THINGS OUTSIDE YOUR HOME?: NO

## 2024-07-30 SDOH — SOCIAL STABILITY: SOCIAL INSECURITY: HAS ANYONE EVER THREATENED TO HURT YOUR FAMILY OR YOUR PETS?: NO

## 2024-07-30 SDOH — HEALTH STABILITY: MENTAL HEALTH: CURRENT SMOKER: 0

## 2024-07-30 SDOH — SOCIAL STABILITY: SOCIAL INSECURITY: HAVE YOU HAD ANY THOUGHTS OF HARMING ANYONE ELSE?: NO

## 2024-07-30 ASSESSMENT — COGNITIVE AND FUNCTIONAL STATUS - GENERAL
MOBILITY SCORE: 23
DAILY ACTIVITIY SCORE: 24
MOVING FROM LYING ON BACK TO SITTING ON SIDE OF FLAT BED WITH BEDRAILS: A LITTLE
PATIENT BASELINE BEDBOUND: NO

## 2024-07-30 ASSESSMENT — PAIN SCALES - GENERAL
PAINLEVEL_OUTOF10: 7
PAINLEVEL_OUTOF10: 7
PAINLEVEL_OUTOF10: 6
PAINLEVEL_OUTOF10: 0 - NO PAIN
PAINLEVEL_OUTOF10: 7
PAINLEVEL_OUTOF10: 0 - NO PAIN

## 2024-07-30 ASSESSMENT — LIFESTYLE VARIABLES
HOW MANY STANDARD DRINKS CONTAINING ALCOHOL DO YOU HAVE ON A TYPICAL DAY: PATIENT DOES NOT DRINK
AUDIT-C TOTAL SCORE: 0
SKIP TO QUESTIONS 9-10: 1
HOW OFTEN DO YOU HAVE A DRINK CONTAINING ALCOHOL: NEVER
AUDIT-C TOTAL SCORE: 0
HOW OFTEN DO YOU HAVE 6 OR MORE DRINKS ON ONE OCCASION: NEVER

## 2024-07-30 ASSESSMENT — PAIN - FUNCTIONAL ASSESSMENT
PAIN_FUNCTIONAL_ASSESSMENT: 0-10
PAIN_FUNCTIONAL_ASSESSMENT: UNABLE TO SELF-REPORT
PAIN_FUNCTIONAL_ASSESSMENT: 0-10
PAIN_FUNCTIONAL_ASSESSMENT: UNABLE TO SELF-REPORT
PAIN_FUNCTIONAL_ASSESSMENT: 0-10

## 2024-07-30 ASSESSMENT — ACTIVITIES OF DAILY LIVING (ADL)
WALKS IN HOME: NEEDS ASSISTANCE
FEEDING YOURSELF: INDEPENDENT
GROOMING: NEEDS ASSISTANCE
LACK_OF_TRANSPORTATION: NO
ADEQUATE_TO_COMPLETE_ADL: YES
TOILETING: NEEDS ASSISTANCE
HEARING - LEFT EAR: FUNCTIONAL
PATIENT'S MEMORY ADEQUATE TO SAFELY COMPLETE DAILY ACTIVITIES?: YES
HEARING - RIGHT EAR: FUNCTIONAL
LACK_OF_TRANSPORTATION: NO
BATHING: NEEDS ASSISTANCE
JUDGMENT_ADEQUATE_SAFELY_COMPLETE_DAILY_ACTIVITIES: YES
DRESSING YOURSELF: NEEDS ASSISTANCE

## 2024-07-30 ASSESSMENT — PAIN DESCRIPTION - LOCATION: LOCATION: LEG

## 2024-07-30 ASSESSMENT — PATIENT HEALTH QUESTIONNAIRE - PHQ9
2. FEELING DOWN, DEPRESSED OR HOPELESS: NOT AT ALL
SUM OF ALL RESPONSES TO PHQ9 QUESTIONS 1 & 2: 0
1. LITTLE INTEREST OR PLEASURE IN DOING THINGS: NOT AT ALL

## 2024-07-30 ASSESSMENT — PAIN DESCRIPTION - ORIENTATION: ORIENTATION: LEFT

## 2024-07-30 NOTE — OP NOTE
ABOVE KNEE AMPUTATION LEFT (L) Operative Note     Date: 2024  OR Location: [facility] OR    Name: Teresa Matthews, : 1950, Age: 73 y.o., MRN: [id number], Sex: female    Diagnosis  Pre-op Diagnosis      * Osteomyelitis, unspecified (Multi) [M86.9] Post-op Diagnosis     * Osteomyelitis, unspecified (Multi) [M86.9]     Procedures  ABOVE KNEE AMPUTATION LEFT  54428 - [place] AMPUTATION THIGH THROUGH FEMUR ANY LEVEL      Surgeons      * Manpreet Carter - Primary    Resident/Fellow/Other Assistant:  MALAIKA Alexander  Procedure Summary  Anesthesia: General  ASA: IV  Anesthesia Staff: Anesthesiologist: Joaquin Lange MD  CRNA: CORNELIUS Jaimes-CRNA  Estimated Blood Loss: 250mL  Intra-op Medications:   Administrations occurring from 0800 to 1000 on 24:   Medication Name Total Dose   lactated Ringer's infusion Cannot be calculated              Anesthesia Record               Intraprocedure I/O Totals          Intake    PRBC 350.00 mL    lactated Ringer's infusion 1000.00 mL    Total Intake 1350 mL       Output    Est. Blood Loss 250 mL    Total Output 250 mL       Net    Net Volume 1100 mL          Specimen:   ID Type Source Tests Collected by Time   1 : ABOVE THE KNEE LEFT LEG AMPUTATION Tissue LEG AMPUTATION ABOVE THE KNEE LEFT SURGICAL PATHOLOGY EXAM Manpreet Carter DO 2024 0838   A :  Blood Blood, Venous CBC WITH AUTO DIFFERENTIAL Manpreet Carter DO 2024 0904        Staff:   Circulator: Adriana Roth Person: Laura Roth Person: Ayala  Circulator: Keena         Drains and/or Catheters: * None in log *    Tourniquet Times:         Implants:     Findings: right knee osteomyletitis    Indications: Teresa Matthews is an 73 y.o. female who is having surgery for Osteomyelitis, unspecified (Multi) [M86.9].      The patient was seen in the preoperative area. The risks, benefits, complications, treatment options, non-operative alternatives, expected recovery and outcomes were discussed with the patient.  The possibilities of reaction to medication, pulmonary aspiration, injury to surrounding structures, bleeding, recurrent infection, the need for additional procedures, failure to diagnose a condition, and creating a complication requiring transfusion or operation were discussed with the patient. The patient concurred with the proposed plan, giving informed consent.  The site of surgery was properly noted/marked if necessary per policy. The patient has been actively warmed in preoperative area. Preoperative antibiotics have been ordered and given within 1 hours of incision. Venous thrombosis prophylaxis are not indicated.    Procedure Details:   Patient is brought to the OR suite placed in supine position administered general anesthetic with endotracheal tube intubation.  Left lower extremity sterilely prepped and draped standard fashion.  Standard fishmouth incision was made at the above-knee segment segment made dissection was done with electrocautery.  The vascular bundle was identified double clamped transected and ligated with 0 Vicryl suture without event.  Femur is identified periosteally elevated transected in the midportion with power.  Posterior muscular flap flap was made with a Humberto amputation knife.  Spot cautery as well as 2 pop-off hemostasis in the muscular bed.  Decided for nerve was identified high ligated with 0 Vicryl suture as well.  Excellent hemostasis otherwise maintained.  I copious irrigated wound fascial sutures were placed with 0 interrupted 0 Vicryl suture subcutaneous 3-0 Vicryl sutures were placed as well as skin close patient Toller procedure well awoken extubated sent to PACU in stable condition.    Complications:  None; patient tolerated the procedure well.    Disposition: PACU - hemodynamically stable.  Condition: stable         Additional Details: No qualified vascular fellow was available for assistance in the above-noted procedure.  MALAIKA Alexander was available for the  entirety of the procedure.  She assisted in all components of the procedure from start to completion.     Attending Attestation: I was present and scrubbed for the entire procedure.    Manpreet Carter  Phone Number: [phone]

## 2024-07-30 NOTE — ANESTHESIA POSTPROCEDURE EVALUATION
Patient: Teresa Matthews    Procedure Summary       Date: 07/30/24 Room / Location: [facility] 03 / [facility]    Anesthesia Start: 0806 Anesthesia Stop: 0949    Procedure: ABOVE KNEE AMPUTATION LEFT (Left) Diagnosis:       Osteomyelitis, unspecified (Multi)      (Osteomyelitis, unspecified (Multi) [M86.9])    Surgeons: Manpreet Carter DO Responsible Provider: Joaquin Lange MD    Anesthesia Type: general ASA Status: 4            Anesthesia Type: general    Vitals Value Taken Time   /80 07/30/24 0955   Temp  07/30/24 1004   Pulse 77 07/30/24 0958   Resp  07/30/24 1004   SpO2 93 % 07/30/24 0958   Vitals shown include unfiled device data.    Anesthesia Post Evaluation    Patient location during evaluation: PACU  Patient participation: complete - patient participated  Level of consciousness: awake  Pain management: adequate  Multimodal analgesia pain management approach  Airway patency: patent  Two or more strategies used to mitigate risk of obstructive sleep apnea  Cardiovascular status: acceptable  Respiratory status: acceptable  Hydration status: acceptable  Postoperative Nausea and Vomiting: none        No notable events documented.

## 2024-07-30 NOTE — PROGRESS NOTES
07/30/24 1446   Discharge Planning   Living Arrangements Other (Comment)  ([facility])   Support Systems Family members  (sister)   Assistance Needed Patient is from [facility] ([place], [place]) and has lived there approximately 3 months. Per patient she requires assist for dressing and bathing, feds self, requires assist x 1 for dressing and bathing and transfers from the bed to  via freida lift. Plan will be for patient to return to [facility] once medically ready for DC. No auth is needed to return to facility LTC. [facility] and [facility]s were attached.   Type of Residence Nursing home/residential care   Do you have animals or pets at home? No   Who is requesting discharge planning? Provider   Home or Post Acute Services Post acute facilities (Rehab/SNF/etc)   Type of Post Acute Facility Services Long term care   Does the patient need discharge transport arranged? Yes   RoundTrip coordination needed? Yes   Has discharge transport been arranged? No   Financial Resource Strain   How hard is it for you to pay for the very basics like food, housing, medical care, and heating? Not very   Housing Stability   In the last 12 months, was there a time when you were not able to pay the mortgage or rent on time? N   At any time in the past 12 months, were you homeless or living in a shelter (including now)? N   Transportation Needs   In the past 12 months, has lack of transportation kept you from medical appointments or from getting medications? no   In the past 12 months, has lack of transportation kept you from meetings, work, or from getting things needed for daily living? No   Patient Choice   Provider Choice list and CMS website (https://medicare.gov/care-compare#search) for post-acute Quality and Resource Measure Data were provided and reviewed with: Patient   Patient / Family choosing to utilize agency / facility established prior to hospitalization Yes

## 2024-07-30 NOTE — RESULT ENCOUNTER NOTE
Henny- hemoglobin continues to be low. Are you permanently in nursing home now, or still just there for rehab?

## 2024-07-30 NOTE — INTERVAL H&P NOTE
H&P reviewed. The patient was examined and there are no changes to the H&P.    73 year old female with left distal femur osteomyelitis scheduled for left AKA with Dr Carter 7/30/24

## 2024-07-30 NOTE — ANESTHESIA PROCEDURE NOTES
Airway  Date/Time: 7/30/2024 8:18 AM  Urgency: elective    Airway not difficult    Staffing  Performed: CRNA   Authorized by: Joaquin Lange MD    Performed by: CORNELIUS Jaimes-JAIMIE  Patient location during procedure: OR    Indications and Patient Condition  Indications for airway management: anesthesia  Spontaneous Ventilation: absent  Sedation level: deep  Preoxygenated: yes  Patient position: sniffing  Mask difficulty assessment: 1 - vent by mask    Final Airway Details  Final airway type: endotracheal airway      Successful airway: ETT  Cuffed: yes   Successful intubation technique: video laryngoscopy  Facilitating devices/methods: intubating stylet  Endotracheal tube insertion site: oral  Blade size: #3  ETT size (mm): 7.5  Cormack-Lehane Classification: grade I - full view of glottis  Number of attempts at approach: 1

## 2024-07-30 NOTE — H&P
[facility] Hospitalist History and Physical Note         Teresa Matthews  :  1950(73 y.o.)  MRN:  [id number]  PCP: Pita Virgen MD    Principal Problem:    Other chronic osteomyelitis of left femur (Multi)      Assessment and Plan:     Teresa Matthews is a 73 y.o. female from ECF with PMH COPD, paroxysmal A. 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   Patient was admitted for elective left knee AKA (24) due to recurrent left knee PJI. Patient currently denies any complaints expect post pain controlled with analgesics.    #Left AKA (24) due to recurrent L knee PJI  #Post op pain  #IDDM II w/ hypoglycemia  #CKD III  #Chronic HFpEF  # paroxysmal Afib  #COPD w/o decompensation  #Anemia of chronic disease  # Chronic pain on chronic prescription of opiates  -on analgesics  -hypoglycemia protocol, permissive hyperglycemia  -on analgesics  -resume rest of home meds as indicated    Disposition: await test results, await consultant recommendations, await clinical improvement, await placement, and anticipate discharge on  vs  to F    DVT Prophylaxis: full anticoagulation apixaban starting 24    Code status: Full Code  Diet: Adult diet Regular    BMI Classification: Body mass index is 31.28 kg/m². obesity BMI 30-39.9    Level of MDM:  High    I personally examined the patient and I personally reviewed chart, data, labs radiology reports    Family Communication  Number:   Name of Designated Family Representative:       Total time spent: 75 minutes, of total time providing counseling or in coordination of care.  Total time on this day of visit includes record and documentation review before and after visit including documentation and time not explicitly included on EMR time stamp.    [phone]:  Please page me for patient care issues.  [phone]: Please page night hospitalist for any issues.        Subjective:   chief complaint: Post op pain    Interval History:  Teresa Matthews is a 73 y.o. female with PMH COPD, paroxysmal A. Fib, HFpEF, IDDM II c/b diabetic neuropathy/multiple foot ulcers and toes amputaion, CKD III, recurrent L knee septic knee arthritis: hx left knee PJI 2021 due to E. Coli and MRSA s/p prosthesis removal and cement/spacer placement.   Patient was admitted for elective left knee AKA (7/30/24) due to recurrent left knee PJI. Patient currently denies any complaints expect post pain controlled with analgesics.      Temp:  [36 °C (96.8 °F)-36.4 °C (97.5 °F)] 36.1 °C (97 °F)  Heart Rate:  [74-89] 81  Resp:  [16-23] 18  BP: (105-154)/(65-99) 152/94  Lab Results   Component Value Date    GLUCOSE 84 06/14/2024    CALCIUM 9.1 06/14/2024     (L) 06/14/2024    K 4.5 06/14/2024    CO2 28 06/14/2024     06/14/2024    BUN 24 (H) 06/14/2024    CREATININE 1.42 (H) 06/14/2024     Lab Results   Component Value Date    WBC 11.2 07/30/2024    HGB 8.6 (L) 07/30/2024    HCT 29.6 (L) 07/30/2024    MCV 85 07/30/2024     07/30/2024     IMAGES:  Encounter Date: 03/25/24   Electrocardiogram, 12-lead PRN ACS symptoms   Result Value    Ventricular Rate 68    Atrial Rate 68    MO Interval 146    QRS Duration 106    QT Interval 438    QTC Calculation(Bazett) 465    R Axis 88    T Axis 34    QRS Count 11    Q Onset 215    P Onset 142    P Offset 193    T Offset 434    QTC Fredericia 456    Narrative    Sinus rhythm with marked sinus arrhythmia /PACs  Otherwise normal ECG  When compared with ECG of 27-MAR-2024 16:54, (unconfirmed)  No significant change was found  Confirmed by Raffi Hendrickson ([id number]) on 3/28/2024 9:53:27 AM        Transesophageal Echo (ELISEO)    Result Date: 3/28/2024              [facility] [address] [place] [address]      Phone [phone] Fax  [phone] TRANSESOPHAGEAL ECHOCARDIOGRAM REPORT  Patient Name:     REYES RAMESH Reading Physician:  [id number] Prosper Concepcion MD Study Date:       3/28/2024            Ordering Provider:  [id number] ANATOLY MILLS JANE MRN/PID:          [id number]             Fellow: Accession#:       [id number]         Nurse:              Amber Christiansen RN Date of           1950 / 73      Sonographer:        Bernadine Madera RDYUNG Birth/Age:        years Gender:           F                    Additional Staff: Height:           182.88 cm            Admit Date:         3/25/2024 Weight:           113.40 kg            Admission Status:   Inpatient - Routine BSA / BMI:        2.34 m2 / 33.91      Department          [facility] Echo                   kg/m2                Location:           Lab Blood Pressure: 124 /76 mmHg Study Type:    TRANSESOPHAGEAL ECHO (ELISEO) Diagnosis/ICD: Disorientation, unspecified-R41.0; Bacteremia-R78.81; Essential                (primary) hypertension-I10; Weakness-R53.1 Indication:    bacteremia/abnormal echo CPT Codes:     Moderate Sedation Services initial 15 minutes patient >5                years-51750; Moderate Sedation Services 1st additional 15 minutes                patient >5 years-68749; ELISEO Complete-32579; Doppler                Limited-72964; Echocardiography, ELISEO add on 3D post                processing-81360  Study Detail: The following Echo studies were performed: 2D. Patient's heart               rhythm is atrial fibrillation. A bubble study was not performed.  PHYSICIAN INTERPRETATION: ELISEO Details: The ELISEO probe used was [id number]. Agitated saline contrast echo was performed to assess for the presence of a patent foramen ovale. ELISEO Medication: The pharynx was anesthetized with lidocaine ointment and hurricaine spray. The patient was sedated using moderate sedation. Total intraservice time for moderate sedation was 28 minutes. Midazolam and  Fentanyl was used to sedate the patient for this exam. ELISEO Procedure: The probe was passed without difficulty. The following complication was encountered during the procedure: Patient tolerated the procedure well without any apparent complications. Left Ventricle: Left ventricular systolic function is normal. The left ventricular cavity size is normal. Left ventricular diastolic filling was not assessed. Left Atrium: The left atrium is enlarged. There is no definite left atrial thrombus present. The left atrial appendage is enlarged. Right Ventricle: The right ventricle is normal in size. There is normal right ventricular global systolic function. Right Atrium: The right atrium was not assessed. Aortic Valve: The aortic valve is trileaflet. There is mild aortic valve cusp calcification. There is mild aortic valve regurgitation. Mitral Valve: The mitral valve is mildly thickened. There is normal mitral valve leaflet mobility. There is moderate mitral annular calcification. There is mild mitral valve regurgitation. Tricuspid Valve: The tricuspid valve is structurally normal. There is trace tricuspid regurgitation. Pulmonic Valve: The pulmonic valve is not well visualized. There is trace pulmonic valve regurgitation. Pericardium: There is a small pericardial effusion. Aorta: The aortic root is normal. The aortic root is at the upper limits of normal size. There is plaque visualized in the descending aorta.  CONCLUSIONS:  1. Left ventricular systolic function is normal.  2. No definite intracardiac thrombi or masses were visualized.  3. No definite valvular vegetations were visualized.  4. The left atrium is enlarged.  5. There is moderate mitral annular calcification.  6. Mild aortic valve regurgitation.  7. No left atrial thrombus.  8. There is plaque visualized in the descending aorta. QUANTITATIVE DATA SUMMARY:  [id number] Prosper Concepcion MD Electronically signed on 3/28/2024 at 6:01:37 PM  ** Final **     Transthoracic  Echo (TTE) Limited    Result Date: 3/26/2024              [place] [address]      Phone [phone] Fax [phone] TRANSTHORACIC ECHOCARDIOGRAM REPORT  Patient Name:      REYES SENIOR           Brittany Physician:    [id number] Hai RAMESH MD Study Date:        3/26/2024           Ordering Provider:    [id number] GONCALVES MRN/PID:           [id number]            Fellow: Accession#:        [id number]        Nurse: Date of Birth/Age: 1950 / 73     Sonographer:          Brittnee mcfarland                                     RDCS Gender:            F                   Additional Staff: Height:            182.88 cm           Admit Date:           3/25/2024 Weight:            113.40 kg           Admission Status:     Inpatient - Routine BSA / BMI:         2.34 m2 / 33.91     Department Location:  [facility]                    kg/m2 Blood Pressure: 114 /63 mmHg Study Type:    TRANSTHORACIC ECHO (TTE) LIMITED Diagnosis/ICD: Bacteremia-R78.81 Indication:    BACTEREMIA CPT Codes:     Echo Limited-92144 Patient History: Pertinent History: CHF, A-FIB, HTN. Study Detail: The following Echo studies were performed: 2D, Doppler and color               flow.  Critical Event Critical Event: Test was completed as per department protocol. Critical Finding: Possible veg on MV. Time Test was Completed: 2:35:00 PM Notified: Dr. Gerber. Attending notification time: 2:45:37 PM  PHYSICIAN INTERPRETATION: Left Ventricle: Left ventricular systolic function was not assessed. There are no regional wall motion abnormalities. The left ventricular cavity size was not assessed. Left ventricular diastolic filling was not assessed. Left Atrium: The left atrium was not assessed. Right Ventricle: The right ventricle was not assessed. Right ventricular systolic function not assessed. Right Atrium: The right atrium was not  assessed. Aortic Valve: There is no visualized aortic valve vegetation. The aortic valve was not assessed. Aortic valve regurgitation was not assessed. Mitral Valve: The mitral valve was not assessed. Mitral valve regurgitation was not assessed. Possible veg on anterior leaflet of MV. Tricuspid Valve: No vegetation is seen on the tricuspid valve. The tricuspid valve was not assessed. Tricuspid regurgitation was not assessed. Pulmonic Valve: No pulmonic valve vegetation visualized. The pulmonic valve was not assessed. The pulmonic valve regurgitation was not assessed. Pericardium: Pericardial effusion was not assessed. Aorta: The aortic root was not assessed.  CONCLUSIONS:  1. Left ventricular systolic function was not assessed.  2. Possible veg on anterior leaflet of MV.  3. No tricuspid valve vegetation.  4. No aortic valve vegetation visualized.  5. No pulmonic valve vegetation visualized. QUANTITATIVE DATA SUMMARY: 2D MEASUREMENTS:              Normal Ranges: LAs: 2.10 cm (2.7-4.0cm) TRICUSPID VALVE/RVSP:                             Normal Ranges: Peak TR Velocity: 2.51 m/s RV Syst Pressure: 28.2 mmHg (< 30mmHg)  [id number] Hai Gerber MD Electronically signed on 3/26/2024 at 4:16:23 PM  ** Final **    === 09/10/22 ===    FOOT    - Impression -  1.  No acute fracture or dislocation identified.  === 09/10/22 ===    CT CHEST PULMONARY EMBOLISM W IV CONTRAST    - Impression -  No evidence PE.    Ground-glass left lower lobe area. Follow-up is suggested.    Bilateral small effusions.    49 mm enlargement of the ascending thoracic aorta.  === 09/10/22 ===    FOOT    - Impression -  1.  No acute fracture or dislocation identified.  === 09/10/22 ===    MR FOOT    - Impression -  1. No evidence of osteomyelitis.  2. Healed/healing calcaneal fracture with scattered reactive marrow  edema throughout the calcaneus.  3. Regions of superficial soft tissue hypoenhancement at the plantar  and posterior aspect of the calcaneus  concerning for ischemia and  developing soft tissue necrosis.  4. Severe thickening of the distal Achilles tendon compatible with  tendinosis.    Past Medical History:   Diagnosis Date    Anemia     Anxiety     Atrial fibrillation     s/p cardioversion 7/5/22    Bacteremia     March 2024: group A strep bacteremia, right lower extremity cellulitis, ELISEO ruled out vegetative endocarditis    Charcot's joint of left foot     CHF (congestive heart failure)     Chronic diastolic    Chronic anticoagulation     ELIQUIS    Chronic insomnia 04/10/2023    Cirrhosis of liver (Multi)     Liver bx 11/7/2019; cirrhotic nodules and mild macrosteatosis    CKD (chronic kidney disease), stage III     Complication of internal knee prosthesis (CMS-HCC)     B/L TKR have been revised and patella resurfaced due to chronic pain. L knee has been complicated by infection, prox tib/fib fracture, distal femur fx, L TKR explanted and has ABX spacer ([id number])    Depression, major, in remission (CMS-HCC) 04/10/2023    Diabetes mellitus, type 2     Insulin-dependent    Endocarditis     Heart valve problem     Moderate mitral annular calcification, Mild aortic valve regurgitation; per 3/28/24 ELISEO    HLD (hyperlipidemia)     HTN (hypertension)     Hx of osteomyelitis     s/p L Hallux amputation x2    Hypothyroid     Obesity     s/p RnY 11/7/2019    ELIZABETH (obstructive sleep apnea)     Osteomyelitis, unspecified     Left lower extremity    Papillary microcarcinoma of thyroid (Multi)     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/2023    E Coli of Left knee prosthetic joint    Wheelchair dependent     And uses [place] lift (explanted L TKR w/ ABX spacer w/ chronic subacute L femur/tib/fib fractures)     Past Surgical History:   Procedure Laterality Date    CARDIOVERSION  07/05/2022    A FIB    COLONOSCOPY      Internal hemorrhoids    CYSTOSCOPY W/ LASER LITHOTRIPSY Right 05/19/2020     With ureter stent    ESOPHAGOSCOPY / EGD      Small hiatal hernia    GASTRIC BYPASS  11/07/2019    Bruno-en-Y with adhesion lysis    HYSTERECTOMY      KNEE SURGERY Bilateral 2009    B/L TKR revisions: R patella resurfacing (9/2006), R TKR revision (3/2009), L TKR revision w/ patellar resurfacing (10/14/2009)    LIVER BIOPSY  11/07/2019    Pathology; cirrhotic nodules and mild macro steatosis    REMOVAL PROSTHESIS TOTAL KNEE W/ OR W/O INSERTION SPACER Left 02/28/2022    Left knee explantation with antibiotic spacer    TOE SURGERY Left 08/09/2020    Left Hallux amputation 2/2 osteomyelitis    TOE SURGERY Left 11/11/2021    Amputation left Hallux, hemiphalangectomy proximal phalanx, resection tibial fibular sesamoid, excisional debridement    TONSILLECTOMY      TOTAL KNEE ARTHROPLASTY Bilateral 2004    Left 3/2004, Right 4/2004    TOTAL THYROIDECTOMY  06/22/2021    Pathology: Papillary micro carcinoma, chronic lymphocytic thyroiditis, oncocytic follicular neoplasm    TUBAL LIGATION      WOUND DEBRIDEMENT  09/14/2022    Excisional debridement of Sacral wound     Family History   Problem Relation Name Age of Onset    Coronary artery disease Mother      Liver disease Mother      Other (non-hodgkins lymphoma) Mother      Stroke Father      Deafness Father      Hypertension Father       Social History     Socioeconomic History    Marital status: Single     Spouse name: Not on file    Number of children: Not on file    Years of education: Not on file    Highest education level: Not on file   Occupational History    Not on file   Tobacco Use    Smoking status: Never    Smokeless tobacco: Never   Vaping Use    Vaping status: Never Used   Substance and Sexual Activity    Alcohol use: Never    Drug use: Never    Sexual activity: Defer   Other Topics Concern    Not on file   Social History Narrative    Not on file     Social Determinants of Health     Financial Resource Strain: Low Risk  (6/10/2024)    Overall Financial  Resource Strain (CARDIA)     Difficulty of Paying Living Expenses: Not hard at all   Food Insecurity: No Food Insecurity (6/5/2024)    Hunger Vital Sign     Worried About Running Out of Food in the Last Year: Never true     Ran Out of Food in the Last Year: Never true   Transportation Needs: No Transportation Needs (6/10/2024)    PRAPARE - Transportation     Lack of Transportation (Medical): No     Lack of Transportation (Non-Medical): No   Physical Activity: Not on file   Stress: Stress Concern Present (6/5/2024)    [employer] - Occupational Stress Questionnaire     Feeling of Stress : To some extent   Social Connections: Unknown (6/5/2024)    Social Connection and Isolation Panel [NHANES]     Frequency of Communication with Friends and Family: More than three times a week     Frequency of Social Gatherings with Friends and Family: Never     Attends Quaker Services: Never     Active Member of Clubs or Organizations: Yes     Attends Club or Organization Meetings: Not on file     Marital Status: Not on file   Intimate Partner Violence: Not At Risk (6/5/2024)    Humiliation, Afraid, Rape, and Kick questionnaire     Fear of Current or Ex-Partner: No     Emotionally Abused: No     Physically Abused: No     Sexually Abused: No   Housing Stability: Low Risk  (6/10/2024)    Housing Stability Vital Sign     Unable to Pay for Housing in the Last Year: No     Number of Places Lived in the Last Year: 2     Unstable Housing in the Last Year: No       Allergies   Allergen Reactions    Metformin Diarrhea    Cephalexin Unknown     Tolerated Zosyn: 11/17/2021    Other Unknown     BANDAGES PLASTIC STRIPS    Statins-Hmg-Coa Reductase Inhibitors Other     Legs hurt    Adhesive Tape-Silicones Rash     Prior to Admission medications    Medication Sig Start Date End Date Taking? Authorizing Provider   amiodarone (Pacerone) 200 mg tablet Take 1 tablet (200 mg) by mouth once daily. 5/14/24 5/14/25 Yes  Monica Macario MD   ascorbic acid, vitamin C, 500 mg capsule Take 1 capsule by mouth once daily. 8/28/20  Yes Historical Provider, MD   aspirin 81 mg EC tablet Take 1 tablet (81 mg) by mouth once every day. 4/21/20  Yes Historical Provider, MD   buPROPion XL (Wellbutrin XL) 150 mg 24 hr tablet Take 1 tablet (150 mg) by mouth once daily in the morning. Do not crush, chew, or split. 4/9/24 7/30/24 Yes Monica Macario MD   busPIRone (Buspar) 5 mg tablet Take 1 tablet (5 mg) by mouth once daily.   Yes Historical Provider, MD   calcium citrate-vitamin D3 200 mg-6.25 mcg (250 unit) tablet Take 1 tablet by mouth once daily. 4/21/20  Yes Historical Provider, MD   desvenlafaxine 100 mg 24 hr tablet Take 1 tablet by mouth once daily. Do not crush, chew, or split. Instead of venlafaxine. 4/9/24  Yes Monica Macario MD   famotidine (Pepcid) 40 mg tablet Take 1 tablet (40 mg) by mouth once daily. 5/14/24 5/14/25 Yes Monica Macario MD   glucagon 1 mg injection Inject 1 mg into the shoulder, thigh, or buttocks 1 time if needed for low blood sugar - see comments for up to 1 dose. USE AS DIRECTED. 5/12/23  Yes Monica Macario MD   insulin lispro protamin-lispro (HumaLOG Mix 75-25 KwikPen) 100 unit/mL (75-25) injection Inject 1-2 times per day as directed.  35 units subcutaneously 3/30/24 3/30/25 Yes Abner Bello MD   levothyroxine (Synthroid, Levoxyl) 175 mcg tablet Take 1 tablet by mouth daily 3/11/24  Yes Monica Macario MD   losartan (Cozaar) 25 mg tablet Take 1 tablet (25 mg) by mouth once daily. 11/20/23 11/19/24 Yes Monica Macario MD   metoprolol succinate XL (Toprol-XL) 50 mg 24 hr tablet Take 1 tablet (50 mg) by mouth once daily. 5/14/24 5/14/25 Yes Monica Macario MD   mv-min/iron/folic/calcium/vitK (WOMEN'S MULTIVITAMIN ORAL) Take 1 tablet by mouth once daily. 4/21/20  Yes Historical Provider, MD   oxyCODONE-acetaminophen (Percocet)  mg tablet Take 1 tablet by mouth every 6 hours  if needed for severe pain (7 - 10). 6/15/24  Yes Yasemin Kelly, APRN-CNP   pantoprazole (ProtoNix) 40 mg EC tablet Take 1 tablet (40 mg) by mouth once daily in the morning. Take before meals. Do not crush, chew, or split.   Yes Historical Provider, MD   potassium chloride CR 10 mEq ER tablet Take 1 tablet (10 mEq) by mouth in the morning and 1 tablet (10 mEq) before bedtime. Do not crush, chew, or split.. 4/9/24  Yes Monica Macario MD   spironolactone (Aldactone) 25 mg tablet Take 1 tablet by mouth daily 4/11/24  Yes Monica Macario MD   traZODone (Desyrel) 50 mg tablet Take 1 tablet (50 mg) by mouth once daily at bedtime. 11/13/23 11/12/24 Yes Monica Macario MD   vibegron 75 mg tablet Take 1 tablet (75 mg) by mouth once daily. 5/14/24 5/14/25 Yes Monica Macario MD   vitamin B complex (SUPER B-50 COMPLEX ORAL) Take 1 capsule by mouth once daily.   Yes Historical Provider, MD   apixaban (Eliquis) 5 mg tablet Take 1 tablet (5 mg) by mouth 2 times a day. 11/13/23 11/12/24  MD Thelma Stack Bulmaro 2 [place] misc Use as instructed, reader is missing in rehab, needs replacement.  Patient not taking: Reported on 7/30/2024 5/14/24   MD Thelma Stack Bulmaro 2 Sensor kit Inject 1 each under the skin every 14 (fourteen) days. Test blood sugar 1-4 times per day as needed for diabetes, replace every 14 days  Patient not taking: Reported on 7/30/2024 2/13/24   Monica Macario MD   gabapentin (Neurontin) 100 mg capsule Take 1 capsule (100 mg) by mouth 3 times a day. 6/14/24 7/14/24  Maddi Solomon MD   loperamide (Imodium) 2 mg capsule Take 1 capsule (2 mg) by mouth 4 times a day as needed for diarrhea. 6/10/24   Alejandrina Ahumada MD   OneTouch Ultra Test strip TO TEST BLOOD SUGAR 4 TIMES A DAY FOR DIABETES 3/18/20   Historical Provider, MD   underpads (Bed Underpads) pad 1 each 2 times a day.  Patient not taking: Reported on 7/30/2024 12/7/23 12/6/24  Monica Macario,  MD   omeprazole (PriLOSEC) 40 mg DR capsule TAKE 1 CAPSULE BY MOUTH DAILY OPEN CAPSULE AND SPRINKLE CONTENTS ON SUGAR FREE APPLESAUCE OR PUDDING. 4/11/24 7/30/24  Monica Macario MD       Review of Systems   All other systems reviewed and are negative.      Objective:     Vitals:    07/30/24 1114 07/30/24 1130 07/30/24 1144 07/30/24 1234   BP: (!) 135/95  (!) 148/95 (!) 152/94   BP Location:       Patient Position: Lying  Lying    Pulse: 82 89 85 81   Resp: 18  20 18   Temp:   36.4 °C (97.5 °F) 36.1 °C (97 °F)   TempSrc:   Temporal    SpO2: 95% 96% 96% 99%   Weight:       Height:         Physical Exam  Vitals and nursing note reviewed.   Constitutional:       Appearance: Normal appearance. She is ill-appearing (chronically).   HENT:      Head: Normocephalic and atraumatic.      Right Ear: External ear normal.      Left Ear: External ear normal.      Nose: Nose normal.      Mouth/Throat:      Mouth: Mucous membranes are moist.   Eyes:      General: No scleral icterus.        Right eye: No discharge.         Left eye: No discharge.      Extraocular Movements: Extraocular movements intact.      Conjunctiva/sclera: Conjunctivae normal.      Pupils: Pupils are equal, round, and reactive to light.   Cardiovascular:      Rate and Rhythm: Normal rate and regular rhythm.   Pulmonary:      Effort: Pulmonary effort is normal.      Breath sounds: Normal breath sounds.   Abdominal:      General: Abdomen is flat. Bowel sounds are normal.      Palpations: Abdomen is soft.   Musculoskeletal:         General: Normal range of motion.      Right lower leg: No edema.      Left lower leg: No edema.      Left Lower Extremity: Left leg is amputated above knee.   Skin:     General: Skin is warm and dry.      Capillary Refill: Capillary refill takes less than 2 seconds.   Neurological:      General: No focal deficit present.   Psychiatric:         Mood and Affect: Mood normal.         Thought Content: Thought content normal.          Judgment: Judgment normal.         Lab Results   Component Value Date     (L) 06/14/2024    K 4.5 06/14/2024     06/14/2024    CO2 28 06/14/2024    BUN 24 (H) 06/14/2024    CREATININE 1.42 (H) 06/14/2024    GLUCOSE 84 06/14/2024    CALCIUM 9.1 06/14/2024    PROT 7.5 06/10/2024    BILITOT 0.3 06/10/2024    ALKPHOS 73 06/10/2024    AST 26 06/10/2024    ALT 18 06/10/2024     Lab Results   Component Value Date    WBC 11.2 07/30/2024    HGB 8.6 (L) 07/30/2024    HCT 29.6 (L) 07/30/2024    MCV 85 07/30/2024     07/30/2024     Lab Results   Component Value Date    TSH 2.13 03/25/2024     Lab Results   Component Value Date    LACTATE 0.9 06/10/2024    TROPONINI <0.02 12/21/2020     (H) 01/26/2024    DDIMER 1,418 (A) 09/13/2022    INR 1.3 (H) 09/17/2022     Additional results since admission have been reviewed.    Dictated using Loggly Version 2.4  Proof read however unrecognized voice recognition errors may have occurred     Electronically signed by Genia Guerra DO on 07/30/24 at 1:05 PM

## 2024-07-30 NOTE — TELEPHONE ENCOUNTER
----- Message from Monica Macario sent at 7/30/2024 10:01 AM EDT -----  Henny- hemoglobin continues to be low. Are you permanently in nursing home now, or still just there for rehab?

## 2024-07-31 LAB
ALBUMIN SERPL BCP-MCNC: 2.9 G/DL (ref 3.4–5)
ANION GAP SERPL CALC-SCNC: 11 MMOL/L (ref 10–20)
ANION GAP SERPL CALC-SCNC: 11 MMOL/L (ref 10–20)
BASOPHILS # BLD AUTO: 0.05 X10*3/UL (ref 0–0.1)
BASOPHILS NFR BLD AUTO: 0.5 %
BLOOD EXPIRATION DATE: NORMAL
BLOOD EXPIRATION DATE: NORMAL
BUN SERPL-MCNC: 25 MG/DL (ref 6–23)
BUN SERPL-MCNC: 27 MG/DL (ref 6–23)
CALCIUM SERPL-MCNC: 8.4 MG/DL (ref 8.6–10.3)
CALCIUM SERPL-MCNC: 8.5 MG/DL (ref 8.6–10.3)
CHLORIDE SERPL-SCNC: 101 MMOL/L (ref 98–107)
CHLORIDE SERPL-SCNC: 102 MMOL/L (ref 98–107)
CO2 SERPL-SCNC: 25 MMOL/L (ref 21–32)
CO2 SERPL-SCNC: 25 MMOL/L (ref 21–32)
CREAT SERPL-MCNC: 1.22 MG/DL (ref 0.5–1.05)
CREAT SERPL-MCNC: 1.29 MG/DL (ref 0.5–1.05)
DISPENSE STATUS: NORMAL
DISPENSE STATUS: NORMAL
EGFRCR SERPLBLD CKD-EPI 2021: 44 ML/MIN/1.73M*2
EGFRCR SERPLBLD CKD-EPI 2021: 47 ML/MIN/1.73M*2
EOSINOPHIL # BLD AUTO: 0.01 X10*3/UL (ref 0–0.4)
EOSINOPHIL NFR BLD AUTO: 0.1 %
ERYTHROCYTE [DISTWIDTH] IN BLOOD BY AUTOMATED COUNT: 16.1 % (ref 11.5–14.5)
GLUCOSE BLD MANUAL STRIP-MCNC: 158 MG/DL (ref 74–99)
GLUCOSE BLD MANUAL STRIP-MCNC: 159 MG/DL (ref 74–99)
GLUCOSE BLD MANUAL STRIP-MCNC: 179 MG/DL (ref 74–99)
GLUCOSE BLD MANUAL STRIP-MCNC: 187 MG/DL (ref 74–99)
GLUCOSE SERPL-MCNC: 167 MG/DL (ref 74–99)
GLUCOSE SERPL-MCNC: 170 MG/DL (ref 74–99)
HCT VFR BLD AUTO: 28.1 % (ref 36–46)
HGB BLD-MCNC: 8.8 G/DL (ref 12–16)
IMM GRANULOCYTES # BLD AUTO: 0.05 X10*3/UL (ref 0–0.5)
IMM GRANULOCYTES NFR BLD AUTO: 0.5 % (ref 0–0.9)
LYMPHOCYTES # BLD AUTO: 1.5 X10*3/UL (ref 0.8–3)
LYMPHOCYTES NFR BLD AUTO: 14.2 %
MAGNESIUM SERPL-MCNC: 1.84 MG/DL (ref 1.6–2.4)
MCH RBC QN AUTO: 26.2 PG (ref 26–34)
MCHC RBC AUTO-ENTMCNC: 31.3 G/DL (ref 32–36)
MCV RBC AUTO: 84 FL (ref 80–100)
MONOCYTES # BLD AUTO: 1.4 X10*3/UL (ref 0.05–0.8)
MONOCYTES NFR BLD AUTO: 13.3 %
NEUTROPHILS # BLD AUTO: 7.54 X10*3/UL (ref 1.6–5.5)
NEUTROPHILS NFR BLD AUTO: 71.4 %
NRBC BLD-RTO: 0.2 /100 WBCS (ref 0–0)
PHOSPHATE SERPL-MCNC: 3.6 MG/DL (ref 2.5–4.9)
PLATELET # BLD AUTO: 256 X10*3/UL (ref 150–450)
POTASSIUM SERPL-SCNC: 5.3 MMOL/L (ref 3.5–5.3)
POTASSIUM SERPL-SCNC: 5.4 MMOL/L (ref 3.5–5.3)
PRODUCT BLOOD TYPE: 600
PRODUCT BLOOD TYPE: 6200
PRODUCT CODE: NORMAL
PRODUCT CODE: NORMAL
RBC # BLD AUTO: 3.36 X10*6/UL (ref 4–5.2)
SODIUM SERPL-SCNC: 132 MMOL/L (ref 136–145)
SODIUM SERPL-SCNC: 133 MMOL/L (ref 136–145)
UNIT ABO: NORMAL
UNIT ABO: NORMAL
UNIT NUMBER: NORMAL
UNIT NUMBER: NORMAL
UNIT RH: NORMAL
UNIT RH: NORMAL
UNIT VOLUME: 350
UNIT VOLUME: 350
WBC # BLD AUTO: 10.6 X10*3/UL (ref 4.4–11.3)
XM INTEP: NORMAL
XM INTEP: NORMAL

## 2024-07-31 PROCEDURE — 2500000002 HC RX 250 W HCPCS SELF ADMINISTERED DRUGS (ALT 637 FOR MEDICARE OP, ALT 636 FOR OP/ED): Performed by: PHYSICIAN ASSISTANT

## 2024-07-31 PROCEDURE — 1200000002 HC GENERAL ROOM WITH TELEMETRY DAILY

## 2024-07-31 PROCEDURE — 80069 RENAL FUNCTION PANEL: CPT | Performed by: INTERNAL MEDICINE

## 2024-07-31 PROCEDURE — 94760 N-INVAS EAR/PLS OXIMETRY 1: CPT

## 2024-07-31 PROCEDURE — 85025 COMPLETE CBC W/AUTO DIFF WBC: CPT | Performed by: INTERNAL MEDICINE

## 2024-07-31 PROCEDURE — 82947 ASSAY GLUCOSE BLOOD QUANT: CPT

## 2024-07-31 PROCEDURE — 83735 ASSAY OF MAGNESIUM: CPT | Performed by: INTERNAL MEDICINE

## 2024-07-31 PROCEDURE — 36415 COLL VENOUS BLD VENIPUNCTURE: CPT | Performed by: INTERNAL MEDICINE

## 2024-07-31 PROCEDURE — 2500000001 HC RX 250 WO HCPCS SELF ADMINISTERED DRUGS (ALT 637 FOR MEDICARE OP): Performed by: INTERNAL MEDICINE

## 2024-07-31 PROCEDURE — 2500000001 HC RX 250 WO HCPCS SELF ADMINISTERED DRUGS (ALT 637 FOR MEDICARE OP): Performed by: PHYSICIAN ASSISTANT

## 2024-07-31 PROCEDURE — 99232 SBSQ HOSP IP/OBS MODERATE 35: CPT | Performed by: INTERNAL MEDICINE

## 2024-07-31 PROCEDURE — 2500000004 HC RX 250 GENERAL PHARMACY W/ HCPCS (ALT 636 FOR OP/ED): Performed by: INTERNAL MEDICINE

## 2024-07-31 PROCEDURE — 2500000002 HC RX 250 W HCPCS SELF ADMINISTERED DRUGS (ALT 637 FOR MEDICARE OP, ALT 636 FOR OP/ED): Performed by: INTERNAL MEDICINE

## 2024-07-31 PROCEDURE — 80048 BASIC METABOLIC PNL TOTAL CA: CPT | Mod: CCI | Performed by: INTERNAL MEDICINE

## 2024-07-31 PROCEDURE — 2500000004 HC RX 250 GENERAL PHARMACY W/ HCPCS (ALT 636 FOR OP/ED): Performed by: PHYSICIAN ASSISTANT

## 2024-07-31 RX ORDER — AMOXICILLIN 250 MG
2 CAPSULE ORAL 2 TIMES DAILY
Start: 2024-07-31 | End: 2024-08-07

## 2024-07-31 RX ORDER — NALOXONE HYDROCHLORIDE 4 MG/.1ML
4 SPRAY NASAL AS NEEDED
Start: 2024-07-31 | End: 2024-08-07

## 2024-07-31 RX ORDER — ACETAMINOPHEN 325 MG/1
1000 TABLET ORAL EVERY 8 HOURS
Start: 2024-07-31 | End: 2024-08-07

## 2024-07-31 RX ORDER — OXYCODONE HYDROCHLORIDE 10 MG/1
10 TABLET ORAL EVERY 6 HOURS PRN
Qty: 20 TABLET | Refills: 0 | Status: SHIPPED | OUTPATIENT
Start: 2024-07-31 | End: 2024-08-07

## 2024-07-31 RX ORDER — EAR PLUGS
1 EACH OTIC (EAR) 2 TIMES DAILY
Status: DISCONTINUED | OUTPATIENT
Start: 2024-07-31 | End: 2024-08-02 | Stop reason: HOSPADM

## 2024-07-31 RX ORDER — INSULIN LISPRO 100 [IU]/ML
25 INJECTION, SUSPENSION SUBCUTANEOUS
Start: 2024-07-31 | End: 2024-08-07

## 2024-07-31 RX ORDER — GABAPENTIN 300 MG/1
300 CAPSULE ORAL 3 TIMES DAILY
Start: 2024-07-31 | End: 2024-08-07

## 2024-07-31 RX ORDER — POLYETHYLENE GLYCOL 3350 17 G/17G
17 POWDER, FOR SOLUTION ORAL 2 TIMES DAILY
Start: 2024-07-31 | End: 2024-08-07

## 2024-07-31 RX ORDER — GABAPENTIN 300 MG/1
300 CAPSULE ORAL 3 TIMES DAILY
Status: DISCONTINUED | OUTPATIENT
Start: 2024-07-31 | End: 2024-08-02 | Stop reason: HOSPADM

## 2024-07-31 ASSESSMENT — COGNITIVE AND FUNCTIONAL STATUS - GENERAL
PERSONAL GROOMING: A LOT
DAILY ACTIVITIY SCORE: 14
DRESSING REGULAR UPPER BODY CLOTHING: A LOT
MOVING FROM LYING ON BACK TO SITTING ON SIDE OF FLAT BED WITH BEDRAILS: A LOT
DRESSING REGULAR LOWER BODY CLOTHING: A LOT
HELP NEEDED FOR BATHING: A LOT
TURNING FROM BACK TO SIDE WHILE IN FLAT BAD: A LOT
DAILY ACTIVITIY SCORE: 24
CLIMB 3 TO 5 STEPS WITH RAILING: A LOT
MOVING FROM LYING ON BACK TO SITTING ON SIDE OF FLAT BED WITH BEDRAILS: A LITTLE
STANDING UP FROM CHAIR USING ARMS: A LOT
MOBILITY SCORE: 23
TOILETING: A LOT
MOBILITY SCORE: 12
WALKING IN HOSPITAL ROOM: A LOT
MOVING TO AND FROM BED TO CHAIR: A LOT

## 2024-07-31 ASSESSMENT — PAIN DESCRIPTION - ORIENTATION: ORIENTATION: LEFT

## 2024-07-31 ASSESSMENT — PAIN SCALES - GENERAL
PAINLEVEL_OUTOF10: 0 - NO PAIN
PAINLEVEL_OUTOF10: 1
PAINLEVEL_OUTOF10: 0 - NO PAIN
PAINLEVEL_OUTOF10: 6
PAINLEVEL_OUTOF10: 10 - WORST POSSIBLE PAIN
PAINLEVEL_OUTOF10: 10 - WORST POSSIBLE PAIN

## 2024-07-31 ASSESSMENT — PAIN - FUNCTIONAL ASSESSMENT
PAIN_FUNCTIONAL_ASSESSMENT: 0-10
PAIN_FUNCTIONAL_ASSESSMENT: 0-10
PAIN_FUNCTIONAL_ASSESSMENT: UNABLE TO SELF-REPORT
PAIN_FUNCTIONAL_ASSESSMENT: 0-10
PAIN_FUNCTIONAL_ASSESSMENT: 0-10

## 2024-07-31 ASSESSMENT — PAIN DESCRIPTION - LOCATION: LOCATION: LEG

## 2024-07-31 NOTE — PROGRESS NOTES
[facility] Hospitalist Progress Note       [phone]: Please page me for patient care issues.  [phone]: Please page night hospitalist for any issues.     Teresa Matthews  :  1950(73 y.o.)  MRN:  [id number]  PCP: Pita Virgen MD      Principal Problem:    Other chronic osteomyelitis of left femur (Multi)      Assessment and Plan:     Teresa Matthews is a 73 y.o. female from ECF with PMH COPD, paroxysmal A. 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   Patient was admitted for elective left knee AKA (24) due to recurrent left knee PJI. Patient currently denies any complaints expect post pain controlled with analgesics.     #Left AKA (24) due to recurrent L knee PJI  #Post op pain  #Hyperkalemia, mild  #IDDM II w/ hypoglycemia  #CKD III  #Chronic HFpEF  # paroxysmal Afib  #COPD w/o decompensation  #Anemia of chronic disease  # Chronic pain on chronic prescription of opiates  -on analgesics  -hypoglycemia protocol, permissive hyperglycemia  -s/p lokelmia x1. Hold losartan and aldactone pending repeat BMP  -resume rest of home meds as indicated      Disposition: DC to ECF pending surgery final recs and resolved hyperkalemia    DVT Prophylaxis: full anticoagulation apixaban    Code status: Full Code  Diet: Adult diet Regular    Level of MDM:  High    I personally examined the patient and I personally reviewed chart, data, labs radiology reports    Family Communication  Number :   Name of Designated Family Representative:       Total time spent: 35 minutes, of total time providing counseling or in coordination of care. Total time on this day of visit includes record and documentation review before and after visit including documentation and time not explicitly included on EMR time stamp      Subjective:    Interval History:  HPI  The patient complains of post op pain  The patient feels their symptoms areimproving  no events or new concerns    Scheduled Meds:acetaminophen, 975 mg, oral, q8h  amiodarone, 200 mg, oral, Daily  apixaban, 5 mg, oral, BID  aspirin, 81 mg, oral, Daily  buPROPion XL, 150 mg, oral, q AM  busPIRone, 5 mg, oral, Daily  desvenlafaxine, 100 mg, oral, Daily  famotidine, 40 mg, oral, Daily  gabapentin, 100 mg, oral, TID  heparin (porcine), 5,000 Units, subcutaneous, q8h  insulin lispro, 0-10 Units, subcutaneous, TID  [Held by provider] insulin lispro protamin-lispro, 30 Units, subcutaneous, BID  levothyroxine, 175 mcg, oral, Daily  [Held by provider] losartan, 25 mg, oral, Daily  metoprolol succinate XL, 50 mg, oral, Daily  pantoprazole, 40 mg, oral, Daily before breakfast  polyethylene glycol, 17 g, oral, BID  sennosides-docusate sodium, 2 tablet, oral, BID  sodium zirconium cyclosilicate, 10 g, oral, Once  [Held by provider] spironolactone, 25 mg, oral, Daily  traZODone, 50 mg, oral, Nightly      Continuous Infusions:   PRN Meds:PRN medications: dextrose, glucagon, HYDROmorphone, naloxone, ondansetron ODT **OR** ondansetron, oxyCODONE, oxyCODONE    Review of Systems   All other systems reviewed and are negative.    Interval Pertinent History:  Social History     Tobacco Use    Smoking status: Never    Smokeless tobacco: Never   Substance Use Topics    Alcohol use: Never         Objective:   Patient Vitals for the past 24 hrs:   BP Temp Temp src Pulse Resp SpO2   07/31/24 0400 (!) 138/92 35.8 °C (96.5 °F) Temporal 106 19 94 %   07/30/24 2000 (!) 134/91 37.2 °C (98.9 °F) Temporal 100 16 98 %   07/30/24 1857 139/84 -- -- 92 18 --   07/30/24 1330 140/88 -- -- -- -- --   07/30/24 1234 (!) 152/94 36.1 °C (97 °F) -- 81 18 99 %   07/30/24 1144 (!) 148/95 36.4 °C (97.5 °F) Temporal 85 20 96 %   07/30/24 1130 -- -- -- 89 -- 96 %   07/30/24 1114 (!) 135/95 -- -- 82 18 95 %   07/30/24 1059 (!) 146/99 -- --  89 22 (!) 89 %   24 1044 (!) 154/91 36 °C (96.8 °F) Temporal 79 23 97 %   24 1029 (!) 148/95 36 °C (96.8 °F) Temporal 82 20 96 %   24 1014 152/89 -- -- 74 19 95 %   24 0959 128/80 -- -- 76 19 94 %   24 0944 105/65 36 °C (96.8 °F) Temporal 76 16 (!) 89 %       Average, Min, and Max for last 24 hours Vitals:  TEMPERATURE:  Temp  Av.2 °C (97.2 °F)  Min: 35.8 °C (96.5 °F)  Max: 37.2 °C (98.9 °F)    RESPIRATIONS RANGE: Resp  Av  Min: 16  Max: 23    PULSE RANGE: Pulse  Av.5  Min: 74  Max: 106    BLOOD PRESSURE RANGE:  Systolic (24hrs), Av , Min:105 , Max:154   ; Diastolic (24hrs), Av, Min:65, Max:99      PULSE OXIMETRY RANGE: SpO2  Av.8 %  Min: 89 %  Max: 99 %  Body mass index is 31.28 kg/m².    I/O last 3 completed shifts:  In: 3170 (30.3 mL/kg) [P.O.:520; I.V.:2200 (21 mL/kg); Blood:350; IV Piggyback:100]  Out: 1100 (10.5 mL/kg) [Urine:850 (0.2 mL/kg/hr); Blood:250]  Weight: 104.6 kg   Weight change:      Physical Exam  Vitals and nursing note reviewed.   Constitutional:       Appearance: Normal appearance. She is ill-appearing (chronically).   HENT:      Head: Normocephalic and atraumatic.      Right Ear: External ear normal.      Left Ear: External ear normal.      Nose: Nose normal.      Mouth/Throat:      Mouth: Mucous membranes are moist.   Eyes:      General: No scleral icterus.        Right eye: No discharge.         Left eye: No discharge.      Extraocular Movements: Extraocular movements intact.      Conjunctiva/sclera: Conjunctivae normal.      Pupils: Pupils are equal, round, and reactive to light.   Cardiovascular:      Rate and Rhythm: Normal rate and regular rhythm.   Pulmonary:      Effort: Pulmonary effort is normal.      Breath sounds: Normal breath sounds.   Abdominal:      General: Abdomen is flat. Bowel sounds are normal.      Palpations: Abdomen is soft.   Musculoskeletal:         General: Normal range of motion.      Right lower leg: No  edema.      Left lower leg: No edema.      Left Lower Extremity: Left leg is amputated above knee. (intact wound vac)  Skin:     General: Skin is warm and dry.      Capillary Refill: Capillary refill takes less than 2 seconds.   Neurological:      General: No focal deficit present.   Psychiatric:         Mood and Affect: Mood normal.         Thought Content: Thought content normal.         Judgment: Judgment normal.         Lab Results   Component Value Date     (L) 07/31/2024    K 5.4 (H) 07/31/2024     07/31/2024    CO2 25 07/31/2024    BUN 27 (H) 07/31/2024    CREATININE 1.29 (H) 07/31/2024    GLUCOSE 170 (H) 07/31/2024    CALCIUM 8.4 (L) 07/31/2024    PROT 7.5 06/10/2024    BILITOT 0.3 06/10/2024    ALKPHOS 73 06/10/2024    AST 26 06/10/2024    ALT 18 06/10/2024       Lab Results   Component Value Date    WBC 10.6 07/31/2024    HGB 8.8 (L) 07/31/2024    HCT 28.1 (L) 07/31/2024    MCV 84 07/31/2024     07/31/2024    LYMPHOPCT 14.2 07/31/2024    RBC 3.36 (L) 07/31/2024    MCH 26.2 07/31/2024    MCHC 31.3 (L) 07/31/2024    RDW 16.1 (H) 07/31/2024    CRP 0.86 06/10/2024       Lab Results   Component Value Date    TSH 2.13 03/25/2024     Lab Results   Component Value Date    LACTATE 0.9 06/10/2024    TROPONINI <0.02 12/21/2020     (H) 01/26/2024    DDIMER 1,418 (A) 09/13/2022    INR 1.3 (H) 09/17/2022       IMAGES:  Encounter Date: 03/25/24   Electrocardiogram, 12-lead PRN ACS symptoms   Result Value    Ventricular Rate 68    Atrial Rate 68    MS Interval 146    QRS Duration 106    QT Interval 438    QTC Calculation(Bazett) 465    R Axis 88    T Axis 34    QRS Count 11    Q Onset 215    P Onset 142    P Offset 193    T Offset 434    QTC Fredericia 456    Narrative    Sinus rhythm with marked sinus arrhythmia /PACs  Otherwise normal ECG  When compared with ECG of 27-MAR-2024 16:54, (unconfirmed)  No significant change was found  Confirmed by Raffi Hendrickson ([id number]) on 3/28/2024 9:53:27 AM         Transesophageal Echo (ELISEO)    Result Date: 3/28/2024              [place] [address]      Phone [phone] Fax [phone] TRANSESOPHAGEAL ECHOCARDIOGRAM REPORT  Patient Name:     REYESMARTHA Soto Physician:  [id number] Prosper Concepcion MD Study Date:       3/28/2024            Ordering Provider:  [id number] ANATOLY LARA MRN/PID:          [id number]             Fellow: Accession#:       [id number]         Nurse:              Amber Christiansen RN Date of           1950 / 73      Sonographer:        Bernadine Madera RDYUNG Birth/Age:        years Gender:           F                    Additional Staff: Height:           182.88 cm            Admit Date:         3/25/2024 Weight:           113.40 kg            Admission Status:   Inpatient - Routine BSA / BMI:        2.34 m2 / 33.91      [place]          [facility]                   kg/m2                Location:           Lab Blood Pressure: 124 /76 mmHg Study Type:    TRANSESOPHAGEAL ECHO (ELISEO) Diagnosis/ICD: Disorientation, unspecified-R41.0; Bacteremia-R78.81; Essential                (primary) hypertension-I10; Weakness-R53.1 Indication:    bacteremia/abnormal echo CPT Codes:     Moderate Sedation Services initial 15 minutes patient >5                years-23863; Moderate Sedation Services 1st additional 15 minutes                patient >5 years-86862; ELISEO Complete-12563; Doppler                Limited-59036; Echocardiography, ELISEO add on 3D post                processing-77921  Study Detail: The following Echo studies were performed: 2D. Patient's heart               rhythm is atrial fibrillation. A bubble study was not performed.  PHYSICIAN INTERPRETATION: ELISEO Details: The ELISEO probe used was [id number]. Agitated saline contrast echo was performed to assess for the presence of a patent foramen ovale. ELISEO Medication: The pharynx was anesthetized with  lidocaine ointment and hurricaine spray. The patient was sedated using moderate sedation. Total intraservice time for moderate sedation was 28 minutes. Midazolam and Fentanyl was used to sedate the patient for this exam. ELISEO Procedure: The probe was passed without difficulty. The following complication was encountered during the procedure: Patient tolerated the procedure well without any apparent complications. Left Ventricle: Left ventricular systolic function is normal. The left ventricular cavity size is normal. Left ventricular diastolic filling was not assessed. Left Atrium: The left atrium is enlarged. There is no definite left atrial thrombus present. The left atrial appendage is enlarged. Right Ventricle: The right ventricle is normal in size. There is normal right ventricular global systolic function. Right Atrium: The right atrium was not assessed. Aortic Valve: The aortic valve is trileaflet. There is mild aortic valve cusp calcification. There is mild aortic valve regurgitation. Mitral Valve: The mitral valve is mildly thickened. There is normal mitral valve leaflet mobility. There is moderate mitral annular calcification. There is mild mitral valve regurgitation. Tricuspid Valve: The tricuspid valve is structurally normal. There is trace tricuspid regurgitation. Pulmonic Valve: The pulmonic valve is not well visualized. There is trace pulmonic valve regurgitation. Pericardium: There is a small pericardial effusion. Aorta: The aortic root is normal. The aortic root is at the upper limits of normal size. There is plaque visualized in the descending aorta.  CONCLUSIONS:  1. Left ventricular systolic function is normal.  2. No definite intracardiac thrombi or masses were visualized.  3. No definite valvular vegetations were visualized.  4. The left atrium is enlarged.  5. There is moderate mitral annular calcification.  6. Mild aortic valve regurgitation.  7. No left atrial thrombus.  8. There is plaque  visualized in the descending aorta. QUANTITATIVE DATA SUMMARY:  [id number] Prosper Concepcion MD Electronically signed on 3/28/2024 at 6:01:37 PM  ** Final **     Transthoracic Echo (TTE) Limited    Result Date: 3/26/2024              [place] [id number]      Phone [phone] Fax [phone] TRANSTHORACIC ECHOCARDIOGRAM REPORT  Patient Name:      REYES SENIOR           Brittany Physician:    [id number] Hai RAMESH MD Study Date:        3/26/2024           Ordering Provider:    [id number] GONCALVES MRN/PID:           [id number]            Fellow: Accession#:        [id number]        Nurse: Date of Birth/Age: 1950 / 73     Sonographer:          Brittnee mcfarland RDCS Gender:            F                   Additional Staff: Height:            182.88 cm           Admit Date:           3/25/2024 Weight:            113.40 kg           Admission Status:     Inpatient - Routine BSA / BMI:         2.34 m2 / 33.91     Department Location:  [facility]                    kg/m2 Blood Pressure: 114 /63 mmHg Study Type:    TRANSTHORACIC ECHO (TTE) LIMITED Diagnosis/ICD: Bacteremia-R78.81 Indication:    BACTEREMIA CPT Codes:     Echo Limited-35121 Patient History: Pertinent History: CHF, A-FIB, HTN. Study Detail: The following Echo studies were performed: 2D, Doppler and color               flow.  Critical Event Critical Event: Test was completed as per department protocol. Critical Finding: Possible veg on MV. Time Test was Completed: 2:35:00 PM Notified: Dr. Gerber. Attending notification time: 2:45:37 PM  PHYSICIAN INTERPRETATION: Left Ventricle: Left ventricular systolic function was not assessed. There are no regional wall motion abnormalities. The left ventricular cavity size was not assessed. Left ventricular diastolic filling was not assessed. Left Atrium: The left  atrium was not assessed. Right Ventricle: The right ventricle was not assessed. Right ventricular systolic function not assessed. Right Atrium: The right atrium was not assessed. Aortic Valve: There is no visualized aortic valve vegetation. The aortic valve was not assessed. Aortic valve regurgitation was not assessed. Mitral Valve: The mitral valve was not assessed. Mitral valve regurgitation was not assessed. Possible veg on anterior leaflet of MV. Tricuspid Valve: No vegetation is seen on the tricuspid valve. The tricuspid valve was not assessed. Tricuspid regurgitation was not assessed. Pulmonic Valve: No pulmonic valve vegetation visualized. The pulmonic valve was not assessed. The pulmonic valve regurgitation was not assessed. Pericardium: Pericardial effusion was not assessed. Aorta: The aortic root was not assessed.  CONCLUSIONS:  1. Left ventricular systolic function was not assessed.  2. Possible veg on anterior leaflet of MV.  3. No tricuspid valve vegetation.  4. No aortic valve vegetation visualized.  5. No pulmonic valve vegetation visualized. QUANTITATIVE DATA SUMMARY: 2D MEASUREMENTS:              Normal Ranges: LAs: 2.10 cm (2.7-4.0cm) TRICUSPID VALVE/RVSP:                             Normal Ranges: Peak TR Velocity: 2.51 m/s RV Syst Pressure: 28.2 mmHg (< 30mmHg)  [id number] Hai Gerber MD Electronically signed on 3/26/2024 at 4:16:23 PM  ** Final **    === 09/10/22 ===    FOOT    - Impression -  1.  No acute fracture or dislocation identified.  === 09/10/22 ===    CT CHEST PULMONARY EMBOLISM W IV CONTRAST    - Impression -  No evidence PE.    Ground-glass left lower lobe area. Follow-up is suggested.    Bilateral small effusions.    49 mm enlargement of the ascending thoracic aorta.  === 09/10/22 ===    FOOT    - Impression -  1.  No acute fracture or dislocation identified.  === 09/10/22 ===    MR FOOT    - Impression -  1. No evidence of osteomyelitis.  2. Healed/healing calcaneal fracture with  scattered reactive marrow  edema throughout the calcaneus.  3. Regions of superficial soft tissue hypoenhancement at the plantar  and posterior aspect of the calcaneus concerning for ischemia and  developing soft tissue necrosis.  4. Severe thickening of the distal Achilles tendon compatible with  tendinosis.    Additional results of the last 24 hours have been reviewed.    Dictated using PPT Reasearch Version 2.4  Proof read however unrecognized voice recognition errors may have occurred     Electronically signed by Genia Guerra DO on 07/31/24 at 8:43 AM

## 2024-07-31 NOTE — PROGRESS NOTES
07/31/24 1600   Discharge Planning   Assistance Needed Per physician patient is medically ready for DC. Message sent to patient's LTC facility via [facility] to inquire if patient can return. Awaiting response from facility currently. Call placed to patient's POA to inform of DC plan with no answer and mailbox was full so message could be left.

## 2024-07-31 NOTE — CARE PLAN
The patient's goals for the shift include      The clinical goals for the shift include Pain control      Problem: Pain  Goal: Takes deep breaths with improved pain control throughout the shift  7/31/2024 1105 by Zulma Hurtado RN  Outcome: Progressing  7/31/2024 0755 by Zulma Hurtado RN  Outcome: Progressing  Goal: Turns in bed with improved pain control throughout the shift  7/31/2024 1105 by Zulma Hurtado RN  Outcome: Progressing  7/31/2024 0755 by Zulma Hurtado RN  Outcome: Progressing  Goal: Walks with improved pain control throughout the shift  7/31/2024 1105 by Zulma Hurtado RN  Outcome: Progressing  7/31/2024 0755 by Zulma Hurtado RN  Outcome: Progressing  Goal: Performs ADL's with improved pain control throughout shift  7/31/2024 1105 by Zulma Hurtado RN  Outcome: Progressing  7/31/2024 0755 by Zulma Hurtado RN  Outcome: Progressing  Goal: Participates in PT with improved pain control throughout the shift  7/31/2024 1105 by Zulma Hurtado RN  Outcome: Progressing  7/31/2024 0755 by Zulma Hurtado RN  Outcome: Progressing  Goal: Free from opioid side effects throughout the shift  7/31/2024 1105 by Zulma Hurtado RN  Outcome: Progressing  7/31/2024 0755 by Zulma Hurtado RN  Outcome: Progressing  Goal: Free from acute confusion related to pain meds throughout the shift  7/31/2024 1105 by Zulma Hurtado RN  Outcome: Progressing  7/31/2024 0755 by Zulma Hurtado RN  Outcome: Progressing     Problem: Pain - Adult  Goal: Verbalizes/displays adequate comfort level or baseline comfort level  7/31/2024 1105 by Zulma Hurtado RN  Outcome: Progressing  7/31/2024 0755 by Zulma Hurtado RN  Outcome: Progressing     Problem: Safety - Adult  Goal: Free from fall injury  7/31/2024 1105 by Zulma Hurtado RN  Outcome: Progressing  7/31/2024 0755 by Zulma R Vuletich, RN  Outcome: Progressing     Problem: Discharge Planning  Goal:  Discharge to home or other facility with appropriate resources  7/31/2024 1105 by Zulma Hurtado RN  Outcome: Progressing  7/31/2024 0755 by Zulma Hurtado RN  Outcome: Progressing     Problem: Chronic Conditions and Co-morbidities  Goal: Patient's chronic conditions and co-morbidity symptoms are monitored and maintained or improved  7/31/2024 1105 by Zulma Hurtado RN  Outcome: Progressing  7/31/2024 0755 by Zulma Hurtado RN  Outcome: Progressing     Problem: Skin  Goal: Decreased wound size/increased tissue granulation at next dressing change  7/31/2024 1105 by Zulma Hrutado RN  Outcome: Progressing  7/31/2024 0755 by Zulma Hurtado RN  Outcome: Progressing  Goal: Participates in plan/prevention/treatment measures  7/31/2024 1105 by Zulma Hurtado RN  Outcome: Progressing  7/31/2024 0755 by Zulma Hurtado RN  Outcome: Progressing  Goal: Prevent/manage excess moisture  7/31/2024 1105 by Zulma Hurtado RN  Outcome: Progressing  7/31/2024 0755 by Zulma Hurtado RN  Outcome: Progressing  Goal: Prevent/minimize sheer/friction injuries  7/31/2024 1105 by Zulma Hurtado RN  Outcome: Progressing  7/31/2024 0755 by Zulma Hurtado RN  Outcome: Progressing  Goal: Promote/optimize nutrition  7/31/2024 1105 by Zulma Hurtado RN  Outcome: Progressing  7/31/2024 0755 by Zulma Hurtado RN  Outcome: Progressing  Goal: Promote skin healing  7/31/2024 1105 by Zulma Hurtado RN  Outcome: Progressing  7/31/2024 0755 by Zulma Hurtado RN  Outcome: Progressing     Problem: Diabetes  Goal: Achieve decreasing blood glucose levels by end of shift  7/31/2024 1105 by Zulma Hurtado RN  Outcome: Progressing  7/31/2024 0755 by Zulma Hurtado RN  Outcome: Progressing  Goal: Increase stability of blood glucose readings by end of shift  7/31/2024 1105 by Zulma Hurtado RN  Outcome: Progressing  7/31/2024 0755 by Zulma Hurtado RN  Outcome:  Progressing  Goal: Decrease in ketones present in urine by end of shift  7/31/2024 1105 by Zulma Hurtado RN  Outcome: Progressing  7/31/2024 0755 by Zulma Hurtado RN  Outcome: Progressing  Goal: Maintain electrolyte levels within acceptable range throughout shift  7/31/2024 1105 by Zulma Hurtado RN  Outcome: Progressing  7/31/2024 0755 by Zulma Hurtado RN  Outcome: Progressing  Goal: Maintain glucose levels >70mg/dl to <250mg/dl throughout shift  7/31/2024 1105 by Zulma Hurtado RN  Outcome: Progressing  7/31/2024 0755 by Zulma Hurtado RN  Outcome: Progressing  Goal: No changes in neurological exam by end of shift  7/31/2024 1105 by Zulma Hurtado RN  Outcome: Progressing  7/31/2024 0755 by Zulma Hurtado RN  Outcome: Progressing  Goal: Learn about and adhere to nutrition recommendations by end of shift  7/31/2024 1105 by Zulma Hurtado RN  Outcome: Progressing  7/31/2024 0755 by Zulma Hurtado RN  Outcome: Progressing  Goal: Vital signs within normal range for age by end of shift  7/31/2024 1105 by Zulma Hurtado RN  Outcome: Progressing  7/31/2024 0755 by Zulma Hurtado RN  Outcome: Progressing  Goal: Increase self care and/or family involovement by end of shift  7/31/2024 1105 by Zulma Hurtado RN  Outcome: Progressing  7/31/2024 0755 by Zulma Hurtado RN  Outcome: Progressing  Goal: Receive DSME education by end of shift  7/31/2024 1105 by Zulma Hurtado RN  Outcome: Progressing  7/31/2024 0755 by Zulma Hurtado RN  Outcome: Progressing

## 2024-07-31 NOTE — PROGRESS NOTES
07/31/24 1618   Discharge Planning   Assistance Needed Call placed to admissions at [facility]. Admissions at facility is gone for the day and cannot confrim that patient can return there today. Plan will be for patient to DC in the AM back to LTC facility once speaking with POA and LTC facility.

## 2024-07-31 NOTE — PROGRESS NOTES
Teresa Matthews is a 73 y.o. female on day 1 of admission presenting with Other chronic osteomyelitis of left femur (Multi).    Subjective   POD#1 s/p left AKA    Received 2 units total PRBCs yesterday during/post procedure. H/H this am stable.     No acute overnight events reported.     Pt seen and evaluated at bedside. Resting comfortably. Pain controlled. Tolerating diet. No chest pain or shortness of breath. Hasn't worked with PT yet.        Objective     Physical Exam  Constitutional:       Appearance: Normal appearance. She is obese.   HENT:      Head: Normocephalic.      Right Ear: External ear normal.      Left Ear: External ear normal.      Nose: Nose normal.      Mouth/Throat:      Mouth: Mucous membranes are moist.   Eyes:      Extraocular Movements: Extraocular movements intact.   Neck:      Vascular: No carotid bruit.   Cardiovascular:      Rate and Rhythm: Normal rate and regular rhythm.   Pulmonary:      Effort: Pulmonary effort is normal. No respiratory distress.      Breath sounds: Normal breath sounds.   Abdominal:      Palpations: Abdomen is soft.      Tenderness: There is no abdominal tenderness.   Musculoskeletal:         General: No swelling or tenderness.      Cervical back: Normal range of motion and neck supple.      Right lower leg: Edema present.      Left lower leg: Edema present.      Comments: Left AKA dressing is c/d/I. Dressing taken down. Staple line is c/d/I, minimal stump swelling. Redressed with xeroform, 4x4, abd, kerlex and ace wrap compression    Skin:     General: Skin is warm and dry.      Findings: No lesion.   Neurological:      General: No focal deficit present.      Mental Status: She is alert and oriented to person, place, and time. Mental status is at baseline.   Psychiatric:         Mood and Affect: Mood normal.         Behavior: Behavior normal.         Thought Content: Thought content normal.         Judgment: Judgment normal.         Last Recorded Vitals  Blood  pressure 121/84, pulse 95, temperature 36.1 °C (97 °F), temperature source Temporal, resp. rate 19, height 1.829 m (6'), weight 105 kg (230 lb 9.6 oz), SpO2 94%.  Vitals:    07/30/24 2000 07/31/24 0400 07/31/24 0859 07/31/24 0932   BP: (!) 134/91 (!) 138/92 121/84    BP Location: Left arm Left arm Right arm    Patient Position: Lying Lying Lying    Pulse: 100 106 95    Resp: 16 19 19    Temp: 37.2 °C (98.9 °F) 35.8 °C (96.5 °F) 36.1 °C (97 °F)    TempSrc: Temporal Temporal Temporal    SpO2: 98% 94% 95% 94%   Weight:       Height:          Intake/Output last 3 Shifts:  I/O last 3 completed shifts:  In: 3170 (30.3 mL/kg) [P.O.:520; I.V.:2200 (21 mL/kg); Blood:350; IV Piggyback:100]  Out: 1100 (10.5 mL/kg) [Urine:850 (0.2 mL/kg/hr); Blood:250]  Weight: 104.6 kg     Relevant Results     Assessment/Plan   Principal Problem:    Other chronic osteomyelitis of left femur (Multi)      73 year old female with chronic osteomyelitis of the left distal femur now POD#1 s/p Left AKA. Overall recovering well postoperatively. Pain well controlled. Hemodynamically stable  -Dressing changed at bedside today, next dressing change due 8/2/2024  -OK to discharge from a vascular surgery standpoint  -Continue pain control as needed  -Continue physical therapy  -Follow up in office on discharge in 4 weeks, appointment scheduled  -Wound care and dressing change instructions added to discharge summary  -Discharge per primary hospitalist team when medically appropriate  -Discussed above course of care and treatment plan with Dr Carter who is in agreement, discussed with primary hospitalist    -      Shelley De La Torre PA-C

## 2024-07-31 NOTE — CARE PLAN
The patient's goals for the shift include      The clinical goals for the shift include pain control      Problem: Pain  Goal: Takes deep breaths with improved pain control throughout the shift  Outcome: Progressing  Goal: Turns in bed with improved pain control throughout the shift  Outcome: Progressing  Goal: Walks with improved pain control throughout the shift  Outcome: Progressing  Goal: Performs ADL's with improved pain control throughout shift  Outcome: Progressing  Goal: Participates in PT with improved pain control throughout the shift  Outcome: Progressing  Goal: Free from opioid side effects throughout the shift  Outcome: Progressing  Goal: Free from acute confusion related to pain meds throughout the shift  Outcome: Progressing     Problem: Pain - Adult  Goal: Verbalizes/displays adequate comfort level or baseline comfort level  Outcome: Progressing     Problem: Safety - Adult  Goal: Free from fall injury  Outcome: Progressing     Problem: Discharge Planning  Goal: Discharge to home or other facility with appropriate resources  Outcome: Progressing     Problem: Chronic Conditions and Co-morbidities  Goal: Patient's chronic conditions and co-morbidity symptoms are monitored and maintained or improved  Outcome: Progressing     Problem: Skin  Goal: Decreased wound size/increased tissue granulation at next dressing change  Outcome: Progressing  Goal: Participates in plan/prevention/treatment measures  Outcome: Progressing  Goal: Prevent/manage excess moisture  Outcome: Progressing  Goal: Prevent/minimize sheer/friction injuries  Outcome: Progressing  Goal: Promote/optimize nutrition  Outcome: Progressing  Goal: Promote skin healing  Outcome: Progressing     Problem: Diabetes  Goal: Achieve decreasing blood glucose levels by end of shift  Outcome: Progressing  Goal: Increase stability of blood glucose readings by end of shift  Outcome: Progressing  Goal: Decrease in ketones present in urine by end of  shift  Outcome: Progressing  Goal: Maintain electrolyte levels within acceptable range throughout shift  Outcome: Progressing  Goal: Maintain glucose levels >70mg/dl to <250mg/dl throughout shift  Outcome: Progressing  Goal: No changes in neurological exam by end of shift  Outcome: Progressing  Goal: Learn about and adhere to nutrition recommendations by end of shift  Outcome: Progressing  Goal: Vital signs within normal range for age by end of shift  Outcome: Progressing  Goal: Increase self care and/or family involovement by end of shift  Outcome: Progressing  Goal: Receive DSME education by end of shift  Outcome: Progressing

## 2024-07-31 NOTE — PROGRESS NOTES
07/31/24 0948   Discharge Planning   Living Arrangements Other (Comment)  ([facility])   Support Systems Family members  (sister)   Assistance Needed Patient is from [facility] . Per patient she requires assist for dressing and bathing, feds self, requires assist x 1 for dressing and bathing and transfers from the bed to  via freida lift. Plan will be for patient to return to [facility] once medically ready for DC. No auth is needed to return to facility LTC.   Type of Residence Nursing home/residential care   Do you have animals or pets at home? No   Who is requesting discharge planning? Provider   Home or Post Acute Services Post acute facilities (Rehab/SNF/etc)   Type of Post Acute Facility Services Long term care   Expected Discharge Disposition Home   Does the patient need discharge transport arranged? Yes   RoundTrip coordination needed? Yes   Has discharge transport been arranged? No

## 2024-08-01 VITALS
SYSTOLIC BLOOD PRESSURE: 131 MMHG | HEART RATE: 94 BPM | OXYGEN SATURATION: 96 % | RESPIRATION RATE: 16 BRPM | WEIGHT: 230.6 LBS | TEMPERATURE: 95.9 F | BODY MASS INDEX: 31.23 KG/M2 | DIASTOLIC BLOOD PRESSURE: 87 MMHG | HEIGHT: 72 IN

## 2024-08-01 LAB
ALBUMIN SERPL BCP-MCNC: 2.9 G/DL (ref 3.4–5)
ANION GAP SERPL CALC-SCNC: 11 MMOL/L (ref 10–20)
BASOPHILS # BLD AUTO: 0.04 X10*3/UL (ref 0–0.1)
BASOPHILS NFR BLD AUTO: 0.5 %
BUN SERPL-MCNC: 23 MG/DL (ref 6–23)
CALCIUM SERPL-MCNC: 8.6 MG/DL (ref 8.6–10.3)
CHLORIDE SERPL-SCNC: 100 MMOL/L (ref 98–107)
CO2 SERPL-SCNC: 27 MMOL/L (ref 21–32)
CREAT SERPL-MCNC: 1.15 MG/DL (ref 0.5–1.05)
EGFRCR SERPLBLD CKD-EPI 2021: 51 ML/MIN/1.73M*2
EOSINOPHIL # BLD AUTO: 0.09 X10*3/UL (ref 0–0.4)
EOSINOPHIL NFR BLD AUTO: 1 %
ERYTHROCYTE [DISTWIDTH] IN BLOOD BY AUTOMATED COUNT: 16.2 % (ref 11.5–14.5)
GLUCOSE BLD MANUAL STRIP-MCNC: 162 MG/DL (ref 74–99)
GLUCOSE BLD MANUAL STRIP-MCNC: 190 MG/DL (ref 74–99)
GLUCOSE BLD MANUAL STRIP-MCNC: 223 MG/DL (ref 74–99)
GLUCOSE BLD MANUAL STRIP-MCNC: 259 MG/DL (ref 74–99)
GLUCOSE SERPL-MCNC: 182 MG/DL (ref 74–99)
HCT VFR BLD AUTO: 27.3 % (ref 36–46)
HGB BLD-MCNC: 8 G/DL (ref 12–16)
IMM GRANULOCYTES # BLD AUTO: 0.03 X10*3/UL (ref 0–0.5)
IMM GRANULOCYTES NFR BLD AUTO: 0.3 % (ref 0–0.9)
LYMPHOCYTES # BLD AUTO: 1.19 X10*3/UL (ref 0.8–3)
LYMPHOCYTES NFR BLD AUTO: 13.8 %
MAGNESIUM SERPL-MCNC: 1.77 MG/DL (ref 1.6–2.4)
MCH RBC QN AUTO: 25.5 PG (ref 26–34)
MCHC RBC AUTO-ENTMCNC: 29.3 G/DL (ref 32–36)
MCV RBC AUTO: 87 FL (ref 80–100)
MONOCYTES # BLD AUTO: 1.24 X10*3/UL (ref 0.05–0.8)
MONOCYTES NFR BLD AUTO: 14.4 %
NEUTROPHILS # BLD AUTO: 6.03 X10*3/UL (ref 1.6–5.5)
NEUTROPHILS NFR BLD AUTO: 70 %
NRBC BLD-RTO: 0.3 /100 WBCS (ref 0–0)
PHOSPHATE SERPL-MCNC: 3.2 MG/DL (ref 2.5–4.9)
PLATELET # BLD AUTO: 254 X10*3/UL (ref 150–450)
POTASSIUM SERPL-SCNC: 4.9 MMOL/L (ref 3.5–5.3)
RBC # BLD AUTO: 3.14 X10*6/UL (ref 4–5.2)
SODIUM SERPL-SCNC: 133 MMOL/L (ref 136–145)
WBC # BLD AUTO: 8.6 X10*3/UL (ref 4.4–11.3)

## 2024-08-01 PROCEDURE — 9420000001 HC RT PATIENT EDUCATION 5 MIN

## 2024-08-01 PROCEDURE — 2500000002 HC RX 250 W HCPCS SELF ADMINISTERED DRUGS (ALT 637 FOR MEDICARE OP, ALT 636 FOR OP/ED): Performed by: INTERNAL MEDICINE

## 2024-08-01 PROCEDURE — 85025 COMPLETE CBC W/AUTO DIFF WBC: CPT | Performed by: INTERNAL MEDICINE

## 2024-08-01 PROCEDURE — 2500000005 HC RX 250 GENERAL PHARMACY W/O HCPCS: Performed by: INTERNAL MEDICINE

## 2024-08-01 PROCEDURE — 99239 HOSP IP/OBS DSCHRG MGMT >30: CPT | Performed by: INTERNAL MEDICINE

## 2024-08-01 PROCEDURE — 2500000001 HC RX 250 WO HCPCS SELF ADMINISTERED DRUGS (ALT 637 FOR MEDICARE OP): Performed by: INTERNAL MEDICINE

## 2024-08-01 PROCEDURE — 83735 ASSAY OF MAGNESIUM: CPT | Performed by: INTERNAL MEDICINE

## 2024-08-01 PROCEDURE — 2500000002 HC RX 250 W HCPCS SELF ADMINISTERED DRUGS (ALT 637 FOR MEDICARE OP, ALT 636 FOR OP/ED): Performed by: PHYSICIAN ASSISTANT

## 2024-08-01 PROCEDURE — 1200000002 HC GENERAL ROOM WITH TELEMETRY DAILY

## 2024-08-01 PROCEDURE — 2500000001 HC RX 250 WO HCPCS SELF ADMINISTERED DRUGS (ALT 637 FOR MEDICARE OP): Performed by: PHYSICIAN ASSISTANT

## 2024-08-01 PROCEDURE — 84100 ASSAY OF PHOSPHORUS: CPT | Performed by: INTERNAL MEDICINE

## 2024-08-01 PROCEDURE — 36415 COLL VENOUS BLD VENIPUNCTURE: CPT | Performed by: INTERNAL MEDICINE

## 2024-08-01 PROCEDURE — 82947 ASSAY GLUCOSE BLOOD QUANT: CPT

## 2024-08-01 PROCEDURE — 2500000004 HC RX 250 GENERAL PHARMACY W/ HCPCS (ALT 636 FOR OP/ED): Performed by: INTERNAL MEDICINE

## 2024-08-01 ASSESSMENT — COGNITIVE AND FUNCTIONAL STATUS - GENERAL
MOVING TO AND FROM BED TO CHAIR: TOTAL
MOBILITY SCORE: 8
MOBILITY SCORE: 12
CLIMB 3 TO 5 STEPS WITH RAILING: A LOT
DAILY ACTIVITIY SCORE: 14
WALKING IN HOSPITAL ROOM: A LOT
MOVING TO AND FROM BED TO CHAIR: A LOT
TOILETING: A LOT
TURNING FROM BACK TO SIDE WHILE IN FLAT BAD: A LOT
DRESSING REGULAR UPPER BODY CLOTHING: A LOT
DAILY ACTIVITIY SCORE: 14
CLIMB 3 TO 5 STEPS WITH RAILING: TOTAL
TURNING FROM BACK TO SIDE WHILE IN FLAT BAD: A LOT
MOVING FROM LYING ON BACK TO SITTING ON SIDE OF FLAT BED WITH BEDRAILS: A LOT
WALKING IN HOSPITAL ROOM: TOTAL
PERSONAL GROOMING: A LOT
HELP NEEDED FOR BATHING: A LOT
STANDING UP FROM CHAIR USING ARMS: A LOT
DRESSING REGULAR LOWER BODY CLOTHING: A LOT
DRESSING REGULAR UPPER BODY CLOTHING: A LOT
HELP NEEDED FOR BATHING: A LOT
DRESSING REGULAR LOWER BODY CLOTHING: A LOT
STANDING UP FROM CHAIR USING ARMS: TOTAL
TOILETING: A LOT
MOVING FROM LYING ON BACK TO SITTING ON SIDE OF FLAT BED WITH BEDRAILS: A LOT
PERSONAL GROOMING: A LOT

## 2024-08-01 ASSESSMENT — PAIN SCALES - GENERAL
PAINLEVEL_OUTOF10: 5 - MODERATE PAIN
PAINLEVEL_OUTOF10: 8
PAINLEVEL_OUTOF10: 5 - MODERATE PAIN
PAINLEVEL_OUTOF10: 7
PAINLEVEL_OUTOF10: 8

## 2024-08-01 ASSESSMENT — PAIN DESCRIPTION - LOCATION
LOCATION: HIP
LOCATION: HIP

## 2024-08-01 ASSESSMENT — PAIN DESCRIPTION - ORIENTATION
ORIENTATION: LEFT
ORIENTATION: LEFT

## 2024-08-01 ASSESSMENT — PAIN - FUNCTIONAL ASSESSMENT
PAIN_FUNCTIONAL_ASSESSMENT: 0-10

## 2024-08-01 NOTE — DISCHARGE SUMMARY
[facility] Hospitalist Discharge Summary and Transition Note           Teresa Matthews                  :  1950                     MRN:  [id number]     ADMIT DATE:  2024            DISCHARGE DATE:  2024     PRIMARY CARE PHYSICIAN:  Pita Virgen MD     VISIT STATUS: Admission     CODE STATUS:  Full Code     DISCHARGE DIAGNOSES:    Principal Problem:    Other chronic osteomyelitis of left femur (Multi)       HOSPITAL COURSE:  Teresa Matthews is a 73 y.o. female from [facility] with PMH COPD, paroxysmal A. 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   Patient was admitted for elective left knee AKA (24) due to recurrent left knee PJI. Patient was later discharged to [facility].     #Left AKA (24) due to recurrent L knee PJI  #Post op pain  #Hyperkalemia, mild  #IDDM II w/ hypoglycemia  #CKD III  #Chronic HFpEF  # paroxysmal Afib  #COPD w/o decompensation  #Anemia of chronic disease  # Chronic pain on chronic prescription of opiates          PROCEDURES:  elective left knee AKA (24)  CONSULTANTS:  orthopedics    COMPLEXITY OF FOLLOW UP:  [] Moderate Complexity: follow up within 7-14 calendar days (41720)  [x]Severe Complexity: follow up within 7 calendar days (79804)     FOLLOW UP TESTING, PENDING RESULTS ORREFERRALS AT TRANSITIONAL CARE VISIT:   [x]Yes  F/U with ortho   [] No     DISCHARGE MEDICATIONS:        Significant Medication Changes:         Your medication list        START taking these medications        Instructions Last Dose Given Next Dose Due   acetaminophen 325 mg tablet  Commonly known as: Tylenol      Take 3 tablets (975 mg) by mouth every 8 hours.       naloxone 4 mg/0.1 mL nasal spray  Commonly known as: Narcan      Administer 1 spray (4 mg) into affected nostril(s) if needed for opioid  reversal or respiratory depression. May repeat every 2-3 minutes if needed, alternating nostrils, until medical assistance becomes available.       oxyCODONE 10 mg immediate release tablet  Commonly known as: Roxicodone      Take 1 tablet (10 mg) by mouth every 6 hours if needed (Pain).       polyethylene glycol 17 gram packet  Commonly known as: Glycolax, Miralax      Take 17 g by mouth 2 times a day.       sennosides-docusate sodium 8.6-50 mg tablet  Commonly known as: Leatha-Colace      Take 2 tablets by mouth 2 times a day.              CHANGE how you take these medications        Instructions Last Dose Given Next Dose Due   gabapentin 300 mg capsule  Commonly known as: Neurontin  What changed:   medication strength  how much to take      Take 1 capsule (300 mg) by mouth 3 times a day.       insulin lispro protamin-lispro 100 unit/mL (75-25) injection  Commonly known as: HumaLOG Mix 75-25 KwikPen  What changed:   how much to take  how to take this  when to take this      Inject 25 Units under the skin 2 times daily (morning and late afternoon). Inject 1-2 times per day as directed.  35 units subcutaneously              CONTINUE taking these medications        Instructions Last Dose Given Next Dose Due   amiodarone 200 mg tablet  Commonly known as: Pacerone      Take 1 tablet (200 mg) by mouth once daily.       apixaban 5 mg tablet  Commonly known as: Eliquis      Take 1 tablet (5 mg) by mouth 2 times a day.       ascorbic acid (vitamin C) 500 mg capsule           aspirin 81 mg EC tablet           buPROPion  mg 24 hr tablet  Commonly known as: Wellbutrin XL      Take 1 tablet (150 mg) by mouth once daily in the morning. Do not crush, chew, or split.       busPIRone 5 mg tablet  Commonly known as: Buspar           calcium citrate-vitamin D3 200 mg-6.25 mcg (250 unit) tablet           desvenlafaxine 100 mg 24 hr tablet  Commonly known as: Pristiq      Take 1 tablet by mouth once daily. Do not crush, chew, or  split. Instead of venlafaxine.       famotidine 40 mg tablet  Commonly known as: Pepcid      Take 1 tablet (40 mg) by mouth once daily.       FreeStyle Bulmaro 2 [facility]  Generic drug: flash glucose scanning reader      Use as instructed, reader is missing in rehab, needs replacement.       FreeStyle Bulmaro 2 Sensor kit  Generic drug: flash glucose sensor kit      Inject 1 each under the skin every 14 (fourteen) days. Test blood sugar 1-4 times per day as needed for diabetes, replace every 14 days       glucagon 1 mg injection  Commonly known as: Glucagen      Inject 1 mg into the shoulder, thigh, or buttocks 1 time if needed for low blood sugar - see comments for up to 1 dose. USE AS DIRECTED.       levothyroxine 175 mcg tablet  Commonly known as: Synthroid, Levoxyl      Take 1 tablet by mouth daily       loperamide 2 mg capsule  Commonly known as: Imodium      Take 1 capsule (2 mg) by mouth 4 times a day as needed for diarrhea.       losartan 25 mg tablet  Commonly known as: Cozaar      Take 1 tablet (25 mg) by mouth once daily.       metoprolol succinate XL 50 mg 24 hr tablet  Commonly known as: Toprol-XL      Take 1 tablet (50 mg) by mouth once daily.       OneTouch Ultra Test strip  Generic drug: blood sugar diagnostic           pantoprazole 40 mg EC tablet  Commonly known as: ProtoNix           spironolactone 25 mg tablet  Commonly known as: Aldactone      Take 1 tablet by mouth daily       SUPER B-50 COMPLEX ORAL           traZODone 50 mg tablet  Commonly known as: Desyrel      Take 1 tablet (50 mg) by mouth once daily at bedtime.       underpads pad  Commonly known as: Bed Underpads      1 each 2 times a day.       vibegron 75 mg tablet      Take 1 tablet (75 mg) by mouth once daily.       WOMEN'S MULTIVITAMIN ORAL                  STOP taking these medications      oxyCODONE-acetaminophen  mg tablet  Commonly known as: Percocet        potassium chloride CR 10 mEq ER tablet  Commonly known as:  Arin                  Where to Get Your Medications        You can get these medications from any pharmacy    Bring a paper prescription for each of these medications  oxyCODONE 10 mg immediate release tablet       Information about where to get these medications is not yet available    Ask your nurse or doctor about these medications  acetaminophen 325 mg tablet  gabapentin 300 mg capsule  insulin lispro protamin-lispro 100 unit/mL (75-25) injection  naloxone 4 mg/0.1 mL nasal spray  polyethylene glycol 17 gram packet  sennosides-docusate sodium 8.6-50 mg tablet           DIET: carb controlled      DISPOSITION:  ECF     Follow up with Pita Virgen MD  .        DISCHARGE TIME: > 30 minutes     Electronically signed by Genia Guerra DO on 08/01/24 at 9:22 AM      Dictated using [employer] Version 2.4  Proof read however unrecognized voice recognition errors may have occurred

## 2024-08-01 NOTE — CARE PLAN
The patient's goals for the shift include      The clinical goals for the shift include pain control      Problem: Pain  Goal: Takes deep breaths with improved pain control throughout the shift  Outcome: Progressing     Problem: Pain - Adult  Goal: Verbalizes/displays adequate comfort level or baseline comfort level  Outcome: Progressing     Problem: Safety - Adult  Goal: Free from fall injury  Outcome: Progressing     Problem: Skin  Goal: Decreased wound size/increased tissue granulation at next dressing change  Outcome: Progressing

## 2024-08-01 NOTE — CONSULTS
"  Wound Care Consult     Visit Date: 7/31/2024      Patient Name: Teresa Matthews         MRN: [id number]           YOB: 1950     Reason for Consult: Wound        Wound History: Patient stated, \"I have been telling the nurse at the facility where I came from my butt hurt and she said there is nothing wrong with my butt, but I know it hurts\".       Pertinent Labs:   Albumin   Date Value Ref Range Status   07/31/2024 2.9 (L) 3.4 - 5.0 g/dL Final     ALBUMIN (MG/L) IN URINE   Date Value Ref Range Status   05/12/2023 131.3 Not Established mg/L Final     Albumin, Urine Random   Date Value Ref Range Status   11/13/2023 101.2 Not established mg/L Final       Wound Assessment:  Wound 06/04/24 Other (comment) Dorsal foot;Right (Active)       Wound 06/04/24 Other (comment) Left;Dorsal foot (Active)       Wound 06/04/24 Pressure Injury Tibial Dorsal;Left (Active)       Wound 06/04/24 Pressure Injury Sacrum (Active)   Wound Image   07/31/24 1554       Wound 06/04/24 Other (comment) Knee Left;Anterior (Active)   Wound Image   07/31/24 1509       Wound 06/04/24 Pressure Injury Buttocks Left (Active)   Wound Image   07/31/24 0053   Dressing Foam 07/31/24 0800   Dressing Status Clean;Dry 07/31/24 0800       Wound 06/12/24 Pretibial Distal;Left (Active)       Wound 06/12/24 Pretibial Right (Active)       Wound Team Summary Assessment:   Top photo image taken by this author today, 7/31/24, but date did not change.  Patient seen in bed, discouraged and feeling unheard rt buttocks soreness. Pain 8/10.  Zulma CHARLES notified.  Jim HOROWITZ assisted with care and turning. Gurvinder had extra incontinence padding to surround tube, urine leaked, incontinent bed change. Digital image taken and patient shown image to see what she was feeling was valid.    -- Sacral gluteal cleft, dry hyperkeratotic skin.  -- Left of sacrum 0.5 x 1.2 cm DTPI dark maroon, non blanching area.  -- Left AKA not visualized bandage just changed.  -- " Right toes have small scab and pinkness to top of toes.      Wound Team Plan:   -- CAVILON to protect DTPI, Zinc to protect from incontinence and promote local blood flow.  -- Mepilex,  q 2 hour turns and turning wedges to offload pressure.  -- Education rt PI, limit the layers, keeping linens wrinkle free and plan of care.  Message with questions.     Steffanie Starr RN, [facility]  7/31/2024  8:11 PM

## 2024-08-01 NOTE — CARE PLAN
Problem: Pain  Goal: Takes deep breaths with improved pain control throughout the shift  Outcome: Progressing  Goal: Turns in bed with improved pain control throughout the shift  Outcome: Progressing  Goal: Walks with improved pain control throughout the shift  Outcome: Progressing  Goal: Performs ADL's with improved pain control throughout shift  Outcome: Progressing  Goal: Participates in PT with improved pain control throughout the shift  Outcome: Progressing  Goal: Free from opioid side effects throughout the shift  Outcome: Progressing  Goal: Free from acute confusion related to pain meds throughout the shift  Outcome: Progressing     Problem: Pain - Adult  Goal: Verbalizes/displays adequate comfort level or baseline comfort level  Outcome: Progressing     Problem: Safety - Adult  Goal: Free from fall injury  Outcome: Progressing     Problem: Discharge Planning  Goal: Discharge to home or other facility with appropriate resources  Outcome: Progressing     Problem: Chronic Conditions and Co-morbidities  Goal: Patient's chronic conditions and co-morbidity symptoms are monitored and maintained or improved  Outcome: Progressing     Problem: Skin  Goal: Decreased wound size/increased tissue granulation at next dressing change  Outcome: Progressing  Flowsheets (Taken 7/31/2024 2302)  Decreased wound size/increased tissue granulation at next dressing change: Promote sleep for wound healing  Goal: Participates in plan/prevention/treatment measures  Outcome: Progressing  Flowsheets (Taken 7/31/2024 2302)  Participates in plan/prevention/treatment measures: Elevate heels  Goal: Prevent/manage excess moisture  Outcome: Progressing  Flowsheets (Taken 7/31/2024 2302)  Prevent/manage excess moisture:   Moisturize dry skin   Cleanse incontinence/protect with barrier cream  Goal: Prevent/minimize sheer/friction injuries  Outcome: Progressing  Flowsheets (Taken 7/31/2024 2302)  Prevent/minimize sheer/friction injuries:   HOB  30 degrees or less   Turn/reposition every 2 hours/use positioning/transfer devices   Use pull sheet  Goal: Promote/optimize nutrition  Outcome: Progressing  Flowsheets (Taken 7/31/2024 2302)  Promote/optimize nutrition:   Consume > 50% meals/supplements   Assist with feeding  Goal: Promote skin healing  Outcome: Progressing  Flowsheets (Taken 7/31/2024 2302)  Promote skin healing: Turn/reposition every 2 hours/use positioning/transfer devices     Problem: Diabetes  Goal: Achieve decreasing blood glucose levels by end of shift  Outcome: Progressing  Goal: Increase stability of blood glucose readings by end of shift  Outcome: Progressing  Goal: Decrease in ketones present in urine by end of shift  Outcome: Progressing  Goal: Maintain electrolyte levels within acceptable range throughout shift  Outcome: Progressing  Goal: Maintain glucose levels >70mg/dl to <250mg/dl throughout shift  Outcome: Progressing  Goal: No changes in neurological exam by end of shift  Outcome: Progressing  Goal: Learn about and adhere to nutrition recommendations by end of shift  Outcome: Progressing  Goal: Vital signs within normal range for age by end of shift  Outcome: Progressing  Goal: Increase self care and/or family involovement by end of shift  Outcome: Progressing  Goal: Receive DSME education by end of shift  Outcome: Progressing   The patient's goals for the shift include      The clinical goals for the shift include pain control

## 2024-08-01 NOTE — CONSULTS
RPatients name:  Room #  Do you have any home inhalers?    n  Do you get relief when using it?       Spacer education and have them teach back  If using home inhaler do you rinse your mouth?  Any barriers?  When were you diagnosed with COPD? n  Any previous PFTs?   If so, when?   explain the importance of a PFT  Do you have a pulmonary Dr.? n  Name:  Phone number:  Date of last appt:  Pulmonary cards given  Do you currently smoke or vape or have you ever?   n  Quit date or planning to quit?  How long have you smoked for?   PPD?    Smoking education given and class information given   Get orders for nicotine supplement  Do you have a Primary Dr.? Has one at the nursing home does not remember the name  Name:  Phone number:  Date of last appt:  Do you have any home O2 or CPAP/BiPAP?  n  Settings for CPAP/BiPAP:  What company?                How much O2 at home?   Last time 6mwt was done?   Educate on acceptable O2 levels and the importance of monitoring O2 at home. Complete home O2 evaluation if needed.    This RT to see patient for COPD consult. The patient was given a COPD booklet with educational materials regarding

## 2024-08-02 ENCOUNTER — HOSPITAL ENCOUNTER (EMERGENCY)
Facility: HOSPITAL | Age: 74
Discharge: HOME | End: 2024-08-03
Attending: EMERGENCY MEDICINE
Payer: MEDICARE

## 2024-08-02 ENCOUNTER — APPOINTMENT (OUTPATIENT)
Dept: RADIOLOGY | Facility: HOSPITAL | Age: 74
End: 2024-08-02
Payer: MEDICARE

## 2024-08-02 ENCOUNTER — APPOINTMENT (OUTPATIENT)
Dept: CARDIOLOGY | Facility: HOSPITAL | Age: 74
End: 2024-08-02
Payer: MEDICARE

## 2024-08-02 ENCOUNTER — NURSING HOME VISIT (OUTPATIENT)
Dept: POST ACUTE CARE | Facility: EXTERNAL LOCATION | Age: 74
End: 2024-08-02

## 2024-08-02 DIAGNOSIS — N39.0 ACUTE LOWER UTI: ICD-10-CM

## 2024-08-02 DIAGNOSIS — R41.82 ALTERED MENTAL STATUS, UNSPECIFIED ALTERED MENTAL STATUS TYPE: Primary | ICD-10-CM

## 2024-08-02 DIAGNOSIS — R53.1 LEFT-SIDED WEAKNESS: ICD-10-CM

## 2024-08-02 DIAGNOSIS — R53.1 GENERALIZED WEAKNESS: Primary | ICD-10-CM

## 2024-08-02 LAB
ALBUMIN SERPL BCP-MCNC: 3 G/DL (ref 3.4–5)
ALP SERPL-CCNC: 61 U/L (ref 33–136)
ALT SERPL W P-5'-P-CCNC: 8 U/L (ref 7–45)
AMMONIA PLAS-SCNC: 27 UMOL/L (ref 16–53)
ANION GAP BLDV CALCULATED.4IONS-SCNC: 3 MMOL/L (ref 10–25)
ANION GAP SERPL CALC-SCNC: 8 MMOL/L (ref 10–20)
APPEARANCE UR: CLEAR
AST SERPL W P-5'-P-CCNC: 14 U/L (ref 9–39)
BASE EXCESS BLDV CALC-SCNC: 2.3 MMOL/L (ref -2–3)
BASOPHILS # BLD AUTO: 0.05 X10*3/UL (ref 0–0.1)
BASOPHILS NFR BLD AUTO: 0.5 %
BILIRUB DIRECT SERPL-MCNC: 0.1 MG/DL (ref 0–0.3)
BILIRUB SERPL-MCNC: 0.4 MG/DL (ref 0–1.2)
BILIRUB UR STRIP.AUTO-MCNC: NEGATIVE MG/DL
BNP SERPL-MCNC: 488 PG/ML (ref 0–99)
BODY TEMPERATURE: 37 DEGREES CELSIUS
BUN SERPL-MCNC: 29 MG/DL (ref 6–23)
CA-I BLDV-SCNC: 1.34 MMOL/L (ref 1.1–1.33)
CALCIUM SERPL-MCNC: 8.9 MG/DL (ref 8.6–10.3)
CARDIAC TROPONIN I PNL SERPL HS: 46 NG/L (ref 0–13)
CARDIAC TROPONIN I PNL SERPL HS: 46 NG/L (ref 0–13)
CHLORIDE BLDV-SCNC: 104 MMOL/L (ref 98–107)
CHLORIDE SERPL-SCNC: 102 MMOL/L (ref 98–107)
CO2 SERPL-SCNC: 27 MMOL/L (ref 21–32)
COLOR UR: YELLOW
CREAT SERPL-MCNC: 1.42 MG/DL (ref 0.5–1.05)
EGFRCR SERPLBLD CKD-EPI 2021: 39 ML/MIN/1.73M*2
EOSINOPHIL # BLD AUTO: 0.21 X10*3/UL (ref 0–0.4)
EOSINOPHIL NFR BLD AUTO: 2.3 %
ERYTHROCYTE [DISTWIDTH] IN BLOOD BY AUTOMATED COUNT: 16.8 % (ref 11.5–14.5)
GLUCOSE BLDV-MCNC: 215 MG/DL (ref 74–99)
GLUCOSE SERPL-MCNC: 218 MG/DL (ref 74–99)
GLUCOSE UR STRIP.AUTO-MCNC: NORMAL MG/DL
HCO3 BLDV-SCNC: 28.9 MMOL/L (ref 22–26)
HCT VFR BLD AUTO: 26.5 % (ref 36–46)
HCT VFR BLD EST: 24 % (ref 36–46)
HGB BLD-MCNC: 7.9 G/DL (ref 12–16)
HGB BLDV-MCNC: 8.1 G/DL (ref 12–16)
HYALINE CASTS #/AREA URNS AUTO: ABNORMAL /LPF
IMM GRANULOCYTES # BLD AUTO: 0.03 X10*3/UL (ref 0–0.5)
IMM GRANULOCYTES NFR BLD AUTO: 0.3 % (ref 0–0.9)
INHALED O2 CONCENTRATION: 28 %
KETONES UR STRIP.AUTO-MCNC: NEGATIVE MG/DL
LACTATE BLDV-SCNC: 1.1 MMOL/L (ref 0.4–2)
LEUKOCYTE ESTERASE UR QL STRIP.AUTO: ABNORMAL
LYMPHOCYTES # BLD AUTO: 1.41 X10*3/UL (ref 0.8–3)
LYMPHOCYTES NFR BLD AUTO: 15.1 %
MAGNESIUM SERPL-MCNC: 1.9 MG/DL (ref 1.6–2.4)
MCH RBC QN AUTO: 26 PG (ref 26–34)
MCHC RBC AUTO-ENTMCNC: 29.8 G/DL (ref 32–36)
MCV RBC AUTO: 87 FL (ref 80–100)
MONOCYTES # BLD AUTO: 1.08 X10*3/UL (ref 0.05–0.8)
MONOCYTES NFR BLD AUTO: 11.6 %
MUCOUS THREADS #/AREA URNS AUTO: ABNORMAL /LPF
NEUTROPHILS # BLD AUTO: 6.55 X10*3/UL (ref 1.6–5.5)
NEUTROPHILS NFR BLD AUTO: 70.2 %
NITRITE UR QL STRIP.AUTO: ABNORMAL
NRBC BLD-RTO: 0.2 /100 WBCS (ref 0–0)
OXYHGB MFR BLDV: 51.7 % (ref 45–75)
PCO2 BLDV: 56 MM HG (ref 41–51)
PH BLDV: 7.32 PH (ref 7.33–7.43)
PH UR STRIP.AUTO: 6 [PH]
PLATELET # BLD AUTO: 253 X10*3/UL (ref 150–450)
PO2 BLDV: 35 MM HG (ref 35–45)
POTASSIUM BLDV-SCNC: 5.1 MMOL/L (ref 3.5–5.3)
POTASSIUM SERPL-SCNC: 4.9 MMOL/L (ref 3.5–5.3)
PROT SERPL-MCNC: 6.7 G/DL (ref 6.4–8.2)
PROT UR STRIP.AUTO-MCNC: ABNORMAL MG/DL
RBC # BLD AUTO: 3.04 X10*6/UL (ref 4–5.2)
RBC # UR STRIP.AUTO: ABNORMAL /UL
RBC #/AREA URNS AUTO: >20 /HPF
SAO2 % BLDV: 53 % (ref 45–75)
SODIUM BLDV-SCNC: 131 MMOL/L (ref 136–145)
SODIUM SERPL-SCNC: 132 MMOL/L (ref 136–145)
SP GR UR STRIP.AUTO: 1.02
UROBILINOGEN UR STRIP.AUTO-MCNC: NORMAL MG/DL
WBC # BLD AUTO: 9.3 X10*3/UL (ref 4.4–11.3)
WBC #/AREA URNS AUTO: ABNORMAL /HPF
WBC CLUMPS #/AREA URNS AUTO: ABNORMAL /HPF

## 2024-08-02 PROCEDURE — 83735 ASSAY OF MAGNESIUM: CPT | Performed by: EMERGENCY MEDICINE

## 2024-08-02 PROCEDURE — 2500000004 HC RX 250 GENERAL PHARMACY W/ HCPCS (ALT 636 FOR OP/ED): Performed by: EMERGENCY MEDICINE

## 2024-08-02 PROCEDURE — 84132 ASSAY OF SERUM POTASSIUM: CPT | Performed by: EMERGENCY MEDICINE

## 2024-08-02 PROCEDURE — 84132 ASSAY OF SERUM POTASSIUM: CPT | Mod: 59 | Performed by: EMERGENCY MEDICINE

## 2024-08-02 PROCEDURE — 99285 EMERGENCY DEPT VISIT HI MDM: CPT

## 2024-08-02 PROCEDURE — 84075 ASSAY ALKALINE PHOSPHATASE: CPT | Performed by: EMERGENCY MEDICINE

## 2024-08-02 PROCEDURE — 85025 COMPLETE CBC W/AUTO DIFF WBC: CPT | Performed by: EMERGENCY MEDICINE

## 2024-08-02 PROCEDURE — 99310 SBSQ NF CARE HIGH MDM 45: CPT | Performed by: NURSE PRACTITIONER

## 2024-08-02 PROCEDURE — 96365 THER/PROPH/DIAG IV INF INIT: CPT

## 2024-08-02 PROCEDURE — 2500000001 HC RX 250 WO HCPCS SELF ADMINISTERED DRUGS (ALT 637 FOR MEDICARE OP): Performed by: INTERNAL MEDICINE

## 2024-08-02 PROCEDURE — 96361 HYDRATE IV INFUSION ADD-ON: CPT

## 2024-08-02 PROCEDURE — 96366 THER/PROPH/DIAG IV INF ADDON: CPT

## 2024-08-02 PROCEDURE — 81001 URINALYSIS AUTO W/SCOPE: CPT | Performed by: EMERGENCY MEDICINE

## 2024-08-02 PROCEDURE — 36415 COLL VENOUS BLD VENIPUNCTURE: CPT | Performed by: EMERGENCY MEDICINE

## 2024-08-02 PROCEDURE — 87181 SC STD AGAR DILUTION PER AGT: CPT | Mod: PORLAB | Performed by: EMERGENCY MEDICINE

## 2024-08-02 PROCEDURE — 71045 X-RAY EXAM CHEST 1 VIEW: CPT | Performed by: RADIOLOGY

## 2024-08-02 PROCEDURE — 70450 CT HEAD/BRAIN W/O DYE: CPT | Performed by: RADIOLOGY

## 2024-08-02 PROCEDURE — 71045 X-RAY EXAM CHEST 1 VIEW: CPT

## 2024-08-02 PROCEDURE — 84484 ASSAY OF TROPONIN QUANT: CPT | Performed by: EMERGENCY MEDICINE

## 2024-08-02 PROCEDURE — 83880 ASSAY OF NATRIURETIC PEPTIDE: CPT | Performed by: EMERGENCY MEDICINE

## 2024-08-02 PROCEDURE — 82140 ASSAY OF AMMONIA: CPT | Performed by: EMERGENCY MEDICINE

## 2024-08-02 PROCEDURE — 70450 CT HEAD/BRAIN W/O DYE: CPT

## 2024-08-02 PROCEDURE — 93005 ELECTROCARDIOGRAM TRACING: CPT

## 2024-08-02 RX ORDER — AMOXICILLIN AND CLAVULANATE POTASSIUM 875; 125 MG/1; MG/1
1 TABLET, FILM COATED ORAL EVERY 12 HOURS
Qty: 14 TABLET | Refills: 0 | Status: SHIPPED | OUTPATIENT
Start: 2024-08-02 | End: 2024-08-07

## 2024-08-02 RX ORDER — CEFTRIAXONE 2 G/50ML
2 INJECTION, SOLUTION INTRAVENOUS ONCE
Status: COMPLETED | OUTPATIENT
Start: 2024-08-02 | End: 2024-08-03

## 2024-08-02 RX ORDER — CIPROFLOXACIN 2 MG/ML
400 INJECTION, SOLUTION INTRAVENOUS ONCE
Status: DISCONTINUED | OUTPATIENT
Start: 2024-08-02 | End: 2024-08-02

## 2024-08-02 RX ADMIN — SODIUM CHLORIDE, POTASSIUM CHLORIDE, SODIUM LACTATE AND CALCIUM CHLORIDE 500 ML: 600; 310; 30; 20 INJECTION, SOLUTION INTRAVENOUS at 18:40

## 2024-08-02 RX ADMIN — CEFTRIAXONE SODIUM 2 G: 2 INJECTION, SOLUTION INTRAVENOUS at 21:58

## 2024-08-02 ASSESSMENT — PAIN DESCRIPTION - LOCATION
LOCATION: LEG
LOCATION: LEG

## 2024-08-02 ASSESSMENT — PAIN SCALES - GENERAL
PAINLEVEL_OUTOF10: 10 - WORST POSSIBLE PAIN
PAINLEVEL_OUTOF10: 6

## 2024-08-02 ASSESSMENT — LIFESTYLE VARIABLES
TOTAL SCORE: 0
HAVE YOU EVER FELT YOU SHOULD CUT DOWN ON YOUR DRINKING: NO
EVER HAD A DRINK FIRST THING IN THE MORNING TO STEADY YOUR NERVES TO GET RID OF A HANGOVER: NO
HAVE PEOPLE ANNOYED YOU BY CRITICIZING YOUR DRINKING: NO
EVER FELT BAD OR GUILTY ABOUT YOUR DRINKING: NO

## 2024-08-02 ASSESSMENT — PAIN DESCRIPTION - ORIENTATION
ORIENTATION: LEFT
ORIENTATION: LEFT

## 2024-08-02 ASSESSMENT — PAIN DESCRIPTION - PAIN TYPE: TYPE: SURGICAL PAIN

## 2024-08-02 ASSESSMENT — PAIN - FUNCTIONAL ASSESSMENT: PAIN_FUNCTIONAL_ASSESSMENT: 0-10

## 2024-08-02 NOTE — CARE PLAN
The patient's goals for the shift include pain control    The clinical goals for the shift include LLE dressing will remain dry & intact and pt will be transported back to [facility] this shift

## 2024-08-02 NOTE — ED TRIAGE NOTES
Pt presents to the ED for complaint of generalized weakness. Pt is at the [facility] for rehab post left BKA at [facility]. Pt states 10/10 leg pain on the affected side. EMS states that they were called for a possible stroke. EMS states CINCI and VAN negative. EMS was told that patient had slurred speech since approx 1640. Attempt to call SNF for report unsuccessful at this time. Pt placed on NC at 2 LPM with improvement in into the high 90's. Pt alert and oriented x's 4. Dr. Lombardo at bedside to assess pt to R/O activation of stroke alert at this time.

## 2024-08-02 NOTE — LETTER
Patient: Teresa Matthews  : 1950    Encounter Date: 2024    PROGRESS NOTE    Subjective  Chief complaint: Teresa Matthews is a 73 y.o. female who is a long term care patient being seen and evaluated for AMS.    HPI:  HPI  An interactive audio and/or video telecommunication system which permits real time communications between the patient (at the originating site) and provider (at a distant site) was utilized to provide this telehealth service after obtaining verbal consent.  Nursing reporting patient with altered mental status, lethargic with facial droop and left-sided weakness.  Orders are placed to send patient out 911.    Objective  Vital signs: 110/70, 97.9, 91, 18, 94%, blood sugar, 96    Physical Exam  Constitutional:       Appearance: She is ill-appearing.   Pulmonary:      Effort: Pulmonary effort is normal.   Musculoskeletal:      Cervical back: Neck supple.   Neurological:      Mental Status: She is lethargic.      Comments: Facial droop  Left-sided weakness         Assessment/Plan  Problem List Items Addressed This Visit       Altered mental status - Primary     Send out 911         Left-sided weakness     Send to  r/o cva          Medications, treatments, and labs reviewed  Continue medications and treatments as listed in EMR    Scribe Attestation  I, Kirill Page   attest that this documentation has been prepared under the direction and in the presence of MARISELA Nelson    Provider Attestation - Scribe documentation  All medical record entries made by the Scribe were at my direction and personally dictated by me. I have reviewed the chart and agree that the record accurately reflects my personal performance of the history, physical exam, discussion and plan.   MARISELA Nelson            Electronically Signed By: MARISELA Nelson   24  7:28 PM

## 2024-08-02 NOTE — CARE PLAN
The patient's goals for the shift include pain control and transport back to [facility] ([place], [place])    The clinical goals for the shift include LLE dressing will remain dry & intact and pt will be transported back to [facility] ([place], [place]) this shift

## 2024-08-02 NOTE — CARE PLAN
Pt discharged. Left unit via stretcher with [facility]. All belongings sent with pt. Pt en route to [facility].     Nurse to nurse report called at 2100 to accepting facility.     Pt left in stable condition.

## 2024-08-03 VITALS
RESPIRATION RATE: 20 BRPM | WEIGHT: 249.12 LBS | HEART RATE: 100 BPM | DIASTOLIC BLOOD PRESSURE: 98 MMHG | SYSTOLIC BLOOD PRESSURE: 143 MMHG | TEMPERATURE: 97.1 F | BODY MASS INDEX: 33.74 KG/M2 | HEIGHT: 72 IN | OXYGEN SATURATION: 96 %

## 2024-08-03 LAB — HOLD SPECIMEN: NORMAL

## 2024-08-03 ASSESSMENT — PAIN SCALES - GENERAL: PAINLEVEL_OUTOF10: 0 - NO PAIN

## 2024-08-03 NOTE — ED PROVIDER NOTES
HPI   Chief Complaint   Patient presents with    Weakness, Gen       HPI: []  73-year-old white female history of hypertension, A-fib, diabetes, CKD, COPD, diabetic neuropathy, status post left below-knee amputation discharge from hospital yesterday after facility to long-term facility sent to the ER because of confusion and altered mental status possible CVA.  Patient has no complaints.  She thinks her mouth is dry.  No trauma fall fever chills nausea vomit diarrhea cough congestion incontinence seizures syncope or near syncope unfortunately the nursing facility was extremely helpful despite multiple phone calls they were unable to give me good history.  So the history was obtained from the EMS.  Last well-known not available.    Past history: Hypertension, COPD, diabetes, left BKA, PVD, A-fib  Social: Ex tobacco use denies current tobacco alcohol drug abuse.  REVIEW OF SYSTEMS:    GENERAL.: No weight loss, fatigue, anorexia, insomnia, fever.  Positive weakness    EYES: No vision loss, double vision, drainage, eye pain.    ENT: No pharyngitis, dry mouth.    CARDIOPULMONARY: No chest pain, palpitations, syncope, near syncope. No shortness of breath, cough, hemoptysis.    GI: No abdominal pain, change in bowel habits, melena, hematemesis, hematochezia, nausea, vomiting, diarrhea.    : No discharge, dysuria, frequency, urgency, hematuria.    MS: No limb pain, joint pain, joint swelling.    SKIN: No rashes.    PSYCH: No depression, anxiety, suicidality, homicidality.    Review of systems is otherwise negative unless stated above or in history of present illness.  Social history, family history, allergies reviewed.  PHYSICAL EXAM:    GENERAL: Vitals noted, no distress. Alert and oriented  x 3. Non-toxic.  Chronically ill-appearing    EENT: TMs clear. Posterior oropharynx unremarkable. No meningismus. No LAD.     NECK: Supple. Nontender. No midline tenderness.     CARDIAC: Irregularly irregular, rate, rhythm. No  murmurs rubs or gallops. No JVD    PULMONARY: Lungs clear bilaterally with good aeration. No wheezes rales or rhonchi. No respiratory distress.  Fine bibasilar crackles no tachypnea stridor or retractions    ABDOMEN: Soft, nonsurgical. Nontender. No peritoneal signs. Normoactive bowel sounds. No pulsatile masses.     EXTREMITIES: Right lower extremity has no peripheral edema. Negative Homans bilaterally, no cords.  2+ bounding pulses well-perfused.  Left lower extremity status post BKA I did not take the dressing off because she is fresh postop.    SKIN: No rash. Intact.     NEURO: No focal neurologic deficits, NIH score of 0. Cranial nerves normal as tested from II through XII.  Patient's NIH score is 0.    MEDICAL DECISION MAKING:  EKG on my interpretation shows atrial fibrillation normal axis rate mid 80s with no acute ischemic change.  CBC with shows no leukocytosis, stable hemoglobin, chemistry shows stable CKD, troponin x 2 remains flat in the 20s, BNP about 400, chest ray shows mild congestion, UA shows UTI, CT head negative, venous gas shows no significant or critical hypercapnia.  Lactate normal.    Treatment: IV established placed on a cardiac monitor given gentle hydration and IV rocephin    ED course: Patient remained stable hemodynamic.  Stroke remains 0.    Impression: #1 generalized weakness, #2 urinary infection complicated    Plan/MDM: 71-year-old female status post left BKA discharged on hospice yesterday history of A-fib hypertension diabetes COPD DNR CC comes with generalized weakness currently is stable hemodynamic.  Stroke scale 0 low concern for stroke TIA STEMI and STEMI sepsis septic shock, will be discharged back to the long-term facility with oral antibiotics, close outpatient follow-up with strict return precaution.              Patient History   Past Medical History:   Diagnosis Date    Anemia     Anxiety     Atrial fibrillation     s/p cardioversion 7/5/22    Bacteremia     March 2024:  group A strep bacteremia, right lower extremity cellulitis, ELISEO ruled out vegetative endocarditis    Charcot's joint of left foot     CHF (congestive heart failure)     Chronic diastolic    Chronic anticoagulation     ELIQUIS    Chronic insomnia 04/10/2023    Cirrhosis of liver (Multi)     Liver bx 11/7/2019; cirrhotic nodules and mild macrosteatosis    CKD (chronic kidney disease), stage III     Complication of internal knee prosthesis (CMS-HCC)     B/L TKR have been revised and patella resurfaced due to chronic pain. L knee has been complicated by infection, prox tib/fib fracture, distal femur fx, L TKR explanted and has ABX spacer ([id number])    Depression, major, in remission (CMS-HCC) 04/10/2023    Diabetes mellitus, type 2     Insulin-dependent    Endocarditis     Heart valve problem     Moderate mitral annular calcification, Mild aortic valve regurgitation; per 3/28/24 ELISEO    HLD (hyperlipidemia)     HTN (hypertension)     Hx of osteomyelitis     s/p L Hallux amputation x2    Hypothyroid     Obesity     s/p RnY 11/7/2019    ELIZABETH (obstructive sleep apnea)     Osteomyelitis, unspecified     Left lower extremity    Papillary microcarcinoma of thyroid (Multi)     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/2023    E Coli of Left knee prosthetic joint    Wheelchair dependent     And uses [place] lift (explanted L TKR w/ ABX spacer w/ chronic subacute L femur/tib/fib fractures)     Past Surgical History:   Procedure Laterality Date    CARDIOVERSION  07/05/2022    A FIB    COLONOSCOPY      Internal hemorrhoids    CYSTOSCOPY W/ LASER LITHOTRIPSY Right 05/19/2020    With ureter stent    ESOPHAGOSCOPY / EGD      Small hiatal hernia    GASTRIC BYPASS  11/07/2019    Bruno-en-Y with adhesion lysis    HYSTERECTOMY      KNEE SURGERY Bilateral 2009    B/L TKR revisions: R patella resurfacing (9/2006), R TKR revision (3/2009), L TKR revision w/ patellar  resurfacing (10/14/2009)    LIVER BIOPSY  11/07/2019    Pathology; cirrhotic nodules and mild macro steatosis    REMOVAL PROSTHESIS TOTAL KNEE W/ OR W/O INSERTION SPACER Left 02/28/2022    Left knee explantation with antibiotic spacer    TOE SURGERY Left 08/09/2020    Left Hallux amputation 2/2 osteomyelitis    TOE SURGERY Left 11/11/2021    Amputation left Hallux, hemiphalangectomy proximal phalanx, resection tibial fibular sesamoid, excisional debridement    TONSILLECTOMY      TOTAL KNEE ARTHROPLASTY Bilateral 2004    Left 3/2004, Right 4/2004    TOTAL THYROIDECTOMY  06/22/2021    Pathology: Papillary micro carcinoma, chronic lymphocytic thyroiditis, oncocytic follicular neoplasm    TUBAL LIGATION      WOUND DEBRIDEMENT  09/14/2022    Excisional debridement of Sacral wound     Family History   Problem Relation Name Age of Onset    Coronary artery disease Mother      Liver disease Mother      Other (non-hodgkins lymphoma) Mother      Stroke Father      Deafness Father      Hypertension Father       Social History     Tobacco Use    Smoking status: Never    Smokeless tobacco: Never   Vaping Use    Vaping status: Never Used   Substance Use Topics    Alcohol use: Never    Drug use: Never       Physical Exam   ED Triage Vitals [08/02/24 1710]   Temperature Heart Rate Respirations BP   36.2 °C (97.1 °F) (!) 102 18 93/83      Pulse Ox Temp src Heart Rate Source Patient Position   (!) 87 % -- -- --      BP Location FiO2 (%)     -- --       Physical Exam      ED Course & [facility]   ED Course as of 08/02/24 2132   Fri Aug 02, 2024   2126 Patient CBC with differential shows no leukocytosis stable hemoglobin 7.9, chemistries show stable CKD, lactate is normal, UA shows UTI, CT head negative, chest ray shows mild congestion, patient was given IV Cipro will be discharged home with oral antibiotics back to her extended care facility currently low concern for stroke TIA sepsis or septic shock. [MT]      ED Course User Index  [MT]  Behzad Lombardo MD         Diagnoses as of 08/02/24 2132   Generalized weakness   Acute lower UTI                       [place] Coma Scale Score: 15                        Medical Decision Making      Procedure  Procedures     Behzad Lombardo MD  08/02/24 2134

## 2024-08-03 NOTE — ED NOTES
Yes and st cath for ua plz  Call Sandra with updates plz :[phone]     Luis Armando Alvarez RN  08/02/24 9150

## 2024-08-04 LAB — BACTERIA UR CULT: ABNORMAL

## 2024-08-05 ENCOUNTER — NURSING HOME VISIT (OUTPATIENT)
Dept: POST ACUTE CARE | Facility: EXTERNAL LOCATION | Age: 74
End: 2024-08-05
Payer: MEDICARE

## 2024-08-05 ENCOUNTER — TELEPHONE (OUTPATIENT)
Dept: CARDIOLOGY | Facility: HOSPITAL | Age: 74
End: 2024-08-05

## 2024-08-05 DIAGNOSIS — N39.0 URINARY TRACT INFECTION WITHOUT HEMATURIA, SITE UNSPECIFIED: Primary | ICD-10-CM

## 2024-08-05 DIAGNOSIS — I50.32 HYPERTENSIVE HEART DISEASE WITH CHRONIC DIASTOLIC CONGESTIVE HEART FAILURE (MULTI): ICD-10-CM

## 2024-08-05 DIAGNOSIS — E89.0 HYPOTHYROIDISM, POSTSURGICAL: ICD-10-CM

## 2024-08-05 DIAGNOSIS — Z79.4 TYPE 2 DIABETES MELLITUS WITH HYPERGLYCEMIA, WITH LONG-TERM CURRENT USE OF INSULIN (MULTI): ICD-10-CM

## 2024-08-05 DIAGNOSIS — J44.9 CHRONIC OBSTRUCTIVE PULMONARY DISEASE, UNSPECIFIED COPD TYPE (MULTI): ICD-10-CM

## 2024-08-05 DIAGNOSIS — I48.91 ATRIAL FIBRILLATION, UNSPECIFIED TYPE (MULTI): ICD-10-CM

## 2024-08-05 DIAGNOSIS — E11.65 TYPE 2 DIABETES MELLITUS WITH HYPERGLYCEMIA, WITH LONG-TERM CURRENT USE OF INSULIN (MULTI): ICD-10-CM

## 2024-08-05 DIAGNOSIS — F32.A DEPRESSION, UNSPECIFIED DEPRESSION TYPE: ICD-10-CM

## 2024-08-05 DIAGNOSIS — I11.0 HYPERTENSIVE HEART DISEASE WITH CHRONIC DIASTOLIC CONGESTIVE HEART FAILURE (MULTI): ICD-10-CM

## 2024-08-05 DIAGNOSIS — K21.9 GASTROESOPHAGEAL REFLUX DISEASE WITHOUT ESOPHAGITIS: ICD-10-CM

## 2024-08-05 PROCEDURE — 99305 1ST NF CARE MODERATE MDM 35: CPT | Performed by: INTERNAL MEDICINE

## 2024-08-05 NOTE — PROGRESS NOTES
HISTORY & PHYSICAL    Subjective   Chief complaint: Teresa Matthews is a 73 y.o. female who is being seen and evaluated for multiple medical problems.  Patient is readmitted to nursing facility after recent hospitalization.    HPI:  HPI  Patient is a 73-year-old female who presented to hospital from nursing facility due to confusion and altered mental status.  Patient's workup in the ED did reveal a UA that was positive for UTI and patient started on antibiotics.  Otherwise patient's imaging was negative for acute stroke or process.  Patient was hemodynamically stable to discharge back to nursing facility for continued medical management.  Patient was seen and examined at the bedside, appears to be in no acute distress.  Nursing staff voicing no new concerns.  Patient is afebrile.    Past Medical History:   Diagnosis Date    Anemia     Anxiety     Atrial fibrillation     s/p cardioversion 7/5/22    Bacteremia     March 2024: group A strep bacteremia, right lower extremity cellulitis, ELISEO ruled out vegetative endocarditis    Charcot's joint of left foot     CHF (congestive heart failure)     Chronic diastolic    Chronic anticoagulation     ELIQUIS    Chronic insomnia 04/10/2023    Cirrhosis of liver (Multi)     Liver bx 11/7/2019; cirrhotic nodules and mild macrosteatosis    CKD (chronic kidney disease), stage III     Complication of internal knee prosthesis (CMS-HCC)     B/L TKR have been revised and patella resurfaced due to chronic pain. L knee has been complicated by infection, prox tib/fib fracture, distal femur fx, L TKR explanted and has ABX spacer ([id number])    Depression, major, in remission (CMS-HCC) 04/10/2023    Diabetes mellitus, type 2     Insulin-dependent    Endocarditis     Heart valve problem     Moderate mitral annular calcification, Mild aortic valve regurgitation; per 3/28/24 ELISEO    HLD (hyperlipidemia)     HTN (hypertension)     Hx of osteomyelitis     s/p L Hallux amputation x2     Hypothyroid     Obesity     s/p RnY 11/7/2019    ELIZABETH (obstructive sleep apnea)     Osteomyelitis, unspecified     Left lower extremity    Papillary microcarcinoma of thyroid (Multi)     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/2023    E Coli of Left knee prosthetic joint    Urinary tract infection     Wheelchair dependent     And uses [place] lift (explanted L TKR w/ ABX spacer w/ chronic subacute L femur/tib/fib fractures)       Past Surgical History:   Procedure Laterality Date    CARDIOVERSION  07/05/2022    A FIB    COLONOSCOPY      Internal hemorrhoids    CYSTOSCOPY W/ LASER LITHOTRIPSY Right 05/19/2020    With ureter stent    ESOPHAGOSCOPY / EGD      Small hiatal hernia    GASTRIC BYPASS  11/07/2019    Bruno-en-Y with adhesion lysis    HYSTERECTOMY      KNEE SURGERY Bilateral 2009    B/L TKR revisions: R patella resurfacing (9/2006), R TKR revision (3/2009), L TKR revision w/ patellar resurfacing (10/14/2009)    LIVER BIOPSY  11/07/2019    Pathology; cirrhotic nodules and mild macro steatosis    REMOVAL PROSTHESIS TOTAL KNEE W/ OR W/O INSERTION SPACER Left 02/28/2022    Left knee explantation with antibiotic spacer    TOE SURGERY Left 08/09/2020    Left Hallux amputation 2/2 osteomyelitis    TOE SURGERY Left 11/11/2021    Amputation left Hallux, hemiphalangectomy proximal phalanx, resection tibial fibular sesamoid, excisional debridement    TONSILLECTOMY      TOTAL KNEE ARTHROPLASTY Bilateral 2004    Left 3/2004, Right 4/2004    TOTAL THYROIDECTOMY  06/22/2021    Pathology: Papillary micro carcinoma, chronic lymphocytic thyroiditis, oncocytic follicular neoplasm    TUBAL LIGATION      WOUND DEBRIDEMENT  09/14/2022    Excisional debridement of Sacral wound       Family History   Problem Relation Name Age of Onset    Coronary artery disease Mother      Liver disease Mother      Other (non-hodgkins lymphoma) Mother      Stroke Father       Deafness Father      Hypertension Father         Social History     Socioeconomic History    Marital status: Single   Tobacco Use    Smoking status: Never    Smokeless tobacco: Never   Vaping Use    Vaping status: Never Used   Substance and Sexual Activity    Alcohol use: Never    Drug use: Never    Sexual activity: Defer     Social Determinants of Health     Financial Resource Strain: Low Risk  (7/30/2024)    Overall Financial Resource Strain (CARDIA)     Difficulty of Paying Living Expenses: Not hard at all   Food Insecurity: No Food Insecurity (6/5/2024)    Hunger Vital Sign     Worried About Running Out of Food in the Last Year: Never true     Ran Out of Food in the Last Year: Never true   Transportation Needs: No Transportation Needs (7/30/2024)    PRAPARE - Transportation     Lack of Transportation (Medical): No     Lack of Transportation (Non-Medical): No   Stress: Stress Concern Present (6/5/2024)    [place] [employer] - Occupational Stress Questionnaire     Feeling of Stress : To some extent   Social Connections: Unknown (6/5/2024)    Social Connection and Isolation Panel [NHANES]     Frequency of Communication with Friends and Family: More than three times a week     Frequency of Social Gatherings with Friends and Family: Never     Attends Restoration Services: Never     Active Member of Clubs or Organizations: Yes   Intimate Partner Violence: Not At Risk (6/5/2024)    Humiliation, Afraid, Rape, and Kick questionnaire     Fear of Current or Ex-Partner: No     Emotionally Abused: No     Physically Abused: No     Sexually Abused: No   Housing Stability: Low Risk  (7/30/2024)    Housing Stability Vital Sign     Unable to Pay for Housing in the Last Year: No     Number of Times Moved in the Last Year: 1     Homeless in the Last Year: No       Vital signs: 110/70, 97.9, 91, 18, blood sugar, 96, 94%    Objective   Physical Exam  Constitutional:       General: She is not in acute  distress.  Eyes:      Extraocular Movements: Extraocular movements intact.   Cardiovascular:      Rate and Rhythm: Normal rate and regular rhythm.   Pulmonary:      Effort: Pulmonary effort is normal.      Breath sounds: Normal breath sounds.   Abdominal:      General: Bowel sounds are normal.      Palpations: Abdomen is soft.      Tenderness: There is no abdominal tenderness.   Musculoskeletal:      Cervical back: Neck supple.      Right lower leg: No edema.      Left lower leg: No edema.   Neurological:      Mental Status: She is alert.   Psychiatric:         Mood and Affect: Mood normal.         Behavior: Behavior is cooperative.         Assessment/Plan   Problem List Items Addressed This Visit       Type 2 diabetes mellitus with hyperglycemia, with long-term current use of insulin (Multi)     Fasting blood glucose at goal  Continue insulin  Glucoscan with sliding scale insulin coverage  Low concentrated sweets diet  Monitor fasting blood sugar         Atrial fibrillation (Multi)     Amiodarone  Apixaban  Bleeding precautions  Monitor heart rate         GERD (gastroesophageal reflux disease)     Monitor GI symptoms  PPIs         Hypertensive heart disease with chronic diastolic congestive heart failure (Multi)     CHF stable, no sob  Monitor blood pressure  Toprol-XL  Spironolactone  Losartan potassium         Hypothyroidism, postsurgical     Monitor TSH  Levothyroxine         Chronic obstructive pulmonary disease, unspecified COPD type (Multi)     Stable, no wheezing or shortness of breath  On room air         Depression     Monitor mood and behaviors  Bupropion  Desvenlafaxine         UTI (urinary tract infection) - Primary     Continue antibiotic until complete, 8/9/2024          Hospital records reviewed  Medications, treatments, and labs reviewed  Continue medications and treatments as listed in EMR  Discussed with nursing and therapy      Scribe Attestation  I, Kirill Page   attest that this  documentation has been prepared under the direction and in the presence of Pita Virgen MD    Provider Attestation - Scribe documentation  All medical record entries made by the Scribe were at my direction and personally dictated by me. I have reviewed the chart and agree that the record accurately reflects my personal performance of the history, physical exam, discussion and plan.   Pita Virgen MD

## 2024-08-05 NOTE — LETTER
Patient: Teresa Matthews  : 1950    Encounter Date: 2024    HISTORY & PHYSICAL    Subjective  Chief complaint: Teresa Matthews is a 73 y.o. female who is being seen and evaluated for multiple medical problems.  Patient is readmitted to nursing facility after recent hospitalization.    HPI:  HPI  Patient is a 73-year-old female who presented to hospital from nursing facility due to confusion and altered mental status.  Patient's workup in the ED did reveal a UA that was positive for UTI and patient started on antibiotics.  Otherwise patient's imaging was negative for acute stroke or process.  Patient was hemodynamically stable to discharge back to nursing facility for continued medical management.  Patient was seen and examined at the bedside, appears to be in no acute distress.  Nursing staff voicing no new concerns.  Patient is afebrile.    Past Medical History:   Diagnosis Date   • Anemia    • Anxiety    • Atrial fibrillation     s/p cardioversion 22   • Bacteremia     2024: group A strep bacteremia, right lower extremity cellulitis, ELISEO ruled out vegetative endocarditis   • Charcot's joint of left foot    • CHF (congestive heart failure)     Chronic diastolic   • Chronic anticoagulation     ELIQUIS   • Chronic insomnia 04/10/2023   • Cirrhosis of liver (Multi)     Liver bx 2019; cirrhotic nodules and mild macrosteatosis   • CKD (chronic kidney disease), stage III    • Complication of internal knee prosthesis (CMS-HCC)     B/L TKR have been revised and patella resurfaced due to chronic pain. L knee has been complicated by infection, prox tib/fib fracture, distal femur fx, L TKR explanted and has ABX spacer ([id number])   • Depression, major, in remission (CMS-[facility]) 04/10/2023   • Diabetes mellitus, type 2     Insulin-dependent   • Endocarditis    • Heart valve problem     Moderate mitral annular calcification, Mild aortic valve regurgitation; per 3/28/24 ELISEO   • HLD  (hyperlipidemia)    • HTN (hypertension)    • Hx of osteomyelitis     s/p L Hallux amputation x2   • Hypothyroid    • Obesity     s/p RnY 11/7/2019   • ELIZABETH (obstructive sleep apnea)    • Osteomyelitis, unspecified     Left lower extremity   • Papillary microcarcinoma of thyroid (Multi)     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   • 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/2023    E Coli of Left knee prosthetic joint   • Urinary tract infection    • Wheelchair dependent     And uses [place] lift (explanted L TKR w/ ABX spacer w/ chronic subacute L femur/tib/fib fractures)       Past Surgical History:   Procedure Laterality Date   • CARDIOVERSION  07/05/2022    A FIB   • COLONOSCOPY      Internal hemorrhoids   • CYSTOSCOPY W/ LASER LITHOTRIPSY Right 05/19/2020    With ureter stent   • ESOPHAGOSCOPY / EGD      Small hiatal hernia   • GASTRIC BYPASS  11/07/2019    Bruno-en-Y with adhesion lysis   • HYSTERECTOMY     • KNEE SURGERY Bilateral 2009    B/L TKR revisions: R patella resurfacing (9/2006), R TKR revision (3/2009), L TKR revision w/ patellar resurfacing (10/14/2009)   • LIVER BIOPSY  11/07/2019    Pathology; cirrhotic nodules and mild macro steatosis   • REMOVAL PROSTHESIS TOTAL KNEE W/ OR W/O INSERTION SPACER Left 02/28/2022    Left knee explantation with antibiotic spacer   • TOE SURGERY Left 08/09/2020    Left Hallux amputation 2/2 osteomyelitis   • TOE SURGERY Left 11/11/2021    Amputation left Hallux, hemiphalangectomy proximal phalanx, resection tibial fibular sesamoid, excisional debridement   • TONSILLECTOMY     • TOTAL KNEE ARTHROPLASTY Bilateral 2004    Left 3/2004, Right 4/2004   • TOTAL THYROIDECTOMY  06/22/2021    Pathology: Papillary micro carcinoma, chronic lymphocytic thyroiditis, oncocytic follicular neoplasm   • TUBAL LIGATION     • WOUND DEBRIDEMENT  09/14/2022    Excisional debridement of Sacral wound       Family History   Problem Relation Name Age of Onset   •  Coronary artery disease Mother     • Liver disease Mother     • Other (non-hodgkins lymphoma) Mother     • Stroke Father     • Deafness Father     • Hypertension Father         Social History     Socioeconomic History   • Marital status: Single   Tobacco Use   • Smoking status: Never   • Smokeless tobacco: Never   Vaping Use   • Vaping status: Never Used   Substance and Sexual Activity   • Alcohol use: Never   • Drug use: Never   • Sexual activity: Defer     Social Determinants of Health     Financial Resource Strain: Low Risk  (7/30/2024)    Overall Financial Resource Strain (CARDIA)    • Difficulty of Paying Living Expenses: Not hard at all   Food Insecurity: No Food Insecurity (6/5/2024)    Hunger Vital Sign    • Worried About Running Out of Food in the Last Year: Never true    • Ran Out of Food in the Last Year: Never true   Transportation Needs: No Transportation Needs (7/30/2024)    PRAPARE - Transportation    • Lack of Transportation (Medical): No    • Lack of Transportation (Non-Medical): No   Stress: Stress Concern Present (6/5/2024)    [place] [employer] - Occupational Stress Questionnaire    • Feeling of Stress : To some extent   Social Connections: Unknown (6/5/2024)    Social Connection and Isolation Panel [NHANES]    • Frequency of Communication with Friends and Family: More than three times a week    • Frequency of Social Gatherings with Friends and Family: Never    • Attends Scientologist Services: Never    • Active Member of Clubs or Organizations: Yes   Intimate Partner Violence: Not At Risk (6/5/2024)    Humiliation, Afraid, Rape, and Kick questionnaire    • Fear of Current or Ex-Partner: No    • Emotionally Abused: No    • Physically Abused: No    • Sexually Abused: No   Housing Stability: Low Risk  (7/30/2024)    Housing Stability Vital Sign    • Unable to Pay for Housing in the Last Year: No    • Number of Times Moved in the Last Year: 1    • Homeless in the Last Year: No        Vital signs: 110/70, 97.9, 91, 18, blood sugar, 96, 94%    Objective  Physical Exam  Constitutional:       General: She is not in acute distress.  Eyes:      Extraocular Movements: Extraocular movements intact.   Cardiovascular:      Rate and Rhythm: Normal rate and regular rhythm.   Pulmonary:      Effort: Pulmonary effort is normal.      Breath sounds: Normal breath sounds.   Abdominal:      General: Bowel sounds are normal.      Palpations: Abdomen is soft.      Tenderness: There is no abdominal tenderness.   Musculoskeletal:      Cervical back: Neck supple.      Right lower leg: No edema.      Left lower leg: No edema.   Neurological:      Mental Status: She is alert.   Psychiatric:         Mood and Affect: Mood normal.         Behavior: Behavior is cooperative.         Assessment/Plan  Problem List Items Addressed This Visit       Type 2 diabetes mellitus with hyperglycemia, with long-term current use of insulin (Multi)     Fasting blood glucose at goal  Continue insulin  Glucoscan with sliding scale insulin coverage  Low concentrated sweets diet  Monitor fasting blood sugar         Atrial fibrillation (Multi)     Amiodarone  Apixaban  Bleeding precautions  Monitor heart rate         GERD (gastroesophageal reflux disease)     Monitor GI symptoms  PPIs         Hypertensive heart disease with chronic diastolic congestive heart failure (Multi)     CHF stable, no sob  Monitor blood pressure  Toprol-XL  Spironolactone  Losartan potassium         Hypothyroidism, postsurgical     Monitor TSH  Levothyroxine         Chronic obstructive pulmonary disease, unspecified COPD type (Multi)     Stable, no wheezing or shortness of breath  On room air         Depression     Monitor mood and behaviors  Bupropion  Desvenlafaxine         UTI (urinary tract infection) - Primary     Continue antibiotic until complete, 8/9/2024          Hospital records reviewed  Medications, treatments, and labs reviewed  Continue medications  and treatments as listed in EMR  Discussed with nursing and therapy      Scribe Attestation  I, Kirill Page   attest that this documentation has been prepared under the direction and in the presence of Pita Virgen MD    Provider Attestation - Scribe documentation  All medical record entries made by the Scribe were at my direction and personally dictated by me. I have reviewed the chart and agree that the record accurately reflects my personal performance of the history, physical exam, discussion and plan.   Pita Virgen MD      Electronically Signed By: Pita Virgen MD   8/5/24  4:00 PM

## 2024-08-06 LAB — BACTERIA UR CULT: ABNORMAL

## 2024-08-07 ENCOUNTER — TELEPHONE (OUTPATIENT)
Dept: PRIMARY CARE | Facility: CLINIC | Age: 74
End: 2024-08-07
Payer: MEDICARE

## 2024-08-09 LAB
ATRIAL RATE: 0 BPM
PR INTERVAL: 184 MS
Q ONSET: 253 MS
QRS COUNT: 15 BEATS
QRS DURATION: 99 MS
QT INTERVAL: 454 MS
QTC CALCULATION(BAZETT): 568 MS
QTC FREDERICIA: 527 MS
R AXIS: 74 DEGREES
T OFFSET: 480 MS
VENTRICULAR RATE: 94 BPM

## 2024-08-12 PROBLEM — R41.82 ALTERED MENTAL STATUS: Status: ACTIVE | Noted: 2024-08-12

## 2024-08-12 PROBLEM — R53.1 LEFT-SIDED WEAKNESS: Status: ACTIVE | Noted: 2024-08-12

## 2024-08-12 NOTE — PROGRESS NOTES
PROGRESS NOTE    Subjective   Chief complaint: Teresa Matthews is a 73 y.o. female who is a long term care patient being seen and evaluated for AMS.    HPI:  HPI  An interactive audio and/or video telecommunication system which permits real time communications between the patient (at the originating site) and provider (at a distant site) was utilized to provide this telehealth service after obtaining verbal consent.  Nursing reporting patient with altered mental status, lethargic with facial droop and left-sided weakness.  Orders are placed to send patient out 911.    Objective   Vital signs: 110/70, 97.9, 91, 18, 94%, blood sugar, 96    Physical Exam  Constitutional:       Appearance: She is ill-appearing.   Pulmonary:      Effort: Pulmonary effort is normal.   Musculoskeletal:      Cervical back: Neck supple.   Neurological:      Mental Status: She is lethargic.      Comments: Facial droop  Left-sided weakness         Assessment/Plan   Problem List Items Addressed This Visit       Altered mental status - Primary     Send out 911         Left-sided weakness     Send to  r/o cva          Medications, treatments, and labs reviewed  Continue medications and treatments as listed in EMR    Scribe Attestation  I, Kirill Page   attest that this documentation has been prepared under the direction and in the presence of MARISELA Nelson    Provider Attestation - Scribe documentation  All medical record entries made by the Scribe were at my direction and personally dictated by me. I have reviewed the chart and agree that the record accurately reflects my personal performance of the history, physical exam, discussion and plan.   MARISELA Nelson

## 2024-08-14 ENCOUNTER — APPOINTMENT (OUTPATIENT)
Dept: PRIMARY CARE | Facility: CLINIC | Age: 74
End: 2024-08-14
Payer: MEDICARE

## 2024-08-22 ENCOUNTER — APPOINTMENT (OUTPATIENT)
Dept: VASCULAR SURGERY | Facility: CLINIC | Age: 74
End: 2024-08-22
Payer: MEDICARE

## 2024-08-26 LAB
LABORATORY COMMENT REPORT: NORMAL
PATH REPORT.FINAL DX SPEC: NORMAL
PATH REPORT.GROSS SPEC: NORMAL
PATH REPORT.RELEVANT HX SPEC: NORMAL
PATH REPORT.TOTAL CANCER: NORMAL

## 2024-09-04 ENCOUNTER — APPOINTMENT (OUTPATIENT)
Dept: VASCULAR SURGERY | Facility: HOSPITAL | Age: 74
End: 2024-09-04
Payer: MEDICARE

## 2025-06-02 NOTE — LETTER
Patient: Teresa Matthews  : 1950    Encounter Date: 2024    PROGRESS NOTE    Subjective  Chief complaint: Teresa Matthews is a 73 y.o. female who is an acute skilled patient being seen and evaluated for weakness    HPI:  HPI  Therapy has evaluated patient and established plan of care. Patient working towards goals. Patient was seen and examined at the bedside. Denies constitutional symptoms.     Objective  Vital signs: 141/82, 98.0, 59, 18    Physical Exam  Constitutional:       General: She is not in acute distress.  Eyes:      Extraocular Movements: Extraocular movements intact.   Cardiovascular:      Rate and Rhythm: Normal rate and regular rhythm.   Pulmonary:      Effort: Pulmonary effort is normal.      Breath sounds: Normal breath sounds.   Abdominal:      General: Bowel sounds are normal.      Palpations: Abdomen is soft.   Musculoskeletal:      Cervical back: Neck supple.      Right lower leg: No edema.      Left lower leg: No edema.   Skin:     Comments: Laceration to FH with suture intact, epithelialized    Neurological:      Mental Status: She is alert.      Motor: Weakness present.   Psychiatric:         Mood and Affect: Mood normal.         Behavior: Behavior is cooperative.         Assessment/Plan  Problem List Items Addressed This Visit       Chronic obstructive pulmonary disease, unspecified COPD type (Multi)     Stable, no wheezing or shortness of breath  On room air         Forehead laceration, subsequent encounter     I removed 2 sutures from forehead using forceps and scissors.  Patient tolerated procedure with no discomfort throughout or after.         Hypertensive heart disease with chronic diastolic congestive heart failure (Multi)     CHF stable, no sob  Monitor blood pressure  Toprol-XL  Spironolactone  Losartan potassium         Weakness - Primary     Continue working in therapy          Medications, treatments, and labs reviewed  Continue medications and treatments  Writer called patient and relayed information.Pt denies any side effects and agrees to increase to 500 mg nighty. Writer called Kate to relay the same, will give pt two Depakote  mg tabs at night. EMR updated with new dosing.     Kate would like to add that Lithium gives pt heartburn and she has to take tums or drink a glass of milk. Kate states this has been going on for a while and does worry about the wear on pt's esophagus.      as listed in EMR      Scribe Attestation  I, Kirill Page   attest that this documentation has been prepared under the direction and in the presence of MARISELA Nelson    Provider Attestation - Scribe documentation  All medical record entries made by the Scribe were at my direction and personally dictated by me. I have reviewed the chart and agree that the record accurately reflects my personal performance of the history, physical exam, discussion and plan.   MARISELA Nelson        Electronically Signed By: MARISELA Nelson   4/16/24  5:00 PM

## (undated) DEVICE — Device

## (undated) DEVICE — APPLICATOR, CHLORAPREP, W/ORANGE TINT, 26ML

## (undated) DEVICE — BANDAGE, ELASTIC, 4 IN X 11 YDS, STERILE, LF

## (undated) DEVICE — SPLINT SYSTEM, ORTHO GLASS, 4 IN X 15 FT, SYNTHETIC

## (undated) DEVICE — DRESSING, GAUZE, PETROLATUM, PATCH, XEROFORM, 1 X 8 IN, STERILE

## (undated) DEVICE — SUTURE, VICRYL, 0, 18 IN, MO-4, VIOLET

## (undated) DEVICE — BLADE, OSCILLATOR 32 X 64 X 0.8MM

## (undated) DEVICE — SPONGE, LAP, XRAY DECT, 18IN X 18IN, W/MASTER DMT, STERILE

## (undated) DEVICE — BANDAGE, GAUZE, CONFORMING, KERLIX, 6 PLY, 4.5 IN X 4.1 YD

## (undated) DEVICE — DRESSING, GAUZE, SPONGE, KERLIX, SUPER, 6 X 6.75 IN, STERILE 10PK

## (undated) DEVICE — TIP, SUCTION, YANKAUER, BULB, ADULT

## (undated) DEVICE — SUTURE, VICRYL, 2-0, 54 IN, TIE, VIOLET

## (undated) DEVICE — TUBING, SUCTION, CONNECTING, NON-CONDUCTIVE, SURE GRIP CONNECTORS, 3/16 IN X 10 FT

## (undated) DEVICE — STAPLER, SKIN, PLUS, WIDE, 35

## (undated) DEVICE — SUTURE, VICRYL, 2-0, 18 IN, CT-1, UNDYED

## (undated) DEVICE — BLADE, OSCILLATOR 19.5 X 86 X 1.27MM

## (undated) DEVICE — PADDING, WEBRIL, UNDERCAST, STERILE, 4 IN

## (undated) DEVICE — SUTURE, SILK, 2-0, TIES, 12-30 IN, BLACK

## (undated) DEVICE — BANDAGE, COFLEX, 4 X 5 YDS, TAN, STERILE, LF

## (undated) DEVICE — COUNTER, NEEDLE, FOAM STRIP, DOUBLE, W/BLADEGUARD, 30 COUNT

## (undated) DEVICE — SOLUTION, IRRIGATION, SODIUM CHLORIDE 0.9%, 1000 ML, POUR BOTTLE

## (undated) DEVICE — STOCKINETTE, IMPERVIOUS, 9 X 48 IN, STERILE